# Patient Record
Sex: MALE | Race: WHITE | NOT HISPANIC OR LATINO | Employment: OTHER | URBAN - METROPOLITAN AREA
[De-identification: names, ages, dates, MRNs, and addresses within clinical notes are randomized per-mention and may not be internally consistent; named-entity substitution may affect disease eponyms.]

---

## 2020-12-21 ENCOUNTER — OFFICE VISIT (OUTPATIENT)
Dept: INTERNAL MEDICINE CLINIC | Facility: CLINIC | Age: 80
End: 2020-12-21
Payer: COMMERCIAL

## 2020-12-21 VITALS
SYSTOLIC BLOOD PRESSURE: 148 MMHG | TEMPERATURE: 97.8 F | OXYGEN SATURATION: 92 % | DIASTOLIC BLOOD PRESSURE: 80 MMHG | BODY MASS INDEX: 25.76 KG/M2 | WEIGHT: 170 LBS | HEIGHT: 68 IN | HEART RATE: 60 BPM

## 2020-12-21 DIAGNOSIS — H61.21 IMPACTED CERUMEN OF RIGHT EAR: ICD-10-CM

## 2020-12-21 DIAGNOSIS — E78.2 MIXED HYPERLIPIDEMIA: ICD-10-CM

## 2020-12-21 DIAGNOSIS — I10 ESSENTIAL HYPERTENSION: ICD-10-CM

## 2020-12-21 DIAGNOSIS — E11.9 TYPE 2 DIABETES MELLITUS WITHOUT COMPLICATION, WITHOUT LONG-TERM CURRENT USE OF INSULIN (HCC): Primary | ICD-10-CM

## 2020-12-21 PROBLEM — Z90.49 STATUS POST LAPAROSCOPIC CHOLECYSTECTOMY: Status: ACTIVE | Noted: 2019-05-10

## 2020-12-21 PROBLEM — Z09 STATUS POST UMBILICAL HERNIA REPAIR, FOLLOW-UP EXAM: Status: ACTIVE | Noted: 2019-05-10

## 2020-12-21 PROBLEM — K21.9 HIATAL HERNIA WITH GERD: Status: ACTIVE | Noted: 2019-05-29

## 2020-12-21 PROBLEM — K44.9 HIATAL HERNIA WITH GERD: Status: ACTIVE | Noted: 2019-05-29

## 2020-12-21 PROCEDURE — 3288F FALL RISK ASSESSMENT DOCD: CPT | Performed by: INTERNAL MEDICINE

## 2020-12-21 PROCEDURE — 3079F DIAST BP 80-89 MM HG: CPT | Performed by: INTERNAL MEDICINE

## 2020-12-21 PROCEDURE — 1101F PT FALLS ASSESS-DOCD LE1/YR: CPT | Performed by: INTERNAL MEDICINE

## 2020-12-21 PROCEDURE — 1160F RVW MEDS BY RX/DR IN RCRD: CPT | Performed by: INTERNAL MEDICINE

## 2020-12-21 PROCEDURE — 3077F SYST BP >= 140 MM HG: CPT | Performed by: INTERNAL MEDICINE

## 2020-12-21 PROCEDURE — 99203 OFFICE O/P NEW LOW 30 MIN: CPT | Performed by: INTERNAL MEDICINE

## 2020-12-21 PROCEDURE — 3725F SCREEN DEPRESSION PERFORMED: CPT | Performed by: INTERNAL MEDICINE

## 2020-12-21 PROCEDURE — 69210 REMOVE IMPACTED EAR WAX UNI: CPT | Performed by: INTERNAL MEDICINE

## 2020-12-21 RX ORDER — LOVASTATIN 40 MG/1
40 TABLET ORAL
COMMUNITY
End: 2021-05-03 | Stop reason: SDUPTHER

## 2020-12-21 RX ORDER — GLIMEPIRIDE 4 MG/1
4 TABLET ORAL
COMMUNITY
End: 2021-03-15 | Stop reason: SDUPTHER

## 2020-12-21 RX ORDER — ATENOLOL 100 MG/1
100 TABLET ORAL
COMMUNITY
End: 2021-06-03 | Stop reason: SDUPTHER

## 2020-12-21 RX ORDER — LISINOPRIL AND HYDROCHLOROTHIAZIDE 25; 20 MG/1; MG/1
TABLET ORAL
COMMUNITY
Start: 2020-09-28 | End: 2021-01-11 | Stop reason: SDUPTHER

## 2021-01-11 DIAGNOSIS — I10 BENIGN ESSENTIAL HTN: Primary | ICD-10-CM

## 2021-01-11 NOTE — TELEPHONE ENCOUNTER
LOV; 12/21/20  NOV: 5/26/21    Looks as if this patient is new to the practice, has seen Dr Veronica Jama once but needs his med refilled   Not sure of the dx code

## 2021-01-12 RX ORDER — LISINOPRIL AND HYDROCHLOROTHIAZIDE 25; 20 MG/1; MG/1
1 TABLET ORAL DAILY
Qty: 90 TABLET | Refills: 1 | Status: SHIPPED | OUTPATIENT
Start: 2021-01-12 | End: 2021-06-03 | Stop reason: SDUPTHER

## 2021-01-19 ENCOUNTER — IMMUNIZATIONS (OUTPATIENT)
Dept: FAMILY MEDICINE CLINIC | Facility: HOSPITAL | Age: 81
End: 2021-01-19

## 2021-01-19 DIAGNOSIS — Z23 ENCOUNTER FOR IMMUNIZATION: Primary | ICD-10-CM

## 2021-01-19 PROCEDURE — 0011A SARS-COV-2 / COVID-19 MRNA VACCINE (MODERNA) 100 MCG: CPT

## 2021-01-19 PROCEDURE — 91301 SARS-COV-2 / COVID-19 MRNA VACCINE (MODERNA) 100 MCG: CPT

## 2021-02-19 ENCOUNTER — IMMUNIZATIONS (OUTPATIENT)
Dept: FAMILY MEDICINE CLINIC | Facility: HOSPITAL | Age: 81
End: 2021-02-19

## 2021-02-19 DIAGNOSIS — Z23 ENCOUNTER FOR IMMUNIZATION: Primary | ICD-10-CM

## 2021-02-19 PROCEDURE — 0012A SARS-COV-2 / COVID-19 MRNA VACCINE (MODERNA) 100 MCG: CPT

## 2021-02-19 PROCEDURE — 91301 SARS-COV-2 / COVID-19 MRNA VACCINE (MODERNA) 100 MCG: CPT

## 2021-03-15 ENCOUNTER — TELEPHONE (OUTPATIENT)
Dept: INTERNAL MEDICINE CLINIC | Age: 81
End: 2021-03-15

## 2021-03-15 DIAGNOSIS — E11.9 TYPE 2 DIABETES MELLITUS WITHOUT COMPLICATION, WITHOUT LONG-TERM CURRENT USE OF INSULIN (HCC): ICD-10-CM

## 2021-03-15 DIAGNOSIS — E11.9 TYPE 2 DIABETES MELLITUS WITHOUT COMPLICATION, WITHOUT LONG-TERM CURRENT USE OF INSULIN (HCC): Primary | ICD-10-CM

## 2021-03-15 RX ORDER — GLIMEPIRIDE 4 MG/1
4 TABLET ORAL DAILY
Qty: 90 TABLET | Refills: 0 | Status: CANCELLED | OUTPATIENT
Start: 2021-03-15

## 2021-03-15 RX ORDER — GLIMEPIRIDE 4 MG/1
4 TABLET ORAL DAILY
Qty: 90 TABLET | Refills: 0 | Status: SHIPPED | OUTPATIENT
Start: 2021-03-15 | End: 2021-06-03 | Stop reason: SDUPTHER

## 2021-05-03 DIAGNOSIS — E78.2 MIXED HYPERLIPIDEMIA: Primary | ICD-10-CM

## 2021-05-03 RX ORDER — LOVASTATIN 40 MG/1
40 TABLET ORAL DAILY
Qty: 90 TABLET | Refills: 1 | Status: SHIPPED | OUTPATIENT
Start: 2021-05-03 | End: 2021-06-03 | Stop reason: SDUPTHER

## 2021-05-03 NOTE — TELEPHONE ENCOUNTER
LOV: 12/21/20  NOV: 5/26/21      His med needs to be refilled, he just est with Dr Sharon Siegel at last visit 12/21/20

## 2021-05-14 ENCOUNTER — APPOINTMENT (OUTPATIENT)
Dept: LAB | Facility: IMAGING CENTER | Age: 81
End: 2021-05-14
Payer: COMMERCIAL

## 2021-05-14 DIAGNOSIS — I10 ESSENTIAL HYPERTENSION: ICD-10-CM

## 2021-05-14 DIAGNOSIS — E78.2 MIXED HYPERLIPIDEMIA: ICD-10-CM

## 2021-05-14 DIAGNOSIS — E11.9 TYPE 2 DIABETES MELLITUS WITHOUT COMPLICATION, WITHOUT LONG-TERM CURRENT USE OF INSULIN (HCC): ICD-10-CM

## 2021-05-14 LAB
ALBUMIN SERPL BCP-MCNC: 4.3 G/DL (ref 3.5–5)
ALP SERPL-CCNC: 79 U/L (ref 46–116)
ALT SERPL W P-5'-P-CCNC: 27 U/L (ref 12–78)
ANION GAP SERPL CALCULATED.3IONS-SCNC: 6 MMOL/L (ref 4–13)
AST SERPL W P-5'-P-CCNC: 20 U/L (ref 5–45)
BASOPHILS # BLD AUTO: 0.03 THOUSANDS/ΜL (ref 0–0.1)
BASOPHILS NFR BLD AUTO: 0 % (ref 0–1)
BILIRUB SERPL-MCNC: 1.61 MG/DL (ref 0.2–1)
BUN SERPL-MCNC: 24 MG/DL (ref 5–25)
CALCIUM SERPL-MCNC: 9.1 MG/DL (ref 8.3–10.1)
CHLORIDE SERPL-SCNC: 105 MMOL/L (ref 100–108)
CHOLEST SERPL-MCNC: 132 MG/DL (ref 50–200)
CO2 SERPL-SCNC: 28 MMOL/L (ref 21–32)
CREAT SERPL-MCNC: 1.32 MG/DL (ref 0.6–1.3)
CREAT UR-MCNC: 95.3 MG/DL
EOSINOPHIL # BLD AUTO: 0.23 THOUSAND/ΜL (ref 0–0.61)
EOSINOPHIL NFR BLD AUTO: 2 % (ref 0–6)
ERYTHROCYTE [DISTWIDTH] IN BLOOD BY AUTOMATED COUNT: 13.1 % (ref 11.6–15.1)
EST. AVERAGE GLUCOSE BLD GHB EST-MCNC: 126 MG/DL
GFR SERPL CREATININE-BSD FRML MDRD: 50 ML/MIN/1.73SQ M
GLUCOSE P FAST SERPL-MCNC: 91 MG/DL (ref 65–99)
HBA1C MFR BLD: 6 %
HCT VFR BLD AUTO: 43.8 % (ref 36.5–49.3)
HDLC SERPL-MCNC: 56 MG/DL
HGB BLD-MCNC: 14.3 G/DL (ref 12–17)
IMM GRANULOCYTES # BLD AUTO: 0.04 THOUSAND/UL (ref 0–0.2)
IMM GRANULOCYTES NFR BLD AUTO: 0 % (ref 0–2)
LDLC SERPL CALC-MCNC: 53 MG/DL (ref 0–100)
LYMPHOCYTES # BLD AUTO: 6.36 THOUSANDS/ΜL (ref 0.6–4.47)
LYMPHOCYTES NFR BLD AUTO: 54 % (ref 14–44)
MCH RBC QN AUTO: 29.7 PG (ref 26.8–34.3)
MCHC RBC AUTO-ENTMCNC: 32.6 G/DL (ref 31.4–37.4)
MCV RBC AUTO: 91 FL (ref 82–98)
MICROALBUMIN UR-MCNC: 241 MG/L (ref 0–20)
MICROALBUMIN/CREAT 24H UR: 253 MG/G CREATININE (ref 0–30)
MONOCYTES # BLD AUTO: 0.64 THOUSAND/ΜL (ref 0.17–1.22)
MONOCYTES NFR BLD AUTO: 5 % (ref 4–12)
NEUTROPHILS # BLD AUTO: 4.59 THOUSANDS/ΜL (ref 1.85–7.62)
NEUTS SEG NFR BLD AUTO: 39 % (ref 43–75)
NONHDLC SERPL-MCNC: 76 MG/DL
NRBC BLD AUTO-RTO: 0 /100 WBCS
PLATELET # BLD AUTO: 163 THOUSANDS/UL (ref 149–390)
PMV BLD AUTO: 10.7 FL (ref 8.9–12.7)
POTASSIUM SERPL-SCNC: 4 MMOL/L (ref 3.5–5.3)
PROT SERPL-MCNC: 7.2 G/DL (ref 6.4–8.2)
RBC # BLD AUTO: 4.81 MILLION/UL (ref 3.88–5.62)
SODIUM SERPL-SCNC: 139 MMOL/L (ref 136–145)
TRIGL SERPL-MCNC: 114 MG/DL
WBC # BLD AUTO: 11.89 THOUSAND/UL (ref 4.31–10.16)

## 2021-05-14 PROCEDURE — 80061 LIPID PANEL: CPT

## 2021-05-14 PROCEDURE — 85025 COMPLETE CBC W/AUTO DIFF WBC: CPT

## 2021-05-14 PROCEDURE — 82043 UR ALBUMIN QUANTITATIVE: CPT

## 2021-05-14 PROCEDURE — 36415 COLL VENOUS BLD VENIPUNCTURE: CPT

## 2021-05-14 PROCEDURE — 83036 HEMOGLOBIN GLYCOSYLATED A1C: CPT

## 2021-05-14 PROCEDURE — 80053 COMPREHEN METABOLIC PANEL: CPT

## 2021-05-14 PROCEDURE — 82570 ASSAY OF URINE CREATININE: CPT

## 2021-06-03 ENCOUNTER — OFFICE VISIT (OUTPATIENT)
Dept: INTERNAL MEDICINE CLINIC | Facility: CLINIC | Age: 81
End: 2021-06-03
Payer: COMMERCIAL

## 2021-06-03 VITALS
OXYGEN SATURATION: 98 % | SYSTOLIC BLOOD PRESSURE: 114 MMHG | DIASTOLIC BLOOD PRESSURE: 72 MMHG | WEIGHT: 170.6 LBS | TEMPERATURE: 98.4 F | HEIGHT: 68 IN | BODY MASS INDEX: 25.85 KG/M2 | HEART RATE: 55 BPM | RESPIRATION RATE: 18 BRPM

## 2021-06-03 DIAGNOSIS — E80.6 HYPERBILIRUBINEMIA: ICD-10-CM

## 2021-06-03 DIAGNOSIS — R80.9 MICROALBUMINURIA: ICD-10-CM

## 2021-06-03 DIAGNOSIS — D72.829 LEUKOCYTOSIS, UNSPECIFIED TYPE: ICD-10-CM

## 2021-06-03 DIAGNOSIS — E11.9 TYPE 2 DIABETES MELLITUS WITHOUT COMPLICATION, WITHOUT LONG-TERM CURRENT USE OF INSULIN (HCC): Primary | ICD-10-CM

## 2021-06-03 DIAGNOSIS — E78.2 MIXED HYPERLIPIDEMIA: ICD-10-CM

## 2021-06-03 DIAGNOSIS — G20 PARKINSON'S DISEASE (HCC): ICD-10-CM

## 2021-06-03 DIAGNOSIS — I10 BENIGN ESSENTIAL HTN: ICD-10-CM

## 2021-06-03 DIAGNOSIS — I10 ESSENTIAL HYPERTENSION: ICD-10-CM

## 2021-06-03 PROBLEM — G20.A1 PARKINSON'S DISEASE: Status: ACTIVE | Noted: 2021-05-19

## 2021-06-03 PROBLEM — H61.21 IMPACTED CERUMEN OF RIGHT EAR: Status: RESOLVED | Noted: 2020-12-21 | Resolved: 2021-06-03

## 2021-06-03 PROCEDURE — 1160F RVW MEDS BY RX/DR IN RCRD: CPT | Performed by: INTERNAL MEDICINE

## 2021-06-03 PROCEDURE — 3074F SYST BP LT 130 MM HG: CPT | Performed by: INTERNAL MEDICINE

## 2021-06-03 PROCEDURE — 1036F TOBACCO NON-USER: CPT | Performed by: INTERNAL MEDICINE

## 2021-06-03 PROCEDURE — 3288F FALL RISK ASSESSMENT DOCD: CPT | Performed by: INTERNAL MEDICINE

## 2021-06-03 PROCEDURE — 3725F SCREEN DEPRESSION PERFORMED: CPT | Performed by: INTERNAL MEDICINE

## 2021-06-03 PROCEDURE — 3078F DIAST BP <80 MM HG: CPT | Performed by: INTERNAL MEDICINE

## 2021-06-03 PROCEDURE — 99214 OFFICE O/P EST MOD 30 MIN: CPT | Performed by: INTERNAL MEDICINE

## 2021-06-03 PROCEDURE — 1101F PT FALLS ASSESS-DOCD LE1/YR: CPT | Performed by: INTERNAL MEDICINE

## 2021-06-03 RX ORDER — LISINOPRIL AND HYDROCHLOROTHIAZIDE 25; 20 MG/1; MG/1
1 TABLET ORAL DAILY
Qty: 90 TABLET | Refills: 1 | Status: SHIPPED | OUTPATIENT
Start: 2021-06-03 | End: 2021-10-04 | Stop reason: SDUPTHER

## 2021-06-03 RX ORDER — LOVASTATIN 40 MG/1
40 TABLET ORAL DAILY
Qty: 90 TABLET | Refills: 1 | Status: SHIPPED | OUTPATIENT
Start: 2021-06-03 | End: 2021-10-25 | Stop reason: SDUPTHER

## 2021-06-03 RX ORDER — ATENOLOL 100 MG/1
100 TABLET ORAL DAILY
Qty: 90 TABLET | Refills: 1 | Status: SHIPPED | OUTPATIENT
Start: 2021-06-03 | End: 2022-01-10 | Stop reason: SDUPTHER

## 2021-06-03 RX ORDER — GLIMEPIRIDE 4 MG/1
4 TABLET ORAL DAILY
Qty: 90 TABLET | Refills: 1 | Status: SHIPPED | OUTPATIENT
Start: 2021-06-03 | End: 2021-09-20 | Stop reason: SDUPTHER

## 2021-06-03 NOTE — PROGRESS NOTES
Assessment/Plan:    Type 2 diabetes mellitus without complication, without long-term current use of insulin ([facility])    Lab Results   Component Value Date    HGBA1C 6 0 (H) 05/14/2021   Controlled  Continue glimepiride 4 mg daily and metformin 1000 mg twice a day  Essential hypertension  Controlled  Continue atenolol 100 mg daily, lisinopril-hydrochlorothiazide 20-25 mg daily  Parkinson's disease [facility])  Continue follow-up with Neurology  Mixed hyperlipidemia  Controlled  Continue lovastatin 40 mg daily  Microalbuminuria  Will refer to nephrology for further evaluation  Hyperbilirubinemia  Recheck CMP in 3 months  Diagnoses and all orders for this visit:    Type 2 diabetes mellitus without complication, without long-term current use of insulin ([facility] )  -     Ambulatory referral to Ophthalmology; Future  -     glimepiride (AMARYL) 4 mg tablet; Take 1 tablet (4 mg total) by mouth daily  -     metFORMIN (GLUCOPHAGE) 1000 MG tablet; Take 1 tablet (1,000 mg total) by mouth 2 (two) times a day with meals  -     Hemoglobin A1C; Future  -     Comprehensive metabolic panel; Future  -     CBC and differential; Future    Benign essential HTN  -     lisinopril-hydrochlorothiazide (PRINZIDE,ZESTORETIC) 20-25 MG per tablet; Take 1 tablet by mouth daily  -     atenolol (TENORMIN) 100 mg tablet; Take 1 tablet (100 mg total) by mouth daily    Mixed hyperlipidemia  -     lovastatin (MEVACOR) 40 MG tablet; Take 1 tablet (40 mg total) by mouth daily  -     Hemoglobin A1C; Future  -     Comprehensive metabolic panel; Future  -     CBC and differential; Future    Microalbuminuria  -     Ambulatory referral to Nephrology; Future    Leukocytosis, unspecified type  -     CBC and differential; Future    Hyperbilirubinemia  -     Comprehensive metabolic panel;  Future  -     CBC and differential; Future    Essential hypertension    Parkinson's disease ([facility] )                  Subjective:      Patient ID: Kaileyycirina Poe is a 79 y o  male  Chief Complaint   Patient presents with    Follow-up     6 month, BW done 5/14/21  no other issues or concerns    health maintenance     annual due , eye exam due appt is in Nov with Dr Stan Dang, A1C after Nov 14, PHQ, Fall risk, bmi f/u plan, bmi adult       69-year-old male is seen today for follow-up of chronic conditions  Laboratory studies reviewed today, hemoglobin A1c is controlled at 6%  CBC shows mild leukocytosis  CMP shows mild elevation in total bilirubin  Lipid panel is within acceptable range  He has been compliant with medication regimen  He has no active complaints or concerns at this time  He has establish care with a neurologist and is currently being evaluated for Parkinson's disease  He was not started any Parkinson's medications at this time  Tremors are stable  Microalbumin:  Creatinine ratio is elevated  He has never been evaluated by Nephrology in the past       Hypertension  This is a chronic problem  The current episode started more than 1 year ago  The problem is unchanged  The problem is controlled  Pertinent negatives include no chest pain, headaches, palpitations or shortness of breath  Past treatments include beta blockers, ACE inhibitors and diuretics  The current treatment provides moderate improvement  There are no compliance problems  Hyperlipidemia  This is a chronic problem  The current episode started more than 1 year ago  The problem is controlled  Recent lipid tests were reviewed and are normal  Pertinent negatives include no chest pain or shortness of breath  Current antihyperlipidemic treatment includes statins  The current treatment provides moderate improvement of lipids  There are no compliance problems  Diabetes  He presents for his follow-up diabetic visit  He has type 2 diabetes mellitus  His disease course has been stable  There are no hypoglycemic associated symptoms   Pertinent negatives for hypoglycemia include no dizziness or headaches  There are no diabetic associated symptoms  Pertinent negatives for diabetes include no chest pain, no fatigue and no weakness  There are no hypoglycemic complications  Symptoms are stable  There are no diabetic complications  Current diabetic treatment includes oral agent (dual therapy)  He is compliant with treatment all of the time  His weight is stable  He is following a generally healthy diet  There is no change in his home blood glucose trend  An ACE inhibitor/angiotensin II receptor blocker is being taken  The following portions of the patient's history were reviewed and updated as appropriate: allergies, current medications, past family history, past medical history, past social history, past surgical history and problem list     Review of Systems   Constitutional: Negative for activity change, appetite change, chills, diaphoresis, fatigue and fever  HENT: Negative for congestion, postnasal drip, rhinorrhea, sinus pressure, sinus pain, sneezing and sore throat  Eyes: Negative for visual disturbance  Respiratory: Negative for apnea, cough, choking, chest tightness, shortness of breath and wheezing  Cardiovascular: Negative for chest pain, palpitations and leg swelling  Gastrointestinal: Negative for abdominal distention, abdominal pain, anal bleeding, blood in stool, constipation, diarrhea, nausea and vomiting  Endocrine: Negative for cold intolerance and heat intolerance  Genitourinary: Negative for difficulty urinating, dysuria and hematuria  Musculoskeletal: Negative  Skin: Negative  Neurological: Negative for dizziness, weakness, light-headedness, numbness and headaches  Hematological: Negative for adenopathy  Psychiatric/Behavioral: Negative for agitation, sleep disturbance and suicidal ideas  All other systems reviewed and are negative  History reviewed  No pertinent past medical history        Current Outpatient Medications:     atenolol (TENORMIN) 100 mg tablet, Take 1 tablet (100 mg total) by mouth daily, Disp: 90 tablet, Rfl: 1    glimepiride (AMARYL) 4 mg tablet, Take 1 tablet (4 mg total) by mouth daily, Disp: 90 tablet, Rfl: 1    lisinopril-hydrochlorothiazide (PRINZIDE,ZESTORETIC) 20-25 MG per tablet, Take 1 tablet by mouth daily, Disp: 90 tablet, Rfl: 1    lovastatin (MEVACOR) 40 MG tablet, Take 1 tablet (40 mg total) by mouth daily, Disp: 90 tablet, Rfl: 1    metFORMIN (GLUCOPHAGE) 1000 MG tablet, Take 1 tablet (1,000 mg total) by mouth 2 (two) times a day with meals, Disp: 180 tablet, Rfl: 1    No Known Allergies    Social History   Past Surgical History:   Procedure Laterality Date    GALLBLADDER SURGERY      NOSE SURGERY       Family History   Problem Relation Age of Onset    No Known Problems Mother     No Known Problems Father        Objective:  /72 (BP Location: Left arm, Patient Position: Sitting, Cuff Size: Standard)   Pulse 55   Temp 98 4 °F (36 9 °C) (Temporal)   Resp 18   Ht 5' 8" (1 727 m)   Wt 77 4 kg (170 lb 9 6 oz)   SpO2 98%   BMI 25 94 kg/m²     Recent Results (from the past 1344 hour(s))   CBC and differential    Collection Time: 05/14/21  9:05 AM   Result Value Ref Range    WBC 11 89 (H) 4 31 - 10 16 Thousand/uL    RBC 4 81 3 88 - 5 62 Million/uL    Hemoglobin 14 3 12 0 - 17 0 g/dL    Hematocrit 43 8 36 5 - 49 3 %    MCV 91 82 - 98 fL    MCH 29 7 26 8 - 34 3 pg    MCHC 32 6 31 4 - 37 4 g/dL    RDW 13 1 11 6 - 15 1 %    MPV 10 7 8 9 - 12 7 fL    Platelets 110 307 - 422 Thousands/uL    nRBC 0 /100 WBCs    Neutrophils Relative 39 (L) 43 - 75 %    Immat GRANS % 0 0 - 2 %    Lymphocytes Relative 54 (H) 14 - 44 %    Monocytes Relative 5 4 - 12 %    Eosinophils Relative 2 0 - 6 %    Basophils Relative 0 0 - 1 %    Neutrophils Absolute 4 59 1 85 - 7 62 Thousands/µL    Immature Grans Absolute 0 04 0 00 - 0 20 Thousand/uL    Lymphocytes Absolute 6 36 (H) 0 60 - 4 47 Thousands/µL    Monocytes Absolute 0 64 0 17 - 1 22 Thousand/µL    Eosinophils Absolute 0 23 0 00 - 0 61 Thousand/µL    Basophils Absolute 0 03 0 00 - 0 10 Thousands/µL   Comprehensive metabolic panel    Collection Time: 05/14/21  9:05 AM   Result Value Ref Range    Sodium 139 136 - 145 mmol/L    Potassium 4 0 3 5 - 5 3 mmol/L    Chloride 105 100 - 108 mmol/L    CO2 28 21 - 32 mmol/L    ANION GAP 6 4 - 13 mmol/L    BUN 24 5 - 25 mg/dL    Creatinine 1 32 (H) 0 60 - 1 30 mg/dL    Glucose, Fasting 91 65 - 99 mg/dL    Calcium 9 1 8 3 - 10 1 mg/dL    AST 20 5 - 45 U/L    ALT 27 12 - 78 U/L    Alkaline Phosphatase 79 46 - 116 U/L    Total Protein 7 2 6 4 - 8 2 g/dL    Albumin 4 3 3 5 - 5 0 g/dL    Total Bilirubin 1 61 (H) 0 20 - 1 00 mg/dL    eGFR 50 ml/min/1 73sq m   Hemoglobin A1C    Collection Time: 05/14/21  9:05 AM   Result Value Ref Range    Hemoglobin A1C 6 0 (H) Normal 3 8-5 6%; PreDiabetic 5 7-6 4%; Diabetic >=6 5%; Glycemic control for adults with diabetes <7 0% %     mg/dl   Lipid panel    Collection Time: 05/14/21  9:05 AM   Result Value Ref Range    Cholesterol 132 50 - 200 mg/dL    Triglycerides 114 <=150 mg/dL    HDL, Direct 56 >=40 mg/dL    LDL Calculated 53 0 - 100 mg/dL    Non-HDL-Chol (CHOL-HDL) 76 mg/dl   Microalbumin / creatinine urine ratio    Collection Time: 05/14/21  9:05 AM   Result Value Ref Range    Creatinine, Ur 95 3 mg/dL    Microalbum  ,U,Random 241 0 (H) 0 0 - 20 0 mg/L    Microalb Creat Ratio 253 (H) 0 - 30 mg/g creatinine            Physical Exam  Vitals signs and nursing note reviewed  Constitutional:       General: He is not in acute distress  Appearance: He is well-developed  He is not diaphoretic  HENT:      Head: Normocephalic and atraumatic  Eyes:      General: No scleral icterus  Right eye: No discharge  Left eye: No discharge  Conjunctiva/sclera: Conjunctivae normal       Pupils: Pupils are equal, round, and reactive to light  Neck:      Musculoskeletal: Normal range of motion and neck supple  Thyroid: No thyromegaly  Vascular: No JVD  Cardiovascular:      Rate and Rhythm: Normal rate and regular rhythm  Heart sounds: Normal heart sounds  No murmur  No friction rub  No gallop  Pulmonary:      Effort: Pulmonary effort is normal  No respiratory distress  Breath sounds: Normal breath sounds  No wheezing or rales  Chest:      Chest wall: No tenderness  Abdominal:      General: Bowel sounds are normal  There is no distension  Palpations: Abdomen is soft  There is no mass  Tenderness: There is no abdominal tenderness  There is no guarding or rebound  Musculoskeletal: Normal range of motion  General: No tenderness or deformity  Lymphadenopathy:      Cervical: No cervical adenopathy  Skin:     General: Skin is warm and dry  Coloration: Skin is not pale  Findings: No erythema or rash  Neurological:      Mental Status: He is alert and oriented to person, place, and time  Cranial Nerves: No cranial nerve deficit  Coordination: Coordination normal       Deep Tendon Reflexes: Reflexes are normal and symmetric  Psychiatric:         Behavior: Behavior normal          Thought Content:  Thought content normal          Judgment: Judgment normal

## 2021-06-03 NOTE — ASSESSMENT & PLAN NOTE
Lab Results   Component Value Date    HGBA1C 6 0 (H) 05/14/2021   Controlled  Continue glimepiride 4 mg daily and metformin 1000 mg twice a day

## 2021-08-09 ENCOUNTER — CONSULT (OUTPATIENT)
Dept: NEPHROLOGY | Facility: CLINIC | Age: 81
End: 2021-08-09
Payer: COMMERCIAL

## 2021-08-09 VITALS
WEIGHT: 168 LBS | BODY MASS INDEX: 25.46 KG/M2 | SYSTOLIC BLOOD PRESSURE: 138 MMHG | RESPIRATION RATE: 16 BRPM | DIASTOLIC BLOOD PRESSURE: 78 MMHG | HEART RATE: 80 BPM | HEIGHT: 68 IN

## 2021-08-09 DIAGNOSIS — R80.9 MICROALBUMINURIA: Primary | ICD-10-CM

## 2021-08-09 LAB
SL AMB  POCT GLUCOSE, UA: NEGATIVE
SL AMB LEUKOCYTE ESTERASE,UA: NEGATIVE
SL AMB POCT BILIRUBIN,UA: NEGATIVE
SL AMB POCT BLOOD,UA: NEGATIVE
SL AMB POCT CLARITY,UA: CLEAR
SL AMB POCT COLOR,UA: YELLOW
SL AMB POCT KETONES,UA: NEGATIVE
SL AMB POCT NITRITE,UA: NEGATIVE
SL AMB POCT PH,UA: 5
SL AMB POCT SPECIFIC GRAVITY,UA: 1.03
SL AMB POCT URINE PROTEIN: ABNORMAL
SL AMB POCT UROBILINOGEN: NEGATIVE

## 2021-08-09 PROCEDURE — 99204 OFFICE O/P NEW MOD 45 MIN: CPT | Performed by: INTERNAL MEDICINE

## 2021-08-09 PROCEDURE — 3075F SYST BP GE 130 - 139MM HG: CPT | Performed by: INTERNAL MEDICINE

## 2021-08-09 PROCEDURE — 1036F TOBACCO NON-USER: CPT | Performed by: INTERNAL MEDICINE

## 2021-08-09 PROCEDURE — 3078F DIAST BP <80 MM HG: CPT | Performed by: INTERNAL MEDICINE

## 2021-08-09 PROCEDURE — 81002 URINALYSIS NONAUTO W/O SCOPE: CPT | Performed by: INTERNAL MEDICINE

## 2021-08-09 NOTE — LETTER
August 9, 2021     January Hightower, [address] [facility]    Patient: Toby East   YOB: 1940   Date of Visit: 8/9/2021       Dear Dr Elda Cohn:    Thank you for referring Toby East to me for evaluation  Below are my notes for this consultation  If you have questions, please do not hesitate to call me  I look forward to following your patient along with you  Sincerely,        Sivan Christian MD        CC: No Recipients  Sivan Christian MD  8/9/2021  2:41 PM  Sign when Signing Visit  Consultation - Nephrology   Toby East 79 y o  male MRN: [id number]  Unit/Bed#:  Encounter: [id number]      Assessment/Plan     Assessment / Plan:  1  Renal    The patient reports that he has been diabetic for couple years and is currently on oral medications and is excellent glycemic control  Creatinine was 1 25 year ago 1 3 this year  Urine protein estimation is still below 300 mg which is in the microalbumin phase  Urinalysis today dip for protein but was likely a concentrated specimen as there are couple in the labs that showed microalbumin levels less than 300  So at this point I cannot say the T is overt diabetic nephropathy given the low levels of protein and that would mean that his mild renal insufficiency is not due to diabetic kidney disease and likely is due to nephrosclerosis  Another possibility there could be reduction glomerular pressure from an ACE-inhibitor  This point is on all the appropriate treatment with excellent sugar control as is A1cs less than 7%  He is on treatment for hypercholesterolemia blood pressure is excellent and he is on an ACE-inhibitor  In talking to the patient he told me that he had a new family physician this year  He would like to just follow with his primary doctor less there was a real problem or issue with his kidneys    At this point you been doing a great job with monitor his urine protein estimation is in renal function I would just continue with that per protocol  Continue with the current treatment if there is ever problem or issue in the future you would like him to be seen by me please have him call the office for follow-up and he is aware of this and that was his desire  History of Present Illness   Physician Requesting Consult: No att  providers found  Reason for Consult / Principal Problem:  Renal insufficiency with micro albuminuria  Hx and PE limited by:   HPI: Sugey Celaya is a 79y o  year old male who presents for his 1st visit  The patient states his health has been good he has no recent illnesses and he was referred here after recently seeing his family physician to evaluate his kidney function  History obtained from chart review and the patient  Consults    Review of Systems   Constitutional: Negative for chills, diaphoresis, fatigue and fever  HENT: Negative  Eyes: Negative  Respiratory: Negative  Negative for cough, chest tightness, shortness of breath and wheezing  Cardiovascular: Negative  Negative for chest pain, palpitations and leg swelling  Gastrointestinal: Negative  Negative for abdominal distention, abdominal pain, diarrhea, nausea and vomiting  Genitourinary: Negative for difficulty urinating, dysuria, flank pain and hematuria  Neurological: Positive for tremors  Negative for dizziness, syncope, light-headedness and headaches  Psychiatric/Behavioral: Negative  Negative for agitation, behavioral problems, confusion and decreased concentration  Historical Information   Patient Active Problem List   Diagnosis    Essential hypertension    Hiatal hernia with GERD    Status post laparoscopic cholecystectomy    Status post umbilical hernia repair, follow-up exam    Type 2 diabetes mellitus without complication, without long-term current use of insulin ([facility]    Mixed hyperlipidemia    Parkinson's disease ([facility]    Microalbuminuria    Hyperbilirubinemia     History reviewed   No pertinent past medical history  Past Surgical History:   Procedure Laterality Date    GALLBLADDER SURGERY      NOSE SURGERY       Social History   Social History     Substance and Sexual Activity   Alcohol Use Yes     Social History     Substance and Sexual Activity   Drug Use Never     Social History     Tobacco Use   Smoking Status Never Smoker   Smokeless Tobacco Never Used     Family History   Problem Relation Age of Onset    No Known Problems Mother     No Known Problems Father        Meds/Allergies   current meds:   No current facility-administered medications for this visit  No Known Allergies    Objective   [unfilled]  Body mass index is 25 54 kg/m²  Invasive Devices:        PHYSICAL EXAM:  /78 (BP Location: Left arm, Patient Position: Sitting, Cuff Size: Standard)   Pulse 80   Resp 16   Ht 5' 8" (1 727 m)   Wt 76 2 kg (168 lb)   BMI 25 54 kg/m²     Physical Exam  Constitutional:       General: He is not in acute distress  Appearance: Normal appearance  He is not ill-appearing or diaphoretic  HENT:      Head: Normocephalic and atraumatic  Nose:      Comments: Wearing mask     Mouth/Throat:      Comments: Wearing mask  Eyes:      General: No scleral icterus  Extraocular Movements: Extraocular movements intact  Cardiovascular:      Rate and Rhythm: Normal rate and regular rhythm  Heart sounds: No friction rub  No gallop  Comments: No edema  Pulmonary:      Effort: Pulmonary effort is normal  No respiratory distress  Breath sounds: Normal breath sounds  No wheezing, rhonchi or rales  Abdominal:      General: Bowel sounds are normal  There is no distension  Palpations: Abdomen is soft  Tenderness: There is no abdominal tenderness  There is no rebound  Musculoskeletal:      Cervical back: Normal range of motion and neck supple  Neurological:      General: No focal deficit present        Mental Status: He is alert and oriented to person, place, and time  Mental status is at baseline  Psychiatric:         Mood and Affect: Mood normal          Behavior: Behavior normal          Thought Content:  Thought content normal          Judgment: Judgment normal            Current Weight: Weight - Scale: 76 2 kg (168 lb)  First Weight: Weight - Scale: 76 2 kg (168 lb)    Lab Results:              Invalid input(s): LABGLOM        Invalid input(s): LABALBU

## 2021-08-09 NOTE — PROGRESS NOTES
Consultation - Nephrology   Terri Milligan 79 y o  male MRN: [id number]  Unit/Bed#:  Encounter: [id number]      Assessment/Plan     Assessment / Plan:  1  Renal    The patient reports that he has been diabetic for couple years and is currently on oral medications and is excellent glycemic control  Creatinine was 1 25 year ago 1 3 this year  Urine protein estimation is still below 300 mg which is in the microalbumin phase  Urinalysis today dip for protein but was likely a concentrated specimen as there are couple in the labs that showed microalbumin levels less than 300  So at this point I cannot say the T is overt diabetic nephropathy given the low levels of protein and that would mean that his mild renal insufficiency is not due to diabetic kidney disease and likely is due to nephrosclerosis  Another possibility there could be reduction glomerular pressure from an ACE-inhibitor  This point is on all the appropriate treatment with excellent sugar control as is A1cs less than 7%  He is on treatment for hypercholesterolemia blood pressure is excellent and he is on an ACE-inhibitor  In talking to the patient he told me that he had a new family physician this year  He would like to just follow with his primary doctor less there was a real problem or issue with his kidneys  At this point you been doing a great job with monitor his urine protein estimation is in renal function I would just continue with that per protocol  Continue with the current treatment if there is ever problem or issue in the future you would like him to be seen by me please have him call the office for follow-up and he is aware of this and that was his desire  History of Present Illness   Physician Requesting Consult: No att  providers found  Reason for Consult / Principal Problem:  Renal insufficiency with micro albuminuria  Hx and PE limited by:   HPI: Terri Milligan is a 79y o  year old male who presents for his 1st visit    The patient states his health has been good he has no recent illnesses and he was referred here after recently seeing his family physician to evaluate his kidney function  History obtained from chart review and the patient  Consults    Review of Systems   Constitutional: Negative for chills, diaphoresis, fatigue and fever  HENT: Negative  Eyes: Negative  Respiratory: Negative  Negative for cough, chest tightness, shortness of breath and wheezing  Cardiovascular: Negative  Negative for chest pain, palpitations and leg swelling  Gastrointestinal: Negative  Negative for abdominal distention, abdominal pain, diarrhea, nausea and vomiting  Genitourinary: Negative for difficulty urinating, dysuria, flank pain and hematuria  Neurological: Positive for tremors  Negative for dizziness, syncope, light-headedness and headaches  Psychiatric/Behavioral: Negative  Negative for agitation, behavioral problems, confusion and decreased concentration  Historical Information   Patient Active Problem List   Diagnosis    Essential hypertension    Hiatal hernia with GERD    Status post laparoscopic cholecystectomy    Status post umbilical hernia repair, follow-up exam    Type 2 diabetes mellitus without complication, without long-term current use of insulin ([facility]    Mixed hyperlipidemia    Parkinson's disease ([facility]    Microalbuminuria    Hyperbilirubinemia     History reviewed  No pertinent past medical history    Past Surgical History:   Procedure Laterality Date    GALLBLADDER SURGERY      NOSE SURGERY       Social History   Social History     Substance and Sexual Activity   Alcohol Use Yes     Social History     Substance and Sexual Activity   Drug Use Never     Social History     Tobacco Use   Smoking Status Never Smoker   Smokeless Tobacco Never Used     Family History   Problem Relation Age of Onset    No Known Problems Mother     No Known Problems Father        Meds/Allergies   current meds: No current facility-administered medications for this visit  No Known Allergies    Objective   [unfilled]  Body mass index is 25 54 kg/m²  Invasive Devices:        PHYSICAL EXAM:  /78 (BP Location: Left arm, Patient Position: Sitting, Cuff Size: Standard)   Pulse 80   Resp 16   Ht 5' 8" (1 727 m)   Wt 76 2 kg (168 lb)   BMI 25 54 kg/m²     Physical Exam  Constitutional:       General: He is not in acute distress  Appearance: Normal appearance  He is not ill-appearing or diaphoretic  HENT:      Head: Normocephalic and atraumatic  Nose:      Comments: Wearing mask     Mouth/Throat:      Comments: Wearing mask  Eyes:      General: No scleral icterus  Extraocular Movements: Extraocular movements intact  Cardiovascular:      Rate and Rhythm: Normal rate and regular rhythm  Heart sounds: No friction rub  No gallop  Comments: No edema  Pulmonary:      Effort: Pulmonary effort is normal  No respiratory distress  Breath sounds: Normal breath sounds  No wheezing, rhonchi or rales  Abdominal:      General: Bowel sounds are normal  There is no distension  Palpations: Abdomen is soft  Tenderness: There is no abdominal tenderness  There is no rebound  Musculoskeletal:      Cervical back: Normal range of motion and neck supple  Neurological:      General: No focal deficit present  Mental Status: He is alert and oriented to person, place, and time  Mental status is at baseline  Psychiatric:         Mood and Affect: Mood normal          Behavior: Behavior normal          Thought Content:  Thought content normal          Judgment: Judgment normal            Current Weight: Weight - Scale: 76 2 kg (168 lb)  First Weight: Weight - Scale: 76 2 kg (168 lb)    Lab Results:              Invalid input(s): LABGLOM        Invalid input(s): LABALBU

## 2021-08-09 NOTE — PATIENT INSTRUCTIONS
You are here for your 1st visit as you were sent by your family physician  You are diabetic for couple years  Your creatinine test which is the blood test for the kidney function is 1 3 going back a year was around 1 2 so it is unchanged  There is a little bit of protein in your urine that could be due to diabetes very early but at this point you do not have diabetic kidney disease because those patients tend to have a lot of protein in the urine and would not be responsible for the blood test being a little abnormal   Your doctor's been monitoring this  Your on all the appropriate treatment 2 delay damage your kidneys with blood pressure control sugar control is great medicines for cholesterol and your on a medicine called an ACE-inhibitor that helps minimize protein in the urine  This tells me your on all the appropriate treatment and her blood sugars are doing great  I will communicate this with your doctor and if there is a problem in the future he can refer you back

## 2021-09-14 ENCOUNTER — TELEPHONE (OUTPATIENT)
Dept: HEMATOLOGY ONCOLOGY | Facility: CLINIC | Age: 81
End: 2021-09-14

## 2021-09-14 ENCOUNTER — TELEPHONE (OUTPATIENT)
Dept: SURGICAL ONCOLOGY | Facility: CLINIC | Age: 81
End: 2021-09-14

## 2021-09-14 NOTE — TELEPHONE ENCOUNTER
New Patient Intake Skin Malignancy   Patient Details:     Guido Maldonado     1940     [id number]     Background Information:   [address] starts by opening a telephone encounter and gathering the following information   Who is calling to schedule? Patient   Referring Provider Dr Selin Montgomery   To Which Speciality? Surgical Oncology   Reason for Visit? New Diagnosis   Tumor Type/Diagnosis? melanoma   Is this Cancer or Non-Cancer? Cancer   Is there a confirmed diagnosis from Biopsy/tissue reviewed by Pathology? Yes   Date of Tissue Diagnosis  If outside of [facility] obtain report and slides 9/2021   Is the patient aware of diagnosis? Yes   Has Imaging been done? No   If so, when and where? If outside of [facility], obtain records and disk    Has Bloodwork been done? No   If so, when and where? If outside of [facility], obtain records  Are records needed from an outside facility? Yes   If yes, Name, [place] city and state where facility is located  The path is from [address] [place] [address] and was sent to right fax 9/14/21 and slide requested is ready to be sent   Is there a personal history of cancer? If yes, what kind? No   Is there a family history of cancer? If so, what kind? No   Scheduling Information:   Preferred Amber Coates  Or [facility]   Are there any days the patient cannot be seen? No   Will you see a different provider if we can schedule your appointment sooner?  Yes   Miscellaneous:  sent to nurse navigator for scheduling instructions

## 2021-09-14 NOTE — TELEPHONE ENCOUNTER
New Patient Request   Patient Details:     Alvin Garcia      1940      [id number]      Reason for Appointment   Who is calling to schedule? Nicki   If not Patient, what is their name? What is the diagnosis? Melanoma    Who is the referring doctor? Dr Johnny Morales are you scheduling with ? Surg onc    Preferred [id number] [address] or [facility] Number to call back on? If calling from the [facility], use the Nurse number   [phone]   Miscellaneous Information:  records will be sent    Please advise the patient, a new patient  will be calling them back within 1 business day

## 2021-09-20 ENCOUNTER — DOCUMENTATION (OUTPATIENT)
Dept: HEMATOLOGY ONCOLOGY | Facility: CLINIC | Age: 81
End: 2021-09-20

## 2021-09-20 DIAGNOSIS — E11.9 TYPE 2 DIABETES MELLITUS WITHOUT COMPLICATION, WITHOUT LONG-TERM CURRENT USE OF INSULIN (HCC): ICD-10-CM

## 2021-09-20 RX ORDER — GLIMEPIRIDE 4 MG/1
4 TABLET ORAL DAILY
Qty: 90 TABLET | Refills: 1 | Status: SHIPPED | OUTPATIENT
Start: 2021-09-20 | End: 2021-12-28 | Stop reason: SDUPTHER

## 2021-09-20 NOTE — PROGRESS NOTES
Called ins & spoke to fernanda call ref# [id number] pt has an active [facility] med repl plan that runs on a cal year  Effective 01/01/21   Deduct $300 met in sept 21  Out of pocket $1700 met $419 28  For labs there is the deduct then a co-ins of 90/10 that goes towards the out of pocket   Once the out of pocket is met all services are covered 100%

## 2021-09-20 NOTE — TELEPHONE ENCOUNTER
Requesting Amaryl 4 mg to be sent to [facility] in [place] since he does not think he will get in on time      LOV: 6/3/21  NOV: 10/11/21

## 2021-09-20 NOTE — TELEPHONE ENCOUNTER
Intake received  Benefits review in process  Pt outreach to follow    Called pt to go over his benefts(see notes) & pt thanked me for the info

## 2021-09-22 PROBLEM — C43.39 MELANOMA OF FOREHEAD (HCC): Status: ACTIVE | Noted: 2021-09-22

## 2021-09-23 ENCOUNTER — CONSULT (OUTPATIENT)
Dept: SURGICAL ONCOLOGY | Facility: CLINIC | Age: 81
End: 2021-09-23
Payer: COMMERCIAL

## 2021-09-23 VITALS
OXYGEN SATURATION: 98 % | WEIGHT: 165 LBS | SYSTOLIC BLOOD PRESSURE: 148 MMHG | BODY MASS INDEX: 25.01 KG/M2 | DIASTOLIC BLOOD PRESSURE: 100 MMHG | TEMPERATURE: 97.1 F | HEART RATE: 68 BPM | RESPIRATION RATE: 16 BRPM | HEIGHT: 68 IN

## 2021-09-23 DIAGNOSIS — C43.30 MALIGNANT MELANOMA OF SKIN OF FACE (HCC): Primary | ICD-10-CM

## 2021-09-23 PROCEDURE — 99204 OFFICE O/P NEW MOD 45 MIN: CPT | Performed by: STUDENT IN AN ORGANIZED HEALTH CARE EDUCATION/TRAINING PROGRAM

## 2021-09-23 PROCEDURE — 1036F TOBACCO NON-USER: CPT | Performed by: STUDENT IN AN ORGANIZED HEALTH CARE EDUCATION/TRAINING PROGRAM

## 2021-09-23 RX ORDER — TRAMADOL HYDROCHLORIDE 50 MG/1
50 TABLET ORAL EVERY 6 HOURS PRN
Qty: 20 TABLET | Refills: 0 | Status: SHIPPED | OUTPATIENT
Start: 2021-09-23 | End: 2021-10-12 | Stop reason: HOSPADM

## 2021-09-23 RX ORDER — CEFAZOLIN SODIUM 1 G/50ML
1000 SOLUTION INTRAVENOUS ONCE
Status: CANCELLED | OUTPATIENT
Start: 2021-09-23 | End: 2021-09-23

## 2021-09-23 NOTE — PROGRESS NOTES
Surgical Oncology Consultation    [address] [facility] [facility] Aranza Fillers  [address] [facility] [id number] [phone]    Patient:  Ghanshyam Donovan  1940  [id number]    Primary Care provider: MD Chinyere Proctor [address]    Referring provider:  No referring provider defined for this encounter  Diagnoses and all orders for this visit:    Malignant melanoma of skin of face ([facility] [address] )  -     Case request operating room: [address] [facility], BIOPSY LYMPH NODE SENTINEL; Standing    Other orders  -     Incentive spirometry; Standing  -     Insert and maintain IV line; Standing  -     Void On-Call to O R ; Standing  -     Place sequential compression device; Standing  -     Comprehensive metabolic panel; Future  -     CBC and differential; Future  -     EKG 12 lead; Future  -     XR chest pa & lateral; Future  -     ceFAZolin (ANCEF) IVPB (premix in dextrose) 1,000 mg 50 mL  -     NM lymphatic melanoma; Future  -     Ambulatory referral to Plastic Surgery; Future  -     traMADol (Ultram) 50 mg tablet; Take 1 tablet (50 mg total) by mouth every 6 (six) hours as needed for moderate pain        Chief Complaint   Patient presents with    Melanoma     Pt here for consult for melanoma on his left face  No follow-ups on file  Oncology History    No history exists  History of Present Illness  :   78 yo male presents with new melanoma of left face  Patient states the melena has been present for an unknown amount of time  He states he sees Dermatology regularly, almost 4 months, for a history of basal cell and squamous cell carcinomas  The dermatologist noted this lesion, performed biopsy, revealing a 1 7 mm in depth melanoma with a positive deep margin  The patient denies any other lumps or bumps the face or neck    He states he has is a hat regularly when he goes out the son and does not recall any blistering sunburns  He has had brown hair and blue eyes  Review of Systems  Complete ROS Surg Onc:   Constitutional: The patient denies new or recent history of general fatigue, no recent weight loss, no change in appetite  Eyes: No complaints of visual problems, no scleral icterus  ENT: No complaints of ear pain, no hoarseness, no difficulty swallowing,  no tinnitus and no new masses in head, oral cavity, or neck  Cardiovascular: No complaints of chest pain, no palpitations, no ankle edema  Respiratory: No complaints of shortness of breath, no cough  Gastrointestinal: No complaints of jaundice, no bloody stools, no pale stools  Genitourinary: No complaints of dysuria, no hematuria, no nocturia, no frequent urination, no urethral discharge  Musculoskeletal: No complaints of weakness, paralysis, joint stiffness or arthralgias  Integumentary: No complaints of rash, no new lesions  Neurological: No complaints of convulsions, no seizures, no dizziness  Hematologic/Lymphatic: No complaints of easy bruising  Endocrine:  No hot or cold intolerance  No polydipsia, polyphagia, or polyuria  Allergy/immunology:  No environmental allergies  No food allergies  Not immunocompromised  Patient Active Problem List   Diagnosis    Essential hypertension    Hiatal hernia with GERD    Status post laparoscopic cholecystectomy    Status post umbilical hernia repair, follow-up exam    Type 2 diabetes mellitus without complication, without long-term current use of insulin ([address] )    Mixed hyperlipidemia    Parkinson's disease ([address] )    Microalbuminuria    Hyperbilirubinemia    Malignant melanoma of skin of face ([address] )     History reviewed  No pertinent past medical history    Past Surgical History:   Procedure Laterality Date    GALLBLADDER SURGERY      HAND SURGERY Left     NOSE SURGERY       Family History   Problem Relation Age of Onset    No Known Problems Mother     No Known Problems Father Social History     Socioeconomic History    Marital status: /Civil Union     Spouse name: Not on file    Number of children: Not on file    Years of education: Not on file    Highest education level: Not on file   Occupational History    Not on file   Tobacco Use    Smoking status: Never Smoker    Smokeless tobacco: Never Used   Vaping Use    Vaping Use: Never used   Substance and Sexual Activity    Alcohol use: Not Currently    Drug use: Never    Sexual activity: Not Currently   Other Topics Concern    Not on file   Social History Narrative    Not on file     Social Determinants of Health     Financial Resource Strain:     Difficulty of Paying Living Expenses:    Food Insecurity:     Worried About Running Out of Food in the Last Year:     [address] in the Last Year:    Transportation Needs:     Lack of Transportation (Medical):      Lack of Transportation (Non-Medical):    Physical Activity:     Days of Exercise per Week:     Minutes of Exercise per Session:    Stress:     Feeling of Stress :    Social Connections:     Frequency of Communication with Friends and Family:     Frequency of Social Gatherings with Friends and Family:     Attends [employer] Services:     Active Member of Clubs or Organizations:     Attends Club or Organization Meetings:     Marital Status:    Intimate Partner Violence:     Fear of Current or Ex-Partner:     Emotionally Abused:     Physically Abused:     Sexually Abused:        Current Outpatient Medications:     atenolol (TENORMIN) 100 mg tablet, Take 1 tablet (100 mg total) by mouth daily, Disp: 90 tablet, Rfl: 1    glimepiride (AMARYL) 4 mg tablet, Take 1 tablet (4 mg total) by mouth daily, Disp: 90 tablet, Rfl: 1    lisinopril-hydrochlorothiazide (PRINZIDE,ZESTORETIC) 20-25 MG per tablet, Take 1 tablet by mouth daily, Disp: 90 tablet, Rfl: 1    lovastatin (MEVACOR) 40 MG tablet, Take 1 tablet (40 mg total) by mouth daily, Disp: 90 tablet, Rfl: 1    metFORMIN (GLUCOPHAGE) 1000 MG tablet, Take 1 tablet (1,000 mg total) by mouth 2 (two) times a day with meals, Disp: 180 tablet, Rfl: 1  No Known Allergies    Vitals:    09/23/21 0954   BP: 148/100   Pulse: 68   Resp: 16   Temp: (!) 97 1 °F (36 2 °C)   SpO2: 98%       Physical Exam   General: Appears well, appears stated age  Skin: Warm, anicteric  HEENT: Normocephalic, atraumatic; sclera aniceteric, mucous membranes moist; cervical nodes without adenopathy  1 cm superior and lateral to the eye is a 0 8 cm pigmented lesion  Cardiopulmonary: RRR, Easy WOB, no BLE edema  Abd: Flat and soft, nontender, no masses appreciated, no hepatosplenomegaly  MSK: Symmetric, no cyanosis, no overt weakness  Lymphatic: No cervical, axillary or inguinal lymphadenopathy  Neuro: Affect appropriate, no gross motor abnormalities            Pathology:  Reviewed in media    Labs: Reviewed in EPIC    Imaging  No results found  I independently reviewed and interpreted the above laboratory and imaging data  Discussion/Summary:   80 yo male with new diagnosis of 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  Discussed diagnosis of melanoma and explained that the patient has an intermediate thickness melanoma for which a wide local excision and sentinel lymph node biopsy is recommended  I also discussed that the true depth of the lesion is unknown because of the positive deep margin  Discussed that any additional therapy will be guided by the final pathology and results of the LN bx  Discussed that sun protection is best prevention for melanoma and discussed ongoing sun protection  Reinforced need for continued skin surveillance with dermatology, which the patient has been very diligent about  Risks, benefits, alternatives and prognosis discussed in full to include bleeding, infection, wound healing complications, need for re-excision, seroma, and pt expresses understanding and endorsement   Will proceed with WLE + SLN bx

## 2021-09-27 DIAGNOSIS — E11.9 TYPE 2 DIABETES MELLITUS WITHOUT COMPLICATION, WITHOUT LONG-TERM CURRENT USE OF INSULIN (HCC): ICD-10-CM

## 2021-09-30 ENCOUNTER — CONSULT (OUTPATIENT)
Dept: PLASTIC SURGERY | Facility: CLINIC | Age: 81
End: 2021-09-30
Payer: COMMERCIAL

## 2021-09-30 VITALS
HEIGHT: 66 IN | SYSTOLIC BLOOD PRESSURE: 130 MMHG | BODY MASS INDEX: 27.32 KG/M2 | WEIGHT: 170 LBS | DIASTOLIC BLOOD PRESSURE: 78 MMHG | HEART RATE: 68 BPM | TEMPERATURE: 96.5 F

## 2021-09-30 DIAGNOSIS — C43.30 MALIGNANT MELANOMA OF SKIN OF FACE (HCC): ICD-10-CM

## 2021-09-30 PROCEDURE — 99204 OFFICE O/P NEW MOD 45 MIN: CPT | Performed by: STUDENT IN AN ORGANIZED HEALTH CARE EDUCATION/TRAINING PROGRAM

## 2021-09-30 PROCEDURE — 1160F RVW MEDS BY RX/DR IN RCRD: CPT | Performed by: STUDENT IN AN ORGANIZED HEALTH CARE EDUCATION/TRAINING PROGRAM

## 2021-09-30 NOTE — PROGRESS NOTES
Plastic Surgery Consult    Reason for visit: left temple melanoma      HPI:  Patient is a pleasant 80 y/o male who presents as referral from surg onc with biopsy proven melanoma of at least 1 7 mm thick with positive margins on shave biopsy  Patient does not recall how long the lesion has been there  He has no history of skin cancer and did not spend a significant portion of his life out in the sun  He has no family history of skin cancer  The lesion does not ulcerate or cause him any discomfort  ROS: 12 pt ROS reviewed, negative except as otherwise noted in HPI  PMH: HTN, cataracts, DM (last HgbA1C 6 0)  FamHx: non-contrib  SurgHx: cholecystectomy  SocHx: no tobaco, etoh  Meds: metformin, lisinopril, glimepiride, atenolol, no blood thinners or steroids  Allergies: NKDA    PE:  Vitals:    09/30/21 1018   BP: 130/78   Pulse: 68   Temp: (!) 96 5 °F (35 8 °C)       General: NC/AT, breathing comfortably on RA  Neuro: CN II-XII grossly intact, symmetric reflexes  HEENT: PERRLA, EOMI, external ears normal, no lesions or deformities, neck supple, trachea midline  Respiratory: CTAB, normal respiratory effort  Cardio: RRR, normal S1, S2, no murmur, rubs, gallops  GI: soft, non-tender, non-distended  MSK: normal alignment, mobility, gait  Skin: no rashes, lesions, subcutaneous nodules      Left temple:  1x0 8 cm scarred biopsy site 1 cm above the lateral aspect of brow  The biopsy site is erythematous but no darkened lesion or color variegation  No palpable lymphadenopathy    Facial nerve is intact bilaterally  V1-V3 intact bilaterally    Gross visual acuity and visual fields intact    Labs: pathology reviewed    A/P: 80 y/o male who presents with intermediate thickness melanoma of the superior left brow    -I discussed with Dr Gregoria Rodriguez who will perform the resection  Given the depth of the lesion, likely 2 cm margins which would leave a substantial defect of the brow and possibly the eyelid   I discussed this with the patient that ultimately the size of the defect would dictate the reconstruction   -After my discussion with the patient, I informed him that my goal was to close the wound in the least complex way possible but still to achieve adequate wound closure  This would best be performed with skin graft, ideally full-thickness to mitigate contracture  This would be obtained from preauricular, post-auricular, supraclavicular, groin, or thigh for STSG if needed, depending on the size of the defect  I explained that the eyelid could also be affected  If this were to occur, we would need to perform local tissue rearrangement, local flaps to achieve wound closure  Again, ultimate reconstructive method would depend on defect  Patient acknowledged  Photos taken, consents obtained   -I also informed patient that the resection could damage the frontal branch of the facial nerve which could impact frontalis and forehead movement  Patient acknowledged   -Patient was scheduled for cataract surgery which will be put on hold   Would like baseline ophthalmologic exam records  -Will coordinate with Dr Luis Jain office for surgery date    Johana Lazcano MD   [address] [place] [address] and Reconstructive Surgery   [address] [facility], [address]   Office: [phone]

## 2021-10-04 ENCOUNTER — TELEPHONE (OUTPATIENT)
Dept: SURGICAL ONCOLOGY | Facility: CLINIC | Age: 81
End: 2021-10-04

## 2021-10-04 DIAGNOSIS — I10 BENIGN ESSENTIAL HTN: ICD-10-CM

## 2021-10-04 RX ORDER — LISINOPRIL AND HYDROCHLOROTHIAZIDE 25; 20 MG/1; MG/1
1 TABLET ORAL DAILY
Qty: 90 TABLET | Refills: 0 | Status: SHIPPED | OUTPATIENT
Start: 2021-10-04 | End: 2022-01-10 | Stop reason: SDUPTHER

## 2021-10-05 ENCOUNTER — OFFICE VISIT (OUTPATIENT)
Dept: LAB | Age: 81
End: 2021-10-05
Payer: COMMERCIAL

## 2021-10-05 ENCOUNTER — APPOINTMENT (OUTPATIENT)
Dept: LAB | Age: 81
End: 2021-10-05
Payer: COMMERCIAL

## 2021-10-05 DIAGNOSIS — C43.30 MALIGNANT MELANOMA OF SKIN OF FACE (HCC): ICD-10-CM

## 2021-10-05 DIAGNOSIS — E11.9 TYPE 2 DIABETES MELLITUS WITHOUT COMPLICATION, WITHOUT LONG-TERM CURRENT USE OF INSULIN (HCC): ICD-10-CM

## 2021-10-05 LAB
ALBUMIN SERPL BCP-MCNC: 3.8 G/DL (ref 3.5–5)
ALP SERPL-CCNC: 74 U/L (ref 46–116)
ALT SERPL W P-5'-P-CCNC: 30 U/L (ref 12–78)
ANION GAP SERPL CALCULATED.3IONS-SCNC: 5 MMOL/L (ref 4–13)
AST SERPL W P-5'-P-CCNC: 20 U/L (ref 5–45)
BASOPHILS # BLD AUTO: 0.02 THOUSANDS/ΜL (ref 0–0.1)
BASOPHILS NFR BLD AUTO: 0 % (ref 0–1)
BILIRUB SERPL-MCNC: 1.36 MG/DL (ref 0.2–1)
BUN SERPL-MCNC: 21 MG/DL (ref 5–25)
CALCIUM SERPL-MCNC: 9.5 MG/DL (ref 8.3–10.1)
CHLORIDE SERPL-SCNC: 107 MMOL/L (ref 100–108)
CO2 SERPL-SCNC: 28 MMOL/L (ref 21–32)
CREAT SERPL-MCNC: 1.28 MG/DL (ref 0.6–1.3)
EOSINOPHIL # BLD AUTO: 0.14 THOUSAND/ΜL (ref 0–0.61)
EOSINOPHIL NFR BLD AUTO: 1 % (ref 0–6)
ERYTHROCYTE [DISTWIDTH] IN BLOOD BY AUTOMATED COUNT: 13.2 % (ref 11.6–15.1)
GFR SERPL CREATININE-BSD FRML MDRD: 52 ML/MIN/1.73SQ M
GLUCOSE SERPL-MCNC: 193 MG/DL (ref 65–140)
HCT VFR BLD AUTO: 40.6 % (ref 36.5–49.3)
HGB BLD-MCNC: 13.3 G/DL (ref 12–17)
IMM GRANULOCYTES # BLD AUTO: 0.03 THOUSAND/UL (ref 0–0.2)
IMM GRANULOCYTES NFR BLD AUTO: 0 % (ref 0–2)
LYMPHOCYTES # BLD AUTO: 4.73 THOUSANDS/ΜL (ref 0.6–4.47)
LYMPHOCYTES NFR BLD AUTO: 44 % (ref 14–44)
MCH RBC QN AUTO: 30.3 PG (ref 26.8–34.3)
MCHC RBC AUTO-ENTMCNC: 32.8 G/DL (ref 31.4–37.4)
MCV RBC AUTO: 93 FL (ref 82–98)
MONOCYTES # BLD AUTO: 0.44 THOUSAND/ΜL (ref 0.17–1.22)
MONOCYTES NFR BLD AUTO: 4 % (ref 4–12)
NEUTROPHILS # BLD AUTO: 5.35 THOUSANDS/ΜL (ref 1.85–7.62)
NEUTS SEG NFR BLD AUTO: 51 % (ref 43–75)
NRBC BLD AUTO-RTO: 0 /100 WBCS
PLATELET # BLD AUTO: 127 THOUSANDS/UL (ref 149–390)
PMV BLD AUTO: 10.5 FL (ref 8.9–12.7)
POTASSIUM SERPL-SCNC: 4.6 MMOL/L (ref 3.5–5.3)
PROT SERPL-MCNC: 6.7 G/DL (ref 6.4–8.2)
RBC # BLD AUTO: 4.39 MILLION/UL (ref 3.88–5.62)
SODIUM SERPL-SCNC: 140 MMOL/L (ref 136–145)
WBC # BLD AUTO: 10.71 THOUSAND/UL (ref 4.31–10.16)

## 2021-10-05 PROCEDURE — 83036 HEMOGLOBIN GLYCOSYLATED A1C: CPT

## 2021-10-05 PROCEDURE — 36415 COLL VENOUS BLD VENIPUNCTURE: CPT

## 2021-10-05 PROCEDURE — 93005 ELECTROCARDIOGRAM TRACING: CPT

## 2021-10-05 PROCEDURE — 80053 COMPREHEN METABOLIC PANEL: CPT

## 2021-10-05 PROCEDURE — 85025 COMPLETE CBC W/AUTO DIFF WBC: CPT

## 2021-10-07 ENCOUNTER — APPOINTMENT (OUTPATIENT)
Dept: RADIOLOGY | Age: 81
End: 2021-10-07
Payer: COMMERCIAL

## 2021-10-07 DIAGNOSIS — C43.30 MALIGNANT MELANOMA OF SKIN OF FACE (HCC): ICD-10-CM

## 2021-10-07 DIAGNOSIS — E11.9 TYPE 2 DIABETES MELLITUS WITHOUT COMPLICATION, WITHOUT LONG-TERM CURRENT USE OF INSULIN (HCC): Primary | ICD-10-CM

## 2021-10-07 LAB
EST. AVERAGE GLUCOSE BLD GHB EST-MCNC: 126 MG/DL
HBA1C MFR BLD: 6 %

## 2021-10-07 PROCEDURE — 71046 X-RAY EXAM CHEST 2 VIEWS: CPT

## 2021-10-08 LAB
ATRIAL RATE: 54 BPM
PR INTERVAL: 160 MS
QRS AXIS: 60 DEGREES
QRSD INTERVAL: 78 MS
QT INTERVAL: 444 MS
QTC INTERVAL: 421 MS
T WAVE AXIS: 65 DEGREES
VENTRICULAR RATE: 54 BPM

## 2021-10-08 PROCEDURE — 93010 ELECTROCARDIOGRAM REPORT: CPT | Performed by: INTERNAL MEDICINE

## 2021-10-11 ENCOUNTER — ANESTHESIA EVENT (OUTPATIENT)
Dept: PERIOP | Facility: HOSPITAL | Age: 81
End: 2021-10-11
Payer: COMMERCIAL

## 2021-10-12 ENCOUNTER — ANESTHESIA (OUTPATIENT)
Dept: PERIOP | Facility: HOSPITAL | Age: 81
End: 2021-10-12
Payer: COMMERCIAL

## 2021-10-12 ENCOUNTER — HOSPITAL ENCOUNTER (OUTPATIENT)
Facility: HOSPITAL | Age: 81
Setting detail: OUTPATIENT SURGERY
Discharge: HOME | End: 2021-10-12
Attending: STUDENT IN AN ORGANIZED HEALTH CARE EDUCATION/TRAINING PROGRAM | Admitting: STUDENT IN AN ORGANIZED HEALTH CARE EDUCATION/TRAINING PROGRAM
Payer: COMMERCIAL

## 2021-10-12 ENCOUNTER — HOSPITAL ENCOUNTER (OUTPATIENT)
Dept: NUCLEAR MEDICINE | Facility: HOSPITAL | Age: 81
Discharge: HOME | End: 2021-10-12
Attending: STUDENT IN AN ORGANIZED HEALTH CARE EDUCATION/TRAINING PROGRAM
Payer: COMMERCIAL

## 2021-10-12 VITALS
SYSTOLIC BLOOD PRESSURE: 163 MMHG | RESPIRATION RATE: 17 BRPM | OXYGEN SATURATION: 96 % | BODY MASS INDEX: 25.9 KG/M2 | DIASTOLIC BLOOD PRESSURE: 77 MMHG | TEMPERATURE: 97.7 F | WEIGHT: 165 LBS | HEIGHT: 67 IN | HEART RATE: 63 BPM

## 2021-10-12 DIAGNOSIS — C43.30 MALIGNANT MELANOMA OF SKIN OF FACE (HCC): ICD-10-CM

## 2021-10-12 LAB — GLUCOSE SERPL-MCNC: 103 MG/DL (ref 65–140)

## 2021-10-12 PROCEDURE — 88305 TISSUE EXAM BY PATHOLOGIST: CPT | Performed by: PATHOLOGY

## 2021-10-12 PROCEDURE — 82948 REAGENT STRIP/BLOOD GLUCOSE: CPT

## 2021-10-12 PROCEDURE — 88342 IMHCHEM/IMCYTCHM 1ST ANTB: CPT | Performed by: PATHOLOGY

## 2021-10-12 PROCEDURE — 88341 IMHCHEM/IMCYTCHM EA ADD ANTB: CPT | Performed by: PATHOLOGY

## 2021-10-12 PROCEDURE — 15240 FTH/GFT F/C/C/M/N/AX/G/H/F20: CPT | Performed by: STUDENT IN AN ORGANIZED HEALTH CARE EDUCATION/TRAINING PROGRAM

## 2021-10-12 PROCEDURE — 78195 LYMPH SYSTEM IMAGING: CPT

## 2021-10-12 PROCEDURE — 88307 TISSUE EXAM BY PATHOLOGIST: CPT | Performed by: PATHOLOGY

## 2021-10-12 PROCEDURE — A9541 TC99M SULFUR COLLOID: HCPCS

## 2021-10-12 PROCEDURE — 15241 FTH/GFT F/C/C/M/N/A/G/H/F EA: CPT | Performed by: STUDENT IN AN ORGANIZED HEALTH CARE EDUCATION/TRAINING PROGRAM

## 2021-10-12 PROCEDURE — G1004 CDSM NDSC: HCPCS

## 2021-10-12 PROCEDURE — 38525 BIOPSY/REMOVAL LYMPH NODES: CPT | Performed by: STUDENT IN AN ORGANIZED HEALTH CARE EDUCATION/TRAINING PROGRAM

## 2021-10-12 PROCEDURE — 99024 POSTOP FOLLOW-UP VISIT: CPT | Performed by: STUDENT IN AN ORGANIZED HEALTH CARE EDUCATION/TRAINING PROGRAM

## 2021-10-12 PROCEDURE — 11646 EXC F/E/E/N/L MAL+MRG >4 CM: CPT | Performed by: STUDENT IN AN ORGANIZED HEALTH CARE EDUCATION/TRAINING PROGRAM

## 2021-10-12 PROCEDURE — 15004 WOUND PREP F/N/HF/G: CPT | Performed by: STUDENT IN AN ORGANIZED HEALTH CARE EDUCATION/TRAINING PROGRAM

## 2021-10-12 PROCEDURE — 38900 IO MAP OF SENT LYMPH NODE: CPT | Performed by: STUDENT IN AN ORGANIZED HEALTH CARE EDUCATION/TRAINING PROGRAM

## 2021-10-12 RX ORDER — LIDOCAINE HYDROCHLORIDE 10 MG/ML
INJECTION, SOLUTION EPIDURAL; INFILTRATION; INTRACAUDAL; PERINEURAL AS NEEDED
Status: DISCONTINUED | OUTPATIENT
Start: 2021-10-12 | End: 2021-10-12

## 2021-10-12 RX ORDER — MAGNESIUM HYDROXIDE 1200 MG/15ML
LIQUID ORAL AS NEEDED
Status: DISCONTINUED | OUTPATIENT
Start: 2021-10-12 | End: 2021-10-12 | Stop reason: HOSPADM

## 2021-10-12 RX ORDER — ACETAMINOPHEN 325 MG/1
975 TABLET ORAL ONCE
Status: COMPLETED | OUTPATIENT
Start: 2021-10-12 | End: 2021-10-12

## 2021-10-12 RX ORDER — ISOSULFAN BLUE 50 MG/5ML
INJECTION, SOLUTION SUBCUTANEOUS AS NEEDED
Status: DISCONTINUED | OUTPATIENT
Start: 2021-10-12 | End: 2021-10-12 | Stop reason: HOSPADM

## 2021-10-12 RX ORDER — GABAPENTIN 300 MG/1
300 CAPSULE ORAL ONCE
Status: COMPLETED | OUTPATIENT
Start: 2021-10-12 | End: 2021-10-12

## 2021-10-12 RX ORDER — MINERAL OIL
OIL (ML) MISCELLANEOUS AS NEEDED
Status: DISCONTINUED | OUTPATIENT
Start: 2021-10-12 | End: 2021-10-12 | Stop reason: HOSPADM

## 2021-10-12 RX ORDER — ROCURONIUM BROMIDE 10 MG/ML
INJECTION, SOLUTION INTRAVENOUS AS NEEDED
Status: DISCONTINUED | OUTPATIENT
Start: 2021-10-12 | End: 2021-10-12

## 2021-10-12 RX ORDER — ALBUMIN, HUMAN INJ 5% 5 %
SOLUTION INTRAVENOUS CONTINUOUS PRN
Status: DISCONTINUED | OUTPATIENT
Start: 2021-10-12 | End: 2021-10-12

## 2021-10-12 RX ORDER — ACETAMINOPHEN 500 MG
500 TABLET ORAL EVERY 6 HOURS PRN
Qty: 30 TABLET | Refills: 2 | Status: SHIPPED | OUTPATIENT
Start: 2021-10-12 | End: 2021-11-11

## 2021-10-12 RX ORDER — FENTANYL CITRATE/PF 50 MCG/ML
25 SYRINGE (ML) INJECTION
Status: DISCONTINUED | OUTPATIENT
Start: 2021-10-12 | End: 2021-10-12 | Stop reason: HOSPADM

## 2021-10-12 RX ORDER — SODIUM CHLORIDE, SODIUM LACTATE, POTASSIUM CHLORIDE, CALCIUM CHLORIDE 600; 310; 30; 20 MG/100ML; MG/100ML; MG/100ML; MG/100ML
125 INJECTION, SOLUTION INTRAVENOUS CONTINUOUS
Status: DISCONTINUED | OUTPATIENT
Start: 2021-10-12 | End: 2021-10-12 | Stop reason: HOSPADM

## 2021-10-12 RX ORDER — ONDANSETRON 2 MG/ML
4 INJECTION INTRAMUSCULAR; INTRAVENOUS ONCE AS NEEDED
Status: DISCONTINUED | OUTPATIENT
Start: 2021-10-12 | End: 2021-10-12 | Stop reason: HOSPADM

## 2021-10-12 RX ORDER — DEXAMETHASONE SODIUM PHOSPHATE 10 MG/ML
INJECTION, SOLUTION INTRAMUSCULAR; INTRAVENOUS AS NEEDED
Status: DISCONTINUED | OUTPATIENT
Start: 2021-10-12 | End: 2021-10-12

## 2021-10-12 RX ORDER — PROPOFOL 10 MG/ML
INJECTION, EMULSION INTRAVENOUS AS NEEDED
Status: DISCONTINUED | OUTPATIENT
Start: 2021-10-12 | End: 2021-10-12

## 2021-10-12 RX ORDER — OXYCODONE HYDROCHLORIDE 5 MG/1
5 TABLET ORAL EVERY 4 HOURS PRN
Qty: 18 TABLET | Refills: 0 | Status: SHIPPED | OUTPATIENT
Start: 2021-10-12 | End: 2021-10-22

## 2021-10-12 RX ORDER — SODIUM CHLORIDE, SODIUM LACTATE, POTASSIUM CHLORIDE, CALCIUM CHLORIDE 600; 310; 30; 20 MG/100ML; MG/100ML; MG/100ML; MG/100ML
50 INJECTION, SOLUTION INTRAVENOUS CONTINUOUS
Status: DISCONTINUED | OUTPATIENT
Start: 2021-10-12 | End: 2021-10-12 | Stop reason: HOSPADM

## 2021-10-12 RX ORDER — FENTANYL CITRATE 50 UG/ML
INJECTION, SOLUTION INTRAMUSCULAR; INTRAVENOUS AS NEEDED
Status: DISCONTINUED | OUTPATIENT
Start: 2021-10-12 | End: 2021-10-12

## 2021-10-12 RX ORDER — ONDANSETRON 2 MG/ML
INJECTION INTRAMUSCULAR; INTRAVENOUS AS NEEDED
Status: DISCONTINUED | OUTPATIENT
Start: 2021-10-12 | End: 2021-10-12

## 2021-10-12 RX ORDER — EPHEDRINE SULFATE 50 MG/ML
INJECTION INTRAVENOUS AS NEEDED
Status: DISCONTINUED | OUTPATIENT
Start: 2021-10-12 | End: 2021-10-12

## 2021-10-12 RX ORDER — SODIUM CHLORIDE, SODIUM LACTATE, POTASSIUM CHLORIDE, CALCIUM CHLORIDE 600; 310; 30; 20 MG/100ML; MG/100ML; MG/100ML; MG/100ML
20 INJECTION, SOLUTION INTRAVENOUS CONTINUOUS
Status: CANCELLED | OUTPATIENT
Start: 2021-10-12

## 2021-10-12 RX ORDER — LIDOCAINE HYDROCHLORIDE AND EPINEPHRINE 10; 10 MG/ML; UG/ML
INJECTION, SOLUTION INFILTRATION; PERINEURAL AS NEEDED
Status: DISCONTINUED | OUTPATIENT
Start: 2021-10-12 | End: 2021-10-12 | Stop reason: HOSPADM

## 2021-10-12 RX ORDER — GLYCOPYRROLATE 0.2 MG/ML
INJECTION INTRAMUSCULAR; INTRAVENOUS AS NEEDED
Status: DISCONTINUED | OUTPATIENT
Start: 2021-10-12 | End: 2021-10-12

## 2021-10-12 RX ORDER — CEFAZOLIN SODIUM 1 G/50ML
1000 SOLUTION INTRAVENOUS ONCE
Status: COMPLETED | OUTPATIENT
Start: 2021-10-12 | End: 2021-10-12

## 2021-10-12 RX ORDER — ONDANSETRON 4 MG/1
4 TABLET, FILM COATED ORAL EVERY 8 HOURS PRN
Qty: 20 TABLET | Refills: 0 | Status: SHIPPED | OUTPATIENT
Start: 2021-10-12 | End: 2021-11-11

## 2021-10-12 RX ORDER — NEOSTIGMINE METHYLSULFATE 1 MG/ML
INJECTION INTRAVENOUS AS NEEDED
Status: DISCONTINUED | OUTPATIENT
Start: 2021-10-12 | End: 2021-10-12

## 2021-10-12 RX ADMIN — ALBUMIN HUMAN: 0.05 INJECTION, SOLUTION INTRAVENOUS at 13:31

## 2021-10-12 RX ADMIN — ONDANSETRON 4 MG: 2 INJECTION INTRAMUSCULAR; INTRAVENOUS at 15:10

## 2021-10-12 RX ADMIN — FENTANYL CITRATE 25 MCG: 50 INJECTION, SOLUTION INTRAMUSCULAR; INTRAVENOUS at 16:00

## 2021-10-12 RX ADMIN — ACETAMINOPHEN 975 MG: 325 TABLET, FILM COATED ORAL at 11:19

## 2021-10-12 RX ADMIN — GABAPENTIN 300 MG: 300 CAPSULE ORAL at 11:19

## 2021-10-12 RX ADMIN — EPHEDRINE SULFATE 10 MG: 50 INJECTION, SOLUTION INTRAVENOUS at 13:29

## 2021-10-12 RX ADMIN — LIDOCAINE HYDROCHLORIDE 50 MG: 10 INJECTION, SOLUTION EPIDURAL; INFILTRATION; INTRACAUDAL at 13:01

## 2021-10-12 RX ADMIN — SODIUM CHLORIDE, SODIUM LACTATE, POTASSIUM CHLORIDE, AND CALCIUM CHLORIDE: .6; .31; .03; .02 INJECTION, SOLUTION INTRAVENOUS at 12:54

## 2021-10-12 RX ADMIN — PROPOFOL 150 MG: 10 INJECTION, EMULSION INTRAVENOUS at 13:01

## 2021-10-12 RX ADMIN — CEFAZOLIN SODIUM 1000 MG: 1 SOLUTION INTRAVENOUS at 12:54

## 2021-10-12 RX ADMIN — EPHEDRINE SULFATE 5 MG: 50 INJECTION, SOLUTION INTRAVENOUS at 15:22

## 2021-10-12 RX ADMIN — ROCURONIUM BROMIDE 10 MG: 10 SOLUTION INTRAVENOUS at 13:36

## 2021-10-12 RX ADMIN — EPHEDRINE SULFATE 5 MG: 50 INJECTION, SOLUTION INTRAVENOUS at 14:29

## 2021-10-12 RX ADMIN — ROCURONIUM BROMIDE 30 MG: 10 SOLUTION INTRAVENOUS at 13:01

## 2021-10-12 RX ADMIN — ROCURONIUM BROMIDE 10 MG: 10 SOLUTION INTRAVENOUS at 13:21

## 2021-10-12 RX ADMIN — PHENYLEPHRINE HYDROCHLORIDE 40 MCG/MIN: 10 INJECTION INTRAVENOUS at 13:11

## 2021-10-12 RX ADMIN — NEOSTIGMINE METHYLSULFATE 2 MG: 1 INJECTION INTRAVENOUS at 15:41

## 2021-10-12 RX ADMIN — DEXAMETHASONE SODIUM PHOSPHATE 4 MG: 10 INJECTION, SOLUTION INTRAMUSCULAR; INTRAVENOUS at 13:01

## 2021-10-12 RX ADMIN — GLYCOPYRROLATE 0.4 MG: 0.2 INJECTION, SOLUTION INTRAMUSCULAR; INTRAVENOUS at 15:41

## 2021-10-12 RX ADMIN — FENTANYL CITRATE 50 MCG: 50 INJECTION, SOLUTION INTRAMUSCULAR; INTRAVENOUS at 13:01

## 2021-10-12 RX ADMIN — FENTANYL CITRATE 25 MCG: 50 INJECTION, SOLUTION INTRAMUSCULAR; INTRAVENOUS at 14:58

## 2021-10-13 ENCOUNTER — TELEPHONE (OUTPATIENT)
Dept: PLASTIC SURGERY | Facility: CLINIC | Age: 81
End: 2021-10-13

## 2021-10-20 ENCOUNTER — OFFICE VISIT (OUTPATIENT)
Dept: PLASTIC SURGERY | Facility: CLINIC | Age: 81
End: 2021-10-20

## 2021-10-20 DIAGNOSIS — C43.30 MALIGNANT MELANOMA OF SKIN OF FACE (HCC): Primary | ICD-10-CM

## 2021-10-20 PROCEDURE — 99024 POSTOP FOLLOW-UP VISIT: CPT | Performed by: PHYSICIAN ASSISTANT

## 2021-10-25 DIAGNOSIS — E78.2 MIXED HYPERLIPIDEMIA: ICD-10-CM

## 2021-10-25 RX ORDER — LOVASTATIN 40 MG/1
40 TABLET ORAL DAILY
Qty: 90 TABLET | Refills: 1 | Status: SHIPPED | OUTPATIENT
Start: 2021-10-25 | End: 2022-01-10 | Stop reason: SDUPTHER

## 2021-10-25 RX ORDER — LOVASTATIN 40 MG/1
40 TABLET ORAL DAILY
Qty: 90 TABLET | Refills: 1 | Status: SHIPPED | OUTPATIENT
Start: 2021-10-25 | End: 2021-10-25 | Stop reason: SDUPTHER

## 2021-10-28 ENCOUNTER — OFFICE VISIT (OUTPATIENT)
Dept: SURGICAL ONCOLOGY | Facility: CLINIC | Age: 81
End: 2021-10-28
Payer: COMMERCIAL

## 2021-10-28 ENCOUNTER — TELEPHONE (OUTPATIENT)
Dept: HEMATOLOGY ONCOLOGY | Facility: CLINIC | Age: 81
End: 2021-10-28

## 2021-10-28 VITALS
BODY MASS INDEX: 25.74 KG/M2 | SYSTOLIC BLOOD PRESSURE: 132 MMHG | RESPIRATION RATE: 17 BRPM | WEIGHT: 164 LBS | OXYGEN SATURATION: 96 % | HEIGHT: 67 IN | DIASTOLIC BLOOD PRESSURE: 70 MMHG | HEART RATE: 60 BPM | TEMPERATURE: 96.8 F

## 2021-10-28 DIAGNOSIS — C43.30 MALIGNANT MELANOMA OF SKIN OF FACE (HCC): Primary | ICD-10-CM

## 2021-10-28 PROCEDURE — 99214 OFFICE O/P EST MOD 30 MIN: CPT | Performed by: STUDENT IN AN ORGANIZED HEALTH CARE EDUCATION/TRAINING PROGRAM

## 2021-11-01 ENCOUNTER — PATIENT OUTREACH (OUTPATIENT)
Dept: HEMATOLOGY ONCOLOGY | Facility: CLINIC | Age: 81
End: 2021-11-01

## 2021-11-01 DIAGNOSIS — C43.30 MALIGNANT MELANOMA OF SKIN OF FACE (HCC): Primary | ICD-10-CM

## 2021-11-02 ENCOUNTER — OFFICE VISIT (OUTPATIENT)
Dept: PLASTIC SURGERY | Facility: CLINIC | Age: 81
End: 2021-11-02

## 2021-11-02 DIAGNOSIS — C43.30 MALIGNANT MELANOMA OF SKIN OF FACE (HCC): Primary | ICD-10-CM

## 2021-11-02 PROCEDURE — 99024 POSTOP FOLLOW-UP VISIT: CPT | Performed by: PHYSICIAN ASSISTANT

## 2021-11-03 ENCOUNTER — CONSULT (OUTPATIENT)
Dept: HEMATOLOGY ONCOLOGY | Facility: CLINIC | Age: 81
End: 2021-11-03
Payer: COMMERCIAL

## 2021-11-03 VITALS
BODY MASS INDEX: 26.06 KG/M2 | SYSTOLIC BLOOD PRESSURE: 124 MMHG | HEART RATE: 65 BPM | TEMPERATURE: 97.8 F | OXYGEN SATURATION: 95 % | DIASTOLIC BLOOD PRESSURE: 70 MMHG | HEIGHT: 67 IN | WEIGHT: 166 LBS | RESPIRATION RATE: 17 BRPM

## 2021-11-03 DIAGNOSIS — C43.30 MALIGNANT MELANOMA OF SKIN OF FACE (HCC): ICD-10-CM

## 2021-11-03 DIAGNOSIS — E11.9 TYPE 2 DIABETES MELLITUS WITHOUT COMPLICATION, WITHOUT LONG-TERM CURRENT USE OF INSULIN (HCC): Primary | ICD-10-CM

## 2021-11-03 PROCEDURE — 99205 OFFICE O/P NEW HI 60 MIN: CPT | Performed by: INTERNAL MEDICINE

## 2021-11-03 PROCEDURE — 3078F DIAST BP <80 MM HG: CPT | Performed by: INTERNAL MEDICINE

## 2021-11-03 PROCEDURE — 3074F SYST BP LT 130 MM HG: CPT | Performed by: INTERNAL MEDICINE

## 2021-11-11 ENCOUNTER — OFFICE VISIT (OUTPATIENT)
Dept: INTERNAL MEDICINE CLINIC | Facility: CLINIC | Age: 81
End: 2021-11-11
Payer: COMMERCIAL

## 2021-11-11 VITALS
DIASTOLIC BLOOD PRESSURE: 78 MMHG | BODY MASS INDEX: 25.9 KG/M2 | RESPIRATION RATE: 20 BRPM | HEIGHT: 67 IN | OXYGEN SATURATION: 98 % | HEART RATE: 55 BPM | SYSTOLIC BLOOD PRESSURE: 118 MMHG | TEMPERATURE: 98.5 F | WEIGHT: 165 LBS

## 2021-11-11 DIAGNOSIS — D69.6 THROMBOCYTOPENIA (HCC): ICD-10-CM

## 2021-11-11 DIAGNOSIS — Z00.00 MEDICARE ANNUAL WELLNESS VISIT, SUBSEQUENT: ICD-10-CM

## 2021-11-11 DIAGNOSIS — Z12.5 SCREENING FOR PROSTATE CANCER: ICD-10-CM

## 2021-11-11 DIAGNOSIS — C43.30 MALIGNANT MELANOMA OF SKIN OF FACE (HCC): ICD-10-CM

## 2021-11-11 DIAGNOSIS — Z13.6 SCREENING FOR CARDIOVASCULAR CONDITION: ICD-10-CM

## 2021-11-11 DIAGNOSIS — Z12.12 SCREENING FOR COLORECTAL CANCER: ICD-10-CM

## 2021-11-11 DIAGNOSIS — I10 ESSENTIAL HYPERTENSION: ICD-10-CM

## 2021-11-11 DIAGNOSIS — R80.9 MICROALBUMINURIA: ICD-10-CM

## 2021-11-11 DIAGNOSIS — E11.9 TYPE 2 DIABETES MELLITUS WITHOUT COMPLICATION, WITHOUT LONG-TERM CURRENT USE OF INSULIN (HCC): Primary | ICD-10-CM

## 2021-11-11 DIAGNOSIS — E78.2 MIXED HYPERLIPIDEMIA: ICD-10-CM

## 2021-11-11 DIAGNOSIS — E80.6 HYPERBILIRUBINEMIA: ICD-10-CM

## 2021-11-11 DIAGNOSIS — G20 PARKINSON'S DISEASE (HCC): ICD-10-CM

## 2021-11-11 DIAGNOSIS — Z12.11 SCREENING FOR COLORECTAL CANCER: ICD-10-CM

## 2021-11-11 PROCEDURE — 1170F FXNL STATUS ASSESSED: CPT | Performed by: INTERNAL MEDICINE

## 2021-11-11 PROCEDURE — 1036F TOBACCO NON-USER: CPT | Performed by: INTERNAL MEDICINE

## 2021-11-11 PROCEDURE — 1160F RVW MEDS BY RX/DR IN RCRD: CPT | Performed by: INTERNAL MEDICINE

## 2021-11-11 PROCEDURE — 3288F FALL RISK ASSESSMENT DOCD: CPT | Performed by: INTERNAL MEDICINE

## 2021-11-11 PROCEDURE — 3725F SCREEN DEPRESSION PERFORMED: CPT | Performed by: INTERNAL MEDICINE

## 2021-11-11 PROCEDURE — G0439 PPPS, SUBSEQ VISIT: HCPCS | Performed by: INTERNAL MEDICINE

## 2021-11-11 PROCEDURE — 1125F AMNT PAIN NOTED PAIN PRSNT: CPT | Performed by: INTERNAL MEDICINE

## 2021-11-11 PROCEDURE — 99214 OFFICE O/P EST MOD 30 MIN: CPT | Performed by: INTERNAL MEDICINE

## 2021-11-12 ENCOUNTER — HOSPITAL ENCOUNTER (OUTPATIENT)
Dept: RADIOLOGY | Age: 81
Discharge: HOME | End: 2021-11-12
Payer: COMMERCIAL

## 2021-11-12 DIAGNOSIS — C43.30 MALIGNANT MELANOMA OF SKIN OF FACE (HCC): ICD-10-CM

## 2021-11-12 LAB — GLUCOSE SERPL-MCNC: 99 MG/DL (ref 65–140)

## 2021-11-12 PROCEDURE — 78816 PET IMAGE W/CT FULL BODY: CPT

## 2021-11-12 PROCEDURE — A9552 F18 FDG: HCPCS

## 2021-11-22 ENCOUNTER — TELEPHONE (OUTPATIENT)
Dept: HEMATOLOGY ONCOLOGY | Facility: CLINIC | Age: 81
End: 2021-11-22

## 2021-11-28 ENCOUNTER — HOSPITAL ENCOUNTER (OUTPATIENT)
Dept: RADIOLOGY | Facility: HOSPITAL | Age: 81
Discharge: HOME | End: 2021-11-28
Attending: INTERNAL MEDICINE
Payer: COMMERCIAL

## 2021-11-28 DIAGNOSIS — C43.30 MALIGNANT MELANOMA OF SKIN OF FACE (HCC): ICD-10-CM

## 2021-11-28 PROCEDURE — 70553 MRI BRAIN STEM W/O & W/DYE: CPT

## 2021-11-28 PROCEDURE — A9585 GADOBUTROL INJECTION: HCPCS | Performed by: INTERNAL MEDICINE

## 2021-11-28 RX ADMIN — GADOBUTROL 7 ML: 604.72 INJECTION INTRAVENOUS at 13:13

## 2021-11-29 PROBLEM — C43.39 MALIGNANT MELANOMA OF FOREHEAD (HCC): Status: ACTIVE | Noted: 2021-11-29

## 2021-12-01 ENCOUNTER — OFFICE VISIT (OUTPATIENT)
Dept: PLASTIC SURGERY | Facility: CLINIC | Age: 81
End: 2021-12-01

## 2021-12-01 DIAGNOSIS — C43.39 MALIGNANT MELANOMA OF FOREHEAD (HCC): Primary | ICD-10-CM

## 2021-12-01 PROCEDURE — 99024 POSTOP FOLLOW-UP VISIT: CPT | Performed by: PHYSICIAN ASSISTANT

## 2021-12-02 ENCOUNTER — TELEPHONE (OUTPATIENT)
Dept: HEMATOLOGY ONCOLOGY | Facility: CLINIC | Age: 81
End: 2021-12-02

## 2021-12-03 ENCOUNTER — OFFICE VISIT (OUTPATIENT)
Dept: HEMATOLOGY ONCOLOGY | Facility: CLINIC | Age: 81
End: 2021-12-03
Payer: COMMERCIAL

## 2021-12-03 VITALS
HEIGHT: 67 IN | TEMPERATURE: 97.4 F | OXYGEN SATURATION: 95 % | WEIGHT: 167 LBS | DIASTOLIC BLOOD PRESSURE: 70 MMHG | HEART RATE: 78 BPM | BODY MASS INDEX: 26.21 KG/M2 | RESPIRATION RATE: 16 BRPM | SYSTOLIC BLOOD PRESSURE: 118 MMHG

## 2021-12-03 DIAGNOSIS — C43.39 MALIGNANT MELANOMA OF FOREHEAD (HCC): ICD-10-CM

## 2021-12-03 DIAGNOSIS — C43.30 MALIGNANT MELANOMA OF SKIN OF FACE (HCC): Primary | ICD-10-CM

## 2021-12-03 PROCEDURE — 3078F DIAST BP <80 MM HG: CPT | Performed by: INTERNAL MEDICINE

## 2021-12-03 PROCEDURE — 3074F SYST BP LT 130 MM HG: CPT | Performed by: INTERNAL MEDICINE

## 2021-12-03 PROCEDURE — 99214 OFFICE O/P EST MOD 30 MIN: CPT | Performed by: INTERNAL MEDICINE

## 2021-12-03 PROCEDURE — 1160F RVW MEDS BY RX/DR IN RCRD: CPT | Performed by: INTERNAL MEDICINE

## 2021-12-03 PROCEDURE — 1036F TOBACCO NON-USER: CPT | Performed by: INTERNAL MEDICINE

## 2021-12-28 DIAGNOSIS — E11.9 TYPE 2 DIABETES MELLITUS WITHOUT COMPLICATION, WITHOUT LONG-TERM CURRENT USE OF INSULIN (HCC): ICD-10-CM

## 2021-12-28 RX ORDER — GLIMEPIRIDE 4 MG/1
4 TABLET ORAL DAILY
Qty: 90 TABLET | Refills: 1 | Status: SHIPPED | OUTPATIENT
Start: 2021-12-28 | End: 2022-03-10 | Stop reason: SDUPTHER

## 2021-12-30 ENCOUNTER — TELEPHONE (OUTPATIENT)
Dept: INTERNAL MEDICINE CLINIC | Facility: OTHER | Age: 81
End: 2021-12-30

## 2022-01-10 DIAGNOSIS — E78.2 MIXED HYPERLIPIDEMIA: ICD-10-CM

## 2022-01-10 DIAGNOSIS — E11.9 TYPE 2 DIABETES MELLITUS WITHOUT COMPLICATION, WITHOUT LONG-TERM CURRENT USE OF INSULIN (HCC): ICD-10-CM

## 2022-01-10 DIAGNOSIS — I10 BENIGN ESSENTIAL HTN: ICD-10-CM

## 2022-01-10 RX ORDER — ATENOLOL 100 MG/1
100 TABLET ORAL DAILY
Qty: 90 TABLET | Refills: 1 | Status: SHIPPED | OUTPATIENT
Start: 2022-01-10 | End: 2022-05-25 | Stop reason: SDUPTHER

## 2022-01-10 RX ORDER — LISINOPRIL AND HYDROCHLOROTHIAZIDE 25; 20 MG/1; MG/1
1 TABLET ORAL DAILY
Qty: 90 TABLET | Refills: 0 | Status: SHIPPED | OUTPATIENT
Start: 2022-01-10 | End: 2022-02-17 | Stop reason: SDUPTHER

## 2022-01-10 RX ORDER — LOVASTATIN 40 MG/1
40 TABLET ORAL DAILY
Qty: 90 TABLET | Refills: 1 | Status: SHIPPED | OUTPATIENT
Start: 2022-01-10 | End: 2022-05-25 | Stop reason: SDUPTHER

## 2022-02-01 ENCOUNTER — OFFICE VISIT (OUTPATIENT)
Dept: SURGICAL ONCOLOGY | Facility: CLINIC | Age: 82
End: 2022-02-01
Payer: COMMERCIAL

## 2022-02-01 VITALS
HEIGHT: 67 IN | DIASTOLIC BLOOD PRESSURE: 78 MMHG | RESPIRATION RATE: 16 BRPM | SYSTOLIC BLOOD PRESSURE: 118 MMHG | BODY MASS INDEX: 26.49 KG/M2 | OXYGEN SATURATION: 100 % | TEMPERATURE: 96 F | HEART RATE: 58 BPM | WEIGHT: 168.8 LBS

## 2022-02-01 DIAGNOSIS — C43.30 MALIGNANT MELANOMA OF SKIN OF FACE (HCC): Primary | ICD-10-CM

## 2022-02-01 PROCEDURE — 3074F SYST BP LT 130 MM HG: CPT | Performed by: STUDENT IN AN ORGANIZED HEALTH CARE EDUCATION/TRAINING PROGRAM

## 2022-02-01 PROCEDURE — 1160F RVW MEDS BY RX/DR IN RCRD: CPT | Performed by: STUDENT IN AN ORGANIZED HEALTH CARE EDUCATION/TRAINING PROGRAM

## 2022-02-01 PROCEDURE — 3078F DIAST BP <80 MM HG: CPT | Performed by: STUDENT IN AN ORGANIZED HEALTH CARE EDUCATION/TRAINING PROGRAM

## 2022-02-01 PROCEDURE — 99214 OFFICE O/P EST MOD 30 MIN: CPT | Performed by: STUDENT IN AN ORGANIZED HEALTH CARE EDUCATION/TRAINING PROGRAM

## 2022-02-01 NOTE — PROGRESS NOTES
Surgical Oncology Consultation    [address] [facility] CANCER CARE SURGICAL ONCOLOGY [address]  [facility] [id number] [phone]    Patient:  Fer Rosas  1940  [id number]    Primary Care provider: MD Chinyere Donovan [address]    Referring provider:  No referring provider defined for this encounter  Diagnoses and all orders for this visit:    Malignant melanoma of skin of face [facility])        Chief Complaint   Patient presents with    Follow-up       Return in about 3 months (around 5/1/2022)  Oncology History   Malignant melanoma of skin of face ([facility] )   9/8/2021 -  Cancer Staged    Staging form: Melanoma of the Skin, AJCC 8th Edition  - Clinical stage from 9/8/2021: Stage III (cT2a, cN1a, cM0) - Signed by Dixie Holter, MD on 11/7/2021 9/8/2021 -  Cancer Staged    Staging form: Melanoma of the Skin, AJCC 8th Edition  - Pathologic stage from 9/8/2021: Stage IIIA (pT2a, pN1a, cM0) - Signed by Dixie Holter, MD on 11/7/2021 9/22/2021 Initial Diagnosis    Malignant melanoma of skin of face ([facility] [address] )       STAGE IIIA: [id number]    History of Present Illness  :   78 yo male presents with new melanoma of left face  Patient states the melena has been present for an unknown amount of time  He states he sees Dermatology regularly, almost 4 months, for a history of basal cell and squamous cell carcinomas  The dermatologist noted this lesion, performed biopsy, revealing a 1 7 mm in depth melanoma with a positive deep margin  The patient denies any other lumps or bumps the face or neck  He states he has is a hat regularly when he goes out the son and does not recall any blistering sunburns  He has had brown hair and blue eyes  Interval History 10/28/21  The patient is doing very well today  He is status post wide local excision of his face melanoma as well as as a sentinel lymph node biopsy    His face is healing very well  He has no complaints or concerns at this time  He has no concerns about his pre- auricular incision  Will see Dr Brenden Gonzalez for consideration of sustemic therapy  Interval History 2/1/22  Patient is doing very well today  He has no concerns about the appearance of his incisions and no new lumps or bumps to report  He elected to pursue close surveillance instead of adjuvant therapy  He had restaging imaging with PET and brain MRI in November, demonstrating no evidence of disease  No new concerns or questions today  Review of Systems  Complete ROS Surg Onc:   Constitutional: The patient denies new or recent history of general fatigue, no recent weight loss, no change in appetite  Eyes: No complaints of visual problems, no scleral icterus  ENT: No complaints of ear pain, no hoarseness, no difficulty swallowing,  no tinnitus and no new masses in head, oral cavity, or neck  Cardiovascular: No complaints of chest pain, no palpitations, no ankle edema  Respiratory: No complaints of shortness of breath, no cough  Gastrointestinal: No complaints of jaundice, no bloody stools, no pale stools  Genitourinary: No complaints of dysuria, no hematuria, no nocturia, no frequent urination, no urethral discharge  Musculoskeletal: No complaints of weakness, paralysis, joint stiffness or arthralgias  Integumentary: No complaints of rash, no new lesions  Neurological: No complaints of convulsions, no seizures, no dizziness  Hematologic/Lymphatic: No complaints of easy bruising  Endocrine:  No hot or cold intolerance  No polydipsia, polyphagia, or polyuria  Allergy/immunology:  No environmental allergies  No food allergies  Not immunocompromised        Patient Active Problem List   Diagnosis    Essential hypertension    Hiatal hernia with GERD    Status post laparoscopic cholecystectomy    Status post umbilical hernia repair, follow-up exam    Type 2 diabetes mellitus without complication, without long-term current use of insulin ([facility] )    Mixed hyperlipidemia    Parkinson's disease ([facility] )    Microalbuminuria    Hyperbilirubinemia    Malignant melanoma of skin of face ([facility] )    Thrombocytopenia ([facility] )    Malignant melanoma of forehead ([facility] )     Past Medical History:   Diagnosis Date    [facility]     Hyperlipidemia     Hypertension      Past Surgical History:   Procedure Laterality Date    FLAP LOCAL HEAD / NECK Left 10/12/2021    Procedure: RECONSTRUCTION OF LEFT TEMPLE DEFECT AFTER RESCECTION MELANOMA;  Surgeon: Michelle Yoo MD;  [facility];  Service: Plastics    FULL THICKNESS SKIN GRAFT Left 10/12/2021    Procedure: SKIN GRAFT FULL THICKNESS LEFT TEMPLE;  Surgeon: Michelle Yoo MD;  [facility];  Service: Plastics    GALLBLADDER SURGERY      HAND SURGERY Left     LYMPH NODE BIOPSY Left 10/12/2021    Procedure: BIOPSY LYMPH NODE SENTINEL, LYMPHOSCINTIGRAPHY, INTRAOPERATIVE LYMPHATIC MAPPING (INJECT AT 1200);   Surgeon: John Hodge MD;  [facility];  Service: Surgical Oncology    NOSE SURGERY      SKIN CANCER EXCISION  10/2021    SKIN LESION EXCISION Left 10/12/2021    Procedure: [address];  Surgeon: John Hodge MD;  [facility];  Service: Surgical Oncology    SPLIT THICKNESS SKIN GRAFT Left 10/12/2021    Procedure: SKIN GRAFT SPLIT THICKNESS LEFT TEMPLE;  Surgeon: Michelle Yoo MD;  [facility];  Service: Plastics     Family History   Problem Relation Age of Onset    No Known Problems Mother     No Known Problems Father     Colon cancer Other 60     Social History     Socioeconomic History    Marital status: /Civil Union     Spouse name: Not on file    Number of children: Not on file    Years of education: Not on file    Highest education level: Not on file   Occupational History    Not on file   Tobacco Use    Smoking status: Never Smoker    Smokeless tobacco: Never Used   Vaping Use    Vaping Use: Never used   Substance and Sexual Activity    Alcohol use: Not Currently    Drug use: Never    Sexual activity: Not Currently   Other Topics Concern    Not on file   Social History Narrative    Not on file     Social Determinants of Health     Financial Resource Strain: Not on file   Food Insecurity: Not on file   Transportation Needs: Not on file   Physical Activity: Not on file   Stress: Not on file   Social Connections: Not on file   Intimate Partner Violence: Not on file   Housing Stability: Not on file       Current Outpatient Medications:     atenolol (TENORMIN) 100 mg tablet, Take 1 tablet (100 mg total) by mouth daily, Disp: 90 tablet, Rfl: 1    glimepiride (AMARYL) 4 mg tablet, Take 1 tablet (4 mg total) by mouth daily, Disp: 90 tablet, Rfl: 1    lisinopril-hydrochlorothiazide (PRINZIDE,ZESTORETIC) 20-25 MG per tablet, Take 1 tablet by mouth daily, Disp: 90 tablet, Rfl: 0    lovastatin (MEVACOR) 40 MG tablet, Take 1 tablet (40 mg total) by mouth daily, Disp: 90 tablet, Rfl: 1    metFORMIN (GLUCOPHAGE) 1000 MG tablet, Take 1 tablet (1,000 mg total) by mouth 2 (two) times a day with meals, Disp: 180 tablet, Rfl: 1  No Known Allergies    Vitals:    02/01/22 0954   BP: 118/78   Pulse: 58   Resp: 16   Temp: (!) 96 °F (35 6 °C)   SpO2: 100%       Physical Exam   General: Appears well, appears stated age  Skin: Warm, anicteric  HEENT: Normocephalic, atraumatic; sclera aniceteric, mucous membranes moist; cervical nodes without adenopathy  Well-healed graft site and pre-auricular incision with AMBER  Cardiopulmonary: RRR, Easy WOB, no BLE edema  Abd: Flat and soft, nontender, no masses appreciated, no hepatosplenomegaly  MSK: Symmetric, no cyanosis, no overt weakness  Lymphatic: No cervical, axillary or inguinal lymphadenopathy  Neuro: Affect appropriate, no gross motor abnormalities            Pathology:  Final Diagnosis   A   Lymph node, sentinel, #1, pre-auricular, biopsy:  One lymph node with subcapsular rare melanoma cells consistent with sub-micrometastasis (<0 1 mm)  See synoptic report and note       B  Skin, left forehead, excision:  Residual invasive melanoma and scar (cicatrix), margins free  See synoptic report  Labs: Reviewed in EPIC    Imaging  No results found  I independently reviewed and interpreted the above laboratory and imaging data, including Dr Monica Humphrey consultation, PET scans, MRI  Discussion/Summary:   78-year-old gentleman status post wide local excision with sentinel lymph node biopsy of what ultimately was determined to be a [id number] melanoma  Amena distinction of sub micro metastases  He has elected to pursue surveillance in stead adjuvant therapy  He is actively seeing Dr Mey Cox, and imaging in Nov was AMBER  Also seeing Dr Juliocesar Bell with derm - last seen 12 2021 with no cocnerns  I will continue seeing him every 3 months  All questions answered

## 2022-02-01 NOTE — LETTER
February 1, 2022     [address] [facility]  [address] [facility]    Patient: Ellis Marinelli   YOB: 1940   Date of Visit: 2/1/2022       Dear Dr Julian Lewis:    Thank you for your care of Mr Zack Castillo  I will continue seeing him as his surgical oncologist for the next several years  Here are my notes from today's visit -     Sincerely,        Nayeli Couch MD        CC: No Recipients  Nayeli Couch MD  2/1/2022  9:57 AM  Incomplete  Surgical Oncology Consultation    [address] [facility] [address] [address] [phone]    Patient:  Ellis Marinelli  1940  [id number]    Primary Care provider: Danna Henry MD  [facility] [address]  [address]    Referring provider:  No referring provider defined for this encounter  Diagnoses and all orders for this visit:    Malignant melanoma of skin of face ([facility] )        No chief complaint on file  No follow-ups on file  Oncology History   Malignant melanoma of skin of face ([facility] )   9/8/2021 -  Cancer Staged    Staging form: Melanoma of the Skin, AJCC 8th Edition  - Clinical stage from 9/8/2021: Stage III (cT2a, cN1a, cM0) - Signed by Gillian Bernard MD on 11/7/2021 9/8/2021 -  Cancer Staged    Staging form: Melanoma of the Skin, AJCC 8th Edition  - Pathologic stage from 9/8/2021: Stage IIIA (pT2a, pN1a, cM0) - Signed by Gillian Bernard MD on 11/7/2021 9/22/2021 Initial Diagnosis    Malignant melanoma of skin of face ([facility] [address] )       STAGE IIIA: [id number]    History of Present Illness  :   78 yo male presents with new melanoma of left face  Patient states the melena has been present for an unknown amount of time  He states he sees Dermatology regularly, almost 4 months, for a history of basal cell and squamous cell carcinomas    The dermatologist noted this lesion, performed biopsy, revealing a 1 7 mm in depth melanoma with a positive deep margin  The patient denies any other lumps or bumps the face or neck  He states he has is a hat regularly when he goes out the son and does not recall any blistering sunburns  He has had brown hair and blue eyes  Interval History 10/28/21  The patient is doing very well today  He is status post wide local excision of his face melanoma as well as as a sentinel lymph node biopsy  His face is healing very well  He has no complaints or concerns at this time  He has no concerns about his pre- auricular incision  Will see Dr Brenden Gonzalez for consideration of sustemic therapy  Interval History 2/1/22  Patient is doing very well today  He has no concerns about the appearance of his incisions and no new lumps or bumps to report  He elected to pursue close surveillance instead of adjuvant therapy  He had restaging imaging with PET and brain MRI in November, demonstrating no evidence of disease  No new concerns or questions today  Review of Systems  Complete ROS Surg Onc:   Constitutional: The patient denies new or recent history of general fatigue, no recent weight loss, no change in appetite  Eyes: No complaints of visual problems, no scleral icterus  ENT: No complaints of ear pain, no hoarseness, no difficulty swallowing,  no tinnitus and no new masses in head, oral cavity, or neck  Cardiovascular: No complaints of chest pain, no palpitations, no ankle edema  Respiratory: No complaints of shortness of breath, no cough  Gastrointestinal: No complaints of jaundice, no bloody stools, no pale stools  Genitourinary: No complaints of dysuria, no hematuria, no nocturia, no frequent urination, no urethral discharge  Musculoskeletal: No complaints of weakness, paralysis, joint stiffness or arthralgias  Integumentary: No complaints of rash, no new lesions  Neurological: No complaints of convulsions, no seizures, no dizziness     Hematologic/Lymphatic: No complaints of easy bruising  Endocrine:  No hot or cold intolerance  No polydipsia, polyphagia, or polyuria  Allergy/immunology:  No environmental allergies  No food allergies  Not immunocompromised  Patient Active Problem List   Diagnosis    Essential hypertension    Hiatal hernia with GERD    Status post laparoscopic cholecystectomy    Status post umbilical hernia repair, follow-up exam    Type 2 diabetes mellitus without complication, without long-term current use of insulin ([facility] )    Mixed hyperlipidemia    Parkinson's disease ([facility] )    Microalbuminuria    Hyperbilirubinemia    Malignant melanoma of skin of face ([facility]    Thrombocytopenia ([facility]    Malignant melanoma of forehead ([facility] )     Past Medical History:   Diagnosis Date    Diabetes mellitus ([facility] )     Hyperlipidemia     Hypertension      Past Surgical History:   Procedure Laterality Date    FLAP LOCAL HEAD / NECK Left 10/12/2021    Procedure: RECONSTRUCTION OF LEFT TEMPLE DEFECT AFTER RESCECTION MELANOMA;  Surgeon: Merline Soman, MD;  [facility];  Service: Plastics    FULL THICKNESS SKIN GRAFT Left 10/12/2021    Procedure: SKIN GRAFT FULL THICKNESS LEFT TEMPLE;  Surgeon: Merline Soman, MD;  [facility];  Service: Plastics    GALLBLADDER SURGERY      HAND SURGERY Left     LYMPH NODE BIOPSY Left 10/12/2021    Procedure: BIOPSY LYMPH NODE SENTINEL, LYMPHOSCINTIGRAPHY, INTRAOPERATIVE LYMPHATIC MAPPING (INJECT AT 1200);   Surgeon: Reid Jones MD;  [facility];  Service: Surgical Oncology    NOSE SURGERY      SKIN CANCER EXCISION  10/2021    SKIN LESION EXCISION Left 10/12/2021    Procedure: FACE MELANOMA SCAR WIDE EXCISION  FACE;  Surgeon: Reid Jones MD;  [facility];  Service: Surgical Oncology    SPLIT THICKNESS SKIN GRAFT Left 10/12/2021    Procedure: SKIN GRAFT SPLIT THICKNESS LEFT TEMPLE;  Surgeon: Merline Soman, MD;  [facility];  Service: Plastics     Family History Problem Relation Age of Onset    No Known Problems Mother     No Known Problems Father     Colon cancer Other 61     Social History     Socioeconomic History    Marital status: /Civil Union     Spouse name: Not on file    Number of children: Not on file    Years of education: Not on file    Highest education level: Not on file   Occupational History    Not on file   Tobacco Use    Smoking status: Never Smoker    Smokeless tobacco: Never Used   Vaping Use    Vaping Use: Never used   Substance and Sexual Activity    Alcohol use: Not Currently    Drug use: Never    Sexual activity: Not Currently   Other Topics Concern    Not on file   Social History Narrative    Not on file     Social Determinants of Health     Financial Resource Strain: Not on file   Food Insecurity: Not on file   Transportation Needs: Not on file   Physical Activity: Not on file   Stress: Not on file   Social Connections: Not on file   Intimate Partner Violence: Not on file   Housing Stability: Not on file       Current Outpatient Medications:     atenolol (TENORMIN) 100 mg tablet, Take 1 tablet (100 mg total) by mouth daily, Disp: 90 tablet, Rfl: 1    glimepiride (AMARYL) 4 mg tablet, Take 1 tablet (4 mg total) by mouth daily, Disp: 90 tablet, Rfl: 1    lisinopril-hydrochlorothiazide (PRINZIDE,ZESTORETIC) 20-25 MG per tablet, Take 1 tablet by mouth daily, Disp: 90 tablet, Rfl: 0    lovastatin (MEVACOR) 40 MG tablet, Take 1 tablet (40 mg total) by mouth daily, Disp: 90 tablet, Rfl: 1    metFORMIN (GLUCOPHAGE) 1000 MG tablet, Take 1 tablet (1,000 mg total) by mouth 2 (two) times a day with meals, Disp: 180 tablet, Rfl: 1  No Known Allergies    There were no vitals filed for this visit  Physical Exam   General: Appears well, appears stated age  Skin: Warm, anicteric  HEENT: Normocephalic, atraumatic; sclera aniceteric, mucous membranes moist; cervical nodes without adenopathy   1 cm superior and lateral to the eye is a 0 8 cm pigmented lesion  Cardiopulmonary: RRR, Easy WOB, no BLE edema  Abd: Flat and soft, nontender, no masses appreciated, no hepatosplenomegaly  MSK: Symmetric, no cyanosis, no overt weakness  Lymphatic: No cervical, axillary or inguinal lymphadenopathy  Neuro: Affect appropriate, no gross motor abnormalities            Pathology:  Final Diagnosis   A  Lymph node, sentinel, #1,  pre-auricular, biopsy:  One lymph node with subcapsular rare melanoma cells consistent with sub-micrometastasis (<0 1 mm)  See synoptic report and note       B  Skin, left forehead, excision:  Residual invasive melanoma and scar (cicatrix), margins free  See synoptic report  Labs: Reviewed in EPIC    Imaging  No results found  I independently reviewed and interpreted the above laboratory and imaging data, including Dr Kisha Cuellar consultation, PET scans, MRI  Discussion/Summary:   79-year-old gentleman status post wide local excision with sentinel lymph node biopsy of what ultimately was determined to be a [id number] melanoma  Amena distinction of sub micro metastases  He has elected to pursue surveillance in stead adjuvant therapy  He is actively seeing Dr Franky Tavarez

## 2022-02-16 ENCOUNTER — APPOINTMENT (OUTPATIENT)
Dept: LAB | Facility: IMAGING CENTER | Age: 82
End: 2022-02-16
Payer: COMMERCIAL

## 2022-02-16 ENCOUNTER — HOSPITAL ENCOUNTER (OUTPATIENT)
Dept: RADIOLOGY | Facility: IMAGING CENTER | Age: 82
Discharge: HOME | End: 2022-02-16
Payer: COMMERCIAL

## 2022-02-16 DIAGNOSIS — C43.30 MALIGNANT MELANOMA OF SKIN OF FACE (HCC): ICD-10-CM

## 2022-02-16 LAB
ALBUMIN SERPL BCP-MCNC: 4 G/DL (ref 3.5–5)
ALP SERPL-CCNC: 83 U/L (ref 46–116)
ALT SERPL W P-5'-P-CCNC: 20 U/L (ref 12–78)
ANION GAP SERPL CALCULATED.3IONS-SCNC: 4 MMOL/L (ref 4–13)
AST SERPL W P-5'-P-CCNC: 14 U/L (ref 5–45)
BASOPHILS # BLD AUTO: 0.01 THOUSANDS/ΜL (ref 0–0.1)
BASOPHILS NFR BLD AUTO: 0 % (ref 0–1)
BILIRUB SERPL-MCNC: 1.38 MG/DL (ref 0.2–1)
BUN SERPL-MCNC: 21 MG/DL (ref 5–25)
CALCIUM SERPL-MCNC: 9.4 MG/DL (ref 8.3–10.1)
CHLORIDE SERPL-SCNC: 106 MMOL/L (ref 100–108)
CO2 SERPL-SCNC: 29 MMOL/L (ref 21–32)
CREAT SERPL-MCNC: 1.16 MG/DL (ref 0.6–1.3)
EOSINOPHIL # BLD AUTO: 0.07 THOUSAND/ΜL (ref 0–0.61)
EOSINOPHIL NFR BLD AUTO: 1 % (ref 0–6)
ERYTHROCYTE [DISTWIDTH] IN BLOOD BY AUTOMATED COUNT: 13.1 % (ref 11.6–15.1)
GFR SERPL CREATININE-BSD FRML MDRD: 58 ML/MIN/1.73SQ M
GLUCOSE P FAST SERPL-MCNC: 100 MG/DL (ref 65–99)
HCT VFR BLD AUTO: 44 % (ref 36.5–49.3)
HGB BLD-MCNC: 14.1 G/DL (ref 12–17)
IMM GRANULOCYTES # BLD AUTO: 0.01 THOUSAND/UL (ref 0–0.2)
IMM GRANULOCYTES NFR BLD AUTO: 0 % (ref 0–2)
LDH SERPL-CCNC: 202 U/L (ref 81–234)
LYMPHOCYTES # BLD AUTO: 3.2 THOUSANDS/ΜL (ref 0.6–4.47)
LYMPHOCYTES NFR BLD AUTO: 44 % (ref 14–44)
MCH RBC QN AUTO: 29.4 PG (ref 26.8–34.3)
MCHC RBC AUTO-ENTMCNC: 32 G/DL (ref 31.4–37.4)
MCV RBC AUTO: 92 FL (ref 82–98)
MONOCYTES # BLD AUTO: 0.43 THOUSAND/ΜL (ref 0.17–1.22)
MONOCYTES NFR BLD AUTO: 6 % (ref 4–12)
NEUTROPHILS # BLD AUTO: 3.58 THOUSANDS/ΜL (ref 1.85–7.62)
NEUTS SEG NFR BLD AUTO: 49 % (ref 43–75)
NRBC BLD AUTO-RTO: 0 /100 WBCS
PLATELET # BLD AUTO: 125 THOUSANDS/UL (ref 149–390)
PMV BLD AUTO: 10.7 FL (ref 8.9–12.7)
POTASSIUM SERPL-SCNC: 4.6 MMOL/L (ref 3.5–5.3)
PROT SERPL-MCNC: 7.4 G/DL (ref 6.4–8.2)
RBC # BLD AUTO: 4.8 MILLION/UL (ref 3.88–5.62)
SODIUM SERPL-SCNC: 139 MMOL/L (ref 136–145)
WBC # BLD AUTO: 7.3 THOUSAND/UL (ref 4.31–10.16)

## 2022-02-16 PROCEDURE — 80053 COMPREHEN METABOLIC PANEL: CPT

## 2022-02-16 PROCEDURE — 76536 US EXAM OF HEAD AND NECK: CPT

## 2022-02-16 PROCEDURE — 83615 LACTATE (LD) (LDH) ENZYME: CPT

## 2022-02-16 PROCEDURE — 36415 COLL VENOUS BLD VENIPUNCTURE: CPT

## 2022-02-16 PROCEDURE — 85025 COMPLETE CBC W/AUTO DIFF WBC: CPT

## 2022-03-02 ENCOUNTER — OFFICE VISIT (OUTPATIENT)
Dept: HEMATOLOGY ONCOLOGY | Facility: CLINIC | Age: 82
End: 2022-03-02
Payer: COMMERCIAL

## 2022-03-02 VITALS
HEIGHT: 67 IN | DIASTOLIC BLOOD PRESSURE: 72 MMHG | WEIGHT: 168 LBS | SYSTOLIC BLOOD PRESSURE: 110 MMHG | BODY MASS INDEX: 26.37 KG/M2 | TEMPERATURE: 97.2 F

## 2022-03-02 DIAGNOSIS — R93.89 ABNORMAL FINDING OF DIAGNOSTIC IMAGING: ICD-10-CM

## 2022-03-02 DIAGNOSIS — C43.39 MALIGNANT MELANOMA OF FOREHEAD (HCC): ICD-10-CM

## 2022-03-02 DIAGNOSIS — C43.30 MALIGNANT MELANOMA OF SKIN OF FACE (HCC): Primary | ICD-10-CM

## 2022-03-02 PROCEDURE — 3074F SYST BP LT 130 MM HG: CPT | Performed by: INTERNAL MEDICINE

## 2022-03-02 PROCEDURE — 3078F DIAST BP <80 MM HG: CPT | Performed by: INTERNAL MEDICINE

## 2022-03-02 PROCEDURE — 99214 OFFICE O/P EST MOD 30 MIN: CPT | Performed by: INTERNAL MEDICINE

## 2022-03-02 PROCEDURE — 1160F RVW MEDS BY RX/DR IN RCRD: CPT | Performed by: INTERNAL MEDICINE

## 2022-03-02 PROCEDURE — 1036F TOBACCO NON-USER: CPT | Performed by: INTERNAL MEDICINE

## 2022-03-02 NOTE — LETTER
March 4, 2022     Branden Hunter MD  [address] [place] [address]    Patient: Dima Poe   YOB: 1940   Date of Visit: 3/2/2022       Dear Dr Martines Carrier:    Thank you for referring Dima Stager to me for evaluation  Below are my notes for this consultation  If you have questions, please do not hesitate to call me  I look forward to following your patient along with you  Sincerely,        Lorelei Murray MD        CC: MD Gamal Perez MD Weldon Einstein, MD Elissa Lint, MD  3/4/2022  2:11 PM  Sign when Signing Visit  [address] [place] [address] Saskia Chaudhari 1159  [phone]     Date of Visit: 3/2/2022  Name: Dima Poe   YOB: 1940       Subjective    VISIT DIAGNOSIS:  Diagnoses and all orders for this visit:    Malignant melanoma of skin of face [facility])  -     Ambulatory Referral to Interventional Radiology; Future    Malignant melanoma of forehead (HCC)    Abnormal finding of diagnostic imaging  -     Ambulatory Referral to Interventional Radiology;  Future        Oncology History   Malignant melanoma of skin of face ([facility] [address] )   9/8/2021 -  Cancer Staged    Staging form: Melanoma of the Skin, AJCC 8th Edition  - Clinical stage from 9/8/2021: Stage III (cT2a, cN1a, cM0) - Signed by Lorelei Murray MD on 11/7/2021 9/8/2021 -  Cancer Staged    Staging form: Melanoma of the Skin, AJCC 8th Edition  - Pathologic stage from 9/8/2021: Stage IIIA (pT2a, pN1a, cM0) - Signed by Lorelei Murray MD on 11/7/2021 9/22/2021 Initial Diagnosis    Malignant melanoma of skin of face [facility])        Cancer Staging  Malignant melanoma of skin of face [facility])  Staging form: Melanoma of the Skin, AJCC 8th Edition  - Clinical stage from 9/8/2021: Stage III (cT2a, cN1a, cM0) - Signed by Lorelei Murray MD on 11/7/2021  - Pathologic stage from 9/8/2021: Stage IIIA (pT2a, pN1a, cM0) - Signed by Emanuel Lin MD on 11/7/2021       HISTORY OF PRESENT ILLNESS: Ti Garcia is a 79 y o  male  who has stage IIIA melanoma here for follow-up and surveillance  He is doing okay and feels well  He has no complaints  He denies any new, changing, concerning skin lesions  Denies new lymphadenopathy  He underwent ultrasound of his neck lymph node basin for monitoring  I am unable to access the specific imaging, but I have reviewed the radiology report which demonstrates an irregular elongated structure in the left pre-auricular tissues extending from the skin to the superficial aspect of left parotid measuring 1 5 x 0 4 x 1 9 cm and this appears to correlate with the previous seen mild FDG avid lesion on PET scan 11/12/2021  He also has an ovoid lymph node inferior to left parotid gland without a clear fatty hilum measuring 9 1 x 0 5 x 1 2 cm and attention to this lesion should be followed  He also has an incidental 0 4 cm left thyroid nodule which does not meet criteria for biopsy or follow-up ultrasound  REVIEW OF SYSTEMS:  Review of Systems   Constitutional: Negative for appetite change, fatigue, fever and unexpected weight change  HENT:   Negative for lump/mass  Eyes: Negative for icterus  Respiratory: Negative for cough, shortness of breath and wheezing  Cardiovascular: Negative for leg swelling  Gastrointestinal: Negative for abdominal pain, constipation, diarrhea, nausea and vomiting  Genitourinary: Negative for difficulty urinating and hematuria  Musculoskeletal: Negative for arthralgias, gait problem and myalgias  Skin: Negative for itching and rash  No new, changing, or concerning lesions  Neurological: Negative for extremity weakness, gait problem, headaches, light-headedness and numbness  Hematological: Negative for adenopathy          MEDICATIONS:    Current Outpatient Medications:     atenolol (TENORMIN) 100 mg tablet, Take 1 tablet (100 mg total) by mouth daily, Disp: 90 tablet, Rfl: 1    glimepiride (AMARYL) 4 mg tablet, Take 1 tablet (4 mg total) by mouth daily, Disp: 90 tablet, Rfl: 1    lisinopril-hydrochlorothiazide (PRINZIDE,ZESTORETIC) 20-25 MG per tablet, Take 1 tablet by mouth daily, Disp: 90 tablet, Rfl: 1    lovastatin (MEVACOR) 40 MG tablet, Take 1 tablet (40 mg total) by mouth daily, Disp: 90 tablet, Rfl: 1    metFORMIN (GLUCOPHAGE) 1000 MG tablet, Take 1 tablet (1,000 mg total) by mouth 2 (two) times a day with meals, Disp: 180 tablet, Rfl: 1     ALLERGIES:  No Known Allergies     ACTIVE PROBLEMS:  Patient Active Problem List   Diagnosis    Essential hypertension    Hiatal hernia with GERD    Status post laparoscopic cholecystectomy    Status post umbilical hernia repair, follow-up exam    Type 2 diabetes mellitus without complication, without long-term current use of insulin (HCC)    Mixed hyperlipidemia    Parkinson's disease ([address] )    Microalbuminuria    Hyperbilirubinemia    Malignant melanoma of skin of face ([facility] [address] )    Thrombocytopenia (HCC)    Malignant melanoma of forehead ([facility] [address] )          PAST MEDICAL HISTORY:   Past Medical History:   Diagnosis Date    Diabetes mellitus ([facility] [address] )     Hyperlipidemia     Hypertension         PAST SURGICAL HISTORY:  Past Surgical History:   Procedure Laterality Date    FLAP LOCAL HEAD / NECK Left 10/12/2021    Procedure: RECONSTRUCTION OF LEFT TEMPLE DEFECT AFTER RESCECTION MELANOMA;  Surgeon: Kathrine Borden MD;  [facility];  Service: Plastics    FULL THICKNESS SKIN GRAFT Left 10/12/2021    Procedure: SKIN GRAFT FULL THICKNESS LEFT TEMPLE;  Surgeon: Kathrine Borden MD;  [facility];  Service: Plastics    GALLBLADDER SURGERY      HAND SURGERY Left     LYMPH NODE BIOPSY Left 10/12/2021    Procedure: BIOPSY LYMPH NODE SENTINEL, LYMPHOSCINTIGRAPHY, INTRAOPERATIVE LYMPHATIC MAPPING (INJECT AT 1200);   Surgeon: Sunitha Dockery MD;  [facility];  Service: Surgical Oncology    NOSE SURGERY      SKIN CANCER EXCISION  10/2021    SKIN LESION EXCISION Left 10/12/2021    Procedure: FACE MELANOMA SCAR WIDE EXCISION  FACE;  Surgeon: Rebekah Andrews MD;  [facility];  Service: Surgical Oncology    SPLIT THICKNESS SKIN GRAFT Left 10/12/2021    Procedure: SKIN GRAFT SPLIT THICKNESS LEFT TEMPLE;  Surgeon: Karley Rogers MD;  [facility];  Service: Plastics        SOCIAL HISTORY:  Social History     Socioeconomic History    Marital status: /Civil Union     Spouse name: None    Number of children: None    Years of education: None    Highest education level: None   Occupational History    None   Tobacco Use    Smoking status: Never Smoker    Smokeless tobacco: Never Used   Vaping Use    Vaping Use: Never used   Substance and Sexual Activity    Alcohol use: Not Currently    Drug use: Never    Sexual activity: Not Currently   Other Topics Concern    None   Social History Narrative    None     Social Determinants of Health     Financial Resource Strain: Not on file   Food Insecurity: Not on file   Transportation Needs: Not on file   Physical Activity: Not on file   Stress: Not on file   Social Connections: Not on file   Intimate Partner Violence: Not on file   Housing Stability: Not on file        FAMILY HISTORY:  Family History   Problem Relation Age of Onset    No Known Problems Mother     No Known Problems Father     Colon cancer Other 60          Objective    PHYSICAL EXAMINATION:   Blood pressure 110/72, temperature (!) 97 2 °F (36 2 °C), temperature source Tympanic, height 5' 7" (1 702 m), weight 76 2 kg (168 lb)       ECOG Performance Status      Most Recent Value   ECOG Performance Status 1 - Restricted in physically strenuous activity but ambulatory and able to carry out work of a light or sedentary nature, e g , light house work, office work             Physical Exam  Constitutional:       General: He is not in acute distress  Appearance: Normal appearance  He is not toxic-appearing  HENT:      Mouth/Throat:      Mouth: Mucous membranes are moist       Pharynx: Oropharynx is clear  Eyes:      General: No scleral icterus  Cardiovascular:      Rate and Rhythm: Normal rate and regular rhythm  Pulses: Normal pulses  Heart sounds: No murmur heard  No friction rub  No gallop  Pulmonary:      Effort: Pulmonary effort is normal  No respiratory distress  Breath sounds: Normal breath sounds  No wheezing or rales  Chest:   Breasts:      Right: No axillary adenopathy or supraclavicular adenopathy  Left: No axillary adenopathy or supraclavicular adenopathy  Abdominal:      General: There is no distension  Palpations: There is no mass  Tenderness: There is no abdominal tenderness  There is no rebound  Musculoskeletal:         General: No swelling or tenderness  Right lower leg: No edema  Left lower leg: No edema  Lymphadenopathy:      Head:      Right side of head: No submandibular, preauricular or posterior auricular adenopathy  Left side of head: No submandibular, preauricular or posterior auricular adenopathy  Cervical: Cervical adenopathy present  Right cervical: No superficial or posterior cervical adenopathy  Left cervical: Superficial cervical adenopathy (+ fullness below the jawline, oblong in nature) present  No posterior cervical adenopathy  Upper Body:      Right upper body: No supraclavicular or axillary adenopathy  Left upper body: No supraclavicular or axillary adenopathy  Lower Body: No right inguinal adenopathy  No left inguinal adenopathy  Skin:     Findings: No rash  Comments: Well healed surgical scar  No evidence of recurrence at primary site  Neurological:      General: No focal deficit present  Mental Status: He is alert and oriented to person, place, and time     Psychiatric:         Mood and Affect: Mood normal          Behavior: Behavior normal          Thought Content: Thought content normal          Judgment: Judgment normal          I reviewed lab data in the chart      WBC   Date Value Ref Range Status   02/16/2022 7 30 4 31 - 10 16 Thousand/uL Final   10/05/2021 10 71 (H) 4 31 - 10 16 Thousand/uL Final   05/14/2021 11 89 (H) 4 31 - 10 16 Thousand/uL Final     Hemoglobin   Date Value Ref Range Status   02/16/2022 14 1 12 0 - 17 0 g/dL Final   10/05/2021 13 3 12 0 - 17 0 g/dL Final   05/14/2021 14 3 12 0 - 17 0 g/dL Final     Platelets   Date Value Ref Range Status   02/16/2022 125 (L) 149 - 390 Thousands/uL Final   10/05/2021 127 (L) 149 - 390 Thousands/uL Final   05/14/2021 163 149 - 390 Thousands/uL Final     MCV   Date Value Ref Range Status   02/16/2022 92 82 - 98 fL Final   10/05/2021 93 82 - 98 fL Final   05/14/2021 91 82 - 98 fL Final      Potassium   Date Value Ref Range Status   02/16/2022 4 6 3 5 - 5 3 mmol/L Final   10/05/2021 4 6 3 5 - 5 3 mmol/L Final   05/14/2021 4 0 3 5 - 5 3 mmol/L Final     Chloride   Date Value Ref Range Status   02/16/2022 106 100 - 108 mmol/L Final   10/05/2021 107 100 - 108 mmol/L Final   05/14/2021 105 100 - 108 mmol/L Final     CO2   Date Value Ref Range Status   02/16/2022 29 21 - 32 mmol/L Final   10/05/2021 28 21 - 32 mmol/L Final   05/14/2021 28 21 - 32 mmol/L Final     BUN   Date Value Ref Range Status   02/16/2022 21 5 - 25 mg/dL Final   10/05/2021 21 5 - 25 mg/dL Final   05/14/2021 24 5 - 25 mg/dL Final     Creatinine   Date Value Ref Range Status   02/16/2022 1 16 0 60 - 1 30 mg/dL Final     Comment:     Standardized to IDMS reference method   10/05/2021 1 28 0 60 - 1 30 mg/dL Final     Comment:     Standardized to IDMS reference method   05/14/2021 1 32 (H) 0 60 - 1 30 mg/dL Final     Comment:     Standardized to IDMS reference method     Glucose   Date Value Ref Range Status   10/05/2021 193 (H) 65 - 140 mg/dL Final     Comment:     If the patient is fasting, the ADA then defines impaired fasting glucose as > 100 mg/dL and diabetes as > or equal to 123 mg/dL  Specimen collection should occur prior to Sulfasalazine administration due to the potential for falsely depressed results  Specimen collection should occur prior to Sulfapyridine administration due to the potential for falsely elevated results  Calcium   Date Value Ref Range Status   02/16/2022 9 4 8 3 - 10 1 mg/dL Final   10/05/2021 9 5 8 3 - 10 1 mg/dL Final   05/14/2021 9 1 8 3 - 10 1 mg/dL Final     Albumin   Date Value Ref Range Status   02/16/2022 4 0 3 5 - 5 0 g/dL Final   10/05/2021 3 8 3 5 - 5 0 g/dL Final   05/14/2021 4 3 3 5 - 5 0 g/dL Final     Total Bilirubin   Date Value Ref Range Status   02/16/2022 1 38 (H) 0 20 - 1 00 mg/dL Final     Comment:     Use of this assay is not recommended for patients undergoing treatment with eltrombopag due to the potential for falsely elevated results  10/05/2021 1 36 (H) 0 20 - 1 00 mg/dL Final     Comment:     Use of this assay is not recommended for patients undergoing treatment with eltrombopag due to the potential for falsely elevated results  05/14/2021 1 61 (H) 0 20 - 1 00 mg/dL Final     Comment:     Use of this assay is not recommended for patients undergoing treatment with eltrombopag due to the potential for falsely elevated results  Alkaline Phosphatase   Date Value Ref Range Status   02/16/2022 83 46 - 116 U/L Final   10/05/2021 74 46 - 116 U/L Final   05/14/2021 79 46 - 116 U/L Final     AST   Date Value Ref Range Status   02/16/2022 14 5 - 45 U/L Final     Comment:     Specimen collection should occur prior to Sulfasalazine administration due to the potential for falsely depressed results  10/05/2021 20 5 - 45 U/L Final     Comment:     Specimen collection should occur prior to Sulfasalazine administration due to the potential for falsely depressed results      05/14/2021 20 5 - 45 U/L Final     Comment:     Specimen collection should occur prior to Sulfasalazine administration due to the potential for falsely depressed results  ALT   Date Value Ref Range Status   02/16/2022 20 12 - 78 U/L Final     Comment:     Specimen collection should occur prior to Sulfasalazine and/or Sulfapyridine administration due to the potential for falsely depressed results  10/05/2021 30 12 - 78 U/L Final     Comment:     Specimen collection should occur prior to Sulfasalazine and/or Sulfapyridine administration due to the potential for falsely depressed results  05/14/2021 27 12 - 78 U/L Final     Comment:     Specimen collection should occur prior to Sulfasalazine and/or Sulfapyridine administration due to the potential for falsely depressed results  LD   Date Value Ref Range Status   02/16/2022 202 81 - 234 U/L Final     No results found for: TSH  No results found for: C0GMEKN   No results found for: FREET4      RECENT IMAGING:  Procedure: US head neck soft tissue    Result Date: 2/17/2022  Narrative: HEAD AND NECK SOFT TISSUE ULTRASOUND INDICATION:   C43 30: Malignant melanoma of unspecified part of face  COMPARISON:  PET/CT 11/12/2021 TECHNIQUE:   Real-time ultrasound of the preauricular region was performed with a linear transducer with both volumetric sweeps and still imaging techniques  FINDINGS:  There is an irregular elongated hypoechoic structure in the left preauricular soft tissues extending from the skin to the superficial aspect of the left parotid gland measuring approximately 1 5 x 0 4 x 1 9 cm (images 7 and 19)  No detectable internal vascularity on color Doppler  This appears to correlate with previously seen area of mild FDG uptake in the left preauricular region on PET/CT 11/12/2021  There is an ovoid hypoechoic lymph node inferior to the left parotid gland without clear fatty hilum measuring 1 1 x 0 5 x 1 2 cm (image 35)   Typical-appearing lymph nodes measuring less than 1 cm in short axis diameter with fatty rasheed in the parotid regions and right submandibular region  Left thyroid nodule incidentally noted on image 116: LEFT lower pole nodule measuring 0 4 x 0 4 x 0 4 cm  No priors for comparison  COMPOSITION:  2 points, solid or almost completely solid  ECHOGENICITY:  2 points, hypoechoic  SHAPE:  0 points, wider-than-tall  MARGIN:  0 points, smooth  ECHOGENIC FOCI:  0 points, none or large comet-tail artifacts  TI-RADS Classification:  TR 4 (4-6 points), FNA if  >=  1 5 cm  Follow if  >=  1 cm  A 2 mm focus of calcification is noted in the right thyroid mid gland  Impression: 1  Irregular elongated hypoechoic structure in the left preauricular soft tissues extending from the skin to the superficial aspect of the left parotid gland measuring approximately 1 5 x 0 4 x 1 9 cm  This appears to correlate with previously seen area of mild FDG uptake in the left preauricular region on PET/CT 11/12/2021  This finding is indeterminate  Biopsy may be considered  2   Ovoid hypoechoic lymph node inferior to the left parotid gland without clear fatty hilum measuring 1 1 x 0 5 x 1 2 cm  Attention on close interval follow-up  Alternatively, biopsy could be considered if clinically indicated  3   Incidentally noted 0 4 cm left thyroid nodule as above which does not meet ACR TI-RADS criteria for requiring biopsy or follow-up ultrasound  Reference: ACR Thyroid Imaging, Reporting and Data System (TI-RADS): White Paper of the [employer]  J AM Nevin Radiol 8412;65:911-594  Additional recommendations based on American Thyroid Association 2015 guidelines  The study was marked in EPIC for significant notification  Workstation performed: [id number]            Assessment/Plan  Mr Stephan Morales is a 79 yr male with stage IIIA melanoma here for follow-up with US concerning for disease recurrence    1  Malignant melanoma of skin of face ([facility] [address] )  2  Malignant melanoma of forehead [facility])  Mr Stephan Morales is doing well and he denies any issues  Imaging of his left neck US LN basin is concerning for disease recurrence in and around the parotid gland and possibly the neck  Referral placed to Interventional Radiology for biopsy of 1 of the lesions  We will have him follow-up after the biopsy to discuss final pathology and further recommendations  - Ambulatory Referral to Interventional Radiology; Future      3  Abnormal finding of diagnostic imaging  Concern for disease recurrence with US of LN basin demonstrating enlarging nodularity  Referral to IR for biopsy  Further recommendations pending final pathology  - Ambulatory Referral to Interventional Radiology;  Future      Mitra Morelos MD, PhD

## 2022-03-04 NOTE — PROGRESS NOTES
[facility]ATOLOGY [facility]  [address] [facility] [address]  [phone]  [phone]     Date of Visit: 3/2/2022  Name: Luis Chaudhari   YOB: 1940       Subjective    VISIT DIAGNOSIS:  Diagnoses and all orders for this visit:    Malignant melanoma of skin of face [facility])  -     Ambulatory Referral to Interventional Radiology; Future    Malignant melanoma of forehead (HCC)    Abnormal finding of diagnostic imaging  -     Ambulatory Referral to Interventional Radiology; Future        Oncology History   Malignant melanoma of skin of face ([facility] [address] )   9/8/2021 -  Cancer Staged    Staging form: Melanoma of the Skin, AJCC 8th Edition  - Clinical stage from 9/8/2021: Stage III (cT2a, cN1a, cM0) - Signed by Vida Gonzalez MD on 11/7/2021 9/8/2021 -  Cancer Staged    Staging form: Melanoma of the Skin, AJCC 8th Edition  - Pathologic stage from 9/8/2021: Stage IIIA (pT2a, pN1a, cM0) - Signed by Vida Gonzalez MD on 11/7/2021 9/22/2021 Initial Diagnosis    Malignant melanoma of skin of face [facility])        Cancer Staging  Malignant melanoma of skin of face [facility])  Staging form: Melanoma of the Skin, AJCC 8th Edition  - Clinical stage from 9/8/2021: Stage III (cT2a, cN1a, cM0) - Signed by Vida Gonzalez MD on 11/7/2021  - Pathologic stage from 9/8/2021: Stage IIIA (pT2a, pN1a, cM0) - Signed by Vida Gonzalez MD on 11/7/2021       HISTORY OF PRESENT ILLNESS: Luis Chaudhari is a 79 y o  male  who has stage IIIA melanoma here for follow-up and surveillance  He is doing okay and feels well  He has no complaints  He denies any new, changing, concerning skin lesions  Denies new lymphadenopathy  He underwent ultrasound of his neck lymph node basin for monitoring    I am unable to access the specific imaging, but I have reviewed the radiology report which demonstrates an irregular elongated structure in the left pre-auricular tissues extending from the skin to the superficial aspect of left parotid measuring 1 5 x 0 4 x 1 9 cm and this appears to correlate with the previous seen mild FDG avid lesion on PET scan 11/12/2021  He also has an ovoid lymph node inferior to left parotid gland without a clear fatty hilum measuring 9 1 x 0 5 x 1 2 cm and attention to this lesion should be followed  He also has an incidental 0 4 cm left thyroid nodule which does not meet criteria for biopsy or follow-up ultrasound  REVIEW OF SYSTEMS:  Review of Systems   Constitutional: Negative for appetite change, fatigue, fever and unexpected weight change  HENT:   Negative for lump/mass  Eyes: Negative for icterus  Respiratory: Negative for cough, shortness of breath and wheezing  Cardiovascular: Negative for leg swelling  Gastrointestinal: Negative for abdominal pain, constipation, diarrhea, nausea and vomiting  Genitourinary: Negative for difficulty urinating and hematuria  Musculoskeletal: Negative for arthralgias, gait problem and myalgias  Skin: Negative for itching and rash  No new, changing, or concerning lesions  Neurological: Negative for extremity weakness, gait problem, headaches, light-headedness and numbness  Hematological: Negative for adenopathy          MEDICATIONS:    Current Outpatient Medications:     atenolol (TENORMIN) 100 mg tablet, Take 1 tablet (100 mg total) by mouth daily, Disp: 90 tablet, Rfl: 1    glimepiride (AMARYL) 4 mg tablet, Take 1 tablet (4 mg total) by mouth daily, Disp: 90 tablet, Rfl: 1    lisinopril-hydrochlorothiazide (PRINZIDE,ZESTORETIC) 20-25 MG per tablet, Take 1 tablet by mouth daily, Disp: 90 tablet, Rfl: 1    lovastatin (MEVACOR) 40 MG tablet, Take 1 tablet (40 mg total) by mouth daily, Disp: 90 tablet, Rfl: 1    metFORMIN (GLUCOPHAGE) 1000 MG tablet, Take 1 tablet (1,000 mg total) by mouth 2 (two) times a day with meals, Disp: 180 tablet, Rfl: 1     ALLERGIES:  No Known Allergies     ACTIVE PROBLEMS:  Patient Active Problem List   Diagnosis    Essential hypertension    Hiatal hernia with GERD    Status post laparoscopic cholecystectomy    Status post umbilical hernia repair, follow-up exam    Type 2 diabetes mellitus without complication, without long-term current use of insulin (HCC)    Mixed hyperlipidemia    Parkinson's disease ([facility] [address] )    Microalbuminuria    Hyperbilirubinemia    Malignant melanoma of skin of face ([facility] [address] )    Thrombocytopenia ([facility] [address] )    Malignant melanoma of forehead ([facility] )          PAST MEDICAL HISTORY:   Past Medical History:   Diagnosis Date    [facility]     Hyperlipidemia     Hypertension         PAST SURGICAL HISTORY:  Past Surgical History:   Procedure Laterality Date    FLAP LOCAL HEAD / NECK Left 10/12/2021    Procedure: RECONSTRUCTION OF LEFT TEMPLE DEFECT AFTER RESCECTION MELANOMA;  Surgeon: Michelle Yoo MD;  [facility];  Service: Plastics    FULL THICKNESS SKIN GRAFT Left 10/12/2021    Procedure: SKIN GRAFT FULL THICKNESS LEFT TEMPLE;  Surgeon: Michelle Yoo MD;  [facility];  Service: Plastics    GALLBLADDER SURGERY      HAND SURGERY Left     LYMPH NODE BIOPSY Left 10/12/2021    Procedure: BIOPSY LYMPH NODE SENTINEL, LYMPHOSCINTIGRAPHY, INTRAOPERATIVE LYMPHATIC MAPPING (INJECT AT 1200);   Surgeon: John Hodge MD;  [facility];  Service: Surgical Oncology    NOSE SURGERY      SKIN CANCER EXCISION  10/2021    SKIN LESION EXCISION Left 10/12/2021    Procedure: FACE MELANOMA SCAR WIDE EXCISION  FACE;  Surgeon: John Hodge MD;  [facility];  Service: Surgical Oncology    SPLIT THICKNESS SKIN GRAFT Left 10/12/2021    Procedure: SKIN GRAFT SPLIT THICKNESS LEFT TEMPLE;  Surgeon: Michelle Yoo MD;  [facility];  Service: Plastics        SOCIAL HISTORY:  Social History     Socioeconomic History    Marital status: /Civil Union     Spouse name: None    Number of children: None    Years of education: None    Highest education level: None   Occupational History    None   Tobacco Use    Smoking status: Never Smoker    Smokeless tobacco: Never Used   Vaping Use    Vaping Use: Never used   Substance and Sexual Activity    Alcohol use: Not Currently    Drug use: Never    Sexual activity: Not Currently   Other Topics Concern    None   Social History Narrative    None     Social Determinants of Health     Financial Resource Strain: Not on file   Food Insecurity: Not on file   Transportation Needs: Not on file   Physical Activity: Not on file   Stress: Not on file   Social Connections: Not on file   Intimate Partner Violence: Not on file   Housing Stability: Not on file        FAMILY HISTORY:  Family History   Problem Relation Age of Onset    No Known Problems Mother     No Known Problems Father     Colon cancer Other 60          Objective    PHYSICAL EXAMINATION:   Blood pressure 110/72, temperature (!) 97 2 °F (36 2 °C), temperature source Tympanic, height 5' 7" (1 702 m), weight 76 2 kg (168 lb)  ECOG Performance Status      Most Recent Value   ECOG Performance Status 1 - Restricted in physically strenuous activity but ambulatory and able to carry out work of a light or sedentary nature, e g , light house work, office work             Physical Exam  Constitutional:       General: He is not in acute distress  Appearance: Normal appearance  He is not toxic-appearing  HENT:      Mouth/Throat:      Mouth: Mucous membranes are moist       Pharynx: Oropharynx is clear  Eyes:      General: No scleral icterus  Cardiovascular:      Rate and Rhythm: Normal rate and regular rhythm  Pulses: Normal pulses  Heart sounds: No murmur heard  No friction rub  No gallop  Pulmonary:      Effort: Pulmonary effort is normal  No respiratory distress  Breath sounds: Normal breath sounds  No wheezing or rales  Chest:   Breasts:      Right: No axillary adenopathy or supraclavicular adenopathy  Left: No axillary adenopathy or supraclavicular adenopathy  Abdominal:      General: There is no distension  Palpations: There is no mass  Tenderness: There is no abdominal tenderness  There is no rebound  Musculoskeletal:         General: No swelling or tenderness  Right lower leg: No edema  Left lower leg: No edema  Lymphadenopathy:      Head:      Right side of head: No submandibular, preauricular or posterior auricular adenopathy  Left side of head: No submandibular, preauricular or posterior auricular adenopathy  Cervical: Cervical adenopathy present  Right cervical: No superficial or posterior cervical adenopathy  Left cervical: Superficial cervical adenopathy (+ fullness below the jawline, oblong in nature) present  No posterior cervical adenopathy  Upper Body:      Right upper body: No supraclavicular or axillary adenopathy  Left upper body: No supraclavicular or axillary adenopathy  Lower Body: No right inguinal adenopathy  No left inguinal adenopathy  Skin:     Findings: No rash  Comments: Well healed surgical scar  No evidence of recurrence at primary site  Neurological:      General: No focal deficit present  Mental Status: He is alert and oriented to person, place, and time  Psychiatric:         Mood and Affect: Mood normal          Behavior: Behavior normal          Thought Content: Thought content normal          Judgment: Judgment normal          I reviewed lab data in the chart      WBC   Date Value Ref Range Status   02/16/2022 7 30 4 31 - 10 16 Thousand/uL Final   10/05/2021 10 71 (H) 4 31 - 10 16 Thousand/uL Final   05/14/2021 11 89 (H) 4 31 - 10 16 Thousand/uL Final     Hemoglobin   Date Value Ref Range Status   02/16/2022 14 1 12 0 - 17 0 g/dL Final   10/05/2021 13 3 12 0 - 17 0 g/dL Final   05/14/2021 14 3 12 0 - 17 0 g/dL Final     Platelets   Date Value Ref Range Status   02/16/2022 125 (L) 149 - 390 Thousands/uL Final   10/05/2021 127 (L) 149 - 390 Thousands/uL Final   05/14/2021 163 149 - 390 Thousands/uL Final     MCV   Date Value Ref Range Status   02/16/2022 92 82 - 98 fL Final   10/05/2021 93 82 - 98 fL Final   05/14/2021 91 82 - 98 fL Final      Potassium   Date Value Ref Range Status   02/16/2022 4 6 3 5 - 5 3 mmol/L Final   10/05/2021 4 6 3 5 - 5 3 mmol/L Final   05/14/2021 4 0 3 5 - 5 3 mmol/L Final     Chloride   Date Value Ref Range Status   02/16/2022 106 100 - 108 mmol/L Final   10/05/2021 107 100 - 108 mmol/L Final   05/14/2021 105 100 - 108 mmol/L Final     CO2   Date Value Ref Range Status   02/16/2022 29 21 - 32 mmol/L Final   10/05/2021 28 21 - 32 mmol/L Final   05/14/2021 28 21 - 32 mmol/L Final     BUN   Date Value Ref Range Status   02/16/2022 21 5 - 25 mg/dL Final   10/05/2021 21 5 - 25 mg/dL Final   05/14/2021 24 5 - 25 mg/dL Final     Creatinine   Date Value Ref Range Status   02/16/2022 1 16 0 60 - 1 30 mg/dL Final     Comment:     Standardized to IDMS reference method   10/05/2021 1 28 0 60 - 1 30 mg/dL Final     Comment:     Standardized to IDMS reference method   05/14/2021 1 32 (H) 0 60 - 1 30 mg/dL Final     Comment:     Standardized to IDMS reference method     Glucose   Date Value Ref Range Status   10/05/2021 193 (H) 65 - 140 mg/dL Final     Comment:     If the patient is fasting, the ADA then defines impaired fasting glucose as > 100 mg/dL and diabetes as > or equal to 123 mg/dL  Specimen collection should occur prior to Sulfasalazine administration due to the potential for falsely depressed results  Specimen collection should occur prior to Sulfapyridine administration due to the potential for falsely elevated results       Calcium   Date Value Ref Range Status   02/16/2022 9 4 8 3 - 10 1 mg/dL Final   10/05/2021 9 5 8 3 - 10 1 mg/dL Final   05/14/2021 9 1 8 3 - 10 1 mg/dL Final     Albumin   Date Value Ref Range Status   02/16/2022 4 0 3 5 - 5 0 g/dL Final   10/05/2021 3 8 3 5 - 5 0 g/dL Final   05/14/2021 4 3 3 5 - 5 0 g/dL Final     Total Bilirubin   Date Value Ref Range Status   02/16/2022 1 38 (H) 0 20 - 1 00 mg/dL Final     Comment:     Use of this assay is not recommended for patients undergoing treatment with eltrombopag due to the potential for falsely elevated results  10/05/2021 1 36 (H) 0 20 - 1 00 mg/dL Final     Comment:     Use of this assay is not recommended for patients undergoing treatment with eltrombopag due to the potential for falsely elevated results  05/14/2021 1 61 (H) 0 20 - 1 00 mg/dL Final     Comment:     Use of this assay is not recommended for patients undergoing treatment with eltrombopag due to the potential for falsely elevated results  Alkaline Phosphatase   Date Value Ref Range Status   02/16/2022 83 46 - 116 U/L Final   10/05/2021 74 46 - 116 U/L Final   05/14/2021 79 46 - 116 U/L Final     AST   Date Value Ref Range Status   02/16/2022 14 5 - 45 U/L Final     Comment:     Specimen collection should occur prior to Sulfasalazine administration due to the potential for falsely depressed results  10/05/2021 20 5 - 45 U/L Final     Comment:     Specimen collection should occur prior to Sulfasalazine administration due to the potential for falsely depressed results  05/14/2021 20 5 - 45 U/L Final     Comment:     Specimen collection should occur prior to Sulfasalazine administration due to the potential for falsely depressed results  ALT   Date Value Ref Range Status   02/16/2022 20 12 - 78 U/L Final     Comment:     Specimen collection should occur prior to Sulfasalazine and/or Sulfapyridine administration due to the potential for falsely depressed results  10/05/2021 30 12 - 78 U/L Final     Comment:     Specimen collection should occur prior to Sulfasalazine and/or Sulfapyridine administration due to the potential for falsely depressed results      05/14/2021 27 12 - 78 U/L Final     Comment:     Specimen collection should occur prior to Sulfasalazine and/or Sulfapyridine administration due to the potential for falsely depressed results  LD   Date Value Ref Range Status   02/16/2022 202 81 - 234 U/L Final     No results found for: TSH  No results found for: [id number]   No results found for: FREET4      RECENT IMAGING:  Procedure: US head neck soft tissue    Result Date: 2/17/2022  Narrative: HEAD AND NECK SOFT TISSUE ULTRASOUND INDICATION:   C43 30: Malignant melanoma of unspecified part of face  COMPARISON:  PET/CT 11/12/2021 TECHNIQUE:   Real-time ultrasound of the preauricular region was performed with a linear transducer with both volumetric sweeps and still imaging techniques  FINDINGS:  There is an irregular elongated hypoechoic structure in the left preauricular soft tissues extending from the skin to the superficial aspect of the left parotid gland measuring approximately 1 5 x 0 4 x 1 9 cm (images 7 and 19)  No detectable internal vascularity on color Doppler  This appears to correlate with previously seen area of mild FDG uptake in the left preauricular region on PET/CT 11/12/2021  There is an ovoid hypoechoic lymph node inferior to the left parotid gland without clear fatty hilum measuring 1 1 x 0 5 x 1 2 cm (image 35)  Typical-appearing lymph nodes measuring less than 1 cm in short axis diameter with fatty rasheed in the parotid regions and right submandibular region  Left thyroid nodule incidentally noted on image 116: LEFT lower pole nodule measuring 0 4 x 0 4 x 0 4 cm  No priors for comparison  COMPOSITION:  2 points, solid or almost completely solid  ECHOGENICITY:  2 points, hypoechoic  SHAPE:  0 points, wider-than-tall  MARGIN:  0 points, smooth  ECHOGENIC FOCI:  0 points, none or large comet-tail artifacts  TI-RADS Classification:  TR 4 (4-6 points), FNA if  >=  1 5 cm  Follow if  >=  1 cm  A 2 mm focus of calcification is noted in the right thyroid mid gland  Impression: 1    Irregular elongated hypoechoic structure in the left preauricular soft tissues extending from the skin to the superficial aspect of the left parotid gland measuring approximately 1 5 x 0 4 x 1 9 cm  This appears to correlate with previously seen area of mild FDG uptake in the left preauricular region on PET/CT 11/12/2021  This finding is indeterminate  Biopsy may be considered  2   Ovoid hypoechoic lymph node inferior to the left parotid gland without clear fatty hilum measuring 1 1 x 0 5 x 1 2 cm  Attention on close interval follow-up  Alternatively, biopsy could be considered if clinically indicated  3   Incidentally noted 0 4 cm left thyroid nodule as above which does not meet ACR TI-RADS criteria for requiring biopsy or follow-up ultrasound  Reference: ACR Thyroid Imaging, Reporting and Data System (TI-RADS): White Paper of the [employer]  J AM Nevin Radiol 5786;86:[id number]  Additional recommendations based on American Thyroid Association 2015 guidelines  The study was marked in EPIC for significant notification  Workstation performed: [id number]            Assessment/Plan  Mr Gilberto Enamorado is a 79 yr male with stage IIIA melanoma here for follow-up with US concerning for disease recurrence    1  Malignant melanoma of skin of face ([facility] [address] )  2  Malignant melanoma of forehead [facility])  Mr Gilberto Enamorado is doing well and he denies any issues  Imaging of his left neck US LN basin is concerning for disease recurrence in and around the parotid gland and possibly the neck  Referral placed to Interventional Radiology for biopsy of 1 of the lesions  We will have him follow-up after the biopsy to discuss final pathology and further recommendations  - Ambulatory Referral to Interventional Radiology; Future      3  Abnormal finding of diagnostic imaging  Concern for disease recurrence with US of LN basin demonstrating enlarging nodularity  Referral to IR for biopsy  Further recommendations pending final pathology      - Ambulatory Referral to Interventional Radiology;  Future      Eugene Richardson MD, PhD

## 2022-03-07 NOTE — NURSING NOTE
Call placed to patient to discuss upcoming appointment at [facility] radiology department and complete consultation with patient  Patient is having lymph node biopsy utilizing US  guidance  Reviewed patient's allergies, no current anticoagulant medication present, also discussed the pre and post procedure expectations  Reminded patient of location and time expected for procedure, Patient expressed understanding by verbalizing and repeating instructions

## 2022-03-10 DIAGNOSIS — E11.9 TYPE 2 DIABETES MELLITUS WITHOUT COMPLICATION, WITHOUT LONG-TERM CURRENT USE OF INSULIN (HCC): ICD-10-CM

## 2022-03-10 RX ORDER — GLIMEPIRIDE 4 MG/1
4 TABLET ORAL DAILY
Qty: 90 TABLET | Refills: 1 | Status: SHIPPED | OUTPATIENT
Start: 2022-03-10 | End: 2022-05-25 | Stop reason: SDUPTHER

## 2022-03-21 ENCOUNTER — HOSPITAL ENCOUNTER (OUTPATIENT)
Dept: RADIOLOGY | Facility: HOSPITAL | Age: 82
Discharge: HOME | End: 2022-03-21
Payer: COMMERCIAL

## 2022-03-21 DIAGNOSIS — C43.30 MALIGNANT MELANOMA OF SKIN OF FACE (HCC): ICD-10-CM

## 2022-03-21 DIAGNOSIS — R93.89 ABNORMAL FINDING OF DIAGNOSTIC IMAGING: ICD-10-CM

## 2022-03-21 PROCEDURE — 88172 CYTP DX EVAL FNA 1ST EA SITE: CPT | Performed by: SPECIALIST

## 2022-03-21 PROCEDURE — 88173 CYTOPATH EVAL FNA REPORT: CPT | Performed by: SPECIALIST

## 2022-03-21 PROCEDURE — 10005 FNA BX W/US GDN 1ST LES: CPT

## 2022-03-21 RX ORDER — LIDOCAINE HYDROCHLORIDE 10 MG/ML
1.5 INJECTION, SOLUTION EPIDURAL; INFILTRATION; INTRACAUDAL; PERINEURAL ONCE
Status: COMPLETED | OUTPATIENT
Start: 2022-03-21 | End: 2022-03-21

## 2022-03-21 RX ADMIN — LIDOCAINE HYDROCHLORIDE 1.5 ML: 10 INJECTION, SOLUTION EPIDURAL; INFILTRATION; INTRACAUDAL; PERINEURAL at 09:50

## 2022-03-24 ENCOUNTER — TELEPHONE (OUTPATIENT)
Dept: HEMATOLOGY ONCOLOGY | Facility: CLINIC | Age: 82
End: 2022-03-24

## 2022-03-24 NOTE — TELEPHONE ENCOUNTER
Called and spoke with patient  First infusion scheduled for 4/4 at 2:30 at the [facility]  Follow up appointment with Allen Summers on 4/4 at 1:30 to obtain consent  Patient aware to get blood work one week before his first infusion prior to 9am  All questions answered and patient verbalized understanding

## 2022-03-24 NOTE — TELEPHONE ENCOUNTER
Called patient to review biopsy results and that it was inconclusive for a definitive diagnosis  I gave him options to repeat the biopsy, undergo excisional biopsy with Dr Ramin Eastman, or proceeded with treatment and watch for a response with close monitoring  He has decided to proceede with treatment with pembrolizumab every 6 weeks  I explained that my nurse, Paradise Westbrook, will be calling him once it is all scheduled and we will see him in the office prior to his first infusion to sign consent and review any questions he may have at that time  He was appreciative of the phone call

## 2022-03-28 ENCOUNTER — APPOINTMENT (OUTPATIENT)
Dept: LAB | Facility: IMAGING CENTER | Age: 82
End: 2022-03-28
Payer: COMMERCIAL

## 2022-03-28 DIAGNOSIS — Z79.899 HIGH RISK MEDICATION USE: ICD-10-CM

## 2022-03-28 DIAGNOSIS — C43.30 MALIGNANT MELANOMA OF SKIN OF FACE (HCC): ICD-10-CM

## 2022-03-28 DIAGNOSIS — C43.39 MALIGNANT MELANOMA OF FOREHEAD (HCC): ICD-10-CM

## 2022-03-28 LAB — CORTIS AM PEAK SERPL-MCNC: 16.5 UG/DL (ref 4.2–22.4)

## 2022-03-28 PROCEDURE — 82533 TOTAL CORTISOL: CPT

## 2022-03-28 PROCEDURE — 82024 ASSAY OF ACTH: CPT

## 2022-03-29 LAB — ACTH PLAS-MCNC: 33 PG/ML (ref 7.2–63.3)

## 2022-04-01 ENCOUNTER — TELEPHONE (OUTPATIENT)
Dept: INFUSION CENTER | Facility: CLINIC | Age: 82
End: 2022-04-01

## 2022-04-01 NOTE — TELEPHONE ENCOUNTER
Called patient to completed new patient phone call  Provided location of infusion center  Reviewed visitor and masking policy with patient  Informed patient he can eat, drink, take home meds etc  Patient aware he will go to the office first to see Georgene Cushing then come to infusion center after that appointment  Patient had labs drawn for infusion, reviewed with patient he will need to have labs work done prior to each infusion  Patient asking about infusion schedule, reviewed current scheduled appointments every 6 weeks, per patient he thought he was to come every 3 weeks then transition to every 6 weeks, informed patient he is currently ordered for every 6 weeks but can clarify that with Ghanshyam Hernandez on Monday  Reviewed what to expect at infusion appointment with patient  Patient asking about side effects of medication  Reviewed general possible side effects of immunotherapy that we monitor and recommended he reach out to Dr Michelle Gaviria office for any new symptoms on treatment  Patient asking how long he will be on these infusions, recommended he ask this at his appointment with Ghanshyam Hernandez as his oncology doctors would be the ones to decide this and may have to see how he responds to this medication  Patient verbalized understanding and appreciated call  Confirmed appointments for Monday 4/4 1:30 in oncology office and then 2:30 in infusion center

## 2022-04-04 ENCOUNTER — HOSPITAL ENCOUNTER (OUTPATIENT)
Dept: INFUSION CENTER | Facility: CLINIC | Age: 82
Discharge: HOME | End: 2022-04-04
Payer: COMMERCIAL

## 2022-04-04 ENCOUNTER — OFFICE VISIT (OUTPATIENT)
Dept: HEMATOLOGY ONCOLOGY | Facility: CLINIC | Age: 82
End: 2022-04-04

## 2022-04-04 VITALS
RESPIRATION RATE: 17 BRPM | TEMPERATURE: 97.4 F | BODY MASS INDEX: 26.47 KG/M2 | SYSTOLIC BLOOD PRESSURE: 109 MMHG | HEIGHT: 67 IN | DIASTOLIC BLOOD PRESSURE: 80 MMHG | HEART RATE: 54 BPM | OXYGEN SATURATION: 97 %

## 2022-04-04 VITALS
TEMPERATURE: 98.6 F | RESPIRATION RATE: 16 BRPM | SYSTOLIC BLOOD PRESSURE: 110 MMHG | OXYGEN SATURATION: 98 % | HEART RATE: 64 BPM | DIASTOLIC BLOOD PRESSURE: 70 MMHG | BODY MASS INDEX: 26.53 KG/M2 | HEIGHT: 67 IN | WEIGHT: 169 LBS

## 2022-04-04 DIAGNOSIS — C43.39 MALIGNANT MELANOMA OF FOREHEAD (HCC): ICD-10-CM

## 2022-04-04 DIAGNOSIS — C43.39 MALIGNANT MELANOMA OF FOREHEAD (HCC): Primary | ICD-10-CM

## 2022-04-04 DIAGNOSIS — C43.30 MALIGNANT MELANOMA OF SKIN OF FACE (HCC): Primary | ICD-10-CM

## 2022-04-04 DIAGNOSIS — Z79.899 HIGH RISK MEDICATION USE: ICD-10-CM

## 2022-04-04 PROCEDURE — 1160F RVW MEDS BY RX/DR IN RCRD: CPT

## 2022-04-04 PROCEDURE — 3078F DIAST BP <80 MM HG: CPT

## 2022-04-04 PROCEDURE — 3074F SYST BP LT 130 MM HG: CPT

## 2022-04-04 PROCEDURE — 99215 OFFICE O/P EST HI 40 MIN: CPT

## 2022-04-04 PROCEDURE — 1036F TOBACCO NON-USER: CPT

## 2022-04-04 PROCEDURE — 96413 CHEMO IV INFUSION 1 HR: CPT

## 2022-04-04 RX ORDER — SODIUM CHLORIDE 9 MG/ML
20 INJECTION, SOLUTION INTRAVENOUS ONCE
Status: COMPLETED | OUTPATIENT
Start: 2022-04-04 | End: 2022-04-04

## 2022-04-04 RX ADMIN — SODIUM CHLORIDE 400 MG: 9 INJECTION, SOLUTION INTRAVENOUS at 15:15

## 2022-04-04 RX ADMIN — SODIUM CHLORIDE 20 ML/HR: 9 INJECTION, SOLUTION INTRAVENOUS at 15:00

## 2022-04-04 NOTE — PROGRESS NOTES
Pt here for first dose Pembro  Labs reviewed prior to treatment  Vitals stable  Callbell within reach; will continue to monitor

## 2022-04-04 NOTE — PROGRESS NOTES
[place] [id number]  Progress Note  Alice Fournier, 1940, [id number]  4/5/2022    Assessment/Plan:  1  Malignant melanoma of forehead [facility])  Mr Chiqui Hamm is an 79-year-old male with local recurrent melanoma of the forehead had for follow up prior to starting treatment with pembrolizumab  I had an extensive discussion with the patient and daughter both previously on the phone and today in the office regarding his diagnosis, progonosis, and recommendations for further management  We reviewed his melanoma history, current findings, pathology, and imaging tests to date  We discussed the benefits, side effects, and risks of PD1 inhibitors which include but are not limited to rash and autoimmune related adverse events including colitis; dermatitis; endocrinopathy including diabetes, hypophysitis, and thyroid function abnormalities; hepatitis; myocarditis; neuropathy; nephritis; pneumonitis; uveitis; and fatigue  Provided him with information of the medications the schedule of his infusions with blood work  Consent was signed in the office today for treatment  Baseline labs reviewed and okay to start treatment today  Reviewed side effects and treatment schedule with the patient and his daughter today in the office  He will have blood work in 3 weeks and then again the week prior to his second infusion  He has standing lab orders in the system  We will call him to remind him of this  It was stressed in the office that he must call us with any new symptoms or side-effect no matter how minor so that we can help him manage it  He will schedule PET scan today for end of May beginning of June after his second cycle to assess for response to treatment     - NM pet ct tumor imaging whole body; Future    2   High risk medication use  He is on treatment with immunotherapy and we will continue to monitor for side effects at lab abnormalities  We will monitor labs with each treatment to ensure safety of continuing on treatment  He knows to watch for signs and symptoms for immune mediated side effects  Denies any immune mediated side effects at this time     - NM pet ct tumor imaging whole body; Future      The patient is scheduled for follow-up in approximately 6 weeks prior to his next infusion  Patient voiced agreement and understanding to the above  Patient knows to call the Hematology/Oncology office with any questions and concerns regarding the above     -------------------------------------------------------------------------------------------------------    Chief Complaint   Patient presents with    Follow-up       History of present illness:   Kathrine Guzman is a 54-year-old with stage 3A melanoma of the forehead now with presumed local recurrence  On 2/16/22 surveillance ultrasound of the neck revealed elongated hypoechoic structure in the left preauricular soft tissue extending from the skin to the superficial aspect of the left parotid gland measuring 1 5x0 4x1 9 cm  This was then biopsied on 3/21/22 for which cellularity was too scant to make definitive diagnosis  This is presumed to be melanoma and after discussion with Dr Vinod Jacques 3 options were presented to the patient for plan moving forward  Re-biopsy with hopes for sufficient tissue, excisional biopsy with surgical oncology, or proceeded with single agent PD-1 inhibitor in hopes for response  I had discussed all of these options on the phone with the patient he decided to proceed with treatment with pembrolizumab  He is feeling well and offers no concerns or complaints at this time  He is seen in the office today with his daughter prior to cycle #1       Cancer History:   Oncology History   Malignant melanoma of skin of face ([facility] [address] )   9/8/2021 -  Cancer Staged    Staging form: Melanoma of the Skin, AJCC 8th Edition  - Clinical stage from 9/8/2021: Stage III (cT2a, cN1a, cM0) - Signed by Leann Gonzales MD on 2021 -  Cancer Staged    Staging form: Melanoma of the Skin, AJCC 8th Edition  - Pathologic stage from 2021: Stage IIIA (pT2a, pN1a, cM0) - Signed by Leann Gonzales MD on 2021 Initial Diagnosis    Malignant melanoma of skin of face ([facility] [address] )     2022 -  Chemotherapy    pembrolizumab (KEYTRUDA) IVPB, 400 mg, Intravenous, Once, 1 of 6 cycles  Administration: 400 mg (2022)     Malignant melanoma of forehead ([facility] [address] )   2021 Initial Diagnosis    Malignant melanoma of forehead ([facility] [address] )     2022 -  Chemotherapy    pembrolizumab (KEYTRUDA) IVPB, 400 mg, Intravenous, Once, 1 of 6 cycles  Administration: 400 mg (2022)          Cancer Staging  Malignant melanoma of skin of face [facility])  Staging form: Melanoma of the Skin, AJCC 8th Edition  - Clinical stage from 2021: Stage III (cT2a, cN1a, cM0) - Signed by Leann Gonzales MD on 2021  - Pathologic stage from 2021: Stage IIIA (pT2a, pN1a, cM0) - Signed by Leann Gonzales MD on 2021       ECO - Symptomatic but completely ambulatory     Review of Systems   Constitutional: Negative for activity change, appetite change, chills, fatigue, fever and unexpected weight change  HENT: Negative for ear pain, sore throat, trouble swallowing and voice change  Eyes: Negative for photophobia, pain and visual disturbance  Respiratory: Negative for cough, chest tightness and shortness of breath  Cardiovascular: Negative for chest pain, palpitations and leg swelling  Gastrointestinal: Negative for abdominal pain, blood in stool, constipation, diarrhea, nausea and vomiting  Endocrine: Negative for cold intolerance and heat intolerance  Genitourinary: Negative for difficulty urinating, dysuria, frequency, hematuria and urgency  Musculoskeletal: Negative for arthralgias, back pain, joint swelling and myalgias     Skin: Negative for color change and rash         No new, changing, or concerning lesions  Neurological: Negative for dizziness, seizures, syncope, weakness, light-headedness, numbness and headaches  Hematological: Negative for adenopathy  Does not bruise/bleed easily  Psychiatric/Behavioral: Negative for confusion and sleep disturbance  All other systems reviewed and are negative  Current Outpatient Medications:     atenolol (TENORMIN) 100 mg tablet, Take 1 tablet (100 mg total) by mouth daily, Disp: 90 tablet, Rfl: 1    glimepiride (AMARYL) 4 mg tablet, Take 1 tablet (4 mg total) by mouth daily, Disp: 90 tablet, Rfl: 1    lisinopril-hydrochlorothiazide (PRINZIDE,ZESTORETIC) 20-25 MG per tablet, Take 1 tablet by mouth daily, Disp: 90 tablet, Rfl: 1    lovastatin (MEVACOR) 40 MG tablet, Take 1 tablet (40 mg total) by mouth daily, Disp: 90 tablet, Rfl: 1    metFORMIN (GLUCOPHAGE) 1000 MG tablet, Take 1 tablet (1,000 mg total) by mouth 2 (two) times a day with meals, Disp: 180 tablet, Rfl: 1  No current facility-administered medications for this visit  No Known Allergies    Objective:   /70 (BP Location: Right arm, Patient Position: Sitting, Cuff Size: Standard)   Pulse 64   Temp 98 6 °F (37 °C) (Temporal)   Resp 16   Ht 5' 7" (1 702 m)   Wt 76 7 kg (169 lb)   SpO2 98%   BMI 26 47 kg/m²   Wt Readings from Last 6 Encounters:   04/04/22 76 7 kg (169 lb)   03/02/22 76 2 kg (168 lb)   02/01/22 76 6 kg (168 lb 12 8 oz)   12/03/21 75 8 kg (167 lb)   11/11/21 74 8 kg (165 lb)   11/03/21 75 3 kg (166 lb)       Physical Exam  Chest:   Breasts:      Right: No axillary adenopathy or supraclavicular adenopathy  Left: No axillary adenopathy or supraclavicular adenopathy  Lymphadenopathy:      Head:      Right side of head: No submental, submandibular, preauricular or posterior auricular adenopathy  Left side of head: Preauricular adenopathy present  No submental, submandibular or posterior auricular adenopathy  Cervical: No cervical adenopathy  Right cervical: No superficial cervical adenopathy  Left cervical: No superficial cervical adenopathy  Upper Body:      Right upper body: No supraclavicular or axillary adenopathy  Left upper body: No supraclavicular or axillary adenopathy  Goals and Barriers:    Current Goal:  Prolong Survival from Cancer  Barriers: None  Patient's Capacity to Self Care:  Patient able to self care      Pertinent Laboratory Results and Imaging Review:  Appointment on 03/28/2022   Component Date Value Ref Range Status    ACTH 03/28/2022 33 0  7 2 - 63 3 pg/mL Final    ACTH reference interval for samples collected between 7 and 10 AM     Cortisol - AM 03/28/2022 16 5  4 2 - 22 4 ug/dL Final   Orders Only on 03/24/2022   Component Date Value Ref Range Status    WBC 03/28/2022 9 45  4 31 - 10 16 Thousand/uL Final    RBC 03/28/2022 5 01  3 88 - 5 62 Million/uL Final    Hemoglobin 03/28/2022 14 8  12 0 - 17 0 g/dL Final    Hematocrit 03/28/2022 45 0  36 5 - 49 3 % Final    MCV 03/28/2022 90  82 - 98 fL Final    MCH 03/28/2022 29 5  26 8 - 34 3 pg Final    MCHC 03/28/2022 32 9  31 4 - 37 4 g/dL Final    RDW 03/28/2022 13 2  11 6 - 15 1 % Final    MPV 03/28/2022 10 7  8 9 - 12 7 fL Final    Platelets 78/58/2209 143* 149 - 390 Thousands/uL Final    nRBC 03/28/2022 0  /100 WBCs Final    Neutrophils Relative 03/28/2022 43  43 - 75 % Final    Immat GRANS % 03/28/2022 0  0 - 2 % Final    Lymphocytes Relative 03/28/2022 49* 14 - 44 % Final    Monocytes Relative 03/28/2022 6  4 - 12 % Final    Eosinophils Relative 03/28/2022 2  0 - 6 % Final    Basophils Relative 03/28/2022 0  0 - 1 % Final    Neutrophils Absolute 03/28/2022 4 01  1 85 - 7 62 Thousands/µL Final    Immature Grans Absolute 03/28/2022 0 03  0 00 - 0 20 Thousand/uL Final    Lymphocytes Absolute 03/28/2022 4 65* 0 60 - 4 47 Thousands/µL Final    Monocytes Absolute 03/28/2022 0 56  0 17 - 1 22 Thousand/µL Final    Eosinophils Absolute 03/28/2022 0 18  0 00 - 0 61 Thousand/µL Final    Basophils Absolute 03/28/2022 0 02  0 00 - 0 10 Thousands/µL Final    Sodium 03/28/2022 138  136 - 145 mmol/L Final    Potassium 03/28/2022 4 1  3 5 - 5 3 mmol/L Final    Chloride 03/28/2022 104  100 - 108 mmol/L Final    CO2 03/28/2022 29  21 - 32 mmol/L Final    ANION GAP 03/28/2022 5  4 - 13 mmol/L Final    BUN 03/28/2022 23  5 - 25 mg/dL Final    Creatinine 03/28/2022 1 40* 0 60 - 1 30 mg/dL Final    Standardized to IDMS reference method    Glucose 03/28/2022 205* 65 - 140 mg/dL Final    If the patient is fasting, the ADA then defines impaired fasting glucose as > 100 mg/dL and diabetes as > or equal to 123 mg/dL  Specimen collection should occur prior to Sulfasalazine administration due to the potential for falsely depressed results  Specimen collection should occur prior to Sulfapyridine administration due to the potential for falsely elevated results   Calcium 03/28/2022 8 9  8 3 - 10 1 mg/dL Final    AST 03/28/2022 18  5 - 45 U/L Final    Specimen collection should occur prior to Sulfasalazine administration due to the potential for falsely depressed results   ALT 03/28/2022 23  12 - 78 U/L Final    Specimen collection should occur prior to Sulfasalazine and/or Sulfapyridine administration due to the potential for falsely depressed results   Alkaline Phosphatase 03/28/2022 82  46 - 116 U/L Final    Total Protein 03/28/2022 7 3  6 4 - 8 2 g/dL Final    Albumin 03/28/2022 4 1  3 5 - 5 0 g/dL Final    Total Bilirubin 03/28/2022 1 15* 0 20 - 1 00 mg/dL Final    Use of this assay is not recommended for patients undergoing treatment with eltrombopag due to the potential for falsely elevated results      eGFR 03/28/2022 46  ml/min/1 73sq m Final    LD 03/28/2022 213  81 - 234 U/L Final    T3, Free 03/28/2022 2 51  2 30 - 4 20 pg/mL Final    Free T4 03/28/2022 0 96  0 76 - 1 46 ng/dL Final Specimen collection should occur prior to Sulfasalazine administration due to the potential for falsely elevated results   TSH 3RD GENERATON 03/28/2022 2 920  0 358 - 3 740 uIU/mL Final   Hospital Outpatient Visit on 03/21/2022   Component Date Value Ref Range Status    Case Report 03/21/2022    Final                    Value:Non-gynecologic Cytology                          Case: [id number]                                  Authorizing Provider:  NE Martinez      Collected:           03/21/2022                   Ordering Location:     [address] [place] [address]      Received:            03/21/2022 [address] Ultrasound                                                          Pathologist:           Rosa Portillo MD                                                     Specimens:   A) - Mass, left head/neck prearicular structure                                                     B) - Mass                                                                                  Final Diagnosis 03/21/2022    Final                    Value: This result contains rich text formatting which cannot be displayed here   Intraoperative Consultation 03/21/2022    Final                    Value: This result contains rich text formatting which cannot be displayed here  Donte Gamma Description 03/21/2022    Final                    Value: This result contains rich text formatting which cannot be displayed here   Clinical Information 03/21/2022    Final                    Value:[id number]    Additional Information 03/21/2022    Final                    Value: This result contains rich text formatting which cannot be displayed here  2/16/22: US head neck soft tissue  1  Irregular elongated hypoechoic structure in the left preauricular soft tissues extending from the skin to the superficial aspect of the left parotid gland measuring approximately 1 5 x 0 4 x 1 9 cm    This appears to correlate with previously seen   area of mild FDG uptake in the left preauricular region on PET/CT 11/12/2021  This finding is indeterminate  Biopsy may be considered      2  Ovoid hypoechoic lymph node inferior to the left parotid gland without clear fatty hilum measuring 1 1 x 0 5 x 1 2 cm  Attention on close interval follow-up  Alternatively, biopsy could be considered if clinically indicated      3  Incidentally noted 0 4 cm left thyroid nodule as above which does not meet ACR TI-RADS criteria for requiring biopsy or follow-up ultrasound  The following historical data was reviewed  Past Medical History:   Diagnosis Date    Diabetes mellitus ([address] )     Hyperlipidemia     Hypertension        Past Surgical History:   Procedure Laterality Date    FLAP LOCAL HEAD / NECK Left 10/12/2021    Procedure: RECONSTRUCTION OF LEFT TEMPLE DEFECT AFTER RESCECTION MELANOMA;  Surgeon: Alvarez Phillips MD;  [facility];  Service: Plastics    FULL THICKNESS SKIN GRAFT Left 10/12/2021    Procedure: SKIN GRAFT FULL THICKNESS LEFT TEMPLE;  Surgeon: Alvarez Phillips MD;  [facility];  Service: Plastics    GALLBLADDER SURGERY      HAND SURGERY Left     LYMPH NODE BIOPSY Left 10/12/2021    Procedure: BIOPSY LYMPH NODE SENTINEL, LYMPHOSCINTIGRAPHY, INTRAOPERATIVE LYMPHATIC MAPPING (INJECT AT 1200);   Surgeon: Pb Morales MD;  [facility];  Service: Surgical Oncology    NOSE SURGERY      SKIN CANCER EXCISION  10/2021    SKIN LESION EXCISION Left 10/12/2021    Procedure: FACE MELANOMA SCAR WIDE EXCISION  FACE;  Surgeon: Pb Morales MD;  [facility];  Service: Surgical Oncology    SPLIT THICKNESS SKIN GRAFT Left 10/12/2021    Procedure: SKIN GRAFT SPLIT THICKNESS LEFT TEMPLE;  Surgeon: Alvarez Phillips MD;  [facility];  Service: Plastics    US GUIDED LYMPH NODE BIOPSY LEFT  3/21/2022       Social History     Socioeconomic History    Marital status: /Civil Union     Spouse name: None    Number of children: None    Years of education: None    Highest education level: None   Occupational History    None   Tobacco Use    Smoking status: Never Smoker    Smokeless tobacco: Never Used   Vaping Use    Vaping Use: Never used   Substance and Sexual Activity    Alcohol use: Not Currently    Drug use: Never    Sexual activity: Not Currently   Other Topics Concern    None   Social History Narrative    None     Social Determinants of Health     Financial Resource Strain: Not on file   Food Insecurity: Not on file   Transportation Needs: Not on file   Physical Activity: Not on file   Stress: Not on file   Social Connections: Not on file   Intimate Partner Violence: Not on file   Housing Stability: Not on file       Family History   Problem Relation Age of Onset    No Known Problems Mother     No Known Problems Father     Colon cancer Other 60       Please note: This report has been generated by a voice recognition software system  Therefore there may be syntax, spelling, and/or grammatical errors  Please call if you have any questions

## 2022-04-18 ENCOUNTER — TELEPHONE (OUTPATIENT)
Dept: HEMATOLOGY ONCOLOGY | Facility: CLINIC | Age: 82
End: 2022-04-18

## 2022-04-18 NOTE — TELEPHONE ENCOUNTER
CALL RETURN FORM   Reason for patient call? Patient is calling in indicating that he is experiencing lower back pain and is unsure of what to do    Patient's primary oncologist?  Sami Simpson    Name of person the patient was calling for?   Sami Simpson    Any additional information to add, if applicable? n/a   Informed patient that the message will be forwarded to the team and someone will get back to them as soon as possible    Did you relay this information to the patient?  yes, patient can be reached back at [phone]

## 2022-04-18 NOTE — TELEPHONE ENCOUNTER
Called and spoke with patient  He states he is having intermittent pain in his lower back  He states this is not new, states this has been going on for months  Pain goes away when he sits down  He states he was told last office visit that if he has any symptoms he should call  He has been using aleve prn which helps  I advised he can also use a heating pad or ice pack on the area as well  Patient verbalized understanding and will call back with any new or worsening symptoms

## 2022-04-25 ENCOUNTER — TELEPHONE (OUTPATIENT)
Dept: HEMATOLOGY ONCOLOGY | Facility: CLINIC | Age: 82
End: 2022-04-25

## 2022-04-25 NOTE — TELEPHONE ENCOUNTER
Called and spoke with patient  Reminded him he is due for 3 week blood work this week  Also confirmed he needs blood work one week prior to next infusion on 5/16, he will get labs on 5/10  All questions answered and patient verbalized understanding

## 2022-04-26 ENCOUNTER — APPOINTMENT (OUTPATIENT)
Dept: LAB | Facility: IMAGING CENTER | Age: 82
End: 2022-04-26
Payer: COMMERCIAL

## 2022-05-03 ENCOUNTER — OFFICE VISIT (OUTPATIENT)
Dept: SURGICAL ONCOLOGY | Facility: CLINIC | Age: 82
End: 2022-05-03
Payer: COMMERCIAL

## 2022-05-03 VITALS
WEIGHT: 168.4 LBS | OXYGEN SATURATION: 98 % | TEMPERATURE: 97.1 F | BODY MASS INDEX: 26.43 KG/M2 | HEIGHT: 67 IN | DIASTOLIC BLOOD PRESSURE: 70 MMHG | SYSTOLIC BLOOD PRESSURE: 132 MMHG | HEART RATE: 58 BPM | RESPIRATION RATE: 16 BRPM

## 2022-05-03 DIAGNOSIS — C43.30 MALIGNANT MELANOMA OF SKIN OF FACE (HCC): Primary | ICD-10-CM

## 2022-05-03 PROCEDURE — 99214 OFFICE O/P EST MOD 30 MIN: CPT | Performed by: STUDENT IN AN ORGANIZED HEALTH CARE EDUCATION/TRAINING PROGRAM

## 2022-05-03 NOTE — LETTER
May 3, 2022     Marcin Alvarez, [address] [place] [address]  [facility]    Patient: Pao Cervantes   YOB: 1940   Date of Visit: 5/3/2022       Dear Dr Ai Gauthier: Thank you for referring Pao Cervantes to me for evaluation  Below are my notes for this consultation  If you have questions, please do not hesitate to call me  I look forward to following your patient along with you  Sincerely,        Ruben Wylie MD        CC: MD Ruben Hernandez MD  5/3/2022  9:37 AM  [facility]  Surgical Oncology F/u    [address]  [facility] [address] [address] [phone]    Patient:  Pao Cervantes  1940  [id number]    Primary Care provider: Ramya Ramirez MD  [facility]  [address]    Referring provider:  No referring provider defined for this encounter  Diagnoses and all orders for this visit:    Malignant melanoma of skin of face [facility])        Chief Complaint   Patient presents with    Follow-up       No follow-ups on file      Oncology History   Malignant melanoma of skin of face ([facility] [address] )   9/8/2021 -  Cancer Staged    Staging form: Melanoma of the Skin, AJCC 8th Edition  - Clinical stage from 9/8/2021: Stage III (cT2a, cN1a, cM0) - Signed by Leann Gonzales MD on 11/7/2021 9/8/2021 -  Cancer Staged    Staging form: Melanoma of the Skin, AJCC 8th Edition  - Pathologic stage from 9/8/2021: Stage IIIA (pT2a, pN1a, cM0) - Signed by Leann Gonzales MD on 11/7/2021 9/22/2021 Initial Diagnosis    Malignant melanoma of skin of face ([facility] [address] )     4/4/2022 -  Chemotherapy    pembrolizumab (KEYTRUDA) IVPB, 400 mg, Intravenous, Once, 1 of 6 cycles  Administration: 400 mg (4/4/2022)     Malignant melanoma of forehead ([address] )   11/29/2021 Initial Diagnosis    Malignant melanoma of forehead ([facility] [address] )     4/4/2022 -  Chemotherapy    pembrolizumab (KEYTRUDA) IVPB, 400 mg, Intravenous, Once, 1 of 6 cycles  Administration: 400 mg (4/4/2022)       STAGE IIIA: [id number]    History of Present Illness  :   80 yo male presents with new melanoma of left face  Patient states the melena has been present for an unknown amount of time  He states he sees Dermatology regularly, almost 4 months, for a history of basal cell and squamous cell carcinomas  The dermatologist noted this lesion, performed biopsy, revealing a 1 7 mm in depth melanoma with a positive deep margin  The patient denies any other lumps or bumps the face or neck  He states he has is a hat regularly when he goes out the son and does not recall any blistering sunburns  He has had brown hair and blue eyes  Interval History 10/28/21  The patient is doing very well today  He is status post wide local excision of his face melanoma as well as as a sentinel lymph node biopsy  Path [id number] - Amena distinction of sub micro metastases  He has elected to pursue surveillance without adjuvant therapy  He is actively seeing Dr Julio Jacinto, and imaging in Nov was AMBER  Also seeing Dr Almazan Done with derm - last seen 12 2021 with no cocnerns  I will continue seeing him every 3 months  Interval History 2/1/22  Patient is doing very well today  He has no concerns about the appearance of his incisions and no new lumps or bumps to report  He elected to pursue close surveillance instead of adjuvant therapy  He had restaging imaging with PET and brain MRI in November, demonstrating no evidence of disease  No new concerns or questions today  Interval History 5/3/22  The patient is feeling well today  He does complain of some numbness at the inferior margin of his skin graft  He has been actively following with Dr Julio Jacinto  On ultrasound of his nodes for surveillance, there was some concern that there was a preauricular nodularity as well as within the left neck    An IR biopsy was performed, which was inconclusive  In discussion with Dr Hailey Gaytan, the decision was made to pursue adjuvant therapy followed by reimaging to determine whether there was a change in the characteristics  He has received his 1st dose of Keytruda  He feels he is doing relatively well with that, though he does complain of fatigue  He also complains of a new 2 mm lesion behind his ear  No bone pain, headache, other systemic symptoms  Review of Systems  Complete ROS Surg Onc:   Constitutional: The patient denies new or recent history of general fatigue, no recent weight loss, no change in appetite  Eyes: No complaints of visual problems, no scleral icterus  ENT: No complaints of ear pain, no hoarseness, no difficulty swallowing,  no tinnitus and no new masses in head, oral cavity, or neck  Cardiovascular: No complaints of chest pain, no palpitations, no ankle edema  Respiratory: No complaints of shortness of breath, no cough  Gastrointestinal: No complaints of jaundice, no bloody stools, no pale stools  Genitourinary: No complaints of dysuria, no hematuria, no nocturia, no frequent urination, no urethral discharge  Musculoskeletal: No complaints of weakness, paralysis, joint stiffness or arthralgias  Integumentary: No complaints of rash, no new lesions  Neurological: No complaints of convulsions, no seizures, no dizziness  Hematologic/Lymphatic: No complaints of easy bruising  Endocrine:  No hot or cold intolerance  No polydipsia, polyphagia, or polyuria  Allergy/immunology:  No environmental allergies  No food allergies  Not immunocompromised        Patient Active Problem List   Diagnosis    Essential hypertension    Hiatal hernia with GERD    Status post laparoscopic cholecystectomy    Status post umbilical hernia repair, follow-up exam    Type 2 diabetes mellitus without complication, without long-term current use of insulin ([address] )    Mixed hyperlipidemia    Parkinson's disease ([address] )    Microalbuminuria  Hyperbilirubinemia    Malignant melanoma of skin of face ([facility] [address] )    Thrombocytopenia ([facility] [facility] )    Malignant melanoma of forehead ([facility] [address] )     Past Medical History:   Diagnosis Date    Diabetes mellitus ([facility] [address] )     Hyperlipidemia     Hypertension      Past Surgical History:   Procedure Laterality Date    FLAP LOCAL HEAD / NECK Left 10/12/2021    Procedure: RECONSTRUCTION OF LEFT TEMPLE DEFECT AFTER RESCECTION MELANOMA;  Surgeon: Chauncey Fernandez MD;  [facility];  Service: Plastics    FULL THICKNESS SKIN GRAFT Left 10/12/2021    Procedure: SKIN GRAFT FULL THICKNESS LEFT TEMPLE;  Surgeon: Chauncey Fernandez MD;  [facility];  Service: Plastics    GALLBLADDER SURGERY      HAND SURGERY Left     LYMPH NODE BIOPSY Left 10/12/2021    Procedure: BIOPSY LYMPH NODE SENTINEL, LYMPHOSCINTIGRAPHY, INTRAOPERATIVE LYMPHATIC MAPPING (INJECT AT 1200);   Surgeon: Grazyna Louis MD;  [facility];  Service: Surgical Oncology    NOSE SURGERY      SKIN CANCER EXCISION  10/2021    SKIN LESION EXCISION Left 10/12/2021    Procedure: FACE MELANOMA SCAR WIDE EXCISION  FACE;  Surgeon: Grazyna Louis MD;  [facility];  Service: Surgical Oncology    SPLIT THICKNESS SKIN GRAFT Left 10/12/2021    Procedure: SKIN GRAFT SPLIT THICKNESS LEFT TEMPLE;  Surgeon: Chauncey Fernandez MD;  [facility];  Service: Plastics    US GUIDED LYMPH NODE BIOPSY LEFT  3/21/2022     Family History   Problem Relation Age of Onset    No Known Problems Mother     No Known Problems Father     Colon cancer Other 60     Social History     Socioeconomic History    Marital status: /Civil Union     Spouse name: Not on file    Number of children: Not on file    Years of education: Not on file    Highest education level: Not on file   Occupational History    Not on file   Tobacco Use    Smoking status: Never Smoker    Smokeless tobacco: Never Used   Vaping Use    Vaping Use: Never used   Substance and Sexual Activity    Alcohol use: Not Currently    Drug use: Never    Sexual activity: Not Currently   Other Topics Concern    Not on file   Social History Narrative    Not on file     Social Determinants of Health     Financial Resource Strain: Not on file   Food Insecurity: Not on file   Transportation Needs: Not on file   Physical Activity: Not on file   Stress: Not on file   Social Connections: Not on file   Intimate Partner Violence: Not on file   Housing Stability: Not on file       Current Outpatient Medications:     atenolol (TENORMIN) 100 mg tablet, Take 1 tablet (100 mg total) by mouth daily, Disp: 90 tablet, Rfl: 1    glimepiride (AMARYL) 4 mg tablet, Take 1 tablet (4 mg total) by mouth daily, Disp: 90 tablet, Rfl: 1    lisinopril-hydrochlorothiazide (PRINZIDE,ZESTORETIC) 20-25 MG per tablet, Take 1 tablet by mouth daily, Disp: 90 tablet, Rfl: 1    lovastatin (MEVACOR) 40 MG tablet, Take 1 tablet (40 mg total) by mouth daily, Disp: 90 tablet, Rfl: 1    metFORMIN (GLUCOPHAGE) 1000 MG tablet, Take 1 tablet (1,000 mg total) by mouth 2 (two) times a day with meals, Disp: 180 tablet, Rfl: 1  No Known Allergies    Vitals:    05/03/22 0858   BP: 132/70   Pulse: 58   Resp: 16   Temp: (!) 97 1 °F (36 2 °C)   SpO2: 98%       Physical Exam   General: Appears well, appears stated age  Skin: Warm, anicteric  HEENT: Normocephalic, atraumatic; sclera aniceteric, mucous membranes moist  Well-healed graft site and pre-auricular incision with AMBER  Mild assymmetric nodularity of left neck appreciated  Post aur with verrucous-appearing lesion  Cardiopulmonary: RRR, Easy WOB, no BLE edema  Abd: Flat and soft, nontender, no masses appreciated, no hepatosplenomegaly  MSK: Symmetric, no cyanosis, no overt weakness  Lymphatic: No cervical, axillary or inguinal lymphadenopathy  Neuro: Affect appropriate, no gross motor abnormalities        Pathology:  Final Diagnosis   A  Lymph node, sentinel, #1,  pre-auricular, biopsy:   One lymph node with subcapsular rare melanoma cells consistent with sub-micrometastasis (<0 1 mm)  See synoptic report and note       B  Skin, left forehead, excision:  Residual invasive melanoma and scar (cicatrix), margins free  See synoptic report  Labs: Reviewed in EPIC    Imaging  No results found  I independently reviewed and interpreted the above laboratory and imaging data, including Dr Jayna Dunne consultation, PET scans, US  I discussed this pt with Dr Hailee Alberts  Discussion/Summary:   79year-old gentleman status post wide local excision with sentinel lymph node biopsy of what ultimately was determined to be a [id number] melanoma 10/2021  Ultrasound of the neck completed a couple of months ago revealed concern for recurrence in the preauricular region as well as the left cervical chain  He underwent biopsy of this which was inconclusive, and elected per to pursue adjuvant therapy and follow clinically  He is doing well with this  He complains today of seeing multiple doctors including myself, derm, medical oncology  As he is currently under treatment with Dr Hailee Alberts, I will see him in 6 months time  I will communicate with her and her team regarding his therapeutic plans  Mary Ramon MD  5/3/2022  9:29 AM  Incomplete  Surgical Oncology Consultation    [facility] [address] [address] [phone]    Patient:  Martita Olvera  1940  [id number]    Primary Care provider: Phoenix Vasquez MD  [facility] [address]  [address]    Referring provider:  No referring provider defined for this encounter  Diagnoses and all orders for this visit:    Malignant melanoma of skin of face [facility])        Chief Complaint   Patient presents with    Follow-up       No follow-ups on file      Oncology History   Malignant melanoma of skin of face ([facility] [address] )   9/8/2021 -  Cancer Staged    Staging form: Melanoma of the Skin, AJCC 8th Edition  - Clinical stage from 9/8/2021: Stage III (cT2a, cN1a, cM0) - Signed by Álvaro Farah MD on 11/7/2021 9/8/2021 -  Cancer Staged    Staging form: Melanoma of the Skin, AJCC 8th Edition  - Pathologic stage from 9/8/2021: Stage IIIA (pT2a, pN1a, cM0) - Signed by Álvaro Farah MD on 11/7/2021 9/22/2021 Initial Diagnosis    Malignant melanoma of skin of face ([facility] [address] )     4/4/2022 -  Chemotherapy    pembrolizumab (KEYTRUDA) IVPB, 400 mg, Intravenous, Once, 1 of 6 cycles  Administration: 400 mg (4/4/2022)     Malignant melanoma of forehead ([facility] [address] )   11/29/2021 Initial Diagnosis    Malignant melanoma of forehead ([facility] [address] )     4/4/2022 -  Chemotherapy    pembrolizumab (KEYTRUDA) IVPB, 400 mg, Intravenous, Once, 1 of 6 cycles  Administration: 400 mg (4/4/2022)       STAGE IIIA: [id number]    History of Present Illness  :   78 yo male presents with new melanoma of left face  Patient states the melena has been present for an unknown amount of time  He states he sees Dermatology regularly, almost 4 months, for a history of basal cell and squamous cell carcinomas  The dermatologist noted this lesion, performed biopsy, revealing a 1 7 mm in depth melanoma with a positive deep margin  The patient denies any other lumps or bumps the face or neck  He states he has is a hat regularly when he goes out the son and does not recall any blistering sunburns  He has had brown hair and blue eyes  Interval History 10/28/21  The patient is doing very well today  He is status post wide local excision of his face melanoma as well as as a sentinel lymph node biopsy  His face is healing very well  He has no complaints or concerns at this time  He has no concerns about his pre- auricular incision  Will see Dr Fredrick Sahu for consideration of sustemic therapy  Interval History 2/1/22  Patient is doing very well today    He has no concerns about the appearance of his incisions and no new lumps or bumps to report  He elected to pursue close surveillance instead of adjuvant therapy  He had restaging imaging with PET and brain MRI in November, demonstrating no evidence of disease  No new concerns or questions today  Review of Systems  Complete ROS Surg Onc:   Constitutional: The patient denies new or recent history of general fatigue, no recent weight loss, no change in appetite  Eyes: No complaints of visual problems, no scleral icterus  ENT: No complaints of ear pain, no hoarseness, no difficulty swallowing,  no tinnitus and no new masses in head, oral cavity, or neck  Cardiovascular: No complaints of chest pain, no palpitations, no ankle edema  Respiratory: No complaints of shortness of breath, no cough  Gastrointestinal: No complaints of jaundice, no bloody stools, no pale stools  Genitourinary: No complaints of dysuria, no hematuria, no nocturia, no frequent urination, no urethral discharge  Musculoskeletal: No complaints of weakness, paralysis, joint stiffness or arthralgias  Integumentary: No complaints of rash, no new lesions  Neurological: No complaints of convulsions, no seizures, no dizziness  Hematologic/Lymphatic: No complaints of easy bruising  Endocrine:  No hot or cold intolerance  No polydipsia, polyphagia, or polyuria  Allergy/immunology:  No environmental allergies  No food allergies  Not immunocompromised        Patient Active Problem List   Diagnosis    Essential hypertension    Hiatal hernia with GERD    Status post laparoscopic cholecystectomy    Status post umbilical hernia repair, follow-up exam    Type 2 diabetes mellitus without complication, without long-term current use of insulin (HCC)    Mixed hyperlipidemia    Parkinson's disease ([address] )    Microalbuminuria    Hyperbilirubinemia    Malignant melanoma of skin of face ([facility] [address] )    Thrombocytopenia (HCC)    Malignant melanoma of forehead ([facility] [address] )     Past Medical History:   Diagnosis Date  Diabetes mellitus ([facility] [address] )     Hyperlipidemia     Hypertension      Past Surgical History:   Procedure Laterality Date    FLAP LOCAL HEAD / NECK Left 10/12/2021    Procedure: RECONSTRUCTION OF LEFT TEMPLE DEFECT AFTER RESCECTION MELANOMA;  Surgeon: Leticia Ambrosio MD;  [facility];  Service: Plastics    FULL THICKNESS SKIN GRAFT Left 10/12/2021    Procedure: SKIN GRAFT FULL THICKNESS LEFT TEMPLE;  Surgeon: Leticia Ambrosio MD;  [facility];  Service: Plastics    GALLBLADDER SURGERY      HAND SURGERY Left     LYMPH NODE BIOPSY Left 10/12/2021    Procedure: BIOPSY LYMPH NODE SENTINEL, LYMPHOSCINTIGRAPHY, INTRAOPERATIVE LYMPHATIC MAPPING (INJECT AT 1200);   Surgeon: Reza Jesus MD;  [facility];  Service: Surgical Oncology    NOSE SURGERY      SKIN CANCER EXCISION  10/2021    SKIN LESION EXCISION Left 10/12/2021    Procedure: [address];  Surgeon: Reza Jesus MD;  [facility];  Service: Surgical Oncology    SPLIT THICKNESS SKIN GRAFT Left 10/12/2021    Procedure: SKIN GRAFT SPLIT THICKNESS LEFT TEMPLE;  Surgeon: Leticia Ambrosio MD;  [facility];  Service: Plastics    US GUIDED LYMPH NODE BIOPSY LEFT  3/21/2022     Family History   Problem Relation Age of Onset    No Known Problems Mother     No Known Problems Father     Colon cancer Other 61     Social History     Socioeconomic History    Marital status: /Civil Union     Spouse name: Not on file    Number of children: Not on file    Years of education: Not on file    Highest education level: Not on file   Occupational History    Not on file   Tobacco Use    Smoking status: Never Smoker    Smokeless tobacco: Never Used   Vaping Use    Vaping Use: Never used   Substance and Sexual Activity    Alcohol use: Not Currently    Drug use: Never    Sexual activity: Not Currently   Other Topics Concern    Not on file   Social History Narrative    Not on file Social Determinants of Health     Financial Resource Strain: Not on file   Food Insecurity: Not on file   Transportation Needs: Not on file   Physical Activity: Not on file   Stress: Not on file   Social Connections: Not on file   Intimate Partner Violence: Not on file   Housing Stability: Not on file       Current Outpatient Medications:     atenolol (TENORMIN) 100 mg tablet, Take 1 tablet (100 mg total) by mouth daily, Disp: 90 tablet, Rfl: 1    glimepiride (AMARYL) 4 mg tablet, Take 1 tablet (4 mg total) by mouth daily, Disp: 90 tablet, Rfl: 1    lisinopril-hydrochlorothiazide (PRINZIDE,ZESTORETIC) 20-25 MG per tablet, Take 1 tablet by mouth daily, Disp: 90 tablet, Rfl: 1    lovastatin (MEVACOR) 40 MG tablet, Take 1 tablet (40 mg total) by mouth daily, Disp: 90 tablet, Rfl: 1    metFORMIN (GLUCOPHAGE) 1000 MG tablet, Take 1 tablet (1,000 mg total) by mouth 2 (two) times a day with meals, Disp: 180 tablet, Rfl: 1  No Known Allergies    Vitals:    05/03/22 0858   BP: 132/70   Pulse: 58   Resp: 16   Temp: (!) 97 1 °F (36 2 °C)   SpO2: 98%       Physical Exam   General: Appears well, appears stated age  Skin: Warm, anicteric  HEENT: Normocephalic, atraumatic; sclera aniceteric, mucous membranes moist; cervical nodes without adenopathy  Well-healed graft site and pre-auricular incision with AMBER  Cardiopulmonary: RRR, Easy WOB, no BLE edema  Abd: Flat and soft, nontender, no masses appreciated, no hepatosplenomegaly  MSK: Symmetric, no cyanosis, no overt weakness  Lymphatic: No cervical, axillary or inguinal lymphadenopathy  Neuro: Affect appropriate, no gross motor abnormalities            Pathology:  Final Diagnosis   A  Lymph node, sentinel, #1,  pre-auricular, biopsy:  One lymph node with subcapsular rare melanoma cells consistent with sub-micrometastasis (<0 1 mm)  See synoptic report and note       B  Skin, left forehead, excision:  Residual invasive melanoma and scar (cicatrix), margins free   See synoptic report  Labs: Reviewed in EPIC    Imaging  No results found  I independently reviewed and interpreted the above laboratory and imaging data, including Dr Katie Hooper consultation, PET scans, MRI  Discussion/Summary:   79-year-old gentleman status post wide local excision with sentinel lymph node biopsy of what ultimately was determined to be a [id number] melanoma  Amena distinction of sub micro metastases  He has elected to pursue surveillance in stead adjuvant therapy  He is actively seeing Dr Dariel Klinefelter, and imaging in Nov was AMBER  Also seeing Dr Gayla Madsen with derm - last seen 12 2021 with no cocnerns  I will continue seeing him every 3 months  All questions answered

## 2022-05-03 NOTE — PROGRESS NOTES
Surgical Oncology F/u    [address] [facility] SURGICAL ONCOLOGY Roberta Jairon [place] [address] [place] [id number] [phone]    Patient:  Cornelio Wadsworth  1940  [id number]    Primary Care provider: MD Chinyere Bush [address]    Referring provider:  No referring provider defined for this encounter  Diagnoses and all orders for this visit:    Malignant melanoma of skin of face [facility])        Chief Complaint   Patient presents with    Follow-up       No follow-ups on file  Oncology History   Malignant melanoma of skin of face ([facility] )   9/8/2021 -  Cancer Staged    Staging form: Melanoma of the Skin, AJCC 8th Edition  - Clinical stage from 9/8/2021: Stage III (cT2a, cN1a, cM0) - Signed by Desiree Hensley MD on 11/7/2021 9/8/2021 -  Cancer Staged    Staging form: Melanoma of the Skin, AJCC 8th Edition  - Pathologic stage from 9/8/2021: Stage IIIA (pT2a, pN1a, cM0) - Signed by Desiree Hensley MD on 11/7/2021 9/22/2021 Initial Diagnosis    Malignant melanoma of skin of face ([facility] )     4/4/2022 -  Chemotherapy    pembrolizumab (KEYTRUDA) IVPB, 400 mg, Intravenous, Once, 1 of 6 cycles  Administration: 400 mg (4/4/2022)     Malignant melanoma of forehead ([facility] )   11/29/2021 Initial Diagnosis    Malignant melanoma of forehead ([facility] )     4/4/2022 -  Chemotherapy    pembrolizumab (KEYTRUDA) IVPB, 400 mg, Intravenous, Once, 1 of 6 cycles  Administration: 400 mg (4/4/2022)       STAGE IIIA: [id number]    History of Present Illness  :   78 yo male presents with new melanoma of left face  Patient states the melena has been present for an unknown amount of time  He states he sees Dermatology regularly, almost 4 months, for a history of basal cell and squamous cell carcinomas  The dermatologist noted this lesion, performed biopsy, revealing a 1 7 mm in depth melanoma with a positive deep margin    The patient denies any other lumps or bumps the face or neck  He states he has is a hat regularly when he goes out the son and does not recall any blistering sunburns  He has had brown hair and blue eyes  Interval History 10/28/21  The patient is doing very well today  He is status post wide local excision of his face melanoma as well as as a sentinel lymph node biopsy  Path T2aN1a - Amena distinction of sub micro metastases  He has elected to pursue surveillance without adjuvant therapy  He is actively seeing Dr Vinod Jacques, and imaging in Nov was AMBER  Also seeing Dr Junito White with derm - last seen 12 2021 with no cocnerns  I will continue seeing him every 3 months  Interval History 2/1/22  Patient is doing very well today  He has no concerns about the appearance of his incisions and no new lumps or bumps to report  He elected to pursue close surveillance instead of adjuvant therapy  He had restaging imaging with PET and brain MRI in November, demonstrating no evidence of disease  No new concerns or questions today  Interval History 5/3/22  The patient is feeling well today  He does complain of some numbness at the inferior margin of his skin graft  He has been actively following with Dr Vinod Jacques  On ultrasound of his nodes for surveillance, there was some concern that there was a preauricular nodularity as well as within the left neck  An IR biopsy was performed, which was inconclusive  In discussion with Dr Vinod Jacques, the decision was made to pursue adjuvant therapy followed by reimaging to determine whether there was a change in the characteristics  He has received his 1st dose of Keytruda  He feels he is doing relatively well with that, though he does complain of fatigue  He also complains of a new 2 mm lesion behind his ear  No bone pain, headache, other systemic symptoms      Review of Systems  Complete ROS Surg Onc:   Constitutional: The patient denies new or recent history of general fatigue, no recent weight loss, no change in appetite  Eyes: No complaints of visual problems, no scleral icterus  ENT: No complaints of ear pain, no hoarseness, no difficulty swallowing,  no tinnitus and no new masses in head, oral cavity, or neck  Cardiovascular: No complaints of chest pain, no palpitations, no ankle edema  Respiratory: No complaints of shortness of breath, no cough  Gastrointestinal: No complaints of jaundice, no bloody stools, no pale stools  Genitourinary: No complaints of dysuria, no hematuria, no nocturia, no frequent urination, no urethral discharge  Musculoskeletal: No complaints of weakness, paralysis, joint stiffness or arthralgias  Integumentary: No complaints of rash, no new lesions  Neurological: No complaints of convulsions, no seizures, no dizziness  Hematologic/Lymphatic: No complaints of easy bruising  Endocrine:  No hot or cold intolerance  No polydipsia, polyphagia, or polyuria  Allergy/immunology:  No environmental allergies  No food allergies  Not immunocompromised        Patient Active Problem List   Diagnosis    Essential hypertension    Hiatal hernia with GERD    Status post laparoscopic cholecystectomy    Status post umbilical hernia repair, follow-up exam    Type 2 diabetes mellitus without complication, without long-term current use of insulin (HCC)    Mixed hyperlipidemia    Parkinson's disease ([address] )    Microalbuminuria    Hyperbilirubinemia    Malignant melanoma of skin of face ([address] )    Thrombocytopenia (HCC)    Malignant melanoma of forehead ([address] )     Past Medical History:   Diagnosis Date    Diabetes mellitus ([address] )     Hyperlipidemia     Hypertension      Past Surgical History:   Procedure Laterality Date    FLAP LOCAL HEAD / NECK Left 10/12/2021    Procedure: RECONSTRUCTION OF LEFT TEMPLE DEFECT AFTER RESCECTION MELANOMA;  Surgeon: Vicenta Sierra MD;  [facility];  Service: Plastics    FULL THICKNESS SKIN GRAFT Left 10/12/2021    Procedure: SKIN GRAFT FULL THICKNESS LEFT TEMPLE;  Surgeon: Laura Orellana MD;  [facility];  Service: Plastics    GALLBLADDER SURGERY      HAND SURGERY Left     LYMPH NODE BIOPSY Left 10/12/2021    Procedure: BIOPSY LYMPH NODE SENTINEL, LYMPHOSCINTIGRAPHY, INTRAOPERATIVE LYMPHATIC MAPPING (INJECT AT 1200);   Surgeon: Tila Lopes MD;  [facility];  Service: Surgical Oncology    NOSE SURGERY      SKIN CANCER EXCISION  10/2021    SKIN LESION EXCISION Left 10/12/2021    Procedure: FACE MELANOMA SCAR WIDE EXCISION  FACE;  Surgeon: Tila Lopes MD;  [facility];  Service: Surgical Oncology    SPLIT THICKNESS SKIN GRAFT Left 10/12/2021    Procedure: SKIN GRAFT SPLIT THICKNESS LEFT TEMPLE;  Surgeon: Laura Orellana MD;  [facility];  Service: Plastics    US GUIDED LYMPH NODE BIOPSY LEFT  3/21/2022     Family History   Problem Relation Age of Onset    No Known Problems Mother     No Known Problems Father     Colon cancer Other 60     Social History     Socioeconomic History    Marital status: /Civil Union     Spouse name: Not on file    Number of children: Not on file    Years of education: Not on file    Highest education level: Not on file   Occupational History    Not on file   Tobacco Use    Smoking status: Never Smoker    Smokeless tobacco: Never Used   Vaping Use    Vaping Use: Never used   Substance and Sexual Activity    Alcohol use: Not Currently    Drug use: Never    Sexual activity: Not Currently   Other Topics Concern    Not on file   Social History Narrative    Not on file     Social Determinants of Health     Financial Resource Strain: Not on file   Food Insecurity: Not on file   Transportation Needs: Not on file   Physical Activity: Not on file   Stress: Not on file   Social Connections: Not on file   Intimate Partner Violence: Not on file   Housing Stability: Not on file       Current Outpatient Medications:     atenolol (TENORMIN) 100 mg tablet, Take 1 tablet (100 mg total) by mouth daily, Disp: 90 tablet, Rfl: 1    glimepiride (AMARYL) 4 mg tablet, Take 1 tablet (4 mg total) by mouth daily, Disp: 90 tablet, Rfl: 1    lisinopril-hydrochlorothiazide (PRINZIDE,ZESTORETIC) 20-25 MG per tablet, Take 1 tablet by mouth daily, Disp: 90 tablet, Rfl: 1    lovastatin (MEVACOR) 40 MG tablet, Take 1 tablet (40 mg total) by mouth daily, Disp: 90 tablet, Rfl: 1    metFORMIN (GLUCOPHAGE) 1000 MG tablet, Take 1 tablet (1,000 mg total) by mouth 2 (two) times a day with meals, Disp: 180 tablet, Rfl: 1  No Known Allergies    Vitals:    05/03/22 0858   BP: 132/70   Pulse: 58   Resp: 16   Temp: (!) 97 1 °F (36 2 °C)   SpO2: 98%       Physical Exam   General: Appears well, appears stated age  Skin: Warm, anicteric  HEENT: Normocephalic, atraumatic; sclera aniceteric, mucous membranes moist  Well-healed graft site and pre-auricular incision with AMBER  Mild assymmetric nodularity of left neck appreciated  Post aur with verrucous-appearing lesion  Cardiopulmonary: RRR, Easy WOB, no BLE edema  Abd: Flat and soft, nontender, no masses appreciated, no hepatosplenomegaly  MSK: Symmetric, no cyanosis, no overt weakness  Lymphatic: No cervical, axillary or inguinal lymphadenopathy  Neuro: Affect appropriate, no gross motor abnormalities        Pathology:  Final Diagnosis   A  Lymph node, sentinel, #1,  pre-auricular, biopsy:  One lymph node with subcapsular rare melanoma cells consistent with sub-micrometastasis (<0 1 mm)  See synoptic report and note       B  Skin, left forehead, excision:  Residual invasive melanoma and scar (cicatrix), margins free  See synoptic report  Labs: Reviewed in EPIC    Imaging  No results found  I independently reviewed and interpreted the above laboratory and imaging data, including Dr Michelle Gaviria consultation, PET scans, US  I discussed this pt with Dr Manzo Conception         Discussion/Summary:   79year-old gentleman status post wide local excision with sentinel lymph node biopsy of what ultimately was determined to be a [id number] melanoma 10/2021  Ultrasound of the neck completed a couple of months ago revealed concern for recurrence in the preauricular region as well as the left cervical chain  He underwent biopsy of this which was inconclusive, and elected per to pursue adjuvant therapy and follow clinically  He is doing well with this  He complains today of seeing multiple doctors including myself, derm, medical oncology  As he is currently under treatment with Dr Hailee Alberts, I will see him in 6 months time  I will communicate with her and her team regarding his therapeutic plans

## 2022-05-10 ENCOUNTER — APPOINTMENT (OUTPATIENT)
Dept: LAB | Facility: IMAGING CENTER | Age: 82
End: 2022-05-10
Payer: COMMERCIAL

## 2022-05-10 DIAGNOSIS — E78.2 MIXED HYPERLIPIDEMIA: ICD-10-CM

## 2022-05-10 DIAGNOSIS — I10 ESSENTIAL HYPERTENSION: ICD-10-CM

## 2022-05-10 DIAGNOSIS — E11.9 TYPE 2 DIABETES MELLITUS WITHOUT COMPLICATION, WITHOUT LONG-TERM CURRENT USE OF INSULIN (HCC): ICD-10-CM

## 2022-05-10 LAB
CHOLEST SERPL-MCNC: 128 MG/DL
CREAT UR-MCNC: 197 MG/DL
EST. AVERAGE GLUCOSE BLD GHB EST-MCNC: 134 MG/DL
HBA1C MFR BLD: 6.3 %
HDLC SERPL-MCNC: 50 MG/DL
LDLC SERPL CALC-MCNC: 56 MG/DL (ref 0–100)
MICROALBUMIN UR-MCNC: 400 MG/L (ref 0–20)
MICROALBUMIN/CREAT 24H UR: 203 MG/G CREATININE (ref 0–30)
NONHDLC SERPL-MCNC: 78 MG/DL
TRIGL SERPL-MCNC: 108 MG/DL

## 2022-05-10 PROCEDURE — 83036 HEMOGLOBIN GLYCOSYLATED A1C: CPT

## 2022-05-10 PROCEDURE — 80061 LIPID PANEL: CPT

## 2022-05-10 PROCEDURE — 82043 UR ALBUMIN QUANTITATIVE: CPT

## 2022-05-10 PROCEDURE — 82570 ASSAY OF URINE CREATININE: CPT

## 2022-05-16 ENCOUNTER — OFFICE VISIT (OUTPATIENT)
Dept: HEMATOLOGY ONCOLOGY | Facility: CLINIC | Age: 82
End: 2022-05-16
Payer: COMMERCIAL

## 2022-05-16 ENCOUNTER — HOSPITAL ENCOUNTER (OUTPATIENT)
Dept: INFUSION CENTER | Facility: CLINIC | Age: 82
Discharge: HOME | End: 2022-05-16
Payer: COMMERCIAL

## 2022-05-16 VITALS
HEART RATE: 55 BPM | SYSTOLIC BLOOD PRESSURE: 132 MMHG | OXYGEN SATURATION: 98 % | DIASTOLIC BLOOD PRESSURE: 82 MMHG | TEMPERATURE: 98 F | RESPIRATION RATE: 18 BRPM

## 2022-05-16 VITALS
WEIGHT: 167 LBS | HEIGHT: 67 IN | OXYGEN SATURATION: 96 % | SYSTOLIC BLOOD PRESSURE: 126 MMHG | HEART RATE: 69 BPM | BODY MASS INDEX: 26.21 KG/M2 | DIASTOLIC BLOOD PRESSURE: 84 MMHG | TEMPERATURE: 97.8 F | RESPIRATION RATE: 14 BRPM

## 2022-05-16 DIAGNOSIS — C43.39 MALIGNANT MELANOMA OF FOREHEAD (HCC): Primary | ICD-10-CM

## 2022-05-16 DIAGNOSIS — C43.39 MALIGNANT MELANOMA OF FOREHEAD (HCC): ICD-10-CM

## 2022-05-16 DIAGNOSIS — Z79.899 HIGH RISK MEDICATION USE: ICD-10-CM

## 2022-05-16 DIAGNOSIS — C43.30 MALIGNANT MELANOMA OF SKIN OF FACE (HCC): Primary | ICD-10-CM

## 2022-05-16 PROCEDURE — 96413 CHEMO IV INFUSION 1 HR: CPT

## 2022-05-16 PROCEDURE — 3074F SYST BP LT 130 MM HG: CPT

## 2022-05-16 PROCEDURE — 99213 OFFICE O/P EST LOW 20 MIN: CPT

## 2022-05-16 PROCEDURE — 3079F DIAST BP 80-89 MM HG: CPT

## 2022-05-16 RX ORDER — SODIUM CHLORIDE 9 MG/ML
20 INJECTION, SOLUTION INTRAVENOUS ONCE
Status: COMPLETED | OUTPATIENT
Start: 2022-05-16 | End: 2022-05-16

## 2022-05-16 RX ADMIN — SODIUM CHLORIDE 400 MG: 9 INJECTION, SOLUTION INTRAVENOUS at 14:36

## 2022-05-16 RX ADMIN — SODIUM CHLORIDE 20 ML/HR: 9 INJECTION, SOLUTION INTRAVENOUS at 14:20

## 2022-05-16 NOTE — PATIENT INSTRUCTIONS
May 2022      Luis Monday Tuesday Wednesday Thursday Friday Saturday   1     2     3    FOLLOW UP PG   8:45 AM   (15 min )   Kendell Bolaños MD   [facility] [place] 4     5     6     7                8     9     10    LAB WALK IN   8:10 AM   (5 min )   [facility] LAB CHAIR 1   [facility] 11     12     13     14                15     [facility]  PG   1:15 PM   (30 min )   [facility]    INF ONCOLOGY TX-TREATMENT PLAN   2:30 PM   (120 min )   [facility] INF CHAIR [facility] 17     18     19     20     21        Cycle 2, Day 1        22     23     24     25    OFFICE VISIT SHORT PG  10:00 AM   (15 min )   Kan Bob MD   [facility] 94     29     02                06     58     63                                           Treatment Details         5/16/2022 - Cycle 2, Day 1      Chemotherapy: [facility] PROVIDER COMMUNICATION, PEMBROLIZUMAB IVPB        June 2022 Sunday Monday Tuesday Wednesday Thursday Friday Saturday                  1    NM PET CT TUMOR IMAGING WB  10:00 AM   (120 min )   BE NM SLN PET  3   [facility] 2     3     4                5     6     7     8     9     10     11                12     13     14     15     16     17     18                19     20     21     22     23     24     25                26     27    FOLLOW UP PG   1:15 PM   (30 min )   [facility]    INF ONCOLOGY TX-TREATMENT PLAN   2:00 PM   (120 min )   AN INF CHAIR [facility] [address] [place] [address] 28     29     30                  Cycle 3, Day 1               Treatment Details         6/27/2022 - Cycle 3, Day 1      Chemotherapy: ON[facility] PROVIDER COMMUNICATION, PEMBROLIZUMAB IVPB

## 2022-05-16 NOTE — PROGRESS NOTES
Patient here for [facility] [place] and is well, no changes or c/o  Tolerating treatment well

## 2022-05-16 NOTE — PROGRESS NOTES
[facility] [id number]  Progress Note  Shayy Harrell, 1940, [id number]  5/16/2022    Assessment/Plan:  1  Malignant melanoma of forehead [facility])  Mr Maya Paul is an 79-year-old male with local recurrent melanoma before head on treatment with pembrolizumab here for follow-up prior to cycle 2  He is doing well and tolerating treatment fine  Labs reviewed he is okay for treatment today  He is standing labs in the system and will have these drawn in 3 weeks and then again the week prior to his next infusion  He is scheduled for PET scan on 06/01  He sees Dermatology in June  He is planning on having cataract surgery next month  2  High risk medication use  He is on treatment with immunotherapy and we will continue to monitor for side effects at lab abnormalities  We will monitor labs with each treatment to ensure safety of continuing on treatment  He knows to watch for signs and symptoms for immune mediated side effects  Denies any immune mediated side effects at this time  The patient is scheduled for follow-up in approximately 6 weeks with Dr Isabel Lazcano  Patient voiced agreement and understanding to the above  Patient knows to call the Hematology/Oncology office with any questions and concerns regarding the above     -------------------------------------------------------------------------------------------------------    Chief Complaint   Patient presents with    Follow-up       History of present illness:   Shayy Harrell is an 79-year-old male with locally recurrent melanoma of the forehead on treatment with pembrolizumab here for follow-up prior to cycle 2  He is doing well and tolerating treatment fine he has no complaints or concerns at this time  He does experience some mild fatigue after the 1st cycle but still able to go about his daily living        Cancer History:   Oncology History   Malignant melanoma of skin of face ([facility] [address] )   2021 -  Cancer Staged    Staging form: Melanoma of the Skin, AJCC 8th Edition  - Clinical stage from 2021: Stage III (cT2a, cN1a, cM0) - Signed by Errol Arevalo MD on 2021 -  Cancer Staged    Staging form: Melanoma of the Skin, AJCC 8th Edition  - Pathologic stage from 2021: Stage IIIA (pT2a, pN1a, cM0) - Signed by Errol Arevalo MD on 2021 Initial Diagnosis    Malignant melanoma of skin of face ([facility] [address] )     2022 -  Chemotherapy    pembrolizumab (KEYTRUDA) IVPB, 400 mg, Intravenous, Once, 1 of 6 cycles  Administration: 400 mg (2022)     Malignant melanoma of forehead ([facility] [address] )   2021 Initial Diagnosis    [facility]     2022 -  Chemotherapy    pembrolizumab (KEYTRUDA) IVPB, 400 mg, Intravenous, Once, 1 of 6 cycles  Administration: 400 mg (2022)          Cancer Staging  Malignant melanoma of skin of face [facility])  Staging form: Melanoma of the Skin, AJCC 8th Edition  - Clinical stage from 2021: Stage III (cT2a, cN1a, cM0) - Signed by Errol Arevalo MD on 2021  - Pathologic stage from 2021: Stage IIIA (pT2a, pN1a, cM0) - Signed by Errol Arevalo MD on 2021       ECO - Asymptomatic     Review of Systems   Constitutional: Positive for fatigue (mild)  Negative for activity change, appetite change, chills, fever and unexpected weight change  HENT: Negative for ear pain, sore throat, trouble swallowing and voice change  Eyes: Negative for photophobia, pain and visual disturbance  Respiratory: Negative for cough, chest tightness and shortness of breath  Cardiovascular: Negative for chest pain, palpitations and leg swelling  Gastrointestinal: Negative for abdominal pain, blood in stool, constipation, diarrhea, nausea and vomiting  Endocrine: Negative for cold intolerance and heat intolerance     Genitourinary: Negative for difficulty urinating, dysuria, frequency, hematuria and urgency  Musculoskeletal: Negative for arthralgias, back pain, joint swelling and myalgias  Skin: Negative for color change and rash  No new, changing, or concerning lesions  Neurological: Negative for dizziness, seizures, syncope, weakness, light-headedness, numbness and headaches  Hematological: Negative for adenopathy  Does not bruise/bleed easily  Psychiatric/Behavioral: Negative for confusion and sleep disturbance  All other systems reviewed and are negative  Current Outpatient Medications:     atenolol (TENORMIN) 100 mg tablet, Take 1 tablet (100 mg total) by mouth daily, Disp: 90 tablet, Rfl: 1    glimepiride (AMARYL) 4 mg tablet, Take 1 tablet (4 mg total) by mouth daily, Disp: 90 tablet, Rfl: 1    lisinopril-hydrochlorothiazide (PRINZIDE,ZESTORETIC) 20-25 MG per tablet, Take 1 tablet by mouth daily, Disp: 90 tablet, Rfl: 1    lovastatin (MEVACOR) 40 MG tablet, Take 1 tablet (40 mg total) by mouth daily, Disp: 90 tablet, Rfl: 1    metFORMIN (GLUCOPHAGE) 1000 MG tablet, Take 1 tablet (1,000 mg total) by mouth 2 (two) times a day with meals, Disp: 180 tablet, Rfl: 1    No Known Allergies    Objective:   /84 (BP Location: Left arm, Patient Position: Sitting, Cuff Size: Adult)   Pulse 69   Temp 97 8 °F (36 6 °C) (Temporal)   Resp 14   Ht 5' 7" (1 702 m)   Wt 75 8 kg (167 lb)   SpO2 96%   BMI 26 16 kg/m²   Wt Readings from Last 6 Encounters:   05/16/22 75 8 kg (167 lb)   05/03/22 76 4 kg (168 lb 6 4 oz)   04/04/22 76 7 kg (169 lb)   03/02/22 76 2 kg (168 lb)   02/01/22 76 6 kg (168 lb 12 8 oz)   12/03/21 75 8 kg (167 lb)       Physical Exam  Constitutional:       General: He is not in acute distress  Appearance: Normal appearance  He is normal weight  He is not ill-appearing  HENT:      Head: Atraumatic  Eyes:      Extraocular Movements: Extraocular movements intact        Conjunctiva/sclera: Conjunctivae normal    Cardiovascular: Rate and Rhythm: Normal rate and regular rhythm  Pulses: Normal pulses  Heart sounds: Normal heart sounds  No murmur heard  Pulmonary:      Effort: Pulmonary effort is normal  No respiratory distress  Breath sounds: Normal breath sounds  No wheezing  Chest:   Breasts:      Right: No axillary adenopathy or supraclavicular adenopathy  Left: No axillary adenopathy or supraclavicular adenopathy  Abdominal:      General: Abdomen is flat  Bowel sounds are normal  There is no distension  Palpations: Abdomen is soft  Tenderness: There is no abdominal tenderness  Musculoskeletal:      Right lower leg: No edema  Left lower leg: No edema  Lymphadenopathy:      Head:      Right side of head: No submental, submandibular, preauricular or posterior auricular adenopathy  Left side of head: Preauricular adenopathy present  No submental, submandibular or posterior auricular adenopathy  Cervical: No cervical adenopathy  Right cervical: No superficial cervical adenopathy  Left cervical: No superficial cervical adenopathy  Upper Body:      Right upper body: No supraclavicular or axillary adenopathy  Left upper body: No supraclavicular or axillary adenopathy  Skin:     General: Skin is warm and dry  Capillary Refill: Capillary refill takes less than 2 seconds  Coloration: Skin is not pale  Findings: No bruising, lesion or rash  Neurological:      General: No focal deficit present  Mental Status: He is alert and oriented to person, place, and time  Mental status is at baseline  Motor: No weakness  Gait: Gait normal    Psychiatric:         Mood and Affect: Mood normal          Behavior: Behavior normal          Thought Content: Thought content normal          Judgment: Judgment normal        Goals and Barriers:    Current Goal:   Prolong Survival from Cancer  Barriers: None        Patient's Capacity to Self Care:  Patient able to self care  Pertinent Laboratory Results and Imaging Review:  Appointment on 05/10/2022   Component Date Value Ref Range Status    Creatinine, Ur 05/10/2022 197 0  mg/dL Final    Microalbum  ,U,Random 05/10/2022 400 0 (A) 0 0 - 20 0 mg/L Final    Microalb Creat Ratio 05/10/2022 203 (A) 0 - 30 mg/g creatinine Final    Hemoglobin A1C 05/10/2022 6 3 (A) Normal 3 8-5 6%; PreDiabetic 5 7-6 4%; Diabetic >=6 5%; Glycemic control for adults with diabetes <7 0% % Final    EAG 05/10/2022 134  mg/dl Final    Cholesterol 05/10/2022 128  See Comment mg/dL Final    Cholesterol:         Pediatric <18 Years        Desirable          <170 mg/dL      Borderline High    170-199 mg/dL      High               >=200 mg/dL        Adult >=18 Years            Desirable         <200 mg/dL      Borderline High   200-239 mg/dL      High              >239 mg/dL      Triglycerides 05/10/2022 108  See Comment mg/dL Final    Triglyceride:     0-9Y            <75mg/dL     10Y-17Y         <90 mg/dL       >=18Y     Normal          <150 mg/dL     Borderline High 150-199 mg/dL     High            200-499 mg/dL        Very High       >499 mg/dL    Specimen collection should occur prior to N-Acetylcysteine or Metamizole administration due to the potential for falsely depressed results   HDL, Direct 05/10/2022 50  >=40 mg/dL Final    Specimen collection should occur prior to Metamizole administration due to the potential for falsley depressed results   LDL Calculated 05/10/2022 56  0 - 100 mg/dL Final    LDL Cholesterol:     Optimal           <100 mg/dl     Near Optimal      100-129 mg/dl     Above Optimal       Borderline High 130-159 mg/dl       High            160-189 mg/dl       Very High       >189 mg/dl         This screening LDL is a calculated result  It does not have the accuracy of the Direct Measured LDL in the monitoring of patients with hyperlipidemia and/or statin therapy     Direct Measure LDL (YRU946) must be ordered separately in these patients      Non-HDL-Chol (CHOL-HDL) 05/10/2022 78  mg/dl Final   Ancillary Orders on 04/29/2022   Component Date Value Ref Range Status    WBC 05/10/2022 7 79  4 31 - 10 16 Thousand/uL Final    RBC 05/10/2022 4 76  3 88 - 5 62 Million/uL Final    Hemoglobin 05/10/2022 14 0  12 0 - 17 0 g/dL Final    Hematocrit 05/10/2022 41 9  36 5 - 49 3 % Final    MCV 05/10/2022 88  82 - 98 fL Final    MCH 05/10/2022 29 4  26 8 - 34 3 pg Final    MCHC 05/10/2022 33 4  31 4 - 37 4 g/dL Final    RDW 05/10/2022 13 2  11 6 - 15 1 % Final    MPV 05/10/2022 10 4  8 9 - 12 7 fL Final    Platelets 77/07/7056 130 (A) 149 - 390 Thousands/uL Final    nRBC 05/10/2022 0  /100 WBCs Final    Neutrophils Relative 05/10/2022 42 (A) 43 - 75 % Final    Immat GRANS % 05/10/2022 0  0 - 2 % Final    Lymphocytes Relative 05/10/2022 48 (A) 14 - 44 % Final    Monocytes Relative 05/10/2022 7  4 - 12 % Final    Eosinophils Relative 05/10/2022 3  0 - 6 % Final    Basophils Relative 05/10/2022 0  0 - 1 % Final    Neutrophils Absolute 05/10/2022 3 30  1 85 - 7 62 Thousands/µL Final    Immature Grans Absolute 05/10/2022 0 03  0 00 - 0 20 Thousand/uL Final    Lymphocytes Absolute 05/10/2022 3 71  0 60 - 4 47 Thousands/µL Final    Monocytes Absolute 05/10/2022 0 52  0 17 - 1 22 Thousand/µL Final    Eosinophils Absolute 05/10/2022 0 21  0 00 - 0 61 Thousand/µL Final    Basophils Absolute 05/10/2022 0 02  0 00 - 0 10 Thousands/µL Final    Sodium 05/10/2022 140  136 - 145 mmol/L Final    Potassium 05/10/2022 4 2  3 5 - 5 3 mmol/L Final    Chloride 05/10/2022 107  100 - 108 mmol/L Final    CO2 05/10/2022 28  21 - 32 mmol/L Final    ANION GAP 05/10/2022 5  4 - 13 mmol/L Final    BUN 05/10/2022 28 (A) 5 - 25 mg/dL Final    Creatinine 05/10/2022 1 34 (A) 0 60 - 1 30 mg/dL Final    Standardized to IDMS reference method    Glucose, Fasting 05/10/2022 105 (A) 65 - 99 mg/dL Final    Specimen collection should occur prior to Sulfasalazine administration due to the potential for falsely depressed results  Specimen collection should occur prior to Sulfapyridine administration due to the potential for falsely elevated results   Calcium 05/10/2022 9 2  8 3 - 10 1 mg/dL Final    AST 05/10/2022 16  5 - 45 U/L Final    Specimen collection should occur prior to Sulfasalazine administration due to the potential for falsely depressed results   ALT 05/10/2022 19  12 - 78 U/L Final    Specimen collection should occur prior to Sulfasalazine and/or Sulfapyridine administration due to the potential for falsely depressed results   Alkaline Phosphatase 05/10/2022 82  46 - 116 U/L Final    Total Protein 05/10/2022 6 9  6 4 - 8 2 g/dL Final    Albumin 05/10/2022 4 0  3 5 - 5 0 g/dL Final    Total Bilirubin 05/10/2022 1 35 (A) 0 20 - 1 00 mg/dL Final    Use of this assay is not recommended for patients undergoing treatment with eltrombopag due to the potential for falsely elevated results   eGFR 05/10/2022 48  ml/min/1 73sq m Final    LD 05/10/2022 158  81 - 234 U/L Final    T3, Free 05/10/2022 2 30  2 30 - 4 20 pg/mL Final    Free T4 05/10/2022 1 00  0 76 - 1 46 ng/dL Final    Specimen collection should occur prior to Sulfasalazine administration due to the potential for falsely elevated results   TSH 3RD GENERATON 05/10/2022 2 060  0 450 - 4 500 uIU/mL Final    Adult TSH (3rd generation) reference range follows the recommended guidelines of the American Thyroid Association, January, 2020  The following historical data was reviewed      Past Medical History:   Diagnosis Date    Diabetes mellitus ([facility] [address] )     Hyperlipidemia     Hypertension        Past Surgical History:   Procedure Laterality Date    FLAP LOCAL HEAD / NECK Left 10/12/2021    Procedure: RECONSTRUCTION OF LEFT TEMPLE DEFECT AFTER RESCECTION MELANOMA;  Surgeon: Mer Troncoso MD;  [facility];  Service: Plastics    FULL THICKNESS SKIN GRAFT Left 10/12/2021    Procedure: SKIN GRAFT FULL THICKNESS LEFT TEMPLE;  Surgeon: Pepito Glover MD;  [facility];  Service: Plastics    GALLBLADDER SURGERY      HAND SURGERY Left     LYMPH NODE BIOPSY Left 10/12/2021    Procedure: BIOPSY LYMPH NODE SENTINEL, LYMPHOSCINTIGRAPHY, INTRAOPERATIVE LYMPHATIC MAPPING (INJECT AT 1200); Surgeon: Mary Ramon MD;  [facility];  Service: Surgical Oncology    NOSE SURGERY      SKIN CANCER EXCISION  10/2021    SKIN LESION EXCISION Left 10/12/2021    Procedure: FACE MELANOMA SCAR WIDE EXCISION  FACE;  Surgeon: Mary Ramon MD;  [facility];  Service: Surgical Oncology    SPLIT THICKNESS SKIN GRAFT Left 10/12/2021    Procedure: SKIN GRAFT SPLIT THICKNESS LEFT TEMPLE;  Surgeon: Pepito Glover MD;  [facility];  Service: Plastics    US GUIDED LYMPH NODE BIOPSY LEFT  3/21/2022       Social History     Socioeconomic History    Marital status: /Civil Union     Spouse name: None    Number of children: None    Years of education: None    Highest education level: None   Occupational History    None   Tobacco Use    Smoking status: Never Smoker    Smokeless tobacco: Never Used   Vaping Use    Vaping Use: Never used   Substance and Sexual Activity    Alcohol use: Not Currently    Drug use: Never    Sexual activity: Not Currently   Other Topics Concern    None   Social History Narrative    None     Social Determinants of Health     Financial Resource Strain: Not on file   Food Insecurity: Not on file   Transportation Needs: Not on file   Physical Activity: Not on file   Stress: Not on file   Social Connections: Not on file   Intimate Partner Violence: Not on file   Housing Stability: Not on file       Family History   Problem Relation Age of Onset    No Known Problems Mother     No Known Problems Father     Colon cancer Other 59       Please note: This report has been generated by a voice recognition software system  Therefore there may be syntax, spelling, and/or grammatical errors  Please call if you have any questions

## 2022-05-16 NOTE — PROGRESS NOTES
Patient tolerated treatment today without incident and was discharged post, no c/o offered, AVS previously given, next cycle 6/27/22

## 2022-05-25 ENCOUNTER — OFFICE VISIT (OUTPATIENT)
Dept: INTERNAL MEDICINE CLINIC | Facility: OTHER | Age: 82
End: 2022-05-25
Payer: COMMERCIAL

## 2022-05-25 ENCOUNTER — TELEPHONE (OUTPATIENT)
Dept: NEPHROLOGY | Facility: CLINIC | Age: 82
End: 2022-05-25

## 2022-05-25 VITALS
SYSTOLIC BLOOD PRESSURE: 118 MMHG | OXYGEN SATURATION: 99 % | TEMPERATURE: 98.1 F | RESPIRATION RATE: 18 BRPM | BODY MASS INDEX: 26.59 KG/M2 | DIASTOLIC BLOOD PRESSURE: 72 MMHG | HEART RATE: 53 BPM | HEIGHT: 67 IN | WEIGHT: 169.4 LBS

## 2022-05-25 DIAGNOSIS — G20 PARKINSON'S DISEASE (HCC): ICD-10-CM

## 2022-05-25 DIAGNOSIS — C77.3 SECONDARY AND UNSPECIFIED MALIGNANT NEOPLASM OF AXILLA AND UPPER LIMB LYMPH NODES (HCC): ICD-10-CM

## 2022-05-25 DIAGNOSIS — E11.9 TYPE 2 DIABETES MELLITUS WITHOUT COMPLICATION, WITHOUT LONG-TERM CURRENT USE OF INSULIN (HCC): Primary | ICD-10-CM

## 2022-05-25 DIAGNOSIS — I10 ESSENTIAL HYPERTENSION: ICD-10-CM

## 2022-05-25 DIAGNOSIS — I10 BENIGN ESSENTIAL HTN: ICD-10-CM

## 2022-05-25 DIAGNOSIS — D69.6 THROMBOCYTOPENIA (HCC): ICD-10-CM

## 2022-05-25 DIAGNOSIS — E78.2 MIXED HYPERLIPIDEMIA: ICD-10-CM

## 2022-05-25 DIAGNOSIS — R80.9 MICROALBUMINURIA: ICD-10-CM

## 2022-05-25 DIAGNOSIS — N18.30 STAGE 3 CHRONIC KIDNEY DISEASE, UNSPECIFIED WHETHER STAGE 3A OR 3B CKD (HCC): ICD-10-CM

## 2022-05-25 DIAGNOSIS — E80.6 HYPERBILIRUBINEMIA: ICD-10-CM

## 2022-05-25 PROCEDURE — 1160F RVW MEDS BY RX/DR IN RCRD: CPT | Performed by: INTERNAL MEDICINE

## 2022-05-25 PROCEDURE — 3725F SCREEN DEPRESSION PERFORMED: CPT | Performed by: INTERNAL MEDICINE

## 2022-05-25 PROCEDURE — 99214 OFFICE O/P EST MOD 30 MIN: CPT | Performed by: INTERNAL MEDICINE

## 2022-05-25 PROCEDURE — 1036F TOBACCO NON-USER: CPT | Performed by: INTERNAL MEDICINE

## 2022-05-25 RX ORDER — GLIMEPIRIDE 4 MG/1
4 TABLET ORAL DAILY
Qty: 90 TABLET | Refills: 3 | Status: SHIPPED | OUTPATIENT
Start: 2022-05-25

## 2022-05-25 RX ORDER — LISINOPRIL AND HYDROCHLOROTHIAZIDE 25; 20 MG/1; MG/1
1 TABLET ORAL DAILY
Qty: 90 TABLET | Refills: 3 | Status: SHIPPED | OUTPATIENT
Start: 2022-05-25

## 2022-05-25 RX ORDER — LOVASTATIN 40 MG/1
40 TABLET ORAL DAILY
Qty: 90 TABLET | Refills: 3 | Status: SHIPPED | OUTPATIENT
Start: 2022-05-25

## 2022-05-25 RX ORDER — ATENOLOL 100 MG/1
100 TABLET ORAL DAILY
Qty: 90 TABLET | Refills: 3 | Status: SHIPPED | OUTPATIENT
Start: 2022-05-25

## 2022-05-25 NOTE — PROGRESS NOTES
Assessment/Plan:    Type 2 diabetes mellitus without complication, without long-term current use of insulin (HCC)  Stable, controlled  Continue glimepiride 4 mg daily and metformin 1000 mg twice a day  He denies any hypoglycemic symptoms  Lab Results   Component Value Date    HGBA1C 6 3 (H) 05/10/2022       Essential hypertension  Controlled  Continue lisinopril-hydrochlorothiazide 20-25 mg daily and atenolol 100 mg daily  Parkinson's disease ([facility] )  Discussed continuing range of motion and stretching exercises  Stage 3 chronic kidney disease, unspecified whether stage 3a or 3b CKD ([facility] )  Lab Results   Component Value Date    EGFR 48 05/10/2022    EGFR 44 04/26/2022    EGFR 46 03/28/2022    CREATININE 1 34 (H) 05/10/2022    CREATININE 1 44 (H) 04/26/2022    CREATININE 1 40 (H) 03/28/2022   Will refer to Nephrology for further evaluation given microalbuminuria  Thrombocytopenia (HCC)  Stable without active bleeding  Mixed hyperlipidemia  Continue lovastatin 40 mg daily  Hyperbilirubinemia  Continue to trend CMP  Diagnoses and all orders for this visit:    Type 2 diabetes mellitus without complication, without long-term current use of insulin (HCC)  -     metFORMIN (GLUCOPHAGE) 1000 MG tablet; Take 1 tablet (1,000 mg total) by mouth 2 (two) times a day with meals  -     glimepiride (AMARYL) 4 mg tablet; Take 1 tablet (4 mg total) by mouth daily  -     Comprehensive metabolic panel; Future  -     Hemoglobin A1C; Future  -     CBC; Future    Mixed hyperlipidemia  -     lovastatin (MEVACOR) 40 MG tablet; Take 1 tablet (40 mg total) by mouth daily  -     Comprehensive metabolic panel; Future  -     Hemoglobin A1C; Future  -     CBC; Future    Benign essential HTN  -     lisinopril-hydrochlorothiazide (PRINZIDE,ZESTORETIC) 20-25 MG per tablet; Take 1 tablet by mouth daily  -     atenolol (TENORMIN) 100 mg tablet;  Take 1 tablet (100 mg total) by mouth daily  -     Comprehensive metabolic panel; Future  -     Hemoglobin A1C; Future  -     CBC; Future    Microalbuminuria  -     Ambulatory Referral to Nephrology; Future  -     Comprehensive metabolic panel; Future  -     CBC; Future    Essential hypertension    Thrombocytopenia (HCC)    Hyperbilirubinemia  -     Comprehensive metabolic panel; Future    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)    Stage 3 chronic kidney disease, unspecified whether stage 3a or 3b CKD (HCC)    Parkinson's disease ([facility] )        BMI Counseling: Body mass index is 26 53 kg/m²  The BMI is above normal  Nutrition recommendations include encouraging healthy choices of fruits and vegetables, decreasing fast food intake, consuming healthier snacks, limiting drinks that contain sugar, moderation in carbohydrate intake, increasing intake of lean protein, reducing intake of saturated and trans fat and reducing intake of cholesterol  Exercise recommendations include moderate physical activity 150 minutes/week and exercising 3-5 times per week  No pharmacotherapy was ordered  Patient referred to PCP  Rationale for BMI follow-up plan is due to patient being overweight or obese  Depression Screening and Follow-up Plan: Patient was screened for depression during today's encounter  They screened negative with a PHQ-2 score of 0  Falls Plan of Care: balance, strength, and gait training instructions were provided  Medications that increase falls were reviewed  Vitamin D supplementation was recommended  Subjective:      Patient ID: Morey Mortimer is a 79 y o  male  Chief Complaint   Patient presents with    Follow-up     6 month, labs done 5/10/22, no issues    hm     Phq, bmi f/u plan       79-year-old male seen today for follow-up of chronic conditions  Laboratory studies reviewed today, CBC remains stable with thrombocytopenia  Hemoglobin A1c controlled at 6 3%  Microalbumin: Creatinine ratio remain elevated      He has been compliant with medication regimen  He has been undergoing treatment infusions for melanoma  He is undergoing PET scan on 06/01/2022  Otherwise, he has no active complaints or concerns at this time  Diabetes  He presents for his follow-up diabetic visit  He has type 2 diabetes mellitus  His disease course has been stable  There are no hypoglycemic associated symptoms  Pertinent negatives for hypoglycemia include no dizziness or headaches  There are no diabetic associated symptoms  Pertinent negatives for diabetes include no chest pain, no fatigue and no weakness  There are no hypoglycemic complications  Symptoms are stable  Diabetic complications include nephropathy  Risk factors for coronary artery disease include diabetes mellitus, dyslipidemia, hypertension and male sex  Current diabetic treatment includes diet and oral agent (dual therapy)  He is compliant with treatment all of the time  He is following a generally healthy diet  There is no change in his home blood glucose trend  An ACE inhibitor/angiotensin II receptor blocker is being taken  Eye exam is current  Hypertension  This is a chronic problem  The current episode started more than 1 year ago  The problem is unchanged  The problem is controlled  Pertinent negatives include no chest pain, headaches, palpitations or shortness of breath  Past treatments include beta blockers, ACE inhibitors and diuretics  The current treatment provides moderate improvement  There are no compliance problems  Hyperlipidemia  This is a chronic problem  The current episode started more than 1 year ago  The problem is controlled  Recent lipid tests were reviewed and are normal  Pertinent negatives include no chest pain or shortness of breath  Current antihyperlipidemic treatment includes statins  The current treatment provides moderate improvement of lipids  There are no compliance problems          The following portions of the patient's history were reviewed and updated as appropriate: allergies, current medications, past family history, past medical history, past social history, past surgical history and problem list     Review of Systems   Constitutional: Negative for activity change, appetite change, chills, diaphoresis, fatigue and fever  HENT: Negative for congestion, postnasal drip, rhinorrhea, sinus pressure, sinus pain, sneezing and sore throat  Eyes: Negative for visual disturbance  Respiratory: Negative for apnea, cough, choking, chest tightness, shortness of breath and wheezing  Cardiovascular: Negative for chest pain, palpitations and leg swelling  Gastrointestinal: Negative for abdominal distention, abdominal pain, anal bleeding, blood in stool, constipation, diarrhea, nausea and vomiting  Endocrine: Negative for cold intolerance and heat intolerance  Genitourinary: Negative for difficulty urinating, dysuria and hematuria  Musculoskeletal: Negative  Skin: Negative  Neurological: Negative for dizziness, weakness, light-headedness, numbness and headaches  Hematological: Negative for adenopathy  Psychiatric/Behavioral: Negative for agitation, sleep disturbance and suicidal ideas  All other systems reviewed and are negative          Past Medical History:   Diagnosis Date    Diabetes mellitus ([facility] )     Hyperlipidemia     Hypertension          Current Outpatient Medications:     atenolol (TENORMIN) 100 mg tablet, Take 1 tablet (100 mg total) by mouth daily, Disp: 90 tablet, Rfl: 3    glimepiride (AMARYL) 4 mg tablet, Take 1 tablet (4 mg total) by mouth daily, Disp: 90 tablet, Rfl: 3    lisinopril-hydrochlorothiazide (PRINZIDE,ZESTORETIC) 20-25 MG per tablet, Take 1 tablet by mouth daily, Disp: 90 tablet, Rfl: 3    lovastatin (MEVACOR) 40 MG tablet, Take 1 tablet (40 mg total) by mouth daily, Disp: 90 tablet, Rfl: 3    metFORMIN (GLUCOPHAGE) 1000 MG tablet, Take 1 tablet (1,000 mg total) by mouth 2 (two) times a day with meals, Disp: 180 tablet, Rfl: 3    No Known Allergies    Social History   Past Surgical History:   Procedure Laterality Date    FLAP LOCAL HEAD / NECK Left 10/12/2021    Procedure: RECONSTRUCTION OF LEFT TEMPLE DEFECT AFTER RESCECTION MELANOMA;  Surgeon: Janice White MD;  [facility];  Service: Plastics    FULL THICKNESS SKIN GRAFT Left 10/12/2021    Procedure: SKIN GRAFT FULL THICKNESS LEFT TEMPLE;  Surgeon: Janice White MD;  [facility];  Service: Plastics    GALLBLADDER SURGERY      HAND SURGERY Left     LYMPH NODE BIOPSY Left 10/12/2021    Procedure: BIOPSY LYMPH NODE SENTINEL, LYMPHOSCINTIGRAPHY, INTRAOPERATIVE LYMPHATIC MAPPING (INJECT AT 1200);   Surgeon: Chad Quick MD;  [facility];  Service: Surgical Oncology    NOSE SURGERY      SKIN CANCER EXCISION  10/2021    SKIN LESION EXCISION Left 10/12/2021    Procedure: FACE MELANOMA SCAR WIDE EXCISION  FACE;  Surgeon: Chad Quick MD;  [facility];  Service: Surgical Oncology    SPLIT THICKNESS SKIN GRAFT Left 10/12/2021    Procedure: SKIN GRAFT SPLIT THICKNESS LEFT TEMPLE;  Surgeon: Janice White MD;  [facility];  Service: Plastics    US GUIDED LYMPH NODE BIOPSY LEFT  3/21/2022     Family History   Problem Relation Age of Onset    No Known Problems Mother     No Known Problems Father     Colon cancer Other 59       Objective:  /72 (BP Location: Left arm, Patient Position: Sitting, Cuff Size: Standard)   Pulse (!) 53   Temp 98 1 °F (36 7 °C) (Temporal)   Resp 18   Ht 5' 7" (1 702 m)   Wt 76 8 kg (169 lb 6 4 oz)   SpO2 99%   BMI 26 53 kg/m²     Recent Results (from the past 1344 hour(s))   CBC and differential    Collection Time: 04/26/22  8:54 AM   Result Value Ref Range    WBC 7 91 4 31 - 10 16 Thousand/uL    RBC 4 50 3 88 - 5 62 Million/uL    Hemoglobin 13 2 12 0 - 17 0 g/dL    Hematocrit 40 6 36 5 - 49 3 %    MCV 90 82 - 98 fL    MCH 29 3 26 8 - 34 3 pg    MCHC 32 5 31 4 - 37 4 g/dL    RDW 13 1 11 6 - 15 1 %    MPV 10 6 8 9 - 12 7 fL    Platelets 051 (L) 997 - 390 Thousands/uL    nRBC 0 /100 WBCs    Neutrophils Relative 47 43 - 75 %    Immat GRANS % 0 0 - 2 %    Lymphocytes Relative 44 14 - 44 %    Monocytes Relative 7 4 - 12 %    Eosinophils Relative 2 0 - 6 %    Basophils Relative 0 0 - 1 %    Neutrophils Absolute 3 70 1 85 - 7 62 Thousands/µL    Immature Grans Absolute 0 02 0 00 - 0 20 Thousand/uL    Lymphocytes Absolute 3 46 0 60 - 4 47 Thousands/µL    Monocytes Absolute 0 52 0 17 - 1 22 Thousand/µL    Eosinophils Absolute 0 19 0 00 - 0 61 Thousand/µL    Basophils Absolute 0 02 0 00 - 0 10 Thousands/µL   Comprehensive metabolic panel    Collection Time: 04/26/22  8:54 AM   Result Value Ref Range    Sodium 140 136 - 145 mmol/L    Potassium 4 0 3 5 - 5 3 mmol/L    Chloride 107 100 - 108 mmol/L    CO2 28 21 - 32 mmol/L    ANION GAP 5 4 - 13 mmol/L    BUN 29 (H) 5 - 25 mg/dL    Creatinine 1 44 (H) 0 60 - 1 30 mg/dL    Glucose 192 (H) 65 - 140 mg/dL    Calcium 8 3 8 3 - 10 1 mg/dL    AST 16 5 - 45 U/L    ALT 19 12 - 78 U/L    Alkaline Phosphatase 83 46 - 116 U/L    Total Protein 6 7 6 4 - 8 2 g/dL    Albumin 3 7 3 5 - 5 0 g/dL    Total Bilirubin 1 02 (H) 0 20 - 1 00 mg/dL    eGFR 44 ml/min/1 73sq m   LD,Blood    Collection Time: 04/26/22  8:54 AM   Result Value Ref Range     81 - 234 U/L   T3, free    Collection Time: 04/26/22  8:54 AM   Result Value Ref Range    T3, Free 2 24 (L) 2 30 - 4 20 pg/mL   T4, free    Collection Time: 04/26/22  8:54 AM   Result Value Ref Range    Free T4 1 03 0 76 - 1 46 ng/dL   TSH, 3rd generation    Collection Time: 04/26/22  8:54 AM   Result Value Ref Range    TSH 3RD GENERATON 1 860 0 450 - 4 500 uIU/mL   CBC and differential    Collection Time: 05/10/22  8:22 AM   Result Value Ref Range    WBC 7 79 4 31 - 10 16 Thousand/uL    RBC 4 76 3 88 - 5 62 Million/uL    Hemoglobin 14 0 12 0 - 17 0 g/dL    Hematocrit 41 9 36 5 - 49 3 %    MCV 88 82 - 98 fL    MCH 29 4 26 8 - 34 3 pg    MCHC 33 4 31 4 - 37 4 g/dL    RDW 13 2 11 6 - 15 1 %    MPV 10 4 8 9 - 12 7 fL    Platelets 107 (L) 075 - 390 Thousands/uL    nRBC 0 /100 WBCs    Neutrophils Relative 42 (L) 43 - 75 %    Immat GRANS % 0 0 - 2 %    Lymphocytes Relative 48 (H) 14 - 44 %    Monocytes Relative 7 4 - 12 %    Eosinophils Relative 3 0 - 6 %    Basophils Relative 0 0 - 1 %    Neutrophils Absolute 3 30 1 85 - 7 62 Thousands/µL    Immature Grans Absolute 0 03 0 00 - 0 20 Thousand/uL    Lymphocytes Absolute 3 71 0 60 - 4 47 Thousands/µL    Monocytes Absolute 0 52 0 17 - 1 22 Thousand/µL    Eosinophils Absolute 0 21 0 00 - 0 61 Thousand/µL    Basophils Absolute 0 02 0 00 - 0 10 Thousands/µL   Comprehensive metabolic panel    Collection Time: 05/10/22  8:22 AM   Result Value Ref Range    Sodium 140 136 - 145 mmol/L    Potassium 4 2 3 5 - 5 3 mmol/L    Chloride 107 100 - 108 mmol/L    CO2 28 21 - 32 mmol/L    ANION GAP 5 4 - 13 mmol/L    BUN 28 (H) 5 - 25 mg/dL    Creatinine 1 34 (H) 0 60 - 1 30 mg/dL    Glucose, Fasting 105 (H) 65 - 99 mg/dL    Calcium 9 2 8 3 - 10 1 mg/dL    AST 16 5 - 45 U/L    ALT 19 12 - 78 U/L    Alkaline Phosphatase 82 46 - 116 U/L    Total Protein 6 9 6 4 - 8 2 g/dL    Albumin 4 0 3 5 - 5 0 g/dL    Total Bilirubin 1 35 (H) 0 20 - 1 00 mg/dL    eGFR 48 ml/min/1 73sq m   LD,Blood    Collection Time: 05/10/22  8:22 AM   Result Value Ref Range     81 - 234 U/L   T3, free    Collection Time: 05/10/22  8:22 AM   Result Value Ref Range    T3, Free 2 30 2 30 - 4 20 pg/mL   T4, free    Collection Time: 05/10/22  8:22 AM   Result Value Ref Range    Free T4 1 00 0 76 - 1 46 ng/dL   TSH, 3rd generation    Collection Time: 05/10/22  8:22 AM   Result Value Ref Range    TSH 3RD GENERATON 2 060 0 450 - 4 500 uIU/mL   Microalbumin / creatinine urine ratio    Collection Time: 05/10/22  8:22 AM   Result Value Ref Range    Creatinine, Ur 197 0 mg/dL    Microalbum  ,U,Random 400 0 (H) 0 0 - 20 0 mg/L Microalb Creat Ratio 203 (H) 0 - 30 mg/g creatinine   Hemoglobin A1C    Collection Time: 05/10/22  8:22 AM   Result Value Ref Range    Hemoglobin A1C 6 3 (H) Normal 3 8-5 6%; PreDiabetic 5 7-6 4%; Diabetic >=6 5%; Glycemic control for adults with diabetes <7 0% %     mg/dl   Lipid panel    Collection Time: 05/10/22  8:22 AM   Result Value Ref Range    Cholesterol 128 See Comment mg/dL    Triglycerides 108 See Comment mg/dL    HDL, Direct 50 >=40 mg/dL    LDL Calculated 56 0 - 100 mg/dL    Non-HDL-Chol (CHOL-HDL) 78 mg/dl            Physical Exam  Vitals and nursing note reviewed  Constitutional:       General: He is not in acute distress  Appearance: He is well-developed  He is not diaphoretic  HENT:      Head: Normocephalic and atraumatic  Eyes:      General: No scleral icterus  Right eye: No discharge  Left eye: No discharge  Conjunctiva/sclera: Conjunctivae normal       Pupils: Pupils are equal, round, and reactive to light  Neck:      Thyroid: No thyromegaly  Vascular: No JVD  Cardiovascular:      Rate and Rhythm: Normal rate and regular rhythm  Heart sounds: Normal heart sounds  No murmur heard  No friction rub  No gallop  Pulmonary:      Effort: Pulmonary effort is normal  No respiratory distress  Breath sounds: Normal breath sounds  No wheezing or rales  Chest:      Chest wall: No tenderness  Abdominal:      General: Bowel sounds are normal  There is no distension  Palpations: Abdomen is soft  There is no mass  Tenderness: There is no abdominal tenderness  There is no guarding or rebound  Musculoskeletal:         General: No tenderness or deformity  Normal range of motion  Cervical back: Normal range of motion and neck supple  Lymphadenopathy:      Cervical: No cervical adenopathy  Skin:     General: Skin is warm and dry  Coloration: Skin is not pale  Findings: No erythema or rash     Neurological:      Mental Status: He is alert and oriented to person, place, and time  Cranial Nerves: No cranial nerve deficit  Coordination: Coordination normal       Deep Tendon Reflexes: Reflexes are normal and symmetric  Psychiatric:         Behavior: Behavior normal          Thought Content:  Thought content normal          Judgment: Judgment normal

## 2022-05-25 NOTE — TELEPHONE ENCOUNTER
Patient called to schedule a consult from his referral  Patient was scheduled  Upon investigation it was found that the patient had previously seen Dr Isaiah East  Consult with Gini Clark for 8/29 is being cancelled  Follow up with Dr Isaiah East is scheduled for 9/01

## 2022-05-25 NOTE — ASSESSMENT & PLAN NOTE
Lab Results   Component Value Date    EGFR 48 05/10/2022    EGFR 44 04/26/2022    EGFR 46 03/28/2022    CREATININE 1 34 (H) 05/10/2022    CREATININE 1 44 (H) 04/26/2022    CREATININE 1 40 (H) 03/28/2022   Will refer to Nephrology for further evaluation given microalbuminuria

## 2022-05-25 NOTE — ASSESSMENT & PLAN NOTE
Stable, controlled  Continue glimepiride 4 mg daily and metformin 1000 mg twice a day  He denies any hypoglycemic symptoms    Lab Results   Component Value Date    HGBA1C 6 3 (H) 05/10/2022

## 2022-06-15 ENCOUNTER — HOSPITAL ENCOUNTER (OUTPATIENT)
Dept: RADIOLOGY | Age: 82
Discharge: HOME | End: 2022-06-15
Payer: COMMERCIAL

## 2022-06-15 DIAGNOSIS — C43.39 MALIGNANT MELANOMA OF FOREHEAD (HCC): ICD-10-CM

## 2022-06-15 LAB — GLUCOSE SERPL-MCNC: 68 MG/DL (ref 65–140)

## 2022-06-15 PROCEDURE — 82948 REAGENT STRIP/BLOOD GLUCOSE: CPT

## 2022-06-15 PROCEDURE — 78816 PET IMAGE W/CT FULL BODY: CPT

## 2022-06-15 PROCEDURE — A9552 F18 FDG: HCPCS

## 2022-06-20 ENCOUNTER — APPOINTMENT (OUTPATIENT)
Dept: LAB | Facility: IMAGING CENTER | Age: 82
End: 2022-06-20
Payer: COMMERCIAL

## 2022-06-20 RX ORDER — SODIUM CHLORIDE 9 MG/ML
20 INJECTION, SOLUTION INTRAVENOUS ONCE
Status: CANCELLED | OUTPATIENT
Start: 2022-06-27

## 2022-06-21 ENCOUNTER — TELEPHONE (OUTPATIENT)
Dept: HEMATOLOGY ONCOLOGY | Facility: CLINIC | Age: 82
End: 2022-06-21

## 2022-06-21 NOTE — TELEPHONE ENCOUNTER
PET results from 6/15 reviewed by Dr  [address]  Called and spoke with patient  Discussed normal PET results  All questions answered and patient verbalized understanding

## 2022-06-23 DIAGNOSIS — C43.39 MALIGNANT MELANOMA OF FOREHEAD (HCC): Primary | ICD-10-CM

## 2022-06-23 DIAGNOSIS — C43.30 MALIGNANT MELANOMA OF SKIN OF FACE (HCC): ICD-10-CM

## 2022-06-27 ENCOUNTER — OFFICE VISIT (OUTPATIENT)
Dept: HEMATOLOGY ONCOLOGY | Facility: CLINIC | Age: 82
End: 2022-06-27
Payer: COMMERCIAL

## 2022-06-27 ENCOUNTER — HOSPITAL ENCOUNTER (OUTPATIENT)
Dept: INFUSION CENTER | Facility: CLINIC | Age: 82
Discharge: HOME | End: 2022-06-27
Payer: COMMERCIAL

## 2022-06-27 VITALS
RESPIRATION RATE: 14 BRPM | BODY MASS INDEX: 26.68 KG/M2 | HEIGHT: 67 IN | TEMPERATURE: 98 F | HEART RATE: 62 BPM | DIASTOLIC BLOOD PRESSURE: 86 MMHG | SYSTOLIC BLOOD PRESSURE: 136 MMHG | WEIGHT: 170 LBS | OXYGEN SATURATION: 98 %

## 2022-06-27 VITALS
SYSTOLIC BLOOD PRESSURE: 149 MMHG | RESPIRATION RATE: 18 BRPM | DIASTOLIC BLOOD PRESSURE: 76 MMHG | OXYGEN SATURATION: 97 % | TEMPERATURE: 97.8 F | HEART RATE: 54 BPM

## 2022-06-27 DIAGNOSIS — Z79.899 HIGH RISK MEDICATION USE: ICD-10-CM

## 2022-06-27 DIAGNOSIS — C43.30 MALIGNANT MELANOMA OF SKIN OF FACE (HCC): ICD-10-CM

## 2022-06-27 DIAGNOSIS — C43.30 MALIGNANT MELANOMA OF SKIN OF FACE (HCC): Primary | ICD-10-CM

## 2022-06-27 DIAGNOSIS — C43.39 MALIGNANT MELANOMA OF FOREHEAD (HCC): ICD-10-CM

## 2022-06-27 DIAGNOSIS — C43.39 MALIGNANT MELANOMA OF FOREHEAD (HCC): Primary | ICD-10-CM

## 2022-06-27 PROCEDURE — 99213 OFFICE O/P EST LOW 20 MIN: CPT

## 2022-06-27 PROCEDURE — 1036F TOBACCO NON-USER: CPT

## 2022-06-27 PROCEDURE — 1160F RVW MEDS BY RX/DR IN RCRD: CPT

## 2022-06-27 PROCEDURE — 3079F DIAST BP 80-89 MM HG: CPT

## 2022-06-27 PROCEDURE — 3075F SYST BP GE 130 - 139MM HG: CPT

## 2022-06-27 PROCEDURE — 96413 CHEMO IV INFUSION 1 HR: CPT

## 2022-06-27 RX ORDER — SODIUM CHLORIDE 9 MG/ML
20 INJECTION, SOLUTION INTRAVENOUS ONCE
Status: COMPLETED | OUTPATIENT
Start: 2022-06-27 | End: 2022-06-27

## 2022-06-27 RX ADMIN — SODIUM CHLORIDE 400 MG: 9 INJECTION, SOLUTION INTRAVENOUS at 14:25

## 2022-06-27 RX ADMIN — SODIUM CHLORIDE 20 ML/HR: 0.9 INJECTION, SOLUTION INTRAVENOUS at 14:25

## 2022-06-27 NOTE — PROGRESS NOTES
Patient here for [facility] ([facility])  Patient resting with no complaints  Vitals stable  Labs within parameters for treatment  [place] within reach of patient  Will continue to monitor

## 2022-06-27 NOTE — PROGRESS NOTES
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 with pembrolizumab here for follow-up prior to cycle 3  He is doing well and tolerating treatment fine  His labs are reviewed and he is okay to continue with treatment today  He is standing labs in the system and knows to have these completed the week prior to his next infusion  We reviewed in detail the results of his PET scan demonstrating a response to treatment  He is due for an ultrasound in September  This will be ordered at his next visit  He is seeing Dermatology later this month  3  High risk medication use  He is on treatment with immunotherapy and we will continue to monitor for side effects at lab abnormalities  We will monitor labs with each treatment to ensure safety of continuing on treatment  He knows to watch for signs and symptoms for immune mediated side effects  Denies any immune mediated side effects at this time  The patient is scheduled for follow-up in approximately 6 weeks with Dr Segundo Sarabia  Patient voiced agreement and understanding to the above  Patient knows to call the Hematology/Oncology office with any questions and concerns regarding the above     -------------------------------------------------------------------------------------------------------    Chief Complaint   Patient presents with    Follow-up       History of present illness:   Gina Perkins is an 23-year-old male with locally recurrent melanoma of the forehead on treatment with pembrolizumab here for follow-up prior to cycle 3  He is doing well and tolerating treatment fine  He has no complaints or concerns today    He has some mild fatigue but able to do his activities of daily living including taking out the garbage and walking up a flight of steps  He had a PET scan earlier this month that demonstrated a good response to treatment with resolution of activity in the left pre or are cool and supraclavicular region  There was previous no of activity on the left 1st metatarsal head that has decreased in activity  On exam there is no lesion or swelling  Cancer History:   Oncology History   Malignant melanoma of skin of face ([facility] [address] )   2021 -  Cancer Staged    Staging form: Melanoma of the Skin, AJCC 8th Edition  - Clinical stage from 2021: Stage III (cT2a, cN1a, cM0) - Signed by Puja Stahl MD on 2021 -  Cancer Staged    Staging form: Melanoma of the Skin, AJCC 8th Edition  - Pathologic stage from 2021: Stage IIIA (pT2a, pN1a, cM0) - Signed by Puja Stahl MD on 2021 Initial Diagnosis    Malignant melanoma of skin of face ([facility] )     2022 -  Chemotherapy    pembrolizumab (KEYTRUDA) IVPB, 400 mg, Intravenous, Once, 2 of 6 cycles  Administration: 400 mg (2022), 400 mg (2022)     Malignant melanoma of forehead ([facility] [address] )   2021 Initial Diagnosis    Malignant melanoma of forehead ([facility] )     2022 -  Chemotherapy    pembrolizumab (KEYTRUDA) IVPB, 400 mg, Intravenous, Once, 2 of 6 cycles  Administration: 400 mg (2022), 400 mg (2022)          Cancer Staging  Malignant melanoma of skin of face [facility])  Staging form: Melanoma of the Skin, AJCC 8th Edition  - Clinical stage from 2021: Stage III (cT2a, cN1a, cM0) - Signed by Puja Stahl MD on 2021  - Pathologic stage from 2021: Stage IIIA (pT2a, pN1a, cM0) - Signed by Puja Stahl MD on 2021       ECO - Asymptomatic     Review of Systems   Constitutional: Positive for fatigue (mild)  Negative for activity change, appetite change, chills, fever and unexpected weight change     HENT: Negative for ear pain, sore throat, trouble swallowing and voice change  Eyes: Negative for photophobia, pain and visual disturbance  Respiratory: Negative for cough, chest tightness and shortness of breath  Cardiovascular: Negative for chest pain, palpitations and leg swelling  Gastrointestinal: Negative for abdominal pain, blood in stool, constipation, diarrhea, nausea and vomiting  Endocrine: Negative for cold intolerance and heat intolerance  Genitourinary: Negative for difficulty urinating, dysuria, frequency, hematuria and urgency  Musculoskeletal: Negative for arthralgias, back pain, joint swelling and myalgias  Skin: Negative for color change and rash  No new, changing, or concerning lesions  Neurological: Negative for dizziness, seizures, syncope, weakness, light-headedness, numbness and headaches  Hematological: Negative for adenopathy  Does not bruise/bleed easily  Psychiatric/Behavioral: Negative for confusion and sleep disturbance  All other systems reviewed and are negative          Current Outpatient Medications:     atenolol (TENORMIN) 100 mg tablet, Take 1 tablet (100 mg total) by mouth daily, Disp: 90 tablet, Rfl: 3    glimepiride (AMARYL) 4 mg tablet, Take 1 tablet (4 mg total) by mouth daily, Disp: 90 tablet, Rfl: 3    lisinopril-hydrochlorothiazide (PRINZIDE,ZESTORETIC) 20-25 MG per tablet, Take 1 tablet by mouth daily, Disp: 90 tablet, Rfl: 3    lovastatin (MEVACOR) 40 MG tablet, Take 1 tablet (40 mg total) by mouth daily, Disp: 90 tablet, Rfl: 3    metFORMIN (GLUCOPHAGE) 1000 MG tablet, Take 1 tablet (1,000 mg total) by mouth 2 (two) times a day with meals, Disp: 180 tablet, Rfl: 3    No Known Allergies    Objective:   /86 (BP Location: Left arm, Patient Position: Sitting, Cuff Size: Adult)   Pulse 62   Temp 98 °F (36 7 °C) (Temporal)   Resp 14   Ht 5' 7" (1 702 m)   Wt 77 1 kg (170 lb)   SpO2 98%   BMI 26 63 kg/m²   Wt Readings from Last 6 Encounters:   06/27/22 77 1 kg (170 lb)   05/25/22 76 8 kg (169 lb 6 4 oz)   05/16/22 75 8 kg (167 lb)   05/03/22 76 4 kg (168 lb 6 4 oz)   04/04/22 76 7 kg (169 lb)   03/02/22 76 2 kg (168 lb)       Physical Exam  Constitutional:       General: He is not in acute distress  Appearance: Normal appearance  He is normal weight  He is not ill-appearing  HENT:      Head: Atraumatic  Eyes:      Extraocular Movements: Extraocular movements intact  Conjunctiva/sclera: Conjunctivae normal    Cardiovascular:      Rate and Rhythm: Normal rate and regular rhythm  Pulses: Normal pulses  Heart sounds: Normal heart sounds  No murmur heard  Pulmonary:      Effort: Pulmonary effort is normal  No respiratory distress  Breath sounds: Normal breath sounds  No wheezing  Chest:   Breasts:      Right: No axillary adenopathy or supraclavicular adenopathy  Left: No axillary adenopathy or supraclavicular adenopathy  Abdominal:      General: Abdomen is flat  Bowel sounds are normal  There is no distension  Palpations: Abdomen is soft  Tenderness: There is no abdominal tenderness  Musculoskeletal:      Right lower leg: No edema  Left lower leg: No edema  Lymphadenopathy:      Head:      Right side of head: No submental, submandibular, preauricular or posterior auricular adenopathy  Left side of head: No submental, submandibular, preauricular or posterior auricular adenopathy  Cervical: No cervical adenopathy  Right cervical: No superficial cervical adenopathy  Left cervical: No superficial cervical adenopathy  Upper Body:      Right upper body: No supraclavicular or axillary adenopathy  Left upper body: No supraclavicular or axillary adenopathy  Lower Body: No right inguinal adenopathy  No left inguinal adenopathy  Skin:     General: Skin is warm and dry  Capillary Refill: Capillary refill takes less than 2 seconds        Coloration: Skin is not pale  Findings: No bruising, lesion or rash  Neurological:      General: No focal deficit present  Mental Status: He is alert and oriented to person, place, and time  Mental status is at baseline  Motor: No weakness  Gait: Gait normal    Psychiatric:         Mood and Affect: Mood normal          Behavior: Behavior normal          Thought Content: Thought content normal          Judgment: Judgment normal          Goals and Barriers:    Current Goal:   Prolong Survival from Cancer  Barriers: None  Patient's Capacity to Self Care:  Patient able to self care  Pertinent Laboratory Results and Imaging Review:  6/15/22: NM pet ct tumor imaging whole body  1  Interval resolution of previous abnormal activity in the left preauricular and supraclavicular regions  Currently, increased activity in the right external ear canal is nonspecific  Correlate with direct visualization  2  No evidence of FDG avid cervical adenopathy  3  No evidence of glucose avid distant metastatic disease in the chest, abdomen, or pelvis  4  Decreasing activity associated with the left 1st metatarsal head to represent prior trauma or arthritis    Otherwise no focal areas of abnormal osseous activity    Hospital Outpatient Visit on 06/15/2022   Component Date Value Ref Range Status    POC Glucose 06/15/2022 68  65 - 140 mg/dl Final   Ancillary Orders on 05/20/2022   Component Date Value Ref Range Status    WBC 06/20/2022 10 89 (A) 4 31 - 10 16 Thousand/uL Final    RBC 06/20/2022 4 65  3 88 - 5 62 Million/uL Final    Hemoglobin 06/20/2022 13 5  12 0 - 17 0 g/dL Final    Hematocrit 06/20/2022 42 2  36 5 - 49 3 % Final    MCV 06/20/2022 91  82 - 98 fL Final    MCH 06/20/2022 29 0  26 8 - 34 3 pg Final    MCHC 06/20/2022 32 0  31 4 - 37 4 g/dL Final    RDW 06/20/2022 13 6  11 6 - 15 1 % Final    MPV 06/20/2022 10 8  8 9 - 12 7 fL Final    Platelets 46/90/0462 137 (A) 149 - 390 Thousands/uL Final    nRBC 06/20/2022 0  /100 WBCs Final    Neutrophils Relative 06/20/2022 47  43 - 75 % Final    Immat GRANS % 06/20/2022 0  0 - 2 % Final    Lymphocytes Relative 06/20/2022 46 (A) 14 - 44 % Final    Monocytes Relative 06/20/2022 5  4 - 12 % Final    Eosinophils Relative 06/20/2022 2  0 - 6 % Final    Basophils Relative 06/20/2022 0  0 - 1 % Final    Neutrophils Absolute 06/20/2022 5 02  1 85 - 7 62 Thousands/µL Final    Immature Grans Absolute 06/20/2022 0 02  0 00 - 0 20 Thousand/uL Final    Lymphocytes Absolute 06/20/2022 5 03 (A) 0 60 - 4 47 Thousands/µL Final    Monocytes Absolute 06/20/2022 0 56  0 17 - 1 22 Thousand/µL Final    Eosinophils Absolute 06/20/2022 0 24  0 00 - 0 61 Thousand/µL Final    Basophils Absolute 06/20/2022 0 02  0 00 - 0 10 Thousands/µL Final    Sodium 06/20/2022 140  136 - 145 mmol/L Final    Potassium 06/20/2022 3 9  3 5 - 5 3 mmol/L Final    Chloride 06/20/2022 106  100 - 108 mmol/L Final    CO2 06/20/2022 26  21 - 32 mmol/L Final    ANION GAP 06/20/2022 8  4 - 13 mmol/L Final    BUN 06/20/2022 24  5 - 25 mg/dL Final    Creatinine 06/20/2022 1 37 (A) 0 60 - 1 30 mg/dL Final    Standardized to IDMS reference method    Glucose, Fasting 06/20/2022 143 (A) 65 - 99 mg/dL Final    Specimen collection should occur prior to Sulfasalazine administration due to the potential for falsely depressed results  Specimen collection should occur prior to Sulfapyridine administration due to the potential for falsely elevated results   Calcium 06/20/2022 8 7  8 3 - 10 1 mg/dL Final    AST 06/20/2022 14  5 - 45 U/L Final    Specimen collection should occur prior to Sulfasalazine administration due to the potential for falsely depressed results   ALT 06/20/2022 20  12 - 78 U/L Final    Specimen collection should occur prior to Sulfasalazine and/or Sulfapyridine administration due to the potential for falsely depressed results       Alkaline Phosphatase 06/20/2022 89  46 - 116 U/L Final  Total Protein 06/20/2022 6 8  6 4 - 8 2 g/dL Final    Albumin 06/20/2022 3 6  3 5 - 5 0 g/dL Final    Total Bilirubin 06/20/2022 0 98  0 20 - 1 00 mg/dL Final    Use of this assay is not recommended for patients undergoing treatment with eltrombopag due to the potential for falsely elevated results   eGFR 06/20/2022 47  ml/min/1 73sq m Final    LD 06/20/2022 165  81 - 234 U/L Final    T3, Free 06/20/2022 1 97 (A) 2 30 - 4 20 pg/mL Final    Free T4 06/20/2022 0 82  0 76 - 1 46 ng/dL Final    Specimen collection should occur prior to Sulfasalazine administration due to the potential for falsely elevated results   TSH 3RD GENERATON 06/20/2022 1 420  0 450 - 4 500 uIU/mL Final    Adult TSH (3rd generation) reference range follows the recommended guidelines of the American Thyroid Association, January, 2020  Appointment on 05/10/2022   Component Date Value Ref Range Status    Creatinine, Ur 05/10/2022 197 0  mg/dL Final    Microalbum  ,U,Random 05/10/2022 400 0 (A) 0 0 - 20 0 mg/L Final    Microalb Creat Ratio 05/10/2022 203 (A) 0 - 30 mg/g creatinine Final    Hemoglobin A1C 05/10/2022 6 3 (A) Normal 3 8-5 6%; PreDiabetic 5 7-6 4%;  Diabetic >=6 5%; Glycemic control for adults with diabetes <7 0% % Final    EAG 05/10/2022 134  mg/dl Final    Cholesterol 05/10/2022 128  See Comment mg/dL Final    Cholesterol:         Pediatric <18 Years        Desirable          <170 mg/dL      Borderline High    170-199 mg/dL      High               >=200 mg/dL        Adult >=18 Years            Desirable         <200 mg/dL      Borderline High   200-239 mg/dL      High              >239 mg/dL      Triglycerides 05/10/2022 108  See Comment mg/dL Final    Triglyceride:     0-9Y            <75mg/dL     10Y-17Y         <90 mg/dL       >=18Y     Normal          <150 mg/dL     Borderline High 150-199 mg/dL     High            200-499 mg/dL        Very High       >499 mg/dL    Specimen collection should occur prior to N-Acetylcysteine or Metamizole administration due to the potential for falsely depressed results   HDL, Direct 05/10/2022 50  >=40 mg/dL Final    Specimen collection should occur prior to Metamizole administration due to the potential for falsley depressed results   LDL Calculated 05/10/2022 56  0 - 100 mg/dL Final    LDL Cholesterol:     Optimal           <100 mg/dl     Near Optimal      100-129 mg/dl     Above Optimal       Borderline High 130-159 mg/dl       High            160-189 mg/dl       Very High       >189 mg/dl         This screening LDL is a calculated result  It does not have the accuracy of the Direct Measured LDL in the monitoring of patients with hyperlipidemia and/or statin therapy  Direct Measure LDL (FQJ123) must be ordered separately in these patients      Non-HDL-Chol (CHOL-HDL) 05/10/2022 78  mg/dl Final   Ancillary Orders on 04/29/2022   Component Date Value Ref Range Status    WBC 05/10/2022 7 79  4 31 - 10 16 Thousand/uL Final    RBC 05/10/2022 4 76  3 88 - 5 62 Million/uL Final    Hemoglobin 05/10/2022 14 0  12 0 - 17 0 g/dL Final    Hematocrit 05/10/2022 41 9  36 5 - 49 3 % Final    MCV 05/10/2022 88  82 - 98 fL Final    MCH 05/10/2022 29 4  26 8 - 34 3 pg Final    MCHC 05/10/2022 33 4  31 4 - 37 4 g/dL Final    RDW 05/10/2022 13 2  11 6 - 15 1 % Final    MPV 05/10/2022 10 4  8 9 - 12 7 fL Final    Platelets 29/81/7153 130 (A) 149 - 390 Thousands/uL Final    nRBC 05/10/2022 0  /100 WBCs Final    Neutrophils Relative 05/10/2022 42 (A) 43 - 75 % Final    Immat GRANS % 05/10/2022 0  0 - 2 % Final    Lymphocytes Relative 05/10/2022 48 (A) 14 - 44 % Final    Monocytes Relative 05/10/2022 7  4 - 12 % Final    Eosinophils Relative 05/10/2022 3  0 - 6 % Final    Basophils Relative 05/10/2022 0  0 - 1 % Final    Neutrophils Absolute 05/10/2022 3 30  1 85 - 7 62 Thousands/µL Final    Immature Grans Absolute 05/10/2022 0 03  0 00 - 0 20 Thousand/uL Final    Lymphocytes Absolute 05/10/2022 3 71  0 60 - 4 47 Thousands/µL Final    Monocytes Absolute 05/10/2022 0 52  0 17 - 1 22 Thousand/µL Final    Eosinophils Absolute 05/10/2022 0 21  0 00 - 0 61 Thousand/µL Final    Basophils Absolute 05/10/2022 0 02  0 00 - 0 10 Thousands/µL Final    Sodium 05/10/2022 140  136 - 145 mmol/L Final    Potassium 05/10/2022 4 2  3 5 - 5 3 mmol/L Final    Chloride 05/10/2022 107  100 - 108 mmol/L Final    CO2 05/10/2022 28  21 - 32 mmol/L Final    ANION GAP 05/10/2022 5  4 - 13 mmol/L Final    BUN 05/10/2022 28 (A) 5 - 25 mg/dL Final    Creatinine 05/10/2022 1 34 (A) 0 60 - 1 30 mg/dL Final    Standardized to IDMS reference method    Glucose, Fasting 05/10/2022 105 (A) 65 - 99 mg/dL Final    Specimen collection should occur prior to Sulfasalazine administration due to the potential for falsely depressed results  Specimen collection should occur prior to Sulfapyridine administration due to the potential for falsely elevated results   Calcium 05/10/2022 9 2  8 3 - 10 1 mg/dL Final    AST 05/10/2022 16  5 - 45 U/L Final    Specimen collection should occur prior to Sulfasalazine administration due to the potential for falsely depressed results   ALT 05/10/2022 19  12 - 78 U/L Final    Specimen collection should occur prior to Sulfasalazine and/or Sulfapyridine administration due to the potential for falsely depressed results   Alkaline Phosphatase 05/10/2022 82  46 - 116 U/L Final    Total Protein 05/10/2022 6 9  6 4 - 8 2 g/dL Final    Albumin 05/10/2022 4 0  3 5 - 5 0 g/dL Final    Total Bilirubin 05/10/2022 1 35 (A) 0 20 - 1 00 mg/dL Final    Use of this assay is not recommended for patients undergoing treatment with eltrombopag due to the potential for falsely elevated results      eGFR 05/10/2022 48  ml/min/1 73sq m Final    LD 05/10/2022 158  81 - 234 U/L Final    T3, Free 05/10/2022 2 30  2 30 - 4 20 pg/mL Final    Free T4 05/10/2022 1 00  0 76 - 1 46 ng/dL Final Specimen collection should occur prior to Sulfasalazine administration due to the potential for falsely elevated results   TSH 3RD GENERATON 05/10/2022 2 060  0 450 - 4 500 uIU/mL Final    Adult TSH (3rd generation) reference range follows the recommended guidelines of the American Thyroid Association, January, 2020  The following historical data was reviewed  Past Medical History:   Diagnosis Date    Diabetes mellitus ([address] )     Hyperlipidemia     Hypertension        Past Surgical History:   Procedure Laterality Date    FLAP LOCAL HEAD / NECK Left 10/12/2021    Procedure: RECONSTRUCTION OF LEFT TEMPLE DEFECT AFTER RESCECTION MELANOMA;  Surgeon: Sam Pugh MD;  [facility];  Service: Plastics    FULL THICKNESS SKIN GRAFT Left 10/12/2021    Procedure: SKIN GRAFT FULL THICKNESS LEFT TEMPLE;  Surgeon: Sam Pugh MD;  [facility];  Service: Plastics    GALLBLADDER SURGERY      HAND SURGERY Left     LYMPH NODE BIOPSY Left 10/12/2021    Procedure: BIOPSY LYMPH NODE SENTINEL, LYMPHOSCINTIGRAPHY, INTRAOPERATIVE LYMPHATIC MAPPING (INJECT AT 1200);   Surgeon: Oneil Dahl MD;  [facility];  Service: Surgical Oncology    NOSE SURGERY      SKIN CANCER EXCISION  10/2021    SKIN LESION EXCISION Left 10/12/2021    Procedure: FACE MELANOMA SCAR WIDE EXCISION  FACE;  Surgeon: Oneil Dahl MD;  [facility];  Service: Surgical Oncology    SPLIT THICKNESS SKIN GRAFT Left 10/12/2021    Procedure: SKIN GRAFT SPLIT THICKNESS LEFT TEMPLE;  Surgeon: Sam Pugh MD;  [facility];  Service: [address] NODE BIOPSY LEFT  3/21/2022       Social History     Socioeconomic History    Marital status: /Civil Union     Spouse name: None    Number of children: None    Years of education: None    Highest education level: None   Occupational History    None   Tobacco Use    Smoking status: Never Smoker    Smokeless tobacco: Never Used   Vaping Use    Vaping Use: Never used   Substance and Sexual Activity    Alcohol use: Not Currently    Drug use: Never    Sexual activity: Not Currently   Other Topics Concern    None   Social History Narrative    None     Social Determinants of Health     Financial Resource Strain: Not on file   Food Insecurity: Not on file   Transportation Needs: Not on file   Physical Activity: Not on file   Stress: Not on file   Social Connections: Not on file   Intimate Partner Violence: Not on file   Housing Stability: Not on file       Family History   Problem Relation Age of Onset    No Known Problems Mother     No Known Problems Father     Colon cancer Other 60       Please note: This report has been generated by a voice recognition software system  Therefore there may be syntax, spelling, and/or grammatical errors  Please call if you have any questions

## 2022-07-25 ENCOUNTER — ESTABLISHED COMPREHENSIVE EXAM (OUTPATIENT)
Dept: URBAN - METROPOLITAN AREA CLINIC 6 | Facility: CLINIC | Age: 82
End: 2022-07-25

## 2022-07-25 DIAGNOSIS — E11.9: ICD-10-CM

## 2022-07-25 DIAGNOSIS — H25.813: ICD-10-CM

## 2022-07-25 DIAGNOSIS — H35.413: ICD-10-CM

## 2022-07-25 DIAGNOSIS — H02.885: ICD-10-CM

## 2022-07-25 DIAGNOSIS — H02.882: ICD-10-CM

## 2022-07-25 PROCEDURE — 92014 COMPRE OPH EXAM EST PT 1/>: CPT

## 2022-07-25 ASSESSMENT — VISUAL ACUITY
OD_GLARE: 20/80
OS_CC: 20/40
OD_CC: J2
OS_CC: J2
OS_GLARE: 20/60
OD_CC: 20/50-1

## 2022-07-25 ASSESSMENT — TONOMETRY
OD_IOP_MMHG: 14
OS_IOP_MMHG: 14

## 2022-08-01 ENCOUNTER — APPOINTMENT (OUTPATIENT)
Dept: LAB | Facility: IMAGING CENTER | Age: 82
End: 2022-08-01
Payer: COMMERCIAL

## 2022-08-01 RX ORDER — SODIUM CHLORIDE 9 MG/ML
20 INJECTION, SOLUTION INTRAVENOUS ONCE
Status: CANCELLED | OUTPATIENT
Start: 2022-08-08

## 2022-08-08 ENCOUNTER — HOSPITAL ENCOUNTER (OUTPATIENT)
Dept: INFUSION CENTER | Facility: CLINIC | Age: 82
Discharge: HOME | End: 2022-08-08
Payer: COMMERCIAL

## 2022-08-08 ENCOUNTER — OFFICE VISIT (OUTPATIENT)
Dept: HEMATOLOGY ONCOLOGY | Facility: CLINIC | Age: 82
End: 2022-08-08
Payer: COMMERCIAL

## 2022-08-08 VITALS
TEMPERATURE: 97.7 F | HEART RATE: 55 BPM | DIASTOLIC BLOOD PRESSURE: 82 MMHG | OXYGEN SATURATION: 95 % | SYSTOLIC BLOOD PRESSURE: 154 MMHG | RESPIRATION RATE: 18 BRPM

## 2022-08-08 VITALS
RESPIRATION RATE: 16 BRPM | HEART RATE: 53 BPM | SYSTOLIC BLOOD PRESSURE: 110 MMHG | WEIGHT: 169 LBS | HEIGHT: 67 IN | TEMPERATURE: 97.1 F | BODY MASS INDEX: 26.53 KG/M2 | DIASTOLIC BLOOD PRESSURE: 80 MMHG | OXYGEN SATURATION: 96 %

## 2022-08-08 DIAGNOSIS — C43.39 MALIGNANT MELANOMA OF FOREHEAD (HCC): ICD-10-CM

## 2022-08-08 DIAGNOSIS — Z79.899 HIGH RISK MEDICATION USE: ICD-10-CM

## 2022-08-08 DIAGNOSIS — C43.30 MALIGNANT MELANOMA OF SKIN OF FACE (HCC): ICD-10-CM

## 2022-08-08 DIAGNOSIS — C43.39 MALIGNANT MELANOMA OF FOREHEAD (HCC): Primary | ICD-10-CM

## 2022-08-08 DIAGNOSIS — C43.30 MALIGNANT MELANOMA OF SKIN OF FACE (HCC): Primary | ICD-10-CM

## 2022-08-08 PROCEDURE — 3074F SYST BP LT 130 MM HG: CPT | Performed by: INTERNAL MEDICINE

## 2022-08-08 PROCEDURE — 3079F DIAST BP 80-89 MM HG: CPT | Performed by: INTERNAL MEDICINE

## 2022-08-08 PROCEDURE — 1160F RVW MEDS BY RX/DR IN RCRD: CPT | Performed by: INTERNAL MEDICINE

## 2022-08-08 PROCEDURE — 99214 OFFICE O/P EST MOD 30 MIN: CPT | Performed by: INTERNAL MEDICINE

## 2022-08-08 PROCEDURE — 96413 CHEMO IV INFUSION 1 HR: CPT

## 2022-08-08 RX ORDER — SODIUM CHLORIDE 9 MG/ML
20 INJECTION, SOLUTION INTRAVENOUS ONCE
Status: COMPLETED | OUTPATIENT
Start: 2022-08-08 | End: 2022-08-08

## 2022-08-08 RX ADMIN — SODIUM CHLORIDE 20 ML/HR: 0.9 INJECTION, SOLUTION INTRAVENOUS at 15:31

## 2022-08-08 RX ADMIN — SODIUM CHLORIDE 400 MG: 9 INJECTION, SOLUTION INTRAVENOUS at 15:40

## 2022-08-08 NOTE — PROGRESS NOTES
[facility]ATOLOGY [facility]  [address] [facility] [address] [id number] [phone]     Date of Visit: 8/8/2022  Name: Traci Dodge   YOB: 1940     Subjective:     Mr Caridad Bateman is an 79 Y M with history of malignant melanoma of the forehead, initially diagnosed with stage IIIa in September 2021, currently on immunotherapy with Keytruda, with 4th cycle scheduled for today  Patient has been tolerating immunotherapy with no major side effects  ROS was unremarkable, patient did report occasional R  Elbow pain, moreso prominent at rest  Denied any other joint pain  He has occasional balancing difficulty when he starts walking  Has a cane but he hasn't been using it  He is independent of all ADLs; he helps take care of his wife at home who is wheelchair bound due to ataxia  VISIT DIAGNOSIS: Malignant melanoma of the head      Oncology History   Malignant melanoma of skin of face ([facility] [address] )   9/8/2021 -  Cancer Staged    Staging form: Melanoma of the Skin, AJCC 8th Edition  - Clinical stage from 9/8/2021: Stage III (cT2a, cN1a, cM0) - Signed by Jacqui Lewis MD on 11/7/2021 9/8/2021 -  Cancer Staged    Staging form: Melanoma of the Skin, AJCC 8th Edition  - Pathologic stage from 9/8/2021: Stage IIIA (pT2a, pN1a, cM0) - Signed by Jacqui Lewis MD on 11/7/2021 9/22/2021 Initial Diagnosis    Malignant melanoma of skin of face ([facility] [address] )     4/4/2022 -  Chemotherapy    pembrolizumab (KEYTRUDA) IVPB, 400 mg, Intravenous, Once, 3 of 9 cycles  Administration: 400 mg (4/4/2022), 400 mg (5/16/2022), 400 mg (6/27/2022)     Malignant melanoma of forehead ([facility] [address] )   11/29/2021 Initial Diagnosis    Malignant melanoma of forehead ([facility] [address] )     4/4/2022 -  Chemotherapy    pembrolizumab (KEYTRUDA) IVPB, 400 mg, Intravenous, Once, 3 of 9 cycles  Administration: 400 mg (4/4/2022), 400 mg (5/16/2022), 400 mg (6/27/2022)        Cancer Staging  Malignant melanoma of skin of face [facility]  Staging form: Melanoma of the Skin, AJCC 8th Edition  - Clinical stage from 9/8/2021: Stage III (cT2a, cN1a, cM0) - Signed by Mary Herzog MD on 11/7/2021  - Pathologic stage from 9/8/2021: Stage IIIA (pT2a, pN1a, cM0) - Signed by Mary Herzog MD on 11/7/2021     Treatment Details   Treatment goal Curative   Plan Name OP Pembrolizumab Q 42 Days   Status Active   Start Date 4/4/2022   End Date 3/6/2023 (Planned)   Provider Mary Herzog MD   Chemotherapy pembrolizumab [facility]) IVPB, 400 mg, Intravenous, Once, 3 of 9 cycles  Administration: 400 mg (4/4/2022), 400 mg (5/16/2022), 400 mg (6/27/2022)        HISTORY OF PRESENT ILLNESS:    Elda Collins is a 79 y o  male  who has stage IIIA melanoma, diagnosed in September 2021, currently on immunotherapy with Keytruda q6 weeks, here for follow-up and surveillance  Skin biopsy from September 2021 came back positive for melanoma with at least 1 7mm invasive melanoma extending into deep specimen margin with 3 mitoses per mm2  There was no ulceration or lymphovascular invasion seen but margins were present for invasive melanoma  Subsequently, patient had a WLE on 10/12/21 which showed residual invasive melanoma (lentigo maligna, 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, 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) with free margins this time  1 sentinel LN biopsied of the pre-auricular area, which showed rare melanoma cells consistent with sub-micromets (<0 1mm)  Because of LN involvement, patient was staged as stage IIIA melanoma (pT2a, pN1a, M0)  He is due for his 4th cycle of Keytruda today       He is doing well overall  Reported occasional episodes of R  Elbow arthritic pain   Also reported noticing a lump on the back of the head behind the ear but physical exam did not reveal any palpable bumps or LAD       INTERIM HISTORY:   Currently on immunotherapy with Keytruda (4th cycle scheduled for today)   Most recent PET scan from 6/15/22 reviewed, showing interval resolution of interval resolution of previous abnormal activity in the left preauricular and supraclavicular regions  There was increased activity in the right external ear canal, nonspecific  REVIEW OF SYSTEMS:  Review of Systems   Constitutional: Negative for appetite change, chills, fatigue, fever and unexpected weight change  HENT:   Negative for hearing loss, mouth sores, nosebleeds and sore throat  Eyes: Negative for eye problems and icterus  Respiratory: Negative for chest tightness, cough, shortness of breath and wheezing  Cardiovascular: Negative for chest pain and palpitations  Gastrointestinal: Negative for abdominal distention, abdominal pain, blood in stool, constipation, diarrhea, nausea and vomiting  Endocrine: Negative for hot flashes  Genitourinary: Negative for difficulty urinating, dyspareunia, dysuria, frequency and hematuria  Musculoskeletal: Positive for gait problem  Negative for back pain, flank pain, myalgias and neck pain  Skin: Negative for itching and rash  Neurological: Positive for gait problem  Negative for dizziness, headaches (balancing difficulty, doesn't use his cane), light-headedness and speech difficulty  Hematological: Does not bruise/bleed easily  Psychiatric/Behavioral: Negative for confusion  The patient is not nervous/anxious           MEDICATIONS:    Current Outpatient Medications:     atenolol (TENORMIN) 100 mg tablet, Take 1 tablet (100 mg total) by mouth daily, Disp: 90 tablet, Rfl: 3    glimepiride (AMARYL) 4 mg tablet, Take 1 tablet (4 mg total) by mouth daily, Disp: 90 tablet, Rfl: 3    lisinopril-hydrochlorothiazide (PRINZIDE,ZESTORETIC) 20-25 MG per tablet, Take 1 tablet by mouth daily, Disp: 90 tablet, Rfl: 3    lovastatin (MEVACOR) 40 MG tablet, Take 1 tablet (40 mg total) by mouth daily, Disp: 90 tablet, Rfl: 3    metFORMIN (GLUCOPHAGE) 1000 MG tablet, Take 1 tablet (1,000 mg total) by mouth 2 (two) times a day with meals, Disp: 180 tablet, Rfl: 3 ALLERGIES:  No Known Allergies     ACTIVE PROBLEMS:  Patient Active Problem List   Diagnosis    Essential hypertension    Hiatal hernia with GERD    Status post laparoscopic cholecystectomy    Status post umbilical hernia repair, follow-up exam    Type 2 diabetes mellitus without complication, without long-term current use of insulin (HCC)    Mixed hyperlipidemia    Parkinson's disease ([facility] [address] )    Microalbuminuria    Hyperbilirubinemia    Malignant melanoma of skin of face ([facility] [address] )    Thrombocytopenia (HCC)    Malignant melanoma of forehead ([facility] [address] )    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)    Stage 3 chronic kidney disease, unspecified whether stage 3a or 3b CKD ([facility] )          PAST MEDICAL HISTORY:   Past Medical History:   Diagnosis Date    Diabetes mellitus ([facility]     Hyperlipidemia     Hypertension         PAST SURGICAL HISTORY:  Past Surgical History:   Procedure Laterality Date    FLAP LOCAL HEAD / NECK Left 10/12/2021    Procedure: RECONSTRUCTION OF LEFT TEMPLE DEFECT AFTER RESCECTION MELANOMA;  Surgeon: Merline Soman, MD;  [facility];  Service: Plastics    FULL THICKNESS SKIN GRAFT Left 10/12/2021    Procedure: SKIN GRAFT FULL THICKNESS LEFT TEMPLE;  Surgeon: Merline Soman, MD;  [facility];  Service: Plastics    GALLBLADDER SURGERY      HAND SURGERY Left     LYMPH NODE BIOPSY Left 10/12/2021    Procedure: BIOPSY LYMPH NODE SENTINEL, LYMPHOSCINTIGRAPHY, INTRAOPERATIVE LYMPHATIC MAPPING (INJECT AT 1200);   Surgeon: Reid Jones MD;  [facility];  Service: Surgical Oncology    NOSE SURGERY      SKIN CANCER EXCISION  10/2021    SKIN LESION EXCISION Left 10/12/2021    Procedure: FACE MELANOMA SCAR WIDE EXCISION  FACE;  Surgeon: Reid Jones MD;  [facility];  Service: Surgical Oncology    SPLIT THICKNESS SKIN GRAFT Left 10/12/2021    Procedure: SKIN GRAFT SPLIT THICKNESS LEFT TEMPLE;  Surgeon: Lc Hicks MD;  [facility];  Service: Plastics    US GUIDED LYMPH NODE BIOPSY LEFT  3/21/2022        SOCIAL HISTORY:  Social History     Socioeconomic History    Marital status: /Civil Union     Spouse name: None    Number of children: None    Years of education: None    Highest education level: None   Occupational History    None   Tobacco Use    Smoking status: Never Smoker    Smokeless tobacco: Never Used   Vaping Use    Vaping Use: Never used   Substance and Sexual Activity    Alcohol use: Not Currently    Drug use: Never    Sexual activity: Not Currently   Other Topics Concern    None   Social History Narrative    None     Social Determinants of Health     Financial Resource Strain: Not on file   Food Insecurity: Not on file   Transportation Needs: Not on file   Physical Activity: Not on file   Stress: Not on file   Social Connections: Not on file   Intimate Partner Violence: Not on file   Housing Stability: Not on file        FAMILY HISTORY:  Family History   Problem Relation Age of Onset    No Known Problems Mother     No Known Problems Father     Colon cancer Other 60          Objective    PHYSICAL EXAMINATION:   Blood pressure 110/80, pulse (!) 53, temperature (!) 97 1 °F (36 2 °C), temperature source Tympanic, resp  rate 16, height 5' 7" (1 702 m), weight 76 7 kg (169 lb), SpO2 96 %  Pain Score:   5      Physical Exam  Vitals reviewed  Constitutional:       General: He is not in acute distress  Appearance: Normal appearance  He is well-developed and normal weight  He is not ill-appearing, toxic-appearing or diaphoretic  HENT:      Head: Normocephalic and atraumatic  Mouth/Throat:      Mouth: Mucous membranes are moist    Eyes:      Conjunctiva/sclera: Conjunctivae normal    Cardiovascular:      Rate and Rhythm: Normal rate and regular rhythm  Heart sounds: No murmur heard    Pulmonary:      Effort: Pulmonary effort is normal  No respiratory distress  Breath sounds: Normal breath sounds  Abdominal:      Palpations: Abdomen is soft  Tenderness: There is no abdominal tenderness  Musculoskeletal:         General: No swelling or tenderness  Cervical back: Neck supple  No rigidity  Right lower leg: No edema  Left lower leg: No edema  Lymphadenopathy:      Cervical: No cervical adenopathy  Skin:     General: Skin is warm  Capillary Refill: Capillary refill takes less than 2 seconds  Comments: Surgical scars from prior skin surgeries present  Multiple seborrheic keratotic appearing lesions appreciated on the back, however few flat brown irregular lesions also noted on the back  Neurological:      General: No focal deficit present  Mental Status: He is alert and oriented to person, place, and time  Sensory: No sensory deficit  Gait: Gait abnormal (demonstrating difficulty with balance on initiation of gait)  Psychiatric:         Mood and Affect: Mood normal          Behavior: Behavior normal          Thought Content: Thought content normal          I reviewed lab data in the chart      WBC   Date Value Ref Range Status   08/01/2022 10 16 4 31 - 10 16 Thousand/uL Final   06/20/2022 10 89 (H) 4 31 - 10 16 Thousand/uL Final   05/10/2022 7 79 4 31 - 10 16 Thousand/uL Final     Hemoglobin   Date Value Ref Range Status   08/01/2022 13 9 12 0 - 17 0 g/dL Final   06/20/2022 13 5 12 0 - 17 0 g/dL Final   05/10/2022 14 0 12 0 - 17 0 g/dL Final     Platelets   Date Value Ref Range Status   08/01/2022 133 (L) 149 - 390 Thousands/uL Final   06/20/2022 137 (L) 149 - 390 Thousands/uL Final   05/10/2022 130 (L) 149 - 390 Thousands/uL Final     MCV   Date Value Ref Range Status   08/01/2022 90 82 - 98 fL Final   06/20/2022 91 82 - 98 fL Final   05/10/2022 88 82 - 98 fL Final      Potassium   Date Value Ref Range Status   08/01/2022 4 0 3 5 - 5 3 mmol/L Final   06/20/2022 3 9 3 5 - 5 3 mmol/L Final   05/10/2022 4 2 3 5 - 5 3 mmol/L Final     Chloride   Date Value Ref Range Status   08/01/2022 106 96 - 108 mmol/L Final   06/20/2022 106 100 - 108 mmol/L Final   05/10/2022 107 100 - 108 mmol/L Final     CO2   Date Value Ref Range Status   08/01/2022 29 21 - 32 mmol/L Final   06/20/2022 26 21 - 32 mmol/L Final   05/10/2022 28 21 - 32 mmol/L Final     BUN   Date Value Ref Range Status   08/01/2022 23 5 - 25 mg/dL Final   06/20/2022 24 5 - 25 mg/dL Final   05/10/2022 28 (H) 5 - 25 mg/dL Final     Creatinine   Date Value Ref Range Status   08/01/2022 1 36 (H) 0 60 - 1 30 mg/dL Final     Comment:     Standardized to IDMS reference method   06/20/2022 1 37 (H) 0 60 - 1 30 mg/dL Final     Comment:     Standardized to IDMS reference method   05/10/2022 1 34 (H) 0 60 - 1 30 mg/dL Final     Comment:     Standardized to IDMS reference method     Glucose   Date Value Ref Range Status   04/26/2022 192 (H) 65 - 140 mg/dL Final     Comment:     If the patient is fasting, the ADA then defines impaired fasting glucose as > 100 mg/dL and diabetes as > or equal to 123 mg/dL  Specimen collection should occur prior to Sulfasalazine administration due to the potential for falsely depressed results  Specimen collection should occur prior to Sulfapyridine administration due to the potential for falsely elevated results  03/28/2022 205 (H) 65 - 140 mg/dL Final     Comment:     If the patient is fasting, the ADA then defines impaired fasting glucose as > 100 mg/dL and diabetes as > or equal to 123 mg/dL  Specimen collection should occur prior to Sulfasalazine administration due to the potential for falsely depressed results  Specimen collection should occur prior to Sulfapyridine administration due to the potential for falsely elevated results  10/05/2021 193 (H) 65 - 140 mg/dL Final     Comment:     If the patient is fasting, the ADA then defines impaired fasting glucose as > 100 mg/dL and diabetes as > or equal to 123 mg/dL    Specimen collection should occur prior to Sulfasalazine administration due to the potential for falsely depressed results  Specimen collection should occur prior to Sulfapyridine administration due to the potential for falsely elevated results  Calcium   Date Value Ref Range Status   08/01/2022 9 0 8 3 - 10 1 mg/dL Final   06/20/2022 8 7 8 3 - 10 1 mg/dL Final   05/10/2022 9 2 8 3 - 10 1 mg/dL Final     Albumin   Date Value Ref Range Status   08/01/2022 3 7 3 5 - 5 0 g/dL Final   06/20/2022 3 6 3 5 - 5 0 g/dL Final   05/10/2022 4 0 3 5 - 5 0 g/dL Final     Total Bilirubin   Date Value Ref Range Status   08/01/2022 1 48 (H) 0 20 - 1 00 mg/dL Final     Comment:     Use of this assay is not recommended for patients undergoing treatment with eltrombopag due to the potential for falsely elevated results  06/20/2022 0 98 0 20 - 1 00 mg/dL Final     Comment:     Use of this assay is not recommended for patients undergoing treatment with eltrombopag due to the potential for falsely elevated results  05/10/2022 1 35 (H) 0 20 - 1 00 mg/dL Final     Comment:     Use of this assay is not recommended for patients undergoing treatment with eltrombopag due to the potential for falsely elevated results  Alkaline Phosphatase   Date Value Ref Range Status   08/01/2022 79 46 - 116 U/L Final   06/20/2022 89 46 - 116 U/L Final   05/10/2022 82 46 - 116 U/L Final     AST   Date Value Ref Range Status   08/01/2022 19 5 - 45 U/L Final     Comment:     Specimen collection should occur prior to Sulfasalazine administration due to the potential for falsely depressed results  06/20/2022 14 5 - 45 U/L Final     Comment:     Specimen collection should occur prior to Sulfasalazine administration due to the potential for falsely depressed results  05/10/2022 16 5 - 45 U/L Final     Comment:     Specimen collection should occur prior to Sulfasalazine administration due to the potential for falsely depressed results        ALT   Date Value Ref Range Status   08/01/2022 22 12 - 78 U/L Final     Comment:     Specimen collection should occur prior to Sulfasalazine and/or Sulfapyridine administration due to the potential for falsely depressed results  06/20/2022 20 12 - 78 U/L Final     Comment:     Specimen collection should occur prior to Sulfasalazine and/or Sulfapyridine administration due to the potential for falsely depressed results  05/10/2022 19 12 - 78 U/L Final     Comment:     Specimen collection should occur prior to Sulfasalazine and/or Sulfapyridine administration due to the potential for falsely depressed results  LD   Date Value Ref Range Status   08/01/2022 186 81 - 234 U/L Final   06/20/2022 165 81 - 234 U/L Final   05/10/2022 158 81 - 234 U/L Final     No results found for: TSH  No results found for: [id number]   Free T4   Date Value Ref Range Status   08/01/2022 0 81 0 76 - 1 46 ng/dL Final     Comment:     Specimen collection should occur prior to Sulfasalazine administration due to the potential for falsely elevated results  06/20/2022 0 82 0 76 - 1 46 ng/dL Final     Comment:     Specimen collection should occur prior to Sulfasalazine administration due to the potential for falsely elevated results  05/10/2022 1 00 0 76 - 1 46 ng/dL Final     Comment:     Specimen collection should occur prior to Sulfasalazine administration due to the potential for falsely elevated results  RECENT IMAGING:  NM pet ct tumor imaging whole body    Result Date: 6/15/2022  Narrative WHOLE-BODY PET/CT SCAN INDICATION: C43 39: Malignant melanoma of other parts of face   Malignant melanoma of the face  Status post wide excision and sentinel lymph node biopsy, restaging examination following chemotherapy MODIFIER: PS + COMPARISON: 11/12/2021 CELL TYPE:  Malignant melanoma, at least 1 7 mm thickness TECHNIQUE:   8 6 mCi F-18-FD administered IV   Multiplanar attenuation corrected and non attenuation corrected PET images were acquired 60 minutes post injection  Contiguous, low dose, axial CT sections were obtained from the skull vertex through the feet  Intravenous contrast material was not utilized  This examination, like all CT scans performed in the [facility], was performed utilizing techniques to minimize radiation dose exposure, including the use of iterative reconstruction  and automated exposure control  Fasting serum glucose: 68 mg/dl FINDINGS: VISUALIZED BRAIN:   No acute abnormalities are seen  HEAD/NECK:   There is no evidence of FDG avid cervical adenopathy  Mildly increased acoustical sensitivity is seen in the right external ear canal image 1200/41 SUV max 4 2  On prior study there was increased activity in the left preauricular area which is no longer seen  There was also activity in the left supraclavicular superficial soft tissue, no longer seen  CT images: Otherwise unremarkable CHEST:   No FDG avid soft tissue lesions are seen  CT images: On the prior study there was a left axillary subcutaneous nodule, no longer present  No pleural or pericardial effusion  Coronary artery calcification and hiatal hernia noted  ABDOMEN:   No FDG avid soft tissue lesions are seen  CT images: As on the prior study, there is intense bowel activity  There is no abnormal finding on CT suggesting it is physiologic  There is no hydronephrosis or ascites  Status post cholecystectomy  Stable nonglucose avid right renal exophytic nodules  PELVIS: No FDG avid soft tissue lesions are seen  CT images: No pelvic ascites  OSSEOUS STRUCTURES/EXTREMITIES: As on prior study, increased activity, left 1st metatarsal head, prior SUV max 10 0 currently 5 4  Otherwise no suspicious hypermetabolic osseous lesions or foci of abnormal activity in the extremities     Impression 1  Interval resolution of previous abnormal activity in the left preauricular and supraclavicular regions    Currently, increased activity in the right external ear canal is nonspecific  Correlate with direct visualization 2  No evidence of FDG avid cervical adenopathy 3  No evidence of glucose avid distant metastatic disease in the chest, abdomen, or pelvis  4  Decreasing activity associated with the left 1st metatarsal head to represent prior trauma or arthritis  Otherwise no focal areas of abnormal osseous activity  Workstation performed: [id number]     Assessment/Plan:        1  Malignant melanoma of forehead [facility])    Mr Felipe Dexter is an 79year old male with stage IIIA melanoma (pT2a, pN1a, M0), initially diagnosed in September 2021, currently on immunotherapy with Keytruda  Current physical exam, most recent imaging (PET scan from June) and bloodwork does not show any evidence of metastasis or recurrence  Patient to continue  [place] for a total of 9 cycles to complete total 1 year of immunotherapy  US of head/neck ordered for surveillance as he had a positive LN (preauricular) identified on sentinel LN biopsy  Continue routine follow-up every 6 weeks in office for physical exams prior to immunotherapy infusions  PET scan in 6 months from prior one to evaluate response to immunotherapy  Patient to continue routine dermatology appointments (Dr Kanwal Richardson is his dermatologist)  Patient was counseled on the importance of practicing sun-protective measures when going outside       Return in about 6 weeks (around 9/19/2022)

## 2022-08-08 NOTE — PROGRESS NOTES
Patient to infusion for Keytruda  He offers no complaints  VSS, IV placed without issue  Labs reviewed from 8/1/22 and meet parameters for treatment today  Call bell within reach

## 2022-08-08 NOTE — LETTER
August 9, 2022     Lynwood Severin, MD  [address] [place] [address]    Patient: Tirso Rivas   YOB: 1940   Date of Visit: 8/8/2022       Dear Dr Bennett Quick:    Thank you for referring Tirso Rivas to me for evaluation  Below are my notes for this consultation  If you have questions, please do not hesitate to call me  I look forward to following your patient along with you  Sincerely,        Judith Hamilton MD        CC: MD Toni Petit MD Lupie Davis, MD Texie Payment, DO  8/9/2022  6:42 PM  Attested  [facility] [address]  [phone]  [phone]     Date of Visit: 8/8/2022  Name: Tirso Rivas   YOB: 1940     Subjective:     Mr Carlos Christianson is an 79 Y M with history of malignant melanoma of the forehead, initially diagnosed with stage IIIa in September 2021, currently on immunotherapy with Keytruda, with 4th cycle scheduled for today  Patient has been tolerating immunotherapy with no major side effects  ROS was unremarkable, patient did report occasional R  Elbow pain, moreso prominent at rest  Denied any other joint pain  He has occasional balancing difficulty when he starts walking  Has a cane but he hasn't been using it  He is independent of all ADLs; he helps take care of his wife at home who is wheelchair bound due to ataxia  VISIT DIAGNOSIS: Malignant melanoma of the head      Oncology History   Malignant melanoma of skin of face ([facility] [address] )   9/8/2021 -  Cancer Staged    Staging form: Melanoma of the Skin, AJCC 8th Edition  - Clinical stage from 9/8/2021: Stage III (cT2a, cN1a, cM0) - Signed by Judith Hamilton MD on 11/7/2021 9/8/2021 -  Cancer Staged    Staging form: Melanoma of the Skin, AJCC 8th Edition  - Pathologic stage from 9/8/2021: Stage IIIA (pT2a, pN1a, cM0) - Signed by Judith Hamilton MD on 11/7/2021 9/22/2021 Initial Diagnosis    Malignant melanoma of skin of face ([facility] [address] )     4/4/2022 -  Chemotherapy    pembrolizumab (KEYTRUDA) IVPB, 400 mg, Intravenous, Once, 3 of 9 cycles  Administration: 400 mg (4/4/2022), 400 mg (5/16/2022), 400 mg (6/27/2022)     Malignant melanoma of forehead ([facility] [address] )   11/29/2021 Initial Diagnosis    Malignant melanoma of forehead ([facility] [address] )     4/4/2022 -  Chemotherapy    pembrolizumab (KEYTRUDA) IVPB, 400 mg, Intravenous, Once, 3 of 9 cycles  Administration: 400 mg (4/4/2022), 400 mg (5/16/2022), 400 mg (6/27/2022)        Cancer Staging  Malignant melanoma of skin of face [facility])  Staging form: Melanoma of the Skin, AJCC 8th Edition  - Clinical stage from 9/8/2021: Stage III (cT2a, cN1a, cM0) - Signed by Dinora Quinones MD on 11/7/2021  - Pathologic stage from 9/8/2021: Stage IIIA (pT2a, pN1a, cM0) - Signed by Dinora Quinones MD on 11/7/2021     Treatment Details   Treatment goal Curative   Plan Name OP Pembrolizumab Q 42 Days   Status Active   Start Date 4/4/2022   End Date 3/6/2023 (Planned)   Provider Dinora Quinones MD   Chemotherapy pembrolizumab [facility]) IVPB, 400 mg, Intravenous, Once, 3 of 9 cycles  Administration: 400 mg (4/4/2022), 400 mg (5/16/2022), 400 mg (6/27/2022)        HISTORY OF PRESENT ILLNESS:    Toma Frazier is a 79 y o  male  who has stage IIIA melanoma, diagnosed in September 2021, currently on immunotherapy with Keytruda q6 weeks, here for follow-up and surveillance  Skin biopsy from September 2021 came back positive for melanoma with at least 1 7mm invasive melanoma extending into deep specimen margin with 3 mitoses per mm2  There was no ulceration or lymphovascular invasion seen but margins were present for invasive melanoma  Subsequently, patient had a WLE on 10/12/21 which showed residual invasive melanoma (lentigo maligna, 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, 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) with free margins this time   1 sentinel LN biopsied of the pre-auricular area, which showed rare melanoma cells consistent with sub-micromets (<0 1mm)  Because of LN involvement, patient was staged as stage IIIA melanoma (pT2a, pN1a, M0)  He is due for his 4th cycle of Keytruda today       He is doing well overall  Reported occasional episodes of R  Elbow arthritic pain  Also reported noticing a lump on the back of the head behind the ear but physical exam did not reveal any palpable bumps or LAD       INTERIM HISTORY:   Currently on immunotherapy with Keytruda (4th cycle scheduled for today)   Most recent PET scan from 6/15/22 reviewed, showing interval resolution of interval resolution of previous abnormal activity in the left preauricular and supraclavicular regions  There was increased activity in the right external ear canal, nonspecific  REVIEW OF SYSTEMS:  Review of Systems   Constitutional: Negative for appetite change, chills, fatigue, fever and unexpected weight change  HENT:   Negative for hearing loss, mouth sores, nosebleeds and sore throat  Eyes: Negative for eye problems and icterus  Respiratory: Negative for chest tightness, cough, shortness of breath and wheezing  Cardiovascular: Negative for chest pain and palpitations  Gastrointestinal: Negative for abdominal distention, abdominal pain, blood in stool, constipation, diarrhea, nausea and vomiting  Endocrine: Negative for hot flashes  Genitourinary: Negative for difficulty urinating, dyspareunia, dysuria, frequency and hematuria  Musculoskeletal: Positive for gait problem  Negative for back pain, flank pain, myalgias and neck pain  Skin: Negative for itching and rash  Neurological: Positive for gait problem  Negative for dizziness, headaches (balancing difficulty, doesn't use his cane), light-headedness and speech difficulty  Hematological: Does not bruise/bleed easily  Psychiatric/Behavioral: Negative for confusion  The patient is not nervous/anxious           MEDICATIONS:    Current Outpatient Medications:     atenolol (TENORMIN) 100 mg tablet, Take 1 tablet (100 mg total) by mouth daily, Disp: 90 tablet, Rfl: 3    glimepiride (AMARYL) 4 mg tablet, Take 1 tablet (4 mg total) by mouth daily, Disp: 90 tablet, Rfl: 3    lisinopril-hydrochlorothiazide (PRINZIDE,ZESTORETIC) 20-25 MG per tablet, Take 1 tablet by mouth daily, Disp: 90 tablet, Rfl: 3    lovastatin (MEVACOR) 40 MG tablet, Take 1 tablet (40 mg total) by mouth daily, Disp: 90 tablet, Rfl: 3    metFORMIN (GLUCOPHAGE) 1000 MG tablet, Take 1 tablet (1,000 mg total) by mouth 2 (two) times a day with meals, Disp: 180 tablet, Rfl: 3     ALLERGIES:  No Known Allergies     ACTIVE PROBLEMS:  Patient Active Problem List   Diagnosis    Essential hypertension    Hiatal hernia with GERD    Status post laparoscopic cholecystectomy    Status post umbilical hernia repair, follow-up exam    Type 2 diabetes mellitus without complication, without long-term current use of insulin (HCC)    Mixed hyperlipidemia    Parkinson's disease (HCC)    Microalbuminuria    Hyperbilirubinemia    Malignant melanoma of skin of face (HCC)    Thrombocytopenia (HCC)    Malignant melanoma of forehead ([address] )    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)    Stage 3 chronic kidney disease, unspecified whether stage 3a or 3b CKD ([address] )          PAST MEDICAL HISTORY:   Past Medical History:   Diagnosis Date    Diabetes mellitus ([address] )     Hyperlipidemia     Hypertension         PAST SURGICAL HISTORY:  Past Surgical History:   Procedure Laterality Date    FLAP LOCAL HEAD / NECK Left 10/12/2021    Procedure: RECONSTRUCTION OF LEFT TEMPLE DEFECT AFTER RESCECTION MELANOMA;  Surgeon: Karley Rogers MD;  [facility];  Service: Plastics    FULL THICKNESS SKIN GRAFT Left 10/12/2021    Procedure: SKIN GRAFT FULL THICKNESS LEFT TEMPLE;  Surgeon: Karley Rogers MD;  [facility];  Service: Plastics    GALLBLADDER SURGERY      HAND SURGERY Left  LYMPH NODE BIOPSY Left 10/12/2021    Procedure: BIOPSY LYMPH NODE SENTINEL, LYMPHOSCINTIGRAPHY, INTRAOPERATIVE LYMPHATIC MAPPING (INJECT AT 1200); Surgeon: Fidel Pena MD;  [facility];  Service: Surgical Oncology    NOSE SURGERY      SKIN CANCER EXCISION  10/2021    SKIN LESION EXCISION Left 10/12/2021    Procedure: FACE MELANOMA SCAR WIDE EXCISION  FACE;  Surgeon: Fidel Pena MD;  [facility];  Service: Surgical Oncology    SPLIT THICKNESS SKIN GRAFT Left 10/12/2021    Procedure: SKIN GRAFT SPLIT THICKNESS LEFT TEMPLE;  Surgeon: Kalee Sequeira MD;  [facility];  Service: Plastics    US GUIDED LYMPH NODE BIOPSY LEFT  3/21/2022        SOCIAL HISTORY:  Social History     Socioeconomic History    Marital status: /Civil Union     Spouse name: None    Number of children: None    Years of education: None    Highest education level: None   Occupational History    None   Tobacco Use    Smoking status: Never Smoker    Smokeless tobacco: Never Used   Vaping Use    Vaping Use: Never used   Substance and Sexual Activity    Alcohol use: Not Currently    Drug use: Never    Sexual activity: Not Currently   Other Topics Concern    None   Social History Narrative    None     Social Determinants of Health     Financial Resource Strain: Not on file   Food Insecurity: Not on file   Transportation Needs: Not on file   Physical Activity: Not on file   Stress: Not on file   Social Connections: Not on file   Intimate Partner Violence: Not on file   Housing Stability: Not on file        FAMILY HISTORY:  Family History   Problem Relation Age of Onset    No Known Problems Mother     No Known Problems Father     Colon cancer Other 60          Objective    PHYSICAL EXAMINATION:   Blood pressure 110/80, pulse (!) 53, temperature (!) 97 1 °F (36 2 °C), temperature source Tympanic, resp  rate 16, height 5' 7" (1 702 m), weight 76 7 kg (169 lb), SpO2 96 %       Pain Score:   5 Physical Exam  Vitals reviewed  Constitutional:       General: He is not in acute distress  Appearance: Normal appearance  He is well-developed and normal weight  He is not ill-appearing, toxic-appearing or diaphoretic  HENT:      Head: Normocephalic and atraumatic  Mouth/Throat:      Mouth: Mucous membranes are moist    Eyes:      Conjunctiva/sclera: Conjunctivae normal    Cardiovascular:      Rate and Rhythm: Normal rate and regular rhythm  Heart sounds: No murmur heard  Pulmonary:      Effort: Pulmonary effort is normal  No respiratory distress  Breath sounds: Normal breath sounds  Abdominal:      Palpations: Abdomen is soft  Tenderness: There is no abdominal tenderness  Musculoskeletal:         General: No swelling or tenderness  Cervical back: Neck supple  No rigidity  Right lower leg: No edema  Left lower leg: No edema  Lymphadenopathy:      Cervical: No cervical adenopathy  Skin:     General: Skin is warm  Capillary Refill: Capillary refill takes less than 2 seconds  Comments: Surgical scars from prior skin surgeries present  Multiple seborrheic keratotic appearing lesions appreciated on the back, however few flat brown irregular lesions also noted on the back  Neurological:      General: No focal deficit present  Mental Status: He is alert and oriented to person, place, and time  Sensory: No sensory deficit  Gait: Gait abnormal (demonstrating difficulty with balance on initiation of gait)  Psychiatric:         Mood and Affect: Mood normal          Behavior: Behavior normal          Thought Content: Thought content normal          I reviewed lab data in the chart      WBC   Date Value Ref Range Status   08/01/2022 10 16 4 31 - 10 16 Thousand/uL Final   06/20/2022 10 89 (H) 4 31 - 10 16 Thousand/uL Final   05/10/2022 7 79 4 31 - 10 16 Thousand/uL Final     Hemoglobin   Date Value Ref Range Status   08/01/2022 13 9 12 0 - 17 0 g/dL Final   06/20/2022 13 5 12 0 - 17 0 g/dL Final   05/10/2022 14 0 12 0 - 17 0 g/dL Final     Platelets   Date Value Ref Range Status   08/01/2022 133 (L) 149 - 390 Thousands/uL Final   06/20/2022 137 (L) 149 - 390 Thousands/uL Final   05/10/2022 130 (L) 149 - 390 Thousands/uL Final     MCV   Date Value Ref Range Status   08/01/2022 90 82 - 98 fL Final   06/20/2022 91 82 - 98 fL Final   05/10/2022 88 82 - 98 fL Final      Potassium   Date Value Ref Range Status   08/01/2022 4 0 3 5 - 5 3 mmol/L Final   06/20/2022 3 9 3 5 - 5 3 mmol/L Final   05/10/2022 4 2 3 5 - 5 3 mmol/L Final     Chloride   Date Value Ref Range Status   08/01/2022 106 96 - 108 mmol/L Final   06/20/2022 106 100 - 108 mmol/L Final   05/10/2022 107 100 - 108 mmol/L Final     CO2   Date Value Ref Range Status   08/01/2022 29 21 - 32 mmol/L Final   06/20/2022 26 21 - 32 mmol/L Final   05/10/2022 28 21 - 32 mmol/L Final     BUN   Date Value Ref Range Status   08/01/2022 23 5 - 25 mg/dL Final   06/20/2022 24 5 - 25 mg/dL Final   05/10/2022 28 (H) 5 - 25 mg/dL Final     Creatinine   Date Value Ref Range Status   08/01/2022 1 36 (H) 0 60 - 1 30 mg/dL Final     Comment:     Standardized to IDMS reference method   06/20/2022 1 37 (H) 0 60 - 1 30 mg/dL Final     Comment:     Standardized to IDMS reference method   05/10/2022 1 34 (H) 0 60 - 1 30 mg/dL Final     Comment:     Standardized to IDMS reference method     Glucose   Date Value Ref Range Status   04/26/2022 192 (H) 65 - 140 mg/dL Final     Comment:     If the patient is fasting, the ADA then defines impaired fasting glucose as > 100 mg/dL and diabetes as > or equal to 123 mg/dL  Specimen collection should occur prior to Sulfasalazine administration due to the potential for falsely depressed results  Specimen collection should occur prior to Sulfapyridine administration due to the potential for falsely elevated results     03/28/2022 205 (H) 65 - 140 mg/dL Final     Comment:     If the patient is fasting, the ADA then defines impaired fasting glucose as > 100 mg/dL and diabetes as > or equal to 123 mg/dL  Specimen collection should occur prior to Sulfasalazine administration due to the potential for falsely depressed results  Specimen collection should occur prior to Sulfapyridine administration due to the potential for falsely elevated results  10/05/2021 193 (H) 65 - 140 mg/dL Final     Comment:     If the patient is fasting, the ADA then defines impaired fasting glucose as > 100 mg/dL and diabetes as > or equal to 123 mg/dL  Specimen collection should occur prior to Sulfasalazine administration due to the potential for falsely depressed results  Specimen collection should occur prior to Sulfapyridine administration due to the potential for falsely elevated results  Calcium   Date Value Ref Range Status   08/01/2022 9 0 8 3 - 10 1 mg/dL Final   06/20/2022 8 7 8 3 - 10 1 mg/dL Final   05/10/2022 9 2 8 3 - 10 1 mg/dL Final     Albumin   Date Value Ref Range Status   08/01/2022 3 7 3 5 - 5 0 g/dL Final   06/20/2022 3 6 3 5 - 5 0 g/dL Final   05/10/2022 4 0 3 5 - 5 0 g/dL Final     Total Bilirubin   Date Value Ref Range Status   08/01/2022 1 48 (H) 0 20 - 1 00 mg/dL Final     Comment:     Use of this assay is not recommended for patients undergoing treatment with eltrombopag due to the potential for falsely elevated results  06/20/2022 0 98 0 20 - 1 00 mg/dL Final     Comment:     Use of this assay is not recommended for patients undergoing treatment with eltrombopag due to the potential for falsely elevated results  05/10/2022 1 35 (H) 0 20 - 1 00 mg/dL Final     Comment:     Use of this assay is not recommended for patients undergoing treatment with eltrombopag due to the potential for falsely elevated results       Alkaline Phosphatase   Date Value Ref Range Status   08/01/2022 79 46 - 116 U/L Final   06/20/2022 89 46 - 116 U/L Final   05/10/2022 82 46 - 116 U/L Final     AST   Date Value Ref Range Status   08/01/2022 19 5 - 45 U/L Final     Comment:     Specimen collection should occur prior to Sulfasalazine administration due to the potential for falsely depressed results  06/20/2022 14 5 - 45 U/L Final     Comment:     Specimen collection should occur prior to Sulfasalazine administration due to the potential for falsely depressed results  05/10/2022 16 5 - 45 U/L Final     Comment:     Specimen collection should occur prior to Sulfasalazine administration due to the potential for falsely depressed results  ALT   Date Value Ref Range Status   08/01/2022 22 12 - 78 U/L Final     Comment:     Specimen collection should occur prior to Sulfasalazine and/or Sulfapyridine administration due to the potential for falsely depressed results  06/20/2022 20 12 - 78 U/L Final     Comment:     Specimen collection should occur prior to Sulfasalazine and/or Sulfapyridine administration due to the potential for falsely depressed results  05/10/2022 19 12 - 78 U/L Final     Comment:     Specimen collection should occur prior to Sulfasalazine and/or Sulfapyridine administration due to the potential for falsely depressed results  LD   Date Value Ref Range Status   08/01/2022 186 81 - 234 U/L Final   06/20/2022 165 81 - 234 U/L Final   05/10/2022 158 81 - 234 U/L Final     No results found for: TSH  No results found for: U0NLFOS   Free T4   Date Value Ref Range Status   08/01/2022 0 81 0 76 - 1 46 ng/dL Final     Comment:     Specimen collection should occur prior to Sulfasalazine administration due to the potential for falsely elevated results  06/20/2022 0 82 0 76 - 1 46 ng/dL Final     Comment:     Specimen collection should occur prior to Sulfasalazine administration due to the potential for falsely elevated results     05/10/2022 1 00 0 76 - 1 46 ng/dL Final     Comment:     Specimen collection should occur prior to Sulfasalazine administration due to the potential for falsely elevated results  RECENT IMAGING:  NM pet ct tumor imaging whole body    Result Date: 6/15/2022  [facility] WHOLE-BODY PET/CT SCAN INDICATION: C43 39: Malignant melanoma of other parts of face   Malignant melanoma of the face  Status post wide excision and sentinel lymph node biopsy, restaging examination following chemotherapy MODIFIER: PS + COMPARISON: 11/12/2021 CELL TYPE:  Malignant melanoma, at least 1 7 mm thickness TECHNIQUE:   8 6 mCi F-18-FD administered IV  Multiplanar attenuation corrected and non attenuation corrected PET images were acquired 60 minutes post injection  Contiguous, low dose, axial CT sections were obtained from the skull vertex through the feet  Intravenous contrast material was not utilized  This examination, like all CT scans performed in the [facility], was performed utilizing techniques to minimize radiation dose exposure, including the use of iterative reconstruction  and automated exposure control  Fasting serum glucose: 68 mg/dl FINDINGS: VISUALIZED BRAIN:   No acute abnormalities are seen  HEAD/NECK:   There is no evidence of FDG avid cervical adenopathy  Mildly increased acoustical sensitivity is seen in the right external ear canal image 1200/41 SUV max 4 2  On prior study there was increased activity in the left preauricular area which is no longer seen  There was also activity in the left supraclavicular superficial soft tissue, no longer seen  CT images: Otherwise unremarkable CHEST:   No FDG avid soft tissue lesions are seen  CT images: On the prior study there was a left axillary subcutaneous nodule, no longer present  No pleural or pericardial effusion  Coronary artery calcification and hiatal hernia noted  ABDOMEN:   No FDG avid soft tissue lesions are seen  CT images: As on the prior study, there is intense bowel activity  There is no abnormal finding on CT suggesting it is physiologic  There is no hydronephrosis or ascites    Status post cholecystectomy  Stable nonglucose avid right renal exophytic nodules  PELVIS: No FDG avid soft tissue lesions are seen  CT images: No pelvic ascites  OSSEOUS STRUCTURES/EXTREMITIES: As on prior study, increased activity, left 1st metatarsal head, prior SUV max 10 0 currently 5 4  Otherwise no suspicious hypermetabolic osseous lesions or foci of abnormal activity in the extremities     Impression 1  Interval resolution of previous abnormal activity in the left preauricular and supraclavicular regions  Currently, increased activity in the right external ear canal is nonspecific  Correlate with direct visualization 2  No evidence of FDG avid cervical adenopathy 3  No evidence of glucose avid distant metastatic disease in the chest, abdomen, or pelvis  4  Decreasing activity associated with the left 1st metatarsal head to represent prior trauma or arthritis  Otherwise no focal areas of abnormal osseous activity  Workstation performed: [id number]     Assessment/Plan:        1  Malignant melanoma of forehead [facility])    Mr Ene Wolff is an 79year old male with stage IIIA melanoma (pT2a, pN1a, M0), initially diagnosed in September 2021, currently on immunotherapy with Keytruda  Current physical exam, most recent imaging (PET scan from June) and bloodwork does not show any evidence of metastasis or recurrence  Patient to continue Kayleigh Clay for a total of 9 cycles to complete total 1 year of immunotherapy  US of head/neck ordered for surveillance as he had a positive LN (preauricular) identified on sentinel LN biopsy  Continue routine follow-up every 6 weeks in office for physical exams prior to immunotherapy infusions  PET scan in 6 months from prior one to evaluate response to immunotherapy  Patient to continue routine dermatology appointments (Dr Sammie Fontaine is his dermatologist)   Patient was counseled on the importance of practicing sun-protective measures when going outside       Return in about 6 weeks (around 9/19/2022)  Attestation signed by Itzel Mtz MD at 8/9/2022  6:42 PM:  I interviewed, took the history and examined the patient 0n 8/8/2022  I discussed the case with the Fellow and reviewed the Fellow's note , prescribed medications, and orders placed  I supervised the Fellow and I agree with the [facility] management plan as it was presented to me  I was present in the clinic and examined the patient  Mr Susana Maher is a 79 yr male with stage IIIA melanoma on treatment with adjuvant pembrolizumab here for follow-up and treatment  He is doing well with no major complaints  Every once in a while has pain in his right elbow no other arthralgias  Denies headaches, double vision, rash, itching, and diarrhea  Physical exam is unremarkable with no concerning cutaneous lesions  No lymphadenopathy  He has no clinical evidence of recurrence or metastasis  Labs reviewed and okay to treat  He will receive pembrolizumab today and continue for total of 9 doses/1 year of treatment  He will return in 6 weeks for his next treatment  He has standing orders for blood work  He knows to call with any issues or concerns prior to his next visit  He is due for ultrasounds of his lymph node basin at this time  Orders placed in scripts provided  1  Malignant melanoma of forehead ([address] )  2  Malignant melanoma of skin of face ([address] )  - Infusion Calculated Appointment Request; Future  - Infusion Calculated Appointment Request; Future  - Infusion Calculated Appointment Request; Future  - US head neck soft tissue; Future        3  High risk medication use  He is on treatment with immunotherapy and we will continue to monitor for side effects and lab abnormalities  We will monitor labs with each treatment to ensure safety of continuing on treatment  He knows to watch for signs and symptoms for immune mediated side effects  Denies any immune mediated side effects at this time         Fracisco Moreno MD, PhD

## 2022-09-12 RX ORDER — SODIUM CHLORIDE 9 MG/ML
20 INJECTION, SOLUTION INTRAVENOUS ONCE
Status: CANCELLED | OUTPATIENT
Start: 2022-09-19

## 2022-09-13 ENCOUNTER — HOSPITAL ENCOUNTER (OUTPATIENT)
Dept: RADIOLOGY | Facility: IMAGING CENTER | Age: 82
Discharge: HOME | End: 2022-09-13
Payer: COMMERCIAL

## 2022-09-13 ENCOUNTER — APPOINTMENT (OUTPATIENT)
Dept: LAB | Facility: IMAGING CENTER | Age: 82
End: 2022-09-13
Payer: COMMERCIAL

## 2022-09-13 DIAGNOSIS — C43.39 MALIGNANT MELANOMA OF FOREHEAD (HCC): ICD-10-CM

## 2022-09-13 PROCEDURE — 76536 US EXAM OF HEAD AND NECK: CPT

## 2022-09-19 ENCOUNTER — HOSPITAL ENCOUNTER (OUTPATIENT)
Dept: INFUSION CENTER | Facility: CLINIC | Age: 82
Discharge: HOME | End: 2022-09-19
Payer: COMMERCIAL

## 2022-09-19 ENCOUNTER — OFFICE VISIT (OUTPATIENT)
Dept: HEMATOLOGY ONCOLOGY | Facility: CLINIC | Age: 82
End: 2022-09-19
Payer: COMMERCIAL

## 2022-09-19 ENCOUNTER — PRE-OP CATARACT MEASUREMENTS (OUTPATIENT)
Dept: URBAN - METROPOLITAN AREA CLINIC 6 | Facility: CLINIC | Age: 82
End: 2022-09-19

## 2022-09-19 VITALS
BODY MASS INDEX: 26.68 KG/M2 | HEART RATE: 65 BPM | DIASTOLIC BLOOD PRESSURE: 80 MMHG | WEIGHT: 170 LBS | RESPIRATION RATE: 17 BRPM | TEMPERATURE: 97 F | OXYGEN SATURATION: 96 % | HEIGHT: 67 IN | SYSTOLIC BLOOD PRESSURE: 152 MMHG

## 2022-09-19 VITALS
RESPIRATION RATE: 18 BRPM | TEMPERATURE: 97.6 F | HEART RATE: 54 BPM | SYSTOLIC BLOOD PRESSURE: 178 MMHG | DIASTOLIC BLOOD PRESSURE: 81 MMHG | OXYGEN SATURATION: 98 %

## 2022-09-19 DIAGNOSIS — Z79.899 HIGH RISK MEDICATION USE: ICD-10-CM

## 2022-09-19 DIAGNOSIS — H02.885: ICD-10-CM

## 2022-09-19 DIAGNOSIS — E11.9: ICD-10-CM

## 2022-09-19 DIAGNOSIS — H02.882: ICD-10-CM

## 2022-09-19 DIAGNOSIS — C43.30 MALIGNANT MELANOMA OF SKIN OF FACE (HCC): Primary | ICD-10-CM

## 2022-09-19 DIAGNOSIS — H25.813: ICD-10-CM

## 2022-09-19 DIAGNOSIS — H35.413: ICD-10-CM

## 2022-09-19 DIAGNOSIS — C43.39 MALIGNANT MELANOMA OF FOREHEAD (HCC): ICD-10-CM

## 2022-09-19 DIAGNOSIS — C77.3 SECONDARY AND UNSPECIFIED MALIGNANT NEOPLASM OF AXILLA AND UPPER LIMB LYMPH NODES (HCC): ICD-10-CM

## 2022-09-19 PROCEDURE — 96413 CHEMO IV INFUSION 1 HR: CPT

## 2022-09-19 PROCEDURE — 92136 OPHTHALMIC BIOMETRY: CPT

## 2022-09-19 PROCEDURE — 99214 OFFICE O/P EST MOD 30 MIN: CPT | Performed by: INTERNAL MEDICINE

## 2022-09-19 PROCEDURE — 1160F RVW MEDS BY RX/DR IN RCRD: CPT | Performed by: INTERNAL MEDICINE

## 2022-09-19 PROCEDURE — 92012 INTRM OPH EXAM EST PATIENT: CPT

## 2022-09-19 RX ORDER — SODIUM CHLORIDE 9 MG/ML
20 INJECTION, SOLUTION INTRAVENOUS ONCE
Status: COMPLETED | OUTPATIENT
Start: 2022-09-19 | End: 2022-09-19

## 2022-09-19 RX ADMIN — SODIUM CHLORIDE 400 MG: 9 INJECTION, SOLUTION INTRAVENOUS at 14:26

## 2022-09-19 RX ADMIN — SODIUM CHLORIDE 20 ML/HR: 9 INJECTION, SOLUTION INTRAVENOUS at 14:07

## 2022-09-19 ASSESSMENT — KERATOMETRY
OS_AXISANGLE_DEGREES: 160
OS_AXISANGLE2_DEGREES: 70
OD_AXISANGLE2_DEGREES: 76
OD_AXISANGLE_DEGREES: 166
OS_K1POWER_DIOPTERS: 46.00
OS_K2POWER_DIOPTERS: 46.25
OD_K1POWER_DIOPTERS: 45.75
OD_K2POWER_DIOPTERS: 46.50

## 2022-09-19 ASSESSMENT — VISUAL ACUITY
OS_GLARE: 20/60
OD_GLARE: 20/80
OS_CC: 20/40-2
OD_CC: 20/50-1

## 2022-09-19 ASSESSMENT — TONOMETRY
OS_IOP_MMHG: 14
OD_IOP_MMHG: 13

## 2022-09-19 NOTE — LETTER
September 19, 2022     Alex Sorenson MD  [address] [place] [address]    Patient: Traci Dodge   YOB: 1940   Date of Visit: 9/19/2022       Dear Dr John Leonard:    Thank you for referring Traci Dodge to me for evaluation  Below are my notes for this consultation  If you have questions, please do not hesitate to call me  I look forward to following your patient along with you           Sincerely,        Jacqui Lewis MD        CC: MD Jeanie Hernandez MD Lorn Lux, MD Lenore Shivers, MD  9/19/2022  2:21 PM  Sign when Signing Visit  [address] [place] [address] 58  [phone]     Date of Visit: 9/19/2022  Name: Traci Dodge   YOB: 1940        Subjective    VISIT DIAGNOSIS:  Diagnoses and all orders for this visit:    Malignant melanoma of skin of face ([facility] [address] )    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[address] )    High risk medication use        Oncology History   Malignant melanoma of skin of face ([facility] [address] )   9/8/2021 -  Cancer Staged    Staging form: Melanoma of the Skin, AJCC 8th Edition  - Clinical stage from 9/8/2021: Stage III (cT2a, cN1a, cM0) - Signed by Jacqui Lewis MD on 11/7/2021 9/8/2021 -  Cancer Staged    Staging form: Melanoma of the Skin, AJCC 8th Edition  - Pathologic stage from 9/8/2021: Stage IIIA (pT2a, pN1a, cM0) - Signed by Jacqui Lewis MD on 11/7/2021 9/22/2021 Initial Diagnosis    Malignant melanoma of skin of face ([facility] [address] )     4/4/2022 -  Chemotherapy    pembrolizumab (KEYTRUDA) IVPB, 400 mg, Intravenous, Once, 5 of 9 cycles  Administration: 400 mg (4/4/2022), 400 mg (5/16/2022), 400 mg (6/27/2022), 400 mg (8/8/2022)     Malignant melanoma of forehead ([facility] [address] )   11/29/2021 Initial Diagnosis    Malignant melanoma of forehead ([facility] )     4/4/2022 -  Chemotherapy    pembrolizumab (KEYTRUDA) IVPB, 400 mg, Intravenous, Once, 5 of 9 cycles  Administration: 400 mg (4/4/2022), 400 mg (5/16/2022), 400 mg (6/27/2022), 400 mg (8/8/2022)        Cancer Staging  Malignant melanoma of skin of face [facility])  Staging form: Melanoma of the Skin, AJCC 8th Edition  - Clinical stage from 9/8/2021: Stage III (cT2a, cN1a, cM0) - Signed by Alexis Martinez MD on 11/7/2021  - Pathologic stage from 9/8/2021: Stage IIIA (pT2a, pN1a, cM0) - Signed by Alexis Martinez MD on 11/7/2021     Treatment Details   Treatment goal Curative   Plan Name OP Pembrolizumab Q 42 Days   Status Active   Start Date 4/4/2022   End Date 3/6/2023 (Planned)   Provider Alexis Martinez MD   Chemotherapy pembrolizumab [facility]) IVPB, 400 mg, Intravenous, Once, 5 of 9 cycles  Administration: 400 mg (4/4/2022), 400 mg (5/16/2022), 400 mg (6/27/2022), 400 mg (8/8/2022)          HISTORY OF PRESENT ILLNESS: Eric Bucio is a 79 y o  male  who has Stage IIIA melanoma with questionable recurrence with indeterminate biopsy on adjuvant treatment with pembrolizumab here for follow up and treatment  He is doing okay and feels well  Denies any new, changing, concerning skin lesions  Denies lymphadenopathy  He does state that he is having some increased dyspnea on exertion, mainly walking longer distances like today walking from the parking lot to the front lobby  Otherwise has not really noticed any change  Denies any chest pain  Denies headaches, double vision, rash, itching, and diarrhea  We discussed results from his recent head and neck ultrasound which demonstrate stable irregular elongated structures in the left periauricular soft tissue which was the side that was previously biopsied with cellular atypia  He did have previous PET scan 3 months ago that demonstrated decreased FDG activity in this region  - consistent with resolution of underlying etiology disease sources information          REVIEW OF SYSTEMS:  Review of Systems Constitutional: Negative for appetite change, fatigue, fever and unexpected weight change  HENT:   Negative for lump/mass  Eyes: Negative for icterus  Respiratory: Positive for shortness of breath ([place])  Negative for cough and wheezing  Cardiovascular: Negative for leg swelling  Gastrointestinal: Negative for abdominal pain, constipation, diarrhea, nausea and vomiting  Genitourinary: Negative for difficulty urinating and hematuria  Musculoskeletal: Negative for arthralgias, gait problem and myalgias  Skin: Negative for itching and rash  No new, changing, or concerning lesions  Neurological: Negative for extremity weakness, gait problem, headaches, light-headedness and numbness  Hematological: Negative for adenopathy  MEDICATIONS:    Current Outpatient Medications:     atenolol (TENORMIN) 100 mg tablet, Take 1 tablet (100 mg total) by mouth daily, Disp: 90 tablet, Rfl: 3    glimepiride (AMARYL) 4 mg tablet, Take 1 tablet (4 mg total) by mouth daily, Disp: 90 tablet, Rfl: 3    lisinopril-hydrochlorothiazide (PRINZIDE,ZESTORETIC) 20-25 MG per tablet, Take 1 tablet by mouth daily, Disp: 90 tablet, Rfl: 3    lovastatin (MEVACOR) 40 MG tablet, Take 1 tablet (40 mg total) by mouth daily, Disp: 90 tablet, Rfl: 3    metFORMIN (GLUCOPHAGE) 1000 MG tablet, Take 1 tablet (1,000 mg total) by mouth 2 (two) times a day with meals, Disp: 180 tablet, Rfl: 3  No current facility-administered medications for this visit      Facility-Administered Medications Ordered in Other Visits:     pembrolizumab (KEYTRUDA) 400 mg in sodium chloride 0 9 % 50 mL IVPB, 400 mg, Intravenous, Once, Judith Hamilton MD     ALLERGIES:  No Known Allergies     ACTIVE PROBLEMS:  Patient Active Problem List   Diagnosis    Essential hypertension    Hiatal hernia with GERD    Status post laparoscopic cholecystectomy    Status post umbilical hernia repair, follow-up exam    Type 2 diabetes mellitus without complication, without long-term current use of insulin (HCC)    Mixed hyperlipidemia    Parkinson's disease ([facility] [address] )    Microalbuminuria    Hyperbilirubinemia    Malignant melanoma of skin of face ([facility] [address] )    Thrombocytopenia (HCC)    [facility]    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)    Stage 3 chronic kidney disease, unspecified whether stage 3a or 3b CKD ([facility]  )    High risk medication use          PAST MEDICAL HISTORY:   Past Medical History:   Diagnosis Date    Diabetes mellitus [facility]     Hyperlipidemia     Hypertension         PAST SURGICAL HISTORY:  Past Surgical History:   Procedure Laterality Date    FLAP LOCAL HEAD / NECK Left 10/12/2021    Procedure: RECONSTRUCTION OF LEFT TEMPLE DEFECT AFTER RESCECTION MELANOMA;  Surgeon: Jeanie Huizar MD;  [facility];  Service: Plastics    FULL THICKNESS SKIN GRAFT Left 10/12/2021    Procedure: SKIN GRAFT FULL THICKNESS LEFT TEMPLE;  Surgeon: Jeanie Huizar MD;  [facility];  Service: Plastics    GALLBLADDER SURGERY      HAND SURGERY Left     LYMPH NODE BIOPSY Left 10/12/2021    Procedure: BIOPSY LYMPH NODE SENTINEL, LYMPHOSCINTIGRAPHY, INTRAOPERATIVE LYMPHATIC MAPPING (INJECT AT 1200);   Surgeon: Slava Velarde MD;  [facility];  Service: Surgical Oncology    NOSE SURGERY      SKIN CANCER EXCISION  10/2021    SKIN LESION EXCISION Left 10/12/2021    Procedure: FACE MELANOMA SCAR WIDE EXCISION  FACE;  Surgeon: Slava Velarde MD;  [facility];  Service: Surgical Oncology    SPLIT THICKNESS SKIN GRAFT Left 10/12/2021    Procedure: SKIN GRAFT SPLIT THICKNESS LEFT TEMPLE;  Surgeon: Jeanie Huizar MD;  [facility];  Service: Plastics    US GUIDED LYMPH NODE BIOPSY LEFT  3/21/2022        SOCIAL HISTORY:  Social History     Socioeconomic History    Marital status: /Civil Union     Spouse name: None    Number of children: None    Years of education: None    Highest education level: None   Occupational History    None   Tobacco Use    Smoking status: Never Smoker    Smokeless tobacco: Never Used   Vaping Use    Vaping Use: Never used   Substance and Sexual Activity    Alcohol use: Not Currently    Drug use: Never    Sexual activity: Not Currently   Other Topics Concern    None   Social History Narrative    None     Social Determinants of Health     Financial Resource Strain: Not on file   Food Insecurity: Not on file   Transportation Needs: Not on file   Physical Activity: Not on file   Stress: Not on file   Social Connections: Not on file   Intimate Partner Violence: Not on file   Housing Stability: Not on file        FAMILY HISTORY:  Family History   Problem Relation Age of Onset    No Known Problems Mother     No Known Problems Father     Colon cancer Other 60           Objective    PHYSICAL EXAMINATION:   Blood pressure 152/80, pulse 65, temperature (!) 97 °F (36 1 °C), resp  rate 17, height 5' 7" (1 702 m), weight 77 1 kg (170 lb), SpO2 96 %  Pain Score: 0-No pain      ECOG Performance Status    Flowsheet Row Most Recent Value   ECOG Performance Status 1 - Restricted in physically strenuous activity but ambulatory and able to carry out work of a light or sedentary nature, e g , light house work, office work            Physical Exam  Constitutional:       General: He is not in acute distress  Appearance: Normal appearance  He is not toxic-appearing  HENT:      Mouth/Throat:      Mouth: Mucous membranes are moist       Pharynx: Oropharynx is clear  Eyes:      General: No scleral icterus  Cardiovascular:      Rate and Rhythm: Normal rate and regular rhythm  Pulses: Normal pulses  Heart sounds: No murmur heard  No friction rub  No gallop  Pulmonary:      Effort: Pulmonary effort is normal  No respiratory distress  Breath sounds: Normal breath sounds  No wheezing or rales     Chest: Breasts:      Right: No axillary adenopathy or supraclavicular adenopathy  Left: No axillary adenopathy or supraclavicular adenopathy  Abdominal:      General: There is no distension  Palpations: There is no mass  Tenderness: There is no abdominal tenderness  There is no rebound  Musculoskeletal:         General: No swelling or tenderness  Right lower leg: No edema  Left lower leg: No edema  Lymphadenopathy:      Head:      Right side of head: No submandibular, preauricular or posterior auricular adenopathy  Left side of head: No submandibular, preauricular or posterior auricular adenopathy  Cervical: No cervical adenopathy  Right cervical: No superficial or posterior cervical adenopathy  Left cervical: No superficial or posterior cervical adenopathy  Upper Body:      Right upper body: No supraclavicular or axillary adenopathy  Left upper body: No supraclavicular or axillary adenopathy  Skin:     Findings: No rash  Comments: No concerning skin lesions   Neurological:      General: No focal deficit present  Mental Status: He is alert and oriented to person, place, and time  Psychiatric:         Mood and Affect: Mood normal          Behavior: Behavior normal          Thought Content: Thought content normal          Judgment: Judgment normal          I reviewed lab data in the chart      WBC   Date Value Ref Range Status   09/13/2022 9 66 4 31 - 10 16 Thousand/uL Final   08/01/2022 10 16 4 31 - 10 16 Thousand/uL Final   06/20/2022 10 89 (H) 4 31 - 10 16 Thousand/uL Final     Hemoglobin   Date Value Ref Range Status   09/13/2022 13 6 12 0 - 17 0 g/dL Final   08/01/2022 13 9 12 0 - 17 0 g/dL Final   06/20/2022 13 5 12 0 - 17 0 g/dL Final     Platelets   Date Value Ref Range Status   09/13/2022 128 (L) 149 - 390 Thousands/uL Final   08/01/2022 133 (L) 149 - 390 Thousands/uL Final   06/20/2022 137 (L) 149 - 390 Thousands/uL Final     MCV   Date Value Ref Range Status   09/13/2022 89 82 - 98 fL Final   08/01/2022 90 82 - 98 fL Final   06/20/2022 91 82 - 98 fL Final      Potassium   Date Value Ref Range Status   09/13/2022 4 1 3 5 - 5 3 mmol/L Final   08/01/2022 4 0 3 5 - 5 3 mmol/L Final   06/20/2022 3 9 3 5 - 5 3 mmol/L Final     Chloride   Date Value Ref Range Status   09/13/2022 105 96 - 108 mmol/L Final   08/01/2022 106 96 - 108 mmol/L Final   06/20/2022 106 100 - 108 mmol/L Final     CO2   Date Value Ref Range Status   09/13/2022 26 21 - 32 mmol/L Final   08/01/2022 29 21 - 32 mmol/L Final   06/20/2022 26 21 - 32 mmol/L Final     BUN   Date Value Ref Range Status   09/13/2022 19 5 - 25 mg/dL Final   08/01/2022 23 5 - 25 mg/dL Final   06/20/2022 24 5 - 25 mg/dL Final     Creatinine   Date Value Ref Range Status   09/13/2022 1 37 (H) 0 60 - 1 30 mg/dL Final     Comment:     Standardized to IDMS reference method   08/01/2022 1 36 (H) 0 60 - 1 30 mg/dL Final     Comment:     Standardized to IDMS reference method   06/20/2022 1 37 (H) 0 60 - 1 30 mg/dL Final     Comment:     Standardized to IDMS reference method     Glucose   Date Value Ref Range Status   09/13/2022 174 (H) 65 - 140 mg/dL Final     Comment:     If the patient is fasting, the ADA then defines impaired fasting glucose as > 100 mg/dL and diabetes as > or equal to 123 mg/dL  Specimen collection should occur prior to Sulfasalazine administration due to the potential for falsely depressed results  Specimen collection should occur prior to Sulfapyridine administration due to the potential for falsely elevated results  04/26/2022 192 (H) 65 - 140 mg/dL Final     Comment:     If the patient is fasting, the ADA then defines impaired fasting glucose as > 100 mg/dL and diabetes as > or equal to 123 mg/dL  Specimen collection should occur prior to Sulfasalazine administration due to the potential for falsely depressed results   Specimen collection should occur prior to Sulfapyridine administration due to the potential for falsely elevated results  03/28/2022 205 (H) 65 - 140 mg/dL Final     Comment:     If the patient is fasting, the ADA then defines impaired fasting glucose as > 100 mg/dL and diabetes as > or equal to 123 mg/dL  Specimen collection should occur prior to Sulfasalazine administration due to the potential for falsely depressed results  Specimen collection should occur prior to Sulfapyridine administration due to the potential for falsely elevated results  Calcium   Date Value Ref Range Status   09/13/2022 8 8 8 3 - 10 1 mg/dL Final   08/01/2022 9 0 8 3 - 10 1 mg/dL Final   06/20/2022 8 7 8 3 - 10 1 mg/dL Final     Albumin   Date Value Ref Range Status   09/13/2022 3 8 3 5 - 5 0 g/dL Final   08/01/2022 3 7 3 5 - 5 0 g/dL Final   06/20/2022 3 6 3 5 - 5 0 g/dL Final     Total Bilirubin   Date Value Ref Range Status   09/13/2022 1 36 (H) 0 20 - 1 00 mg/dL Final     Comment:     Use of this assay is not recommended for patients undergoing treatment with eltrombopag due to the potential for falsely elevated results  08/01/2022 1 48 (H) 0 20 - 1 00 mg/dL Final     Comment:     Use of this assay is not recommended for patients undergoing treatment with eltrombopag due to the potential for falsely elevated results  06/20/2022 0 98 0 20 - 1 00 mg/dL Final     Comment:     Use of this assay is not recommended for patients undergoing treatment with eltrombopag due to the potential for falsely elevated results  Alkaline Phosphatase   Date Value Ref Range Status   09/13/2022 77 46 - 116 U/L Final   08/01/2022 79 46 - 116 U/L Final   06/20/2022 89 46 - 116 U/L Final     AST   Date Value Ref Range Status   09/13/2022 14 5 - 45 U/L Final     Comment:     Specimen collection should occur prior to Sulfasalazine administration due to the potential for falsely depressed results      08/01/2022 19 5 - 45 U/L Final     Comment:     Specimen collection should occur prior to Sulfasalazine administration due to the potential for falsely depressed results  2022 14 5 - 45 U/L Final     Comment:     Specimen collection should occur prior to Sulfasalazine administration due to the potential for falsely depressed results  ALT   Date Value Ref Range Status   2022 24 12 - 78 U/L Final     Comment:     Specimen collection should occur prior to Sulfasalazine and/or Sulfapyridine administration due to the potential for falsely depressed results  2022 22 12 - 78 U/L Final     Comment:     Specimen collection should occur prior to Sulfasalazine and/or Sulfapyridine administration due to the potential for falsely depressed results  2022 20 12 - 78 U/L Final     Comment:     Specimen collection should occur prior to Sulfasalazine and/or Sulfapyridine administration due to the potential for falsely depressed results  LD   Date Value Ref Range Status   2022 165 81 - 234 U/L Final   2022 186 81 - 234 U/L Final   2022 165 81 - 234 U/L Final     No results found for: TSH  No results found for: [id number]   Free T4   Date Value Ref Range Status   2022 0 91 0 76 - 1 46 ng/dL Final     Comment:     Specimen collection should occur prior to Sulfasalazine administration due to the potential for falsely elevated results  2022 0 81 0 76 - 1 46 ng/dL Final     Comment:     Specimen collection should occur prior to Sulfasalazine administration due to the potential for falsely elevated results  2022 0 82 0 76 - 1 46 ng/dL Final     Comment:     Specimen collection should occur prior to Sulfasalazine administration due to the potential for falsely elevated results  RECENT IMAGIN2022 US Head and Neck    1  Stable irregular, elongated hypoechoic structure left preauricular soft tissues extending from the skin to the superficial aspect of the left parotid gland  This was previously biopsied 3/21/2022 which revealed cellular atypia    However, interval stability and resolution of FDG activity in this area on the most recent PET/CT favor benignity  Continued ultrasound surveillance in 6 months may be considered      2  Otherwise, no suspicious lymphadenopathy detected  Assessment/Plan  Mr Meg Henry is a 79 yr male with stage IIIA melanoma and possible recurrence on treatment with pembrolizumab doing well with response treatment here for follow-up and treatment  1  Malignant melanoma of skin of face ([facility] [address] )  2  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[facility] [address] )  He is doing well and tolerating treatment  Recent ultrasound demonstrates stable ovoid potential lymphadenopathy which correlates to areas of decreased FDG activity on previous PET  This is all suggestive of response to treatment or resolution of underlying etiology which could be inflammation versus neoplasm  We will continue treatment  Labs reviewed and okay to continue  Pembrolizumab cycle 5 today  He will return in 6 weeks for his next treatment  He has standing orders for blood work  He knows to call with any issues or concerns prior to his next visit  3  High risk medication use  He is on treatment with immunotherapy and we will continue to monitor for side effects and lab abnormalities  We will monitor labs with each treatment to ensure safety of continuing on treatment  He knows to watch for signs and symptoms for immune mediated side effects  Will continue to monitor his shortness of breath  Return in about 6 weeks (around 10/31/2022) for Office Visit, Infusion - See Treatment Plan, labs       Abilio Washington MD, PhD

## 2022-09-19 NOTE — PROGRESS NOTES
Pt resting with no complaints, vitals stable, labs within parameters for treatment, call bell within reach  All questions answered and emotional support given  All needs met at this time

## 2022-09-19 NOTE — PROGRESS NOTES
[address] [facility] [address] [address]  [phone]     Date of Visit: 9/19/2022  Name: Santiago Jacobs   YOB: 1940        Subjective    VISIT DIAGNOSIS:  Diagnoses and all orders for this visit:    Malignant melanoma of skin of face ([facility] [address] )    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[facility] [address] )    High risk medication use        Oncology History   Malignant melanoma of skin of face ([facility] [address] )   9/8/2021 -  Cancer Staged    Staging form: Melanoma of the Skin, AJCC 8th Edition  - Clinical stage from 9/8/2021: Stage III (cT2a, cN1a, cM0) - Signed by Tanya Murray MD on 11/7/2021 9/8/2021 -  Cancer Staged    Staging form: Melanoma of the Skin, AJCC 8th Edition  - Pathologic stage from 9/8/2021: Stage IIIA (pT2a, pN1a, cM0) - Signed by Tanya Murray MD on 11/7/2021 9/22/2021 Initial Diagnosis    Malignant melanoma of skin of face ([facility] [address] )     4/4/2022 -  Chemotherapy    pembrolizumab (KEYTRUDA) IVPB, 400 mg, Intravenous, Once, 5 of 9 cycles  Administration: 400 mg (4/4/2022), 400 mg (5/16/2022), 400 mg (6/27/2022), 400 mg (8/8/2022)     Malignant melanoma of forehead ([facility] [address] )   11/29/2021 Initial Diagnosis    Malignant melanoma of forehead ([facility] [address] )     4/4/2022 -  Chemotherapy    pembrolizumab (KEYTRUDA) IVPB, 400 mg, Intravenous, Once, 5 of 9 cycles  Administration: 400 mg (4/4/2022), 400 mg (5/16/2022), 400 mg (6/27/2022), 400 mg (8/8/2022)        Cancer Staging  Malignant melanoma of skin of face [facility])  Staging form: Melanoma of the Skin, AJCC 8th Edition  - Clinical stage from 9/8/2021: Stage III (cT2a, cN1a, cM0) - Signed by Tanya Murray MD on 11/7/2021  - Pathologic stage from 9/8/2021: Stage IIIA (pT2a, pN1a, cM0) - Signed by Tanya Murray MD on 11/7/2021     Treatment Details   Treatment goal Curative   Plan Name OP Pembrolizumab Q 42 Days   Status Active   Start Date 4/4/2022   End Date 3/6/2023 (Planned)   Provider Gillian Bernard MD   Chemotherapy pembrolizumab [facility]) IVPB, 400 mg, Intravenous, Once, 5 of 9 cycles  Administration: 400 mg (4/4/2022), 400 mg (5/16/2022), 400 mg (6/27/2022), 400 mg (8/8/2022)          HISTORY OF PRESENT ILLNESS: Ellis Marinelli is a 79 y o  male  who has Stage IIIA melanoma with questionable recurrence with indeterminate biopsy on adjuvant treatment with pembrolizumab here for follow up and treatment  He is doing okay and feels well  Denies any new, changing, concerning skin lesions  Denies lymphadenopathy  He does state that he is having some increased dyspnea on exertion, mainly walking longer distances like today walking from the parking lot to the front lobby  Otherwise has not really noticed any change  Denies any chest pain  Denies headaches, double vision, rash, itching, and diarrhea  We discussed results from his recent head and neck ultrasound which demonstrate stable irregular elongated structures in the left periauricular soft tissue which was the side that was previously biopsied with cellular atypia  He did have previous PET scan 3 months ago that demonstrated decreased FDG activity in this region  - consistent with resolution of underlying etiology disease sources information  REVIEW OF SYSTEMS:  Review of Systems   Constitutional: Negative for appetite change, fatigue, fever and unexpected weight change  HENT:   Negative for lump/mass  Eyes: Negative for icterus  Respiratory: Positive for shortness of breath ([place])  Negative for cough and wheezing  Cardiovascular: Negative for leg swelling  Gastrointestinal: Negative for abdominal pain, constipation, diarrhea, nausea and vomiting  Genitourinary: Negative for difficulty urinating and hematuria  Musculoskeletal: Negative for arthralgias, gait problem and myalgias  Skin: Negative for itching and rash  No new, changing, or concerning lesions  Neurological: Negative for extremity weakness, gait problem, headaches, light-headedness and numbness  Hematological: Negative for adenopathy  MEDICATIONS:    Current Outpatient Medications:     atenolol (TENORMIN) 100 mg tablet, Take 1 tablet (100 mg total) by mouth daily, Disp: 90 tablet, Rfl: 3    glimepiride (AMARYL) 4 mg tablet, Take 1 tablet (4 mg total) by mouth daily, Disp: 90 tablet, Rfl: 3    lisinopril-hydrochlorothiazide (PRINZIDE,ZESTORETIC) 20-25 MG per tablet, Take 1 tablet by mouth daily, Disp: 90 tablet, Rfl: 3    lovastatin (MEVACOR) 40 MG tablet, Take 1 tablet (40 mg total) by mouth daily, Disp: 90 tablet, Rfl: 3    metFORMIN (GLUCOPHAGE) 1000 MG tablet, Take 1 tablet (1,000 mg total) by mouth 2 (two) times a day with meals, Disp: 180 tablet, Rfl: 3  No current facility-administered medications for this visit      Facility-Administered Medications Ordered in Other Visits:     pembrolizumab (KEYTRUDA) 400 mg in sodium chloride 0 9 % 50 mL IVPB, 400 mg, Intravenous, Once, Chon Joe MD     ALLERGIES:  No Known Allergies     ACTIVE PROBLEMS:  Patient Active Problem List   Diagnosis    Essential hypertension    Hiatal hernia with GERD    Status post laparoscopic cholecystectomy    Status post umbilical hernia repair, follow-up exam    Type 2 diabetes mellitus without complication, without long-term current use of insulin (HCC)    Mixed hyperlipidemia    Parkinson's disease ([facility] [address] )    Microalbuminuria    Hyperbilirubinemia    Malignant melanoma of skin of face ([facility] [address] )    Thrombocytopenia ([facility] [address] )    Malignant melanoma of forehead ([facility] [address] )    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[facility] [address] )    Stage 3 chronic kidney disease, unspecified whether stage 3a or 3b CKD ([facility] [address] )    High risk medication use          PAST MEDICAL HISTORY:   Past Medical History:   Diagnosis Date    Diabetes mellitus ([facility] [address] )     Hyperlipidemia     Hypertension PAST SURGICAL HISTORY:  Past Surgical History:   Procedure Laterality Date    FLAP LOCAL HEAD / NECK Left 10/12/2021    Procedure: RECONSTRUCTION OF LEFT TEMPLE DEFECT AFTER RESCECTION MELANOMA;  Surgeon: Gamal Washington MD;  [facility];  Service: Plastics    FULL THICKNESS SKIN GRAFT Left 10/12/2021    Procedure: SKIN GRAFT FULL THICKNESS LEFT TEMPLE;  Surgeon: Gamal Washington MD;  [facility];  Service: Plastics    GALLBLADDER SURGERY      HAND SURGERY Left     LYMPH NODE BIOPSY Left 10/12/2021    Procedure: BIOPSY LYMPH NODE SENTINEL, LYMPHOSCINTIGRAPHY, INTRAOPERATIVE LYMPHATIC MAPPING (INJECT AT 1200);   Surgeon: Abilio Bess MD;  [facility];  Service: Surgical Oncology    NOSE SURGERY      SKIN CANCER EXCISION  10/2021    SKIN LESION EXCISION Left 10/12/2021    Procedure: FACE MELANOMA SCAR WIDE EXCISION  FACE;  Surgeon: Abilio Bess MD;  [facility];  Service: Surgical Oncology    SPLIT THICKNESS SKIN GRAFT Left 10/12/2021    Procedure: SKIN GRAFT SPLIT THICKNESS LEFT TEMPLE;  Surgeon: Gamal Washington MD;  [facility];  Service: Plastics    US GUIDED LYMPH NODE BIOPSY LEFT  3/21/2022        SOCIAL HISTORY:  Social History     Socioeconomic History    Marital status: /Civil Union     Spouse name: None    Number of children: None    Years of education: None    Highest education level: None   Occupational History    None   Tobacco Use    Smoking status: Never Smoker    Smokeless tobacco: Never Used   Vaping Use    Vaping Use: Never used   Substance and Sexual Activity    Alcohol use: Not Currently    Drug use: Never    Sexual activity: Not Currently   Other Topics Concern    None   Social History Narrative    None     Social Determinants of Health     Financial Resource Strain: Not on file   Food Insecurity: Not on file   Transportation Needs: Not on file   Physical Activity: Not on file   Stress: Not on file   Social Connections: Not on file   Intimate Partner Violence: Not on file   Housing Stability: Not on file        FAMILY HISTORY:  Family History   Problem Relation Age of Onset    No Known Problems Mother     No Known Problems Father     Colon cancer Other 61           Objective    PHYSICAL EXAMINATION:   Blood pressure 152/80, pulse 65, temperature (!) 97 °F (36 1 °C), resp  rate 17, height 5' 7" (1 702 m), weight 77 1 kg (170 lb), SpO2 96 %  Pain Score: 0-No pain      ECOG Performance Status    Flowsheet Row Most Recent Value   ECOG Performance Status 1 - Restricted in physically strenuous activity but ambulatory and able to carry out work of a light or sedentary nature, e g , light house work, office work            Physical Exam  Constitutional:       General: He is not in acute distress  Appearance: Normal appearance  He is not toxic-appearing  HENT:      Mouth/Throat:      Mouth: Mucous membranes are moist       Pharynx: Oropharynx is clear  Eyes:      General: No scleral icterus  Cardiovascular:      Rate and Rhythm: Normal rate and regular rhythm  Pulses: Normal pulses  Heart sounds: No murmur heard  No friction rub  No gallop  Pulmonary:      Effort: Pulmonary effort is normal  No respiratory distress  Breath sounds: Normal breath sounds  No wheezing or rales  Chest:   Breasts:      Right: No axillary adenopathy or supraclavicular adenopathy  Left: No axillary adenopathy or supraclavicular adenopathy  Abdominal:      General: There is no distension  Palpations: There is no mass  Tenderness: There is no abdominal tenderness  There is no rebound  Musculoskeletal:         General: No swelling or tenderness  Right lower leg: No edema  Left lower leg: No edema  Lymphadenopathy:      Head:      Right side of head: No submandibular, preauricular or posterior auricular adenopathy        Left side of head: No submandibular, preauricular or posterior auricular adenopathy  Cervical: No cervical adenopathy  Right cervical: No superficial or posterior cervical adenopathy  Left cervical: No superficial or posterior cervical adenopathy  Upper Body:      Right upper body: No supraclavicular or axillary adenopathy  Left upper body: No supraclavicular or axillary adenopathy  Skin:     Findings: No rash  Comments: No concerning skin lesions   Neurological:      General: No focal deficit present  Mental Status: He is alert and oriented to person, place, and time  Psychiatric:         Mood and Affect: Mood normal          Behavior: Behavior normal          Thought Content: Thought content normal          Judgment: Judgment normal          I reviewed lab data in the chart      WBC   Date Value Ref Range Status   09/13/2022 9 66 4 31 - 10 16 Thousand/uL Final   08/01/2022 10 16 4 31 - 10 16 Thousand/uL Final   06/20/2022 10 89 (H) 4 31 - 10 16 Thousand/uL Final     Hemoglobin   Date Value Ref Range Status   09/13/2022 13 6 12 0 - 17 0 g/dL Final   08/01/2022 13 9 12 0 - 17 0 g/dL Final   06/20/2022 13 5 12 0 - 17 0 g/dL Final     Platelets   Date Value Ref Range Status   09/13/2022 128 (L) 149 - 390 Thousands/uL Final   08/01/2022 133 (L) 149 - 390 Thousands/uL Final   06/20/2022 137 (L) 149 - 390 Thousands/uL Final     MCV   Date Value Ref Range Status   09/13/2022 89 82 - 98 fL Final   08/01/2022 90 82 - 98 fL Final   06/20/2022 91 82 - 98 fL Final      Potassium   Date Value Ref Range Status   09/13/2022 4 1 3 5 - 5 3 mmol/L Final   08/01/2022 4 0 3 5 - 5 3 mmol/L Final   06/20/2022 3 9 3 5 - 5 3 mmol/L Final     Chloride   Date Value Ref Range Status   09/13/2022 105 96 - 108 mmol/L Final   08/01/2022 106 96 - 108 mmol/L Final   06/20/2022 106 100 - 108 mmol/L Final     CO2   Date Value Ref Range Status   09/13/2022 26 21 - 32 mmol/L Final   08/01/2022 29 21 - 32 mmol/L Final   06/20/2022 26 21 - 32 mmol/L Final     BUN   Date Value Ref Range Status   09/13/2022 19 5 - 25 mg/dL Final   08/01/2022 23 5 - 25 mg/dL Final   06/20/2022 24 5 - 25 mg/dL Final     Creatinine   Date Value Ref Range Status   09/13/2022 1 37 (H) 0 60 - 1 30 mg/dL Final     Comment:     Standardized to IDMS reference method   08/01/2022 1 36 (H) 0 60 - 1 30 mg/dL Final     Comment:     Standardized to IDMS reference method   06/20/2022 1 37 (H) 0 60 - 1 30 mg/dL Final     Comment:     Standardized to IDMS reference method     Glucose   Date Value Ref Range Status   09/13/2022 174 (H) 65 - 140 mg/dL Final     Comment:     If the patient is fasting, the ADA then defines impaired fasting glucose as > 100 mg/dL and diabetes as > or equal to 123 mg/dL  Specimen collection should occur prior to Sulfasalazine administration due to the potential for falsely depressed results  Specimen collection should occur prior to Sulfapyridine administration due to the potential for falsely elevated results  04/26/2022 192 (H) 65 - 140 mg/dL Final     Comment:     If the patient is fasting, the ADA then defines impaired fasting glucose as > 100 mg/dL and diabetes as > or equal to 123 mg/dL  Specimen collection should occur prior to Sulfasalazine administration due to the potential for falsely depressed results  Specimen collection should occur prior to Sulfapyridine administration due to the potential for falsely elevated results  03/28/2022 205 (H) 65 - 140 mg/dL Final     Comment:     If the patient is fasting, the ADA then defines impaired fasting glucose as > 100 mg/dL and diabetes as > or equal to 123 mg/dL  Specimen collection should occur prior to Sulfasalazine administration due to the potential for falsely depressed results  Specimen collection should occur prior to Sulfapyridine administration due to the potential for falsely elevated results       Calcium   Date Value Ref Range Status   09/13/2022 8 8 8 3 - 10 1 mg/dL Final   08/01/2022 9 0 8 3 - 10 1 mg/dL Final   06/20/2022 8 7 8 3 - 10 1 mg/dL Final     Albumin   Date Value Ref Range Status   09/13/2022 3 8 3 5 - 5 0 g/dL Final   08/01/2022 3 7 3 5 - 5 0 g/dL Final   06/20/2022 3 6 3 5 - 5 0 g/dL Final     Total Bilirubin   Date Value Ref Range Status   09/13/2022 1 36 (H) 0 20 - 1 00 mg/dL Final     Comment:     Use of this assay is not recommended for patients undergoing treatment with eltrombopag due to the potential for falsely elevated results  08/01/2022 1 48 (H) 0 20 - 1 00 mg/dL Final     Comment:     Use of this assay is not recommended for patients undergoing treatment with eltrombopag due to the potential for falsely elevated results  06/20/2022 0 98 0 20 - 1 00 mg/dL Final     Comment:     Use of this assay is not recommended for patients undergoing treatment with eltrombopag due to the potential for falsely elevated results  Alkaline Phosphatase   Date Value Ref Range Status   09/13/2022 77 46 - 116 U/L Final   08/01/2022 79 46 - 116 U/L Final   06/20/2022 89 46 - 116 U/L Final     AST   Date Value Ref Range Status   09/13/2022 14 5 - 45 U/L Final     Comment:     Specimen collection should occur prior to Sulfasalazine administration due to the potential for falsely depressed results  08/01/2022 19 5 - 45 U/L Final     Comment:     Specimen collection should occur prior to Sulfasalazine administration due to the potential for falsely depressed results  06/20/2022 14 5 - 45 U/L Final     Comment:     Specimen collection should occur prior to Sulfasalazine administration due to the potential for falsely depressed results  ALT   Date Value Ref Range Status   09/13/2022 24 12 - 78 U/L Final     Comment:     Specimen collection should occur prior to Sulfasalazine and/or Sulfapyridine administration due to the potential for falsely depressed results      08/01/2022 22 12 - 78 U/L Final     Comment:     Specimen collection should occur prior to Sulfasalazine and/or Sulfapyridine administration due to the potential for falsely depressed results  2022 20 12 - 78 U/L Final     Comment:     Specimen collection should occur prior to Sulfasalazine and/or Sulfapyridine administration due to the potential for falsely depressed results  LD   Date Value Ref Range Status   2022 165 81 - 234 U/L Final   2022 186 81 - 234 U/L Final   2022 165 81 - 234 U/L Final     No results found for: TSH  No results found for: [id number]   Free T4   Date Value Ref Range Status   2022 0 91 0 76 - 1 46 ng/dL Final     Comment:     Specimen collection should occur prior to Sulfasalazine administration due to the potential for falsely elevated results  2022 0 81 0 76 - 1 46 ng/dL Final     Comment:     Specimen collection should occur prior to Sulfasalazine administration due to the potential for falsely elevated results  2022 0 82 0 76 - 1 46 ng/dL Final     Comment:     Specimen collection should occur prior to Sulfasalazine administration due to the potential for falsely elevated results  RECENT IMAGIN2022 US Head and Neck    1  Stable irregular, elongated hypoechoic structure left preauricular soft tissues extending from the skin to the superficial aspect of the left parotid gland  This was previously biopsied 3/21/2022 which revealed cellular atypia  However, interval   stability and resolution of FDG activity in this area on the most recent PET/CT favor benignity  Continued ultrasound surveillance in 6 months may be considered      2  Otherwise, no suspicious lymphadenopathy detected  Assessment/Plan  Mr Peter Hedrick is a 79 yr male with stage IIIA melanoma and possible recurrence on treatment with pembrolizumab doing well with response treatment here for follow-up and treatment  1  Malignant melanoma of skin of face ([address] )  2  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[address] )  He is doing well and tolerating treatment  Recent ultrasound demonstrates stable ovoid potential lymphadenopathy which correlates to areas of decreased FDG activity on previous PET  This is all suggestive of response to treatment or resolution of underlying etiology which could be inflammation versus neoplasm  We will continue treatment  Labs reviewed and okay to continue  Pembrolizumab cycle 5 today  He will return in 6 weeks for his next treatment  He has standing orders for blood work  He knows to call with any issues or concerns prior to his next visit  3  High risk medication use  He is on treatment with immunotherapy and we will continue to monitor for side effects and lab abnormalities  We will monitor labs with each treatment to ensure safety of continuing on treatment  He knows to watch for signs and symptoms for immune mediated side effects  Will continue to monitor his shortness of breath  Return in about 6 weeks (around 10/31/2022) for Office Visit, Infusion - See Treatment Plan, labs       Kely Pena MD, PhD

## 2022-09-27 ENCOUNTER — CONSULT (OUTPATIENT)
Dept: INTERNAL MEDICINE CLINIC | Facility: OTHER | Age: 82
End: 2022-09-27
Payer: COMMERCIAL

## 2022-09-27 VITALS
SYSTOLIC BLOOD PRESSURE: 150 MMHG | OXYGEN SATURATION: 95 % | TEMPERATURE: 96.5 F | DIASTOLIC BLOOD PRESSURE: 80 MMHG | BODY MASS INDEX: 26.84 KG/M2 | WEIGHT: 171 LBS | HEIGHT: 67 IN | HEART RATE: 62 BPM

## 2022-09-27 DIAGNOSIS — E78.2 MIXED HYPERLIPIDEMIA: ICD-10-CM

## 2022-09-27 DIAGNOSIS — Z01.818 PRE-OP EVALUATION: ICD-10-CM

## 2022-09-27 DIAGNOSIS — D69.6 THROMBOCYTOPENIA (HCC): ICD-10-CM

## 2022-09-27 DIAGNOSIS — R42 DIZZINESS: Primary | ICD-10-CM

## 2022-09-27 DIAGNOSIS — G20 PARKINSON'S DISEASE (HCC): ICD-10-CM

## 2022-09-27 DIAGNOSIS — N18.30 STAGE 3 CHRONIC KIDNEY DISEASE, UNSPECIFIED WHETHER STAGE 3A OR 3B CKD (HCC): ICD-10-CM

## 2022-09-27 DIAGNOSIS — H25.9 AGE-RELATED CATARACT OF BOTH EYES, UNSPECIFIED AGE-RELATED CATARACT TYPE: ICD-10-CM

## 2022-09-27 DIAGNOSIS — I10 ESSENTIAL HYPERTENSION: ICD-10-CM

## 2022-09-27 DIAGNOSIS — E11.9 TYPE 2 DIABETES MELLITUS WITHOUT COMPLICATION, WITHOUT LONG-TERM CURRENT USE OF INSULIN (HCC): ICD-10-CM

## 2022-09-27 PROCEDURE — 93000 ELECTROCARDIOGRAM COMPLETE: CPT | Performed by: NURSE PRACTITIONER

## 2022-09-27 PROCEDURE — 99213 OFFICE O/P EST LOW 20 MIN: CPT | Performed by: NURSE PRACTITIONER

## 2022-09-27 RX ORDER — MECLIZINE HCL 12.5 MG/1
12.5 TABLET ORAL 3 TIMES DAILY PRN
Qty: 15 TABLET | Refills: 0 | Status: SHIPPED | OUTPATIENT
Start: 2022-09-27 | End: 2022-10-02

## 2022-09-27 NOTE — ASSESSMENT & PLAN NOTE
Likely vertigo, given script for vestibular therapy, will start meclizine  Orthostatic blood pressure is 150/80, 150/70, 160/70  EKG reviewed

## 2022-09-27 NOTE — PROGRESS NOTES
Assessment/Plan:    Type 2 diabetes mellitus without complication, without long-term current use of insulin ([facility])  Stable, controlled  Continue glimepiride 4 mg daily and metformin 1000 mg twice a day  He denies any hypoglycemic symptoms  Lab Results   Component Value Date    HGBA1C 6 3 (H) 05/10/2022       Essential hypertension  Controlled  Continue lisinopril-hydrochlorothiazide 20-25 mg daily and atenolol 100 mg daily  Parkinson's disease ([facility] )  Discussed continuing range of motion and stretching exercises  Thrombocytopenia (HCC)  Stable without active bleeding  Stage 3 chronic kidney disease, unspecified whether stage 3a or 3b CKD ([facility] )  Lab Results   Component Value Date    EGFR 47 09/13/2022    EGFR 47 08/01/2022    EGFR 47 06/20/2022    CREATININE 1 37 (H) 09/13/2022    CREATININE 1 36 (H) 08/01/2022    CREATININE 1 37 (H) 06/20/2022   Referral previously given to Nephrology due to microalbuminuria  Encourage good oral water intake  Avoid nephrotoxic medication    Mixed hyperlipidemia  Continue lovastatin 40 mg daily  Dizziness  Likely vertigo, given script for vestibular therapy, will start meclizine  Orthostatic blood pressure is 150/80, 150/70, 160/70  EKG reviewed  Diagnoses and all orders for this visit:    Dizziness  -     POCT ECG  -     meclizine (ANTIVERT) 12 5 MG tablet; Take 1 tablet (12 5 mg total) by mouth 3 (three) times a day as needed for dizziness for up to 5 days  -     Ambulatory Referral to Physical Therapy;  Future    Type 2 diabetes mellitus without complication, without long-term current use of insulin ([facility])    Essential hypertension    Parkinson's disease (HCC)    Thrombocytopenia ([facility] )    Stage 3 chronic kidney disease, unspecified whether stage 3a or 3b CKD ([facility] )    Mixed hyperlipidemia    Pre-op evaluation          Subjective:    Toma Frazier is a 79y o  year old male who presents to the office today for a preoperative consultation at the request of surgeon Dr Wendy Neal who plans on performing bilateral cataract surgery, right eye will be performed on 10/11/2022 and left eye will be performed on 10/25/2022  This consultation is requested for the specific conditions prompting preoperative evaluation (i e  because of potential affect on operative risk)  Planned anesthesia: MAC  The patient has the following known anesthesia issues: denies  Patients bleeding risk: no recent abnormal bleeding  Patient does not have objections to receiving blood products if needed  Patient is able to walk 4 blocks without symptoms  Patient is able to walk up 2 flights of stairs without symptoms  Significant past medical history includes:  Patient Active Problem List   Diagnosis    Essential hypertension    Hiatal hernia with GERD    Status post laparoscopic cholecystectomy    Status post umbilical hernia repair, follow-up exam    Type 2 diabetes mellitus without complication, without long-term current use of insulin ([facility] )    Mixed hyperlipidemia    Parkinson's disease ([facility] )    Microalbuminuria    Hyperbilirubinemia    Malignant melanoma of skin of face ([facility] )    Thrombocytopenia (HCC)    Malignant melanoma of forehead ([facility] )    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)    Stage 3 chronic kidney disease, unspecified whether stage 3a or 3b CKD ([facility] )    High risk medication use    Dizziness       Tobacco use: denies  Alcohol use: denies  Illicit drug use: denies    Symptoms:   Easy bleeding: no  Easy bruising: no  Frequent nose bleeds: no  Chest pain: no  Cough: no  Dyspnea on exertion:no  Edema: no  Palpitations: no  Wheezing: no    Living situation: Patient lives with his wife in a [place] home  He will be caring for himself after surgery  hedoes not have post-op concerns       The following portions of the patient's history were reviewed and updated as appropriate: allergies, current medications, past family history, past medical history, past social history, past surgical history and problem list     Review of Systems  Review of Systems   Constitutional: Negative for activity change, appetite change, chills, diaphoresis and fever  HENT: Negative for congestion, ear discharge, ear pain, postnasal drip, rhinorrhea, sinus pressure, sinus pain and sore throat  Eyes: Negative for pain, discharge, itching and visual disturbance  Respiratory: Negative for cough, chest tightness, shortness of breath and wheezing  Cardiovascular: Negative for chest pain, palpitations and leg swelling  Gastrointestinal: Negative for abdominal pain, constipation, diarrhea, nausea and vomiting  Endocrine: Negative for polydipsia, polyphagia and polyuria  Genitourinary: Negative for difficulty urinating, dysuria and urgency  Musculoskeletal: Negative for arthralgias, back pain and neck pain  Skin: Negative for rash and wound  Neurological: Positive for dizziness  Negative for weakness, numbness and headaches          Reports that he has been having dizziness for quite some time, denies falls at home, reports that he feels dizzy when lying down and turning his head to the right, denies dizziness with change of position         Past Medical History:   Diagnosis Date    Diabetes mellitus ([address] )     Hyperlipidemia     Hypertension      Past Surgical History:   Procedure Laterality Date    FLAP LOCAL HEAD / NECK Left 10/12/2021    Procedure: RECONSTRUCTION OF LEFT TEMPLE DEFECT AFTER RESCECTION MELANOMA;  Surgeon: Vinod Thao MD;  [facility];  Service: Plastics    FULL THICKNESS SKIN GRAFT Left 10/12/2021    Procedure: SKIN GRAFT FULL THICKNESS LEFT TEMPLE;  Surgeon: Vinod Thao MD;  [facility];  Service: Plastics    GALLBLADDER SURGERY      HAND SURGERY Left     LYMPH NODE BIOPSY Left 10/12/2021    Procedure: BIOPSY LYMPH NODE SENTINEL, LYMPHOSCINTIGRAPHY, INTRAOPERATIVE LYMPHATIC MAPPING (INJECT AT 1200); Surgeon: Cristi Walters MD;  [facility];  Service: Surgical Oncology    NOSE SURGERY      SKIN CANCER EXCISION  10/2021    SKIN LESION EXCISION Left 10/12/2021    Procedure: [address];  Surgeon: Cristi Walters MD;  [facility];  Service: Surgical Oncology    SPLIT THICKNESS SKIN GRAFT Left 10/12/2021    Procedure: SKIN GRAFT SPLIT THICKNESS LEFT TEMPLE;  Surgeon: Jaqueline Anne MD;  [facility];  Service: Plastics    US GUIDED LYMPH NODE BIOPSY LEFT  3/21/2022     Social History     Tobacco Use    Smoking status: Never Smoker    Smokeless tobacco: Never Used   Vaping Use    Vaping Use: Never used   Substance Use Topics    Alcohol use: Not Currently    Drug use: Never     Family History   Problem Relation Age of Onset    No Known Problems Mother     No Known Problems Father     Colon cancer Other 60     Patient has no known allergies  Current Outpatient Medications   Medication Sig Dispense Refill    atenolol (TENORMIN) 100 mg tablet Take 1 tablet (100 mg total) by mouth daily 90 tablet 3    glimepiride (AMARYL) 4 mg tablet Take 1 tablet (4 mg total) by mouth daily 90 tablet 3    lisinopril-hydrochlorothiazide (PRINZIDE,ZESTORETIC) 20-25 MG per tablet Take 1 tablet by mouth daily 90 tablet 3    lovastatin (MEVACOR) 40 MG tablet Take 1 tablet (40 mg total) by mouth daily 90 tablet 3    meclizine (ANTIVERT) 12 5 MG tablet Take 1 tablet (12 5 mg total) by mouth 3 (three) times a day as needed for dizziness for up to 5 days 15 tablet 0    metFORMIN (GLUCOPHAGE) 1000 MG tablet Take 1 tablet (1,000 mg total) by mouth 2 (two) times a day with meals 180 tablet 3     No current facility-administered medications for this visit         Objective:       /80 Comment: sitting to standing  Pulse 62   Temp (!) 96 5 °F (35 8 °C) (Tympanic)   Ht 5' 7" (1 702 m)   Wt 77 6 kg (171 lb)   SpO2 95% Comment: ra  BMI 26 78 kg/m² Physical Exam  Constitutional:       General: He is not in acute distress  Appearance: He is well-developed  He is not diaphoretic  HENT:      Head: Normocephalic and atraumatic  Right Ear: External ear normal       Left Ear: External ear normal       Nose: Nose normal       Mouth/Throat:      Pharynx: No oropharyngeal exudate  Eyes:      General:         Right eye: No discharge  Left eye: No discharge  Conjunctiva/sclera: Conjunctivae normal       Pupils: Pupils are equal, round, and reactive to light  Neck:      Thyroid: No thyromegaly  Cardiovascular:      Rate and Rhythm: Normal rate and regular rhythm  Heart sounds: Normal heart sounds  No murmur heard  No friction rub  No gallop  Pulmonary:      Effort: Pulmonary effort is normal  No respiratory distress  Breath sounds: Normal breath sounds  No stridor  No wheezing or rales  Abdominal:      General: Bowel sounds are normal  There is no distension  Palpations: Abdomen is soft  Tenderness: There is no abdominal tenderness  Musculoskeletal:      Cervical back: Normal range of motion and neck supple  Lymphadenopathy:      Cervical: No cervical adenopathy  Skin:     General: Skin is warm and dry  Findings: No erythema or rash  Neurological:      Mental Status: He is alert and oriented to person, place, and time  Psychiatric:         Behavior: Behavior normal          Thought Content:  Thought content normal          Judgment: Judgment normal               Cardiographics  ECG: see attached         Lab Review   Ancillary Orders on 08/12/2022   Component Date Value    WBC 09/13/2022 9 66     RBC 09/13/2022 4 53     Hemoglobin 09/13/2022 13 6     Hematocrit 09/13/2022 40 5     MCV 09/13/2022 89     MCH 09/13/2022 30 0     MCHC 09/13/2022 33 6     RDW 09/13/2022 13 8     MPV 09/13/2022 10 4     Platelets 39/11/8587 128 (A)    nRBC 09/13/2022 0     Neutrophils Relative 09/13/2022 44     Immat GRANS % 2022 0     Lymphocytes Relative 2022 48 (A)    Monocytes Relative 2022 6     Eosinophils Relative 2022 2     Basophils Relative 2022 0     Neutrophils Absolute 2022 4 29     Immature Grans Absolute 2022 0 03     Lymphocytes Absolute 2022 4 50 (A)    Monocytes Absolute 2022 0 61     Eosinophils Absolute 2022 0 21     Basophils Absolute 2022 0 02     Sodium 2022 138     Potassium 2022 4 1     Chloride 2022 105     CO2 2022 26     ANION GAP 2022 7     BUN 2022 19     Creatinine 2022 1 37 (A)    Glucose 2022 174 (A)    Calcium 2022 8 8     AST 2022 14     ALT 2022 24     Alkaline Phosphatase 2022 77     Total Protein 2022 6 8     Albumin 2022 3 8     Total Bilirubin 2022 1 36 (A)    eGFR 2022 47     LD 2022 165     T3, Free 2022 2 50     Free T4 2022 0 91     TSH 3RD GENERATON 2022 2 030         Assessment:     79 y o  male with planned surgery as above  Known risk factors for perioperative complications: None        RCRI  High Risk surgery? 1 Point  CAD History:         1 Point   MI; Positive Stress Test; CP due to Mi;  Nitrate Usage to control Angina; Pathologic Q wave on EKG  CHF Active:         1 Point   Pulm Edema; Paroxysmal Nocturnal Dyspnea;  Bibasilar Rales (crackles);S3; CHF on CXR  Cerebrovascular Disease (TIA or CVA):     1 Point  DM on Insulin:        1 Point  Serum Creat >2 0 mg/dl:       1 Point          Total Points: 0     Scorin: Class I, Very Low Risk (0 4%)     1: Class II, Low risk (0 9%)     2: Class III Moderate (6 6%)     3: Class IV High (>11%)    Cardiac Risk Estimation: mild    Shaune Meals is cleared from a cardiovascular standpoint to proceed with surgery    he is at a mild risk from a cardiovascular standpoint at this time without any additional cardiac testing  Reevaluation needed if he should present with symptoms prior to surgery  Plan:  Preoperative workup as follows none  Change in medication regimen before surgery: Discuss with surgeon prior to surgery

## 2022-09-27 NOTE — ASSESSMENT & PLAN NOTE
Lab Results   Component Value Date    EGFR 47 09/13/2022    EGFR 47 08/01/2022    EGFR 47 06/20/2022    CREATININE 1 37 (H) 09/13/2022    CREATININE 1 36 (H) 08/01/2022    CREATININE 1 37 (H) 06/20/2022   Referral previously given to Nephrology due to microalbuminuria  Encourage good oral water intake  Avoid nephrotoxic medication

## 2022-09-29 ENCOUNTER — EVALUATION (OUTPATIENT)
Dept: PHYSICAL THERAPY | Age: 82
End: 2022-09-29
Payer: COMMERCIAL

## 2022-09-29 VITALS — HEART RATE: 60 BPM | SYSTOLIC BLOOD PRESSURE: 130 MMHG | DIASTOLIC BLOOD PRESSURE: 84 MMHG

## 2022-09-29 DIAGNOSIS — R26.9 ABNORMALITY OF GAIT: Primary | ICD-10-CM

## 2022-09-29 DIAGNOSIS — G20 PARKINSON'S DISEASE (HCC): ICD-10-CM

## 2022-09-29 DIAGNOSIS — R42 DIZZINESS: ICD-10-CM

## 2022-09-29 PROCEDURE — 97110 THERAPEUTIC EXERCISES: CPT

## 2022-09-29 PROCEDURE — 97163 PT EVAL HIGH COMPLEX 45 MIN: CPT

## 2022-09-30 NOTE — PROGRESS NOTES
PT Evaluation     Today's date: 2022  Patient name: Toma Frazier  : 1940  MRN: [id number]  Referring provider: Fredick Schwab, CR*  Dx:   Encounter Diagnosis     ICD-10-CM    1  Abnormality of gait  R26 9    2  Dizziness  R42 Ambulatory Referral to Physical Therapy   3   Parkinson's disease ([facility] )  G20                   Assessment/Plan    Subjective Evaluation    History of Present Illness  Onset date: worsening gait and c/o dizziness for the past 9 months           Recurrent probem    Quality of life: fair          Objective    Flowsheet Rows    Flowsheet Row Most Recent Value   PT/OT G-Codes    Current Score 39   Projected Score 61   Assessment Type Evaluation   G code set Mobility: Walking & Moving Around   Mobility: Walking and Moving Around Current Status () CK   Mobility: Walking and Moving Around Goal Status () CJ             Precautions: no known allergies      Manuals 22             I eval                                                    Neuro Re-Ed             Trial gait training with walker Cueing with turning 60 ft x 4            Squats (sit to stand ) 10 reps             Hamstring stretch             heelcord stretch             sidelying quad stretch             Eye head and vor exer              Forward /backward walking in ll bars, sidestepping supervision              Ther Ex             Ankle sway referencing flat and foam              Cervical upper trap and self rotation stretch             Chin tucks              Hand to opposite knee gait                                                                  Ther Activity                                       Gait Training                                       Modalities

## 2022-10-03 ENCOUNTER — TELEPHONE (OUTPATIENT)
Dept: NEPHROLOGY | Facility: CLINIC | Age: 82
End: 2022-10-03

## 2022-10-03 NOTE — TELEPHONE ENCOUNTER
Patient call to cancel his 10/12/22  Appointment did not want to r/s will call to make new appointment

## 2022-10-04 ENCOUNTER — OFFICE VISIT (OUTPATIENT)
Dept: PHYSICAL THERAPY | Age: 82
End: 2022-10-04
Payer: COMMERCIAL

## 2022-10-04 DIAGNOSIS — R26.9 ABNORMALITY OF GAIT: Primary | ICD-10-CM

## 2022-10-04 DIAGNOSIS — R42 DIZZINESS: ICD-10-CM

## 2022-10-04 PROCEDURE — 97110 THERAPEUTIC EXERCISES: CPT

## 2022-10-04 PROCEDURE — 97140 MANUAL THERAPY 1/> REGIONS: CPT

## 2022-10-05 NOTE — PROGRESS NOTES
Daily Note     Today's date: 10/4/2022  Patient name: Elda Collins  : 1940  MRN: [id number]  Referring provider: REGINA Hyde  Dx:   Encounter Diagnosis     ICD-10-CM    1  Abnormality of gait  R26 9    2  Dizziness  R42                   Subjective: Hill Burris PT supervising units       Pt stating dizziness with supine lying while wife was trying to put in eye drops per his report yesterday  Objective: See treatment diary below      Assessment: Tolerated treatment well  Patient would benefit from continued PT      Plan: Continue per plan of care  Recheck danita hallpike next session        Precautions: no known allergies      Manuals 9/29/22 10/4            I eval             Right epley manuever  perf and recheck danita hallpike           dixhallpike recheck                          Neuro Re-Ed             Trial gait training with walker Cueing with turning 60 ft x 4 reciprocal arm swing no assistive device increase step length           Squats (sit to stand ) 10 reps  10 reps            Hamstring stretch             heelcord stretch             sidelying quad stretch             Eye head and vor exer              Forward /backward walking in ll bars, sidestepping supervision   10 ft x 5 each           Ther Ex             Ankle sway referencing flat and foam   10 reps  EO, EC            Cervical upper trap and self rotation stretch             Chin tucks   5 reps 5 sec hold            Hand to opposite knee gait   Cg of 1 difficulty to perform 5 ft hold           Laq, hip flexion  2# 3 x 10                                                  Ther Activity                                       Gait Training                                       Modalities

## 2022-10-07 ENCOUNTER — OFFICE VISIT (OUTPATIENT)
Dept: PHYSICAL THERAPY | Age: 82
End: 2022-10-07
Payer: COMMERCIAL

## 2022-10-07 DIAGNOSIS — R42 VERTIGO: ICD-10-CM

## 2022-10-07 DIAGNOSIS — R26.9 ABNORMALITY OF GAIT: Primary | ICD-10-CM

## 2022-10-07 PROCEDURE — 97110 THERAPEUTIC EXERCISES: CPT

## 2022-10-07 PROCEDURE — 97140 MANUAL THERAPY 1/> REGIONS: CPT

## 2022-10-07 NOTE — PROGRESS NOTES
Daily Note     Today's date: 10/7/2022  Patient name: Sherin Page  : 1940  MRN: [id number]  Referring provider: REGINA Mendez  Dx:   Encounter Diagnosis     ICD-10-CM    1  Abnormality of gait  R26 9    2  Vertigo  R42                   Subjective: I haven't been dizzy since last session "      Objective: See treatment diary below      Assessment: Tolerated treatment well  Patient would benefit from continued PT      Plan: Continue per plan of care        Precautions: no known allergies      Manuals 9/29/22 10/4 10/7           I eval             Right epley manuever  perf and recheck danita hallpike perf again          dixhallpike recheck   + on right j                       Neuro Re-Ed             Trial gait training with walker Cueing with turning 60 ft x 4 reciprocal arm swing no assistive device increase step length improved gait quality noted today no loss of balance with quick turn with gait          Squats (sit to stand ) 10 reps  10 reps  3 x 10          Hamstring stretch             heelcord stretch             sidelying quad stretch             Eye head and vor exer              Forward /backward walking in ll bars, sidestepping supervision   10 ft x 5 each 10 ft x 5          Ther Ex             Ankle sway referencing flat and foam   10 reps  EO, EC  10 reps           Cervical upper trap and self rotation stretch             Chin tucks   5 reps 5 sec hold  5 reps           Hand to opposite knee gait   Cg of 1 difficulty to perform 5 ft hold           Laq, hip flexion  2# 3 x 10                                                  Ther Activity                                       Gait Training                                       Modalities

## 2022-10-11 ENCOUNTER — SURGERY/PROCEDURE (OUTPATIENT)
Dept: URBAN - METROPOLITAN AREA SURGICAL CENTER 6 | Facility: SURGICAL CENTER | Age: 82
End: 2022-10-11

## 2022-10-11 DIAGNOSIS — H25.811: ICD-10-CM

## 2022-10-11 PROCEDURE — 66984 XCAPSL CTRC RMVL W/O ECP: CPT

## 2022-10-12 ENCOUNTER — 1 DAY POST-OP (OUTPATIENT)
Dept: URBAN - METROPOLITAN AREA CLINIC 6 | Facility: CLINIC | Age: 82
End: 2022-10-12

## 2022-10-12 DIAGNOSIS — Z96.1: ICD-10-CM

## 2022-10-12 PROCEDURE — 99024 POSTOP FOLLOW-UP VISIT: CPT

## 2022-10-12 ASSESSMENT — KERATOMETRY
OD_K2POWER_DIOPTERS: 46.50
OS_K1POWER_DIOPTERS: 46.00
OS_K2POWER_DIOPTERS: 46.25
OS_AXISANGLE_DEGREES: 160
OD_K1POWER_DIOPTERS: 45.75
OD_AXISANGLE_DEGREES: 166
OS_AXISANGLE2_DEGREES: 70
OD_AXISANGLE2_DEGREES: 76

## 2022-10-12 ASSESSMENT — VISUAL ACUITY: OD_SC: 20/25-2

## 2022-10-12 ASSESSMENT — TONOMETRY
OS_IOP_MMHG: 17
OD_IOP_MMHG: 17

## 2022-10-13 ASSESSMENT — KERATOMETRY
OD_K2POWER_DIOPTERS: 46.50
OS_AXISANGLE2_DEGREES: 70
OD_AXISANGLE2_DEGREES: 76
OD_AXISANGLE_DEGREES: 166
OS_K2POWER_DIOPTERS: 46.25
OS_AXISANGLE_DEGREES: 160
OD_K1POWER_DIOPTERS: 45.75
OS_K1POWER_DIOPTERS: 46.00

## 2022-10-19 ENCOUNTER — 1 WEEK POST-OP (OUTPATIENT)
Dept: URBAN - METROPOLITAN AREA CLINIC 6 | Facility: CLINIC | Age: 82
End: 2022-10-19

## 2022-10-19 DIAGNOSIS — H25.812: ICD-10-CM

## 2022-10-19 DIAGNOSIS — Z96.1: ICD-10-CM

## 2022-10-19 PROCEDURE — 92136 OPHTHALMIC BIOMETRY: CPT | Mod: 26,LT

## 2022-10-19 PROCEDURE — 99024 POSTOP FOLLOW-UP VISIT: CPT

## 2022-10-19 ASSESSMENT — KERATOMETRY
OD_K1POWER_DIOPTERS: 45.75
OS_K2POWER_DIOPTERS: 46.25
OS_AXISANGLE_DEGREES: 160
OD_AXISANGLE2_DEGREES: 76
OS_AXISANGLE2_DEGREES: 70
OS_K1POWER_DIOPTERS: 46.00
OD_K2POWER_DIOPTERS: 46.50
OD_AXISANGLE_DEGREES: 166

## 2022-10-19 ASSESSMENT — VISUAL ACUITY: OD_SC: 20/40-2

## 2022-10-19 ASSESSMENT — TONOMETRY
OS_IOP_MMHG: 15
OD_IOP_MMHG: 13

## 2022-10-24 ENCOUNTER — APPOINTMENT (OUTPATIENT)
Dept: LAB | Facility: IMAGING CENTER | Age: 82
End: 2022-10-24
Payer: COMMERCIAL

## 2022-10-24 DIAGNOSIS — E80.6 HYPERBILIRUBINEMIA: ICD-10-CM

## 2022-10-24 DIAGNOSIS — I10 BENIGN ESSENTIAL HTN: ICD-10-CM

## 2022-10-24 DIAGNOSIS — R80.9 MICROALBUMINURIA: ICD-10-CM

## 2022-10-24 DIAGNOSIS — E78.2 MIXED HYPERLIPIDEMIA: ICD-10-CM

## 2022-10-24 DIAGNOSIS — E11.9 TYPE 2 DIABETES MELLITUS WITHOUT COMPLICATION, WITHOUT LONG-TERM CURRENT USE OF INSULIN (HCC): ICD-10-CM

## 2022-10-24 LAB
ALBUMIN SERPL BCP-MCNC: 3.9 G/DL (ref 3.5–5)
ALP SERPL-CCNC: 85 U/L (ref 46–116)
ALT SERPL W P-5'-P-CCNC: 32 U/L (ref 12–78)
ANION GAP SERPL CALCULATED.3IONS-SCNC: 5 MMOL/L (ref 4–13)
AST SERPL W P-5'-P-CCNC: 19 U/L (ref 5–45)
BILIRUB SERPL-MCNC: 1.5 MG/DL (ref 0.2–1)
BUN SERPL-MCNC: 33 MG/DL (ref 5–25)
CALCIUM SERPL-MCNC: 8.8 MG/DL (ref 8.3–10.1)
CHLORIDE SERPL-SCNC: 107 MMOL/L (ref 96–108)
CO2 SERPL-SCNC: 28 MMOL/L (ref 21–32)
CREAT SERPL-MCNC: 1.47 MG/DL (ref 0.6–1.3)
EST. AVERAGE GLUCOSE BLD GHB EST-MCNC: 134 MG/DL
GFR SERPL CREATININE-BSD FRML MDRD: 43 ML/MIN/1.73SQ M
GLUCOSE P FAST SERPL-MCNC: 194 MG/DL (ref 65–99)
HBA1C MFR BLD: 6.3 %
POTASSIUM SERPL-SCNC: 3.9 MMOL/L (ref 3.5–5.3)
PROT SERPL-MCNC: 6.7 G/DL (ref 6.4–8.4)
SODIUM SERPL-SCNC: 140 MMOL/L (ref 135–147)

## 2022-10-24 PROCEDURE — 83036 HEMOGLOBIN GLYCOSYLATED A1C: CPT

## 2022-10-24 PROCEDURE — 80053 COMPREHEN METABOLIC PANEL: CPT

## 2022-10-24 RX ORDER — SODIUM CHLORIDE 9 MG/ML
20 INJECTION, SOLUTION INTRAVENOUS ONCE
Status: CANCELLED | OUTPATIENT
Start: 2022-10-31

## 2022-10-25 ENCOUNTER — SURGERY/PROCEDURE (OUTPATIENT)
Dept: URBAN - METROPOLITAN AREA SURGICAL CENTER 6 | Facility: SURGICAL CENTER | Age: 82
End: 2022-10-25

## 2022-10-25 DIAGNOSIS — H25.812: ICD-10-CM

## 2022-10-25 PROCEDURE — 66984 XCAPSL CTRC RMVL W/O ECP: CPT | Mod: 79,LT

## 2022-10-26 ENCOUNTER — 1 DAY POST-OP (OUTPATIENT)
Dept: URBAN - METROPOLITAN AREA CLINIC 6 | Facility: CLINIC | Age: 82
End: 2022-10-26

## 2022-10-26 DIAGNOSIS — Z96.1: ICD-10-CM

## 2022-10-26 PROCEDURE — 99024 POSTOP FOLLOW-UP VISIT: CPT

## 2022-10-26 ASSESSMENT — KERATOMETRY
OS_AXISANGLE_DEGREES: 160
OD_K1POWER_DIOPTERS: 45.75
OD_K2POWER_DIOPTERS: 46.50
OS_K1POWER_DIOPTERS: 46.00
OS_K2POWER_DIOPTERS: 46.25
OS_AXISANGLE2_DEGREES: 70
OD_AXISANGLE2_DEGREES: 76
OD_AXISANGLE_DEGREES: 166

## 2022-10-26 ASSESSMENT — VISUAL ACUITY
OS_SC: 20/30+2
OD_SC: 20/25

## 2022-10-26 ASSESSMENT — TONOMETRY
OS_IOP_MMHG: 13
OD_IOP_MMHG: 10

## 2022-10-27 ASSESSMENT — KERATOMETRY
OD_AXISANGLE_DEGREES: 166
OD_AXISANGLE2_DEGREES: 76
OD_K2POWER_DIOPTERS: 46.50
OS_K1POWER_DIOPTERS: 46.00
OD_K1POWER_DIOPTERS: 45.75
OS_K2POWER_DIOPTERS: 46.25
OS_AXISANGLE2_DEGREES: 70
OS_AXISANGLE_DEGREES: 160

## 2022-10-31 ENCOUNTER — HOSPITAL ENCOUNTER (OUTPATIENT)
Dept: INFUSION CENTER | Facility: CLINIC | Age: 82
Discharge: HOME | End: 2022-10-31

## 2022-10-31 ENCOUNTER — OFFICE VISIT (OUTPATIENT)
Dept: HEMATOLOGY ONCOLOGY | Facility: CLINIC | Age: 82
End: 2022-10-31

## 2022-10-31 VITALS
HEART RATE: 59 BPM | HEIGHT: 67 IN | OXYGEN SATURATION: 96 % | DIASTOLIC BLOOD PRESSURE: 84 MMHG | SYSTOLIC BLOOD PRESSURE: 138 MMHG | TEMPERATURE: 97.2 F | RESPIRATION RATE: 16 BRPM | BODY MASS INDEX: 25.98 KG/M2 | WEIGHT: 165.5 LBS

## 2022-10-31 VITALS
SYSTOLIC BLOOD PRESSURE: 134 MMHG | TEMPERATURE: 97.6 F | RESPIRATION RATE: 19 BRPM | DIASTOLIC BLOOD PRESSURE: 80 MMHG | HEART RATE: 59 BPM | OXYGEN SATURATION: 99 %

## 2022-10-31 DIAGNOSIS — N18.30 STAGE 3 CHRONIC KIDNEY DISEASE, UNSPECIFIED WHETHER STAGE 3A OR 3B CKD (HCC): ICD-10-CM

## 2022-10-31 DIAGNOSIS — C77.3 SECONDARY AND UNSPECIFIED MALIGNANT NEOPLASM OF AXILLA AND UPPER LIMB LYMPH NODES (HCC): ICD-10-CM

## 2022-10-31 DIAGNOSIS — C43.30 MALIGNANT MELANOMA OF SKIN OF FACE (HCC): Primary | ICD-10-CM

## 2022-10-31 DIAGNOSIS — C43.39 MALIGNANT MELANOMA OF FOREHEAD (HCC): ICD-10-CM

## 2022-10-31 DIAGNOSIS — Z79.899 HIGH RISK MEDICATION USE: ICD-10-CM

## 2022-10-31 RX ORDER — SODIUM CHLORIDE 9 MG/ML
20 INJECTION, SOLUTION INTRAVENOUS ONCE
Status: COMPLETED | OUTPATIENT
Start: 2022-10-31 | End: 2022-10-31

## 2022-10-31 RX ADMIN — SODIUM CHLORIDE 20 ML/HR: 0.9 INJECTION, SOLUTION INTRAVENOUS at 12:00

## 2022-10-31 RX ADMIN — SODIUM CHLORIDE 400 MG: 9 INJECTION, SOLUTION INTRAVENOUS at 12:36

## 2022-10-31 NOTE — PROGRESS NOTES
Patient is here for [facility]  Labs reviewed from 10/24, within treatment parameters  Patient resting with no complains  Will continue to monitor

## 2022-10-31 NOTE — ASSESSMENT & PLAN NOTE
After reviewing of labs patient is ok to undergo Cycle 6 of Keytruda infusion   We will continue monitoring for side effects and laboratory abnormalities  Will repeat labs prior to next visit  We will continue monitoring his SoB, dizziness and fatigue

## 2022-10-31 NOTE — PROGRESS NOTES
Patient tolerated infusion without complications  Patient aware of next appointment   Patient  provided AVS

## 2022-10-31 NOTE — PROGRESS NOTES
[facility] HEMATOLOGY ONCOLOGY SPECIALISTS [address] [facility] [address]  [phone]     Date of Visit: 10/31/2022  Name: Holly Riggs   YOB: 1940        Subjective    VISIT DIAGNOSIS:  Diagnoses and all orders for this visit:    Malignant melanoma of skin of face ([facility] [address] )  -     NM pet ct tumor imaging whole body; Future    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[facility] [address] )  -     NM pet ct tumor imaging whole body; Future    High risk medication use  -     NM pet ct tumor imaging whole body;  Future    Stage 3 chronic kidney disease, unspecified whether stage 3a or 3b CKD ([facility] [address] )        Oncology History   [facility]   9/8/2021 -  Cancer Staged    Staging form: Melanoma of the Skin, AJCC 8th Edition  - Clinical stage from 9/8/2021: Stage III (cT2a, cN1a, cM0) - Signed by Luly Huitron MD on 11/7/2021 9/8/2021 -  Cancer Staged    Staging form: Melanoma of the Skin, AJCC 8th Edition  - Pathologic stage from 9/8/2021: Stage IIIA (pT2a, pN1a, cM0) - Signed by Luly Huitron MD on 11/7/2021 9/22/2021 Initial Diagnosis    Malignant melanoma of skin of face ([facility] [address] )     4/4/2022 -  Chemotherapy    pembrolizumab (KEYTRUDA) IVPB, 400 mg, Intravenous, Once, 5 of 9 cycles  Administration: 400 mg (4/4/2022), 400 mg (5/16/2022), 400 mg (6/27/2022), 400 mg (8/8/2022), 400 mg (9/19/2022)     Malignant melanoma of forehead ([facility] [address] )   11/29/2021 Initial Diagnosis    Malignant melanoma of forehead ([facility] [address] )     4/4/2022 -  Chemotherapy    pembrolizumab (KEYTRUDA) IVPB, 400 mg, Intravenous, Once, 5 of 9 cycles  Administration: 400 mg (4/4/2022), 400 mg (5/16/2022), 400 mg (6/27/2022), 400 mg (8/8/2022), 400 mg (9/19/2022)        Cancer Staging  Malignant melanoma of skin of face [facility])  Staging form: Melanoma of the Skin, AJCC 8th Edition  - Clinical stage from 9/8/2021: Stage III (cT2a, cN1a, cM0) - Signed by Tsering Maloney Rodrigue Cottrell MD on 11/7/2021  - Pathologic stage from 9/8/2021: Stage IIIA (pT2a, pN1a, cM0) - Signed by Elizabeth Sawyer MD on 11/7/2021     Treatment Details   Treatment goal Curative   Plan Name OP Pembrolizumab Q 42 Days   Status Active   Start Date 4/4/2022   End Date 3/6/2023 (Planned)   Provider Elizabeth Sawyer MD   Chemotherapy pembrolizumab [facility]) IVPB, 400 mg, Intravenous, Once, 5 of 9 cycles  Administration: 400 mg (4/4/2022), 400 mg (5/16/2022), 400 mg (6/27/2022), 400 mg (8/8/2022), 400 mg (9/19/2022)          HISTORY OF PRESENT ILLNESS: Samanta Naqvi is a 79 y o  male  Who has a history of Stage IIIa malignant melanoma  Today he presents for follow up prior to his 6th Pembrolizumab infusion later today  He endorses dizziness which is not new, aggravated by changing positions and getting up from a sitting position for which patient has been following up with PT  Patient also endorses fatigue and decreased oral intake as his wife has ataxia and is in a wheelchair and cannot cook  He has been eating mostly box meals  Drinks approximately 1 cup of water, also drinks milk  Takes his medications with 1 cup of coffee in the morning  He has noted increased sputum production and dyspnea on exertion which has caused him to have a cough productive of whitish sputum, but he states that the Dominguez has been present for some time  He had cataract surgery 10/25 for the left eye and 2 weeks prior to that for the right eye with significant improvement in vision, however now he has to obtain reading glasses  Labs and imaging were reviewed with patient during this visit, most notably elevated BUN and Creatining which as we discussed was likely secondary to his diabetes (which is currently well controlled) and his poor oral intake  Augie Enciso states that he was supposed to follow up with nephrology, but has had to cancel his most recent appointment   He was advised to increase his water intake and follow up with nephrology as well as his PCP  REVIEW OF SYSTEMS:  Review of Systems   Constitutional: Positive for fatigue  Negative for appetite change, fever and unexpected weight change  HENT:   Positive for hearing loss  Negative for lump/mass and trouble swallowing  Eyes: Negative for eye problems and icterus  Respiratory: Negative for chest tightness, cough and shortness of breath  Cardiovascular: Negative for chest pain, leg swelling and palpitations  Gastrointestinal: Negative for abdominal distention, abdominal pain, diarrhea, nausea and vomiting  Endocrine: Negative for hot flashes  Genitourinary: Negative for difficulty urinating and hematuria  Musculoskeletal: Negative for arthralgias and myalgias  Skin: Negative for itching, rash and wound  Neurological: Positive for dizziness  Negative for extremity weakness, headaches, light-headedness and numbness  Hematological: Negative for adenopathy  Psychiatric/Behavioral: Negative for confusion and sleep disturbance          MEDICATIONS:    Current Outpatient Medications:   •  atenolol (TENORMIN) 100 mg tablet, Take 1 tablet (100 mg total) by mouth daily, Disp: 90 tablet, Rfl: 3  •  glimepiride (AMARYL) 4 mg tablet, Take 1 tablet (4 mg total) by mouth daily, Disp: 90 tablet, Rfl: 3  •  lisinopril-hydrochlorothiazide (PRINZIDE,ZESTORETIC) 20-25 MG per tablet, Take 1 tablet by mouth daily, Disp: 90 tablet, Rfl: 3  •  lovastatin (MEVACOR) 40 MG tablet, Take 1 tablet (40 mg total) by mouth daily, Disp: 90 tablet, Rfl: 3  •  metFORMIN (GLUCOPHAGE) 1000 MG tablet, Take 1 tablet (1,000 mg total) by mouth 2 (two) times a day with meals, Disp: 180 tablet, Rfl: 3  •  meclizine (ANTIVERT) 12 5 MG tablet, Take 1 tablet (12 5 mg total) by mouth 3 (three) times a day as needed for dizziness for up to 5 days, Disp: 15 tablet, Rfl: 0     ALLERGIES:  No Known Allergies     ACTIVE PROBLEMS:  Patient Active Problem List   Diagnosis   • Essential hypertension • Hiatal hernia with GERD   • Status post laparoscopic cholecystectomy   • Status post umbilical hernia repair, follow-up exam   • Type 2 diabetes mellitus without complication, without long-term current use of insulin (HCC)   • Mixed hyperlipidemia   • Parkinson's disease ([facility] [address] )   • Microalbuminuria   • Hyperbilirubinemia   • Mal[facility]   • Thrombocytopenia (HCC)   • Malignant melanoma of forehead (HCC)   • 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)   • Stage 3 chronic kidney disease, unspecified whether stage 3a or 3b CKD ([facility] )   • High risk medication use   • Dizziness          PAST MEDICAL HISTORY:   Past Medical History:   Diagnosis Date   • Diabetes mellitus ([place] [address] )    • Hyperlipidemia    • Hypertension         PAST SURGICAL HISTORY:  Past Surgical History:   Procedure Laterality Date   • FLAP LOCAL HEAD / NECK Left 10/12/2021    Procedure: RECONSTRUCTION OF LEFT TEMPLE DEFECT AFTER RESCECTION MELANOMA;  Surgeon: True Wellington MD;  [facility];  Service: Plastics   • FULL THICKNESS SKIN GRAFT Left 10/12/2021    Procedure: SKIN GRAFT FULL THICKNESS LEFT TEMPLE;  Surgeon: True Wellington MD;  [facility];  Service: Plastics   • GALLBLADDER SURGERY     • HAND SURGERY Left    • LYMPH NODE BIOPSY Left 10/12/2021    Procedure: BIOPSY LYMPH NODE SENTINEL, LYMPHOSCINTIGRAPHY, INTRAOPERATIVE LYMPHATIC MAPPING (INJECT AT 1200);   Surgeon: Angela Dang MD;  [facility];  Service: Surgical Oncology   • NOSE SURGERY     • SKIN CANCER EXCISION  10/2021   • SKIN LESION EXCISION Left 10/12/2021    Procedure: FACE MELANOMA SCAR WIDE EXCISION  FACE;  Surgeon: Anglea Dang MD;  [facility];  Service: Surgical Oncology   • SPLIT THICKNESS SKIN GRAFT Left 10/12/2021    Procedure: SKIN GRAFT SPLIT THICKNESS LEFT TEMPLE;  Surgeon: True Wellington MD;  [facility]: [facility];  Service: Plastics   • US GUIDED LYMPH NODE BIOPSY LEFT  3/21/2022        SOCIAL HISTORY:  Social History     Socioeconomic History   • Marital status: /Civil Union     Spouse name: None   • Number of children: None   • Years of education: None   • Highest education level: None   Occupational History   • None   Tobacco Use   • Smoking status: Never Smoker   • Smokeless tobacco: Never Used   Vaping Use   • Vaping Use: Never used   Substance and Sexual Activity   • Alcohol use: Not Currently   • Drug use: Never   • Sexual activity: Not Currently   Other Topics Concern   • None   Social History Narrative   • None     Social Determinants of Health     Financial Resource Strain: Not on file   Food Insecurity: Not on file   Transportation Needs: Not on file   Physical Activity: Not on file   Stress: Not on file   Social Connections: Not on file   Intimate Partner Violence: Not on file   Housing Stability: Not on file        FAMILY HISTORY:  Family History   Problem Relation Age of Onset   • No Known Problems Mother    • No Known Problems Father    • Colon cancer Other 60           Objective    PHYSICAL EXAMINATION:   Blood pressure 138/84, pulse 59, temperature (!) 97 2 °F (36 2 °C), resp  rate 16, height 5' 7" (1 702 m), weight 75 1 kg (165 lb 8 oz), SpO2 96 %  Pain Score: 0-No pain     ECOG Performance Status    Flowsheet Row Most Recent Value   ECOG Performance Status 1 - Restricted in physically strenuous activity but ambulatory and able to carry out work of a light or sedentary nature, e g , light house work, office work               Physical Exam  Constitutional:       General: He is not in acute distress  Appearance: Normal appearance  He is normal weight  He is not ill-appearing, toxic-appearing or diaphoretic  HENT:      Head: Normocephalic and atraumatic  Nose: Nose normal  No congestion or rhinorrhea  Mouth/Throat:      Mouth: Mucous membranes are moist       Pharynx: Oropharynx is clear     Eyes:      General: No scleral icterus  Right eye: No discharge  Left eye: No discharge  Extraocular Movements: Extraocular movements intact  Conjunctiva/sclera: Conjunctivae normal    Neck:      Comments: Nondiscrete fullness appreciated in the anterior cervical region bilaterally, no masses appreciated or discrete lymphadenopathy  Cardiovascular:      Rate and Rhythm: Normal rate and regular rhythm  Pulses: Normal pulses  Heart sounds: Normal heart sounds  No murmur heard  Pulmonary:      Effort: Pulmonary effort is normal  No respiratory distress  Breath sounds: Normal breath sounds  No wheezing, rhonchi or rales  Abdominal:      General: Abdomen is flat  Bowel sounds are normal  There is no distension  Palpations: Abdomen is soft  Tenderness: There is no abdominal tenderness  There is no guarding  Musculoskeletal:         General: Normal range of motion  Cervical back: Normal range of motion and neck supple  Right lower leg: No edema  Left lower leg: No edema  Lymphadenopathy:      Cervical: No cervical adenopathy  Skin:     General: Skin is warm and dry  Coloration: Skin is not jaundiced or pale  Comments: SK left lateral forehead   Neurological:      Mental Status: He is alert  Mental status is at baseline  Psychiatric:         Mood and Affect: Mood normal          Behavior: Behavior normal          Thought Content: Thought content normal          Judgment: Judgment normal          I reviewed lab data in the chart      WBC   Date Value Ref Range Status   10/24/2022 9 59 4 31 - 10 16 Thousand/uL Final   09/13/2022 9 66 4 31 - 10 16 Thousand/uL Final   08/01/2022 10 16 4 31 - 10 16 Thousand/uL Final     Hemoglobin   Date Value Ref Range Status   10/24/2022 13 7 12 0 - 17 0 g/dL Final   09/13/2022 13 6 12 0 - 17 0 g/dL Final   08/01/2022 13 9 12 0 - 17 0 g/dL Final     Platelets   Date Value Ref Range Status   10/24/2022 169 149 - 390 Thousands/uL Final 09/13/2022 128 (L) 149 - 390 Thousands/uL Final   08/01/2022 133 (L) 149 - 390 Thousands/uL Final     MCV   Date Value Ref Range Status   10/24/2022 91 82 - 98 fL Final   09/13/2022 89 82 - 98 fL Final   08/01/2022 90 82 - 98 fL Final      Potassium   Date Value Ref Range Status   10/24/2022 3 9 3 5 - 5 3 mmol/L Final   09/13/2022 4 1 3 5 - 5 3 mmol/L Final   08/01/2022 4 0 3 5 - 5 3 mmol/L Final     Chloride   Date Value Ref Range Status   10/24/2022 107 96 - 108 mmol/L Final   09/13/2022 105 96 - 108 mmol/L Final   08/01/2022 106 96 - 108 mmol/L Final     CO2   Date Value Ref Range Status   10/24/2022 28 21 - 32 mmol/L Final   09/13/2022 26 21 - 32 mmol/L Final   08/01/2022 29 21 - 32 mmol/L Final     BUN   Date Value Ref Range Status   10/24/2022 33 (H) 5 - 25 mg/dL Final   09/13/2022 19 5 - 25 mg/dL Final   08/01/2022 23 5 - 25 mg/dL Final     Creatinine   Date Value Ref Range Status   10/24/2022 1 47 (H) 0 60 - 1 30 mg/dL Final     Comment:     Standardized to IDMS reference method   09/13/2022 1 37 (H) 0 60 - 1 30 mg/dL Final     Comment:     Standardized to IDMS reference method   08/01/2022 1 36 (H) 0 60 - 1 30 mg/dL Final     Comment:     Standardized to IDMS reference method     Glucose   Date Value Ref Range Status   09/13/2022 174 (H) 65 - 140 mg/dL Final     Comment:     If the patient is fasting, the ADA then defines impaired fasting glucose as > 100 mg/dL and diabetes as > or equal to 123 mg/dL  Specimen collection should occur prior to Sulfasalazine administration due to the potential for falsely depressed results  Specimen collection should occur prior to Sulfapyridine administration due to the potential for falsely elevated results  04/26/2022 192 (H) 65 - 140 mg/dL Final     Comment:     If the patient is fasting, the ADA then defines impaired fasting glucose as > 100 mg/dL and diabetes as > or equal to 123 mg/dL    Specimen collection should occur prior to Sulfasalazine administration due to the potential for falsely depressed results  Specimen collection should occur prior to Sulfapyridine administration due to the potential for falsely elevated results  03/28/2022 205 (H) 65 - 140 mg/dL Final     Comment:     If the patient is fasting, the ADA then defines impaired fasting glucose as > 100 mg/dL and diabetes as > or equal to 123 mg/dL  Specimen collection should occur prior to Sulfasalazine administration due to the potential for falsely depressed results  Specimen collection should occur prior to Sulfapyridine administration due to the potential for falsely elevated results  Calcium   Date Value Ref Range Status   10/24/2022 8 8 8 3 - 10 1 mg/dL Final   09/13/2022 8 8 8 3 - 10 1 mg/dL Final   08/01/2022 9 0 8 3 - 10 1 mg/dL Final     Albumin   Date Value Ref Range Status   10/24/2022 3 9 3 5 - 5 0 g/dL Final   09/13/2022 3 8 3 5 - 5 0 g/dL Final   08/01/2022 3 7 3 5 - 5 0 g/dL Final     Total Bilirubin   Date Value Ref Range Status   10/24/2022 1 50 (H) 0 20 - 1 00 mg/dL Final     Comment:     Use of this assay is not recommended for patients undergoing treatment with eltrombopag due to the potential for falsely elevated results  09/13/2022 1 36 (H) 0 20 - 1 00 mg/dL Final     Comment:     Use of this assay is not recommended for patients undergoing treatment with eltrombopag due to the potential for falsely elevated results  08/01/2022 1 48 (H) 0 20 - 1 00 mg/dL Final     Comment:     Use of this assay is not recommended for patients undergoing treatment with eltrombopag due to the potential for falsely elevated results  Alkaline Phosphatase   Date Value Ref Range Status   10/24/2022 85 46 - 116 U/L Final   09/13/2022 77 46 - 116 U/L Final   08/01/2022 79 46 - 116 U/L Final     AST   Date Value Ref Range Status   10/24/2022 19 5 - 45 U/L Final     Comment:     Specimen collection should occur prior to Sulfasalazine administration due to the potential for falsely depressed results  09/13/2022 14 5 - 45 U/L Final     Comment:     Specimen collection should occur prior to Sulfasalazine administration due to the potential for falsely depressed results  08/01/2022 19 5 - 45 U/L Final     Comment:     Specimen collection should occur prior to Sulfasalazine administration due to the potential for falsely depressed results  ALT   Date Value Ref Range Status   10/24/2022 32 12 - 78 U/L Final     Comment:     Specimen collection should occur prior to Sulfasalazine and/or Sulfapyridine administration due to the potential for falsely depressed results  09/13/2022 24 12 - 78 U/L Final     Comment:     Specimen collection should occur prior to Sulfasalazine and/or Sulfapyridine administration due to the potential for falsely depressed results  08/01/2022 22 12 - 78 U/L Final     Comment:     Specimen collection should occur prior to Sulfasalazine and/or Sulfapyridine administration due to the potential for falsely depressed results  LD   Date Value Ref Range Status   10/24/2022 154 81 - 234 U/L Final   09/13/2022 165 81 - 234 U/L Final   08/01/2022 186 81 - 234 U/L Final     No results found for: TSH  No results found for: [id number]   Free T4   Date Value Ref Range Status   10/24/2022 0 97 0 76 - 1 46 ng/dL Final     Comment:     Specimen collection should occur prior to Sulfasalazine administration due to the potential for falsely elevated results  09/13/2022 0 91 0 76 - 1 46 ng/dL Final     Comment:     Specimen collection should occur prior to Sulfasalazine administration due to the potential for falsely elevated results  08/01/2022 0 81 0 76 - 1 46 ng/dL Final     Comment:     Specimen collection should occur prior to Sulfasalazine administration due to the potential for falsely elevated results  RECENT IMAGING:  No results found         Assessment    Assessment/Plan  Malignant melanoma of skin of face [facility])  Patient has been doing well, however endorses fatigue, dizziness and decreased PO intake  He will be undergoing cycle 6 of Pembrolizumab today  Labs and imaging were reviewed during this visit, notably elevate BUN and Cr, likely in the setting of poor PO intake as well as chronically elevated in the setting of T2DM  At this time he is ok to continue with Keytruda infusion later today  He will return in 6 weeks for his next treatment  Will repeat blood work and PET scan prior to next visit  Patient advised to increase intake of water  Ancil Rater verbalized understanding and is in agreement with plan  Stage 3 chronic kidney disease, unspecified whether stage 3a or 3b CKD ([facility] [address] )  Lab Results   Component Value Date    EGFR 43 10/24/2022    EGFR 47 09/13/2022    EGFR 47 08/01/2022    CREATININE 1 47 (H) 10/24/2022    CREATININE 1 37 (H) 09/13/2022    CREATININE 1 36 (H) 08/01/2022     Follow up with nephrology  Patient advised to increase intake of water    High risk medication use  After reviewing of labs patient is ok to undergo Cycle 6 of Keytruda infusion   We will continue monitoring for side effects and laboratory abnormalities  Will repeat labs prior to next visit  We will continue monitoring his SoB, dizziness and fatigue  Return in about 6 weeks (around 12/12/2022) for Office Visit, Infusion - See Treatment Plan, labs, scans

## 2022-10-31 NOTE — ASSESSMENT & PLAN NOTE
Patient has been doing well, however endorses fatigue, dizziness and decreased PO intake  He will be undergoing cycle 6 of Pembrolizumab today  Labs and imaging were reviewed during this visit, notably elevate BUN and Cr, likely in the setting of poor PO intake as well as chronically elevated in the setting of T2DM  At this time he is ok to continue with Keytruda infusion later today  He will return in 6 weeks for his next treatment  Will repeat blood work and PET scan prior to next visit  Patient advised to increase intake of water  Tim Lao verbalized understanding and is in agreement with plan

## 2022-10-31 NOTE — LETTER
November 2, 2022     David Villa MD  [address] [place] [address]    Patient: Padmini Diaz   YOB: 1940   Date of Visit: 10/31/2022       Dear Dr Kamla Huitron:    Thank you for referring Padmini Diaz to me for evaluation  Below are my notes for this consultation  If you have questions, please do not hesitate to call me  I look forward to following your patient along with you  Sincerely,        Wisam Sosa MD        CC: MD Víctor Schwartz MD Ada Docker, MD Iva Skeeter, MD  10/31/2022 11:41 AM  Attested  [address] HEMATOLOGY ONCOLOGY [facility] 15  88 [facility] 58  [phone]  [phone]     Date of Visit: 10/31/2022  Name: Padmini Diaz   YOB: 1940        Subjective    VISIT DIAGNOSIS:  Diagnoses and all orders for this visit:    Malignant melanoma of skin of face ([facility] [address] )  -     NM pet ct tumor imaging whole body; Future    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[address] )  -     NM pet ct tumor imaging whole body; Future    High risk medication use  -     NM pet ct tumor imaging whole body;  Future    Stage 3 chronic kidney disease, unspecified whether stage 3a or 3b CKD ([address] )        Oncology History   Malignant melanoma of skin of face ([facility] [address] )   9/8/2021 -  Cancer Staged    Staging form: Melanoma of the Skin, AJCC 8th Edition  - Clinical stage from 9/8/2021: Stage III (cT2a, cN1a, cM0) - Signed by Wisam Sosa MD on 11/7/2021 9/8/2021 -  Cancer Staged    Staging form: Melanoma of the Skin, AJCC 8th Edition  - Pathologic stage from 9/8/2021: Stage IIIA (pT2a, pN1a, cM0) - Signed by Wisam Sosa MD on 11/7/2021 9/22/2021 Initial Diagnosis    Malignant melanoma of skin of face ([facility] [address] )     4/4/2022 -  Chemotherapy    pembrolizumab (KEYTRUDA) IVPB, 400 mg, Intravenous, Once, 5 of 9 cycles  Administration: 400 mg (4/4/2022), 400 mg (5/16/2022), 400 mg (6/27/2022), 400 mg (8/8/2022), 400 mg (9/19/2022)     Malignant melanoma of forehead ([facility] [address] )   11/29/2021 Initial Diagnosis    Malignant melanoma of forehead ([facility] [address] )     4/4/2022 -  Chemotherapy    pembrolizumab (KEYTRUDA) IVPB, 400 mg, Intravenous, Once, 5 of 9 cycles  Administration: 400 mg (4/4/2022), 400 mg (5/16/2022), 400 mg (6/27/2022), 400 mg (8/8/2022), 400 mg (9/19/2022)        Cancer Staging  Malignant melanoma of skin of face [facility])  Staging form: Melanoma of the Skin, AJCC 8th Edition  - Clinical stage from 9/8/2021: Stage III (cT2a, cN1a, cM0) - Signed by Yocasta Tejeda MD on 11/7/2021  - Pathologic stage from 9/8/2021: Stage IIIA (pT2a, pN1a, cM0) - Signed by Yocasta Tejeda MD on 11/7/2021     Treatment Details   Treatment goal Curative   Plan Name OP Pembrolizumab Q 42 Days   Status Active   Start Date 4/4/2022   End Date 3/6/2023 (Planned)   Provider Yocasta Tejeda MD   Chemotherapy pembrolizumab [facility]) IVPB, 400 mg, Intravenous, Once, 5 of 9 cycles  Administration: 400 mg (4/4/2022), 400 mg (5/16/2022), 400 mg (6/27/2022), 400 mg (8/8/2022), 400 mg (9/19/2022)          HISTORY OF PRESENT ILLNESS: Chauncey Vela is a 79 y o  male  Who has a history of Stage IIIa malignant melanoma  Today he presents for follow up prior to his 6th Pembrolizumab infusion later today  He endorses dizziness which is not new, aggravated by changing positions and getting up from a sitting position for which patient has been following up with PT  Patient also endorses fatigue and decreased oral intake as his wife has ataxia and is in a wheelchair and cannot cook  He has been eating mostly box meals  Drinks approximately 1 cup of water, also drinks milk  Takes his medications with 1 cup of coffee in the morning     He has noted increased sputum production and dyspnea on exertion which has caused him to have a cough productive of whitish sputum, but he states that the Dominguez has been present for some time  He had cataract surgery 10/25 for the left eye and 2 weeks prior to that for the right eye with significant improvement in vision, however now he has to obtain reading glasses  Labs and imaging were reviewed with patient during this visit, most notably elevated BUN and Creatining which as we discussed was likely secondary to his diabetes (which is currently well controlled) and his poor oral intake  Lorraineannabel Angulo states that he was supposed to follow up with nephrology, but has had to cancel his most recent appointment  He was advised to increase his water intake and follow up with nephrology as well as his PCP  REVIEW OF SYSTEMS:  Review of Systems   Constitutional: Positive for fatigue  Negative for appetite change, fever and unexpected weight change  HENT:   Positive for hearing loss  Negative for lump/mass and trouble swallowing  Eyes: Negative for eye problems and icterus  Respiratory: Negative for chest tightness, cough and shortness of breath  Cardiovascular: Negative for chest pain, leg swelling and palpitations  Gastrointestinal: Negative for abdominal distention, abdominal pain, diarrhea, nausea and vomiting  Endocrine: Negative for hot flashes  Genitourinary: Negative for difficulty urinating and hematuria  Musculoskeletal: Negative for arthralgias and myalgias  Skin: Negative for itching, rash and wound  Neurological: Positive for dizziness  Negative for extremity weakness, headaches, light-headedness and numbness  Hematological: Negative for adenopathy  Psychiatric/Behavioral: Negative for confusion and sleep disturbance          MEDICATIONS:    Current Outpatient Medications:   •  atenolol (TENORMIN) 100 mg tablet, Take 1 tablet (100 mg total) by mouth daily, Disp: 90 tablet, Rfl: 3  •  glimepiride (AMARYL) 4 mg tablet, Take 1 tablet (4 mg total) by mouth daily, Disp: 90 tablet, Rfl: 3  •  lisinopril-hydrochlorothiazide (PRINZIDE,ZESTORETIC) 20-25 MG per tablet, Take 1 tablet by mouth daily, Disp: 90 tablet, Rfl: 3  •  lovastatin (MEVACOR) 40 MG tablet, Take 1 tablet (40 mg total) by mouth daily, Disp: 90 tablet, Rfl: 3  •  metFORMIN (GLUCOPHAGE) 1000 MG tablet, Take 1 tablet (1,000 mg total) by mouth 2 (two) times a day with meals, Disp: 180 tablet, Rfl: 3  •  meclizine (ANTIVERT) 12 5 MG tablet, Take 1 tablet (12 5 mg total) by mouth 3 (three) times a day as needed for dizziness for up to 5 days, Disp: 15 tablet, Rfl: 0     ALLERGIES:  No Known Allergies     ACTIVE PROBLEMS:  Patient Active Problem List   Diagnosis   • Essential hypertension   • Hiatal hernia with GERD   • Status post laparoscopic cholecystectomy   • Status post umbilical hernia repair, follow-up exam   • Type 2 diabetes mellitus without complication, without long-term current use of insulin (HCC)   • Mixed hyperlipidemia   • Parkinson's disease ([facility] [address] )   • Microalbuminuria   • Hyperbilirubinemia   • Malignant melanoma of skin of face (HCC)   • Thrombocytopenia (HCC)   • Malignant melanoma of forehead (HCC)   • 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)   • Stage 3 chronic kidney disease, unspecified whether stage 3a or 3b CKD ([facility] [address] )   • High risk medication use   • Dizziness          PAST MEDICAL HISTORY:   Past Medical History:   Diagnosis Date   • Diabetes mellitus ([facility] [address] )    • Hyperlipidemia    • Hypertension         PAST SURGICAL HISTORY:  Past Surgical History:   Procedure Laterality Date   • FLAP LOCAL HEAD / NECK Left 10/12/2021    Procedure: RECONSTRUCTION OF LEFT TEMPLE DEFECT AFTER RESCECTION MELANOMA;  Surgeon: Garcia Garcia MD;  [facility];  Service: Plastics   • FULL THICKNESS SKIN GRAFT Left 10/12/2021    Procedure: SKIN GRAFT FULL THICKNESS LEFT TEMPLE;  Surgeon: Garcia Garcia MD;  [facility];  Service: Plastics   • GALLBLADDER SURGERY     • HAND SURGERY Left    • LYMPH NODE BIOPSY Left 10/12/2021    Procedure: BIOPSY LYMPH NODE SENTINEL, LYMPHOSCINTIGRAPHY, INTRAOPERATIVE LYMPHATIC MAPPING (INJECT AT 1200); Surgeon: Lupe Bryant MD;  [facility];  Service: Surgical Oncology   • NOSE SURGERY     • SKIN CANCER EXCISION  10/2021   • SKIN LESION EXCISION Left 10/12/2021    Procedure: [address];  Surgeon: Lupe Bryant MD;  [facility];  Service: Surgical Oncology   • SPLIT THICKNESS SKIN GRAFT Left 10/12/2021    Procedure: SKIN GRAFT SPLIT THICKNESS LEFT TEMPLE;  Surgeon: Rubén Sullivan MD;  [facility];  Service: Plastics   • US GUIDED LYMPH NODE BIOPSY LEFT  3/21/2022        SOCIAL HISTORY:  Social History     Socioeconomic History   • Marital status: /Civil Union     Spouse name: None   • Number of children: None   • Years of education: None   • Highest education level: None   Occupational History   • None   Tobacco Use   • Smoking status: Never Smoker   • Smokeless tobacco: Never Used   Vaping Use   • Vaping Use: Never used   Substance and Sexual Activity   • Alcohol use: Not Currently   • Drug use: Never   • Sexual activity: Not Currently   Other Topics Concern   • None   Social History Narrative   • None     Social Determinants of Health     Financial Resource Strain: Not on file   Food Insecurity: Not on file   Transportation Needs: Not on file   Physical Activity: Not on file   Stress: Not on file   Social Connections: Not on file   Intimate Partner Violence: Not on file   Housing Stability: Not on file        FAMILY HISTORY:  Family History   Problem Relation Age of Onset   • No Known Problems Mother    • No Known Problems Father    • Colon cancer Other 60           Objective    PHYSICAL EXAMINATION:   Blood pressure 138/84, pulse 59, temperature (!) 97 2 °F (36 2 °C), resp  rate 16, height 5' 7" (1 702 m), weight 75 1 kg (165 lb 8 oz), SpO2 96 %       Pain Score: 0-No pain     ECOG Performance Status    Flowsheet Row Most Recent Value   ECOG Performance Status 1 - Restricted in physically strenuous activity but ambulatory and able to carry out work of a light or sedentary nature, e g , light house work, office work               Physical Exam  Constitutional:       General: He is not in acute distress  Appearance: Normal appearance  He is normal weight  He is not ill-appearing, toxic-appearing or diaphoretic  HENT:      Head: Normocephalic and atraumatic  Nose: Nose normal  No congestion or rhinorrhea  Mouth/Throat:      Mouth: Mucous membranes are moist       Pharynx: Oropharynx is clear  Eyes:      General: No scleral icterus  Right eye: No discharge  Left eye: No discharge  Extraocular Movements: Extraocular movements intact  Conjunctiva/sclera: Conjunctivae normal    Neck:      Comments: Nondiscrete fullness appreciated in the anterior cervical region bilaterally, no masses appreciated or discrete lymphadenopathy  Cardiovascular:      Rate and Rhythm: Normal rate and regular rhythm  Pulses: Normal pulses  Heart sounds: Normal heart sounds  No murmur heard  Pulmonary:      Effort: Pulmonary effort is normal  No respiratory distress  Breath sounds: Normal breath sounds  No wheezing, rhonchi or rales  Abdominal:      General: Abdomen is flat  Bowel sounds are normal  There is no distension  Palpations: Abdomen is soft  Tenderness: There is no abdominal tenderness  There is no guarding  Musculoskeletal:         General: Normal range of motion  Cervical back: Normal range of motion and neck supple  Right lower leg: No edema  Left lower leg: No edema  Lymphadenopathy:      Cervical: No cervical adenopathy  Skin:     General: Skin is warm and dry  Coloration: Skin is not jaundiced or pale  Comments: SK left lateral forehead   Neurological:      Mental Status: He is alert  Mental status is at baseline     Psychiatric:         Mood and Affect: Mood normal  Behavior: Behavior normal          Thought Content: Thought content normal          Judgment: Judgment normal          I reviewed lab data in the chart      WBC   Date Value Ref Range Status   10/24/2022 9 59 4 31 - 10 16 Thousand/uL Final   09/13/2022 9 66 4 31 - 10 16 Thousand/uL Final   08/01/2022 10 16 4 31 - 10 16 Thousand/uL Final     Hemoglobin   Date Value Ref Range Status   10/24/2022 13 7 12 0 - 17 0 g/dL Final   09/13/2022 13 6 12 0 - 17 0 g/dL Final   08/01/2022 13 9 12 0 - 17 0 g/dL Final     Platelets   Date Value Ref Range Status   10/24/2022 169 149 - 390 Thousands/uL Final   09/13/2022 128 (L) 149 - 390 Thousands/uL Final   08/01/2022 133 (L) 149 - 390 Thousands/uL Final     MCV   Date Value Ref Range Status   10/24/2022 91 82 - 98 fL Final   09/13/2022 89 82 - 98 fL Final   08/01/2022 90 82 - 98 fL Final      Potassium   Date Value Ref Range Status   10/24/2022 3 9 3 5 - 5 3 mmol/L Final   09/13/2022 4 1 3 5 - 5 3 mmol/L Final   08/01/2022 4 0 3 5 - 5 3 mmol/L Final     Chloride   Date Value Ref Range Status   10/24/2022 107 96 - 108 mmol/L Final   09/13/2022 105 96 - 108 mmol/L Final   08/01/2022 106 96 - 108 mmol/L Final     CO2   Date Value Ref Range Status   10/24/2022 28 21 - 32 mmol/L Final   09/13/2022 26 21 - 32 mmol/L Final   08/01/2022 29 21 - 32 mmol/L Final     BUN   Date Value Ref Range Status   10/24/2022 33 (H) 5 - 25 mg/dL Final   09/13/2022 19 5 - 25 mg/dL Final   08/01/2022 23 5 - 25 mg/dL Final     Creatinine   Date Value Ref Range Status   10/24/2022 1 47 (H) 0 60 - 1 30 mg/dL Final     Comment:     Standardized to IDMS reference method   09/13/2022 1 37 (H) 0 60 - 1 30 mg/dL Final     Comment:     Standardized to IDMS reference method   08/01/2022 1 36 (H) 0 60 - 1 30 mg/dL Final     Comment:     Standardized to IDMS reference method     Glucose   Date Value Ref Range Status   09/13/2022 174 (H) 65 - 140 mg/dL Final     Comment:     If the patient is fasting, the ADA then defines impaired fasting glucose as > 100 mg/dL and diabetes as > or equal to 123 mg/dL  Specimen collection should occur prior to Sulfasalazine administration due to the potential for falsely depressed results  Specimen collection should occur prior to Sulfapyridine administration due to the potential for falsely elevated results  04/26/2022 192 (H) 65 - 140 mg/dL Final     Comment:     If the patient is fasting, the ADA then defines impaired fasting glucose as > 100 mg/dL and diabetes as > or equal to 123 mg/dL  Specimen collection should occur prior to Sulfasalazine administration due to the potential for falsely depressed results  Specimen collection should occur prior to Sulfapyridine administration due to the potential for falsely elevated results  03/28/2022 205 (H) 65 - 140 mg/dL Final     Comment:     If the patient is fasting, the ADA then defines impaired fasting glucose as > 100 mg/dL and diabetes as > or equal to 123 mg/dL  Specimen collection should occur prior to Sulfasalazine administration due to the potential for falsely depressed results  Specimen collection should occur prior to Sulfapyridine administration due to the potential for falsely elevated results  Calcium   Date Value Ref Range Status   10/24/2022 8 8 8 3 - 10 1 mg/dL Final   09/13/2022 8 8 8 3 - 10 1 mg/dL Final   08/01/2022 9 0 8 3 - 10 1 mg/dL Final     Albumin   Date Value Ref Range Status   10/24/2022 3 9 3 5 - 5 0 g/dL Final   09/13/2022 3 8 3 5 - 5 0 g/dL Final   08/01/2022 3 7 3 5 - 5 0 g/dL Final     Total Bilirubin   Date Value Ref Range Status   10/24/2022 1 50 (H) 0 20 - 1 00 mg/dL Final     Comment:     Use of this assay is not recommended for patients undergoing treatment with eltrombopag due to the potential for falsely elevated results     09/13/2022 1 36 (H) 0 20 - 1 00 mg/dL Final     Comment:     Use of this assay is not recommended for patients undergoing treatment with eltrombopag due to the potential for falsely elevated results  08/01/2022 1 48 (H) 0 20 - 1 00 mg/dL Final     Comment:     Use of this assay is not recommended for patients undergoing treatment with eltrombopag due to the potential for falsely elevated results  Alkaline Phosphatase   Date Value Ref Range Status   10/24/2022 85 46 - 116 U/L Final   09/13/2022 77 46 - 116 U/L Final   08/01/2022 79 46 - 116 U/L Final     AST   Date Value Ref Range Status   10/24/2022 19 5 - 45 U/L Final     Comment:     Specimen collection should occur prior to Sulfasalazine administration due to the potential for falsely depressed results  09/13/2022 14 5 - 45 U/L Final     Comment:     Specimen collection should occur prior to Sulfasalazine administration due to the potential for falsely depressed results  08/01/2022 19 5 - 45 U/L Final     Comment:     Specimen collection should occur prior to Sulfasalazine administration due to the potential for falsely depressed results  ALT   Date Value Ref Range Status   10/24/2022 32 12 - 78 U/L Final     Comment:     Specimen collection should occur prior to Sulfasalazine and/or Sulfapyridine administration due to the potential for falsely depressed results  09/13/2022 24 12 - 78 U/L Final     Comment:     Specimen collection should occur prior to Sulfasalazine and/or Sulfapyridine administration due to the potential for falsely depressed results  08/01/2022 22 12 - 78 U/L Final     Comment:     Specimen collection should occur prior to Sulfasalazine and/or Sulfapyridine administration due to the potential for falsely depressed results         LD   Date Value Ref Range Status   10/24/2022 154 81 - 234 U/L Final   09/13/2022 165 81 - 234 U/L Final   08/01/2022 186 81 - 234 U/L Final     No results found for: TSH  No results found for: [id number]   Free T4   Date Value Ref Range Status   10/24/2022 0 97 0 76 - 1 46 ng/dL Final     Comment:     Specimen collection should occur prior to Sulfasalazine administration due to the potential for falsely elevated results  09/13/2022 0 91 0 76 - 1 46 ng/dL Final     Comment:     Specimen collection should occur prior to Sulfasalazine administration due to the potential for falsely elevated results  08/01/2022 0 81 0 76 - 1 46 ng/dL Final     Comment:     Specimen collection should occur prior to Sulfasalazine administration due to the potential for falsely elevated results  RECENT IMAGING:  No results found  Assessment    Assessment/Plan  Malignant melanoma of skin of face [facility])  Patient has been doing well, however endorses fatigue, dizziness and decreased PO intake  He will be undergoing cycle 6 of Pembrolizumab today  Labs and imaging were reviewed during this visit, notably elevate BUN and Cr, likely in the setting of poor PO intake as well as chronically elevated in the setting of T2DM  At this time he is ok to continue with Keytruda infusion later today  He will return in 6 weeks for his next treatment  Will repeat blood work and PET scan prior to next visit  Patient advised to increase intake of water  Cresencio Doyle verbalized understanding and is in agreement with plan  Stage 3 chronic kidney disease, unspecified whether stage 3a or 3b CKD ([facility] [address] )  Lab Results   Component Value Date    EGFR 43 10/24/2022    EGFR 47 09/13/2022    EGFR 47 08/01/2022    CREATININE 1 47 (H) 10/24/2022    CREATININE 1 37 (H) 09/13/2022    CREATININE 1 36 (H) 08/01/2022     Follow up with nephrology  Patient advised to increase intake of water    High risk medication use  After reviewing of labs patient is ok to undergo Cycle 6 of Keytruda infusion   We will continue monitoring for side effects and laboratory abnormalities  Will repeat labs prior to next visit  We will continue monitoring his SoB, dizziness and fatigue  Return in about 6 weeks (around 12/12/2022) for Office Visit, Infusion - See Treatment Plan, labs, scans       Attestation with edits by Robbin Jamison MD at 11/2/2022  8:18 AM:  I interviewed, took the history and examined the patient  I discussed the case with the Resident and reviewed the Resident’s note , prescribed medications, and orders placed  I supervised the Resident and I agree with the Resident management plan as it was presented to me  I was present in the clinic and examined the patient  Mr Bartley Sever is a 79 yr male with Stage IIIA melanoma on treatment with adjuvant pembrolizumab here for follow up and treatment  Doing ok and tolerating treatment  Denies immune mediated side effects  Stable to mildly worsened MCMILLAN   + sputum  Decreased eating due to caring for his wife and eating mainly readily made meals - boxed meals, frozen foods, etc       On exam, SK on right forehead/temple area  Fullness bilateral cervical LNs L>R but no discrete nodules  Labs reviewed and ok to continue with treatment  Due for imaging at this time and will assess possible cervical LAD as well as overall status of disease  Ok for cycle 6 pembrolizumab today  Will return in six weeks for next treatment  Has standing orders for blood work  Orders placed for scans  He knows to call with any issues or concerns prior to his next visit       Robbin Jamison MD 11/02/22

## 2022-10-31 NOTE — ASSESSMENT & PLAN NOTE
Lab Results   Component Value Date    EGFR 43 10/24/2022    EGFR 47 09/13/2022    EGFR 47 08/01/2022    CREATININE 1 47 (H) 10/24/2022    CREATININE 1 37 (H) 09/13/2022    CREATININE 1 36 (H) 08/01/2022     Follow up with nephrology  Patient advised to increase intake of water

## 2022-11-02 ENCOUNTER — 1 WEEK POST-OP (OUTPATIENT)
Dept: URBAN - METROPOLITAN AREA CLINIC 6 | Facility: CLINIC | Age: 82
End: 2022-11-02

## 2022-11-02 DIAGNOSIS — Z96.1: ICD-10-CM

## 2022-11-02 PROCEDURE — 99024 POSTOP FOLLOW-UP VISIT: CPT

## 2022-11-02 ASSESSMENT — KERATOMETRY
OD_K1POWER_DIOPTERS: 45.75
OD_K2POWER_DIOPTERS: 46.50
OS_K2POWER_DIOPTERS: 46.25
OS_AXISANGLE2_DEGREES: 70
OD_AXISANGLE2_DEGREES: 76
OS_K1POWER_DIOPTERS: 46.00
OS_AXISANGLE_DEGREES: 160
OD_AXISANGLE_DEGREES: 166

## 2022-11-02 ASSESSMENT — TONOMETRY
OS_IOP_MMHG: 12
OD_IOP_MMHG: 11

## 2022-11-02 ASSESSMENT — VISUAL ACUITY
OS_SC: 20/20-1
OD_SC: 20/30
OU_CC: J1+

## 2022-11-03 ENCOUNTER — OFFICE VISIT (OUTPATIENT)
Dept: SURGICAL ONCOLOGY | Facility: CLINIC | Age: 82
End: 2022-11-03

## 2022-11-03 VITALS
TEMPERATURE: 98 F | RESPIRATION RATE: 18 BRPM | HEART RATE: 60 BPM | HEIGHT: 67 IN | WEIGHT: 169.4 LBS | SYSTOLIC BLOOD PRESSURE: 126 MMHG | DIASTOLIC BLOOD PRESSURE: 84 MMHG | BODY MASS INDEX: 26.59 KG/M2 | OXYGEN SATURATION: 98 %

## 2022-11-03 DIAGNOSIS — C43.30 MALIGNANT MELANOMA OF SKIN OF FACE (HCC): Primary | ICD-10-CM

## 2022-11-03 DIAGNOSIS — L98.9 SKIN LESION: ICD-10-CM

## 2022-11-03 NOTE — LETTER
November 3, 2022     Boby Bence, MD  [address] [facility]    Patient: Red Castillo   YOB: 1940   Date of Visit: 11/3/2022       Dear Dr Ryan Pierre: Thank you for referring Red Castillo to me for evaluation  Below are my notes for this consultation  If you have questions, please do not hesitate to call me  I look forward to following your patient along with you  Sincerely,        Caty Winslow MD        CC: No Recipients  Caty Winslow MD  11/3/2022  9:25 AM  Incomplete  Surgical Oncology F/u    [address] [facility] [address] [address] [phone]    Patient:  Red Castillo  1940  [id number]    [facility] provider: [facility]  [address]    Referring provider:  No referring provider defined for this encounter  Diagnoses and all orders for this visit:    Malignant melanoma of skin of face [facility])        Chief Complaint   Patient presents with   • Follow-up       No follow-ups on file      Oncology History   Malignant melanoma of skin of face ([facility] )   9/8/2021 -  Cancer Staged    Staging form: Melanoma of the Skin, AJCC 8th Edition  - Clinical stage from 9/8/2021: Stage III (cT2a, cN1a, cM0) - Signed by Boby Bence, MD on 11/7/2021 9/8/2021 -  Cancer Staged    Staging form: Melanoma of the Skin, AJCC 8th Edition  - Pathologic stage from 9/8/2021: Stage IIIA (pT2a, pN1a, cM0) - Signed by Boby Bence, MD on 11/7/2021 9/22/2021 Initial Diagnosis    Malignant melanoma of skin of face ([facility] )     4/4/2022 -  Chemotherapy    pembrolizumab (KEYTRUDA) IVPB, 400 mg, Intravenous, Once, 6 of 9 cycles  Administration: 400 mg (4/4/2022), 400 mg (5/16/2022), 400 mg (6/27/2022), 400 mg (8/8/2022), 400 mg (9/19/2022), 400 mg (10/31/2022)     Malignant melanoma of forehead ([facility] )   11/29/2021 Initial Diagnosis    Malignant melanoma of forehead ([facility] [address] )     4/4/2022 -  Chemotherapy    pembrolizumab (KEYTRUDA) IVPB, 400 mg, Intravenous, Once, 6 of 9 cycles  Administration: 400 mg (4/4/2022), 400 mg (5/16/2022), 400 mg (6/27/2022), 400 mg (8/8/2022), 400 mg (9/19/2022), 400 mg (10/31/2022)       STAGE IIIA: [id number]    Interval History 11/2022  Doing well  Is on pembro with Dr Bradly Pedraza  Tolerating treatment well  Had cycle 6 recently  Sched PET for near future  Continues to see derm    Interval History 5/3/22  The patient is feeling well today  He does complain of some numbness at the inferior margin of his skin graft  He has been actively following with Dr Bradly Pedraza  On ultrasound of his nodes for surveillance, there was some concern that there was a preauricular nodularity as well as within the left neck  An IR biopsy was performed, which was inconclusive  In discussion with Dr Bradly Pedraza, the decision was made to pursue adjuvant therapy followed by reimaging to determine whether there was a change in the characteristics  He has received his 1st dose of Keytruda  He feels he is doing relatively well with that, though he does complain of fatigue  No bone pain, headache, other systemic symptoms  Review of Systems  Complete ROS Surg Onc:   Constitutional: The patient denies new or recent history of general fatigue, no recent weight loss, no change in appetite  Eyes: No complaints of visual problems, no scleral icterus  ENT: No complaints of ear pain, no hoarseness, no difficulty swallowing,  no tinnitus and no new masses in head, oral cavity, or neck  Cardiovascular: No complaints of chest pain, no palpitations, no ankle edema  Respiratory: No complaints of shortness of breath, no cough  Gastrointestinal: No complaints of jaundice, no bloody stools, no pale stools  Genitourinary: No complaints of dysuria, no hematuria, no nocturia, no frequent urination, no urethral discharge     Musculoskeletal: No complaints of weakness, paralysis, joint stiffness or arthralgias  Integumentary: No complaints of rash, no new lesions  Neurological: No complaints of convulsions, no seizures, no dizziness  Hematologic/Lymphatic: No complaints of easy bruising  Endocrine:  No hot or cold intolerance  No polydipsia, polyphagia, or polyuria  Allergy/immunology:  No environmental allergies  No food allergies  Not immunocompromised  Patient Active Problem List   Diagnosis   • Essential hypertension   • Hiatal hernia with GERD   • Status post laparoscopic cholecystectomy   • Status post umbilical hernia repair, follow-up exam   • Type 2 diabetes mellitus without complication, without long-term current use of insulin ([facility] )   • Mixed hyperlipidemia   • Parkinson's disease ([facility] )   • Microalbuminuria   • Hyperbilirubinemia   • Malignant melanoma of skin of face ([facility] )   • Thrombocytopenia ([facility] )   • Malignant melanoma of forehead ([facility] )   • 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)   • Stage 3 chronic kidney disease, unspecified whether stage 3a or 3b CKD ([facility] )   • High risk medication use   • Dizziness     Past Medical History:   Diagnosis Date   • Diabetes mellitus ([facility] )    • Hyperlipidemia    • Hypertension      Past Surgical History:   Procedure Laterality Date   • FLAP LOCAL HEAD / NECK Left 10/12/2021    Procedure: RECONSTRUCTION OF LEFT TEMPLE DEFECT AFTER RESCECTION MELANOMA;  Surgeon: Brice Acevedo MD;  [facility];  Service: Plastics   • FULL THICKNESS SKIN GRAFT Left 10/12/2021    Procedure: SKIN GRAFT FULL THICKNESS LEFT TEMPLE;  Surgeon: Brice Acevedo MD;  [facility];  Service: Plastics   • GALLBLADDER SURGERY     • HAND SURGERY Left    • LYMPH NODE BIOPSY Left 10/12/2021    Procedure: BIOPSY LYMPH NODE SENTINEL, LYMPHOSCINTIGRAPHY, INTRAOPERATIVE LYMPHATIC MAPPING (INJECT AT 1200);   Surgeon: Naty Oconnor MD;  [facility];  Service: Surgical Oncology   • NOSE SURGERY     • SKIN CANCER EXCISION  10/2021   • SKIN LESION EXCISION Left 10/12/2021    Procedure: FACE MELANOMA SCAR WIDE EXCISION  FACE;  Surgeon: Lio Nash MD;  [facility];  Service: Surgical Oncology   • SPLIT THICKNESS SKIN GRAFT Left 10/12/2021    Procedure: SKIN GRAFT SPLIT THICKNESS LEFT TEMPLE;  Surgeon: Himanshu Reyes MD;  [facility];  Service: Plastics   • US GUIDED LYMPH NODE BIOPSY LEFT  3/21/2022     Family History   Problem Relation Age of Onset   • No Known Problems Mother    • No Known Problems Father    • Colon cancer Other 61     Social History     Socioeconomic History   • Marital status: /Civil Union     Spouse name: Not on file   • Number of children: Not on file   • Years of education: Not on file   • Highest education level: Not on file   Occupational History   • Not on file   Tobacco Use   • Smoking status: Never Smoker   • Smokeless tobacco: Never Used   Vaping Use   • Vaping Use: Never used   Substance and Sexual Activity   • Alcohol use: Not Currently   • Drug use: Never   • Sexual activity: Not Currently   Other Topics Concern   • Not on file   Social History Narrative   • Not on file     Social Determinants of Health     Financial Resource Strain: Not on file   Food Insecurity: Not on file   Transportation Needs: Not on file   Physical Activity: Not on file   Stress: Not on file   Social Connections: Not on file   Intimate Partner Violence: Not on file   Housing Stability: Not on file       Current Outpatient Medications:   •  atenolol (TENORMIN) 100 mg tablet, Take 1 tablet (100 mg total) by mouth daily, Disp: 90 tablet, Rfl: 3  •  glimepiride (AMARYL) 4 mg tablet, Take 1 tablet (4 mg total) by mouth daily, Disp: 90 tablet, Rfl: 3  •  lisinopril-hydrochlorothiazide (PRINZIDE,ZESTORETIC) 20-25 MG per tablet, Take 1 tablet by mouth daily, Disp: 90 tablet, Rfl: 3  •  lovastatin (MEVACOR) 40 MG tablet, Take 1 tablet (40 mg total) by mouth daily, Disp: 90 tablet, Rfl: 3  •  metFORMIN (GLUCOPHAGE) 1000 MG tablet, Take 1 tablet (1,000 mg total) by mouth 2 (two) times a day with meals, Disp: 180 tablet, Rfl: 3  •  meclizine (ANTIVERT) 12 5 MG tablet, Take 1 tablet (12 5 mg total) by mouth 3 (three) times a day as needed for dizziness for up to 5 days, Disp: 15 tablet, Rfl: 0  No Known Allergies    Vitals:    11/03/22 0847   BP: 126/84   Pulse: 60   Resp: 18   Temp: 98 °F (36 7 °C)   SpO2: 98%       Physical Exam     General: Appears well, appears stated age  Skin: Warm, anicteric  HEENT: Normocephalic, atraumatic; sclera aniceteric, mucous membranes moist  Well-healed graft site and pre-auricular incision  Several new pigments lesions between primary and node incision  Cardiopulmonary: RRR, Easy WOB, no BLE edema  Abd: Flat and soft, nontender, no masses appreciated, no hepatosplenomegaly  MSK: Symmetric, no cyanosis, no overt weakness  Lymphatic: No cervical, axillary or inguinal lymphadenopathy  Neuro: Affect appropriate, no gross motor abnormalities            Pathology:  Final Diagnosis   A  Lymph node, sentinel, #1,  pre-auricular, biopsy:  One lymph node with subcapsular rare melanoma cells consistent with sub-micrometastasis (<0 1 mm)  See synoptic report and note       B  Skin, left forehead, excision:  Residual invasive melanoma and scar (cicatrix), margins free  See synoptic report  Labs: Reviewed in EPIC    Imaging  No results found  I independently reviewed and interpreted the above laboratory and imaging data, including Dr Rachel Oswald consultation, PET scans, US  I discussed this pt with Dr Romelia Guadarrama  Discussion/Summary:   79year-old gentleman status post wide local excision with sentinel lymph node biopsy of what ultimately was determined to be a [id number] melanoma 10/2021  Ultrasound of the neck completed earlier this year revealed concern for recurrence in the preauricular region as well as the left cervical chain    He underwent biopsy of this which was inconclusive, and elected per to pursue adjuvant therapy and follow clinically  He is doing well with this and has received 6 treatments  PET sched for Dec  Today I performed punch bx of one of these new pigmented lesions - most likely SK but new and thus biopsied  Will call with results  Will cont pembro and plan for PET  Biopsy    Date/Time: 11/3/2022 9:16 AM  Performed by: Deb Overton MD  Authorized by: Deb Overton MD     Procedure Details - Skin Biopsy:     Biopsy method: punch biopsy      Malignancy: malignancy unknown       Hx melanoma with lesions concerning for in transit disease  Punch biopsy performed today  Lesion infiltrated with local and punch performed  2 mm punch  Covered with baci and dry dressing

## 2022-11-03 NOTE — PROGRESS NOTES
Surgical Oncology F/u    [address] [facility] SURGICAL ONCOLOGY [address]  [facility] [id number] [phone]    Patient:  Kelvin Franklin  1940  [id number]    Primary Care provider: MD [address]    Referring provider:  No referring provider defined for this encounter  Diagnoses and all orders for this visit:    Malignant melanoma of skin of face [facility])        Chief Complaint   Patient presents with   • Follow-up       No follow-ups on file  Oncology History   Malignant melanoma of skin of face ([facility] [address] )   9/8/2021 -  Cancer Staged    Staging form: Melanoma of the Skin, AJCC 8th Edition  - Clinical stage from 9/8/2021: Stage III (cT2a, cN1a, cM0) - Signed by Ester Penny MD on 11/7/2021 9/8/2021 -  Cancer Staged    Staging form: Melanoma of the Skin, AJCC 8th Edition  - Pathologic stage from 9/8/2021: Stage IIIA (pT2a, pN1a, cM0) - Signed by Ester Penny MD on 11/7/2021 9/22/2021 Initial Diagnosis    Malignant melanoma of skin of face ([facility] [address] )     4/4/2022 -  Chemotherapy    pembrolizumab (KEYTRUDA) IVPB, 400 mg, Intravenous, Once, 6 of 9 cycles  Administration: 400 mg (4/4/2022), 400 mg (5/16/2022), 400 mg (6/27/2022), 400 mg (8/8/2022), 400 mg (9/19/2022), 400 mg (10/31/2022)     Malignant melanoma of forehead ([address] )   11/29/2021 Initial Diagnosis    Malignant melanoma of forehead ([facility] [address] )     4/4/2022 -  Chemotherapy    pembrolizumab (KEYTRUDA) IVPB, 400 mg, Intravenous, Once, 6 of 9 cycles  Administration: 400 mg (4/4/2022), 400 mg (5/16/2022), 400 mg (6/27/2022), 400 mg (8/8/2022), 400 mg (9/19/2022), 400 mg (10/31/2022)       STAGE IIIA: [id number]    Interval History 11/2022  Doing well  Is on pembro with Dr Heidi Ray  Tolerating treatment well  Had cycle 6 recently  Sched PET for near future  Continues to see derm    Interval History 5/3/22  The patient is feeling well today    He does complain of some numbness at the inferior margin of his skin graft  He has been actively following with Dr Esperanza Erwin  On ultrasound of his nodes for surveillance, there was some concern that there was a preauricular nodularity as well as within the left neck  An IR biopsy was performed, which was inconclusive  In discussion with Dr Esperanza Erwin, the decision was made to pursue adjuvant therapy followed by reimaging to determine whether there was a change in the characteristics  He has received his 1st dose of Keytruda  He feels he is doing relatively well with that, though he does complain of fatigue  No bone pain, headache, other systemic symptoms  Review of Systems  Complete ROS Surg Onc:   Constitutional: The patient denies new or recent history of general fatigue, no recent weight loss, no change in appetite  Eyes: No complaints of visual problems, no scleral icterus  ENT: No complaints of ear pain, no hoarseness, no difficulty swallowing,  no tinnitus and no new masses in head, oral cavity, or neck  Cardiovascular: No complaints of chest pain, no palpitations, no ankle edema  Respiratory: No complaints of shortness of breath, no cough  Gastrointestinal: No complaints of jaundice, no bloody stools, no pale stools  Genitourinary: No complaints of dysuria, no hematuria, no nocturia, no frequent urination, no urethral discharge  Musculoskeletal: No complaints of weakness, paralysis, joint stiffness or arthralgias  Integumentary: No complaints of rash, no new lesions  Neurological: No complaints of convulsions, no seizures, no dizziness  Hematologic/Lymphatic: No complaints of easy bruising  Endocrine:  No hot or cold intolerance  No polydipsia, polyphagia, or polyuria  Allergy/immunology:  No environmental allergies  No food allergies  Not immunocompromised        Patient Active Problem List   Diagnosis   • Essential hypertension   • Hiatal hernia with GERD   • Status post laparoscopic cholecystectomy   • Status post umbilical hernia repair, follow-up exam   • Type 2 diabetes mellitus without complication, without long-term current use of insulin (HCC)   • Mixed hyperlipidemia   • Parkinson's disease ([facility] [address] )   • Microalbuminuria   • Hyperbilirubinemia   • Malignant [facility] )   • Thrombocytopenia (HCC)   • Malignant melanoma of forehead (HCC)   • 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)   • Stage 3 chronic kidney disease, unspecified whether stage 3a or 3b CKD ([facility] )   • High risk medication use   • Dizziness     Past Medical History:   Diagnosis Date   • Diabetes mellitus ([facility] )    • Hyperlipidemia    • Hypertension      Past Surgical History:   Procedure Laterality Date   • FLAP LOCAL HEAD / NECK Left 10/12/2021    Procedure: RECONSTRUCTION OF LEFT TEMPLE DEFECT AFTER RESCECTION MELANOMA;  Surgeon: Fernanda Chavez MD;  [facility];  Service: Plastics   • FULL THICKNESS SKIN GRAFT Left 10/12/2021    Procedure: SKIN GRAFT FULL THICKNESS LEFT TEMPLE;  Surgeon: Fernanda Chavez MD;  [facility];  Service: Plastics   • GALLBLADDER SURGERY     • HAND SURGERY Left    • LYMPH NODE BIOPSY Left 10/12/2021    Procedure: BIOPSY LYMPH NODE SENTINEL, LYMPHOSCINTIGRAPHY, INTRAOPERATIVE LYMPHATIC MAPPING (INJECT AT 1200);   Surgeon: Vladimir Dodson MD;  [facility];  Service: Surgical Oncology   • NOSE SURGERY     • SKIN CANCER EXCISION  10/2021   • SKIN LESION EXCISION Left 10/12/2021    Procedure: [address];  Surgeon: Vladimir Dodson MD;  [facility];  Service: Surgical Oncology   • SPLIT THICKNESS SKIN GRAFT Left 10/12/2021    Procedure: SKIN GRAFT SPLIT THICKNESS LEFT TEMPLE;  Surgeon: Fernanda Chavez MD;  [facility];  Service: Plastics   • US GUIDED LYMPH NODE BIOPSY LEFT  3/21/2022     Family History   Problem Relation Age of Onset   • No Known Problems Mother    • No Known Problems Father    • Colon cancer Other 61     Social History     Socioeconomic History   • Marital status: /Civil Union     Spouse name: Not on file   • Number of children: Not on file   • Years of education: Not on file   • Highest education level: Not on file   Occupational History   • Not on file   Tobacco Use   • Smoking status: Never Smoker   • Smokeless tobacco: Never Used   Vaping Use   • Vaping Use: Never used   Substance and Sexual Activity   • Alcohol use: Not Currently   • Drug use: Never   • Sexual activity: Not Currently   Other Topics Concern   • Not on file   Social History Narrative   • Not on file     Social Determinants of Health     Financial Resource Strain: Not on file   Food Insecurity: Not on file   Transportation Needs: Not on file   Physical Activity: Not on file   Stress: Not on file   Social Connections: Not on file   Intimate Partner Violence: Not on file   Housing Stability: Not on file       Current Outpatient Medications:   •  atenolol (TENORMIN) 100 mg tablet, Take 1 tablet (100 mg total) by mouth daily, Disp: 90 tablet, Rfl: 3  •  glimepiride (AMARYL) 4 mg tablet, Take 1 tablet (4 mg total) by mouth daily, Disp: 90 tablet, Rfl: 3  •  lisinopril-hydrochlorothiazide (PRINZIDE,ZESTORETIC) 20-25 MG per tablet, Take 1 tablet by mouth daily, Disp: 90 tablet, Rfl: 3  •  lovastatin (MEVACOR) 40 MG tablet, Take 1 tablet (40 mg total) by mouth daily, Disp: 90 tablet, Rfl: 3  •  metFORMIN (GLUCOPHAGE) 1000 MG tablet, Take 1 tablet (1,000 mg total) by mouth 2 (two) times a day with meals, Disp: 180 tablet, Rfl: 3  •  meclizine (ANTIVERT) 12 5 MG tablet, Take 1 tablet (12 5 mg total) by mouth 3 (three) times a day as needed for dizziness for up to 5 days, Disp: 15 tablet, Rfl: 0  No Known Allergies    Vitals:    11/03/22 0847   BP: 126/84   Pulse: 60   Resp: 18   Temp: 98 °F (36 7 °C)   SpO2: 98%       Physical Exam     General: Appears well, appears stated age  Skin: Warm, anicteric  HEENT: Normocephalic, atraumatic; sclera aniceteric, mucous membranes moist  Well-healed graft site and pre-auricular incision  Several new pigments lesions between primary and node incision  Cardiopulmonary: RRR, Easy WOB, no BLE edema  Abd: Flat and soft, nontender, no masses appreciated, no hepatosplenomegaly  MSK: Symmetric, no cyanosis, no overt weakness  Lymphatic: No cervical, axillary or inguinal lymphadenopathy  Neuro: Affect appropriate, no gross motor abnormalities            Pathology:  Final Diagnosis   A  Lymph node, sentinel, #1,  pre-auricular, biopsy:  One lymph node with subcapsular rare melanoma cells consistent with sub-micrometastasis (<0 1 mm)  See synoptic report and note       B  Skin, left forehead, excision:  Residual invasive melanoma and scar (cicatrix), margins free  See synoptic report  Labs: Reviewed in EPIC    Imaging  No results found  I independently reviewed and interpreted the above laboratory and imaging data, including Dr Rachel Oswald consultation, PET scans, US  I discussed this pt with Dr Romelia Guadarrama  Discussion/Summary:   79year-old gentleman status post wide local excision with sentinel lymph node biopsy of what ultimately was determined to be a [id number] melanoma 10/2021  Ultrasound of the neck completed earlier this year revealed concern for recurrence in the preauricular region as well as the left cervical chain  He underwent biopsy of this which was inconclusive, and elected per to pursue adjuvant therapy and follow clinically  He is doing well with this and has received 6 treatments  PET sched for Dec  Today I performed punch bx of one of these new pigmented lesions - most likely SK but new and thus biopsied  Will call with results  Will cont pembro and plan for PET       Biopsy    Date/Time: 11/3/2022 9:16 AM  Performed by: Litzy Phoenix MD  Authorized by: Litzy Phoenix MD     Procedure Details - Skin Biopsy:     Biopsy method: punch biopsy Malignancy: malignancy unknown       Hx melanoma with lesions concerning for in transit disease  Punch biopsy performed today  Lesion infiltrated with local and punch performed  2 mm punch  Covered with baci and dry dressing

## 2022-11-11 ENCOUNTER — OFFICE VISIT (OUTPATIENT)
Dept: PHYSICAL THERAPY | Age: 82
End: 2022-11-11

## 2022-11-11 DIAGNOSIS — R42 DIZZINESS: ICD-10-CM

## 2022-11-11 DIAGNOSIS — R26.9 ABNORMALITY OF GAIT: Primary | ICD-10-CM

## 2022-11-12 NOTE — PROGRESS NOTES
Daily Note     Today's date: 2022  Patient name: Mamie Delgado  : 1940  MRN: [id number]  Referring provider: RGEINA Palacios  Dx:   Encounter Diagnosis     ICD-10-CM    1  Abnormality of gait  R26 9    2  Dizziness  R42                   Subjective: "I am having much dizziness "      Objective: See treatment diary below      Assessment: Tolerated treatment well  Patient would benefit from continued PT  Left Bar b que roll f/b right epley post canal performed today by PT   Pt trialing rolling walking as unstable gait observed with pt to utilize if feeling unbalance or difficulty starting to walk ( possible hx of parkinson's disease noted )      Plan: Continue per plan of care        Precautions: no known allergies      Manuals 9/29/22 10/4 10/7 11/11          I eval             Right epley manuever  perf and recheck danita hallpike perf again Man after performing bar b que roll for left horiz canal         dixhallpike recheck   + on right j + right and left rolling                       Neuro Re-Ed             Trial gait training with walker Cueing with turning 60 ft x 4 reciprocal arm swing no assistive device increase step length improved gait quality noted today no loss of balance with quick turn with gait Rolling walker training 100ft x 2         Squats (sit to stand ) 10 reps  10 reps  3 x 10 3 x 10          Hamstring stretch    1 min x 1         heelcord stretch             sidelying quad stretch             Eye head and vor exer              Forward /backward walking in ll bars, sidestepping supervision   10 ft x 5 each 10 ft x 5          Ther Ex             Ankle sway referencing flat and foam   10 reps  EO, EC  10 reps  10         Cervical upper trap and self rotation stretch             Chin tucks   5 reps 5 sec hold  5 reps  5 reps          Hand to opposite knee gait   Cg of 1 difficulty to perform 5 ft hold           Laq, hip flexion  2# 3 x 10 Ther Activity                                       Gait Training                                       Modalities

## 2022-11-15 ENCOUNTER — OFFICE VISIT (OUTPATIENT)
Dept: PHYSICAL THERAPY | Age: 82
End: 2022-11-15

## 2022-11-15 DIAGNOSIS — R26.9 ABNORMALITY OF GAIT: Primary | ICD-10-CM

## 2022-11-15 DIAGNOSIS — R42 DIZZINESS: ICD-10-CM

## 2022-11-15 NOTE — PROGRESS NOTES
Daily Note     Today's date: 11/15/2022  Patient name: Sherin Page  : 1940  MRN: [id number]  Referring provider: REGINA Mendez  Dx:   Encounter Diagnosis     ICD-10-CM    1  Abnormality of gait  R26 9    2  Dizziness  R42                   Subjective: "I was good for about 5 days then it showed up again "      Objective: See treatment diary below      Assessment: Tolerated treatment well  Patient would benefit from continued PT      Plan: Continue per plan of care        Precautions: no known allergies      Manuals 9/29/22 10/4 10/7 11/11 11/15         I eval             Right epley manuever  perf and recheck danita hallpike perf again Man after performing bar b que roll for left horiz canal Right epley,  bilateral ant canal treatment   Then retest         dixhallpike recheck   + on right j + right and left rolling  30 min man                     Neuro Re-Ed             Trial gait training with walker Cueing with turning 60 ft x 4 reciprocal arm swing no assistive device increase step length improved gait quality noted today no loss of balance with quick turn with gait Rolling walker training 100ft x 2 No walker needed improved ambulation noted today        Squats (sit to stand ) 10 reps  10 reps  3 x 10 3 x 10  3 x 10        Hamstring stretch    1 min x 1         heelcord stretch             sidelying quad stretch             Eye head and vor exer              Forward /backward walking in ll bars, sidestepping supervision   10 ft x 5 each 10 ft x 5          Ther Ex             Ankle sway referencing flat and foam   10 reps  EO, EC  10 reps  10         Cervical upper trap and self rotation stretch             Chin tucks   5 reps 5 sec hold  5 reps  5 reps  10 reps         Hand to opposite knee gait   Cg of 1 difficulty to perform 5 ft hold           Laq, hip flexion  2# 3 x 10           Upper trap stretch, and levator     3 hold 15 sec        Self rotation stretch     3 hold 15 sec        scm stretch      3 hold 15 sec         Ther Activity             Corner pect stretch     5 reps 20 sec hold        Cervical isometrics     5 reps 5 sec hold         Gait Training                                       Modalities

## 2022-11-16 ENCOUNTER — RX CHECK (OUTPATIENT)
Dept: URBAN - METROPOLITAN AREA CLINIC 6 | Facility: CLINIC | Age: 82
End: 2022-11-16

## 2022-11-16 DIAGNOSIS — H52.13: ICD-10-CM

## 2022-11-16 PROCEDURE — 92015 DETERMINE REFRACTIVE STATE: CPT

## 2022-11-16 ASSESSMENT — KERATOMETRY
OS_K1POWER_DIOPTERS: 46.00
OS_K2POWER_DIOPTERS: 46.25
OS_AXISANGLE_DEGREES: 160
OS_AXISANGLE2_DEGREES: 70
OD_K2POWER_DIOPTERS: 46.50
OD_AXISANGLE2_DEGREES: 76
OD_K1POWER_DIOPTERS: 45.75
OD_AXISANGLE_DEGREES: 166

## 2022-11-16 ASSESSMENT — VISUAL ACUITY
OD_SC: 20/40-2
OS_SC: 20/25-1

## 2022-11-18 ENCOUNTER — OFFICE VISIT (OUTPATIENT)
Dept: PHYSICAL THERAPY | Age: 82
End: 2022-11-18

## 2022-11-18 DIAGNOSIS — R26.9 ABNORMALITY OF GAIT: ICD-10-CM

## 2022-11-18 DIAGNOSIS — R42 DIZZINESS: Primary | ICD-10-CM

## 2022-11-18 NOTE — PROGRESS NOTES
Daily Note     Today's date: 2022  Patient name: Solo Klein  : 1940  MRN: [id number]  Referring provider: REGINA Dela Cruz  Dx:   Encounter Diagnosis     ICD-10-CM    1  Dizziness  R42       2  Abnormality of gait  R26 9                      Subjective: "i'm 80-90 percent better "      Objective: See treatment diary below      Assessment: Tolerated treatment well  Patient demonstrated fatigue post treatment      Plan: Continue per plan of care  Instruct in right self epley maneuver        Precautions: no known allergies      Manuals 9/29/22 10/4 10/7 11/11 11/15 11/18        I eval             Right epley manuever  perf and recheck danita hallpike perf again Man after performing bar b que roll for left horiz canal Right epley,  bilateral ant canal treatment   Then retest  Right and bilateral ant canals'man       dixhallpike recheck   + on right j + right and left rolling  30 min man Man 15 min                    Neuro Re-Ed             Trial gait training with walker Cueing with turning 60 ft x 4 reciprocal arm swing no assistive device increase step length improved gait quality noted today no loss of balance with quick turn with gait Rolling walker training 100ft x 2 No walker needed improved ambulation noted today        Squats (sit to stand ) 10 reps  10 reps  3 x 10 3 x 10  3 x 10 3 x 10       Hamstring stretch    1 min x 1         heelcord stretch             sidelying quad stretch             Eye head and vor exer              Forward /backward walking in ll bars, sidestepping supervision   10 ft x 5 each 10 ft x 5   10 ft x 4       Ther Ex             Ankle sway referencing flat and foam   10 reps  EO, EC  10 reps  10         Cervical upper trap and self rotation stretch             Chin tucks   5 reps 5 sec hold  5 reps  5 reps  10 reps  10 reps        Hand to opposite knee gait   Cg of 1 difficulty to perform 5 ft hold    Cg of 1close s 40 ft x 2       Laq, hip flexion  2# 3 x 10 Upper trap stretch, and levator     3 hold 15 sec        Self rotation stretch     3 hold 15 sec        scm stretch      3 hold 15 sec         Ther Activity             Corner pect stretch     5 reps 20 sec hold        Cervical isometrics     5 reps 5 sec hold  5 reps 5 sec hold        Gait Training             Hurdles fwd, sidestepping in ll bars       Close super and bars present 10 ft x 8       Cane 1 inch height sidestepping and forward       10-ft  X 5       Modalities

## 2022-11-23 ENCOUNTER — OFFICE VISIT (OUTPATIENT)
Dept: PHYSICAL THERAPY | Age: 82
End: 2022-11-23

## 2022-11-23 ENCOUNTER — TELEPHONE (OUTPATIENT)
Dept: SURGICAL ONCOLOGY | Facility: CLINIC | Age: 82
End: 2022-11-23

## 2022-11-23 DIAGNOSIS — R42 DIZZINESS: Primary | ICD-10-CM

## 2022-11-23 DIAGNOSIS — R26.9 ABNORMALITY OF GAIT: ICD-10-CM

## 2022-11-23 NOTE — TELEPHONE ENCOUNTER
Tc to pt to review pathology results  Pt reports site healed well  Instructed to call with any further questions or concerns  Pt verbalized understanding and was appreciative of the call

## 2022-11-23 NOTE — PROGRESS NOTES
Daily Note     Today's date: 2022  Patient name: Morey Mortimer  : 1940  MRN: [id number]  Referring provider: REGINA Chopra  Dx:   Encounter Diagnosis     ICD-10-CM    1  Dizziness  R42       2  Abnormality of gait  R26 9           Start Time: 0800  Stop Time: 0900  Total time in clinic (min): 60 minutes    Subjective: "I felt very good for the past 5 days "      Objective: See treatment diary below      Assessment: Tolerated treatment well  Patient would benefit from continued PT      Plan: Continue per plan of care        Precautions: no known allergies      Manuals 9/29/22 10/4 10/7 11/11 11/15 11/18 11/23       I eval             Right epley manuever  perf and recheck danita hallpike perf again Man after performing bar b que roll for left horiz canal Right epley,  bilateral ant canal treatment   Then retest  Right and bilateral ant canals'man Right post and bilateral ant canals      dixhallpike recheck   + on right j + right and left rolling  30 min man Man 15 min Man 15 min +right post canal                   Neuro Re-Ed             Trial gait training with walker Cueing with turning 60 ft x 4 reciprocal arm swing no assistive device increase step length improved gait quality noted today no loss of balance with quick turn with gait Rolling walker training 100ft x 2 No walker needed improved ambulation noted today        Squats (sit to stand ) 10 reps  10 reps  3 x 10 3 x 10  3 x 10 3 x 10 3 x 10      Hamstring stretch    1 min x 1         heelcord stretch             sidelying quad stretch             Eye head and vor exer              Forward /backward walking in ll bars, sidestepping supervision   10 ft x 5 each 10 ft x 5   10 ft x 4 10 ft x 4      Ther Ex             Ankle sway referencing flat and foam   10 reps  EO, EC  10 reps  10   10      Cervical upper trap and self rotation stretch             Chin tucks   5 reps 5 sec hold  5 reps  5 reps  10 reps  10 reps  10 reps x 2      Hand to opposite knee gait   Cg of 1 difficulty to perform 5 ft hold    Cg of 1close s 40 ft x 2  Cg 40 ft x 2     Laq, hip flexion  2# 3 x 10           Upper trap stretch, and levator     3 hold 15 sec   3 hold 15 sec     Self rotation stretch     3 hold 15 sec   3 hold 15 sec     scm stretch      3 hold 15 sec         Ther Activity             Corner pect stretch     5 reps 20 sec hold   5 reps 20 sec      Cervical isometrics     5 reps 5 sec hold  5 reps 5 sec hold   5 reps 5 sec     Gait Training             Hurdles fwd, sidestepping in ll bars       Close super and bars present 10 ft x 8  Close s/ cg of 1 10 ft x 6     Cane 1 inch height sidestepping and forward       10-ft  X 5       Modalities

## 2022-11-28 ENCOUNTER — OFFICE VISIT (OUTPATIENT)
Dept: PHYSICAL THERAPY | Age: 82
End: 2022-11-28

## 2022-11-28 DIAGNOSIS — R42 DIZZINESS: ICD-10-CM

## 2022-11-28 DIAGNOSIS — R26.9 ABNORMALITY OF GAIT: Primary | ICD-10-CM

## 2022-11-28 NOTE — PROGRESS NOTES
PT Re-Evaluation     Today's date: 2022  Patient name: Cammie Baez  : 1940  MRN: [id number]  Referring provider: REGINA Wellington  Dx:   Encounter Diagnosis     ICD-10-CM    1  Abnormality of gait  R26 9       2  Dizziness  R42                      Assessment  Assessment details: Cammie Baez is an 79year old male presenting with improved balance and improving vertigo at this time  Tug has improved to 10 seconds  Today's session revealed a 2 beat nystagmus with right post canal testing  He does present with severe cervical rom limitation which has challenged the canolith repositioning technique  PT is recommended to continue at this time to ensure vertigo consistently stays reduced  Functional gait assessment   Impairments: abnormal or restricted ROM and impaired balance  Other impairment: signficant forward head posturing      Functional limitations: significant cervical rom restrictions improving to moderate and improved cervical stabilization notedUnderstanding of Dx/Px/POC: good   Prognosis: good    Goals  stg improve vertigo by 50 percent met  Improve sitting posture with use of lumbar roll and chin tuck exer in 2-3 weeks partially met  ltg :   Improve vertigo by 80 percent in 4 weeks   No loss of balance with quick turns with gait in 4 weeks  Partially met  Functional gait assessment to    Partially met   Pt able to perform self canolith repositioning prior to discharge not yet met       Plan  Patient would benefit from: skilled physical therapy  Referral necessary: Yes  Planned therapy interventions: manual therapy, canalith repositioning, balance, neuromuscular re-education, patient education, postural training, home exercise program, therapeutic activities, therapeutic exercise, strengthening and stretching  Frequency: 2x week  Duration in weeks: 8  Plan of Care beginning date: 2022  Plan of Care expiration date: 2023  Treatment plan discussed with: patient        Subjective Evaluation    History of Present Illness  Mechanism of injury: Norah Dick has made steady progress with improved balance and improved gait quality, in addition to today reporting this is the best he has felt in quite some time with noted decrease in right post canal BPPV symptoms with canolith repositioning  Recommend to continue PT 2-3 more weeks with possible d/c pending BPPV symptoms 80 percent reduced consistently  (today 90 percent improved in status  )  He is tolerating canolith repositioning with PT encouraging extra pillow usage with sleeping to elevate head  PT also focusing in on reducing his forward head posturing and cervical stabilization  Recurrent probem    Quality of life: good    Pain  Current pain ratin  At best pain ratin  At worst pain ratin  Location: c spine ( significant rom restrictions present at I eval now at moderate restriction  Quality: dull ache  Relieving factors: change in position, rest and heat  Progression: improved          Objective     Active Range of Motion   Cervical/Thoracic Spine       Cervical  Subcranial protraction:  WFL   Subcranial retraction: Active cervical subcranial retraction: 3/4 range improved  Flexion:  WFL  Extension: Neck active extension: 1/2 range       Left lateral flexion: Neck active lateral bend left: 1/4 range  Right lateral flexion: Neck active lateral bend right: 1/4 range       Left rotation: Neck active rotation left: 1/2 range   Right rotation: Neck active rotation right: 1/2 range            Additional Active Range of Motion Details  significant cervical restriction improved to moderate level   Pt given cervical rom exer, isometric exer at this time  rom restrictions limiting canolith repositioning techniques         Ambulation     Ambulation: Level Surfaces   Ambulation without assistive device: independent    Additional Level Surfaces Ambulation Details  250 ft,      TUG 10 sec, Functional gait assessment 26/30  Tandem gait close supervision 10 ft,  Tandem stance 30 sec, sharpened romberg pt falls in 10 sec,   Right post canal 2 beat nystagmus today  Right rolling slight vertigo , left roll neg,   Pt has benefitted from ant canal and right post canal canolith repositioning to date  Underlying neurological condition benefiting from PT hand to opposite  Knee gait high level balance activities at this time                  Precautions: no known allergies      Manuals 11/28/22            danita hallpike retest Right 2 beat nystagmus then gone            Rolling r/l  Right very slight no nystagmus            c prom stretch by PT             graston to c spine              Neuro Re-Ed/ there exer              Chin tucks  10 reps 5 sec hold             Cervical isometrics 5 reps 5 sec hold             Upper trap stretch 3 hold 15 sec            Levator stretch 3 hold 15 sec            Self rotation stretch 3 hold 15 sec            heelcord stretch with strap  1 min x 1            Hamstring stretch in longsit 20 sec x 5            Ther Ex             Ankle sway referencing flat and foam eo, ec close sup in ll bars 10 reps each            Tandem gait offset feet 1 min x 1            Tandem stance offset feet 30 sec x 3            Forward and backward walking in ll bars  10 ft x 4            Sidestepping in ll bars 10 ft x 4 no resistance then w/ theraband  10 ft x 4 with theraband red            Hurdles sidestepping and forward stepping cg of 1             Hand to opposite knee gait  40 ft x 2 close supervsion                          Ther Activity                                       Gait Training                                       Modalities

## 2022-11-29 ENCOUNTER — TELEMEDICINE (OUTPATIENT)
Dept: INTERNAL MEDICINE CLINIC | Facility: OTHER | Age: 82
End: 2022-11-29

## 2022-11-29 DIAGNOSIS — E11.9 TYPE 2 DIABETES MELLITUS WITHOUT COMPLICATION, WITHOUT LONG-TERM CURRENT USE OF INSULIN (HCC): ICD-10-CM

## 2022-11-29 DIAGNOSIS — Z13.6 SCREENING FOR CARDIOVASCULAR CONDITION: ICD-10-CM

## 2022-11-29 DIAGNOSIS — N18.30 STAGE 3 CHRONIC KIDNEY DISEASE, UNSPECIFIED WHETHER STAGE 3A OR 3B CKD (HCC): ICD-10-CM

## 2022-11-29 DIAGNOSIS — Z71.89 ADVANCED CARE PLANNING/COUNSELING DISCUSSION: ICD-10-CM

## 2022-11-29 DIAGNOSIS — D69.6 THROMBOCYTOPENIA (HCC): ICD-10-CM

## 2022-11-29 DIAGNOSIS — Z00.00 MEDICARE ANNUAL WELLNESS VISIT, SUBSEQUENT: ICD-10-CM

## 2022-11-29 DIAGNOSIS — I10 ESSENTIAL HYPERTENSION: Primary | ICD-10-CM

## 2022-11-29 PROBLEM — R42 DIZZINESS: Status: RESOLVED | Noted: 2022-09-27 | Resolved: 2022-11-29

## 2022-11-29 PROBLEM — Z79.899 HIGH RISK MEDICATION USE: Status: RESOLVED | Noted: 2022-09-19 | Resolved: 2022-11-29

## 2022-11-29 PROBLEM — E80.6 HYPERBILIRUBINEMIA: Status: RESOLVED | Noted: 2021-06-03 | Resolved: 2022-11-29

## 2022-11-29 PROBLEM — K13.70 ORAL LESION: Status: ACTIVE | Noted: 2022-11-29

## 2022-11-29 NOTE — PATIENT INSTRUCTIONS
Medicare Preventive Visit Patient Instructions  Thank you for completing your Welcome to Medicare Visit or Medicare Annual Wellness Visit today  Your next wellness visit will be due in one year (11/30/2023)  The screening/preventive services that you may require over the next 5-10 years are detailed below  Some tests may not apply to you based off risk factors and/or age  Screening tests ordered at today's visit but not completed yet may show as past due  Also, please note that scanned in results may not display below  Preventive Screenings:  Service Recommendations Previous Testing/Comments   Colorectal Cancer Screening  · Colonoscopy    · Fecal Occult Blood Test (FOBT)/Fecal Immunochemical Test (FIT)  · Fecal DNA/Cologuard Test  · Flexible Sigmoidoscopy Age: 39-70 years old   Colonoscopy: every 10 years (May be performed more frequently if at higher risk)  OR  FOBT/FIT: every 1 year  OR  Cologuard: every 3 years  OR  Sigmoidoscopy: every 5 years  Screening may be recommended earlier than age 39 if at higher risk for colorectal cancer  Also, an individualized decision between you and your healthcare provider will decide whether screening between the ages of 74-80 would be appropriate   Colonoscopy: Not on file  FOBT/FIT: Not on file  Cologuard: Not on file  Sigmoidoscopy: Not on file          Prostate Cancer Screening Individualized decision between patient and health care provider in men between ages of 53-78   Medicare will cover every 12 months beginning on the day after your 50th birthday PSA: No results in last 5 years     Screening Not Indicated     Hepatitis C Screening Once for adults born between 1945 and 1965  More frequently in patients at high risk for Hepatitis C Hep C Antibody: Not on file        Diabetes Screening 1-2 times per year if you're at risk for diabetes or have pre-diabetes Fasting glucose: 194 mg/dL (10/24/2022)  A1C: 6 3 % (10/24/2022)  Screening Not Indicated  History Diabetes Cholesterol Screening Once every 5 years if you don't have a lipid disorder  May order more often based on risk factors  Lipid panel: 05/10/2022  Screening Not Indicated  History Lipid Disorder      Other Preventive Screenings Covered by Medicare:  1  Abdominal Aortic Aneurysm (AAA) Screening: covered once if your at risk  You're considered to be at risk if you have a family history of AAA or a male between the age of 73-68 who smoking at least 100 cigarettes in your lifetime  2  Lung Cancer Screening: covers low dose CT scan once per year if you meet all of the following conditions: (1) Age 50-69; (2) No signs or symptoms of lung cancer; (3) Current smoker or have quit smoking within the last 15 years; (4) You have a tobacco smoking history of at least 20 pack years (packs per day x number of years you smoked); (5) You get a written order from a healthcare provider  3  Glaucoma Screening: covered annually if you're considered high risk: (1) You have diabetes OR (2) Family history of glaucoma OR (3)  aged 48 and older OR (3)  American aged 72 and older  3  Osteoporosis Screening: covered every 2 years if you meet one of the following conditions: (1) Have a vertebral abnormality; (2) On glucocorticoid therapy for more than 3 months; (3) Have primary hyperparathyroidism; (4) On osteoporosis medications and need to assess response to drug therapy  5  HIV Screening: covered annually if you're between the age of 12-76  Also covered annually if you are younger than 13 and older than 72 with risk factors for HIV infection  For pregnant patients, it is covered up to 3 times per pregnancy      Immunizations:  Immunization Recommendations   Influenza Vaccine Annual influenza vaccination during flu season is recommended for all persons aged >= 6 months who do not have contraindications   Pneumococcal Vaccine   * Pneumococcal conjugate vaccine = PCV13 (Prevnar 13), PCV15 (Vaxneuvance), PCV20 (Prevnar 20)  * Pneumococcal polysaccharide vaccine = PPSV23 (Pneumovax) Adults 2364 years old: 1-3 doses may be recommended based on certain risk factors  Adults 72 years old: 1-2 doses may be recommended based off what pneumonia vaccine you previously received   Hepatitis B Vaccine 3 dose series if at intermediate or high risk (ex: diabetes, end stage renal disease, liver disease)   Tetanus (Td) Vaccine - COST NOT COVERED BY MEDICARE PART B Following completion of primary series, a booster dose should be given every 10 years to maintain immunity against tetanus  Td may also be given as tetanus wound prophylaxis  Tdap Vaccine - COST NOT COVERED BY MEDICARE PART B Recommended at least once for all adults  For pregnant patients, recommended with each pregnancy  Shingles Vaccine (Shingrix) - COST NOT COVERED BY MEDICARE PART B  2 shot series recommended in those aged 48 and above     Health Maintenance Due:  There are no preventive care reminders to display for this patient  Immunizations Due:      Topic Date Due   • Hepatitis B Vaccine (1 of 3 - 3-dose series) Never done   • Pneumococcal Vaccine: 65+ Years (1 - PCV) Never done   • COVID-19 Vaccine (5 - Booster for Moderna series) 08/05/2022   • Influenza Vaccine (1) Never done     Advance Directives   What are advance directives? Advance directives are legal documents that state your wishes and plans for medical care  These plans are made ahead of time in case you lose your ability to make decisions for yourself  Advance directives can apply to any medical decision, such as the treatments you want, and if you want to donate organs  What are the types of advance directives? There are many types of advance directives, and each state has rules about how to use them  You may choose a combination of any of the following:  · Living will: This is a written record of the treatment you want   You can also choose which treatments you do not want, which to limit, and which to stop at a certain time  This includes surgery, medicine, IV fluid, and tube feedings  · Durable power of  for healthcare [facility]): This is a written record that states who you want to make healthcare choices for you when you are unable to make them for yourself  This person, called a proxy, is usually a family member or a friend  You may choose more than 1 proxy  · Do not resuscitate (DNR) order:  A DNR order is used in case your heart stops beating or you stop breathing  It is a request not to have certain forms of treatment, such as CPR  A DNR order may be included in other types of advance directives  · Medical directive: This covers the care that you want if you are in a coma, near death, or unable to make decisions for yourself  You can list the treatments you want for each condition  Treatment may include pain medicine, surgery, blood transfusions, dialysis, IV or tube feedings, and a ventilator (breathing machine)  · Values history: This document has questions about your views, beliefs, and how you feel and think about life  This information can help others choose the care that you would choose  Why are advance directives important? An advance directive helps you control your care  Although spoken wishes may be used, it is better to have your wishes written down  Spoken wishes can be misunderstood, or not followed  Treatments may be given even if you do not want them  An advance directive may make it easier for your family to make difficult choices about your care  Weight Management   Why it is important to manage your weight:  Being overweight increases your risk of health conditions such as heart disease, high blood pressure, type 2 diabetes, and certain types of cancer  It can also increase your risk for osteoarthritis, sleep apnea, and other respiratory problems  Aim for a slow, steady weight loss  Even a small amount of weight loss can lower your risk of health problems    How to lose weight safely:  A safe and healthy way to lose weight is to eat fewer calories and get regular exercise  You can lose up about 1 pound a week by decreasing the number of calories you eat by 500 calories each day  Healthy meal plan for weight management:  A healthy meal plan includes a variety of foods, contains fewer calories, and helps you stay healthy  A healthy meal plan includes the following:  · Eat whole-grain foods more often  A healthy meal plan should contain fiber  Fiber is the part of grains, fruits, and vegetables that is not broken down by your body  Whole-grain foods are healthy and provide extra fiber in your diet  Some examples of whole-grain foods are whole-wheat breads and pastas, oatmeal, brown rice, and bulgur  · Eat a variety of vegetables every day  Include dark, leafy greens such as spinach, kale, jean greens, and mustard greens  Eat yellow and orange vegetables such as carrots, sweet potatoes, and winter squash  · Eat a variety of fruits every day  Choose fresh or canned fruit (canned in its own juice or light syrup) instead of juice  Fruit juice has very little or no fiber  · Eat low-fat dairy foods  Drink fat-free (skim) milk or 1% milk  Eat fat-free yogurt and low-fat cottage cheese  Try low-fat cheeses such as mozzarella and other reduced-fat cheeses  · Choose meat and other protein foods that are low in fat  Choose beans or other legumes such as split peas or lentils  Choose fish, skinless poultry (chicken or turkey), or lean cuts of red meat (beef or pork)  Before you cook meat or poultry, cut off any visible fat  · Use less fat and oil  Try baking foods instead of frying them  Add less fat, such as margarine, sour cream, regular salad dressing and mayonnaise to foods  Eat fewer high-fat foods  Some examples of high-fat foods include french fries, doughnuts, ice cream, and cakes  · Eat fewer sweets  Limit foods and drinks that are high in sugar   This includes candy, cookies, regular soda, and sweetened drinks  Exercise:  Exercise at least 30 minutes per day on most days of the week  Some examples of exercise include walking, biking, dancing, and swimming  You can also fit in more physical activity by taking the stairs instead of the elevator or parking farther away from stores  Ask your healthcare provider about the best exercise plan for you  © Copyright [employer] 2018 Information is for End User's use only and may not be sold, redistributed or otherwise used for commercial purposes  All illustrations and images included in CareNotes® are the copyrighted property of [employer]  or [facility] Preventive Visit Patient Instructions  Thank you for completing your Welcome to Medicare Visit or Medicare Annual Wellness Visit today  Your next wellness visit will be due in one year (11/30/2023)  The screening/preventive services that you may require over the next 5-10 years are detailed below  Some tests may not apply to you based off risk factors and/or age  Screening tests ordered at today's visit but not completed yet may show as past due  Also, please note that scanned in results may not display below  Preventive Screenings:  Service Recommendations Previous Testing/Comments   Colorectal Cancer Screening  · Colonoscopy    · Fecal Occult Blood Test (FOBT)/Fecal Immunochemical Test (FIT)  · Fecal DNA/Cologuard Test  · Flexible Sigmoidoscopy Age: 39-70 years old   Colonoscopy: every 10 years (May be performed more frequently if at higher risk)  OR  FOBT/FIT: every 1 year  OR  Cologuard: every 3 years  OR  Sigmoidoscopy: every 5 years  Screening may be recommended earlier than age 39 if at higher risk for colorectal cancer  Also, an individualized decision between you and your healthcare provider will decide whether screening between the ages of 74-80 would be appropriate   Colonoscopy: Not on file  FOBT/FIT: Not on file  Cologuard: Not on file  Sigmoidoscopy: Not on file          Prostate Cancer Screening Individualized decision between patient and health care provider in men between ages of 53-78   Medicare will cover every 12 months beginning on the day after your 50th birthday PSA: No results in last 5 years     Screening Not Indicated     Hepatitis C Screening Once for adults born between 1945 and 1965  More frequently in patients at high risk for Hepatitis C Hep C Antibody: Not on file        Diabetes Screening 1-2 times per year if you're at risk for diabetes or have pre-diabetes Fasting glucose: 194 mg/dL (10/24/2022)  A1C: 6 3 % (10/24/2022)  Screening Not Indicated  History Diabetes   Cholesterol Screening Once every 5 years if you don't have a lipid disorder  May order more often based on risk factors  Lipid panel: 05/10/2022  Screening Not Indicated  History Lipid Disorder      Other Preventive Screenings Covered by Medicare:  6  Abdominal Aortic Aneurysm (AAA) Screening: covered once if your at risk  You're considered to be at risk if you have a family history of AAA or a male between the age of 73-68 who smoking at least 100 cigarettes in your lifetime  7  Lung Cancer Screening: covers low dose CT scan once per year if you meet all of the following conditions: (1) Age 50-69; (2) No signs or symptoms of lung cancer; (3) Current smoker or have quit smoking within the last 15 years; (4) You have a tobacco smoking history of at least 20 pack years (packs per day x number of years you smoked); (5) You get a written order from a healthcare provider  8  Glaucoma Screening: covered annually if you're considered high risk: (1) You have diabetes OR (2) Family history of glaucoma OR (3)  aged 48 and older OR (3)  American aged 72 and older  5   Osteoporosis Screening: covered every 2 years if you meet one of the following conditions: (1) Have a vertebral abnormality; (2) On glucocorticoid therapy for more than 3 months; (3) Have primary hyperparathyroidism; (4) On osteoporosis medications and need to assess response to drug therapy  10  HIV Screening: covered annually if you're between the age of 12-76  Also covered annually if you are younger than 13 and older than 72 with risk factors for HIV infection  For pregnant patients, it is covered up to 3 times per pregnancy  Immunizations:  Immunization Recommendations   Influenza Vaccine Annual influenza vaccination during flu season is recommended for all persons aged >= 6 months who do not have contraindications   Pneumococcal Vaccine   * Pneumococcal conjugate vaccine = PCV13 (Prevnar 13), PCV15 (Vaxneuvance), PCV20 (Prevnar 20)  * Pneumococcal polysaccharide vaccine = PPSV23 (Pneumovax) Adults 25-60 years old: 1-3 doses may be recommended based on certain risk factors  Adults 72 years old: 1-2 doses may be recommended based off what pneumonia vaccine you previously received   Hepatitis B Vaccine 3 dose series if at intermediate or high risk (ex: diabetes, end stage renal disease, liver disease)   Tetanus (Td) Vaccine - COST NOT COVERED BY MEDICARE PART B Following completion of primary series, a booster dose should be given every 10 years to maintain immunity against tetanus  Td may also be given as tetanus wound prophylaxis  Tdap Vaccine - COST NOT COVERED BY MEDICARE PART B Recommended at least once for all adults  For pregnant patients, recommended with each pregnancy  Shingles Vaccine (Shingrix) - COST NOT COVERED BY MEDICARE PART B  2 shot series recommended in those aged 48 and above     Health Maintenance Due:  There are no preventive care reminders to display for this patient    Immunizations Due:      Topic Date Due   • Hepatitis B Vaccine (1 of 3 - 3-dose series) Never done   • Pneumococcal Vaccine: 65+ Years (1 - PCV) Never done   • COVID-19 Vaccine (5 - Booster for Moderna series) 08/05/2022   • Influenza Vaccine (1) Never done     Advance Directives   What are advance directives? Advance directives are legal documents that state your wishes and plans for medical care  These plans are made ahead of time in case you lose your ability to make decisions for yourself  Advance directives can apply to any medical decision, such as the treatments you want, and if you want to donate organs  What are the types of advance directives? There are many types of advance directives, and each state has rules about how to use them  You may choose a combination of any of the following:  · Living will: This is a written record of the treatment you want  You can also choose which treatments you do not want, which to limit, and which to stop at a certain time  This includes surgery, medicine, IV fluid, and tube feedings  · Durable power of  for healthcare [facility]): This is a written record that states who you want to make healthcare choices for you when you are unable to make them for yourself  This person, called a proxy, is usually a family member or a friend  You may choose more than 1 proxy  · Do not resuscitate (DNR) order:  A DNR order is used in case your heart stops beating or you stop breathing  It is a request not to have certain forms of treatment, such as CPR  A DNR order may be included in other types of advance directives  · Medical directive: This covers the care that you want if you are in a coma, near death, or unable to make decisions for yourself  You can list the treatments you want for each condition  Treatment may include pain medicine, surgery, blood transfusions, dialysis, IV or tube feedings, and a ventilator (breathing machine)  · Values history: This document has questions about your views, beliefs, and how you feel and think about life  This information can help others choose the care that you would choose  Why are advance directives important? An advance directive helps you control your care   Although spoken wishes may be used, it is better to have your wishes written down  Spoken wishes can be misunderstood, or not followed  Treatments may be given even if you do not want them  An advance directive may make it easier for your family to make difficult choices about your care  Weight Management   Why it is important to manage your weight:  Being overweight increases your risk of health conditions such as heart disease, high blood pressure, type 2 diabetes, and certain types of cancer  It can also increase your risk for osteoarthritis, sleep apnea, and other respiratory problems  Aim for a slow, steady weight loss  Even a small amount of weight loss can lower your risk of health problems  How to lose weight safely:  A safe and healthy way to lose weight is to eat fewer calories and get regular exercise  You can lose up about 1 pound a week by decreasing the number of calories you eat by 500 calories each day  Healthy meal plan for weight management:  A healthy meal plan includes a variety of foods, contains fewer calories, and helps you stay healthy  A healthy meal plan includes the following:  · Eat whole-grain foods more often  A healthy meal plan should contain fiber  Fiber is the part of grains, fruits, and vegetables that is not broken down by your body  Whole-grain foods are healthy and provide extra fiber in your diet  Some examples of whole-grain foods are whole-wheat breads and pastas, oatmeal, brown rice, and bulgur  · Eat a variety of vegetables every day  Include dark, leafy greens such as spinach, kale, jean greens, and mustard greens  Eat yellow and orange vegetables such as carrots, sweet potatoes, and winter squash  · Eat a variety of fruits every day  Choose fresh or canned fruit (canned in its own juice or light syrup) instead of juice  Fruit juice has very little or no fiber  · Eat low-fat dairy foods  Drink fat-free (skim) milk or 1% milk  Eat fat-free yogurt and low-fat cottage cheese   Try low-fat cheeses such as mozzarella and other reduced-fat cheeses  · Choose meat and other protein foods that are low in fat  Choose beans or other legumes such as split peas or lentils  Choose fish, skinless poultry (chicken or turkey), or lean cuts of red meat (beef or pork)  Before you cook meat or poultry, cut off any visible fat  · Use less fat and oil  Try baking foods instead of frying them  Add less fat, such as margarine, sour cream, regular salad dressing and mayonnaise to foods  Eat fewer high-fat foods  Some examples of high-fat foods include french fries, doughnuts, ice cream, and cakes  · Eat fewer sweets  Limit foods and drinks that are high in sugar  This includes candy, cookies, regular soda, and sweetened drinks  Exercise:  Exercise at least 30 minutes per day on most days of the week  Some examples of exercise include walking, biking, dancing, and swimming  You can also fit in more physical activity by taking the stairs instead of the elevator or parking farther away from stores  Ask your healthcare provider about the best exercise plan for you  © Copyright [employer] 2018 Information is for End User's use only and may not be sold, redistributed or otherwise used for commercial purposes   All illustrations and images included in CareNotes® are the copyrighted property of [employer]  or [address] [place] [address] [employer]

## 2022-11-29 NOTE — ASSESSMENT & PLAN NOTE
Lab Results   Component Value Date    EGFR 43 10/24/2022    EGFR 47 09/13/2022    EGFR 47 08/01/2022    CREATININE 1 47 (H) 10/24/2022    CREATININE 1 37 (H) 09/13/2022    CREATININE 1 36 (H) 08/01/2022   Stable  Continue to trend kidney function

## 2022-11-29 NOTE — ASSESSMENT & PLAN NOTE
Lab Results   Component Value Date    HGBA1C 6 3 (H) 10/24/2022   Controlled  Continue current diabetic regimen  Consider discontinuing Amaryl if he develops any hypoglycemic symptoms

## 2022-11-29 NOTE — PROGRESS NOTES
Assessment and Plan:     Problem List Items Addressed This Visit        Endocrine    Type 2 diabetes mellitus without complication, without long-term current use of insulin ([facility] [address] )       Lab Results   Component Value Date    HGBA1C 6 3 (H) 10/24/2022   Controlled  Continue current diabetic regimen  Consider discontinuing Amaryl if he develops any hypoglycemic symptoms  Relevant Orders    Hemoglobin A1C    Lipid panel    Microalbumin / creatinine urine ratio       Cardiovascular and Mediastinum    Essential hypertension - Primary     Well controlled  Continue current antihypertensive regimen  Relevant Orders    Hemoglobin A1C    Lipid panel    Microalbumin / creatinine urine ratio       Hematopoietic and Hemostatic    Thrombocytopenia (HCC)     Resolved  Continue to trend CBC  Genitourinary    Stage 3 chronic kidney disease, unspecified whether stage 3a or 3b CKD (HCC)     Lab Results   Component Value Date    EGFR 43 10/24/2022    EGFR 47 09/13/2022    EGFR 47 08/01/2022    CREATININE 1 47 (H) 10/24/2022    CREATININE 1 37 (H) 09/13/2022    CREATININE 1 36 (H) 08/01/2022   Stable  Continue to trend kidney function  Other    Advanced care planning/counseling discussion     Refer to ACP note  Other Visit Diagnoses     Medicare annual wellness visit, subsequent        Screening for cardiovascular condition            Serious Illness Conversation    1  What is your understanding now of where you are with your illness? Prognostic Understanding: appropriate understanding of prognosis     2  How much information about what is likely to be ahead with your illness would you like to have? Information: patient wants to be fully informed     3  What did you (clinician) communicate to the patient? Prognostic Communication: Uncertain - It can be difficult to predict what will happen with your illness   I hope you will continue to live well for a long time but I’m worried that you could get sick quickly, and I think it is important to prepare for that possibility  4  If your health situation worsens, what are your most important goals? Goals: be mentally aware, have my medical decisions respected, be physically comfortable, be spiritually and emotionally at peace     5  What are the biggest fears and worries about the future and your health? Fears/Worries: loss of control     6  What abilities are so critical to your life that you cannot imagine living without them? Unacceptable Function: being chronically confused or not being myself, being in pain or very uncomfortable, being unable to interact with others, being unconscious, not being myself, being in chronic severe pain, being unable to communicate effectively, being unable to talk, not being able to care for myself, including toileting and feeding     7  What gives you strength as you think about the future with your illness? 8  If you become sicker, how much are you willing to go through for the possibility of gaining more time? Be in the hospital: Yes Have a feeding tube: Yes   Be in the ICU: Yes Live in a nursing home: Yes   Be on a ventilator: Yes Be uncomfortable: Yes   Be on dialysis: Yes Undergo aggressive test and/or procedures: Yes   9  How much does your proxy and family know about your priorities and wishes? Discussion Discussion: extensive discussion with family about goals and wishes        How does this plan sound to you? I will do everything I can to help you through this  Patient verbalized understanding of the plan     I have spent 15 minutes speaking with my patient on advanced care planning today or during this visit     Advanced directives         BMI Counseling: There is no height or weight on file to calculate BMI   The BMI is above normal  Nutrition recommendations include decreasing portion sizes, encouraging healthy choices of fruits and vegetables, decreasing fast food intake, consuming healthier snacks and limiting drinks that contain sugar  Exercise recommendations include moderate physical activity 150 minutes/week and exercising 3-5 times per week  No pharmacotherapy was ordered  Patient referred to PCP  Rationale for BMI follow-up plan is due to patient being overweight or obese  Depression Screening and Follow-up Plan: Patient was screened for depression during today's encounter  They screened negative with a PHQ-2 score of 0  Falls Plan of Care: balance, strength, and gait training instructions were provided  Medications that increase falls were reviewed  Vitamin D supplementation was recommended  Preventive health issues were discussed with patient, and age appropriate screening tests were ordered as noted in patient's After Visit Summary  Personalized health advice and appropriate referrals for health education or preventive services given if needed, as noted in patient's After Visit Summary  History of Present Illness:     Patient presents for a Medicare Wellness Visit    11-year-old male seen today for follow-up of chronic conditions  Laboratory studies reviewed today, hemoglobin A1c is 6 3%   TSH, CBC, CMP are all within acceptable range  He has been compliant with medication regimen  He has been experiencing allergic rhinitis symptoms  He recently saw his periodontist who noticed an oral lesion which recommendations are to follow up closely  Otherwise, he has no active complaints or concerns at this time  Hypertension  This is a chronic problem  The current episode started more than 1 year ago  The problem is unchanged  The problem is controlled  Pertinent negatives include no chest pain, headaches, palpitations or shortness of breath  Past treatments include beta blockers, ACE inhibitors and diuretics  The current treatment provides moderate improvement  There are no compliance problems  Hyperlipidemia  This is a chronic problem   The current episode started more than 1 year ago  The problem is controlled  Recent lipid tests were reviewed and are normal  Pertinent negatives include no chest pain or shortness of breath  Current antihyperlipidemic treatment includes statins  The current treatment provides moderate improvement of lipids  There are no compliance problems  Diabetes  He presents for his follow-up diabetic visit  He has type 2 diabetes mellitus  His disease course has been stable  There are no hypoglycemic associated symptoms  Pertinent negatives for hypoglycemia include no dizziness or headaches  There are no diabetic associated symptoms  Pertinent negatives for diabetes include no chest pain, no fatigue and no weakness  There are no hypoglycemic complications  Symptoms are stable  There are no diabetic complications  Current diabetic treatment includes diet and oral agent (dual therapy)  He is compliant with treatment all of the time  There is no change in his home blood glucose trend  An ACE inhibitor/angiotensin II receptor blocker is being taken  Patient Care Team:  Osito Ruby MD as PCP - General (Internal Medicine)  Osito Ruby MD as PCP - [address] [place] [address] (RTE)  Chelo Helms MD (Dermatology)  Skyler Diaz MD (Surgical Oncology)  Cherie Guillen MD (Plastic Surgery)  Flower Calhoun, RN as Nurse Navigator (Oncology)     Review of Systems:     Review of Systems   Constitutional: Negative for activity change, appetite change, chills, diaphoresis, fatigue and fever  HENT: Negative for congestion, postnasal drip, rhinorrhea, sinus pressure, sinus pain, sneezing and sore throat  Eyes: Negative for visual disturbance  Respiratory: Negative for apnea, cough, choking, chest tightness, shortness of breath and wheezing  Cardiovascular: Negative for chest pain, palpitations and leg swelling     Gastrointestinal: Negative for abdominal distention, abdominal pain, anal bleeding, blood in stool, constipation, diarrhea, nausea and vomiting  Endocrine: Negative for cold intolerance and heat intolerance  Genitourinary: Negative for difficulty urinating, dysuria and hematuria  Musculoskeletal: Negative  Skin: Negative  Neurological: Negative for dizziness, weakness, light-headedness, numbness and headaches  Hematological: Negative for adenopathy  Psychiatric/Behavioral: Negative for agitation, sleep disturbance and suicidal ideas  All other systems reviewed and are negative         Problem List:     Patient Active Problem List   Diagnosis   • Essential hypertension   • Hiatal hernia with GERD   • Status post laparoscopic cholecystectomy   • Status post umbilical hernia repair, follow-up exam   • Type 2 diabetes mellitus without complication, without long-term current use of insulin ([facility] [address] )   • Mixed hyperlipidemia   • Parkinson's disease ([facility] [address] )   • Microalbuminuria   • Malignant melanoma of skin of face ([facility] [address] )   • Thrombocytopenia ([facility] [address] )   • Malignant melanoma of forehead ([facility] [address] )   • 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)   • Stage 3 chronic kidney disease, unspecified whether stage 3a or 3b CKD ([facility] [address] )   • Oral lesion   • Advanced care planning/counseling discussion      Past Medical and Surgical History:     Past Medical History:   Diagnosis Date   • Diabetes mellitus ([facility] [address] )    • Hyperlipidemia    • Hypertension      Past Surgical History:   Procedure Laterality Date   • CATARACT EXTRACTION, BILATERAL     • FLAP LOCAL HEAD / NECK Left 10/12/2021    Procedure: RECONSTRUCTION OF LEFT TEMPLE DEFECT AFTER RESCECTION MELANOMA;  Surgeon: Roberto Delgado MD;  [facility];  Service: Plastics   • FULL THICKNESS SKIN GRAFT Left 10/12/2021    Procedure: SKIN GRAFT FULL THICKNESS LEFT TEMPLE;  Surgeon: Roberto Delgado MD;  [facility];  Service: Plastics   • GALLBLADDER SURGERY     • HAND SURGERY Left    • LYMPH NODE BIOPSY Left 10/12/2021    Procedure: BIOPSY LYMPH NODE SENTINEL, LYMPHOSCINTIGRAPHY, INTRAOPERATIVE LYMPHATIC MAPPING (INJECT AT 1200); Surgeon: Tiana Minor MD;  [facility];  Service: Surgical Oncology   • NOSE SURGERY     • SKIN CANCER EXCISION  10/2021   • SKIN LESION EXCISION Left 10/12/2021    Procedure: [address];  Surgeon: Tiana Minor MD;  [facility];  Service: Surgical Oncology   • SPLIT THICKNESS SKIN GRAFT Left 10/12/2021    Procedure: SKIN GRAFT SPLIT THICKNESS LEFT TEMPLE;  Surgeon: Lily Ibrahim MD;  [facility];  Service: Plastics   • US GUIDED LYMPH NODE BIOPSY LEFT  03/21/2022      Family History:     Family History   Problem Relation Age of Onset   • No Known Problems Mother    • No Known Problems Father    • Colon cancer Other 61      Social History:     Social History     Socioeconomic History   • Marital status: /Civil Union     Spouse name: None   • Number of children: None   • Years of education: None   • Highest education level: None   Occupational History   • None   Tobacco Use   • Smoking status: Never   • Smokeless tobacco: Never   Vaping Use   • Vaping Use: Never used   Substance and Sexual Activity   • Alcohol use: Not Currently   • Drug use: Never   • Sexual activity: Not Currently   Other Topics Concern   • None   Social History Narrative   • None     Social Determinants of Health     Financial Resource Strain: Low Risk    • Difficulty of Paying Living Expenses: Not hard at all   Food Insecurity: Not on file   Transportation Needs: No Transportation Needs   • Lack of Transportation (Medical): No   • Lack of Transportation (Non-Medical):  No   Physical Activity: Not on file   Stress: Not on file   Social Connections: Not on file   Intimate Partner Violence: Not on file   Housing Stability: Not on file      Medications and Allergies:     Current Outpatient Medications   Medication Sig Dispense Refill   • atenolol (TENORMIN) 100 mg tablet Take 1 tablet (100 mg total) by mouth daily 90 tablet 3   • glimepiride (AMARYL) 4 mg tablet Take 1 tablet (4 mg total) by mouth daily 90 tablet 3   • lisinopril-hydrochlorothiazide (PRINZIDE,ZESTORETIC) 20-25 MG per tablet Take 1 tablet by mouth daily 90 tablet 3   • lovastatin (MEVACOR) 40 MG tablet Take 1 tablet (40 mg total) by mouth daily 90 tablet 3   • meclizine (ANTIVERT) 12 5 MG tablet Take 1 tablet (12 5 mg total) by mouth 3 (three) times a day as needed for dizziness for up to 5 days 15 tablet 0   • metFORMIN (GLUCOPHAGE) 1000 MG tablet Take 1 tablet (1,000 mg total) by mouth 2 (two) times a day with meals 180 tablet 3     No current facility-administered medications for this visit  No Known Allergies   Immunizations:     Immunization History   Administered Date(s) Administered   • COVID-19 MODERNA VACC 0 5 ML IM 01/19/2021, 02/19/2021, 11/12/2021, 04/05/2022      Health Maintenance: There are no preventive care reminders to display for this patient  Topic Date Due   • Hepatitis B Vaccine (1 of 3 - 3-dose series) Never done   • Pneumococcal Vaccine: 65+ Years (1 - PCV) Never done   • COVID-19 Vaccine (5 - Booster for Moderna series) 08/05/2022   • Influenza Vaccine (1) Never done      Medicare Screening Tests and Risk Assessments:     Leopoldo Gee is here for his Subsequent Wellness visit  Last Medicare Wellness visit information reviewed, patient interviewed and updates made to the record today  Health Risk Assessment:   Patient rates overall health as fair  Patient feels that their physical health rating is same  Patient is satisfied with their life  Eyesight was rated as same  Hearing was rated as same  Patient feels that their emotional and mental health rating is same  Patients states they are never, rarely angry  Patient states they are often unusually tired/fatigued  Pain experienced in the last 7 days has been none  Patient states that he has experienced no weight loss or gain in last 6 months       Depression Screening: PHQ-2 Score: 0      Fall Risk Screening: In the past year, patient has experienced: no history of falling in past year      Home Safety:  Patient does not have trouble with stairs inside or outside of their home  Patient has working smoke alarms and has no working carbon monoxide detector  Home safety hazards include: none  Nutrition:   Current diet is Regular  Medications:   Patient is not currently taking any over-the-counter supplements  Patient is able to manage medications  Activities of Daily Living (ADLs)/Instrumental Activities of Daily Living (IADLs):   Walk and transfer into and out of bed and chair?: Yes  Dress and groom yourself?: Yes    Bathe or shower yourself?: Yes    Feed yourself?  Yes  Do your laundry/housekeeping?: Yes  Manage your money, pay your bills and track your expenses?: Yes  Make your own meals?: Yes    Do your own shopping?: Yes    Previous Hospitalizations:   Any hospitalizations or ED visits within the last 12 months?: No      Advance Care Planning:   Living will: Yes    Advanced directive: Yes    Advanced directive counseling given: Yes    End of Life Decisions reviewed with patient: Yes    Provider agrees with end of life decisions: Yes      Cognitive Screening:   Provider or family/friend/caregiver concerned regarding cognition?: No    PREVENTIVE SCREENINGS      Cardiovascular Screening:    General: Screening Not Indicated, History Lipid Disorder, Risks and Benefits Discussed and Screening Current      Diabetes Screening:     General: Screening Not Indicated, History Diabetes and Risks and Benefits Discussed      Colorectal Cancer Screening:     General: Screening Not Indicated      Prostate Cancer Screening:    General: Screening Not Indicated      Osteoporosis Screening:    General: Screening Not Indicated      Abdominal Aortic Aneurysm (AAA) Screening:        General: Screening Not Indicated      Lung Cancer Screening:     General: Screening Not Indicated Hepatitis C Screening:    General: Screening Not Indicated    Screening, Brief Intervention, and Referral to Treatment (SBIRT)    Screening    Typical number of drinks in a week: 0    AUDIT-C Screenin) How often did you have a drink containing alcohol in the past year? never  2) How many drinks did you have on a typical day when you were drinking in the past year? 0  3) How often did you have 6 or more drinks on one occasion in the past year? never    AUDIT-C Score: 0  Interpretation: Score 0-3 (male): Negative screen for alcohol misuse    Single Item Drug Screening:  How often have you used an illegal drug (including marijuana) or a prescription medication for non-medical reasons in the past year? never    Single Item Drug Screen Score: 0  Interpretation: Negative screen for possible drug use disorder    Brief Intervention  Alcohol & drug use screenings were reviewed  No concerns regarding substance use disorder identified  Other Counseling Topics:   Car/seat belt/driving safety, skin self-exam, sunscreen and calcium and vitamin D intake and regular weightbearing exercise  No results found  Physical Exam:     There were no vitals taken for this visit  Physical Exam  Vitals and nursing note reviewed  Tisha Holter, MD  Virtual AWV Consent    Verification of patient location:    Patient is located in the following state in which I hold an active license [place]    Reason for visit is follow-up and Medicare wellness visit    Encounter provider Tisha Holter, MD    Provider located at [address] [place] [phone]      Recent Visits  No visits were found meeting these conditions    Showing recent visits within past 7 days and meeting all other requirements  Today's Visits  Date Type Provider Dept   22 Telemedicine Tisha Holter, MD [facility] Showing today's visits and meeting all other requirements  Future Appointments  No visits were found meeting these conditions  Showing future appointments within next 150 days and meeting all other requirements       After connecting through televideo, the patient was identified by name and date of birth  Akila Leeanne was informed that this is a telemedicine visit and that the visit is being conducted through the [employer]  He agrees to proceed  My office door was closed  No one else was in the room  He acknowledged consent and understanding of privacy and security of the video platform  Akila Fallon verbally agrees to participate in [employer]  Pt is aware that [employer] could be limited without vital signs or the ability to perform a full hands-on physical Ogjason Coppola understands he or the provider may request at any time to terminate the video visit and request the patient to seek care or treatment in person  Patient is aware this is a billable service

## 2022-11-30 ENCOUNTER — OFFICE VISIT (OUTPATIENT)
Dept: PHYSICAL THERAPY | Age: 82
End: 2022-11-30

## 2022-11-30 DIAGNOSIS — R42 DIZZINESS: ICD-10-CM

## 2022-11-30 DIAGNOSIS — R26.9 ABNORMALITY OF GAIT: Primary | ICD-10-CM

## 2022-12-01 NOTE — PROGRESS NOTES
Daily Note     Today's date: 2022  Patient name: Becca Reddy  : 1940  MRN: [id number]  Referring provider: REGINA Kinsey  Dx:   Encounter Diagnosis     ICD-10-CM    1  Abnormality of gait  R26 9       2  Dizziness  R42                      Subjective: "this is the best I've felt  No dizziness this morning  "      Objective: See treatment diary below      Assessment: Tolerated treatment well  Patient would benefit from continued PT      Plan: Continue per plan of care        Precautions: no known allergies      Manuals 22           danita hallpike retest Right 2 beat nystagmus then gone neg           Rolling r/l  Right very slight no nystagmus neg           c prom stretch by PT             graston to c spine              Neuro Re-Ed/ there exer              Chin tucks  10 reps 5 sec hold  10 reps            Cervical isometrics 5 reps 5 sec hold  5 x 5 sec           Upper trap stretch 3 hold 15 sec 3 hold 15 sec           Levator stretch 3 hold 15 sec 3 hold 15 sec           Self rotation stretch 3 hold 15 sec 3 hold 15 sec           heelcord stretch with strap  1 min x 1 1 min x 1           Hamstring stretch in longsit 20 sec x 5 20 sec x 5           Ther Ex             Ankle sway referencing flat and foam eo, ec close sup in ll bars 10 reps each 10 reps            Tandem gait offset feet 1 min x 1 1 min x 1           Tandem stance offset feet 30 sec x 3 30 sec x 3           Forward and backward walking in ll bars  10 ft x 4 10 ft x 4           Sidestepping in ll bars 10 ft x 4 no resistance then w/ theraband  10 ft x 4 with theraband red 10 ft x 4           Hurdles sidestepping and forward stepping cg of 1  10 ft x 4           Hand to opposite knee gait  40 ft x 2 close supervsion  20 ft x 2                        Ther Activity                                       Gait Training                                       Modalities

## 2022-12-05 ENCOUNTER — OFFICE VISIT (OUTPATIENT)
Dept: PHYSICAL THERAPY | Age: 82
End: 2022-12-05

## 2022-12-05 ENCOUNTER — APPOINTMENT (OUTPATIENT)
Dept: LAB | Facility: IMAGING CENTER | Age: 82
End: 2022-12-05

## 2022-12-05 DIAGNOSIS — R26.9 ABNORMALITY OF GAIT: Primary | ICD-10-CM

## 2022-12-05 NOTE — PROGRESS NOTES
Daily Note     Today's date: 2022  Patient name: Solo Klein  : 1940  MRN: [id number]  Referring provider: REGINA Dela Cruz  Dx:   Encounter Diagnosis     ICD-10-CM    1  Abnormality of gait  R26 9                      Subjective: Pt  Denies dizziness at this time  Objective: See treatment diary below      Assessment: Tolerated treatment well  Patient would benefit from continued PT      Plan: Continue per plan of care        Precautions: no known allergies      Manuals 22 12/5          danita hallpike retest Right 2 beat nystagmus then gone neg           Rolling r/l  Right very slight no nystagmus neg           c prom stretch by PT             graston to c spine              Neuro Re-Ed/ there exer              Chin tucks  10 reps 5 sec hold  10 reps            Cervical isometrics 5 reps 5 sec hold  5 x 5 sec 5 x 5          Upper trap stretch 3 hold 15 sec 3 hold 15 sec 20sec x 5          Levator stretch 3 hold 15 sec 3 hold 15 sec 20sec x 5          Self rotation stretch 3 hold 15 sec 3 hold 15 sec 5 x 5          heelcord stretch with strap  1 min x 1 1 min x 1 20sec x 5          Hamstring stretch in longsit 20 sec x 5 20 sec x 5 20sec x 5          Ther Ex             Ankle sway referencing flat and foam eo, ec close sup in ll bars 10 reps each 10 reps  10x          Tandem gait offset feet 1 min x 1 1 min x 1 1 min          Tandem stance offset feet 30 sec x 3 30 sec x 3 20sec x 5          Forward and backward walking in ll bars  10 ft x 4 10 ft x 4 60ft          Sidestepping in ll bars 10 ft x 4 no resistance then w/ theraband  10 ft x 4 with theraband red 10 ft x 4 60ft          Hurdles sidestepping and forward stepping cg of 1  10 ft x 4 60ft          Hand to opposite knee gait  40 ft x 2 close supervsion  20 ft x 2           Ball pass   40ft x 2          Ther Activity                                       Gait Training                                       Modalities

## 2022-12-06 ENCOUNTER — HOSPITAL ENCOUNTER (OUTPATIENT)
Dept: RADIOLOGY | Age: 82
Discharge: HOME | End: 2022-12-06

## 2022-12-06 DIAGNOSIS — C43.30 MALIGNANT MELANOMA OF SKIN OF FACE (HCC): ICD-10-CM

## 2022-12-06 DIAGNOSIS — Z79.899 HIGH RISK MEDICATION USE: ICD-10-CM

## 2022-12-06 DIAGNOSIS — C77.3 SECONDARY AND UNSPECIFIED MALIGNANT NEOPLASM OF AXILLA AND UPPER LIMB LYMPH NODES (HCC): ICD-10-CM

## 2022-12-06 LAB — GLUCOSE SERPL-MCNC: 77 MG/DL (ref 65–140)

## 2022-12-06 RX ORDER — SODIUM CHLORIDE 9 MG/ML
20 INJECTION, SOLUTION INTRAVENOUS ONCE
Status: CANCELLED | OUTPATIENT
Start: 2022-12-12

## 2022-12-06 NOTE — LETTER
[address] [place], [place] [address] [address]      December 12, 2022    MRN: [id number]     Phone: [phone]     Dear Mr Angela Currie recently had a(n) Nuclear Medicine performed on 12/6/2022 at  [address] [place] [address] that was requested by Nicole Diez MD  The study was reviewed by a radiologist, which is a physician who specializes in medical imaging  The radiologist issued a report describing his or her findings  In that report there was a finding that the radiologist felt warranted further discussion with your health care provider and that discussion would be beneficial to you  The results were sent to Nicole Diez MD on 12/07/2022  2:44 PM  We recommend that you contact Nicole Diez MD at [phone] or set up an appointment to discuss the results of the imaging test  If you have already heard from Nicole Diez MD regarding the results of your study, you can disregard this letter  This letter is not meant to alarm you, but intended to encourage you to follow-up on your results with the provider that sent you for the imaging study  In addition, we have enclosed answers to frequently asked questions by other patients who have also received a letter to review results with their health care provider (see page two)  Thank you for choosing [facility] for your medical imaging needs  FREQUENTLY ASKED QUESTIONS    1  Why am I receiving this letter? [facility] requires us to notify patients who have findings on imaging exams that may require more testing or follow-up with a health professional within the next 3 months          2  How serious is the finding on the imaging test?  This letter is sent to all patients who may need follow-up or more testing within the next 3 months  Receiving this letter does not necessarily mean you have a life-threatening imaging finding or disease  Recommendations in the radiologist’s imaging report are general in nature and it is up to your healthcare provider to say whether those recommendations make sense for your situation  You are strongly encouraged to talk to your health care provider about the results and ask whether additional steps need to be taken  3  Where can I get a copy of the final report for my recent radiology exam?  To get a full copy of the report you can access your records online at [email or web address] or please contact [facility] Medical Records Department at [phone] Monday through Friday between 8 am and 6 pm          4  What do I need to do now? Please contact your health care provider who requested the imaging study to discuss what further actions (if any) are needed  You may have already heard from (your ordering provider) in regard to this test in which case you can disregard this letter  NOTICE IN ACCORDANCE WITH THE [place] STATE “PATIENT TEST RESULT INFORMATION ACT OF 2018”    You are receiving this notice as a result of a determination by your diagnostic imaging service that further discussions of your test results are warranted and would be beneficial to you  The complete results of your test or tests have been or will be sent to the health care practitioner that ordered the test or tests  It is recommended that you contact your health care practitioner to discuss your results as soon as possible

## 2022-12-07 ENCOUNTER — OFFICE VISIT (OUTPATIENT)
Dept: PHYSICAL THERAPY | Age: 82
End: 2022-12-07

## 2022-12-07 ENCOUNTER — TELEPHONE (OUTPATIENT)
Dept: HEMATOLOGY ONCOLOGY | Facility: CLINIC | Age: 82
End: 2022-12-07

## 2022-12-07 DIAGNOSIS — R26.9 ABNORMALITY OF GAIT: Primary | ICD-10-CM

## 2022-12-07 NOTE — PROGRESS NOTES
Daily Note     Today's date: 2022  Patient name: Clyde Aceves  : 1940  MRN: [id number]  Referring provider: REGINA Perez  Dx:   Encounter Diagnosis     ICD-10-CM    1  Abnormality of gait  R26 9                      Subjective: Pt  Denies dizziness with all activities  Objective: See treatment diary below      Assessment: Tolerated treatment well  Patient would benefit from continued PT      Plan: Continue per plan of care        Precautions: no known allergies      Manuals 22 12         danita hallpike retest Right 2 beat nystagmus then gone neg           Rolling r/l  Right very slight no nystagmus neg           c prom stretch by PT             graston to c spine              Neuro Re-Ed/ there exer              Chin tucks  10 reps 5 sec hold  10 reps   10x         Cervical isometrics 5 reps 5 sec hold  5 x 5 sec 5 x 5 5 x 5         Upper trap stretch 3 hold 15 sec 3 hold 15 sec 20sec x 5          Levator stretch 3 hold 15 sec 3 hold 15 sec 20sec x 5          Self rotation stretch 3 hold 15 sec 3 hold 15 sec 5 x 5 5 x 5         heelcord stretch with strap  1 min x 1 1 min x 1 20sec x 5          Hamstring stretch in longsit 20 sec x 5 20 sec x 5 20sec x 5 20sec x 5         Ther Ex             Ankle sway referencing flat and foam eo, ec close sup in ll bars 10 reps each 10 reps  10x 10x         Tandem gait offset feet 1 min x 1 1 min x 1 1 min 1 min         Tandem stance offset feet 30 sec x 3 30 sec x 3 20sec x 5 20sec x 5         Forward and backward walking in ll bars  10 ft x 4 10 ft x 4 60ft 60ft         Sidestepping in ll bars 10 ft x 4 no resistance then w/ theraband  10 ft x 4 with theraband red 10 ft x 4 60ft 60ft         Hurdles sidestepping and forward stepping cg of 1  10 ft x 4 60ft 60ft         Hand to opposite knee gait  40 ft x 2 close supervsion  20 ft x 2           Ball pass   40ft x 2 40x 2         Ther Activity Gait Training                                       Modalities

## 2022-12-07 NOTE — TELEPHONE ENCOUNTER
Received a voicemail from [facility] with radiology regarding significant findings on PET from 12/6  Will forward to Dr Yuri Plunkett for review:    IMPRESSION:  1  New small pulmonary nodules, the largest measuring 5 mm  The largest exhibits minimal FDG activity  This is suspicious for developing pulmonary metastases  2  No evidence of recurrent FDG avid disease in the head or neck  3   No other suspicious FDG avid abnormalities

## 2022-12-12 ENCOUNTER — OFFICE VISIT (OUTPATIENT)
Dept: HEMATOLOGY ONCOLOGY | Facility: CLINIC | Age: 82
End: 2022-12-12

## 2022-12-12 ENCOUNTER — TELEPHONE (OUTPATIENT)
Dept: HEMATOLOGY ONCOLOGY | Facility: CLINIC | Age: 82
End: 2022-12-12

## 2022-12-12 ENCOUNTER — HOSPITAL ENCOUNTER (OUTPATIENT)
Dept: INFUSION CENTER | Facility: CLINIC | Age: 82
Discharge: HOME | End: 2022-12-12

## 2022-12-12 VITALS
TEMPERATURE: 97.8 F | RESPIRATION RATE: 16 BRPM | OXYGEN SATURATION: 98 % | BODY MASS INDEX: 26.37 KG/M2 | HEART RATE: 58 BPM | SYSTOLIC BLOOD PRESSURE: 142 MMHG | DIASTOLIC BLOOD PRESSURE: 74 MMHG | WEIGHT: 168 LBS | HEIGHT: 67 IN

## 2022-12-12 VITALS
RESPIRATION RATE: 16 BRPM | DIASTOLIC BLOOD PRESSURE: 80 MMHG | HEART RATE: 53 BPM | SYSTOLIC BLOOD PRESSURE: 138 MMHG | OXYGEN SATURATION: 94 % | TEMPERATURE: 96.4 F

## 2022-12-12 DIAGNOSIS — C43.39 MALIGNANT MELANOMA OF FOREHEAD (HCC): ICD-10-CM

## 2022-12-12 DIAGNOSIS — C43.30 MALIGNANT MELANOMA OF SKIN OF FACE (HCC): Primary | ICD-10-CM

## 2022-12-12 DIAGNOSIS — Z79.899 HIGH RISK MEDICATION USE: ICD-10-CM

## 2022-12-12 RX ORDER — METHYLPREDNISOLONE 4 MG/1
TABLET ORAL
Qty: 1 EACH | Refills: 0 | Status: SHIPPED | OUTPATIENT
Start: 2022-12-12

## 2022-12-12 RX ORDER — SODIUM CHLORIDE 9 MG/ML
20 INJECTION, SOLUTION INTRAVENOUS ONCE
Status: COMPLETED | OUTPATIENT
Start: 2022-12-12 | End: 2022-12-12

## 2022-12-12 RX ADMIN — SODIUM CHLORIDE 400 MG: 9 INJECTION, SOLUTION INTRAVENOUS at 15:31

## 2022-12-12 RX ADMIN — SODIUM CHLORIDE 20 ML/HR: 0.9 INJECTION, SOLUTION INTRAVENOUS at 14:48

## 2022-12-12 NOTE — TELEPHONE ENCOUNTER
Needs follow up office visit with Dr Varghese in 6 weeks - prior to his scheduled infusion   Also needs to have one additional infusion appointments and office visit scheduled

## 2022-12-12 NOTE — PROGRESS NOTES
Patient tolerated infusion without complications  Patient aware of next appointment    Patient provided appointment list

## 2022-12-12 NOTE — LETTER
December 12, 2022     Radha Fishman MD  [address] [place] [address]    Patient: Janet Brandon   YOB: 1940   Date of Visit: 12/12/2022       Dear Dr Meredith Morgan:    Thank you for referring Janet Brandon to me for evaluation  Below are my notes for this consultation  If you have questions, please do not hesitate to call me  I look forward to following your patient along with you  Sincerely,        Alin Sanford MD        CC: MD Misti Rojas MD Rada Emery, MD [facility], MD  12/12/2022 11:41 PM  Attested  [address] [facility] 15  88 [facility] 58  [phone]  [phone]     Date of Visit: 12/12/2022  Name: Janet Brandon   YOB: 1940     Subjective     Subjective    VISIT DIAGNOSIS:  There are no diagnoses linked to this encounter      Oncology History   Malignant melanoma of skin of face ([facility] [address] )   9/8/2021 -  Cancer Staged    Staging form: Melanoma of the Skin, AJCC 8th Edition  - Clinical stage from 9/8/2021: Stage III (cT2a, cN1a, cM0) - Signed by Alin Sanford MD on 11/7/2021 9/8/2021 -  Cancer Staged    Staging form: Melanoma of the Skin, AJCC 8th Edition  - Pathologic stage from 9/8/2021: Stage IIIA (pT2a, pN1a, cM0) - Signed by Alin Sanford MD on 11/7/2021 9/22/2021 Initial Diagnosis    Malignant melanoma of skin of face ([facility] [address] )     4/4/2022 -  Chemotherapy    pembrolizumab (KEYTRUDA) IVPB, 400 mg, Intravenous, Once, 6 of 9 cycles  Administration: 400 mg (4/4/2022), 400 mg (5/16/2022), 400 mg (6/27/2022), 400 mg (8/8/2022), 400 mg (9/19/2022), 400 mg (10/31/2022)     Malignant melanoma of forehead ([facility] [address] )   11/29/2021 Initial Diagnosis    Malignant melanoma of forehead ([facility] [address] )     4/4/2022 -  Chemotherapy    pembrolizumab (KEYTRUDA) IVPB, 400 mg, Intravenous, Once, 6 of 9 cycles  Administration: 400 mg (4/4/2022), 400 mg (5/16/2022), 400 mg (6/27/2022), 400 mg (8/8/2022), 400 mg (9/19/2022), 400 mg (10/31/2022)        Cancer Staging   Malignant melanoma of skin of face [facility])  Staging form: Melanoma of the Skin, AJCC 8th Edition  - Clinical stage from 9/8/2021: Stage III (cT2a, cN1a, cM0) - Signed by Kaylan Slade MD on 11/7/2021  - Pathologic stage from 9/8/2021: Stage IIIA (pT2a, pN1a, cM0) - Signed by Kaylan Slade MD on 11/7/2021     Treatment Details   Treatment goal Curative   Plan Name OP Pembrolizumab Q 42 Days   Status Active   Start Date 4/4/2022   End Date 3/6/2023 (Planned)   Provider Kaylan Slade MD   Chemotherapy pembrolizumab [facility]) IVPB, 400 mg, Intravenous, Once, 6 of 9 cycles  Administration: 400 mg (4/4/2022), 400 mg (5/16/2022), 400 mg (6/27/2022), 400 mg (8/8/2022), 400 mg (9/19/2022), 400 mg (10/31/2022)          HISTORY OF PRESENT ILLNESS: Solo Klein is a 79 y o  male  who has Stage IIIA melanoma with questionable recurrence with indeterminate biopsy on adjuvant treatment with pembrolizumab here for follow up and treatment      He is doing okay and feels well  States that he is still experiencing a productive cough  He states that the cough(whitish)  has been ongoing for the past 2 -3 months  Denies any fevers or chills       We discussed results from his recent PET CT scan which demonstrate stable new small pulmonary nouldes with the largest exhibiting minimal FDG activity   We discussed that the nodules are too small to be biopsied at this time        INTERIM HISTORY:     REVIEW OF SYSTEMS:  Review of Systems - Oncology     MEDICATIONS:    Current Outpatient Medications:   •  atenolol (TENORMIN) 100 mg tablet, Take 1 tablet (100 mg total) by mouth daily, Disp: 90 tablet, Rfl: 3  •  glimepiride (AMARYL) 4 mg tablet, Take 1 tablet (4 mg total) by mouth daily, Disp: 90 tablet, Rfl: 3  •  lisinopril-hydrochlorothiazide (PRINZIDE,ZESTORETIC) 20-25 MG per tablet, Take 1 tablet by mouth daily, Disp: 90 tablet, Rfl: 3  •  lovastatin (MEVACOR) 40 MG tablet, Take 1 tablet (40 mg total) by mouth daily, Disp: 90 tablet, Rfl: 3  •  metFORMIN (GLUCOPHAGE) 1000 MG tablet, Take 1 tablet (1,000 mg total) by mouth 2 (two) times a day with meals, Disp: 180 tablet, Rfl: 3  •  meclizine (ANTIVERT) 12 5 MG tablet, Take 1 tablet (12 5 mg total) by mouth 3 (three) times a day as needed for dizziness for up to 5 days, Disp: 15 tablet, Rfl: 0     ALLERGIES:  No Known Allergies     ACTIVE PROBLEMS:  Patient Active Problem List   Diagnosis   • Essential hypertension   • Hiatal hernia with GERD   • Status post laparoscopic cholecystectomy   • Status post umbilical hernia repair, follow-up exam   • Type 2 diabetes mellitus without complication, without long-term current use of insulin (HCC)   • Mixed hyperlipidemia   • Parkinson's disease ([address] )   • Microalbuminuria   • Malignant melanoma of skin of face ([address] )   • Thrombocytopenia (HCC)   • Malignant melanoma of forehead ([address] )   • 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)   • Stage 3 chronic kidney disease, unspecified whether stage 3a or 3b CKD ([facility] [address] )   • Oral lesion   • Advanced care planning/counseling discussion          PAST MEDICAL HISTORY:   Past Medical History:   Diagnosis Date   • Diabetes mellitus ([address] )    • Hyperlipidemia    • Hypertension         PAST SURGICAL HISTORY:  Past Surgical History:   Procedure Laterality Date   • CATARACT EXTRACTION, BILATERAL     • FLAP LOCAL HEAD / NECK Left 10/12/2021    Procedure: RECONSTRUCTION OF LEFT TEMPLE DEFECT AFTER RESCECTION MELANOMA;  Surgeon: Viry Best MD;  [facility];  Service: Plastics   • FULL THICKNESS SKIN GRAFT Left 10/12/2021    Procedure: SKIN GRAFT FULL THICKNESS LEFT TEMPLE;  Surgeon: Viry Best MD;  [facility];  Service: Plastics   • GALLBLADDER SURGERY     • HAND SURGERY Left    • LYMPH NODE BIOPSY Left 10/12/2021    Procedure: BIOPSY LYMPH NODE SENTINEL, LYMPHOSCINTIGRAPHY, INTRAOPERATIVE LYMPHATIC MAPPING (INJECT AT 1200); Surgeon: Micky Dorado MD;  [facility];  Service: Surgical Oncology   • NOSE SURGERY     • SKIN CANCER EXCISION  10/2021   • SKIN LESION EXCISION Left 10/12/2021    Procedure: [address];  Surgeon: Micky Dorado MD;  [facility];  Service: Surgical Oncology   • SPLIT THICKNESS SKIN GRAFT Left 10/12/2021    Procedure: SKIN GRAFT SPLIT THICKNESS LEFT TEMPLE;  Surgeon: Tona Lopez MD;  [facility];  Service: Plastics   • US GUIDED LYMPH NODE BIOPSY LEFT  03/21/2022        SOCIAL HISTORY:  Social History     Socioeconomic History   • Marital status: /Civil Union     Spouse name: None   • Number of children: None   • Years of education: None   • Highest education level: None   Occupational History   • None   Tobacco Use   • Smoking status: Never   • Smokeless tobacco: Never   Vaping Use   • Vaping Use: Never used   Substance and Sexual Activity   • Alcohol use: Not Currently   • Drug use: Never   • Sexual activity: Not Currently   Other Topics Concern   • None   Social History Narrative   • None     Social Determinants of Health     Financial Resource Strain: Low Risk    • Difficulty of Paying Living Expenses: Not hard at all   Food Insecurity: Not on file   Transportation Needs: No Transportation Needs   • Lack of Transportation (Medical): No   • Lack of Transportation (Non-Medical): No   Physical Activity: Not on file   Stress: Not on file   Social Connections: Not on file   Intimate Partner Violence: Not on file   Housing Stability: Not on file        FAMILY HISTORY:  Family History   Problem Relation Age of Onset   • No Known Problems Mother    • No Known Problems Father    • Colon cancer Other 60        Objective     Objective    PHYSICAL EXAMINATION:   Blood pressure 142/74, pulse 58, temperature 97 8 °F (36 6 °C), resp   rate 16, height 5' 7" (1 702 m), weight 76 2 kg (168 lb), SpO2 98 %  Physical Exam  Constitutional:       Appearance: Normal appearance  HENT:      Head: Normocephalic and atraumatic  Right Ear: Tympanic membrane normal       Left Ear: Tympanic membrane normal       Mouth/Throat:      Pharynx: Oropharynx is clear  Eyes:      Conjunctiva/sclera: Conjunctivae normal    Cardiovascular:      Rate and Rhythm: Normal rate and regular rhythm  Pulses: Normal pulses  Heart sounds: Normal heart sounds  Pulmonary:      Effort: Pulmonary effort is normal       Breath sounds: Normal breath sounds  Abdominal:      General: Bowel sounds are normal    Musculoskeletal:         General: Normal range of motion  Cervical back: Normal range of motion  Skin:     General: Skin is warm  Neurological:      Mental Status: He is alert  Psychiatric:         Mood and Affect: Mood normal          Behavior: Behavior normal          I reviewed lab data in the chart      WBC   Date Value Ref Range Status   12/05/2022 9 28 4 31 - 10 16 Thousand/uL Final   10/24/2022 9 59 4 31 - 10 16 Thousand/uL Final   09/13/2022 9 66 4 31 - 10 16 Thousand/uL Final     Hemoglobin   Date Value Ref Range Status   12/05/2022 13 8 12 0 - 17 0 g/dL Final   10/24/2022 13 7 12 0 - 17 0 g/dL Final   09/13/2022 13 6 12 0 - 17 0 g/dL Final     Platelets   Date Value Ref Range Status   12/05/2022 152 149 - 390 Thousands/uL Final   10/24/2022 169 149 - 390 Thousands/uL Final   09/13/2022 128 (L) 149 - 390 Thousands/uL Final     MCV   Date Value Ref Range Status   12/05/2022 93 82 - 98 fL Final   10/24/2022 91 82 - 98 fL Final   09/13/2022 89 82 - 98 fL Final      Potassium   Date Value Ref Range Status   12/05/2022 4 3 3 5 - 5 3 mmol/L Final   10/24/2022 3 9 3 5 - 5 3 mmol/L Final   09/13/2022 4 1 3 5 - 5 3 mmol/L Final     Chloride   Date Value Ref Range Status   12/05/2022 110 (H) 96 - 108 mmol/L Final   10/24/2022 107 96 - 108 mmol/L Final   09/13/2022 105 96 - 108 mmol/L Final     CO2   Date Value Ref Range Status   12/05/2022 24 21 - 32 mmol/L Final   10/24/2022 28 21 - 32 mmol/L Final   09/13/2022 26 21 - 32 mmol/L Final     BUN   Date Value Ref Range Status   12/05/2022 26 (H) 5 - 25 mg/dL Final   10/24/2022 33 (H) 5 - 25 mg/dL Final   09/13/2022 19 5 - 25 mg/dL Final     Creatinine   Date Value Ref Range Status   12/05/2022 1 43 (H) 0 60 - 1 30 mg/dL Final     Comment:     Standardized to IDMS reference method   10/24/2022 1 47 (H) 0 60 - 1 30 mg/dL Final     Comment:     Standardized to IDMS reference method   09/13/2022 1 37 (H) 0 60 - 1 30 mg/dL Final     Comment:     Standardized to IDMS reference method     Glucose   Date Value Ref Range Status   12/05/2022 142 (H) 65 - 140 mg/dL Final     Comment:     If the patient is fasting, the ADA then defines impaired fasting glucose as > 100 mg/dL and diabetes as > or equal to 123 mg/dL  Specimen collection should occur prior to Sulfasalazine administration due to the potential for falsely depressed results  Specimen collection should occur prior to Sulfapyridine administration due to the potential for falsely elevated results  09/13/2022 174 (H) 65 - 140 mg/dL Final     Comment:     If the patient is fasting, the ADA then defines impaired fasting glucose as > 100 mg/dL and diabetes as > or equal to 123 mg/dL  Specimen collection should occur prior to Sulfasalazine administration due to the potential for falsely depressed results  Specimen collection should occur prior to Sulfapyridine administration due to the potential for falsely elevated results  04/26/2022 192 (H) 65 - 140 mg/dL Final     Comment:     If the patient is fasting, the ADA then defines impaired fasting glucose as > 100 mg/dL and diabetes as > or equal to 123 mg/dL  Specimen collection should occur prior to Sulfasalazine administration due to the potential for falsely depressed results   Specimen collection should occur prior to Sulfapyridine administration due to the potential for falsely elevated results  Calcium   Date Value Ref Range Status   12/05/2022 8 8 8 3 - 10 1 mg/dL Final   10/24/2022 8 8 8 3 - 10 1 mg/dL Final   09/13/2022 8 8 8 3 - 10 1 mg/dL Final     Albumin   Date Value Ref Range Status   12/05/2022 3 9 3 5 - 5 0 g/dL Final   10/24/2022 3 9 3 5 - 5 0 g/dL Final   09/13/2022 3 8 3 5 - 5 0 g/dL Final     Total Bilirubin   Date Value Ref Range Status   12/05/2022 1 27 (H) 0 20 - 1 00 mg/dL Final     Comment:     Use of this assay is not recommended for patients undergoing treatment with eltrombopag due to the potential for falsely elevated results  10/24/2022 1 50 (H) 0 20 - 1 00 mg/dL Final     Comment:     Use of this assay is not recommended for patients undergoing treatment with eltrombopag due to the potential for falsely elevated results  09/13/2022 1 36 (H) 0 20 - 1 00 mg/dL Final     Comment:     Use of this assay is not recommended for patients undergoing treatment with eltrombopag due to the potential for falsely elevated results  Alkaline Phosphatase   Date Value Ref Range Status   12/05/2022 79 46 - 116 U/L Final   10/24/2022 85 46 - 116 U/L Final   09/13/2022 77 46 - 116 U/L Final     AST   Date Value Ref Range Status   12/05/2022 13 5 - 45 U/L Final     Comment:     Specimen collection should occur prior to Sulfasalazine administration due to the potential for falsely depressed results  10/24/2022 19 5 - 45 U/L Final     Comment:     Specimen collection should occur prior to Sulfasalazine administration due to the potential for falsely depressed results  09/13/2022 14 5 - 45 U/L Final     Comment:     Specimen collection should occur prior to Sulfasalazine administration due to the potential for falsely depressed results        ALT   Date Value Ref Range Status   12/05/2022 22 12 - 78 U/L Final     Comment:     Specimen collection should occur prior to Sulfasalazine and/or Sulfapyridine administration due to the potential for falsely depressed results  10/24/2022 32 12 - 78 U/L Final     Comment:     Specimen collection should occur prior to Sulfasalazine and/or Sulfapyridine administration due to the potential for falsely depressed results  09/13/2022 24 12 - 78 U/L Final     Comment:     Specimen collection should occur prior to Sulfasalazine and/or Sulfapyridine administration due to the potential for falsely depressed results  LD   Date Value Ref Range Status   12/05/2022 187 81 - 234 U/L Final   10/24/2022 154 81 - 234 U/L Final   09/13/2022 165 81 - 234 U/L Final     No results found for: TSH  No results found for: [id number]   Free T4   Date Value Ref Range Status   12/05/2022 0 87 0 76 - 1 46 ng/dL Final     Comment:     Specimen collection should occur prior to Sulfasalazine administration due to the potential for falsely elevated results  10/24/2022 0 97 0 76 - 1 46 ng/dL Final     Comment:     Specimen collection should occur prior to Sulfasalazine administration due to the potential for falsely elevated results  09/13/2022 0 91 0 76 - 1 46 ng/dL Final     Comment:     Specimen collection should occur prior to Sulfasalazine administration due to the potential for falsely elevated results  RECENT IMAGING:  No results found  Assessment     Assessment/Plan  Mr Angelito Ornelas is a 79 yr male with stage IIIA melanoma and possible recurrence on treatment with pembrolizumab       1  Malignant melanoma of skin of face ([facility] [address] )  2  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[address] )  He is doing well and tolerating treatment  Recent PET CT scan  Demonstrates New small pulmonary nodules, the largest measuring 5 mm  The largest exhibits minimal FDG activity  This is suspicious for developing pulmonary metastases  2  No evidence of recurrent FDG avid disease in the head or neck  We will continue treatment    Labs reviewed and okay to continue      Pembrolizumab cycle 7/9 completed      He will return in 6 weeks for his next treatment  He has standing orders for blood work  He knows to call with any issues or concerns prior to his next visit         3  High risk medication use  He is on treatment with immunotherapy and we will continue to monitor for side effects and lab abnormalities   We will monitor labs with each treatment to ensure safety of continuing on treatment  Lashanda Mtz knows to watch for signs and symptoms for immune mediated side effects        Will continue to monitor his shortness of breath                            Return in about 6 weeks (around 1/23/2023) for Office Visit, Infusion - See Treatment Plan, labs  Attestation signed by Johnnie Pretty MD at 12/12/2022 11:41 PM:  I interviewed, took the history and examined the patient  I discussed the case with the Resident and reviewed the Resident’s note , prescribed medications, and orders placed  I supervised the Resident and I agree with the Resident management plan as it was presented to me  I was present in the clinic and examined the patient  Mr Ryder Aviles is a 79 yr male with stage IIIa melanoma and questionable progression with an indeterminate biopsy started on treatment with pembrolizumab here for follow-up and treatment today  She is doing okay  Continues to have a cough with whitish sputum happens all day long  Has been happening for 2 to 3 months  No associated fevers or chills or night sweats  Has not had any relief from this  He was initially told he might have allergies but has not really taken anything for because he does not think these are allergies  Independently reviewed his imaging and we discussed there is concerning small pulmonary nodules that are new from previous that have some minimal FDG activity  I discussed that at this time they are less than 1 cm, the largest is 5 mm, and amenable to biopsy at this time    We will continue with treatment with pembrolizumab and repeat short-term imaging for his lung in 8 to 12 weeks  On exam his lungs are clear  No concerning skin lesions  No lymphadenopathy  ECOG PS 1  We discussed results of his imaging  Labs reviewed  We will continue with treatment  Cycle 7 pembrolizumab today  Will return in 6 weeks for his next treatment  He has standing orders for blood work prior to each treatment  We will try treatment with a Medrol Dosepak to see if he gets any relief from his cough  Possible reactive airway and we can see if a short course of steroids will help calm his airway down  He knows to call with any issues or concerns prior to his next visit        Aminata Fraga MD 12/12/22

## 2022-12-12 NOTE — PROGRESS NOTES
[facility] [address]  [place] [id number] [phone]     Date of Visit: 12/12/2022  Name: Chasity Miller   YOB: 1940     Subjective    Subjective    VISIT DIAGNOSIS:  There are no diagnoses linked to this encounter      Oncology History   Malignant melanoma of skin of face ([facility] )   9/8/2021 -  Cancer Staged    Staging form: Melanoma of the Skin, AJCC 8th Edition  - Clinical stage from 9/8/2021: Stage III (cT2a, cN1a, cM0) - Signed by Aminata Fraga MD on 11/7/2021 9/8/2021 -  Cancer Staged    Staging form: Melanoma of the Skin, AJCC 8th Edition  - Pathologic stage from 9/8/2021: Stage IIIA (pT2a, pN1a, cM0) - Signed by Aminata Fraga MD on 11/7/2021 9/22/2021 Initial Diagnosis    Malignant melanoma of skin of face ([facility] )     4/4/2022 -  Chemotherapy    pembrolizumab (KEYTRUDA) IVPB, 400 mg, Intravenous, Once, 6 of 9 cycles  Administration: 400 mg (4/4/2022), 400 mg (5/16/2022), 400 mg (6/27/2022), 400 mg (8/8/2022), 400 mg (9/19/2022), 400 mg (10/31/2022)     Malignant melanoma of forehead ([facility] )   11/29/2021 Initial Diagnosis    Malignant melanoma of forehead ([facility] )     4/4/2022 -  Chemotherapy    pembrolizumab (KEYTRUDA) IVPB, 400 mg, Intravenous, Once, 6 of 9 cycles  Administration: 400 mg (4/4/2022), 400 mg (5/16/2022), 400 mg (6/27/2022), 400 mg (8/8/2022), 400 mg (9/19/2022), 400 mg (10/31/2022)        Cancer Staging   Malignant melanoma of skin of face [facility])  Staging form: Melanoma of the Skin, AJCC 8th Edition  - Clinical stage from 9/8/2021: Stage III (cT2a, cN1a, cM0) - Signed by Aminata Fraga MD on 11/7/2021  - Pathologic stage from 9/8/2021: Stage IIIA (pT2a, pN1a, cM0) - Signed by Aminata Fraga MD on 11/7/2021     Treatment Details   Treatment goal Curative   Plan Name OP Pembrolizumab Q 42 Days   Status Active   Start Date 4/4/2022   End Date 3/6/2023 (Planned)   Provider Aminata Fraga MD Chemotherapy pembrolizumab (KEYTRUDA) IVPB, 400 mg, Intravenous, Once, 6 of 9 cycles  Administration: 400 mg (4/4/2022), 400 mg (5/16/2022), 400 mg (6/27/2022), 400 mg (8/8/2022), 400 mg (9/19/2022), 400 mg (10/31/2022)          HISTORY OF PRESENT ILLNESS: Eric Bucio is a 79 y o  male  who has Stage IIIA melanoma with questionable recurrence with indeterminate biopsy on adjuvant treatment with pembrolizumab here for follow up and treatment      He is doing okay and feels well  States that he is still experiencing a productive cough  He states that the cough(whitish)  has been ongoing for the past 2 -3 months  Denies any fevers or chills       We discussed results from his recent PET CT scan which demonstrate stable new small pulmonary nouldes with the largest exhibiting minimal FDG activity   We discussed that the nodules are too small to be biopsied at this time        INTERIM HISTORY:     REVIEW OF SYSTEMS:  Review of Systems - Oncology     MEDICATIONS:    Current Outpatient Medications:   •  atenolol (TENORMIN) 100 mg tablet, Take 1 tablet (100 mg total) by mouth daily, Disp: 90 tablet, Rfl: 3  •  glimepiride (AMARYL) 4 mg tablet, Take 1 tablet (4 mg total) by mouth daily, Disp: 90 tablet, Rfl: 3  •  lisinopril-hydrochlorothiazide (PRINZIDE,ZESTORETIC) 20-25 MG per tablet, Take 1 tablet by mouth daily, Disp: 90 tablet, Rfl: 3  •  lovastatin (MEVACOR) 40 MG tablet, Take 1 tablet (40 mg total) by mouth daily, Disp: 90 tablet, Rfl: 3  •  metFORMIN (GLUCOPHAGE) 1000 MG tablet, Take 1 tablet (1,000 mg total) by mouth 2 (two) times a day with meals, Disp: 180 tablet, Rfl: 3  •  meclizine (ANTIVERT) 12 5 MG tablet, Take 1 tablet (12 5 mg total) by mouth 3 (three) times a day as needed for dizziness for up to 5 days, Disp: 15 tablet, Rfl: 0     ALLERGIES:  No Known Allergies     ACTIVE PROBLEMS:  Patient Active Problem List   Diagnosis   • Essential hypertension   • Hiatal hernia with GERD   • Status post laparoscopic cholecystectomy   • Status post umbilical hernia repair, follow-up exam   • Type 2 diabetes mellitus without complication, without long-term current use of insulin (HCC)   • Mixed hyperlipidemia   • Parkinson's disease ([facility] [address] )   • Microalbuminuria   • Malignant melanoma of skin of face ([facility] )   • Thrombocytopenia ([facility] )   • Malignant melanoma of forehead (HCC)   • 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)   • Stage 3 chronic kidney disease, unspecified whether stage 3a or 3b CKD ([facility] )   • Oral lesion   • Advanced care planning/counseling discussion          PAST MEDICAL HISTORY:   Past Medical History:   Diagnosis Date   • Diabetes mellitus ([facility] )    • Hyperlipidemia    • Hypertension         PAST SURGICAL HISTORY:  Past Surgical History:   Procedure Laterality Date   • CATARACT EXTRACTION, BILATERAL     • FLAP LOCAL HEAD / NECK Left 10/12/2021    Procedure: RECONSTRUCTION OF LEFT TEMPLE DEFECT AFTER RESCECTION MELANOMA;  Surgeon: Kalee Sequeira MD;  [facility];  Service: Plastics   • FULL THICKNESS SKIN GRAFT Left 10/12/2021    Procedure: SKIN GRAFT FULL THICKNESS LEFT TEMPLE;  Surgeon: Kalee Sequeira MD;  [facility];  Service: Plastics   • GALLBLADDER SURGERY     • HAND SURGERY Left    • LYMPH NODE BIOPSY Left 10/12/2021    Procedure: BIOPSY LYMPH NODE SENTINEL, LYMPHOSCINTIGRAPHY, INTRAOPERATIVE LYMPHATIC MAPPING (INJECT AT 1200);   Surgeon: Fidel Pena MD;  [facility];  Service: Surgical Oncology   • NOSE SURGERY     • SKIN CANCER EXCISION  10/2021   • SKIN LESION EXCISION Left 10/12/2021    Procedure: FACE MELANOMA SCAR WIDE EXCISION  FACE;  Surgeon: Fidel Pena MD;  [facility];  Service: Surgical Oncology   • SPLIT THICKNESS SKIN GRAFT Left 10/12/2021    Procedure: SKIN GRAFT SPLIT THICKNESS LEFT TEMPLE;  Surgeon: Kalee Sequeira MD;  [facility];  Service: Plastics   • [address] BIOPSY LEFT  03/21/2022        SOCIAL HISTORY:  Social History     Socioeconomic History   • Marital status: /Civil Union     Spouse name: None   • Number of children: None   • Years of education: None   • Highest education level: None   Occupational History   • None   Tobacco Use   • Smoking status: Never   • Smokeless tobacco: Never   Vaping Use   • Vaping Use: Never used   Substance and Sexual Activity   • Alcohol use: Not Currently   • Drug use: Never   • Sexual activity: Not Currently   Other Topics Concern   • None   Social History Narrative   • None     Social Determinants of Health     Financial Resource Strain: Low Risk    • Difficulty of Paying Living Expenses: Not hard at all   Food Insecurity: Not on file   Transportation Needs: No Transportation Needs   • Lack of Transportation (Medical): No   • Lack of Transportation (Non-Medical): No   Physical Activity: Not on file   Stress: Not on file   Social Connections: Not on file   Intimate Partner Violence: Not on file   Housing Stability: Not on file        FAMILY HISTORY:  Family History   Problem Relation Age of Onset   • No Known Problems Mother    • No Known Problems Father    • Colon cancer Other 60        Objective    Objective    PHYSICAL EXAMINATION:   Blood pressure 142/74, pulse 58, temperature 97 8 °F (36 6 °C), resp  rate 16, height 5' 7" (1 702 m), weight 76 2 kg (168 lb), SpO2 98 %  Physical Exam  Constitutional:       Appearance: Normal appearance  HENT:      Head: Normocephalic and atraumatic  Right Ear: Tympanic membrane normal       Left Ear: Tympanic membrane normal       Mouth/Throat:      Pharynx: Oropharynx is clear  Eyes:      Conjunctiva/sclera: Conjunctivae normal    Cardiovascular:      Rate and Rhythm: Normal rate and regular rhythm  Pulses: Normal pulses  Heart sounds: Normal heart sounds  Pulmonary:      Effort: Pulmonary effort is normal       Breath sounds: Normal breath sounds  Abdominal:      General: Bowel sounds are normal    Musculoskeletal:         General: Normal range of motion  Cervical back: Normal range of motion  Skin:     General: Skin is warm  Neurological:      Mental Status: He is alert  Psychiatric:         Mood and Affect: Mood normal          Behavior: Behavior normal          I reviewed lab data in the chart      WBC   Date Value Ref Range Status   12/05/2022 9 28 4 31 - 10 16 Thousand/uL Final   10/24/2022 9 59 4 31 - 10 16 Thousand/uL Final   09/13/2022 9 66 4 31 - 10 16 Thousand/uL Final     Hemoglobin   Date Value Ref Range Status   12/05/2022 13 8 12 0 - 17 0 g/dL Final   10/24/2022 13 7 12 0 - 17 0 g/dL Final   09/13/2022 13 6 12 0 - 17 0 g/dL Final     Platelets   Date Value Ref Range Status   12/05/2022 152 149 - 390 Thousands/uL Final   10/24/2022 169 149 - 390 Thousands/uL Final   09/13/2022 128 (L) 149 - 390 Thousands/uL Final     MCV   Date Value Ref Range Status   12/05/2022 93 82 - 98 fL Final   10/24/2022 91 82 - 98 fL Final   09/13/2022 89 82 - 98 fL Final      Potassium   Date Value Ref Range Status   12/05/2022 4 3 3 5 - 5 3 mmol/L Final   10/24/2022 3 9 3 5 - 5 3 mmol/L Final   09/13/2022 4 1 3 5 - 5 3 mmol/L Final     Chloride   Date Value Ref Range Status   12/05/2022 110 (H) 96 - 108 mmol/L Final   10/24/2022 107 96 - 108 mmol/L Final   09/13/2022 105 96 - 108 mmol/L Final     CO2   Date Value Ref Range Status   12/05/2022 24 21 - 32 mmol/L Final   10/24/2022 28 21 - 32 mmol/L Final   09/13/2022 26 21 - 32 mmol/L Final     BUN   Date Value Ref Range Status   12/05/2022 26 (H) 5 - 25 mg/dL Final   10/24/2022 33 (H) 5 - 25 mg/dL Final   09/13/2022 19 5 - 25 mg/dL Final     Creatinine   Date Value Ref Range Status   12/05/2022 1 43 (H) 0 60 - 1 30 mg/dL Final     Comment:     Standardized to IDMS reference method   10/24/2022 1 47 (H) 0 60 - 1 30 mg/dL Final     Comment:     Standardized to IDMS reference method   09/13/2022 1 37 (H) 0 60 - 1 30 mg/dL Final     Comment:     Standardized to IDMS reference method     Glucose   Date Value Ref Range Status   12/05/2022 142 (H) 65 - 140 mg/dL Final     Comment:     If the patient is fasting, the ADA then defines impaired fasting glucose as > 100 mg/dL and diabetes as > or equal to 123 mg/dL  Specimen collection should occur prior to Sulfasalazine administration due to the potential for falsely depressed results  Specimen collection should occur prior to Sulfapyridine administration due to the potential for falsely elevated results  09/13/2022 174 (H) 65 - 140 mg/dL Final     Comment:     If the patient is fasting, the ADA then defines impaired fasting glucose as > 100 mg/dL and diabetes as > or equal to 123 mg/dL  Specimen collection should occur prior to Sulfasalazine administration due to the potential for falsely depressed results  Specimen collection should occur prior to Sulfapyridine administration due to the potential for falsely elevated results  04/26/2022 192 (H) 65 - 140 mg/dL Final     Comment:     If the patient is fasting, the ADA then defines impaired fasting glucose as > 100 mg/dL and diabetes as > or equal to 123 mg/dL  Specimen collection should occur prior to Sulfasalazine administration due to the potential for falsely depressed results  Specimen collection should occur prior to Sulfapyridine administration due to the potential for falsely elevated results       Calcium   Date Value Ref Range Status   12/05/2022 8 8 8 3 - 10 1 mg/dL Final   10/24/2022 8 8 8 3 - 10 1 mg/dL Final   09/13/2022 8 8 8 3 - 10 1 mg/dL Final     Albumin   Date Value Ref Range Status   12/05/2022 3 9 3 5 - 5 0 g/dL Final   10/24/2022 3 9 3 5 - 5 0 g/dL Final   09/13/2022 3 8 3 5 - 5 0 g/dL Final     Total Bilirubin   Date Value Ref Range Status   12/05/2022 1 27 (H) 0 20 - 1 00 mg/dL Final     Comment:     Use of this assay is not recommended for patients undergoing treatment with eltrombopag due to the potential for falsely elevated results  10/24/2022 1 50 (H) 0 20 - 1 00 mg/dL Final     Comment:     Use of this assay is not recommended for patients undergoing treatment with eltrombopag due to the potential for falsely elevated results  09/13/2022 1 36 (H) 0 20 - 1 00 mg/dL Final     Comment:     Use of this assay is not recommended for patients undergoing treatment with eltrombopag due to the potential for falsely elevated results  Alkaline Phosphatase   Date Value Ref Range Status   12/05/2022 79 46 - 116 U/L Final   10/24/2022 85 46 - 116 U/L Final   09/13/2022 77 46 - 116 U/L Final     AST   Date Value Ref Range Status   12/05/2022 13 5 - 45 U/L Final     Comment:     Specimen collection should occur prior to Sulfasalazine administration due to the potential for falsely depressed results  10/24/2022 19 5 - 45 U/L Final     Comment:     Specimen collection should occur prior to Sulfasalazine administration due to the potential for falsely depressed results  09/13/2022 14 5 - 45 U/L Final     Comment:     Specimen collection should occur prior to Sulfasalazine administration due to the potential for falsely depressed results  ALT   Date Value Ref Range Status   12/05/2022 22 12 - 78 U/L Final     Comment:     Specimen collection should occur prior to Sulfasalazine and/or Sulfapyridine administration due to the potential for falsely depressed results  10/24/2022 32 12 - 78 U/L Final     Comment:     Specimen collection should occur prior to Sulfasalazine and/or Sulfapyridine administration due to the potential for falsely depressed results  09/13/2022 24 12 - 78 U/L Final     Comment:     Specimen collection should occur prior to Sulfasalazine and/or Sulfapyridine administration due to the potential for falsely depressed results         LD   Date Value Ref Range Status   12/05/2022 187 81 - 234 U/L Final   10/24/2022 154 81 - 234 U/L Final   09/13/2022 165 81 - 234 U/L Final     No results found for: TSH  No results found for: [id number]   Free T4   Date Value Ref Range Status   12/05/2022 0 87 0 76 - 1 46 ng/dL Final     Comment:     Specimen collection should occur prior to Sulfasalazine administration due to the potential for falsely elevated results  10/24/2022 0 97 0 76 - 1 46 ng/dL Final     Comment:     Specimen collection should occur prior to Sulfasalazine administration due to the potential for falsely elevated results  09/13/2022 0 91 0 76 - 1 46 ng/dL Final     Comment:     Specimen collection should occur prior to Sulfasalazine administration due to the potential for falsely elevated results  RECENT IMAGING:  No results found  Assessment    Assessment/Plan  Mr Ernestine Carey is a 79 yr male with stage IIIA melanoma and possible recurrence on treatment with pembrolizumab       1  Malignant melanoma of skin of face ([facility] [address] )  2  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[facility] )  He is doing well and tolerating treatment  Recent PET CT scan  Demonstrates New small pulmonary nodules, the largest measuring 5 mm  The largest exhibits minimal FDG activity  This is suspicious for developing pulmonary metastases  2  No evidence of recurrent FDG avid disease in the head or neck  We will continue treatment  Labs reviewed and okay to continue      Pembrolizumab cycle 7/9 completed      He will return in 6 weeks for his next treatment  He has standing orders for blood work  He knows to call with any issues or concerns prior to his next visit         3   High risk medication use  He is on treatment with immunotherapy and we will continue to monitor for side effects and lab abnormalities   We will monitor labs with each treatment to ensure safety of continuing on treatment  Shearon Begin knows to watch for signs and symptoms for immune mediated side effects        Will continue to monitor his shortness of breath                            Return in about 6 weeks (around 1/23/2023) for Office Visit, Infusion - See Treatment Plan, labs

## 2022-12-12 NOTE — TELEPHONE ENCOUNTER
Please schedule additional cycle on Monday afternoon, please print out schedule for patieent before leaving infusion  thanks!

## 2022-12-12 NOTE — PROGRESS NOTES
Patient is here for [facility]  Labs reviewed from 12/5, within treatment parameters  Patient resting with no complains  Will continue to monitor

## 2023-01-16 ENCOUNTER — APPOINTMENT (OUTPATIENT)
Dept: LAB | Facility: IMAGING CENTER | Age: 83
End: 2023-01-16

## 2023-01-16 DIAGNOSIS — I10 ESSENTIAL HYPERTENSION: ICD-10-CM

## 2023-01-16 DIAGNOSIS — E11.9 TYPE 2 DIABETES MELLITUS WITHOUT COMPLICATION, WITHOUT LONG-TERM CURRENT USE OF INSULIN (HCC): ICD-10-CM

## 2023-01-16 RX ORDER — SODIUM CHLORIDE 9 MG/ML
20 INJECTION, SOLUTION INTRAVENOUS ONCE
Status: CANCELLED | OUTPATIENT
Start: 2023-01-23

## 2023-01-20 NOTE — PROGRESS NOTES
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)    Malignant melanoma of forehead ([address] )    Lung nodules  -     CT chest wo contrast; Future    High risk medication use        Oncology History   Malignant melanoma of skin of face ([facility] )   9/8/2021 -  Cancer Staged    Staging form: Melanoma of the Skin, AJCC 8th Edition  - Clinical stage from 9/8/2021: Stage III (cT2a, cN1a, cM0) - Signed by Marcella Thacker MD on 11/7/2021 9/8/2021 -  Cancer Staged    Staging form: Melanoma of the Skin, AJCC 8th Edition  - Pathologic stage from 9/8/2021: Stage IIIA (pT2a, pN1a, cM0) - Signed by Marcella Thacker MD on 11/7/2021 9/22/2021 Initial Diagnosis    Malignant melanoma of skin of face ([facility] )     4/4/2022 -  Chemotherapy    pembrolizumab (KEYTRUDA) IVPB, 400 mg, Intravenous, Once, 8 of 9 cycles  Administration: 400 mg (4/4/2022), 400 mg (5/16/2022), 400 mg (6/27/2022), 400 mg (8/8/2022), 400 mg (9/19/2022), 400 mg (10/31/2022), 400 mg (12/12/2022), 400 mg (1/23/2023)     Malignant melanoma of forehead ([address] )   11/29/2021 Initial Diagnosis    Malignant melanoma of forehead ([address] )     4/4/2022 -  Chemotherapy    pembrolizumab (KEYTRUDA) IVPB, 400 mg, Intravenous, Once, 8 of 9 cycles  Administration: 400 mg (4/4/2022), 400 mg (5/16/2022), 400 mg (6/27/2022), 400 mg (8/8/2022), 400 mg (9/19/2022), 400 mg (10/31/2022), 400 mg (12/12/2022), 400 mg (1/23/2023)        Cancer Staging   Malignant melanoma of skin of face [facility])  Staging form: Melanoma of the Skin, AJCC 8th Edition  - Clinical stage from 9/8/2021: Stage III (cT2a, cN1a, cM0) - Signed by Faustino Carrillo MD on 11/7/2021  - Pathologic stage from 9/8/2021: Stage IIIA (pT2a, pN1a, cM0) - Signed by Faustino Carrillo MD on 11/7/2021     Treatment Details   Treatment goal Curative   Plan Name OP Pembrolizumab Q 42 Days   Status Active   Start Date 4/4/2022   End Date 3/6/2023 (Planned)   Provider Faustino Carrillo MD   Chemotherapy pembrolizumab [facility]) IVPB, 400 mg, Intravenous, Once, 8 of 9 cycles  Administration: 400 mg (4/4/2022), 400 mg (5/16/2022), 400 mg (6/27/2022), 400 mg (8/8/2022), 400 mg (9/19/2022), 400 mg (10/31/2022), 400 mg (12/12/2022), 400 mg (1/23/2023)          HISTORY OF PRESENT ILLNESS: Renu Morrissey is a 79 y o  male  who has stage IIIa melanoma with questionable disease progression started on treatment with pembrolizumab here for follow-up and treatment  He is doing okay  No issues or complaints  Denies any new, changing, or concerning skin lesions  No lymphadenopathy  Denies immune mediated side effects  Denies headaches, double vision, rash, itching, chest pain, shortness of breath, and diarrhea  REVIEW OF SYSTEMS:  Review of Systems   Constitutional: Negative for appetite change, fatigue, fever and unexpected weight change  HENT:   Negative for lump/mass  Eyes: Negative for icterus  Respiratory: Negative for cough, shortness of breath and wheezing  Cardiovascular: Negative for leg swelling  Gastrointestinal: Negative for abdominal pain, constipation, diarrhea, nausea and vomiting  Genitourinary: Negative for difficulty urinating and hematuria  Musculoskeletal: Negative for arthralgias, gait problem and myalgias  Skin: Negative for itching and rash  No new, changing, or concerning lesions  Neurological: Negative for extremity weakness, gait problem, headaches, light-headedness and numbness  Hematological: Negative for adenopathy          MEDICATIONS:    Current Outpatient Medications:   •  atenolol (TENORMIN) 100 mg tablet, Take 1 tablet (100 mg total) by mouth daily, Disp: 90 tablet, Rfl: 3  •  glimepiride (AMARYL) 4 mg tablet, Take 1 tablet (4 mg total) by mouth daily, Disp: 90 tablet, Rfl: 3  •  lisinopril-hydrochlorothiazide (PRINZIDE,ZESTORETIC) 20-25 MG per tablet, Take 1 tablet by mouth daily, Disp: 90 tablet, Rfl: 3  •  lovastatin (MEVACOR) 40 MG tablet, Take 1 tablet (40 mg total) by mouth daily, Disp: 90 tablet, Rfl: 3  •  metFORMIN (GLUCOPHAGE) 1000 MG tablet, Take 1 tablet (1,000 mg total) by mouth 2 (two) times a day with meals, Disp: 180 tablet, Rfl: 3  •  methylPREDNISolone 4 MG tablet therapy pack, Use as directed on package, Disp: 1 each, Rfl: 0  •  meclizine (ANTIVERT) 12 5 MG tablet, Take 1 tablet (12 5 mg total) by mouth 3 (three) times a day as needed for dizziness for up to 5 days, Disp: 15 tablet, Rfl: 0  No current facility-administered medications for this visit       ALLERGIES:  No Known Allergies     ACTIVE PROBLEMS:  Patient Active Problem List   Diagnosis   • Essential hypertension   • Hiatal hernia with GERD   • Status post laparoscopic cholecystectomy   • Status post umbilical hernia repair, follow-up exam   • Type 2 diabetes mellitus without complication, without long-term current use of insulin (HCC)   • Mixed hyperlipidemia   • Parkinson's disease ([address] )   • Microalbuminuria   • Malignant melanoma of skin of face ([address] )   • Thrombocytopenia (HCC)   • Malignant melanoma of forehead ([address] )   • 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)   • Stage 3 chronic kidney disease, unspecified whether stage 3a or 3b CKD ([address] )   • Oral lesion   • Advanced care planning/counseling discussion          PAST MEDICAL HISTORY:   Past Medical History:   Diagnosis Date   • Diabetes mellitus ([address] )    • Hyperlipidemia    • Hypertension         PAST SURGICAL HISTORY:  Past Surgical History:   Procedure Laterality Date   • CATARACT EXTRACTION, BILATERAL     • FLAP LOCAL HEAD / NECK Left 10/12/2021    Procedure: RECONSTRUCTION OF LEFT TEMPLE DEFECT AFTER RESCECTION MELANOMA;  Surgeon: Kailey Gates MD;  [facility];  Service: Plastics   • FULL THICKNESS SKIN GRAFT Left 10/12/2021    Procedure: SKIN GRAFT FULL THICKNESS LEFT TEMPLE;  Surgeon: Kailey Gates MD;  [facility];  Service: Plastics   • GALLBLADDER SURGERY     • HAND SURGERY Left    • LYMPH NODE BIOPSY Left 10/12/2021    Procedure: BIOPSY LYMPH NODE SENTINEL, LYMPHOSCINTIGRAPHY, INTRAOPERATIVE LYMPHATIC MAPPING (INJECT AT 1200); Surgeon: Tim Lewis MD;  [facility];  Service: Surgical Oncology   • NOSE SURGERY     • SKIN CANCER EXCISION  10/2021   • SKIN LESION EXCISION Left 10/12/2021    Procedure: [address];  Surgeon: Tim Lewis MD;  [facility];  Service: Surgical Oncology   • SPLIT THICKNESS SKIN GRAFT Left 10/12/2021    Procedure: SKIN GRAFT SPLIT THICKNESS LEFT TEMPLE;  Surgeon: Kailey Gates MD;  [facility];  Service: Plastics   • US GUIDED LYMPH NODE BIOPSY LEFT  03/21/2022        SOCIAL HISTORY:  Social History     Socioeconomic History   • Marital status: /Civil Union     Spouse name: None   • Number of children: None   • Years of education: None   • Highest education level: None   Occupational History   • None   Tobacco Use   • Smoking status: Never   • Smokeless tobacco: Never   Vaping Use   • Vaping Use: Never used   Substance and Sexual Activity   • Alcohol use: Not Currently   • Drug use: Never   • Sexual activity: Not Currently   Other Topics Concern   • None   Social History Narrative   • None     Social Determinants of Health     Financial Resource Strain: Low Risk    • Difficulty of Paying Living Expenses: Not hard at all   Food Insecurity: Not on file   Transportation Needs: No Transportation Needs   • Lack of Transportation (Medical): No   • Lack of Transportation (Non-Medical):  No   Physical Activity: Not on file   Stress: Not on file   Social Connections: Not on file   Intimate Partner Violence: Not on file   Housing Stability: Not on file        FAMILY HISTORY:  Family History   Problem Relation Age of Onset   • No Known Problems Mother    • No Known Problems Father    • Colon cancer Other 60           Objective    PHYSICAL EXAMINATION:   Blood pressure 130/80, pulse 64, resp  rate 18, height 5' 7" (1 702 m), weight 76 2 kg (168 lb), SpO2 (!) 84 %  ECOG Performance Status    Flowsheet Row Most Recent Value   ECOG Performance Status 1 - Restricted in physically strenuous activity but ambulatory and able to carry out work of a light or sedentary nature, e g , light house work, office work             Physical Exam  Constitutional:       General: He is not in acute distress  Appearance: Normal appearance  He is not toxic-appearing  HENT:      Mouth/Throat:      Mouth: Mucous membranes are moist       Pharynx: Oropharynx is clear  Eyes:      General: No scleral icterus  Cardiovascular:      Rate and Rhythm: Normal rate and regular rhythm  Pulses: Normal pulses  Heart sounds: No murmur heard  No friction rub  No gallop  Pulmonary:      Effort: Pulmonary effort is normal  No respiratory distress  Breath sounds: Normal breath sounds  No wheezing or rales  Abdominal:      General: There is no distension  Palpations: There is no mass  Tenderness: There is no abdominal tenderness  There is no rebound  Musculoskeletal:         General: No swelling or tenderness  Right lower leg: No edema  Left lower leg: No edema  Lymphadenopathy:      Head:      Right side of head: No submandibular, preauricular or posterior auricular adenopathy  Left side of head: No submandibular, preauricular or posterior auricular adenopathy  Cervical: No cervical adenopathy  Right cervical: No superficial or posterior cervical adenopathy       Left cervical: No superficial or posterior cervical adenopathy  Upper Body:      Right upper body: No supraclavicular or axillary adenopathy  Left upper body: No supraclavicular or axillary adenopathy  Skin:     Findings: No rash  Comments: No concerning skin lesions   Neurological:      General: No focal deficit present  Mental Status: He is alert and oriented to person, place, and time  Psychiatric:         Mood and Affect: Mood normal          Behavior: Behavior normal          Thought Content: Thought content normal          Judgment: Judgment normal          I reviewed lab data in the chart      WBC   Date Value Ref Range Status   01/16/2023 8 06 4 31 - 10 16 Thousand/uL Final   12/05/2022 9 28 4 31 - 10 16 Thousand/uL Final   10/24/2022 9 59 4 31 - 10 16 Thousand/uL Final     Hemoglobin   Date Value Ref Range Status   01/16/2023 13 3 12 0 - 17 0 g/dL Final   12/05/2022 13 8 12 0 - 17 0 g/dL Final   10/24/2022 13 7 12 0 - 17 0 g/dL Final     Platelets   Date Value Ref Range Status   01/16/2023 132 (L) 149 - 390 Thousands/uL Final   12/05/2022 152 149 - 390 Thousands/uL Final   10/24/2022 169 149 - 390 Thousands/uL Final     MCV   Date Value Ref Range Status   01/16/2023 92 82 - 98 fL Final   12/05/2022 93 82 - 98 fL Final   10/24/2022 91 82 - 98 fL Final      Potassium   Date Value Ref Range Status   01/16/2023 4 1 3 5 - 5 3 mmol/L Final   12/05/2022 4 3 3 5 - 5 3 mmol/L Final   10/24/2022 3 9 3 5 - 5 3 mmol/L Final     Chloride   Date Value Ref Range Status   01/16/2023 109 (H) 96 - 108 mmol/L Final   12/05/2022 110 (H) 96 - 108 mmol/L Final   10/24/2022 107 96 - 108 mmol/L Final     CO2   Date Value Ref Range Status   01/16/2023 27 21 - 32 mmol/L Final   12/05/2022 24 21 - 32 mmol/L Final   10/24/2022 28 21 - 32 mmol/L Final     BUN   Date Value Ref Range Status   01/16/2023 30 (H) 5 - 25 mg/dL Final   12/05/2022 26 (H) 5 - 25 mg/dL Final   10/24/2022 33 (H) 5 - 25 mg/dL Final     Creatinine   Date Value Ref Range Status   01/16/2023 1 60 (H) 0 60 - 1 30 mg/dL Final     Comment:     Standardized to IDMS reference method   12/05/2022 1 43 (H) 0 60 - 1 30 mg/dL Final     Comment:     Standardized to IDMS reference method   10/24/2022 1 47 (H) 0 60 - 1 30 mg/dL Final     Comment:     Standardized to IDMS reference method     Glucose   Date Value Ref Range Status   01/16/2023 181 (H) 65 - 140 mg/dL Final     Comment:     If the patient is fasting, the ADA then defines impaired fasting glucose as > 100 mg/dL and diabetes as > or equal to 123 mg/dL  Specimen collection should occur prior to Sulfasalazine administration due to the potential for falsely depressed results  Specimen collection should occur prior to Sulfapyridine administration due to the potential for falsely elevated results  12/05/2022 142 (H) 65 - 140 mg/dL Final     Comment:     If the patient is fasting, the ADA then defines impaired fasting glucose as > 100 mg/dL and diabetes as > or equal to 123 mg/dL  Specimen collection should occur prior to Sulfasalazine administration due to the potential for falsely depressed results  Specimen collection should occur prior to Sulfapyridine administration due to the potential for falsely elevated results  09/13/2022 174 (H) 65 - 140 mg/dL Final     Comment:     If the patient is fasting, the ADA then defines impaired fasting glucose as > 100 mg/dL and diabetes as > or equal to 123 mg/dL  Specimen collection should occur prior to Sulfasalazine administration due to the potential for falsely depressed results  Specimen collection should occur prior to Sulfapyridine administration due to the potential for falsely elevated results       Calcium   Date Value Ref Range Status   01/16/2023 8 9 8 3 - 10 1 mg/dL Final   12/05/2022 8 8 8 3 - 10 1 mg/dL Final   10/24/2022 8 8 8 3 - 10 1 mg/dL Final     Albumin   Date Value Ref Range Status   01/16/2023 3 6 3 5 - 5 0 g/dL Final   12/05/2022 3 9 3 5 - 5 0 g/dL Final   10/24/2022 3 9 3 5 - 5 0 g/dL Final     Total Bilirubin   Date Value Ref Range Status   01/16/2023 0 88 0 20 - 1 00 mg/dL Final     Comment:     Use of this assay is not recommended for patients undergoing treatment with eltrombopag due to the potential for falsely elevated results  12/05/2022 1 27 (H) 0 20 - 1 00 mg/dL Final     Comment:     Use of this assay is not recommended for patients undergoing treatment with eltrombopag due to the potential for falsely elevated results  10/24/2022 1 50 (H) 0 20 - 1 00 mg/dL Final     Comment:     Use of this assay is not recommended for patients undergoing treatment with eltrombopag due to the potential for falsely elevated results  Alkaline Phosphatase   Date Value Ref Range Status   01/16/2023 78 46 - 116 U/L Final   12/05/2022 79 46 - 116 U/L Final   10/24/2022 85 46 - 116 U/L Final     AST   Date Value Ref Range Status   01/16/2023 14 5 - 45 U/L Final     Comment:     Specimen collection should occur prior to Sulfasalazine administration due to the potential for falsely depressed results  12/05/2022 13 5 - 45 U/L Final     Comment:     Specimen collection should occur prior to Sulfasalazine administration due to the potential for falsely depressed results  10/24/2022 19 5 - 45 U/L Final     Comment:     Specimen collection should occur prior to Sulfasalazine administration due to the potential for falsely depressed results  ALT   Date Value Ref Range Status   01/16/2023 17 12 - 78 U/L Final     Comment:     Specimen collection should occur prior to Sulfasalazine and/or Sulfapyridine administration due to the potential for falsely depressed results  12/05/2022 22 12 - 78 U/L Final     Comment:     Specimen collection should occur prior to Sulfasalazine and/or Sulfapyridine administration due to the potential for falsely depressed results      10/24/2022 32 12 - 78 U/L Final     Comment:     Specimen collection should occur prior to Sulfasalazine and/or Sulfapyridine administration due to the potential for falsely depressed results  LD   Date Value Ref Range Status   01/16/2023 187 81 - 234 U/L Final   12/05/2022 187 81 - 234 U/L Final   10/24/2022 154 81 - 234 U/L Final     No results found for: TSH  No results found for: [id number]   Free T4   Date Value Ref Range Status   01/16/2023 0 95 0 76 - 1 46 ng/dL Final     Comment:     Specimen collection should occur prior to Sulfasalazine administration due to the potential for falsely elevated results  12/05/2022 0 87 0 76 - 1 46 ng/dL Final     Comment:     Specimen collection should occur prior to Sulfasalazine administration due to the potential for falsely elevated results  10/24/2022 0 97 0 76 - 1 46 ng/dL Final     Comment:     Specimen collection should occur prior to Sulfasalazine administration due to the potential for falsely elevated results  RECENT IMAGING:  No results found  Assessment/Plan  Mr Gal Marks is a 79 yr male with stage IIIa melanoma with progression with indeterminate biopsy started on pembrolizumab here for follow-up and treatment  1  Malignant melanoma of skin of face ([address] )  2  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[address] )  3  Malignant melanoma of forehead ([address] )  He is doing well and tolerating treatment with pembrolizumab  Denies any side effects at this time  Labs reviewed and okay to continue treatment  Cycle 8 pembrolizumab    He will return in 6 weeks prior to his next treatment  He has standing orders for blood work prior to each treatment  He knows to call with issues or concerns prior to his next visit  4  Lung nodules  On previous imaging in December 2022 there was concern for development of new nonspecific subcentimeter pulmonary nodules concerning for development of metastases  We will repeat imaging at this time to evaluate these lesions    Orders placed for CT noncontrast chest   Scripts provided  5  High risk medication use    He is on treatment with immunotherapy and we will continue to monitor for side effects and lab abnormalities  We will monitor labs with each treatment to ensure safety of continuing on treatment  He knows to watch for signs and symptoms for immune mediated side effects  Denies any immune mediated side effects at this time  He knows to call with any issues or concerns prior to his next visit  Return in about 6 weeks (around 3/6/2023) for Office Visit, labs, Infusion - See Treatment Plan       Buster Rodríguez MD, PhD

## 2023-01-23 ENCOUNTER — OFFICE VISIT (OUTPATIENT)
Dept: HEMATOLOGY ONCOLOGY | Facility: CLINIC | Age: 83
End: 2023-01-23

## 2023-01-23 ENCOUNTER — HOSPITAL ENCOUNTER (OUTPATIENT)
Dept: INFUSION CENTER | Facility: CLINIC | Age: 83
Discharge: HOME | End: 2023-01-23

## 2023-01-23 VITALS
SYSTOLIC BLOOD PRESSURE: 130 MMHG | BODY MASS INDEX: 26.37 KG/M2 | OXYGEN SATURATION: 84 % | DIASTOLIC BLOOD PRESSURE: 80 MMHG | RESPIRATION RATE: 18 BRPM | HEIGHT: 67 IN | WEIGHT: 168 LBS | HEART RATE: 64 BPM

## 2023-01-23 VITALS
HEART RATE: 68 BPM | SYSTOLIC BLOOD PRESSURE: 173 MMHG | DIASTOLIC BLOOD PRESSURE: 100 MMHG | TEMPERATURE: 96.7 F | RESPIRATION RATE: 18 BRPM | OXYGEN SATURATION: 68 %

## 2023-01-23 DIAGNOSIS — C43.30 MALIGNANT MELANOMA OF SKIN OF FACE (HCC): Primary | ICD-10-CM

## 2023-01-23 DIAGNOSIS — C77.3 SECONDARY AND UNSPECIFIED MALIGNANT NEOPLASM OF AXILLA AND UPPER LIMB LYMPH NODES (HCC): ICD-10-CM

## 2023-01-23 DIAGNOSIS — Z79.899 HIGH RISK MEDICATION USE: ICD-10-CM

## 2023-01-23 DIAGNOSIS — C43.39 MALIGNANT MELANOMA OF FOREHEAD (HCC): ICD-10-CM

## 2023-01-23 DIAGNOSIS — R91.8 LUNG NODULES: ICD-10-CM

## 2023-01-23 RX ORDER — SODIUM CHLORIDE 9 MG/ML
20 INJECTION, SOLUTION INTRAVENOUS ONCE
Status: COMPLETED | OUTPATIENT
Start: 2023-01-23 | End: 2023-01-23

## 2023-01-23 RX ADMIN — SODIUM CHLORIDE 400 MG: 9 INJECTION, SOLUTION INTRAVENOUS at 14:31

## 2023-01-23 RX ADMIN — SODIUM CHLORIDE 20 ML/HR: 9 INJECTION, SOLUTION INTRAVENOUS at 14:08

## 2023-01-23 NOTE — PROGRESS NOTES
Patient arrives to infusion center for D1 C8 Keytruda today  Patient offers no acute complaints today  Labs reviewed from 1/16/23 and are within parameters for treatment today  Noted elevated creat at 1 6 which is increased from baseline 1 37  TEAMs message sent to [facility] in Dr Monalisa Julian office - OK to treat today  PIV inserted in x2 attempts  Patient resting on recliner chair, call bell within reach

## 2023-01-23 NOTE — LETTER
January 23, 2023     Chauncey Kee MD  [address] [place] [address]    Patient: Makenzie Joyce   YOB: 1940   Date of Visit: 1/23/2023       Dear Dr Zeinab Chopra:    Thank you for referring Makenzie Joyce to me for evaluation  Below are my notes for this consultation  If you have questions, please do not hesitate to call me  I look forward to following your patient along with you           Sincerely,        Rubén Bolaños MD        CC: MD Rita Jay RN Rayma Llamas, MD Eleni Doughty, MD Ronnell Saas, MD  1/23/2023 11:04 PM  Sign when Signing Visit  [address] [place] [address] 58  [phone]  [phone]     Date of Visit: 1/23/2023  Name: Makenzie Joyce   YOB: 1940        Subjective    VISIT DIAGNOSIS:  Diagnoses and all orders for this visit:    Malignant melanoma of skin of face [facility])  -     CT chest wo contrast; Future    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 [address] )    Lung nodules  -     CT chest wo contrast; Future    High risk medication use        Oncology History   Malignant melanoma of skin of face ([address] )   9/8/2021 -  Cancer Staged    Staging form: Melanoma of the Skin, AJCC 8th Edition  - Clinical stage from 9/8/2021: Stage III (cT2a, cN1a, cM0) - Signed by Rubén Bolaños MD on 11/7/2021 9/8/2021 -  Cancer Staged    Staging form: Melanoma of the Skin, AJCC 8th Edition  - Pathologic stage from 9/8/2021: Stage IIIA (pT2a, pN1a, cM0) - Signed by Rubén Bolaños MD on 11/7/2021 9/22/2021 Initial Diagnosis    Malignant melanoma of skin of face ([facility] [address] )     4/4/2022 -  Chemotherapy    pembrolizumab (KEYTRUDA) IVPB, 400 mg, Intravenous, Once, 8 of 9 cycles  Administration: 400 mg (4/4/2022), 400 mg (5/16/2022), 400 mg (6/27/2022), 400 mg (8/8/2022), 400 mg (9/19/2022), 400 mg (10/31/2022), 400 mg (12/12/2022), 400 mg (1/23/2023)     Malignant melanoma of forehead ([facility] [address] )   11/29/2021 Initial Diagnosis    Malignant melanoma of forehead ([facility] [address] )     4/4/2022 -  Chemotherapy    pembrolizumab (KEYTRUDA) IVPB, 400 mg, Intravenous, Once, 8 of 9 cycles  Administration: 400 mg (4/4/2022), 400 mg (5/16/2022), 400 mg (6/27/2022), 400 mg (8/8/2022), 400 mg (9/19/2022), 400 mg (10/31/2022), 400 mg (12/12/2022), 400 mg (1/23/2023)        Cancer Staging   Malignant melanoma of skin of face [facility])  Staging form: Melanoma of the Skin, AJCC 8th Edition  - Clinical stage from 9/8/2021: Stage III (cT2a, cN1a, cM0) - Signed by Rubén Bolaños MD on 11/7/2021  - Pathologic stage from 9/8/2021: Stage IIIA (pT2a, pN1a, cM0) - Signed by Rubén Bolaños MD on 11/7/2021     Treatment Details   Treatment goal Curative   Plan Name OP Pembrolizumab Q 42 Days   Status Active   Start Date 4/4/2022   End Date 3/6/2023 (Planned)   Provider Rubén Bolaños MD   Chemotherapy pembrolizumab [facility]) IVPB, 400 mg, Intravenous, Once, 8 of 9 cycles  Administration: 400 mg (4/4/2022), 400 mg (5/16/2022), 400 mg (6/27/2022), 400 mg (8/8/2022), 400 mg (9/19/2022), 400 mg (10/31/2022), 400 mg (12/12/2022), 400 mg (1/23/2023)          HISTORY OF PRESENT ILLNESS: Makenzie Joyce is a 79 y o  male  who has stage IIIa melanoma with questionable disease progression started on treatment with pembrolizumab here for follow-up and treatment  He is doing okay  No issues or complaints  Denies any new, changing, or concerning skin lesions  No lymphadenopathy  Denies immune mediated side effects  Denies headaches, double vision, rash, itching, chest pain, shortness of breath, and diarrhea  REVIEW OF SYSTEMS:  Review of Systems   Constitutional: Negative for appetite change, fatigue, fever and unexpected weight change  HENT:   Negative for lump/mass  Eyes: Negative for icterus     Respiratory: Negative for cough, shortness of breath and wheezing  Cardiovascular: Negative for leg swelling  Gastrointestinal: Negative for abdominal pain, constipation, diarrhea, nausea and vomiting  Genitourinary: Negative for difficulty urinating and hematuria  Musculoskeletal: Negative for arthralgias, gait problem and myalgias  Skin: Negative for itching and rash  No new, changing, or concerning lesions  Neurological: Negative for extremity weakness, gait problem, headaches, light-headedness and numbness  Hematological: Negative for adenopathy  MEDICATIONS:    Current Outpatient Medications:   •  atenolol (TENORMIN) 100 mg tablet, Take 1 tablet (100 mg total) by mouth daily, Disp: 90 tablet, Rfl: 3  •  glimepiride (AMARYL) 4 mg tablet, Take 1 tablet (4 mg total) by mouth daily, Disp: 90 tablet, Rfl: 3  •  lisinopril-hydrochlorothiazide (PRINZIDE,ZESTORETIC) 20-25 MG per tablet, Take 1 tablet by mouth daily, Disp: 90 tablet, Rfl: 3  •  lovastatin (MEVACOR) 40 MG tablet, Take 1 tablet (40 mg total) by mouth daily, Disp: 90 tablet, Rfl: 3  •  metFORMIN (GLUCOPHAGE) 1000 MG tablet, Take 1 tablet (1,000 mg total) by mouth 2 (two) times a day with meals, Disp: 180 tablet, Rfl: 3  •  methylPREDNISolone 4 MG tablet therapy pack, Use as directed on package, Disp: 1 each, Rfl: 0  •  meclizine (ANTIVERT) 12 5 MG tablet, Take 1 tablet (12 5 mg total) by mouth 3 (three) times a day as needed for dizziness for up to 5 days, Disp: 15 tablet, Rfl: 0  No current facility-administered medications for this visit       ALLERGIES:  No Known Allergies     ACTIVE PROBLEMS:  Patient Active Problem List   Diagnosis   • Essential hypertension   • Hiatal hernia with GERD   • Status post laparoscopic cholecystectomy   • Status post umbilical hernia repair, follow-up exam   • Type 2 diabetes mellitus without complication, without long-term current use of insulin ([facility] [address] )   • Mixed hyperlipidemia   • Parkinson's disease ([facility] [address] )   • Microalbuminuria   • Malignant melanoma of skin of face (HCC)   • Thrombocytopenia (HCC)   • Malignant melanoma of forehead (HCC)   • 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)   • Stage 3 chronic kidney disease, unspecified whether stage 3a or 3b CKD ([facility] [address] )   • Oral lesion   • Advanced care planning/counseling discussion          PAST MEDICAL HISTORY:   Past Medical History:   Diagnosis Date   • Diabetes mellitus ([facility] [address] )    • Hyperlipidemia    • Hypertension         PAST SURGICAL HISTORY:  Past Surgical History:   Procedure Laterality Date   • CATARACT EXTRACTION, BILATERAL     • FLAP LOCAL HEAD / NECK Left 10/12/2021    Procedure: RECONSTRUCTION OF LEFT TEMPLE DEFECT AFTER RESCECTION MELANOMA;  Surgeon: Mick Kerr MD;  [facility];  Service: Plastics   • FULL THICKNESS SKIN GRAFT Left 10/12/2021    Procedure: SKIN GRAFT FULL THICKNESS LEFT TEMPLE;  Surgeon: Mick Kerr MD;  [facility];  Service: Plastics   • GALLBLADDER SURGERY     • HAND SURGERY Left    • LYMPH NODE BIOPSY Left 10/12/2021    Procedure: BIOPSY LYMPH NODE SENTINEL, LYMPHOSCINTIGRAPHY, INTRAOPERATIVE LYMPHATIC MAPPING (INJECT AT 1200);   Surgeon: Shay Guo MD;  [facility];  Service: Surgical Oncology   • NOSE SURGERY     • SKIN CANCER EXCISION  10/2021   • SKIN LESION EXCISION Left 10/12/2021    Procedure: FACE MELANOMA SCAR WIDE EXCISION  FACE;  Surgeon: Shay Guo MD;  [facility];  Service: Surgical Oncology   • SPLIT THICKNESS SKIN GRAFT Left 10/12/2021    Procedure: SKIN GRAFT SPLIT THICKNESS LEFT TEMPLE;  Surgeon: Mick Kerr MD;  [facility];  Service: Plastics   • US GUIDED LYMPH NODE BIOPSY LEFT  03/21/2022        SOCIAL HISTORY:  Social History     Socioeconomic History   • Marital status: /Civil Union     Spouse name: None   • Number of children: None   • Years of education: None   • Highest education level: None Occupational History   • None   Tobacco Use   • Smoking status: Never   • Smokeless tobacco: Never   Vaping Use   • Vaping Use: Never used   Substance and Sexual Activity   • Alcohol use: Not Currently   • Drug use: Never   • Sexual activity: Not Currently   Other Topics Concern   • None   Social History Narrative   • None     Social Determinants of Health     Financial Resource Strain: Low Risk    • Difficulty of Paying Living Expenses: Not hard at all   Food Insecurity: Not on file   Transportation Needs: No Transportation Needs   • Lack of Transportation (Medical): No   • Lack of Transportation (Non-Medical): No   Physical Activity: Not on file   Stress: Not on file   Social Connections: Not on file   Intimate Partner Violence: Not on file   Housing Stability: Not on file        FAMILY HISTORY:  Family History   Problem Relation Age of Onset   • No Known Problems Mother    • No Known Problems Father    • Colon cancer Other 60           Objective    PHYSICAL EXAMINATION:   Blood pressure 130/80, pulse 64, resp  rate 18, height 5' 7" (1 702 m), weight 76 2 kg (168 lb), SpO2 (!) 84 %  ECOG Performance Status    Flowsheet Row Most Recent Value   ECOG Performance Status 1 - Restricted in physically strenuous activity but ambulatory and able to carry out work of a light or sedentary nature, e g , light house work, office work             Physical Exam  Constitutional:       General: He is not in acute distress  Appearance: Normal appearance  He is not toxic-appearing  HENT:      Mouth/Throat:      Mouth: Mucous membranes are moist       Pharynx: Oropharynx is clear  Eyes:      General: No scleral icterus  Cardiovascular:      Rate and Rhythm: Normal rate and regular rhythm  Pulses: Normal pulses  Heart sounds: No murmur heard  No friction rub  No gallop  Pulmonary:      Effort: Pulmonary effort is normal  No respiratory distress  Breath sounds: Normal breath sounds   No wheezing or rales    Abdominal:      General: There is no distension  Palpations: There is no mass  Tenderness: There is no abdominal tenderness  There is no rebound  Musculoskeletal:         General: No swelling or tenderness  Right lower leg: No edema  Left lower leg: No edema  Lymphadenopathy:      Head:      Right side of head: No submandibular, preauricular or posterior auricular adenopathy  Left side of head: No submandibular, preauricular or posterior auricular adenopathy  Cervical: No cervical adenopathy  Right cervical: No superficial or posterior cervical adenopathy  Left cervical: No superficial or posterior cervical adenopathy  Upper Body:      Right upper body: No supraclavicular or axillary adenopathy  Left upper body: No supraclavicular or axillary adenopathy  Skin:     Findings: No rash  Comments: No concerning skin lesions   Neurological:      General: No focal deficit present  Mental Status: He is alert and oriented to person, place, and time  Psychiatric:         Mood and Affect: Mood normal          Behavior: Behavior normal          Thought Content: Thought content normal          Judgment: Judgment normal          I reviewed lab data in the chart      WBC   Date Value Ref Range Status   01/16/2023 8 06 4 31 - 10 16 Thousand/uL Final   12/05/2022 9 28 4 31 - 10 16 Thousand/uL Final   10/24/2022 9 59 4 31 - 10 16 Thousand/uL Final     Hemoglobin   Date Value Ref Range Status   01/16/2023 13 3 12 0 - 17 0 g/dL Final   12/05/2022 13 8 12 0 - 17 0 g/dL Final   10/24/2022 13 7 12 0 - 17 0 g/dL Final     Platelets   Date Value Ref Range Status   01/16/2023 132 (L) 149 - 390 Thousands/uL Final   12/05/2022 152 149 - 390 Thousands/uL Final   10/24/2022 169 149 - 390 Thousands/uL Final     MCV   Date Value Ref Range Status   01/16/2023 92 82 - 98 fL Final   12/05/2022 93 82 - 98 fL Final   10/24/2022 91 82 - 98 fL Final      Potassium   Date Value Ref Range Status   01/16/2023 4 1 3 5 - 5 3 mmol/L Final   12/05/2022 4 3 3 5 - 5 3 mmol/L Final   10/24/2022 3 9 3 5 - 5 3 mmol/L Final     Chloride   Date Value Ref Range Status   01/16/2023 109 (H) 96 - 108 mmol/L Final   12/05/2022 110 (H) 96 - 108 mmol/L Final   10/24/2022 107 96 - 108 mmol/L Final     CO2   Date Value Ref Range Status   01/16/2023 27 21 - 32 mmol/L Final   12/05/2022 24 21 - 32 mmol/L Final   10/24/2022 28 21 - 32 mmol/L Final     BUN   Date Value Ref Range Status   01/16/2023 30 (H) 5 - 25 mg/dL Final   12/05/2022 26 (H) 5 - 25 mg/dL Final   10/24/2022 33 (H) 5 - 25 mg/dL Final     Creatinine   Date Value Ref Range Status   01/16/2023 1 60 (H) 0 60 - 1 30 mg/dL Final     Comment:     Standardized to IDMS reference method   12/05/2022 1 43 (H) 0 60 - 1 30 mg/dL Final     Comment:     Standardized to IDMS reference method   10/24/2022 1 47 (H) 0 60 - 1 30 mg/dL Final     Comment:     Standardized to IDMS reference method     Glucose   Date Value Ref Range Status   01/16/2023 181 (H) 65 - 140 mg/dL Final     Comment:     If the patient is fasting, the ADA then defines impaired fasting glucose as > 100 mg/dL and diabetes as > or equal to 123 mg/dL  Specimen collection should occur prior to Sulfasalazine administration due to the potential for falsely depressed results  Specimen collection should occur prior to Sulfapyridine administration due to the potential for falsely elevated results  12/05/2022 142 (H) 65 - 140 mg/dL Final     Comment:     If the patient is fasting, the ADA then defines impaired fasting glucose as > 100 mg/dL and diabetes as > or equal to 123 mg/dL  Specimen collection should occur prior to Sulfasalazine administration due to the potential for falsely depressed results  Specimen collection should occur prior to Sulfapyridine administration due to the potential for falsely elevated results     09/13/2022 174 (H) 65 - 140 mg/dL Final     Comment:     If the patient is fasting, the ADA then defines impaired fasting glucose as > 100 mg/dL and diabetes as > or equal to 123 mg/dL  Specimen collection should occur prior to Sulfasalazine administration due to the potential for falsely depressed results  Specimen collection should occur prior to Sulfapyridine administration due to the potential for falsely elevated results  Calcium   Date Value Ref Range Status   01/16/2023 8 9 8 3 - 10 1 mg/dL Final   12/05/2022 8 8 8 3 - 10 1 mg/dL Final   10/24/2022 8 8 8 3 - 10 1 mg/dL Final     Albumin   Date Value Ref Range Status   01/16/2023 3 6 3 5 - 5 0 g/dL Final   12/05/2022 3 9 3 5 - 5 0 g/dL Final   10/24/2022 3 9 3 5 - 5 0 g/dL Final     Total Bilirubin   Date Value Ref Range Status   01/16/2023 0 88 0 20 - 1 00 mg/dL Final     Comment:     Use of this assay is not recommended for patients undergoing treatment with eltrombopag due to the potential for falsely elevated results  12/05/2022 1 27 (H) 0 20 - 1 00 mg/dL Final     Comment:     Use of this assay is not recommended for patients undergoing treatment with eltrombopag due to the potential for falsely elevated results  10/24/2022 1 50 (H) 0 20 - 1 00 mg/dL Final     Comment:     Use of this assay is not recommended for patients undergoing treatment with eltrombopag due to the potential for falsely elevated results  Alkaline Phosphatase   Date Value Ref Range Status   01/16/2023 78 46 - 116 U/L Final   12/05/2022 79 46 - 116 U/L Final   10/24/2022 85 46 - 116 U/L Final     AST   Date Value Ref Range Status   01/16/2023 14 5 - 45 U/L Final     Comment:     Specimen collection should occur prior to Sulfasalazine administration due to the potential for falsely depressed results  12/05/2022 13 5 - 45 U/L Final     Comment:     Specimen collection should occur prior to Sulfasalazine administration due to the potential for falsely depressed results      10/24/2022 19 5 - 45 U/L Final     Comment:     Specimen collection should occur prior to Sulfasalazine administration due to the potential for falsely depressed results  ALT   Date Value Ref Range Status   01/16/2023 17 12 - 78 U/L Final     Comment:     Specimen collection should occur prior to Sulfasalazine and/or Sulfapyridine administration due to the potential for falsely depressed results  12/05/2022 22 12 - 78 U/L Final     Comment:     Specimen collection should occur prior to Sulfasalazine and/or Sulfapyridine administration due to the potential for falsely depressed results  10/24/2022 32 12 - 78 U/L Final     Comment:     Specimen collection should occur prior to Sulfasalazine and/or Sulfapyridine administration due to the potential for falsely depressed results  LD   Date Value Ref Range Status   01/16/2023 187 81 - 234 U/L Final   12/05/2022 187 81 - 234 U/L Final   10/24/2022 154 81 - 234 U/L Final     No results found for: TSH  No results found for: [id number]   Free T4   Date Value Ref Range Status   01/16/2023 0 95 0 76 - 1 46 ng/dL Final     Comment:     Specimen collection should occur prior to Sulfasalazine administration due to the potential for falsely elevated results  12/05/2022 0 87 0 76 - 1 46 ng/dL Final     Comment:     Specimen collection should occur prior to Sulfasalazine administration due to the potential for falsely elevated results  10/24/2022 0 97 0 76 - 1 46 ng/dL Final     Comment:     Specimen collection should occur prior to Sulfasalazine administration due to the potential for falsely elevated results  RECENT IMAGING:  No results found  Assessment/Plan  Mr Adiel Marti is a 79 yr male with stage IIIa melanoma with progression with indeterminate biopsy started on pembrolizumab here for follow-up and treatment  1  Malignant melanoma of skin of face ([address] )  2  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[address] )  3   Malignant melanoma of forehead ([address] )  He is doing well and tolerating treatment with pembrolizumab  Denies any side effects at this time  Labs reviewed and okay to continue treatment  Cycle 8 pembrolizumab    He will return in 6 weeks prior to his next treatment  He has standing orders for blood work prior to each treatment  He knows to call with issues or concerns prior to his next visit  4  Lung nodules  On previous imaging in December 2022 there was concern for development of new nonspecific subcentimeter pulmonary nodules concerning for development of metastases  We will repeat imaging at this time to evaluate these lesions  Orders placed for CT noncontrast chest   Scripts provided  5  High risk medication use    He is on treatment with immunotherapy and we will continue to monitor for side effects and lab abnormalities  We will monitor labs with each treatment to ensure safety of continuing on treatment  He knows to watch for signs and symptoms for immune mediated side effects  Denies any immune mediated side effects at this time  He knows to call with any issues or concerns prior to his next visit  Return in about 6 weeks (around 3/6/2023) for Office Visit, labs, Infusion - See Treatment Plan       Mary Du MD, PhD

## 2023-01-23 NOTE — PROGRESS NOTES
Patient completed treatment today without issue  PIV removed intact for DC, coban wrap in place  Patient aware of upcoming appts, has AVS from previous appt  Patient ambulatory upon DC without gait disturbance noted

## 2023-01-31 ENCOUNTER — OFFICE VISIT (OUTPATIENT)
Dept: URGENT CARE | Age: 83
End: 2023-01-31

## 2023-01-31 VITALS
DIASTOLIC BLOOD PRESSURE: 63 MMHG | BODY MASS INDEX: 26.37 KG/M2 | HEIGHT: 67 IN | SYSTOLIC BLOOD PRESSURE: 167 MMHG | WEIGHT: 168 LBS | HEART RATE: 99 BPM | TEMPERATURE: 99.2 F | RESPIRATION RATE: 20 BRPM | OXYGEN SATURATION: 94 %

## 2023-01-31 DIAGNOSIS — U07.1 COVID: Primary | ICD-10-CM

## 2023-01-31 RX ORDER — LIDOCAINE HYDROCHLORIDE 20 MG/ML
15 SOLUTION OROPHARYNGEAL 3 TIMES DAILY PRN
Qty: 100 ML | Refills: 0 | Status: SHIPPED | OUTPATIENT
Start: 2023-01-31

## 2023-01-31 NOTE — PROGRESS NOTES
[facility]        NAME: Sebastian Cortes is a 79 y o  male  : 1940    MRN: [id number]  DATE: 2023  TIME: 7:15 PM    Assessment and Plan   COVID [U07 1]  1  COVID  Lidocaine Viscous HCl (XYLOCAINE) 2 % mucosal solution            Patient Instructions       Follow up with PCP in 3-5 days  Proceed to  ER if symptoms worsen  Chief Complaint     Chief Complaint   Patient presents with   • COVID-19     Patient felt weak last night with a sore throat  Some nausea and vomiting  Progressed into headache, weakness and fatigue this morning  Tested himself at home and was positive for covid  Painful swallowing and difficulty talking   O2 87 initially room air and went up to 94%         History of Present Illness       HPI    Review of Systems   Review of Systems      Current Medications       Current Outpatient Medications:   •  atenolol (TENORMIN) 100 mg tablet, Take 1 tablet (100 mg total) by mouth daily, Disp: 90 tablet, Rfl: 3  •  glimepiride (AMARYL) 4 mg tablet, Take 1 tablet (4 mg total) by mouth daily, Disp: 90 tablet, Rfl: 3  •  Lidocaine Viscous HCl (XYLOCAINE) 2 % mucosal solution, Swish and swallow 15 mL 3 (three) times a day as needed for mouth pain or discomfort or mucositis, Disp: 100 mL, Rfl: 0  •  lisinopril-hydrochlorothiazide (PRINZIDE,ZESTORETIC) 20-25 MG per tablet, Take 1 tablet by mouth daily, Disp: 90 tablet, Rfl: 3  •  lovastatin (MEVACOR) 40 MG tablet, Take 1 tablet (40 mg total) by mouth daily, Disp: 90 tablet, Rfl: 3  •  metFORMIN (GLUCOPHAGE) 1000 MG tablet, Take 1 tablet (1,000 mg total) by mouth 2 (two) times a day with meals, Disp: 180 tablet, Rfl: 3  •  methylPREDNISolone 4 MG tablet therapy pack, Use as directed on package, Disp: 1 each, Rfl: 0  •  meclizine (ANTIVERT) 12 5 MG tablet, Take 1 tablet (12 5 mg total) by mouth 3 (three) times a day as needed for dizziness for up to 5 days, Disp: 15 tablet, Rfl: 0    Current Allergies     Allergies as of 2023 • (No Known Allergies)            The following portions of the patient's history were reviewed and updated as appropriate: allergies, current medications, past family history, past medical history, past social history, past surgical history and problem list      Past Medical History:   Diagnosis Date   • Diabetes mellitus ([address] )    • Hyperlipidemia    • Hypertension        Past Surgical History:   Procedure Laterality Date   • CATARACT EXTRACTION, BILATERAL     • FLAP LOCAL HEAD / NECK Left 10/12/2021    Procedure: RECONSTRUCTION OF LEFT TEMPLE DEFECT AFTER RESCECTION MELANOMA;  Surgeon: Tiny Kerns MD;  [facility];  Service: Plastics   • FULL THICKNESS SKIN GRAFT Left 10/12/2021    Procedure: SKIN GRAFT FULL THICKNESS LEFT TEMPLE;  Surgeon: Tiny Kerns MD;  [facility];  Service: Plastics   • GALLBLADDER SURGERY     • HAND SURGERY Left    • LYMPH NODE BIOPSY Left 10/12/2021    Procedure: BIOPSY LYMPH NODE SENTINEL, LYMPHOSCINTIGRAPHY, INTRAOPERATIVE LYMPHATIC MAPPING (INJECT AT 1200); Surgeon: Alex Leiva MD;  [facility];  Service: Surgical Oncology   • NOSE SURGERY     • SKIN CANCER EXCISION  10/2021   • SKIN LESION EXCISION Left 10/12/2021    Procedure: [address];  Surgeon: Alex Leiva MD;  [facility];  Service: Surgical Oncology   • SPLIT THICKNESS SKIN GRAFT Left 10/12/2021    Procedure: SKIN GRAFT SPLIT THICKNESS LEFT TEMPLE;  Surgeon: Tiny Kerns MD;  [facility];  Service: Plastics   • US GUIDED LYMPH NODE BIOPSY LEFT  03/21/2022       Family History   Problem Relation Age of Onset   • No Known Problems Mother    • No Known Problems Father    • Colon cancer Other 60         Medications have been verified          Objective   /63   Pulse 99   Temp 99 2 °F (37 3 °C)   Resp 20   Ht 5' 7" (1 702 m)   Wt 76 2 kg (168 lb)   SpO2 94%   BMI 26 31 kg/m²        Physical Exam     Physical Exam COVID-19 several days before      • Follow any regular management plan you use  Your healthcare provider will tell you if you need to make any changes to your regular management plan  For example, if you have asthma, continue to follow your asthma action plan  If you have diabetes, you may need to check your blood sugar level more often  Stress and illness can make blood sugar levels go up  You may need to adjust medicine such as insulin  If you have a heart condition or high blood pressure, you may need to check your blood pressure more often  Stress and illness can also raise your blood pressure      • Talk to your healthcare providers about your medicines  You may be able to get more than 1 month of medicine at a time  This will lower the number of times you need to go to a pharmacy to get your medicines  Make sure you have enough medicine if you have a condition that can lead to an emergency  Examples include asthma medicines, insulin, or an epinephrine pen  Check the expiration dates on the medicines you currently have  Ask for refills as soon as possible, if needed  If it is not time to refill prescriptions, you may be able to get an emergency supply of some medicines  Medicine plans vary, so ask your healthcare provider or pharmacist for options      • Have supplies available in your home  If possible, get extra supplies you use regularly  Examples include absorbent pads, syringes, and wound cleaning solutions  This will limit the number of trips out of your home to get supplies      • Know the signs and symptoms of COVID-19  Signs and symptoms usually start about 5 days after infection but can take 2 to 14 days  Signs and symptoms range from mild to severe  You may feel like you have the flu or a bad cold  Your chronic health condition may cause some of the same symptoms COVID-19 causes  This can make it hard to know if a symptom is from COVID-19 or your chronic condition   Keep a record of any new or worsening symptom you have  This is especially important if you have a condition that often causes shortness of breath  Your provider can tell you if you should be tested for COVID-19  Tell your healthcare provider if you think you were infected but develop signs or symptoms not listed below:     ? A cough     ? Shortness of breath or trouble breathing that may become severe     ? A fever of at least 100 4°F, or 38°C (may be lower in adults 65 or older)     ? Chills that might include shaking     ? Muscle pain, body aches, or a headache     ? A sore throat     ? Suddenly not being able to taste or smell anything     ? Feeling very tired (fatigue)     ? Congestion (stuffy head and nose), or a runny nose     ? Diarrhea, nausea, or vomiting     What you can do to prevent having to go out of your home during this time:   • Ask your healthcare provider for other ways to have appointments  Some providers offer phone, video, or other types of appointments      • Have food, medicines, and other supplies delivered  Some pharmacies can send certain medicines to you through the mail  Grocery stores and restaurants may be able to deliver food and other items  If possible, have delivered items placed somewhere  Try not to have someone hand you an item  You will be so close to the person that the virus can spread between you      • Ask someone to get items you need  The person can get groceries, medicines, or other needed items for you  Choose a person who does not have signs or symptoms of COVID-19 or has tested negative for it  The person should not be waiting for test results  He or she should not have a condition that increases the risk for COVID-19 or serious problems it causes      What you need to know about COVID-19 vaccines: Your healthcare provider can give you more information about what to expect, depending on your chronic condition   You are considered fully vaccinated against COVID-19 two weeks after the final dose of any COVID-19 vaccine  Let your healthcare provider know when you have received the final dose of the vaccine  Make a copy of your vaccination card  Keep the original with you in case you need to show it  Keep the copy in a safe place  • COVID-19 vaccines are given as a shot in 1 or 2 doses  Vaccination is recommended for everyone 5 years or older      ? One 2-dose vaccine is fully approved  for those 16 years or older  This vaccine also has an emergency use authorization (EUA) for children 11to 13years old  No vaccine is currently available for children younger than 5 years      ? A booster (additional) dose  is given to help the immune system continue to protect against severe COVID-19      - A booster is recommended for all adults 18 or older  The booster can be a different brand of the COVID-19 vaccine than you originally received  The timing for the booster depends on the type of vaccine you received:     - 1-dose vaccine: The booster is given at least 2 months after you received the vaccine      - 2-dose vaccine: The booster is given at least 5 or 6 months after the second dose      - A booster can be given to adolescents 15to 16years old  Only 1 COVID-19 vaccine has this EUA  The booster is given at least 5 months after the second dose of the original vaccine series      - A booster is recommended for immunocompromised children 11to 6years old  Only 1 COVID-19 vaccine has this EUA  The booster is given 28 days after the second dose         Continue social distancing and other measures, even after you get the vaccine  Although it is not common, you can become infected after you get the vaccine  You may also be able to pass the virus to others without knowing you are infected      After you get the vaccine, check local, national, and international travel rules  You may need to be tested before you travel  Some countries require proof of a negative test before you travel   You may also need to quarantine after you return      Medicine may be given to prevent infection  The medicine can be given if you are at high risk for infection and cannot get the vaccine  It can also be given if your immune system does not respond well to the vaccine      Lower your risk for COVID-19:   • Wash your hands often throughout the day  Use soap and water  Rub your soapy hands together, lacing your fingers, for at least 20 seconds  Rinse with warm, running water  Dry your hands with a clean towel or paper towel  Use hand  that contains alcohol if soap and water are not available  Teach children how to wash their hands and use hand            • Cover sneezes and coughs  Turn your face away and cover your mouth and nose with a tissue  Throw the tissue away  Use the bend of your arm if a tissue is not available  Then wash your hands with soap and water or use hand   Teach children how to cover a cough or sneeze      • Follow worldwide, national, and local social distancing guidelines  Keep at least 6 feet (2 meters) between you and others      • Wear a face covering (mask) around anyone who does not live in your home  Use a cloth covering with at least 2 layers  You can also create layers by putting a cloth covering over a disposable non-medical mask  Cover your mouth and your nose           • Try not to touch your face  If you get the virus on your hands, you can transfer it to your eyes, nose, or mouth and become infected  You can also transfer it to objects, surfaces, or people      • Clean and disinfect high-touch surfaces and objects in your home often  Use disinfecting wipes, or make a solution by mixing 4 teaspoons of bleach with 1 quart (4 cups) of water  Do not  use any cleaning or disinfecting products that can trigger an asthma attack or other breathing problems  Open windows or have circulating air as you clean  Do not  mix ammonia with bleach  This will create toxic fumes     How to follow social distancing guidelines:  National and local social distancing rules vary  Rules and restrictions may change over time as restrictions are lifted  The following are general guidelines:  • Stay home if you are sick or think you may have COVID-19  It is important to stay home if you are waiting for a testing appointment or for test results      • Avoid close physical contact with anyone who does not live in your home  Do not shake hands with, hug, or kiss a person as a greeting  If you must use public transportation (such as a bus or subway), try to sit or stand away from others  Wear your face covering      • Avoid in-person gatherings and crowds  Attend virtually if possible      Help strengthen your immune system:   • Ask about other vaccines you may need  Get the influenza (flu) vaccine as soon as recommended each year, usually starting in September or October  Get the pneumonia vaccine if recommended  Your healthcare provider can tell you if you should also get other vaccines, and when to get them      • Do not smoke  Nicotine and other chemicals in cigarettes and cigars can increase your risk for infection and for serious COVID-19 effects  Ask your healthcare provider for information if you currently smoke and need help to quit  E-cigarettes or smokeless tobacco still contain nicotine  Talk to your healthcare provider before you use these products      • Eat a variety of healthy foods  Examples include vegetables, fruits, whole-grain breads and cereals, lean meats and poultry, fish, low-fat dairy products, and cooked beans  Healthy foods contain nutrients that help keep your immune system strong           • Find ways to manage stress  You may be feeling more stressed than usual because of the COVID-19 outbreak  The situation is very stressful to many people  Talk to your healthcare providers about ways to manage stress during this time   Stress can lead to breathing problems or make the problems worse  Stress can trigger an attack or exacerbation of many health conditions  It is important to do things that help you feel more relaxed, such as the following:      ? Pick 1 or 2 times a day to watch the news  Constant news watching can increase your stress levels      ? Talk to a friend on the phone or through a video chat      ? Take a warm, soothing bath      ? Listen to music      ? Exercise can also help relieve stress  This may be hard if your regular gym or outdoor exercise area is closed  If you do not have exercise equipment at home, try walking inside your home  You can walk quickly or turn on music and dance      Follow up with your doctor or healthcare provider as directed: Your providers will tell you when you can come in for tests, procedures, or check-ups  Bring your symptom record with you to all appointments  Write down your questions so you remember to ask them during your visits  For more information:   • Centers for Disease Control and Prevention  [address] [place] , [address]  Phone: 1- 179 - 2553009  Phone: 0- 774 - 7886864  Web Address: [employer]     © [address] [place] [address] 2022 Information is for End User's use only and may not be sold, redistributed or otherwise used for commercial purposes  All illustrations and images included in CareNotes® are the copyrighted property of [employer]  or [facility] Shannan Miller   The above information is an  only  It is not intended as medical advice for individual conditions or treatments  Talk to your doctor, nurse or pharmacist before following any medical regimen to see if it is safe and effective for you      COVID-19: Slow the Coronavirus Spread   WHAT YOU NEED TO KNOW:   The virus that causes COVID-19 is highly contagious (easily spread)  The virus has also changed into new forms, called variants  Some variants are spreading even more quickly and easily than the original virus   COVID-19 can cause severe or life-threatening health problems in some people  Health problems can continue for weeks, months, or possibly years  Signs and symptoms of infection from any form of the virus can take up to 14 days to begin, or may not develop at all  This means you or someone around you may have the virus and pass it to others without knowing it         DISCHARGE INSTRUCTIONS:   Call your doctor if:   • You have questions about social distancing or ways to keep your home and family safe      • You have questions or concerns about the new coronavirus or COVID-19      How the 2019 coronavirus spreads: The virus can be passed starting 2 to 3 days before symptoms begin or before a positive test if symptoms never begin  • Droplets are the main way all coronaviruses spread  The virus travels in droplets that form when a person talks, coughs, or sneezes  The droplets can also float in the air for minutes or hours  Infection happens when you breathe in the droplets or get them in your eyes or nose  Close personal contact with an infected person increases your risk for infection  This means being within 6 feet (2 meters) of the person for at least 15 minutes over 24 hours      • Person-to-person contact can spread the virus  For example, a person with the virus on his or her hands can spread it by shaking hands with someone      • The virus can stay on objects and surfaces for up to 3 days  You may become infected by touching the object or surface and then touching your eyes or mouth      What you need to know about COVID-19 vaccines:  Healthcare providers recommend a COVID-19 vaccine, even if you have already had COVID-19  You are considered fully vaccinated against COVID-19 two weeks after the final dose of any COVID-19 vaccine  Let your healthcare provider know when you have received the final dose of the vaccine  Make a copy of your vaccination card  Keep the original with you in case you need to show it   Keep the copy in a safe place  • COVID-19 vaccines are given as a shot in 1 or 2 doses  Vaccination is recommended for everyone 5 years or older      ? One 2-dose vaccine is fully approved  for those 16 years or older  This vaccine also has an emergency use authorization (EUA) for children 11to 13years old  No vaccine is currently available for children younger than 5 years      ? A booster (additional) dose  is given to help the immune system continue to protect against severe COVID-19      - A booster is recommended for all adults 18 or older  The booster can be a different brand of the COVID-19 vaccine than you originally received  The timing for the booster depends on the type of vaccine you received:     - 1-dose vaccine: The booster is given at least 2 months after you received the vaccine      - 2-dose vaccine: The booster is given at least 5 or 6 months after the second dose      - A booster can be given to adolescents 15to 16years old  Only 1 COVID-19 vaccine has this EUA  The booster is given at least 5 months after the second dose of the original vaccine series      - A booster is recommended for immunocompromised children 11to 6years old  Only 1 COVID-19 vaccine has this EUA  The booster is given 28 days after the second dose         Even after you get the vaccine, continue social distancing and other measures  Although rare, you can become infected after you get the vaccine  You may also be able to pass the virus to others without knowing you are infected      After you get the vaccine, check local, national, and international travel rules  You may need to be tested before you travel  Some countries require proof of a negative test before you travel  You may also need to quarantine after you return      Medicine may be given to prevent infection  The medicine can be given if you are at high risk for infection and cannot get the vaccine   It can also be given if your immune system does not respond well to the vaccine      Help prevent the virus from spreading:   • Wash your hands often throughout the day  Use soap and water  Rub your soapy hands together, lacing your fingers, for at least 20 seconds  Rinse with warm, running water  Dry your hands with a clean towel or paper towel  Use hand  that contains alcohol if soap and water are not available  Teach children how to wash their hands and use hand            • Cover sneezes and coughs  Turn your face away and cover your mouth and nose with a tissue  Throw the tissue away  Use the bend of your arm if a tissue is not available  Then wash your hands well with soap and water or use hand   Teach children how to cover a cough or sneeze      • Follow national and local social distancing rules  Rules may change over time as restrictions are lifted      • Stay home if you are sick or think you may have COVID-19  It is important to stay home if you are waiting for a testing appointment or for test results      • Avoid close physical contact with anyone who does not live in your home  Do not shake hands with, hug, or kiss a person as a greeting  If you must use public transportation (such as a bus or subway), try to sit or stand away from others      • Wear a face covering (mask) when needed  Use a cloth covering with at least 2 layers  You can also create layers by putting a cloth covering over a disposable non-medical mask  Cover your mouth and your nose           • Avoid in-person gatherings and crowds  Attend virtually if possible      Prevent an infection in your home:   • Clean and disinfect high-touch surfaces and objects in your home often  Use disinfecting wipes or a disinfecting solution of 4 teaspoons of bleach in 1 quart (4 cups) of water  Wash clothing and bedding with regular laundry detergent  Wash and dry on the warmest settings for the fabric      • Do not share items with anyone    This includes food, drinks, dishes, and eating utensils      • Safely handle food you have delivered or  from a restaurant  If possible, have food left at your door or placed into your car  This helps prevent close physical contact with anyone  Wash your hands before you eat      • Keep anyone who is infected away from others in the home, if possible  The person should have his or her own dishes and eating utensils      Stay safe when you go out:   • Plan your route  This will help you make the fewest stops possible and help prevent close contact       • Make a list of items to buy before you go out  This will limit the items you touch in the store  The more items you handle, the more risk you take of getting the virus on your hands      • Remember to wash your hands  Wash before you leave your home, so you do not bring the virus with you  Wash again when you get home, after you put away any items you bought      • Wear a face covering (mask) when needed  Bring extra coverings with you  You may need to wear a covering in restaurants, stores, and other public buildings  You may also need a covering on mass transit, such as a bus, subway, or airplane      Follow up with your doctor as directed:  Write down your questions so you remember to ask them during your visits  For more information:   • Centers for Disease Control and Prevention  [address] , [address]  Phone: 5- 881 - 7215587  Phone: 4- 297 - 0023437  Web Address: [employer]      © [address] [place] [address] 2022 Information is for End User's use only and may not be sold, redistributed or otherwise used for commercial purposes  All illustrations and images included in CareNotes® are the copyrighted property of [employer]  or [facility] Shannan Miller   The above information is an  only  It is not intended as medical advice for individual conditions or treatments   Talk to your doctor, nurse or pharmacist before following any medical regimen to see if it is safe and effective for you  Follow up with PCP in 3-5 days  Proceed to  ER if symptoms worsen  Chief Complaint     Chief Complaint   Patient presents with   • COVID-19     Patient felt weak last night with a sore throat  Some nausea and vomiting  Progressed into headache, weakness and fatigue this morning  Tested himself at home and was positive for covid  Painful swallowing and difficulty talking  O2 87 initially room air and went up to 94%         History of Present Illness       Patient is an 19-year-old male with past medical history significant for diabetes mellitus, hypertension, malignant melanoma currently undergoing outpatient infusions for treatment, who presents with daughter for evaluation of sore throat, fatigue, headaches, body aches, weakness since last night  He performed an at home COVID test yesterday which was negative  He denies fever, diarrhea, chest pain, palpitations, shortness of breath, wheezing, decreased fluid intake or urinary output  He reports intermittent nausea with one episode of vomiting, however his main concern is his sore throat  Review of Systems   Review of Systems   Constitutional: Positive for fatigue  Negative for fever  HENT: Positive for sore throat  Negative for congestion, ear discharge, ear pain, postnasal drip, rhinorrhea, sinus pressure, sinus pain and sneezing  Eyes: Negative  Negative for pain, discharge, redness and itching  Respiratory: Negative  Negative for apnea, cough, choking, chest tightness, shortness of breath, wheezing and stridor  Cardiovascular: Negative  Negative for chest pain and palpitations  Gastrointestinal: Positive for nausea and vomiting  Negative for diarrhea  Endocrine: Negative  Negative for polydipsia, polyphagia and polyuria  Genitourinary: Negative  Negative for decreased urine volume and flank pain  Musculoskeletal: Negative    Negative for arthralgias, back pain, myalgias, neck pain and neck stiffness  Skin: Negative  Negative for color change and rash  Allergic/Immunologic: Negative  Negative for environmental allergies  Neurological: Positive for weakness and headaches  Negative for dizziness, facial asymmetry, light-headedness and numbness  Hematological: Negative  Negative for adenopathy  Psychiatric/Behavioral: Negative            Current Medications       Current Outpatient Medications:   •  atenolol (TENORMIN) 100 mg tablet, Take 1 tablet (100 mg total) by mouth daily, Disp: 90 tablet, Rfl: 3  •  glimepiride (AMARYL) 4 mg tablet, Take 1 tablet (4 mg total) by mouth daily, Disp: 90 tablet, Rfl: 3  •  Lidocaine Viscous HCl (XYLOCAINE) 2 % mucosal solution, Swish and swallow 15 mL 3 (three) times a day as needed for mouth pain or discomfort or mucositis, Disp: 100 mL, Rfl: 0  •  lisinopril-hydrochlorothiazide (PRINZIDE,ZESTORETIC) 20-25 MG per tablet, Take 1 tablet by mouth daily, Disp: 90 tablet, Rfl: 3  •  lovastatin (MEVACOR) 40 MG tablet, Take 1 tablet (40 mg total) by mouth daily, Disp: 90 tablet, Rfl: 3  •  metFORMIN (GLUCOPHAGE) 1000 MG tablet, Take 1 tablet (1,000 mg total) by mouth 2 (two) times a day with meals, Disp: 180 tablet, Rfl: 3  •  methylPREDNISolone 4 MG tablet therapy pack, Use as directed on package, Disp: 1 each, Rfl: 0  •  meclizine (ANTIVERT) 12 5 MG tablet, Take 1 tablet (12 5 mg total) by mouth 3 (three) times a day as needed for dizziness for up to 5 days, Disp: 15 tablet, Rfl: 0    Current Allergies     Allergies as of 01/31/2023   • (No Known Allergies)            The following portions of the patient's history were reviewed and updated as appropriate: allergies, current medications, past family history, past medical history, past social history, past surgical history and problem list      Past Medical History:   Diagnosis Date   • Diabetes mellitus ([address] )    • Hyperlipidemia    • Hypertension        Past Surgical History:   Procedure Laterality Date   • CATARACT EXTRACTION, BILATERAL     • FLAP LOCAL HEAD / NECK Left 10/12/2021    Procedure: RECONSTRUCTION OF LEFT TEMPLE DEFECT AFTER RESCECTION MELANOMA;  Surgeon: Kely Randolph MD;  [facility];  Service: Plastics   • FULL THICKNESS SKIN GRAFT Left 10/12/2021    Procedure: SKIN GRAFT FULL THICKNESS LEFT TEMPLE;  Surgeon: Kely Randolph MD;  [facility];  Service: Plastics   • GALLBLADDER SURGERY     • HAND SURGERY Left    • LYMPH NODE BIOPSY Left 10/12/2021    Procedure: BIOPSY LYMPH NODE SENTINEL, LYMPHOSCINTIGRAPHY, INTRAOPERATIVE LYMPHATIC MAPPING (INJECT AT 1200); Surgeon: James Casas MD;  [facility];  Service: Surgical Oncology   • NOSE SURGERY     • SKIN CANCER EXCISION  10/2021   • SKIN LESION EXCISION Left 10/12/2021    Procedure: [address];  Surgeon: James Casas MD;  [facility];  Service: Surgical Oncology   • SPLIT THICKNESS SKIN GRAFT Left 10/12/2021    Procedure: SKIN GRAFT SPLIT THICKNESS LEFT TEMPLE;  Surgeon: Kely Randolph MD;  [facility];  Service: Plastics   • US GUIDED LYMPH NODE BIOPSY LEFT  03/21/2022       Family History   Problem Relation Age of Onset   • No Known Problems Mother    • No Known Problems Father    • Colon cancer Other 60         Medications have been verified  Objective   /63   Pulse 99   Temp 99 2 °F (37 3 °C)   Resp 20   Ht 5' 7" (1 702 m)   Wt 76 2 kg (168 lb)   SpO2 94%   BMI 26 31 kg/m²        Physical Exam     Physical Exam  Vitals reviewed  Constitutional:       General: He is not in acute distress  Appearance: Normal appearance  He is not ill-appearing, toxic-appearing or diaphoretic  Interventions: He is not intubated  HENT:      Head: Normocephalic and atraumatic  Right Ear: Hearing, tympanic membrane, ear canal and external ear normal  There is no impacted cerumen  Tympanic membrane is not erythematous or bulging        Left Ear: Hearing, tympanic membrane, ear canal and external ear normal  There is no impacted cerumen  Tympanic membrane is not erythematous or bulging  Nose: Nose normal  No congestion or rhinorrhea  Mouth/Throat:      Mouth: Mucous membranes are moist       Pharynx: Oropharynx is clear  Uvula midline  No pharyngeal swelling, oropharyngeal exudate, posterior oropharyngeal erythema or uvula swelling  Tonsils: No tonsillar exudate or tonsillar abscesses  1+ on the right  1+ on the left  Eyes:      Extraocular Movements: Extraocular movements intact  Conjunctiva/sclera: Conjunctivae normal       Pupils: Pupils are equal, round, and reactive to light  Cardiovascular:      Rate and Rhythm: Normal rate and regular rhythm  Pulses: Normal pulses  Heart sounds: Normal heart sounds, S1 normal and S2 normal  Heart sounds not distant  No murmur heard  No friction rub  No gallop  Pulmonary:      Effort: Pulmonary effort is normal  No tachypnea, bradypnea, accessory muscle usage, prolonged expiration, respiratory distress or retractions  He is not intubated  Breath sounds: Normal breath sounds  No stridor, decreased air movement or transmitted upper airway sounds  No decreased breath sounds, wheezing, rhonchi or rales  Chest:      Chest wall: No tenderness  Abdominal:      General: Abdomen is flat  Palpations: Abdomen is soft  Musculoskeletal:         General: Normal range of motion  Cervical back: Normal range of motion and neck supple  No rigidity or tenderness  Lymphadenopathy:      Cervical: No cervical adenopathy  Skin:     General: Skin is warm and dry  Capillary Refill: Capillary refill takes less than 2 seconds  Findings: No erythema  Neurological:      General: No focal deficit present  Mental Status: He is alert     Psychiatric:         Mood and Affect: Mood normal

## 2023-02-01 ENCOUNTER — TELEMEDICINE (OUTPATIENT)
Dept: INTERNAL MEDICINE CLINIC | Facility: OTHER | Age: 83
End: 2023-02-01

## 2023-02-01 VITALS — HEIGHT: 67 IN | WEIGHT: 162 LBS | BODY MASS INDEX: 25.43 KG/M2

## 2023-02-01 DIAGNOSIS — U07.1 COVID-19: Primary | ICD-10-CM

## 2023-02-01 NOTE — PATIENT INSTRUCTIONS
Ensure adequate hydration  Monitor for shortness of breath, particular at rest, fever, chest pain, palpitations, dizziness/syncope, inability to tolerate oral intake  If any of the symptoms or signs occurs, present to the emergency department for further evaluation  Take viscous lidocaine as directed and as needed for sore throat  Follow-up with primary care provider and infusion specialist regarding COVID diagnosis soon as possible  You have declined antiviral therapy for COVID at this time

## 2023-02-01 NOTE — PROGRESS NOTES
COVID-19 Outpatient Progress Note    Assessment/Plan:    Problem List Items Addressed This Visit    None     Disposition:     Patient has asymptomatic or mild COVID-19 infection  Based off CDC guidelines, they were recommended to isolate for 5 days  If they are asymptomatic or symptoms are improving with no fevers in the past 24 hours, isolation may be ended followed by 5 days of wearing a mask when around othes to minimize risk of infecting others  If still have a fever or other symptoms have not improved, continue to isolate until they improve  Regardless of when they end isolation, avoid being around people who are more likely to get very sick from COVID-19 until at least day 11  Discussed symptom directed medication options with patient  Discussed vitamin D, vitamin C, and/or zinc supplementation with patient  As per patient and daughter will defer Paxlovid  If symptoms worsen recommend reaching out to oncology to see if they would be ok if he took Paxlovid  Rest and fluids advised  Educated that the course of this illness could be 2-4 weeks  Discussed symptomatic relief, such as warm steam inhalations, tylenol/ibuprofen for fevers and body aches, rest, and drink plenty of fluids  Warm salt gargles for sore throat  Discussed red flag signs to go to the ER, such as chest pain or shortness of breath  Return to the office for reevaluation if symptoms worsen or do not improve in 1-2 weeks      I have spent 15 minutes directly with the patient  Encounter provider: NE Man     Provider located at: [address] [place] [address]     Recent Visits  No visits were found meeting these conditions    Showing recent visits within past 7 days and meeting all other requirements  Today's Visits  Date Type Provider Dept   02/01/23 [facility], [address] [place] [address] [facility]   Showing today's visits and meeting all other requirements  Future Appointments  No visits were found meeting these conditions  Showing future appointments within next 150 days and meeting all other requirements       Patient agrees to participate in a virtual check in via telephone or video visit instead of presenting to the office to address urgent/immediate medical needs  Patient is aware this is a billable service  He acknowledged consent and understanding of privacy and security of the video platform  The patient has agreed to participate and understands they can discontinue the visit at any time  After connecting through [place], the patient was identified by name and date of birth  Rupali Navarro was informed that this was a telemedicine visit and that the exam was being conducted confidentially over secure lines  My office door was closed  No one else was in the room  Rupali Navarro acknowledged consent and understanding of privacy and security of the telemedicine visit  I informed the patient that I have reviewed his record in Epic and presented the opportunity for him to ask any questions regarding the visit today  The patient agreed to participate  Verification of patient location:  Patient is located in the following state in which I hold an active license: [place]    Subjective:   Rupali Navarro is a 79 y o  male who has been screened for COVID-19  Symptom change since last report: unchanged  Patient's symptoms include fatigue, nasal congestion, sore throat, cough (productive), nausea (has resolved) and vomiting (has resolved)  Patient denies fever, chills, malaise, rhinorrhea, anosmia, loss of taste, shortness of breath, chest tightness, abdominal pain, diarrhea, myalgias and headaches  - Date of symptom onset: 1/30/2023  - Date of positive COVID-19 test: 1/31/2023  Type of test: Home antigen       COVID-19 vaccination status: Fully vaccinated with fina Simms has been staying home and has isolated themselves in his home  He is taking care to not share personal items and is cleaning all surfaces that are touched often, like counters, tabletops, and doorknobs using household cleaning sprays or wipes  He is wearing a mask when he leaves his room  His wife is currently having symptoms as well     No results found for: Chalo Trivedi, [address] [facility], [place], [place], [facility]    Review of Systems   Constitutional: Positive for fatigue  Negative for activity change, appetite change, chills, diaphoresis and fever  HENT: Positive for congestion and sore throat  Negative for ear discharge, ear pain, postnasal drip, rhinorrhea, sinus pressure and sinus pain  Eyes: Negative for pain, discharge, itching and visual disturbance  Respiratory: Positive for cough (productive)  Negative for chest tightness, shortness of breath and wheezing  Cardiovascular: Negative for chest pain, palpitations and leg swelling  Gastrointestinal: Positive for nausea (has resolved) and vomiting (has resolved)  Negative for abdominal pain, constipation and diarrhea  Endocrine: Negative for polydipsia, polyphagia and polyuria  Genitourinary: Negative for difficulty urinating, dysuria and urgency  Musculoskeletal: Negative for arthralgias, back pain, myalgias and neck pain  Skin: Negative for rash and wound  Neurological: Negative for dizziness, weakness, numbness and headaches       Current Outpatient Medications on File Prior to Visit   Medication Sig   • atenolol (TENORMIN) 100 mg tablet Take 1 tablet (100 mg total) by mouth daily   • glimepiride (AMARYL) 4 mg tablet Take 1 tablet (4 mg total) by mouth daily   • Lidocaine Viscous HCl (XYLOCAINE) 2 % mucosal solution Swish and swallow 15 mL 3 (three) times a day as needed for mouth pain or discomfort or mucositis   • lisinopril-hydrochlorothiazide (PRINZIDE,ZESTORETIC) 20-25 MG per tablet Take 1 tablet by mouth daily   • lovastatin (MEVACOR) 40 MG tablet Take 1 tablet (40 mg total) by mouth daily   • metFORMIN (GLUCOPHAGE) 1000 MG tablet Take 1 tablet (1,000 mg total) by mouth 2 (two) times a day with meals   • meclizine (ANTIVERT) 12 5 MG tablet Take 1 tablet (12 5 mg total) by mouth 3 (three) times a day as needed for dizziness for up to 5 days (Patient not taking: Reported on 2/1/2023)   • methylPREDNISolone 4 MG tablet therapy pack Use as directed on package (Patient not taking: Reported on 2/1/2023)       Objective:    Ht 5' 7" (1 702 m)   Wt 73 5 kg (162 lb)   BMI 25 37 kg/m²      Physical Exam  Neurological:      Mental Status: He is alert and oriented to person, place, and time         NE Marrero

## 2023-02-08 ENCOUNTER — ESTABLISHED COMPREHENSIVE EXAM (OUTPATIENT)
Dept: URBAN - METROPOLITAN AREA CLINIC 6 | Facility: CLINIC | Age: 83
End: 2023-02-08

## 2023-02-08 DIAGNOSIS — Z96.1: ICD-10-CM

## 2023-02-08 DIAGNOSIS — H04.123: ICD-10-CM

## 2023-02-08 PROCEDURE — 92012 INTRM OPH EXAM EST PATIENT: CPT

## 2023-02-08 ASSESSMENT — TONOMETRY
OD_IOP_MMHG: 10
OS_IOP_MMHG: 10

## 2023-02-08 ASSESSMENT — KERATOMETRY
OS_K2POWER_DIOPTERS: 46.25
OD_K1POWER_DIOPTERS: 45.75
OD_AXISANGLE2_DEGREES: 76
OD_K2POWER_DIOPTERS: 46.50
OS_AXISANGLE2_DEGREES: 70
OD_AXISANGLE_DEGREES: 166
OS_AXISANGLE_DEGREES: 160
OS_K1POWER_DIOPTERS: 46.00

## 2023-02-08 ASSESSMENT — VISUAL ACUITY
OD_SC: 20/25
OS_SC: 20/25

## 2023-02-20 ENCOUNTER — HOSPITAL ENCOUNTER (OUTPATIENT)
Dept: RADIOLOGY | Age: 83
Discharge: HOME | End: 2023-02-20

## 2023-02-20 DIAGNOSIS — R91.8 LUNG NODULES: ICD-10-CM

## 2023-02-20 DIAGNOSIS — C43.30 MALIGNANT MELANOMA OF SKIN OF FACE (HCC): ICD-10-CM

## 2023-02-21 DIAGNOSIS — E78.2 MIXED HYPERLIPIDEMIA: ICD-10-CM

## 2023-02-21 DIAGNOSIS — I10 ESSENTIAL HYPERTENSION: ICD-10-CM

## 2023-02-21 DIAGNOSIS — E11.9 TYPE 2 DIABETES MELLITUS WITHOUT COMPLICATION, WITHOUT LONG-TERM CURRENT USE OF INSULIN (HCC): Primary | ICD-10-CM

## 2023-02-21 DIAGNOSIS — N18.30 STAGE 3 CHRONIC KIDNEY DISEASE, UNSPECIFIED WHETHER STAGE 3A OR 3B CKD (HCC): ICD-10-CM

## 2023-02-25 ENCOUNTER — APPOINTMENT (EMERGENCY)
Dept: RADIOLOGY | Facility: HOSPITAL | Age: 83
End: 2023-02-25

## 2023-02-25 ENCOUNTER — HOSPITAL ENCOUNTER (EMERGENCY)
Facility: HOSPITAL | Age: 83
Discharge: HOME | End: 2023-02-25
Attending: EMERGENCY MEDICINE

## 2023-02-25 VITALS
OXYGEN SATURATION: 97 % | RESPIRATION RATE: 18 BRPM | HEART RATE: 80 BPM | DIASTOLIC BLOOD PRESSURE: 96 MMHG | SYSTOLIC BLOOD PRESSURE: 179 MMHG | TEMPERATURE: 98.2 F
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
## 2023-02-25 DIAGNOSIS — S00.83XA TRAUMATIC HEMATOMA OF FOREHEAD: Primary | ICD-10-CM

## 2023-02-25 NOTE — DISCHARGE INSTRUCTIONS
Thank you for coming to the ED for your care  We recommend keeping the current bandage on until late this evening or overnight  Afterwards, you can put a bandaid or other dressing over the area if it continues to bleed  If the bleeding increases significantly (such as saturating the dressings) we recommend returning to the ED for evaluation  This hematoma should improve with time and slowly decrease in size  Please read the attached information regarding care of this as well as the information for head injuries in general      We also recommend returning to the ED if you develop significant increase in headache, vision changes, nausea/vomiting, or other concerning symptoms
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
## 2023-02-27 ENCOUNTER — APPOINTMENT (OUTPATIENT)
Dept: LAB | Facility: IMAGING CENTER | Age: 83
End: 2023-02-27

## 2023-02-27 RX ORDER — SODIUM CHLORIDE 9 MG/ML
20 INJECTION, SOLUTION INTRAVENOUS ONCE
Status: CANCELLED | OUTPATIENT
Start: 2023-03-06

## 2023-03-06 ENCOUNTER — OFFICE VISIT (OUTPATIENT)
Dept: HEMATOLOGY ONCOLOGY | Facility: CLINIC | Age: 83
End: 2023-03-06

## 2023-03-06 ENCOUNTER — HOSPITAL ENCOUNTER (OUTPATIENT)
Dept: INFUSION CENTER | Facility: CLINIC | Age: 83
Discharge: HOME | End: 2023-03-06

## 2023-03-06 ENCOUNTER — TELEPHONE (OUTPATIENT)
Dept: HEMATOLOGY ONCOLOGY | Facility: CLINIC | Age: 83
End: 2023-03-06

## 2023-03-06 VITALS
TEMPERATURE: 97.4 F | OXYGEN SATURATION: 99 % | DIASTOLIC BLOOD PRESSURE: 80 MMHG | SYSTOLIC BLOOD PRESSURE: 132 MMHG | RESPIRATION RATE: 16 BRPM | BODY MASS INDEX: 25.9 KG/M2 | WEIGHT: 165 LBS | HEIGHT: 67 IN | HEART RATE: 68 BPM

## 2023-03-06 VITALS
TEMPERATURE: 97.1 F | DIASTOLIC BLOOD PRESSURE: 90 MMHG | HEART RATE: 55 BPM | SYSTOLIC BLOOD PRESSURE: 144 MMHG | RESPIRATION RATE: 16 BRPM | OXYGEN SATURATION: 97 %

## 2023-03-06 DIAGNOSIS — C43.30 MALIGNANT MELANOMA OF SKIN OF FACE (HCC): Primary | ICD-10-CM

## 2023-03-06 DIAGNOSIS — C77.3 SECONDARY AND UNSPECIFIED MALIGNANT NEOPLASM OF AXILLA AND UPPER LIMB LYMPH NODES (HCC): ICD-10-CM

## 2023-03-06 DIAGNOSIS — Z79.899 HIGH RISK MEDICATION USE: ICD-10-CM

## 2023-03-06 DIAGNOSIS — C43.39 MALIGNANT MELANOMA OF FOREHEAD (HCC): ICD-10-CM

## 2023-03-06 RX ORDER — SODIUM CHLORIDE 9 MG/ML
20 INJECTION, SOLUTION INTRAVENOUS ONCE
Status: COMPLETED | OUTPATIENT
Start: 2023-03-06 | End: 2023-03-06

## 2023-03-06 RX ADMIN — SODIUM CHLORIDE 400 MG: 9 INJECTION, SOLUTION INTRAVENOUS at 15:24

## 2023-03-06 RX ADMIN — SODIUM CHLORIDE 20 ML/HR: 0.9 INJECTION, SOLUTION INTRAVENOUS at 15:22

## 2023-03-06 NOTE — TELEPHONE ENCOUNTER
Follow-up disposition: Return in about 6 weeks (around 4/17/2023) for Office Visit, labs, Infusion - See Treatment Plan  Check out comments: Need office visit with me prior to his infusions and needs additional infusion appointments     Please schedule treatment and f/u appt  Thank you! While we try to accommodate patient requests, our priority is to schedule treatment according to Doctor's orders and site availability  1  Does the Provider use the intake sheet or checkout note? no  2  What would be a preferred day of the week that would work best for your infusion appointment? Monday  3  Do you prefer mornings or afternoons for your appointments? afternoon  4  Are there any days or dates that do not work for your schedule, including any upcoming vacations? none  5  We are going to try our best to schedule you at the infusion center closest to your home  In the event that we are unable to what would be your next preferred infusion site or sites? 1  [facility] (preferred)  2  [facility]    6  Do you have transportation to take you to all of your appointments? yes  7   Would you like the infusion center to draw labs from your port? (disregard if patient doesn't have a port or need labs for infusion appointment) n/a

## 2023-03-06 NOTE — LETTER
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  He knows to call with issues or concerns prior to his next visit  4  High risk medication use  He is on treatment with immunotherapy and we will continue to monitor for side effects and lab abnormalities  We will monitor labs with each treatment to ensure safety of continuing on treatment  He knows to watch for signs and symptoms for immune mediated side effects  Denies any immune mediated side effects at this time  Return in about 6 weeks (around 4/17/2023) for Office Visit, labs, Infusion - See Treatment Plan       Claudia Finch MD, PhD

## 2023-03-06 NOTE — PROGRESS NOTES
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 4/17/2023) for Office Visit, labs, Infusion - See Treatment Plan       Fannie Lombard, MD, PhD

## 2023-03-06 NOTE — PROGRESS NOTES
Patient presents today for Keytruda infusion  Patient offers no complaints  VSS  Labs from 2/27/2023 reviewed and within parameters to treat  Peripheral IV inserted without incident

## 2023-03-27 ENCOUNTER — TELEPHONE (OUTPATIENT)
Dept: HEMATOLOGY ONCOLOGY | Facility: CLINIC | Age: 83
End: 2023-03-27

## 2023-03-27 NOTE — TELEPHONE ENCOUNTER
Patient Call Form   Reason for patient call? Patient has questions about treatment plan   Patient's primary oncologist? Rodrigue Hendricks   Name of person the patient was calling for? Rodrigue Hendricks   Does the patient issue require a call back? Yes, [phone]   If call back required, inform patient that the message will be forwarded to the team and someone will get back to them as soon as possible    Did you relay this information to the patient?  yes

## 2023-03-27 NOTE — TELEPHONE ENCOUNTER
Spoke with patient's wife  Patient not home at the time  Provided my direct line for him to call me back when he returns

## 2023-03-27 NOTE — TELEPHONE ENCOUNTER
Spoke with patient  He did not have a follow up with Dr Flor Montgomery scheduled  It appears that it was missed to be scheduled  Scheduled patient to see Dr Flor Montgomery prior to his next infusion  He verbalized understanding and agreed to plan

## 2023-04-10 DIAGNOSIS — C43.39 MALIGNANT MELANOMA OF FOREHEAD (HCC): ICD-10-CM

## 2023-04-10 DIAGNOSIS — C43.30 MALIGNANT MELANOMA OF SKIN OF FACE (HCC): Primary | ICD-10-CM

## 2023-04-10 DIAGNOSIS — Z79.899 HIGH RISK MEDICATION USE: ICD-10-CM

## 2023-04-11 DIAGNOSIS — C43.39 MALIGNANT MELANOMA OF FOREHEAD (HCC): ICD-10-CM

## 2023-04-11 DIAGNOSIS — C43.30 MALIGNANT MELANOMA OF SKIN OF FACE (HCC): Primary | ICD-10-CM

## 2023-04-11 DIAGNOSIS — Z79.899 HIGH RISK MEDICATION USE: ICD-10-CM

## 2023-04-11 RX ORDER — SODIUM CHLORIDE 9 MG/ML
20 INJECTION, SOLUTION INTRAVENOUS ONCE
Status: CANCELLED | OUTPATIENT
Start: 2023-04-17

## 2023-04-26 ENCOUNTER — TELEPHONE (OUTPATIENT)
Dept: HEMATOLOGY ONCOLOGY | Facility: CLINIC | Age: 83
End: 2023-04-26

## 2023-04-26 NOTE — TELEPHONE ENCOUNTER
Appointment Change  Cancel, Reschedule, Change to Virtual      Who are you speaking with? Patient   If it is not the patient, are they listed on an active communication consent form? N/A   Which provider is the appointment scheduled with? Dr Mario Metzger   When is the appointment scheduled? Please list date and time  05/04/23 8:30AM   At which location is the appointment scheduled to take place? [facility]   Was the appointment rescheduled or changed from an in person visit to a virtual visit? If so, please list the details of the change  05/02/23 11AM   What is the reason for the appointment change? Provider out of office   Was [facility] transport scheduled for this visit? No   Does [facility] transport need to be scheduled for the new visit (if applicable) N/A   Does the patient need an infusion appointment rescheduled? No   Does the patient have an infusion appointment scheduled? If so, when? No   Is the patient undergoing chemotherapy? No   Was the no-show policy reviewed for appointments being changed with less then 24 hours of notice?  N/A

## 2023-04-28 ENCOUNTER — APPOINTMENT (OUTPATIENT)
Dept: LAB | Facility: IMAGING CENTER | Age: 83
End: 2023-04-28

## 2023-04-28 ENCOUNTER — TELEPHONE (OUTPATIENT)
Dept: HEMATOLOGY ONCOLOGY | Facility: CLINIC | Age: 83
End: 2023-04-28

## 2023-04-28 LAB
ALBUMIN SERPL BCP-MCNC: 3.3 G/DL (ref 3.5–5)
ALP SERPL-CCNC: 83 U/L (ref 46–116)
ALT SERPL W P-5'-P-CCNC: 69 U/L (ref 12–78)
ANION GAP SERPL CALCULATED.3IONS-SCNC: 1 MMOL/L (ref 4–13)
AST SERPL W P-5'-P-CCNC: 26 U/L (ref 5–45)
BASOPHILS # BLD AUTO: 0.02 THOUSANDS/ΜL (ref 0–0.1)
BASOPHILS NFR BLD AUTO: 0 % (ref 0–1)
BILIRUB SERPL-MCNC: 0.64 MG/DL (ref 0.2–1)
BUN SERPL-MCNC: 29 MG/DL (ref 5–25)
CALCIUM ALBUM COR SERPL-MCNC: 9.3 MG/DL (ref 8.3–10.1)
CALCIUM SERPL-MCNC: 8.7 MG/DL (ref 8.3–10.1)
CHLORIDE SERPL-SCNC: 110 MMOL/L (ref 96–108)
CO2 SERPL-SCNC: 28 MMOL/L (ref 21–32)
CREAT SERPL-MCNC: 1.39 MG/DL (ref 0.6–1.3)
EOSINOPHIL # BLD AUTO: 0.23 THOUSAND/ΜL (ref 0–0.61)
EOSINOPHIL NFR BLD AUTO: 3 % (ref 0–6)
ERYTHROCYTE [DISTWIDTH] IN BLOOD BY AUTOMATED COUNT: 13.2 % (ref 11.6–15.1)
GFR SERPL CREATININE-BSD FRML MDRD: 46 ML/MIN/1.73SQ M
GLUCOSE SERPL-MCNC: 187 MG/DL (ref 65–140)
HCT VFR BLD AUTO: 37.8 % (ref 36.5–49.3)
HGB BLD-MCNC: 12.6 G/DL (ref 12–17)
IMM GRANULOCYTES # BLD AUTO: 0.03 THOUSAND/UL (ref 0–0.2)
IMM GRANULOCYTES NFR BLD AUTO: 0 % (ref 0–2)
LDH SERPL-CCNC: 135 U/L (ref 81–234)
LYMPHOCYTES # BLD AUTO: 3.02 THOUSANDS/ΜL (ref 0.6–4.47)
LYMPHOCYTES NFR BLD AUTO: 43 % (ref 14–44)
MCH RBC QN AUTO: 29.7 PG (ref 26.8–34.3)
MCHC RBC AUTO-ENTMCNC: 33.3 G/DL (ref 31.4–37.4)
MCV RBC AUTO: 89 FL (ref 82–98)
MONOCYTES # BLD AUTO: 0.54 THOUSAND/ΜL (ref 0.17–1.22)
MONOCYTES NFR BLD AUTO: 8 % (ref 4–12)
NEUTROPHILS # BLD AUTO: 3.19 THOUSANDS/ΜL (ref 1.85–7.62)
NEUTS SEG NFR BLD AUTO: 46 % (ref 43–75)
NRBC BLD AUTO-RTO: 0 /100 WBCS
PLATELET # BLD AUTO: 172 THOUSANDS/UL (ref 149–390)
PMV BLD AUTO: 9.8 FL (ref 8.9–12.7)
POTASSIUM SERPL-SCNC: 4.6 MMOL/L (ref 3.5–5.3)
PROT SERPL-MCNC: 5.8 G/DL (ref 6.4–8.4)
RBC # BLD AUTO: 4.24 MILLION/UL (ref 3.88–5.62)
SODIUM SERPL-SCNC: 139 MMOL/L (ref 135–147)
T3FREE SERPL-MCNC: 2.13 PG/ML (ref 2.3–4.2)
T4 FREE SERPL-MCNC: 1.03 NG/DL (ref 0.76–1.46)
TSH SERPL DL<=0.05 MIU/L-ACNC: 1.66 UIU/ML (ref 0.45–4.5)
WBC # BLD AUTO: 7.03 THOUSAND/UL (ref 4.31–10.16)

## 2023-04-28 PROCEDURE — 85025 COMPLETE CBC W/AUTO DIFF WBC: CPT

## 2023-04-28 PROCEDURE — 36415 COLL VENOUS BLD VENIPUNCTURE: CPT

## 2023-04-28 PROCEDURE — 84481 FREE ASSAY (FT-3): CPT

## 2023-04-28 PROCEDURE — 84439 ASSAY OF FREE THYROXINE: CPT

## 2023-04-28 PROCEDURE — 80053 COMPREHEN METABOLIC PANEL: CPT

## 2023-04-28 PROCEDURE — 83615 LACTATE (LD) (LDH) ENZYME: CPT

## 2023-04-28 PROCEDURE — 84443 ASSAY THYROID STIM HORMONE: CPT

## 2023-05-02 ENCOUNTER — OFFICE VISIT (OUTPATIENT)
Dept: SURGICAL ONCOLOGY | Facility: CLINIC | Age: 83
End: 2023-05-02

## 2023-05-02 VITALS
BODY MASS INDEX: 25.58 KG/M2 | TEMPERATURE: 97.8 F | WEIGHT: 163 LBS | RESPIRATION RATE: 16 BRPM | DIASTOLIC BLOOD PRESSURE: 80 MMHG | SYSTOLIC BLOOD PRESSURE: 120 MMHG | OXYGEN SATURATION: 100 % | HEART RATE: 79 BPM | HEIGHT: 67 IN

## 2023-05-02 DIAGNOSIS — C43.30 MALIGNANT MELANOMA OF SKIN OF FACE (HCC): Primary | ICD-10-CM

## 2023-05-02 NOTE — PROGRESS NOTES
Surgical Oncology F/u    [address] [facility] SURGICAL ONCOLOGY [facility]  [place] [address] [place] [id number] [phone]    Patient:  Joselito Castillo  1940  [id number]    Primary Care provider: MD Chinyere Erickson [address]    Referring provider:  No referring provider defined for this encounter  Diagnoses and all orders for this visit:    Malignant melanoma of skin of face [facility])        Chief Complaint   Patient presents with    Follow-up       No follow-ups on file  Oncology History   Malignant melanoma of skin of face ([facility] [address] )   9/8/2021 -  Cancer Staged    Staging form: Melanoma of the Skin, AJCC 8th Edition  - Clinical stage from 9/8/2021: Stage III (cT2a, cN1a, cM0) - Signed by Julia Litten, MD on 11/7/2021 9/8/2021 -  Cancer Staged    Staging form: Melanoma of the Skin, AJCC 8th Edition  - Pathologic stage from 9/8/2021: Stage IIIA (pT2a, pN1a, cM0) - Signed by Julia Litten, MD on 11/7/2021 9/22/2021 Initial Diagnosis    Malignant melanoma of skin of face ([facility] [address] )     4/4/2022 -  Chemotherapy    pembrolizumab (KEYTRUDA) IVPB, 400 mg, Intravenous, Once, 9 of 12 cycles  Administration: 400 mg (4/4/2022), 400 mg (5/16/2022), 400 mg (6/27/2022), 400 mg (8/8/2022), 400 mg (9/19/2022), 400 mg (10/31/2022), 400 mg (12/12/2022), 400 mg (1/23/2023), 400 mg (3/6/2023)     Malignant melanoma of forehead ([facility] [address] )   11/29/2021 Initial Diagnosis    Malignant melanoma of forehead ([facility] [address] )     4/4/2022 -  Chemotherapy    pembrolizumab (KEYTRUDA) IVPB, 400 mg, Intravenous, Once, 9 of 12 cycles  Administration: 400 mg (4/4/2022), 400 mg (5/16/2022), 400 mg (6/27/2022), 400 mg (8/8/2022), 400 mg (9/19/2022), 400 mg (10/31/2022), 400 mg (12/12/2022), 400 mg (1/23/2023), 400 mg (3/6/2023)       STAGE IIIA: [facility] [place]    Doing relatively well  No immune mediated side effects  No new lumps or bumps to report   No new bx since last being seen  Due for re-imaging soon  Does feel reduction in energy, appetite  Feels he has lost weight  No HA, bone pain, abd pain, n/v      Review of Systems  Complete ROS Surg Onc:   Constitutional: The patient denies new or recent history of general fatigue, no recent weight loss, no change in appetite  Eyes: No complaints of visual problems, no scleral icterus  ENT: No complaints of ear pain, no hoarseness, no difficulty swallowing,  no tinnitus and no new masses in head, oral cavity, or neck  Cardiovascular: No complaints of chest pain, no palpitations, no ankle edema  Respiratory: No complaints of shortness of breath, no cough  Gastrointestinal: No complaints of jaundice, no bloody stools, no pale stools  Genitourinary: No complaints of dysuria, no hematuria, no nocturia, no frequent urination, no urethral discharge  Musculoskeletal: No complaints of weakness, paralysis, joint stiffness or arthralgias  Integumentary: No complaints of rash, no new lesions  Neurological: No complaints of convulsions, no seizures, no dizziness  Hematologic/Lymphatic: No complaints of easy bruising  Endocrine:  No hot or cold intolerance  No polydipsia, polyphagia, or polyuria  Allergy/immunology:  No environmental allergies  No food allergies  Not immunocompromised        Patient Active Problem List   Diagnosis    Essential hypertension    Hiatal hernia with GERD    Status post laparoscopic cholecystectomy    Status post umbilical hernia repair, follow-up exam    Type 2 diabetes mellitus without complication, without long-term current use of insulin (HCC)    Mixed hyperlipidemia    Parkinson's disease ([address] )    Microalbuminuria    Malignant melanoma of skin of face ([address] )    Thrombocytopenia (HCC)    Malignant melanoma of forehead ([address] )    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)    Stage 3 chronic kidney disease, unspecified whether stage 3a or 3b CKD ([facility] [address] )    Oral lesion    Advanced care planning/counseling discussion    COVID-19     Past Medical History:   Diagnosis Date    Diabetes mellitus ([facility] [address] )     Hyperlipidemia     Hypertension      Past Surgical History:   Procedure Laterality Date    CATARACT EXTRACTION, BILATERAL      FLAP LOCAL HEAD / NECK Left 10/12/2021    Procedure: RECONSTRUCTION OF LEFT TEMPLE DEFECT AFTER RESCECTION MELANOMA;  Surgeon: Simon Garcia MD;  [facility];  Service: Plastics    FULL THICKNESS SKIN GRAFT Left 10/12/2021    Procedure: SKIN GRAFT FULL THICKNESS LEFT TEMPLE;  Surgeon: Simon Garcia MD;  [facility];  Service: Plastics    GALLBLADDER SURGERY      HAND SURGERY Left     LYMPH NODE BIOPSY Left 10/12/2021    Procedure: BIOPSY LYMPH NODE SENTINEL, LYMPHOSCINTIGRAPHY, INTRAOPERATIVE LYMPHATIC MAPPING (INJECT AT 1200);   Surgeon: Stacy Ellsworth MD;  [facility];  Service: Surgical Oncology    NOSE SURGERY      SKIN CANCER EXCISION  10/2021    SKIN LESION EXCISION Left 10/12/2021    Procedure: FACE MELANOMA SCAR WIDE EXCISION  FACE;  Surgeon: Stacy Ellsworth MD;  [facility];  Service: Surgical Oncology    SPLIT THICKNESS SKIN GRAFT Left 10/12/2021    Procedure: SKIN GRAFT SPLIT THICKNESS LEFT TEMPLE;  Surgeon: Simon Garcia MD;  [facility];  Service: Plastics    US GUIDED LYMPH NODE BIOPSY LEFT  03/21/2022     Family History   Problem Relation Age of Onset    No Known Problems Mother     No Known Problems Father     Colon cancer Other 60     Social History     Socioeconomic History    Marital status: /Civil Union     Spouse name: Not on file    Number of children: Not on file    Years of education: Not on file    Highest education level: Not on file   Occupational History    Not on file   Tobacco Use    Smoking status: Never    Smokeless tobacco: Never   Vaping Use    Vaping Use: Never used   Substance and Sexual Activity    Alcohol use: Not Currently    Drug use: Never    Sexual activity: Not Currently   Other Topics Concern    Not on file   Social History Narrative    Not on file     Social Determinants of Health     Financial Resource Strain: Low Risk     Difficulty of Paying Living Expenses: Not hard at all   Food Insecurity: Not on file   Transportation Needs: No Transportation Needs    Lack of Transportation (Medical): No    Lack of Transportation (Non-Medical):  No   Physical Activity: Not on file   Stress: Not on file   Social Connections: Not on file   Intimate Partner Violence: Not on file   Housing Stability: Not on file       Current Outpatient Medications:     atenolol (TENORMIN) 100 mg tablet, Take 1 tablet (100 mg total) by mouth daily, Disp: 90 tablet, Rfl: 3    glimepiride (AMARYL) 4 mg tablet, Take 1 tablet (4 mg total) by mouth daily, Disp: 90 tablet, Rfl: 3    lisinopril-hydrochlorothiazide (PRINZIDE,ZESTORETIC) 20-25 MG per tablet, Take 1 tablet by mouth daily, Disp: 90 tablet, Rfl: 3    lovastatin (MEVACOR) 40 MG tablet, Take 1 tablet (40 mg total) by mouth daily, Disp: 90 tablet, Rfl: 3    metFORMIN (GLUCOPHAGE) 1000 MG tablet, Take 1 tablet (1,000 mg total) by mouth 2 (two) times a day with meals, Disp: 180 tablet, Rfl: 3    Lidocaine Viscous HCl (XYLOCAINE) 2 % mucosal solution, Swish and swallow 15 mL 3 (three) times a day as needed for mouth pain or discomfort or mucositis (Patient not taking: Reported on 5/2/2023), Disp: 100 mL, Rfl: 0    meclizine (ANTIVERT) 12 5 MG tablet, Take 1 tablet (12 5 mg total) by mouth 3 (three) times a day as needed for dizziness for up to 5 days (Patient not taking: Reported on 2/1/2023), Disp: 15 tablet, Rfl: 0    methylPREDNISolone 4 MG tablet therapy pack, Use as directed on package (Patient not taking: Reported on 2/1/2023), Disp: 1 each, Rfl: 0  No Known Allergies    Vitals:    05/02/23 1050   BP: 120/80   Pulse: 79   Resp: 16   Temp: 97 8 °F (36 6 °C)   SpO2: 100% Physical Exam     General: Appears well, appears stated age  Skin: Warm, anicteric  HEENT: Normocephalic, atraumatic; sclera aniceteric, mucous membranes moist  Well-healed graft site and pre-auricular incision  Several new pigments lesions between primary and node incision  Cardiopulmonary: RRR, Easy WOB, no BLE edema  Abd: Flat and soft, nontender, no masses appreciated, no hepatosplenomegaly  MSK: Symmetric, no cyanosis, no overt weakness  Lymphatic: No cervical, axillary or inguinal lymphadenopathy  Neuro: Affect appropriate, no gross motor abnormalities            Pathology:  Final Diagnosis   A  Lymph node, sentinel, #1,  pre-auricular, biopsy:  One lymph node with subcapsular rare melanoma cells consistent with sub-micrometastasis (<0 1 mm)  See synoptic report and note       B  Skin, left forehead, excision:  Residual invasive melanoma and scar (cicatrix), margins free  See synoptic report  Labs: Reviewed in EPIC    Imaging  No results found  I independently reviewed and interpreted the above laboratory and imaging data, including Dr uDong Oscar consultation, PET scans, US  I discussed this pt with Dr Landrum Newport  Discussion/Summary:   79year-old gentleman status post wide local excision with sentinel lymph node biopsy of what ultimately was determined to be a [id number] melanoma 10/2021  Ultrasound of the neck completed early 2022 revealed concern for recurrence in the preauricular region as well as the left cervical chain  He underwent biopsy of this which was inconclusive, and elected per to pursue adjuvant therapy and follow clinically  On last visit I performed punch bx of ?in transit lesion that was negative for cancer  PET in Dec with ?pulm mets  He is due for re-imaging soon and continues on pembro  I will see him again in 6 mo  Caitlyn Maza

## 2023-05-03 ENCOUNTER — OFFICE VISIT (OUTPATIENT)
Dept: HEMATOLOGY ONCOLOGY | Facility: CLINIC | Age: 83
End: 2023-05-03

## 2023-05-03 VITALS
HEIGHT: 67 IN | HEART RATE: 62 BPM | WEIGHT: 163 LBS | SYSTOLIC BLOOD PRESSURE: 120 MMHG | BODY MASS INDEX: 25.58 KG/M2 | DIASTOLIC BLOOD PRESSURE: 80 MMHG | RESPIRATION RATE: 16 BRPM | TEMPERATURE: 97.2 F | OXYGEN SATURATION: 96 %

## 2023-05-03 DIAGNOSIS — Z79.899 HIGH RISK MEDICATION USE: ICD-10-CM

## 2023-05-03 DIAGNOSIS — C43.30 MALIGNANT MELANOMA OF SKIN OF FACE (HCC): ICD-10-CM

## 2023-05-03 DIAGNOSIS — C43.30 MALIGNANT MELANOMA OF SKIN OF FACE (HCC): Primary | ICD-10-CM

## 2023-05-03 DIAGNOSIS — C43.39 MALIGNANT MELANOMA OF FOREHEAD (HCC): ICD-10-CM

## 2023-05-03 DIAGNOSIS — C43.39 MALIGNANT MELANOMA OF FOREHEAD (HCC): Primary | ICD-10-CM

## 2023-05-03 DIAGNOSIS — C77.3 SECONDARY AND UNSPECIFIED MALIGNANT NEOPLASM OF AXILLA AND UPPER LIMB LYMPH NODES (HCC): ICD-10-CM

## 2023-05-03 RX ORDER — SODIUM CHLORIDE 9 MG/ML
20 INJECTION, SOLUTION INTRAVENOUS ONCE
Status: CANCELLED | OUTPATIENT
Start: 2023-05-05

## 2023-05-03 NOTE — LETTER
May 3, 2023     Es Reinoso MD  [address] [place] [address]    Patient: Julisa Smith   YOB: 1940   Date of Visit: 5/3/2023       Dear Dr Tia Dennis:    Thank you for referring Julisa Smith to me for evaluation  Below are my notes for this consultation  If you have questions, please do not hesitate to call me  I look forward to following your patient along with you           Sincerely,        Trevor Acharya MD        CC: MD Billie Dalton MD Nonda Echevaria, MD Catheryn North, MD  5/3/2023  8:19 AM  Incomplete  [facility] [address] [id number] [phone]     Date of Visit: 5/3/2023  Name: Julisa Smith   YOB: 1940     Subjective     Subjective    VISIT DIAGNOSIS:  Diagnoses and all orders for this visit:    Malignant melanoma of skin of face ([address] )    Malignant melanoma of forehead ([address] )    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[address] )    High risk medication use        Oncology History   Malignant melanoma of skin of face ([address] )   9/8/2021 -  Cancer Staged    Staging form: Melanoma of the Skin, AJCC 8th Edition  - Clinical stage from 9/8/2021: Stage III (cT2a, cN1a, cM0) - Signed by Trevor Acharya MD on 11/7/2021 9/8/2021 -  Cancer Staged    Staging form: Melanoma of the Skin, AJCC 8th Edition  - Pathologic stage from 9/8/2021: Stage IIIA (pT2a, pN1a, cM0) - Signed by Trevor Acharya MD on 11/7/2021 9/22/2021 Initial Diagnosis    Malignant melanoma of skin of face ([address] )     4/4/2022 -  Chemotherapy    pembrolizumab (KEYTRUDA) IVPB, 400 mg, Intravenous, Once, 9 of 12 cycles  Administration: 400 mg (4/4/2022), 400 mg (5/16/2022), 400 mg (6/27/2022), 400 mg (8/8/2022), 400 mg (9/19/2022), 400 mg (10/31/2022), 400 mg (12/12/2022), 400 mg (1/23/2023), 400 mg (3/6/2023)     Malignant melanoma of forehead ([facility] )   11/29/2021 Initial Diagnosis    Malignant melanoma of forehead ([facility] )     4/4/2022 -  Chemotherapy    pembrolizumab (KEYTRUDA) IVPB, 400 mg, Intravenous, Once, 9 of 12 cycles  Administration: 400 mg (4/4/2022), 400 mg (5/16/2022), 400 mg (6/27/2022), 400 mg (8/8/2022), 400 mg (9/19/2022), 400 mg (10/31/2022), 400 mg (12/12/2022), 400 mg (1/23/2023), 400 mg (3/6/2023)        Cancer Staging   Malignant melanoma of skin of face [facility])  Staging form: Melanoma of the Skin, AJCC 8th Edition  - Clinical stage from 9/8/2021: Stage III (cT2a, cN1a, cM0) - Signed by Elaine Ko MD on 11/7/2021  - Pathologic stage from 9/8/2021: Stage IIIA (pT2a, pN1a, cM0) - Signed by Elaine Ko MD on 11/7/2021     Treatment Details   Treatment goal Curative   Plan Name OP Pembrolizumab Q 42 Days   Status Active   Start Date 4/4/2022   End Date 7/10/2023 (Planned)   Provider Elaine Ko MD   Chemotherapy pembrolizumab [facility]) IVPB, 400 mg, Intravenous, Once, 9 of 12 cycles  Administration: 400 mg (4/4/2022), 400 mg (5/16/2022), 400 mg (6/27/2022), 400 mg (8/8/2022), 400 mg (9/19/2022), 400 mg (10/31/2022), 400 mg (12/12/2022), 400 mg (1/23/2023), 400 mg (3/6/2023)          HISTORY OF PRESENT ILLNESS: Mami Matt is a 79 y o  male  who ***      INTERIM HISTORY: ***    REVIEW OF SYSTEMS:  Review of Systems - Oncology     MEDICATIONS:    Current Outpatient Medications:   •  atenolol (TENORMIN) 100 mg tablet, Take 1 tablet (100 mg total) by mouth daily, Disp: 90 tablet, Rfl: 3  •  glimepiride (AMARYL) 4 mg tablet, Take 1 tablet (4 mg total) by mouth daily, Disp: 90 tablet, Rfl: 3  •  lisinopril-hydrochlorothiazide (PRINZIDE,ZESTORETIC) 20-25 MG per tablet, Take 1 tablet by mouth daily, Disp: 90 tablet, Rfl: 3  •  lovastatin (MEVACOR) 40 MG tablet, Take 1 tablet (40 mg total) by mouth daily, Disp: 90 tablet, Rfl: 3  •  metFORMIN (GLUCOPHAGE) 1000 MG tablet, Take 1 tablet (1,000 mg total) by mouth 2 (two) times a day with meals, Disp: 180 tablet, Rfl: 3  •  Lidocaine Viscous HCl (XYLOCAINE) 2 % mucosal solution, Swish and swallow 15 mL 3 (three) times a day as needed for mouth pain or discomfort or mucositis (Patient not taking: Reported on 5/2/2023), Disp: 100 mL, Rfl: 0  •  meclizine (ANTIVERT) 12 5 MG tablet, Take 1 tablet (12 5 mg total) by mouth 3 (three) times a day as needed for dizziness for up to 5 days (Patient not taking: Reported on 2/1/2023), Disp: 15 tablet, Rfl: 0  •  methylPREDNISolone 4 MG tablet therapy pack, Use as directed on package (Patient not taking: Reported on 2/1/2023), Disp: 1 each, Rfl: 0     ALLERGIES:  No Known Allergies     ACTIVE PROBLEMS:  Patient Active Problem List   Diagnosis   • Essential hypertension   • Hiatal hernia with GERD   • Status post laparoscopic cholecystectomy   • Status post umbilical hernia repair, follow-up exam   • Type 2 diabetes mellitus without complication, without long-term current use of insulin (HCC)   • Mixed hyperlipidemia   • Parkinson's disease ([address] )   • Microalbuminuria   • Malignant melanoma of skin of face ([address] )   • Thrombocytopenia ([address] )   • Malignant melanoma of forehead ([address] )   • 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[address] )   • Stage 3 chronic kidney disease, unspecified whether stage 3a or 3b CKD ([address] )   • Oral lesion   • Advanced care planning/counseling discussion   • COVID-19          PAST MEDICAL HISTORY:   Past Medical History:   Diagnosis Date   • Diabetes mellitus ([address] )    • Hyperlipidemia    • Hypertension         PAST SURGICAL HISTORY:  Past Surgical History:   Procedure Laterality Date   • CATARACT EXTRACTION, BILATERAL     • FLAP LOCAL HEAD / NECK Left 10/12/2021    Procedure: RECONSTRUCTION OF LEFT TEMPLE DEFECT AFTER RESCECTION MELANOMA;  Surgeon: Amita Restrepo MD;  [facility];  Service: Plastics   • FULL THICKNESS SKIN GRAFT Left 10/12/2021    Procedure: SKIN GRAFT FULL THICKNESS LEFT TEMPLE;  Surgeon: Radha Francisco MD;  [facility];  Service: Plastics   • GALLBLADDER SURGERY     • HAND SURGERY Left    • LYMPH NODE BIOPSY Left 10/12/2021    Procedure: BIOPSY LYMPH NODE SENTINEL, LYMPHOSCINTIGRAPHY, INTRAOPERATIVE LYMPHATIC MAPPING (INJECT AT 1200); Surgeon: Felice Gilliam MD;  [facility];  Service: Surgical Oncology   • NOSE SURGERY     • SKIN CANCER EXCISION  10/2021   • SKIN LESION EXCISION Left 10/12/2021    Procedure: [address];  Surgeon: Felice Gilliam MD;  [facility];  Service: Surgical Oncology   • SPLIT THICKNESS SKIN GRAFT Left 10/12/2021    Procedure: SKIN GRAFT SPLIT THICKNESS LEFT TEMPLE;  Surgeon: Radha Francisco MD;  [facility];  Service: Plastics   • US GUIDED LYMPH NODE BIOPSY LEFT  03/21/2022        SOCIAL HISTORY:  Social History     Socioeconomic History   • Marital status: /Civil Union     Spouse name: None   • Number of children: None   • Years of education: None   • Highest education level: None   Occupational History   • None   Tobacco Use   • Smoking status: Never   • Smokeless tobacco: Never   Vaping Use   • Vaping Use: Never used   Substance and Sexual Activity   • Alcohol use: Not Currently   • Drug use: Never   • Sexual activity: Not Currently   Other Topics Concern   • None   Social History Narrative   • None     Social Determinants of Health     Financial Resource Strain: Low Risk    • Difficulty of Paying Living Expenses: Not hard at all   Food Insecurity: Not on file   Transportation Needs: No Transportation Needs   • Lack of Transportation (Medical): No   • Lack of Transportation (Non-Medical):  No   Physical Activity: Not on file   Stress: Not on file   Social Connections: Not on file   Intimate Partner Violence: Not on file   Housing Stability: Not on file        FAMILY HISTORY:  Family History   Problem Relation Age of Onset   • No Known Problems Mother    • No "Known Problems Father    • Colon cancer Other 60        Objective     Objective    PHYSICAL EXAMINATION:   Blood pressure 120/80, pulse 62, temperature (!) 97 2 °F (36 2 °C), temperature source Temporal, resp  rate 16, height 5' 7\" (1 702 m), weight 73 9 kg (163 lb), SpO2 96 %  Pain Score: 0-No pain      Physical Exam    I reviewed lab data in the chart      WBC   Date Value Ref Range Status   04/28/2023 7 03 4 31 - 10 16 Thousand/uL Final   04/10/2023 7 39 4 31 - 10 16 Thousand/uL Final   02/27/2023 8 05 4 31 - 10 16 Thousand/uL Final     Hemoglobin   Date Value Ref Range Status   04/28/2023 12 6 12 0 - 17 0 g/dL Final   04/10/2023 13 9 12 0 - 17 0 g/dL Final   02/27/2023 13 0 12 0 - 17 0 g/dL Final     Platelets   Date Value Ref Range Status   04/28/2023 172 149 - 390 Thousands/uL Final   04/10/2023 131 (L) 149 - 390 Thousands/uL Final   02/27/2023 137 (L) 149 - 390 Thousands/uL Final     MCV   Date Value Ref Range Status   04/28/2023 89 82 - 98 fL Final   04/10/2023 90 82 - 98 fL Final   02/27/2023 90 82 - 98 fL Final      Potassium   Date Value Ref Range Status   04/28/2023 4 6 3 5 - 5 3 mmol/L Final   04/10/2023 4 4 3 5 - 5 3 mmol/L Final   02/27/2023 4 0 3 5 - 5 3 mmol/L Final     Chloride   Date Value Ref Range Status   04/28/2023 110 (H) 96 - 108 mmol/L Final   04/10/2023 105 96 - 108 mmol/L Final   02/27/2023 107 96 - 108 mmol/L Final     CO2   Date Value Ref Range Status   04/28/2023 28 21 - 32 mmol/L Final   04/10/2023 30 21 - 32 mmol/L Final   02/27/2023 27 21 - 32 mmol/L Final     BUN   Date Value Ref Range Status   04/28/2023 29 (H) 5 - 25 mg/dL Final   04/10/2023 24 5 - 25 mg/dL Final   02/27/2023 29 (H) 5 - 25 mg/dL Final     Creatinine   Date Value Ref Range Status   04/28/2023 1 39 (H) 0 60 - 1 30 mg/dL Final     Comment:     Standardized to IDMS reference method   04/10/2023 1 43 (H) 0 60 - 1 30 mg/dL Final     Comment:     Standardized to IDMS reference method   02/27/2023 1 65 (H) 0 60 - 1 30 " mg/dL Final     Comment:     Standardized to IDMS reference method     Glucose   Date Value Ref Range Status   04/28/2023 187 (H) 65 - 140 mg/dL Final     Comment:     If the patient is fasting, the ADA then defines impaired fasting glucose as > 100 mg/dL and diabetes as > or equal to 123 mg/dL  Specimen collection should occur prior to Sulfasalazine administration due to the potential for falsely depressed results  Specimen collection should occur prior to Sulfapyridine administration due to the potential for falsely elevated results  01/16/2023 181 (H) 65 - 140 mg/dL Final     Comment:     If the patient is fasting, the ADA then defines impaired fasting glucose as > 100 mg/dL and diabetes as > or equal to 123 mg/dL  Specimen collection should occur prior to Sulfasalazine administration due to the potential for falsely depressed results  Specimen collection should occur prior to Sulfapyridine administration due to the potential for falsely elevated results  12/05/2022 142 (H) 65 - 140 mg/dL Final     Comment:     If the patient is fasting, the ADA then defines impaired fasting glucose as > 100 mg/dL and diabetes as > or equal to 123 mg/dL  Specimen collection should occur prior to Sulfasalazine administration due to the potential for falsely depressed results  Specimen collection should occur prior to Sulfapyridine administration due to the potential for falsely elevated results       Calcium   Date Value Ref Range Status   04/28/2023 8 7 8 3 - 10 1 mg/dL Final   04/10/2023 8 9 8 3 - 10 1 mg/dL Final   02/27/2023 8 8 8 3 - 10 1 mg/dL Final     Albumin   Date Value Ref Range Status   04/28/2023 3 3 (L) 3 5 - 5 0 g/dL Final   04/10/2023 3 8 3 5 - 5 0 g/dL Final   02/27/2023 3 6 3 5 - 5 0 g/dL Final     Total Bilirubin   Date Value Ref Range Status   04/28/2023 0 64 0 20 - 1 00 mg/dL Final     Comment:     Use of this assay is not recommended for patients undergoing treatment with eltrombopag due to the potential for falsely elevated results  04/10/2023 1 39 (H) 0 20 - 1 00 mg/dL Final     Comment:     Use of this assay is not recommended for patients undergoing treatment with eltrombopag due to the potential for falsely elevated results  02/27/2023 1 14 (H) 0 20 - 1 00 mg/dL Final     Comment:     Use of this assay is not recommended for patients undergoing treatment with eltrombopag due to the potential for falsely elevated results  Alkaline Phosphatase   Date Value Ref Range Status   04/28/2023 83 46 - 116 U/L Final   04/10/2023 91 46 - 116 U/L Final   02/27/2023 70 46 - 116 U/L Final     AST   Date Value Ref Range Status   04/28/2023 26 5 - 45 U/L Final     Comment:     Specimen collection should occur prior to Sulfasalazine administration due to the potential for falsely depressed results  04/10/2023 17 5 - 45 U/L Final     Comment:     Specimen collection should occur prior to Sulfasalazine administration due to the potential for falsely depressed results  02/27/2023 15 5 - 45 U/L Final     Comment:     Specimen collection should occur prior to Sulfasalazine administration due to the potential for falsely depressed results  ALT   Date Value Ref Range Status   04/28/2023 69 12 - 78 U/L Final     Comment:     Specimen collection should occur prior to Sulfasalazine and/or Sulfapyridine administration due to the potential for falsely depressed results  04/10/2023 21 12 - 78 U/L Final     Comment:     Specimen collection should occur prior to Sulfasalazine and/or Sulfapyridine administration due to the potential for falsely depressed results  02/27/2023 19 12 - 78 U/L Final     Comment:     Specimen collection should occur prior to Sulfasalazine and/or Sulfapyridine administration due to the potential for falsely depressed results         LD   Date Value Ref Range Status   04/28/2023 135 81 - 234 U/L Final   04/10/2023 137 81 - 234 U/L Final   02/27/2023 151 81 - 234 U/L Final     No results found for: TSH  No results found for: [id number]   Free T4   Date Value Ref Range Status   04/28/2023 1 03 0 76 - 1 46 ng/dL Final     Comment:     Specimen collection should occur prior to Sulfasalazine administration due to the potential for falsely elevated results  04/10/2023 0 95 0 76 - 1 46 ng/dL Final     Comment:     Specimen collection should occur prior to Sulfasalazine administration due to the potential for falsely elevated results  02/27/2023 0 96 0 76 - 1 46 ng/dL Final     Comment:     Specimen collection should occur prior to Sulfasalazine administration due to the potential for falsely elevated results  RECENT IMAGING:  No results found  Assessment     Assessment/Plan  No problem-specific Assessment & Plan notes found for this encounter  Return in about 6 weeks (around 6/14/2023) for Office Visit, Infusion - See Treatment Plan, labs  John Luna MD  5/3/2023  8:26 AM  Sign when Signing Visit  [address] [place] [address]  [phone]  [phone]     Date of Visit: 5/3/2023  Name: Elayne Muir   YOB: 1940        Subjective    VISIT DIAGNOSIS:  Diagnoses and all orders for this visit:    Malignant melanoma of skin of face ([address] )  -     NM pet ct tumor imaging whole body; Future    Malignant melanoma of forehead ([address] )  -     NM pet ct tumor imaging whole body; Future    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[address] )  -     NM pet ct tumor imaging whole body;  Future    High risk medication use        Oncology History   Malignant melanoma of skin of face ([address] )   9/8/2021 -  Cancer Staged    Staging form: Melanoma of the Skin, AJCC 8th Edition  - Clinical stage from 9/8/2021: Stage III (cT2a, cN1a, cM0) - Signed by John Luna MD on 11/7/2021 9/8/2021 -  Cancer Staged    Staging form: Melanoma of the Skin, AJCC 8th Edition  - Pathologic stage from 9/8/2021: Stage IIIA (pT2a, pN1a, cM0) - Signed by Mick Delgado MD on 11/7/2021 9/22/2021 Initial Diagnosis    Malignant melanoma of skin of face ([facility] )     4/4/2022 -  Chemotherapy    pembrolizumab (KEYTRUDA) IVPB, 400 mg, Intravenous, Once, 9 of 12 cycles  Administration: 400 mg (4/4/2022), 400 mg (5/16/2022), 400 mg (6/27/2022), 400 mg (8/8/2022), 400 mg (9/19/2022), 400 mg (10/31/2022), 400 mg (12/12/2022), 400 mg (1/23/2023), 400 mg (3/6/2023)     Malignant melanoma of forehead ([facility] )   11/29/2021 Initial Diagnosis    Malignant melanoma of forehead ([facility] )     4/4/2022 -  Chemotherapy    pembrolizumab (KEYTRUDA) IVPB, 400 mg, Intravenous, Once, 9 of 12 cycles  Administration: 400 mg (4/4/2022), 400 mg (5/16/2022), 400 mg (6/27/2022), 400 mg (8/8/2022), 400 mg (9/19/2022), 400 mg (10/31/2022), 400 mg (12/12/2022), 400 mg (1/23/2023), 400 mg (3/6/2023)        Cancer Staging   Malignant melanoma of skin of face [facility])  Staging form: Melanoma of the Skin, AJCC 8th Edition  - Clinical stage from 9/8/2021: Stage III (cT2a, cN1a, cM0) - Signed by Mick Delgado MD on 11/7/2021  - Pathologic stage from 9/8/2021: Stage IIIA (pT2a, pN1a, cM0) - Signed by Mick Delgado MD on 11/7/2021     Treatment Details   Treatment goal Curative   Plan Name OP Pembrolizumab Q 42 Days   Status Active   Start Date 4/4/2022   End Date 7/10/2023 (Planned)   Provider Mick Delgado MD   Chemotherapy pembrolizumab [facility]) IVPB, 400 mg, Intravenous, Once, 9 of 12 cycles  Administration: 400 mg (4/4/2022), 400 mg (5/16/2022), 400 mg (6/27/2022), 400 mg (8/8/2022), 400 mg (9/19/2022), 400 mg (10/31/2022), 400 mg (12/12/2022), 400 mg (1/23/2023), 400 mg (3/6/2023)          HISTORY OF PRESENT ILLNESS: Trish De León is a 79 y o  male  who has stage IIIa melanoma on treatment with adjuvant pembrolizumab here for continued follow-up, monitoring, and surveillance while on treatment  He is doing well and feels okay  He states that he has continued shortness of breath, which is stable for him, and proceeding starting treatment  He states it just takes a while to walk where he needs to go especially down the end of the driveway to get his mail from the [facility]  Otherwise doing okay  Eating well  Minimal fatigue  Denies any new, changing, concerning skin lesions  Denies any lymphadenopathy  Denies any headaches, double vision, rash, itching, chest pain, shortness of breath, and diarrhea  No muscle weakness  REVIEW OF SYSTEMS:  Review of Systems   Constitutional: Negative for appetite change, fatigue, fever and unexpected weight change  HENT:   Negative for lump/mass  Eyes: Negative for icterus  Respiratory: Positive for shortness of breath (stable, present prior to treatment)  Negative for cough and wheezing  Cardiovascular: Negative for leg swelling  Gastrointestinal: Negative for abdominal pain, constipation, diarrhea, nausea and vomiting  Genitourinary: Negative for difficulty urinating and hematuria  Musculoskeletal: Negative for arthralgias, gait problem and myalgias  Skin: Negative for itching and rash  No new, changing, or concerning lesions  Neurological: Negative for extremity weakness, gait problem, headaches, light-headedness and numbness  Hematological: Negative for adenopathy          MEDICATIONS:    Current Outpatient Medications:   •  atenolol (TENORMIN) 100 mg tablet, Take 1 tablet (100 mg total) by mouth daily, Disp: 90 tablet, Rfl: 3  •  glimepiride (AMARYL) 4 mg tablet, Take 1 tablet (4 mg total) by mouth daily, Disp: 90 tablet, Rfl: 3  •  lisinopril-hydrochlorothiazide (PRINZIDE,ZESTORETIC) 20-25 MG per tablet, Take 1 tablet by mouth daily, Disp: 90 tablet, Rfl: 3  •  lovastatin (MEVACOR) 40 MG tablet, Take 1 tablet (40 mg total) by mouth daily, Disp: 90 tablet, Rfl: 3  •  metFORMIN (GLUCOPHAGE) 1000 MG tablet, Take 1 tablet (1,000 mg total) by mouth 2 (two) times a day with meals, Disp: 180 tablet, Rfl: 3  •  Lidocaine Viscous HCl (XYLOCAINE) 2 % mucosal solution, Swish and swallow 15 mL 3 (three) times a day as needed for mouth pain or discomfort or mucositis (Patient not taking: Reported on 5/2/2023), Disp: 100 mL, Rfl: 0  •  meclizine (ANTIVERT) 12 5 MG tablet, Take 1 tablet (12 5 mg total) by mouth 3 (three) times a day as needed for dizziness for up to 5 days (Patient not taking: Reported on 2/1/2023), Disp: 15 tablet, Rfl: 0  •  methylPREDNISolone 4 MG tablet therapy pack, Use as directed on package (Patient not taking: Reported on 2/1/2023), Disp: 1 each, Rfl: 0     ALLERGIES:  No Known Allergies     ACTIVE PROBLEMS:  Patient Active Problem List   Diagnosis   • Essential hypertension   • Hiatal hernia with GERD   • Status post laparoscopic cholecystectomy   • Status post umbilical hernia repair, follow-up exam   • Type 2 diabetes mellitus without complication, without long-term current use of insulin (HCC)   • Mixed hyperlipidemia   • Parkinson's disease ([address] )   • Microalbuminuria   • Malignant melanoma of skin of face ([address] )   • Thrombocytopenia ([address] )   • Malignant melanoma of forehead ([address] )   • 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[address] )   • Stage 3 chronic kidney disease, unspecified whether stage 3a or 3b CKD ([address] )   • Oral lesion   • Advanced care planning/counseling discussion   • COVID-19          PAST MEDICAL HISTORY:   Past Medical History:   Diagnosis Date   • Diabetes mellitus ([address] )    • Hyperlipidemia    • Hypertension         PAST SURGICAL HISTORY:  Past Surgical History:   Procedure Laterality Date   • CATARACT EXTRACTION, BILATERAL     • FLAP LOCAL HEAD / NECK Left 10/12/2021    Procedure: RECONSTRUCTION OF LEFT TEMPLE DEFECT AFTER RESCECTION MELANOMA;  Surgeon: Ulises Bauer MD;  [facility];  Service: Plastics   • FULL THICKNESS SKIN GRAFT Left 10/12/2021    Procedure: SKIN GRAFT FULL THICKNESS LEFT TEMPLE;  Surgeon: Amita Restrepo MD;  [facility];  Service: Plastics   • GALLBLADDER SURGERY     • HAND SURGERY Left    • LYMPH NODE BIOPSY Left 10/12/2021    Procedure: BIOPSY LYMPH NODE SENTINEL, LYMPHOSCINTIGRAPHY, INTRAOPERATIVE LYMPHATIC MAPPING (INJECT AT 1200); Surgeon: Josette Case MD;  [facility];  Service: Surgical Oncology   • NOSE SURGERY     • SKIN CANCER EXCISION  10/2021   • SKIN LESION EXCISION Left 10/12/2021    Procedure: [address];  Surgeon: Josette Case MD;  [facility];  Service: Surgical Oncology   • SPLIT THICKNESS SKIN GRAFT Left 10/12/2021    Procedure: SKIN GRAFT SPLIT THICKNESS LEFT TEMPLE;  Surgeon: Amita Restrepo MD;  [facility];  Service: Plastics   • US GUIDED LYMPH NODE BIOPSY LEFT  03/21/2022        SOCIAL HISTORY:  Social History     Socioeconomic History   • Marital status: /Civil Union     Spouse name: None   • Number of children: None   • Years of education: None   • Highest education level: None   Occupational History   • None   Tobacco Use   • Smoking status: Never   • Smokeless tobacco: Never   Vaping Use   • Vaping Use: Never used   Substance and Sexual Activity   • Alcohol use: Not Currently   • Drug use: Never   • Sexual activity: Not Currently   Other Topics Concern   • None   Social History Narrative   • None     Social Determinants of Health     Financial Resource Strain: Low Risk    • Difficulty of Paying Living Expenses: Not hard at all   Food Insecurity: Not on file   Transportation Needs: No Transportation Needs   • Lack of Transportation (Medical): No   • Lack of Transportation (Non-Medical):  No   Physical Activity: Not on file   Stress: Not on file   Social Connections: Not on file   Intimate Partner Violence: Not on file   Housing Stability: Not on file        FAMILY HISTORY:  Family History   Problem Relation Age of Onset   • No Known Problems Mother    • No Known "Problems Father    • Colon cancer Other 60           Objective    PHYSICAL EXAMINATION:   Blood pressure 120/80, pulse 62, temperature (!) 97 2 °F (36 2 °C), temperature source Temporal, resp  rate 16, height 5' 7\" (1 702 m), weight 73 9 kg (163 lb), SpO2 96 %  Pain Score: 0-No pain     ECOG Performance Status    Flowsheet Row Most Recent Value   ECOG Performance Status 1 - Restricted in physically strenuous activity but ambulatory and able to carry out work of a light or sedentary nature, e g , light house work, office work             Physical Exam  Constitutional:       General: He is not in acute distress  Appearance: Normal appearance  He is not toxic-appearing  HENT:      Mouth/Throat:      Mouth: Mucous membranes are moist       Pharynx: Oropharynx is clear  Eyes:      General: No scleral icterus  Conjunctiva/sclera: Conjunctivae normal    Cardiovascular:      Rate and Rhythm: Normal rate and regular rhythm  Pulses: Normal pulses  Heart sounds: No murmur heard  No friction rub  No gallop  Pulmonary:      Effort: Pulmonary effort is normal  No respiratory distress  Breath sounds: Normal breath sounds  No wheezing or rales  Abdominal:      General: There is no distension  Palpations: There is no mass  Tenderness: There is no abdominal tenderness  There is no rebound  Musculoskeletal:         General: No swelling or tenderness  Normal range of motion  Cervical back: Normal range of motion  Right lower leg: No edema  Left lower leg: No edema  Lymphadenopathy:      Head:      Right side of head: No submandibular, preauricular or posterior auricular adenopathy  Left side of head: No submandibular, preauricular or posterior auricular adenopathy  Cervical: No cervical adenopathy  Right cervical: No superficial or posterior cervical adenopathy  Left cervical: No superficial or posterior cervical adenopathy        Upper Body:      " Right upper body: No supraclavicular or axillary adenopathy  Left upper body: No supraclavicular or axillary adenopathy  Skin:     Coloration: Skin is not jaundiced  Findings: No rash  Comments: Well healed surgical scar  No evidence of recurrence at primary site  Neurological:      General: No focal deficit present  Mental Status: He is alert and oriented to person, place, and time  Psychiatric:         Mood and Affect: Mood normal          Behavior: Behavior normal          Thought Content: Thought content normal          Judgment: Judgment normal          I reviewed lab data in the chart      WBC   Date Value Ref Range Status   04/28/2023 7 03 4 31 - 10 16 Thousand/uL Final   04/10/2023 7 39 4 31 - 10 16 Thousand/uL Final   02/27/2023 8 05 4 31 - 10 16 Thousand/uL Final     Hemoglobin   Date Value Ref Range Status   04/28/2023 12 6 12 0 - 17 0 g/dL Final   04/10/2023 13 9 12 0 - 17 0 g/dL Final   02/27/2023 13 0 12 0 - 17 0 g/dL Final     Platelets   Date Value Ref Range Status   04/28/2023 172 149 - 390 Thousands/uL Final   04/10/2023 131 (L) 149 - 390 Thousands/uL Final   02/27/2023 137 (L) 149 - 390 Thousands/uL Final     MCV   Date Value Ref Range Status   04/28/2023 89 82 - 98 fL Final   04/10/2023 90 82 - 98 fL Final   02/27/2023 90 82 - 98 fL Final      Potassium   Date Value Ref Range Status   04/28/2023 4 6 3 5 - 5 3 mmol/L Final   04/10/2023 4 4 3 5 - 5 3 mmol/L Final   02/27/2023 4 0 3 5 - 5 3 mmol/L Final     Chloride   Date Value Ref Range Status   04/28/2023 110 (H) 96 - 108 mmol/L Final   04/10/2023 105 96 - 108 mmol/L Final   02/27/2023 107 96 - 108 mmol/L Final     CO2   Date Value Ref Range Status   04/28/2023 28 21 - 32 mmol/L Final   04/10/2023 30 21 - 32 mmol/L Final   02/27/2023 27 21 - 32 mmol/L Final     BUN   Date Value Ref Range Status   04/28/2023 29 (H) 5 - 25 mg/dL Final   04/10/2023 24 5 - 25 mg/dL Final   02/27/2023 29 (H) 5 - 25 mg/dL Final     Creatinine Date Value Ref Range Status   04/28/2023 1 39 (H) 0 60 - 1 30 mg/dL Final     Comment:     Standardized to IDMS reference method   04/10/2023 1 43 (H) 0 60 - 1 30 mg/dL Final     Comment:     Standardized to IDMS reference method   02/27/2023 1 65 (H) 0 60 - 1 30 mg/dL Final     Comment:     Standardized to IDMS reference method     Glucose   Date Value Ref Range Status   04/28/2023 187 (H) 65 - 140 mg/dL Final     Comment:     If the patient is fasting, the ADA then defines impaired fasting glucose as > 100 mg/dL and diabetes as > or equal to 123 mg/dL  Specimen collection should occur prior to Sulfasalazine administration due to the potential for falsely depressed results  Specimen collection should occur prior to Sulfapyridine administration due to the potential for falsely elevated results  01/16/2023 181 (H) 65 - 140 mg/dL Final     Comment:     If the patient is fasting, the ADA then defines impaired fasting glucose as > 100 mg/dL and diabetes as > or equal to 123 mg/dL  Specimen collection should occur prior to Sulfasalazine administration due to the potential for falsely depressed results  Specimen collection should occur prior to Sulfapyridine administration due to the potential for falsely elevated results  12/05/2022 142 (H) 65 - 140 mg/dL Final     Comment:     If the patient is fasting, the ADA then defines impaired fasting glucose as > 100 mg/dL and diabetes as > or equal to 123 mg/dL  Specimen collection should occur prior to Sulfasalazine administration due to the potential for falsely depressed results  Specimen collection should occur prior to Sulfapyridine administration due to the potential for falsely elevated results       Calcium   Date Value Ref Range Status   04/28/2023 8 7 8 3 - 10 1 mg/dL Final   04/10/2023 8 9 8 3 - 10 1 mg/dL Final   02/27/2023 8 8 8 3 - 10 1 mg/dL Final     Albumin   Date Value Ref Range Status   04/28/2023 3 3 (L) 3 5 - 5 0 g/dL Final   04/10/2023 3 8 3 5 - 5 0 g/dL Final   02/27/2023 3 6 3 5 - 5 0 g/dL Final     Total Bilirubin   Date Value Ref Range Status   04/28/2023 0 64 0 20 - 1 00 mg/dL Final     Comment:     Use of this assay is not recommended for patients undergoing treatment with eltrombopag due to the potential for falsely elevated results  04/10/2023 1 39 (H) 0 20 - 1 00 mg/dL Final     Comment:     Use of this assay is not recommended for patients undergoing treatment with eltrombopag due to the potential for falsely elevated results  02/27/2023 1 14 (H) 0 20 - 1 00 mg/dL Final     Comment:     Use of this assay is not recommended for patients undergoing treatment with eltrombopag due to the potential for falsely elevated results  Alkaline Phosphatase   Date Value Ref Range Status   04/28/2023 83 46 - 116 U/L Final   04/10/2023 91 46 - 116 U/L Final   02/27/2023 70 46 - 116 U/L Final     AST   Date Value Ref Range Status   04/28/2023 26 5 - 45 U/L Final     Comment:     Specimen collection should occur prior to Sulfasalazine administration due to the potential for falsely depressed results  04/10/2023 17 5 - 45 U/L Final     Comment:     Specimen collection should occur prior to Sulfasalazine administration due to the potential for falsely depressed results  02/27/2023 15 5 - 45 U/L Final     Comment:     Specimen collection should occur prior to Sulfasalazine administration due to the potential for falsely depressed results  ALT   Date Value Ref Range Status   04/28/2023 69 12 - 78 U/L Final     Comment:     Specimen collection should occur prior to Sulfasalazine and/or Sulfapyridine administration due to the potential for falsely depressed results  04/10/2023 21 12 - 78 U/L Final     Comment:     Specimen collection should occur prior to Sulfasalazine and/or Sulfapyridine administration due to the potential for falsely depressed results      02/27/2023 19 12 - 78 U/L Final     Comment:     Specimen collection should occur prior to Sulfasalazine and/or Sulfapyridine administration due to the potential for falsely depressed results  LD   Date Value Ref Range Status   04/28/2023 135 81 - 234 U/L Final   04/10/2023 137 81 - 234 U/L Final   02/27/2023 151 81 - 234 U/L Final     No results found for: TSH  No results found for: [id number]   Free T4   Date Value Ref Range Status   04/28/2023 1 03 0 76 - 1 46 ng/dL Final     Comment:     Specimen collection should occur prior to Sulfasalazine administration due to the potential for falsely elevated results  04/10/2023 0 95 0 76 - 1 46 ng/dL Final     Comment:     Specimen collection should occur prior to Sulfasalazine administration due to the potential for falsely elevated results  02/27/2023 0 96 0 76 - 1 46 ng/dL Final     Comment:     Specimen collection should occur prior to Sulfasalazine administration due to the potential for falsely elevated results  RECENT IMAGING:  No results found  Assessment/Plan  Mr Yue Davis is a 79 yr male with stage IIIa melanoma on adjuvant treatment pembrolizumab here for continued monitoring, follow-up, and surveillance while on treatment  1  Malignant melanoma of skin of face ([address] )  2  Malignant melanoma of forehead ([address] )  3  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[address] )  He is doing well and tolerating treatment  He is stable shortness of breath predated treatment, we will continue to monitor  No abnormal heart rhythm on exam   Labs reviewed and okay  He will continue with treatment and it is okay to continue with pembrolizumab  He has standing orders for blood work prior to each treatment  He is due for full body imaging to assess disease status at this time  Orders placed for PET given his decreased kidney function in addition to the disease being on his forehead  He did recently have continued monitoring of pulmonary nodules that were identified and these seem to be stable to date  He knows to continue to monitor for immune mediated side effects and call with issues or concerns prior to his next visit       - NM pet ct tumor imaging whole body; Future    4  High risk medication use  He is on treatment with immunotherapy and we will continue to monitor for side effects and lab abnormalities  We will monitor labs with each treatment to ensure safety of continuing on treatment  He knows to watch for signs and symptoms for immune mediated side effects  Denies any immune mediated side effects at this time  Return in about 6 weeks (around 6/14/2023) for Office Visit, Infusion - See Treatment Plan, labs       Claudius Osler, MD, PhD

## 2023-05-03 NOTE — PROGRESS NOTES
[facility] HEMATOLOGY ONCOLOGY SPECIALISTS [address] [place] [address]  [phone]     Date of Visit: 5/3/2023  Name: Mami Matt   YOB: 1940        Subjective    VISIT DIAGNOSIS:  Diagnoses and all orders for this visit:    Malignant melanoma of skin of face ([address] )  -     NM pet ct tumor imaging whole body; Future    Malignant melanoma of forehead ([address] )  -     NM pet ct tumor imaging whole body; Future    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[address] )  -     NM pet ct tumor imaging whole body;  Future    High risk medication use        Oncology History   Malignant melanoma of skin of face ([address] )   9/8/2021 -  Cancer Staged    Staging form: Melanoma of the Skin, AJCC 8th Edition  - Clinical stage from 9/8/2021: Stage III (cT2a, cN1a, cM0) - Signed by Elaine Ko MD on 11/7/2021 9/8/2021 -  Cancer Staged    Staging form: Melanoma of the Skin, AJCC 8th Edition  - Pathologic stage from 9/8/2021: Stage IIIA (pT2a, pN1a, cM0) - Signed by Elaine Ko MD on 11/7/2021 9/22/2021 Initial Diagnosis    Malignant melanoma of skin of face ([address] )     4/4/2022 -  Chemotherapy    pembrolizumab (KEYTRUDA) IVPB, 400 mg, Intravenous, Once, 9 of 12 cycles  Administration: 400 mg (4/4/2022), 400 mg (5/16/2022), 400 mg (6/27/2022), 400 mg (8/8/2022), 400 mg (9/19/2022), 400 mg (10/31/2022), 400 mg (12/12/2022), 400 mg (1/23/2023), 400 mg (3/6/2023)     Malignant melanoma of forehead ([address] )   11/29/2021 Initial Diagnosis    Malignant melanoma of forehead ([address] )     4/4/2022 -  Chemotherapy    pembrolizumab (KEYTRUDA) IVPB, 400 mg, Intravenous, Once, 9 of 12 cycles  Administration: 400 mg (4/4/2022), 400 mg (5/16/2022), 400 mg (6/27/2022), 400 mg (8/8/2022), 400 mg (9/19/2022), 400 mg (10/31/2022), 400 mg (12/12/2022), 400 mg (1/23/2023), 400 mg (3/6/2023)        Cancer Staging   Malignant melanoma of skin of face ([facility] )  Staging form: Melanoma of the Skin, AJCC 8th Edition  - Clinical stage from 9/8/2021: Stage III (cT2a, cN1a, cM0) - Signed by Candido Styles MD on 11/7/2021  - Pathologic stage from 9/8/2021: Stage IIIA (pT2a, pN1a, cM0) - Signed by Candido Styles MD on 11/7/2021     Treatment Details   Treatment goal Curative   Plan Name OP Pembrolizumab Q 42 Days   Status Active   Start Date 4/4/2022   End Date 7/10/2023 (Planned)   Provider Candido Styles MD   Chemotherapy pembrolizumab [facility]) IVPB, 400 mg, Intravenous, Once, 9 of 12 cycles  Administration: 400 mg (4/4/2022), 400 mg (5/16/2022), 400 mg (6/27/2022), 400 mg (8/8/2022), 400 mg (9/19/2022), 400 mg (10/31/2022), 400 mg (12/12/2022), 400 mg (1/23/2023), 400 mg (3/6/2023)          HISTORY OF PRESENT ILLNESS: Gianna Garcai is a 79 y o  male  who has stage IIIa melanoma on treatment with adjuvant pembrolizumab here for continued follow-up, monitoring, and surveillance while on treatment  He is doing well and feels okay  He states that he has continued shortness of breath, which is stable for him, and proceeding starting treatment  He states it just takes a while to walk where he needs to go especially down the end of the driveway to get his mail from the [facility]  Otherwise doing okay  Eating well  Minimal fatigue  Denies any new, changing, concerning skin lesions  Denies any lymphadenopathy  Denies any headaches, double vision, rash, itching, chest pain, shortness of breath, and diarrhea  No muscle weakness  REVIEW OF SYSTEMS:  Review of Systems   Constitutional: Negative for appetite change, fatigue, fever and unexpected weight change  HENT:   Negative for lump/mass  Eyes: Negative for icterus  Respiratory: Positive for shortness of breath (stable, present prior to treatment)  Negative for cough and wheezing  Cardiovascular: Negative for leg swelling     Gastrointestinal: Negative for abdominal pain, constipation, diarrhea, nausea and vomiting  Genitourinary: Negative for difficulty urinating and hematuria  Musculoskeletal: Negative for arthralgias, gait problem and myalgias  Skin: Negative for itching and rash  No new, changing, or concerning lesions  Neurological: Negative for extremity weakness, gait problem, headaches, light-headedness and numbness  Hematological: Negative for adenopathy          MEDICATIONS:    Current Outpatient Medications:   •  atenolol (TENORMIN) 100 mg tablet, Take 1 tablet (100 mg total) by mouth daily, Disp: 90 tablet, Rfl: 3  •  glimepiride (AMARYL) 4 mg tablet, Take 1 tablet (4 mg total) by mouth daily, Disp: 90 tablet, Rfl: 3  •  lisinopril-hydrochlorothiazide (PRINZIDE,ZESTORETIC) 20-25 MG per tablet, Take 1 tablet by mouth daily, Disp: 90 tablet, Rfl: 3  •  lovastatin (MEVACOR) 40 MG tablet, Take 1 tablet (40 mg total) by mouth daily, Disp: 90 tablet, Rfl: 3  •  metFORMIN (GLUCOPHAGE) 1000 MG tablet, Take 1 tablet (1,000 mg total) by mouth 2 (two) times a day with meals, Disp: 180 tablet, Rfl: 3  •  Lidocaine Viscous HCl (XYLOCAINE) 2 % mucosal solution, Swish and swallow 15 mL 3 (three) times a day as needed for mouth pain or discomfort or mucositis (Patient not taking: Reported on 5/2/2023), Disp: 100 mL, Rfl: 0  •  meclizine (ANTIVERT) 12 5 MG tablet, Take 1 tablet (12 5 mg total) by mouth 3 (three) times a day as needed for dizziness for up to 5 days (Patient not taking: Reported on 2/1/2023), Disp: 15 tablet, Rfl: 0  •  methylPREDNISolone 4 MG tablet therapy pack, Use as directed on package (Patient not taking: Reported on 2/1/2023), Disp: 1 each, Rfl: 0     ALLERGIES:  No Known Allergies     ACTIVE PROBLEMS:  Patient Active Problem List   Diagnosis   • Essential hypertension   • Hiatal hernia with GERD   • Status post laparoscopic cholecystectomy   • Status post umbilical hernia repair, follow-up exam   • Type 2 diabetes mellitus without complication, without long-term current use of insulin ([facility] )   • Mixed hyperlipidemia   • Parkinson's disease ([facility] )   • Microalbuminuria   • Malignant melanoma of skin of face ([facility] )   • Thrombocytopenia (HCC)   • Malignant melanoma of forehead (HCC)   • 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)   • Stage 3 chronic kidney disease, unspecified whether stage 3a or 3b CKD ([facility] )   • Oral lesion   • Advanced care planning/counseling discussion   • COVID-19          PAST MEDICAL HISTORY:   Past Medical History:   Diagnosis Date   • Diabetes mellitus ([facility] )    • Hyperlipidemia    • Hypertension         PAST SURGICAL HISTORY:  Past Surgical History:   Procedure Laterality Date   • CATARACT EXTRACTION, BILATERAL     • FLAP LOCAL HEAD / NECK Left 10/12/2021    Procedure: RECONSTRUCTION OF LEFT TEMPLE DEFECT AFTER RESCECTION MELANOMA;  Surgeon: Govind Ortega MD;  [facility];  Service: Plastics   • FULL THICKNESS SKIN GRAFT Left 10/12/2021    Procedure: SKIN GRAFT FULL THICKNESS LEFT TEMPLE;  Surgeon: Govind Ortega MD;  [facility];  Service: Plastics   • GALLBLADDER SURGERY     • HAND SURGERY Left    • LYMPH NODE BIOPSY Left 10/12/2021    Procedure: BIOPSY LYMPH NODE SENTINEL, LYMPHOSCINTIGRAPHY, INTRAOPERATIVE LYMPHATIC MAPPING (INJECT AT 1200);   Surgeon: Kandace Burciaga MD;  [facility];  Service: Surgical Oncology   • NOSE SURGERY     • SKIN CANCER EXCISION  10/2021   • SKIN LESION EXCISION Left 10/12/2021    Procedure: FACE MELANOMA SCAR WIDE EXCISION  FACE;  Surgeon: Kandace Burciaga MD;  [facility];  Service: Surgical Oncology   • SPLIT THICKNESS SKIN GRAFT Left 10/12/2021    Procedure: SKIN GRAFT SPLIT THICKNESS LEFT TEMPLE;  Surgeon: Govind Ortega MD;  [facility];  Service: Plastics   • US GUIDED LYMPH NODE BIOPSY LEFT  03/21/2022        SOCIAL HISTORY:  Social History     Socioeconomic History   • Marital status: /Civil Union     Spouse name: None   • Number "of children: None   • Years of education: None   • Highest education level: None   Occupational History   • None   Tobacco Use   • Smoking status: Never   • Smokeless tobacco: Never   Vaping Use   • Vaping Use: Never used   Substance and Sexual Activity   • Alcohol use: Not Currently   • Drug use: Never   • Sexual activity: Not Currently   Other Topics Concern   • None   Social History Narrative   • None     Social Determinants of Health     Financial Resource Strain: Low Risk    • Difficulty of Paying Living Expenses: Not hard at all   Food Insecurity: Not on file   Transportation Needs: No Transportation Needs   • Lack of Transportation (Medical): No   • Lack of Transportation (Non-Medical): No   Physical Activity: Not on file   Stress: Not on file   Social Connections: Not on file   Intimate Partner Violence: Not on file   Housing Stability: Not on file        FAMILY HISTORY:  Family History   Problem Relation Age of Onset   • No Known Problems Mother    • No Known Problems Father    • Colon cancer Other 60           Objective    PHYSICAL EXAMINATION:   Blood pressure 120/80, pulse 62, temperature (!) 97 2 °F (36 2 °C), temperature source Temporal, resp  rate 16, height 5' 7\" (1 702 m), weight 73 9 kg (163 lb), SpO2 96 %  Pain Score: 0-No pain     ECOG Performance Status    Flowsheet Row Most Recent Value   ECOG Performance Status 1 - Restricted in physically strenuous activity but ambulatory and able to carry out work of a light or sedentary nature, e g , light house work, office work             Physical Exam  Constitutional:       General: He is not in acute distress  Appearance: Normal appearance  He is not toxic-appearing  HENT:      Mouth/Throat:      Mouth: Mucous membranes are moist       Pharynx: Oropharynx is clear  Eyes:      General: No scleral icterus  Conjunctiva/sclera: Conjunctivae normal    Cardiovascular:      Rate and Rhythm: Normal rate and regular rhythm        Pulses: Normal " pulses  Heart sounds: No murmur heard  No friction rub  No gallop  Pulmonary:      Effort: Pulmonary effort is normal  No respiratory distress  Breath sounds: Normal breath sounds  No wheezing or rales  Abdominal:      General: There is no distension  Palpations: There is no mass  Tenderness: There is no abdominal tenderness  There is no rebound  Musculoskeletal:         General: No swelling or tenderness  Normal range of motion  Cervical back: Normal range of motion  Right lower leg: No edema  Left lower leg: No edema  Lymphadenopathy:      Head:      Right side of head: No submandibular, preauricular or posterior auricular adenopathy  Left side of head: No submandibular, preauricular or posterior auricular adenopathy  Cervical: No cervical adenopathy  Right cervical: No superficial or posterior cervical adenopathy  Left cervical: No superficial or posterior cervical adenopathy  Upper Body:      Right upper body: No supraclavicular or axillary adenopathy  Left upper body: No supraclavicular or axillary adenopathy  Skin:     Coloration: Skin is not jaundiced  Findings: No rash  Comments: Well healed surgical scar  No evidence of recurrence at primary site  Neurological:      General: No focal deficit present  Mental Status: He is alert and oriented to person, place, and time  Psychiatric:         Mood and Affect: Mood normal          Behavior: Behavior normal          Thought Content: Thought content normal          Judgment: Judgment normal          I reviewed lab data in the chart      WBC   Date Value Ref Range Status   04/28/2023 7 03 4 31 - 10 16 Thousand/uL Final   04/10/2023 7 39 4 31 - 10 16 Thousand/uL Final   02/27/2023 8 05 4 31 - 10 16 Thousand/uL Final     Hemoglobin   Date Value Ref Range Status   04/28/2023 12 6 12 0 - 17 0 g/dL Final   04/10/2023 13 9 12 0 - 17 0 g/dL Final   02/27/2023 13 0 12 0 - 17 0 g/dL Final     Platelets   Date Value Ref Range Status   04/28/2023 172 149 - 390 Thousands/uL Final   04/10/2023 131 (L) 149 - 390 Thousands/uL Final   02/27/2023 137 (L) 149 - 390 Thousands/uL Final     MCV   Date Value Ref Range Status   04/28/2023 89 82 - 98 fL Final   04/10/2023 90 82 - 98 fL Final   02/27/2023 90 82 - 98 fL Final      Potassium   Date Value Ref Range Status   04/28/2023 4 6 3 5 - 5 3 mmol/L Final   04/10/2023 4 4 3 5 - 5 3 mmol/L Final   02/27/2023 4 0 3 5 - 5 3 mmol/L Final     Chloride   Date Value Ref Range Status   04/28/2023 110 (H) 96 - 108 mmol/L Final   04/10/2023 105 96 - 108 mmol/L Final   02/27/2023 107 96 - 108 mmol/L Final     CO2   Date Value Ref Range Status   04/28/2023 28 21 - 32 mmol/L Final   04/10/2023 30 21 - 32 mmol/L Final   02/27/2023 27 21 - 32 mmol/L Final     BUN   Date Value Ref Range Status   04/28/2023 29 (H) 5 - 25 mg/dL Final   04/10/2023 24 5 - 25 mg/dL Final   02/27/2023 29 (H) 5 - 25 mg/dL Final     Creatinine   Date Value Ref Range Status   04/28/2023 1 39 (H) 0 60 - 1 30 mg/dL Final     Comment:     Standardized to IDMS reference method   04/10/2023 1 43 (H) 0 60 - 1 30 mg/dL Final     Comment:     Standardized to IDMS reference method   02/27/2023 1 65 (H) 0 60 - 1 30 mg/dL Final     Comment:     Standardized to IDMS reference method     Glucose   Date Value Ref Range Status   04/28/2023 187 (H) 65 - 140 mg/dL Final     Comment:     If the patient is fasting, the ADA then defines impaired fasting glucose as > 100 mg/dL and diabetes as > or equal to 123 mg/dL  Specimen collection should occur prior to Sulfasalazine administration due to the potential for falsely depressed results  Specimen collection should occur prior to Sulfapyridine administration due to the potential for falsely elevated results     01/16/2023 181 (H) 65 - 140 mg/dL Final     Comment:     If the patient is fasting, the ADA then defines impaired fasting glucose as > 100 mg/dL and diabetes as > or equal to 123 mg/dL  Specimen collection should occur prior to Sulfasalazine administration due to the potential for falsely depressed results  Specimen collection should occur prior to Sulfapyridine administration due to the potential for falsely elevated results  12/05/2022 142 (H) 65 - 140 mg/dL Final     Comment:     If the patient is fasting, the ADA then defines impaired fasting glucose as > 100 mg/dL and diabetes as > or equal to 123 mg/dL  Specimen collection should occur prior to Sulfasalazine administration due to the potential for falsely depressed results  Specimen collection should occur prior to Sulfapyridine administration due to the potential for falsely elevated results  Calcium   Date Value Ref Range Status   04/28/2023 8 7 8 3 - 10 1 mg/dL Final   04/10/2023 8 9 8 3 - 10 1 mg/dL Final   02/27/2023 8 8 8 3 - 10 1 mg/dL Final     Albumin   Date Value Ref Range Status   04/28/2023 3 3 (L) 3 5 - 5 0 g/dL Final   04/10/2023 3 8 3 5 - 5 0 g/dL Final   02/27/2023 3 6 3 5 - 5 0 g/dL Final     Total Bilirubin   Date Value Ref Range Status   04/28/2023 0 64 0 20 - 1 00 mg/dL Final     Comment:     Use of this assay is not recommended for patients undergoing treatment with eltrombopag due to the potential for falsely elevated results  04/10/2023 1 39 (H) 0 20 - 1 00 mg/dL Final     Comment:     Use of this assay is not recommended for patients undergoing treatment with eltrombopag due to the potential for falsely elevated results  02/27/2023 1 14 (H) 0 20 - 1 00 mg/dL Final     Comment:     Use of this assay is not recommended for patients undergoing treatment with eltrombopag due to the potential for falsely elevated results       Alkaline Phosphatase   Date Value Ref Range Status   04/28/2023 83 46 - 116 U/L Final   04/10/2023 91 46 - 116 U/L Final   02/27/2023 70 46 - 116 U/L Final     AST   Date Value Ref Range Status   04/28/2023 26 5 - 45 U/L Final     Comment:     Specimen collection should occur prior to Sulfasalazine administration due to the potential for falsely depressed results  04/10/2023 17 5 - 45 U/L Final     Comment:     Specimen collection should occur prior to Sulfasalazine administration due to the potential for falsely depressed results  02/27/2023 15 5 - 45 U/L Final     Comment:     Specimen collection should occur prior to Sulfasalazine administration due to the potential for falsely depressed results  ALT   Date Value Ref Range Status   04/28/2023 69 12 - 78 U/L Final     Comment:     Specimen collection should occur prior to Sulfasalazine and/or Sulfapyridine administration due to the potential for falsely depressed results  04/10/2023 21 12 - 78 U/L Final     Comment:     Specimen collection should occur prior to Sulfasalazine and/or Sulfapyridine administration due to the potential for falsely depressed results  02/27/2023 19 12 - 78 U/L Final     Comment:     Specimen collection should occur prior to Sulfasalazine and/or Sulfapyridine administration due to the potential for falsely depressed results  LD   Date Value Ref Range Status   04/28/2023 135 81 - 234 U/L Final   04/10/2023 137 81 - 234 U/L Final   02/27/2023 151 81 - 234 U/L Final     No results found for: TSH  No results found for: [id number]   Free T4   Date Value Ref Range Status   04/28/2023 1 03 0 76 - 1 46 ng/dL Final     Comment:     Specimen collection should occur prior to Sulfasalazine administration due to the potential for falsely elevated results  04/10/2023 0 95 0 76 - 1 46 ng/dL Final     Comment:     Specimen collection should occur prior to Sulfasalazine administration due to the potential for falsely elevated results  02/27/2023 0 96 0 76 - 1 46 ng/dL Final     Comment:     Specimen collection should occur prior to Sulfasalazine administration due to the potential for falsely elevated results  RECENT IMAGING:  No results found  Assessment/Plan  Mr Mary Leavitt is a 79 yr male with stage IIIa melanoma on adjuvant treatment pembrolizumab here for continued monitoring, follow-up, and surveillance while on treatment  1  Malignant melanoma of skin of face ([address] )  2  Malignant melanoma of forehead ([address] )  3  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[address] )  He is doing well and tolerating treatment  He is stable shortness of breath predated treatment, we will continue to monitor  No abnormal heart rhythm on exam   Labs reviewed and okay  He will continue with treatment and it is okay to continue with pembrolizumab  He has standing orders for blood work prior to each treatment  He is due for full body imaging to assess disease status at this time  Orders placed for PET given his decreased kidney function in addition to the disease being on his forehead  He did recently have continued monitoring of pulmonary nodules that were identified and these seem to be stable to date  He knows to continue to monitor for immune mediated side effects and call with issues or concerns prior to his next visit       - NM pet ct tumor imaging whole body; Future    4  High risk medication use  He is on treatment with immunotherapy and we will continue to monitor for side effects and lab abnormalities  We will monitor labs with each treatment to ensure safety of continuing on treatment  He knows to watch for signs and symptoms for immune mediated side effects  Denies any immune mediated side effects at this time  Return in about 6 weeks (around 6/14/2023) for Office Visit, Infusion - See Treatment Plan, labs       Ok Dennis MD, PhD

## 2023-05-03 NOTE — LETTER
May 7, 2023     Pk Maldonado MD  [address] [place] [address]    Patient: Fabian Sexton   YOB: 1940   Date of Visit: 5/3/2023       Dear Dr Shaun Gan:    Thank you for referring Fabian Sexton to me for evaluation  Below are my notes for this consultation  If you have questions, please do not hesitate to call me  I look forward to following your patient along with you  Sincerely,        Gloria Kelly MD        CC: MD Misael Winter MD Magdaleno Buckler, MD Imelda Cowper, MD  5/3/2023  8:26 AM  Sign when Signing Visit  [address] [address]  [phone]     Date of Visit: 5/3/2023  Name: Fabian Sexton   YOB: 1940        Subjective    VISIT DIAGNOSIS:  Diagnoses and all orders for this visit:    Malignant melanoma of skin of face ([address] )  -     NM pet ct tumor imaging whole body; Future    Malignant melanoma of forehead ([address] )  -     NM pet ct tumor imaging whole body; Future    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[address] )  -     NM pet ct tumor imaging whole body;  Future    High risk medication use        Oncology History   Malignant melanoma of skin of face ([address] )   9/8/2021 -  Cancer Staged    Staging form: Melanoma of the Skin, AJCC 8th Edition  - Clinical stage from 9/8/2021: Stage III (cT2a, cN1a, cM0) - Signed by Gloria Kelly MD on 11/7/2021 9/8/2021 -  Cancer Staged    Staging form: Melanoma of the Skin, AJCC 8th Edition  - Pathologic stage from 9/8/2021: Stage IIIA (pT2a, pN1a, cM0) - Signed by Gloria Kelly MD on 11/7/2021 9/22/2021 Initial Diagnosis    Malignant melanoma of skin of face ([address] )     4/4/2022 -  Chemotherapy    pembrolizumab (KEYTRUDA) IVPB, 400 mg, Intravenous, Once, 9 of 12 cycles  Administration: 400 mg (4/4/2022), 400 mg (5/16/2022), 400 mg (6/27/2022), 400 mg (8/8/2022), 400 mg (9/19/2022), 400 mg (10/31/2022), 400 mg (12/12/2022), 400 mg (1/23/2023), 400 mg (3/6/2023)     Malignant melanoma of forehead ([facility] )   11/29/2021 Initial Diagnosis    Malignant melanoma of forehead ([facility] [facility] )     4/4/2022 -  Chemotherapy    pembrolizumab (KEYTRUDA) IVPB, 400 mg, Intravenous, Once, 9 of 12 cycles  Administration: 400 mg (4/4/2022), 400 mg (5/16/2022), 400 mg (6/27/2022), 400 mg (8/8/2022), 400 mg (9/19/2022), 400 mg (10/31/2022), 400 mg (12/12/2022), 400 mg (1/23/2023), 400 mg (3/6/2023)        Cancer Staging   Malignant melanoma of skin of face [facility])  Staging form: Melanoma of the Skin, AJCC 8th Edition  - Clinical stage from 9/8/2021: Stage III (cT2a, cN1a, cM0) - Signed by Alexa Cullen MD on 11/7/2021  - Pathologic stage from 9/8/2021: Stage IIIA (pT2a, pN1a, cM0) - Signed by Alexa Cullen MD on 11/7/2021     Treatment Details   Treatment goal Curative   Plan Name OP Pembrolizumab Q 42 Days   Status Active   Start Date 4/4/2022   End Date 7/10/2023 (Planned)   Provider Alexa Cullen MD   Chemotherapy pembrolizumab [facility]) IVPB, 400 mg, Intravenous, Once, 9 of 12 cycles  Administration: 400 mg (4/4/2022), 400 mg (5/16/2022), 400 mg (6/27/2022), 400 mg (8/8/2022), 400 mg (9/19/2022), 400 mg (10/31/2022), 400 mg (12/12/2022), 400 mg (1/23/2023), 400 mg (3/6/2023)          HISTORY OF PRESENT ILLNESS: Jovanna Carbone is a 79 y o  male  who has stage IIIa melanoma on treatment with adjuvant pembrolizumab here for continued follow-up, monitoring, and surveillance while on treatment  He is doing well and feels okay  He states that he has continued shortness of breath, which is stable for him, and proceeding starting treatment  He states it just takes a while to walk where he needs to go especially down the end of the driveway to get his mail [facility]  Otherwise doing okay  Eating well  Minimal fatigue      Denies any new, changing, concerning skin lesions  Denies any lymphadenopathy  Denies any headaches, double vision, rash, itching, chest pain, shortness of breath, and diarrhea  No muscle weakness  REVIEW OF SYSTEMS:  Review of Systems   Constitutional: Negative for appetite change, fatigue, fever and unexpected weight change  HENT:   Negative for lump/mass  Eyes: Negative for icterus  Respiratory: Positive for shortness of breath (stable, present prior to treatment)  Negative for cough and wheezing  Cardiovascular: Negative for leg swelling  Gastrointestinal: Negative for abdominal pain, constipation, diarrhea, nausea and vomiting  Genitourinary: Negative for difficulty urinating and hematuria  Musculoskeletal: Negative for arthralgias, gait problem and myalgias  Skin: Negative for itching and rash  No new, changing, or concerning lesions  Neurological: Negative for extremity weakness, gait problem, headaches, light-headedness and numbness  Hematological: Negative for adenopathy          MEDICATIONS:    Current Outpatient Medications:   •  atenolol (TENORMIN) 100 mg tablet, Take 1 tablet (100 mg total) by mouth daily, Disp: 90 tablet, Rfl: 3  •  glimepiride (AMARYL) 4 mg tablet, Take 1 tablet (4 mg total) by mouth daily, Disp: 90 tablet, Rfl: 3  •  lisinopril-hydrochlorothiazide (PRINZIDE,ZESTORETIC) 20-25 MG per tablet, Take 1 tablet by mouth daily, Disp: 90 tablet, Rfl: 3  •  lovastatin (MEVACOR) 40 MG tablet, Take 1 tablet (40 mg total) by mouth daily, Disp: 90 tablet, Rfl: 3  •  metFORMIN (GLUCOPHAGE) 1000 MG tablet, Take 1 tablet (1,000 mg total) by mouth 2 (two) times a day with meals, Disp: 180 tablet, Rfl: 3  •  Lidocaine Viscous HCl (XYLOCAINE) 2 % mucosal solution, Swish and swallow 15 mL 3 (three) times a day as needed for mouth pain or discomfort or mucositis (Patient not taking: Reported on 5/2/2023), Disp: 100 mL, Rfl: 0  •  meclizine (ANTIVERT) 12 5 MG tablet, Take 1 tablet (12 5 mg total) by mouth 3 (three) times a day as needed for dizziness for up to 5 days (Patient not taking: Reported on 2/1/2023), Disp: 15 tablet, Rfl: 0  •  methylPREDNISolone 4 MG tablet therapy pack, Use as directed on package (Patient not taking: Reported on 2/1/2023), Disp: 1 each, Rfl: 0     ALLERGIES:  No Known Allergies     ACTIVE PROBLEMS:  Patient Active Problem List   Diagnosis   • Essential hypertension   • Hiatal hernia with GERD   • Status post laparoscopic cholecystectomy   • Status post umbilical hernia repair, follow-up exam   • Type 2 diabetes mellitus without complication, without long-term current use of insulin (HCC)   • Mixed hyperlipidemia   • Parkinson's disease ([address] )   • Microalbuminuria   • Malignant melanoma of skin of face ([address] )   • Thrombocytopenia ([address] )   • Malignant melanoma of forehead ([address] )   • 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[address] )   • Stage 3 chronic kidney disease, unspecified whether stage 3a or 3b CKD ([address] )   • Oral lesion   • Advanced care planning/counseling discussion   • COVID-19          PAST MEDICAL HISTORY:   Past Medical History:   Diagnosis Date   • Diabetes mellitus ([address] )    • Hyperlipidemia    • Hypertension         PAST SURGICAL HISTORY:  Past Surgical History:   Procedure Laterality Date   • CATARACT EXTRACTION, BILATERAL     • FLAP LOCAL HEAD / NECK Left 10/12/2021    Procedure: RECONSTRUCTION OF LEFT TEMPLE DEFECT AFTER RESCECTION MELANOMA;  Surgeon: Benitez Stevens MD;  [facility];  Service: Plastics   • FULL THICKNESS SKIN GRAFT Left 10/12/2021    Procedure: SKIN GRAFT FULL THICKNESS LEFT TEMPLE;  Surgeon: Benitez Stevens MD;  [facility];  Service: Plastics   • GALLBLADDER SURGERY     • HAND SURGERY Left    • LYMPH NODE BIOPSY Left 10/12/2021    Procedure: BIOPSY LYMPH NODE SENTINEL, LYMPHOSCINTIGRAPHY, INTRAOPERATIVE LYMPHATIC MAPPING (INJECT AT 1200);   Surgeon: Eligio Monteiro MD;  [facility];  Service: "Surgical Oncology   • NOSE SURGERY     • SKIN CANCER EXCISION  10/2021   • SKIN LESION EXCISION Left 10/12/2021    Procedure: FACE MELANOMA SCAR WIDE EXCISION  FACE;  Surgeon: Perry Gilbert MD;  [facility];  Service: Surgical Oncology   • SPLIT THICKNESS SKIN GRAFT Left 10/12/2021    Procedure: SKIN GRAFT SPLIT THICKNESS LEFT TEMPLE;  Surgeon: Tom Oneill MD;  [facility];  Service: Plastics   • US GUIDED LYMPH NODE BIOPSY LEFT  03/21/2022        SOCIAL HISTORY:  Social History     Socioeconomic History   • Marital status: /Civil Union     Spouse name: None   • Number of children: None   • Years of education: None   • Highest education level: None   Occupational History   • None   Tobacco Use   • Smoking status: Never   • Smokeless tobacco: Never   Vaping Use   • Vaping Use: Never used   Substance and Sexual Activity   • Alcohol use: Not Currently   • Drug use: Never   • Sexual activity: Not Currently   Other Topics Concern   • None   Social History Narrative   • None     Social Determinants of Health     Financial Resource Strain: Low Risk    • Difficulty of Paying Living Expenses: Not hard at all   Food Insecurity: Not on file   Transportation Needs: No Transportation Needs   • Lack of Transportation (Medical): No   • Lack of Transportation (Non-Medical): No   Physical Activity: Not on file   Stress: Not on file   Social Connections: Not on file   Intimate Partner Violence: Not on file   Housing Stability: Not on file        FAMILY HISTORY:  Family History   Problem Relation Age of Onset   • No Known Problems Mother    • No Known Problems Father    • Colon cancer Other 60           Objective    PHYSICAL EXAMINATION:   Blood pressure 120/80, pulse 62, temperature (!) 97 2 °F (36 2 °C), temperature source Temporal, resp  rate 16, height 5' 7\" (1 702 m), weight 73 9 kg (163 lb), SpO2 96 %       Pain Score: 0-No pain     ECOG Performance Status    Flowsheet Row Most Recent Value   ECOG " Performance Status 1 - Restricted in physically strenuous activity but ambulatory and able to carry out work of a light or sedentary nature, e g , light house work, office work             Physical Exam  Constitutional:       General: He is not in acute distress  Appearance: Normal appearance  He is not toxic-appearing  HENT:      Mouth/Throat:      Mouth: Mucous membranes are moist       Pharynx: Oropharynx is clear  Eyes:      General: No scleral icterus  Conjunctiva/sclera: Conjunctivae normal    Cardiovascular:      Rate and Rhythm: Normal rate and regular rhythm  Pulses: Normal pulses  Heart sounds: No murmur heard  No friction rub  No gallop  Pulmonary:      Effort: Pulmonary effort is normal  No respiratory distress  Breath sounds: Normal breath sounds  No wheezing or rales  Abdominal:      General: There is no distension  Palpations: There is no mass  Tenderness: There is no abdominal tenderness  There is no rebound  Musculoskeletal:         General: No swelling or tenderness  Normal range of motion  Cervical back: Normal range of motion  Right lower leg: No edema  Left lower leg: No edema  Lymphadenopathy:      Head:      Right side of head: No submandibular, preauricular or posterior auricular adenopathy  Left side of head: No submandibular, preauricular or posterior auricular adenopathy  Cervical: No cervical adenopathy  Right cervical: No superficial or posterior cervical adenopathy  Left cervical: No superficial or posterior cervical adenopathy  Upper Body:      Right upper body: No supraclavicular or axillary adenopathy  Left upper body: No supraclavicular or axillary adenopathy  Skin:     Coloration: Skin is not jaundiced  Findings: No rash  Comments: Well healed surgical scar  No evidence of recurrence at primary site  Neurological:      General: No focal deficit present        Mental Status: He is alert and oriented to person, place, and time  Psychiatric:         Mood and Affect: Mood normal          Behavior: Behavior normal          Thought Content: Thought content normal          Judgment: Judgment normal          I reviewed lab data in the chart      WBC   Date Value Ref Range Status   04/28/2023 7 03 4 31 - 10 16 Thousand/uL Final   04/10/2023 7 39 4 31 - 10 16 Thousand/uL Final   02/27/2023 8 05 4 31 - 10 16 Thousand/uL Final     Hemoglobin   Date Value Ref Range Status   04/28/2023 12 6 12 0 - 17 0 g/dL Final   04/10/2023 13 9 12 0 - 17 0 g/dL Final   02/27/2023 13 0 12 0 - 17 0 g/dL Final     Platelets   Date Value Ref Range Status   04/28/2023 172 149 - 390 Thousands/uL Final   04/10/2023 131 (L) 149 - 390 Thousands/uL Final   02/27/2023 137 (L) 149 - 390 Thousands/uL Final     MCV   Date Value Ref Range Status   04/28/2023 89 82 - 98 fL Final   04/10/2023 90 82 - 98 fL Final   02/27/2023 90 82 - 98 fL Final      Potassium   Date Value Ref Range Status   04/28/2023 4 6 3 5 - 5 3 mmol/L Final   04/10/2023 4 4 3 5 - 5 3 mmol/L Final   02/27/2023 4 0 3 5 - 5 3 mmol/L Final     Chloride   Date Value Ref Range Status   04/28/2023 110 (H) 96 - 108 mmol/L Final   04/10/2023 105 96 - 108 mmol/L Final   02/27/2023 107 96 - 108 mmol/L Final     CO2   Date Value Ref Range Status   04/28/2023 28 21 - 32 mmol/L Final   04/10/2023 30 21 - 32 mmol/L Final   02/27/2023 27 21 - 32 mmol/L Final     BUN   Date Value Ref Range Status   04/28/2023 29 (H) 5 - 25 mg/dL Final   04/10/2023 24 5 - 25 mg/dL Final   02/27/2023 29 (H) 5 - 25 mg/dL Final     Creatinine   Date Value Ref Range Status   04/28/2023 1 39 (H) 0 60 - 1 30 mg/dL Final     Comment:     Standardized to IDMS reference method   04/10/2023 1 43 (H) 0 60 - 1 30 mg/dL Final     Comment:     Standardized to IDMS reference method   02/27/2023 1 65 (H) 0 60 - 1 30 mg/dL Final     Comment:     Standardized to IDMS reference method     Glucose   Date Value Ref Range Status   04/28/2023 187 (H) 65 - 140 mg/dL Final     Comment:     If the patient is fasting, the ADA then defines impaired fasting glucose as > 100 mg/dL and diabetes as > or equal to 123 mg/dL  Specimen collection should occur prior to Sulfasalazine administration due to the potential for falsely depressed results  Specimen collection should occur prior to Sulfapyridine administration due to the potential for falsely elevated results  01/16/2023 181 (H) 65 - 140 mg/dL Final     Comment:     If the patient is fasting, the ADA then defines impaired fasting glucose as > 100 mg/dL and diabetes as > or equal to 123 mg/dL  Specimen collection should occur prior to Sulfasalazine administration due to the potential for falsely depressed results  Specimen collection should occur prior to Sulfapyridine administration due to the potential for falsely elevated results  12/05/2022 142 (H) 65 - 140 mg/dL Final     Comment:     If the patient is fasting, the ADA then defines impaired fasting glucose as > 100 mg/dL and diabetes as > or equal to 123 mg/dL  Specimen collection should occur prior to Sulfasalazine administration due to the potential for falsely depressed results  Specimen collection should occur prior to Sulfapyridine administration due to the potential for falsely elevated results  Calcium   Date Value Ref Range Status   04/28/2023 8 7 8 3 - 10 1 mg/dL Final   04/10/2023 8 9 8 3 - 10 1 mg/dL Final   02/27/2023 8 8 8 3 - 10 1 mg/dL Final     Albumin   Date Value Ref Range Status   04/28/2023 3 3 (L) 3 5 - 5 0 g/dL Final   04/10/2023 3 8 3 5 - 5 0 g/dL Final   02/27/2023 3 6 3 5 - 5 0 g/dL Final     Total Bilirubin   Date Value Ref Range Status   04/28/2023 0 64 0 20 - 1 00 mg/dL Final     Comment:     Use of this assay is not recommended for patients undergoing treatment with eltrombopag due to the potential for falsely elevated results     04/10/2023 1 39 (H) 0 20 - 1 00 mg/dL Final     Comment: Use of this assay is not recommended for patients undergoing treatment with eltrombopag due to the potential for falsely elevated results  02/27/2023 1 14 (H) 0 20 - 1 00 mg/dL Final     Comment:     Use of this assay is not recommended for patients undergoing treatment with eltrombopag due to the potential for falsely elevated results  Alkaline Phosphatase   Date Value Ref Range Status   04/28/2023 83 46 - 116 U/L Final   04/10/2023 91 46 - 116 U/L Final   02/27/2023 70 46 - 116 U/L Final     AST   Date Value Ref Range Status   04/28/2023 26 5 - 45 U/L Final     Comment:     Specimen collection should occur prior to Sulfasalazine administration due to the potential for falsely depressed results  04/10/2023 17 5 - 45 U/L Final     Comment:     Specimen collection should occur prior to Sulfasalazine administration due to the potential for falsely depressed results  02/27/2023 15 5 - 45 U/L Final     Comment:     Specimen collection should occur prior to Sulfasalazine administration due to the potential for falsely depressed results  ALT   Date Value Ref Range Status   04/28/2023 69 12 - 78 U/L Final     Comment:     Specimen collection should occur prior to Sulfasalazine and/or Sulfapyridine administration due to the potential for falsely depressed results  04/10/2023 21 12 - 78 U/L Final     Comment:     Specimen collection should occur prior to Sulfasalazine and/or Sulfapyridine administration due to the potential for falsely depressed results  02/27/2023 19 12 - 78 U/L Final     Comment:     Specimen collection should occur prior to Sulfasalazine and/or Sulfapyridine administration due to the potential for falsely depressed results         LD   Date Value Ref Range Status   04/28/2023 135 81 - 234 U/L Final   04/10/2023 137 81 - 234 U/L Final   02/27/2023 151 81 - 234 U/L Final     No results found for: TSH  No results found for: [id number]   Free T4   Date Value Ref Range Status   04/28/2023 1  03 0 76 - 1 46 ng/dL Final     Comment:     Specimen collection should occur prior to Sulfasalazine administration due to the potential for falsely elevated results  04/10/2023 0 95 0 76 - 1 46 ng/dL Final     Comment:     Specimen collection should occur prior to Sulfasalazine administration due to the potential for falsely elevated results  02/27/2023 0 96 0 76 - 1 46 ng/dL Final     Comment:     Specimen collection should occur prior to Sulfasalazine administration due to the potential for falsely elevated results  RECENT IMAGING:  No results found  Assessment/Plan  Mr Kenny Hickey is a 79 yr male with stage IIIa melanoma on adjuvant treatment pembrolizumab here for continued monitoring, follow-up, and surveillance while on treatment  1  Malignant melanoma of skin of face ([facility] )  2  Malignant melanoma of forehead ([facility] )  3  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[facility] )  He is doing well and tolerating treatment  He is stable shortness of breath predated treatment, we will continue to monitor  No abnormal heart rhythm on exam   Labs reviewed and okay  He will continue with treatment and it is okay to continue with pembrolizumab  He has standing orders for blood work prior to each treatment  He is due for full body imaging to assess disease status at this time  Orders placed for PET given his decreased kidney function in addition to the disease being on his forehead  He did recently have continued monitoring of pulmonary nodules that were identified and these seem to be stable to date  He knows to continue to monitor for immune mediated side effects and call with issues or concerns prior to his next visit       - NM pet ct tumor imaging whole body; Future    4  High risk medication use  He is on treatment with immunotherapy and we will continue to monitor for side effects and lab abnormalities    We will monitor labs with each treatment to ensure safety of continuing on treatment  He knows to watch for signs and symptoms for immune mediated side effects  Denies any immune mediated side effects at this time  Return in about 6 weeks (around 6/14/2023) for Office Visit, Infusion - See Treatment Plan, labs       Nell Ahumada, MD, PhD

## 2023-05-03 NOTE — LETTER
May 3, 2023     Marshall Marte MD  [address] [place] [address]    Patient: Palma Foss   YOB: 1940   Date of Visit: 5/3/2023       Dear Dr Mario Jenkins:    Thank you for referring Palma Foss to me for evaluation  Below are my notes for this consultation  If you have questions, please do not hesitate to call me  I look forward to following your patient along with you           Sincerely,        Lodema Ormond, MD        CC: Vernetta Garden , MD Cloteal Bevels, MD Carrie Degree, MD Lodema Ormond, MD  5/3/2023  8:19 AM  Incomplete  [facility] [address] [id number] [phone]     Date of Visit: 5/3/2023  Name: Palma Foss   YOB: 1940     Subjective     Subjective    VISIT DIAGNOSIS:  Diagnoses and all orders for this visit:    Malignant melanoma of skin of face ([address] )    Malignant melanoma of forehead ([address] )    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[address] )    High risk medication use        Oncology History   Malignant melanoma of skin of face ([address] )   9/8/2021 -  Cancer Staged    Staging form: Melanoma of the Skin, AJCC 8th Edition  - Clinical stage from 9/8/2021: Stage III (cT2a, cN1a, cM0) - Signed by Lodema Ormond, MD on 11/7/2021 9/8/2021 -  Cancer Staged    Staging form: Melanoma of the Skin, AJCC 8th Edition  - Pathologic stage from 9/8/2021: Stage IIIA (pT2a, pN1a, cM0) - Signed by Lodema Ormond, MD on 11/7/2021 9/22/2021 Initial Diagnosis    Malignant melanoma of skin of face ([address] )     4/4/2022 -  Chemotherapy    pembrolizumab (KEYTRUDA) IVPB, 400 mg, Intravenous, Once, 9 of 12 cycles  Administration: 400 mg (4/4/2022), 400 mg (5/16/2022), 400 mg (6/27/2022), 400 mg (8/8/2022), 400 mg (9/19/2022), 400 mg (10/31/2022), 400 mg (12/12/2022), 400 mg (1/23/2023), 400 mg (3/6/2023)     Malignant melanoma of forehead ([facility] )   11/29/2021 Initial Diagnosis    Malignant melanoma of forehead ([facility] )     4/4/2022 -  Chemotherapy    pembrolizumab (KEYTRUDA) IVPB, 400 mg, Intravenous, Once, 9 of 12 cycles  Administration: 400 mg (4/4/2022), 400 mg (5/16/2022), 400 mg (6/27/2022), 400 mg (8/8/2022), 400 mg (9/19/2022), 400 mg (10/31/2022), 400 mg (12/12/2022), 400 mg (1/23/2023), 400 mg (3/6/2023)        Cancer Staging   Malignant melanoma of skin of face [facility])  Staging form: Melanoma of the Skin, AJCC 8th Edition  - Clinical stage from 9/8/2021: Stage III (cT2a, cN1a, cM0) - Signed by Patrick Deshpande MD on 11/7/2021  - Pathologic stage from 9/8/2021: Stage IIIA (pT2a, pN1a, cM0) - Signed by Patrick Deshpande MD on 11/7/2021     Treatment Details   Treatment goal Curative   Plan Name OP Pembrolizumab Q 42 Days   Status Active   Start Date 4/4/2022   End Date 7/10/2023 (Planned)   Provider Patrick Deshpande MD   Chemotherapy pembrolizumab [facility]) IVPB, 400 mg, Intravenous, Once, 9 of 12 cycles  Administration: 400 mg (4/4/2022), 400 mg (5/16/2022), 400 mg (6/27/2022), 400 mg (8/8/2022), 400 mg (9/19/2022), 400 mg (10/31/2022), 400 mg (12/12/2022), 400 mg (1/23/2023), 400 mg (3/6/2023)          HISTORY OF PRESENT ILLNESS: Dominik Hernández is a 79 y o  male  who ***      INTERIM HISTORY: ***    REVIEW OF SYSTEMS:  Review of Systems - Oncology     MEDICATIONS:    Current Outpatient Medications:   •  atenolol (TENORMIN) 100 mg tablet, Take 1 tablet (100 mg total) by mouth daily, Disp: 90 tablet, Rfl: 3  •  glimepiride (AMARYL) 4 mg tablet, Take 1 tablet (4 mg total) by mouth daily, Disp: 90 tablet, Rfl: 3  •  lisinopril-hydrochlorothiazide (PRINZIDE,ZESTORETIC) 20-25 MG per tablet, Take 1 tablet by mouth daily, Disp: 90 tablet, Rfl: 3  •  lovastatin (MEVACOR) 40 MG tablet, Take 1 tablet (40 mg total) by mouth daily, Disp: 90 tablet, Rfl: 3  •  metFORMIN (GLUCOPHAGE) 1000 MG tablet, Take 1 tablet (1,000 mg total) by mouth 2 (two) times a day with meals, Disp: 180 tablet, Rfl: 3  •  Lidocaine Viscous HCl (XYLOCAINE) 2 % mucosal solution, Swish and swallow 15 mL 3 (three) times a day as needed for mouth pain or discomfort or mucositis (Patient not taking: Reported on 5/2/2023), Disp: 100 mL, Rfl: 0  •  meclizine (ANTIVERT) 12 5 MG tablet, Take 1 tablet (12 5 mg total) by mouth 3 (three) times a day as needed for dizziness for up to 5 days (Patient not taking: Reported on 2/1/2023), Disp: 15 tablet, Rfl: 0  •  methylPREDNISolone 4 MG tablet therapy pack, Use as directed on package (Patient not taking: Reported on 2/1/2023), Disp: 1 each, Rfl: 0     ALLERGIES:  No Known Allergies     ACTIVE PROBLEMS:  Patient Active Problem List   Diagnosis   • Essential hypertension   • Hiatal hernia with GERD   • Status post laparoscopic cholecystectomy   • Status post umbilical hernia repair, follow-up exam   • Type 2 diabetes mellitus without complication, without long-term current use of insulin (HCC)   • Mixed hyperlipidemia   • Parkinson's disease ([address] )   • Microalbuminuria   • Malignant melanoma of skin of face ([address] )   • Thrombocytopenia ([address] )   • Malignant melanoma of forehead ([address] )   • 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[address] )   • Stage 3 chronic kidney disease, unspecified whether stage 3a or 3b CKD ([address] )   • Oral lesion   • Advanced care planning/counseling discussion   • COVID-19          PAST MEDICAL HISTORY:   Past Medical History:   Diagnosis Date   • Diabetes mellitus ([address] )    • Hyperlipidemia    • Hypertension         PAST SURGICAL HISTORY:  Past Surgical History:   Procedure Laterality Date   • CATARACT EXTRACTION, BILATERAL     • FLAP LOCAL HEAD / NECK Left 10/12/2021    Procedure: RECONSTRUCTION OF LEFT TEMPLE DEFECT AFTER RESCECTION MELANOMA;  Surgeon: Leroy Arcos MD;  [facility];  Service: Plastics   • FULL THICKNESS SKIN GRAFT Left 10/12/2021    Procedure: SKIN GRAFT FULL THICKNESS LEFT TEMPLE;  Surgeon: Jyoti Naranjo MD;  [facility];  Service: Plastics   • GALLBLADDER SURGERY     • HAND SURGERY Left    • LYMPH NODE BIOPSY Left 10/12/2021    Procedure: BIOPSY LYMPH NODE SENTINEL, LYMPHOSCINTIGRAPHY, INTRAOPERATIVE LYMPHATIC MAPPING (INJECT AT 1200); Surgeon: Neelima Bruno MD;  [facility];  Service: Surgical Oncology   • NOSE SURGERY     • SKIN CANCER EXCISION  10/2021   • SKIN LESION EXCISION Left 10/12/2021    Procedure: [address];  Surgeon: Neelima Bruno MD;  [facility];  Service: Surgical Oncology   • SPLIT THICKNESS SKIN GRAFT Left 10/12/2021    Procedure: SKIN GRAFT SPLIT THICKNESS LEFT TEMPLE;  Surgeon: Jyoti Naranjo MD;  [facility];  Service: Plastics   • US GUIDED LYMPH NODE BIOPSY LEFT  03/21/2022        SOCIAL HISTORY:  Social History     Socioeconomic History   • Marital status: /Civil Union     Spouse name: None   • Number of children: None   • Years of education: None   • Highest education level: None   Occupational History   • None   Tobacco Use   • Smoking status: Never   • Smokeless tobacco: Never   Vaping Use   • Vaping Use: Never used   Substance and Sexual Activity   • Alcohol use: Not Currently   • Drug use: Never   • Sexual activity: Not Currently   Other Topics Concern   • None   Social History Narrative   • None     Social Determinants of Health     Financial Resource Strain: Low Risk    • Difficulty of Paying Living Expenses: Not hard at all   Food Insecurity: Not on file   Transportation Needs: No Transportation Needs   • Lack of Transportation (Medical): No   • Lack of Transportation (Non-Medical):  No   Physical Activity: Not on file   Stress: Not on file   Social Connections: Not on file   Intimate Partner Violence: Not on file   Housing Stability: Not on file        FAMILY HISTORY:  Family History   Problem Relation Age of Onset   • No Known Problems Mother    • No "Known Problems Father    • Colon cancer Other 60        Objective     Objective    PHYSICAL EXAMINATION:   Blood pressure 120/80, pulse 62, temperature (!) 97 2 °F (36 2 °C), temperature source Temporal, resp  rate 16, height 5' 7\" (1 702 m), weight 73 9 kg (163 lb), SpO2 96 %  Pain Score: 0-No pain      Physical Exam    I reviewed lab data in the chart      WBC   Date Value Ref Range Status   04/28/2023 7 03 4 31 - 10 16 Thousand/uL Final   04/10/2023 7 39 4 31 - 10 16 Thousand/uL Final   02/27/2023 8 05 4 31 - 10 16 Thousand/uL Final     Hemoglobin   Date Value Ref Range Status   04/28/2023 12 6 12 0 - 17 0 g/dL Final   04/10/2023 13 9 12 0 - 17 0 g/dL Final   02/27/2023 13 0 12 0 - 17 0 g/dL Final     Platelets   Date Value Ref Range Status   04/28/2023 172 149 - 390 Thousands/uL Final   04/10/2023 131 (L) 149 - 390 Thousands/uL Final   02/27/2023 137 (L) 149 - 390 Thousands/uL Final     MCV   Date Value Ref Range Status   04/28/2023 89 82 - 98 fL Final   04/10/2023 90 82 - 98 fL Final   02/27/2023 90 82 - 98 fL Final      Potassium   Date Value Ref Range Status   04/28/2023 4 6 3 5 - 5 3 mmol/L Final   04/10/2023 4 4 3 5 - 5 3 mmol/L Final   02/27/2023 4 0 3 5 - 5 3 mmol/L Final     Chloride   Date Value Ref Range Status   04/28/2023 110 (H) 96 - 108 mmol/L Final   04/10/2023 105 96 - 108 mmol/L Final   02/27/2023 107 96 - 108 mmol/L Final     CO2   Date Value Ref Range Status   04/28/2023 28 21 - 32 mmol/L Final   04/10/2023 30 21 - 32 mmol/L Final   02/27/2023 27 21 - 32 mmol/L Final     BUN   Date Value Ref Range Status   04/28/2023 29 (H) 5 - 25 mg/dL Final   04/10/2023 24 5 - 25 mg/dL Final   02/27/2023 29 (H) 5 - 25 mg/dL Final     Creatinine   Date Value Ref Range Status   04/28/2023 1 39 (H) 0 60 - 1 30 mg/dL Final     Comment:     Standardized to IDMS reference method   04/10/2023 1 43 (H) 0 60 - 1 30 mg/dL Final     Comment:     Standardized to IDMS reference method   02/27/2023 1 65 (H) 0 60 - 1 30 " mg/dL Final     Comment:     Standardized to IDMS reference method     Glucose   Date Value Ref Range Status   04/28/2023 187 (H) 65 - 140 mg/dL Final     Comment:     If the patient is fasting, the ADA then defines impaired fasting glucose as > 100 mg/dL and diabetes as > or equal to 123 mg/dL  Specimen collection should occur prior to Sulfasalazine administration due to the potential for falsely depressed results  Specimen collection should occur prior to Sulfapyridine administration due to the potential for falsely elevated results  01/16/2023 181 (H) 65 - 140 mg/dL Final     Comment:     If the patient is fasting, the ADA then defines impaired fasting glucose as > 100 mg/dL and diabetes as > or equal to 123 mg/dL  Specimen collection should occur prior to Sulfasalazine administration due to the potential for falsely depressed results  Specimen collection should occur prior to Sulfapyridine administration due to the potential for falsely elevated results  12/05/2022 142 (H) 65 - 140 mg/dL Final     Comment:     If the patient is fasting, the ADA then defines impaired fasting glucose as > 100 mg/dL and diabetes as > or equal to 123 mg/dL  Specimen collection should occur prior to Sulfasalazine administration due to the potential for falsely depressed results  Specimen collection should occur prior to Sulfapyridine administration due to the potential for falsely elevated results       Calcium   Date Value Ref Range Status   04/28/2023 8 7 8 3 - 10 1 mg/dL Final   04/10/2023 8 9 8 3 - 10 1 mg/dL Final   02/27/2023 8 8 8 3 - 10 1 mg/dL Final     Albumin   Date Value Ref Range Status   04/28/2023 3 3 (L) 3 5 - 5 0 g/dL Final   04/10/2023 3 8 3 5 - 5 0 g/dL Final   02/27/2023 3 6 3 5 - 5 0 g/dL Final     Total Bilirubin   Date Value Ref Range Status   04/28/2023 0 64 0 20 - 1 00 mg/dL Final     Comment:     Use of this assay is not recommended for patients undergoing treatment with eltrombopag due to the potential for falsely elevated results  04/10/2023 1 39 (H) 0 20 - 1 00 mg/dL Final     Comment:     Use of this assay is not recommended for patients undergoing treatment with eltrombopag due to the potential for falsely elevated results  02/27/2023 1 14 (H) 0 20 - 1 00 mg/dL Final     Comment:     Use of this assay is not recommended for patients undergoing treatment with eltrombopag due to the potential for falsely elevated results  Alkaline Phosphatase   Date Value Ref Range Status   04/28/2023 83 46 - 116 U/L Final   04/10/2023 91 46 - 116 U/L Final   02/27/2023 70 46 - 116 U/L Final     AST   Date Value Ref Range Status   04/28/2023 26 5 - 45 U/L Final     Comment:     Specimen collection should occur prior to Sulfasalazine administration due to the potential for falsely depressed results  04/10/2023 17 5 - 45 U/L Final     Comment:     Specimen collection should occur prior to Sulfasalazine administration due to the potential for falsely depressed results  02/27/2023 15 5 - 45 U/L Final     Comment:     Specimen collection should occur prior to Sulfasalazine administration due to the potential for falsely depressed results  ALT   Date Value Ref Range Status   04/28/2023 69 12 - 78 U/L Final     Comment:     Specimen collection should occur prior to Sulfasalazine and/or Sulfapyridine administration due to the potential for falsely depressed results  04/10/2023 21 12 - 78 U/L Final     Comment:     Specimen collection should occur prior to Sulfasalazine and/or Sulfapyridine administration due to the potential for falsely depressed results  02/27/2023 19 12 - 78 U/L Final     Comment:     Specimen collection should occur prior to Sulfasalazine and/or Sulfapyridine administration due to the potential for falsely depressed results         LD   Date Value Ref Range Status   04/28/2023 135 81 - 234 U/L Final   04/10/2023 137 81 - 234 U/L Final   02/27/2023 151 81 - 234 U/L Final     No results found for: TSH  No results found for: [id number]   Free T4   Date Value Ref Range Status   04/28/2023 1 03 0 76 - 1 46 ng/dL Final     Comment:     Specimen collection should occur prior to Sulfasalazine administration due to the potential for falsely elevated results  04/10/2023 0 95 0 76 - 1 46 ng/dL Final     Comment:     Specimen collection should occur prior to Sulfasalazine administration due to the potential for falsely elevated results  02/27/2023 0 96 0 76 - 1 46 ng/dL Final     Comment:     Specimen collection should occur prior to Sulfasalazine administration due to the potential for falsely elevated results  RECENT IMAGING:  No results found  Assessment     Assessment/Plan  No problem-specific Assessment & Plan notes found for this encounter  Return in about 6 weeks (around 6/14/2023) for Office Visit, Infusion - See Treatment Plan, labs  Enrique Osler, MD  5/3/2023  8:26 AM  Sign when Signing Visit  [address] [place] [address] 58  [phone]  [phone]     Date of Visit: 5/3/2023  Name: Byron Tilley   YOB: 1940        Subjective    VISIT DIAGNOSIS:  Diagnoses and all orders for this visit:    Malignant melanoma of skin of face ([address] )  -     NM pet ct tumor imaging whole body; Future    Malignant melanoma of forehead ([address] )  -     NM pet ct tumor imaging whole body; Future    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[address] )  -     NM pet ct tumor imaging whole body;  Future    High risk medication use        Oncology History   Malignant melanoma of skin of face ([address] )   9/8/2021 -  Cancer Staged    Staging form: Melanoma of the Skin, AJCC 8th Edition  - Clinical stage from 9/8/2021: Stage III (cT2a, cN1a, cM0) - Signed by Enrique Osler, MD on 11/7/2021 9/8/2021 -  Cancer Staged    Staging form: Melanoma of the Skin, AJCC 8th Edition  - Pathologic stage from 9/8/2021: Stage IIIA (pT2a, pN1a, cM0) - Signed by Peace Benjamin MD on 11/7/2021 9/22/2021 Initial Diagnosis    Malignant melanoma of skin of face ([facility] )     4/4/2022 -  Chemotherapy    pembrolizumab (KEYTRUDA) IVPB, 400 mg, Intravenous, Once, 9 of 12 cycles  Administration: 400 mg (4/4/2022), 400 mg (5/16/2022), 400 mg (6/27/2022), 400 mg (8/8/2022), 400 mg (9/19/2022), 400 mg (10/31/2022), 400 mg (12/12/2022), 400 mg (1/23/2023), 400 mg (3/6/2023)     Malignant melanoma of forehead ([facility] )   11/29/2021 Initial Diagnosis    Malignant melanoma of forehead ([facility] )     4/4/2022 -  Chemotherapy    pembrolizumab (KEYTRUDA) IVPB, 400 mg, Intravenous, Once, 9 of 12 cycles  Administration: 400 mg (4/4/2022), 400 mg (5/16/2022), 400 mg (6/27/2022), 400 mg (8/8/2022), 400 mg (9/19/2022), 400 mg (10/31/2022), 400 mg (12/12/2022), 400 mg (1/23/2023), 400 mg (3/6/2023)        Cancer Staging   Malignant melanoma of skin of face [facility])  Staging form: Melanoma of the Skin, AJCC 8th Edition  - Clinical stage from 9/8/2021: Stage III (cT2a, cN1a, cM0) - Signed by Peace Benjamin MD on 11/7/2021  - Pathologic stage from 9/8/2021: Stage IIIA (pT2a, pN1a, cM0) - Signed by Peace Benjamin MD on 11/7/2021     Treatment Details   Treatment goal Curative   Plan Name OP Pembrolizumab Q 42 Days   Status Active   Start Date 4/4/2022   End Date 7/10/2023 (Planned)   Provider Peace Benjamin MD   Chemotherapy pembrolizumab [facility]) IVPB, 400 mg, Intravenous, Once, 9 of 12 cycles  Administration: 400 mg (4/4/2022), 400 mg (5/16/2022), 400 mg (6/27/2022), 400 mg (8/8/2022), 400 mg (9/19/2022), 400 mg (10/31/2022), 400 mg (12/12/2022), 400 mg (1/23/2023), 400 mg (3/6/2023)          HISTORY OF PRESENT ILLNESS: Kavin Gee is a 79 y o  male  who has stage IIIa melanoma on treatment with adjuvant pembrolizumab here for continued follow-up, monitoring, and surveillance while on treatment  He is doing well and feels okay  He states that he has continued shortness of breath, which is stable for him, and proceeding starting treatment  He states it just takes a while to walk where he needs to go especially down the end of the driveway to get his mail from the [facility]  Otherwise doing okay  Eating well  Minimal fatigue  Denies any new, changing, concerning skin lesions  Denies any lymphadenopathy  Denies any headaches, double vision, rash, itching, chest pain, shortness of breath, and diarrhea  No muscle weakness  REVIEW OF SYSTEMS:  Review of Systems   Constitutional: Negative for appetite change, fatigue, fever and unexpected weight change  HENT:   Negative for lump/mass  Eyes: Negative for icterus  Respiratory: Positive for shortness of breath (stable, present prior to treatment)  Negative for cough and wheezing  Cardiovascular: Negative for leg swelling  Gastrointestinal: Negative for abdominal pain, constipation, diarrhea, nausea and vomiting  Genitourinary: Negative for difficulty urinating and hematuria  Musculoskeletal: Negative for arthralgias, gait problem and myalgias  Skin: Negative for itching and rash  No new, changing, or concerning lesions  Neurological: Negative for extremity weakness, gait problem, headaches, light-headedness and numbness  Hematological: Negative for adenopathy          MEDICATIONS:    Current Outpatient Medications:   •  atenolol (TENORMIN) 100 mg tablet, Take 1 tablet (100 mg total) by mouth daily, Disp: 90 tablet, Rfl: 3  •  glimepiride (AMARYL) 4 mg tablet, Take 1 tablet (4 mg total) by mouth daily, Disp: 90 tablet, Rfl: 3  •  lisinopril-hydrochlorothiazide (PRINZIDE,ZESTORETIC) 20-25 MG per tablet, Take 1 tablet by mouth daily, Disp: 90 tablet, Rfl: 3  •  lovastatin (MEVACOR) 40 MG tablet, Take 1 tablet (40 mg total) by mouth daily, Disp: 90 tablet, Rfl: 3  •  metFORMIN (GLUCOPHAGE) 1000 MG tablet, Take 1 tablet (1,000 mg total) by mouth 2 (two) times a day with meals, Disp: 180 tablet, Rfl: 3  •  Lidocaine Viscous HCl (XYLOCAINE) 2 % mucosal solution, Swish and swallow 15 mL 3 (three) times a day as needed for mouth pain or discomfort or mucositis (Patient not taking: Reported on 5/2/2023), Disp: 100 mL, Rfl: 0  •  meclizine (ANTIVERT) 12 5 MG tablet, Take 1 tablet (12 5 mg total) by mouth 3 (three) times a day as needed for dizziness for up to 5 days (Patient not taking: Reported on 2/1/2023), Disp: 15 tablet, Rfl: 0  •  methylPREDNISolone 4 MG tablet therapy pack, Use as directed on package (Patient not taking: Reported on 2/1/2023), Disp: 1 each, Rfl: 0     ALLERGIES:  No Known Allergies     ACTIVE PROBLEMS:  Patient Active Problem List   Diagnosis   • Essential hypertension   • Hiatal hernia with GERD   • Status post laparoscopic cholecystectomy   • Status post umbilical hernia repair, follow-up exam   • Type 2 diabetes mellitus without complication, without long-term current use of insulin (HCC)   • Mixed hyperlipidemia   • Parkinson's disease ([address] )   • Microalbuminuria   • Malignant melanoma of skin of face ([address] )   • Thrombocytopenia ([address] )   • Malignant melanoma of forehead ([address] )   • 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[address] )   • Stage 3 chronic kidney disease, unspecified whether stage 3a or 3b CKD ([address] )   • Oral lesion   • Advanced care planning/counseling discussion   • COVID-19          PAST MEDICAL HISTORY:   Past Medical History:   Diagnosis Date   • Diabetes mellitus ([address] )    • Hyperlipidemia    • Hypertension         PAST SURGICAL HISTORY:  Past Surgical History:   Procedure Laterality Date   • CATARACT EXTRACTION, BILATERAL     • FLAP LOCAL HEAD / NECK Left 10/12/2021    Procedure: RECONSTRUCTION OF LEFT TEMPLE DEFECT AFTER RESCECTION MELANOMA;  Surgeon: Brad Major MD;  [facility];  Service: Plastics   • FULL THICKNESS SKIN GRAFT Left 10/12/2021    Procedure: SKIN GRAFT FULL THICKNESS LEFT TEMPLE;  Surgeon: Oxana Bell MD;  [facility];  Service: Plastics   • GALLBLADDER SURGERY     • HAND SURGERY Left    • LYMPH NODE BIOPSY Left 10/12/2021    Procedure: BIOPSY LYMPH NODE SENTINEL, LYMPHOSCINTIGRAPHY, INTRAOPERATIVE LYMPHATIC MAPPING (INJECT AT 1200); Surgeon: Sy Ramires MD;  [facility];  Service: Surgical Oncology   • NOSE SURGERY     • SKIN CANCER EXCISION  10/2021   • SKIN LESION EXCISION Left 10/12/2021    Procedure: [address];  Surgeon: Sy Ramires MD;  [facility];  Service: Surgical Oncology   • SPLIT THICKNESS SKIN GRAFT Left 10/12/2021    Procedure: SKIN GRAFT SPLIT THICKNESS LEFT TEMPLE;  Surgeon: Oxana Bell MD;  [facility];  Service: Plastics   • US GUIDED LYMPH NODE BIOPSY LEFT  03/21/2022        SOCIAL HISTORY:  Social History     Socioeconomic History   • Marital status: /Civil Union     Spouse name: None   • Number of children: None   • Years of education: None   • Highest education level: None   Occupational History   • None   Tobacco Use   • Smoking status: Never   • Smokeless tobacco: Never   Vaping Use   • Vaping Use: Never used   Substance and Sexual Activity   • Alcohol use: Not Currently   • Drug use: Never   • Sexual activity: Not Currently   Other Topics Concern   • None   Social History Narrative   • None     Social Determinants of Health     Financial Resource Strain: Low Risk    • Difficulty of Paying Living Expenses: Not hard at all   Food Insecurity: Not on file   Transportation Needs: No Transportation Needs   • Lack of Transportation (Medical): No   • Lack of Transportation (Non-Medical):  No   Physical Activity: Not on file   Stress: Not on file   Social Connections: Not on file   Intimate Partner Violence: Not on file   Housing Stability: Not on file        FAMILY HISTORY:  Family History   Problem Relation Age of Onset   • No Known Problems Mother    • No Known "Problems Father    • Colon cancer Other 60           Objective    PHYSICAL EXAMINATION:   Blood pressure 120/80, pulse 62, temperature (!) 97 2 °F (36 2 °C), temperature source Temporal, resp  rate 16, height 5' 7\" (1 702 m), weight 73 9 kg (163 lb), SpO2 96 %  Pain Score: 0-No pain     ECOG Performance Status    Flowsheet Row Most Recent Value   ECOG Performance Status 1 - Restricted in physically strenuous activity but ambulatory and able to carry out work of a light or sedentary nature, e g , light house work, office work             Physical Exam  Constitutional:       General: He is not in acute distress  Appearance: Normal appearance  He is not toxic-appearing  HENT:      Mouth/Throat:      Mouth: Mucous membranes are moist       Pharynx: Oropharynx is clear  Eyes:      General: No scleral icterus  Conjunctiva/sclera: Conjunctivae normal    Cardiovascular:      Rate and Rhythm: Normal rate and regular rhythm  Pulses: Normal pulses  Heart sounds: No murmur heard  No friction rub  No gallop  Pulmonary:      Effort: Pulmonary effort is normal  No respiratory distress  Breath sounds: Normal breath sounds  No wheezing or rales  Abdominal:      General: There is no distension  Palpations: There is no mass  Tenderness: There is no abdominal tenderness  There is no rebound  Musculoskeletal:         General: No swelling or tenderness  Normal range of motion  Cervical back: Normal range of motion  Right lower leg: No edema  Left lower leg: No edema  Lymphadenopathy:      Head:      Right side of head: No submandibular, preauricular or posterior auricular adenopathy  Left side of head: No submandibular, preauricular or posterior auricular adenopathy  Cervical: No cervical adenopathy  Right cervical: No superficial or posterior cervical adenopathy  Left cervical: No superficial or posterior cervical adenopathy        Upper Body:      " Right upper body: No supraclavicular or axillary adenopathy  Left upper body: No supraclavicular or axillary adenopathy  Skin:     Coloration: Skin is not jaundiced  Findings: No rash  Comments: Well healed surgical scar  No evidence of recurrence at primary site  Neurological:      General: No focal deficit present  Mental Status: He is alert and oriented to person, place, and time  Psychiatric:         Mood and Affect: Mood normal          Behavior: Behavior normal          Thought Content: Thought content normal          Judgment: Judgment normal          I reviewed lab data in the chart      WBC   Date Value Ref Range Status   04/28/2023 7 03 4 31 - 10 16 Thousand/uL Final   04/10/2023 7 39 4 31 - 10 16 Thousand/uL Final   02/27/2023 8 05 4 31 - 10 16 Thousand/uL Final     Hemoglobin   Date Value Ref Range Status   04/28/2023 12 6 12 0 - 17 0 g/dL Final   04/10/2023 13 9 12 0 - 17 0 g/dL Final   02/27/2023 13 0 12 0 - 17 0 g/dL Final     Platelets   Date Value Ref Range Status   04/28/2023 172 149 - 390 Thousands/uL Final   04/10/2023 131 (L) 149 - 390 Thousands/uL Final   02/27/2023 137 (L) 149 - 390 Thousands/uL Final     MCV   Date Value Ref Range Status   04/28/2023 89 82 - 98 fL Final   04/10/2023 90 82 - 98 fL Final   02/27/2023 90 82 - 98 fL Final      Potassium   Date Value Ref Range Status   04/28/2023 4 6 3 5 - 5 3 mmol/L Final   04/10/2023 4 4 3 5 - 5 3 mmol/L Final   02/27/2023 4 0 3 5 - 5 3 mmol/L Final     Chloride   Date Value Ref Range Status   04/28/2023 110 (H) 96 - 108 mmol/L Final   04/10/2023 105 96 - 108 mmol/L Final   02/27/2023 107 96 - 108 mmol/L Final     CO2   Date Value Ref Range Status   04/28/2023 28 21 - 32 mmol/L Final   04/10/2023 30 21 - 32 mmol/L Final   02/27/2023 27 21 - 32 mmol/L Final     BUN   Date Value Ref Range Status   04/28/2023 29 (H) 5 - 25 mg/dL Final   04/10/2023 24 5 - 25 mg/dL Final   02/27/2023 29 (H) 5 - 25 mg/dL Final     Creatinine Date Value Ref Range Status   04/28/2023 1 39 (H) 0 60 - 1 30 mg/dL Final     Comment:     Standardized to IDMS reference method   04/10/2023 1 43 (H) 0 60 - 1 30 mg/dL Final     Comment:     Standardized to IDMS reference method   02/27/2023 1 65 (H) 0 60 - 1 30 mg/dL Final     Comment:     Standardized to IDMS reference method     Glucose   Date Value Ref Range Status   04/28/2023 187 (H) 65 - 140 mg/dL Final     Comment:     If the patient is fasting, the ADA then defines impaired fasting glucose as > 100 mg/dL and diabetes as > or equal to 123 mg/dL  Specimen collection should occur prior to Sulfasalazine administration due to the potential for falsely depressed results  Specimen collection should occur prior to Sulfapyridine administration due to the potential for falsely elevated results  01/16/2023 181 (H) 65 - 140 mg/dL Final     Comment:     If the patient is fasting, the ADA then defines impaired fasting glucose as > 100 mg/dL and diabetes as > or equal to 123 mg/dL  Specimen collection should occur prior to Sulfasalazine administration due to the potential for falsely depressed results  Specimen collection should occur prior to Sulfapyridine administration due to the potential for falsely elevated results  12/05/2022 142 (H) 65 - 140 mg/dL Final     Comment:     If the patient is fasting, the ADA then defines impaired fasting glucose as > 100 mg/dL and diabetes as > or equal to 123 mg/dL  Specimen collection should occur prior to Sulfasalazine administration due to the potential for falsely depressed results  Specimen collection should occur prior to Sulfapyridine administration due to the potential for falsely elevated results       Calcium   Date Value Ref Range Status   04/28/2023 8 7 8 3 - 10 1 mg/dL Final   04/10/2023 8 9 8 3 - 10 1 mg/dL Final   02/27/2023 8 8 8 3 - 10 1 mg/dL Final     Albumin   Date Value Ref Range Status   04/28/2023 3 3 (L) 3 5 - 5 0 g/dL Final   04/10/2023 3 8 3 5 - 5 0 g/dL Final   02/27/2023 3 6 3 5 - 5 0 g/dL Final     Total Bilirubin   Date Value Ref Range Status   04/28/2023 0 64 0 20 - 1 00 mg/dL Final     Comment:     Use of this assay is not recommended for patients undergoing treatment with eltrombopag due to the potential for falsely elevated results  04/10/2023 1 39 (H) 0 20 - 1 00 mg/dL Final     Comment:     Use of this assay is not recommended for patients undergoing treatment with eltrombopag due to the potential for falsely elevated results  02/27/2023 1 14 (H) 0 20 - 1 00 mg/dL Final     Comment:     Use of this assay is not recommended for patients undergoing treatment with eltrombopag due to the potential for falsely elevated results  Alkaline Phosphatase   Date Value Ref Range Status   04/28/2023 83 46 - 116 U/L Final   04/10/2023 91 46 - 116 U/L Final   02/27/2023 70 46 - 116 U/L Final     AST   Date Value Ref Range Status   04/28/2023 26 5 - 45 U/L Final     Comment:     Specimen collection should occur prior to Sulfasalazine administration due to the potential for falsely depressed results  04/10/2023 17 5 - 45 U/L Final     Comment:     Specimen collection should occur prior to Sulfasalazine administration due to the potential for falsely depressed results  02/27/2023 15 5 - 45 U/L Final     Comment:     Specimen collection should occur prior to Sulfasalazine administration due to the potential for falsely depressed results  ALT   Date Value Ref Range Status   04/28/2023 69 12 - 78 U/L Final     Comment:     Specimen collection should occur prior to Sulfasalazine and/or Sulfapyridine administration due to the potential for falsely depressed results  04/10/2023 21 12 - 78 U/L Final     Comment:     Specimen collection should occur prior to Sulfasalazine and/or Sulfapyridine administration due to the potential for falsely depressed results      02/27/2023 19 12 - 78 U/L Final     Comment:     Specimen collection should occur prior to Sulfasalazine and/or Sulfapyridine administration due to the potential for falsely depressed results  LD   Date Value Ref Range Status   04/28/2023 135 81 - 234 U/L Final   04/10/2023 137 81 - 234 U/L Final   02/27/2023 151 81 - 234 U/L Final     No results found for: TSH  No results found for: [id number]   Free T4   Date Value Ref Range Status   04/28/2023 1 03 0 76 - 1 46 ng/dL Final     Comment:     Specimen collection should occur prior to Sulfasalazine administration due to the potential for falsely elevated results  04/10/2023 0 95 0 76 - 1 46 ng/dL Final     Comment:     Specimen collection should occur prior to Sulfasalazine administration due to the potential for falsely elevated results  02/27/2023 0 96 0 76 - 1 46 ng/dL Final     Comment:     Specimen collection should occur prior to Sulfasalazine administration due to the potential for falsely elevated results  RECENT IMAGING:  No results found  Assessment/Plan  Mr Sunitha Arita is a 79 yr male with stage IIIa melanoma on adjuvant treatment pembrolizumab here for continued monitoring, follow-up, and surveillance while on treatment  1  Malignant melanoma of skin of face ([address] )  2  Malignant melanoma of forehead ([address] )  3  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[address] )  He is doing well and tolerating treatment  He is stable shortness of breath predated treatment, we will continue to monitor  No abnormal heart rhythm on exam   Labs reviewed and okay  He will continue with treatment and it is okay to continue with pembrolizumab  He has standing orders for blood work prior to each treatment  He is due for full body imaging to assess disease status at this time  Orders placed for PET given his decreased kidney function in addition to the disease being on his forehead  He did recently have continued monitoring of pulmonary nodules that were identified and these seem to be stable to date  He knows to continue to monitor for immune mediated side effects and call with issues or concerns prior to his next visit       - NM pet ct tumor imaging whole body; Future    4  High risk medication use  He is on treatment with immunotherapy and we will continue to monitor for side effects and lab abnormalities  We will monitor labs with each treatment to ensure safety of continuing on treatment  He knows to watch for signs and symptoms for immune mediated side effects  Denies any immune mediated side effects at this time  Return in about 6 weeks (around 6/14/2023) for Office Visit, Infusion - See Treatment Plan, labs       Walter Foss MD, PhD

## 2023-05-05 ENCOUNTER — HOSPITAL ENCOUNTER (OUTPATIENT)
Dept: INFUSION CENTER | Facility: CLINIC | Age: 83
End: 2023-05-05

## 2023-05-05 VITALS
RESPIRATION RATE: 16 BRPM | TEMPERATURE: 97.9 F | OXYGEN SATURATION: 97 % | DIASTOLIC BLOOD PRESSURE: 80 MMHG | HEART RATE: 60 BPM | SYSTOLIC BLOOD PRESSURE: 151 MMHG

## 2023-05-05 DIAGNOSIS — C43.30 MALIGNANT MELANOMA OF SKIN OF FACE (HCC): Primary | ICD-10-CM

## 2023-05-05 DIAGNOSIS — C43.39 MALIGNANT MELANOMA OF FOREHEAD (HCC): ICD-10-CM

## 2023-05-05 RX ORDER — SODIUM CHLORIDE 9 MG/ML
20 INJECTION, SOLUTION INTRAVENOUS ONCE
Status: COMPLETED | OUTPATIENT
Start: 2023-05-05 | End: 2023-05-05

## 2023-05-05 RX ADMIN — SODIUM CHLORIDE 400 MG: 9 INJECTION, SOLUTION INTRAVENOUS at 08:53

## 2023-05-05 RX ADMIN — SODIUM CHLORIDE 20 ML/HR: 0.9 INJECTION, SOLUTION INTRAVENOUS at 08:25

## 2023-05-05 NOTE — PROGRESS NOTES
Patient is here for day 1 cycle 10 of [facility] ([facility])  He offers no complaints at this time   Labs reviewed and meet parameters

## 2023-05-22 ENCOUNTER — TELEPHONE (OUTPATIENT)
Dept: HEMATOLOGY ONCOLOGY | Facility: CLINIC | Age: 83
End: 2023-05-22

## 2023-05-24 ENCOUNTER — APPOINTMENT (OUTPATIENT)
Dept: LAB | Facility: IMAGING CENTER | Age: 83
End: 2023-05-24

## 2023-05-24 DIAGNOSIS — N18.30 STAGE 3 CHRONIC KIDNEY DISEASE, UNSPECIFIED WHETHER STAGE 3A OR 3B CKD (HCC): ICD-10-CM

## 2023-05-24 DIAGNOSIS — I10 ESSENTIAL HYPERTENSION: ICD-10-CM

## 2023-05-24 DIAGNOSIS — E11.9 TYPE 2 DIABETES MELLITUS WITHOUT COMPLICATION, WITHOUT LONG-TERM CURRENT USE OF INSULIN (HCC): ICD-10-CM

## 2023-05-24 DIAGNOSIS — E78.2 MIXED HYPERLIPIDEMIA: ICD-10-CM

## 2023-05-24 LAB
CHOLEST SERPL-MCNC: 144 MG/DL
CREAT UR-MCNC: 90.5 MG/DL
EST. AVERAGE GLUCOSE BLD GHB EST-MCNC: 128 MG/DL
HBA1C MFR BLD: 6.1 %
HDLC SERPL-MCNC: 51 MG/DL
LDLC SERPL CALC-MCNC: 69 MG/DL (ref 0–100)
MICROALBUMIN UR-MCNC: 76.5 MG/L (ref 0–20)
MICROALBUMIN/CREAT 24H UR: 85 MG/G CREATININE (ref 0–30)
NONHDLC SERPL-MCNC: 93 MG/DL
TRIGL SERPL-MCNC: 119 MG/DL

## 2023-05-25 ENCOUNTER — TELEPHONE (OUTPATIENT)
Dept: HEMATOLOGY ONCOLOGY | Facility: CLINIC | Age: 83
End: 2023-05-25

## 2023-05-25 ENCOUNTER — OFFICE VISIT (OUTPATIENT)
Dept: HEMATOLOGY ONCOLOGY | Facility: CLINIC | Age: 83
End: 2023-05-25

## 2023-05-25 VITALS
OXYGEN SATURATION: 99 % | HEIGHT: 67 IN | RESPIRATION RATE: 16 BRPM | HEART RATE: 53 BPM | TEMPERATURE: 97.4 F | DIASTOLIC BLOOD PRESSURE: 82 MMHG | WEIGHT: 163 LBS | BODY MASS INDEX: 25.58 KG/M2 | SYSTOLIC BLOOD PRESSURE: 120 MMHG

## 2023-05-25 DIAGNOSIS — C77.3 SECONDARY AND UNSPECIFIED MALIGNANT NEOPLASM OF AXILLA AND UPPER LIMB LYMPH NODES (HCC): ICD-10-CM

## 2023-05-25 DIAGNOSIS — C43.39 MALIGNANT MELANOMA OF FOREHEAD (HCC): Primary | ICD-10-CM

## 2023-05-25 DIAGNOSIS — C43.30 MALIGNANT MELANOMA OF SKIN OF FACE (HCC): ICD-10-CM

## 2023-05-25 DIAGNOSIS — Z79.899 HIGH RISK MEDICATION USE: ICD-10-CM

## 2023-05-25 NOTE — LETTER
June 4, 2023     Fred Michelle MD  [address] [place] [address]    Patient: Shona Macias   YOB: 1940   Date of Visit: 5/25/2023       Dear Dr Chanell Blue:    Thank you for referring Shona Macias to me for evaluation  Below are my notes for this consultation  If you have questions, please do not hesitate to call me  I look forward to following your patient along with you  Sincerely,        Vivian Deng MD        CC: MD Alejandro Grayson MD Ricki Bookman, MD Mordecai Fortis, MD  6/4/2023  6:38 PM  Attested  [address] [facility] 15  [facility] 58  [phone]  [phone]     Date of Visit: 5/25/2023  Name: Shona Macias   YOB: 1940     Subjective    Subjective    VISIT DIAGNOSIS:  There are no diagnoses linked to this encounter      Oncology History   Malignant melanoma of skin of face ([facility] [address] )   9/8/2021 -  Cancer Staged    Staging form: Melanoma of the Skin, AJCC 8th Edition  - Clinical stage from 9/8/2021: Stage III (cT2a, cN1a, cM0) - Signed by Vivian Deng MD on 11/7/2021 9/8/2021 -  Cancer Staged    Staging form: Melanoma of the Skin, AJCC 8th Edition  - Pathologic stage from 9/8/2021: Stage IIIA (pT2a, pN1a, cM0) - Signed by Vivian Deng MD on 11/7/2021 9/22/2021 Initial Diagnosis    Malignant melanoma of skin of face ([facility] [address] )     4/4/2022 -  Chemotherapy    pembrolizumab (KEYTRUDA) IVPB, 400 mg, Intravenous, Once, 10 of 12 cycles  Administration: 400 mg (4/4/2022), 400 mg (5/16/2022), 400 mg (6/27/2022), 400 mg (8/8/2022), 400 mg (9/19/2022), 400 mg (10/31/2022), 400 mg (12/12/2022), 400 mg (1/23/2023), 400 mg (3/6/2023), 400 mg (5/5/2023)     Malignant melanoma of forehead ([facility] [address] )   11/29/2021 Initial Diagnosis    Malignant melanoma of forehead ([facility] [address] )     4/4/2022 -  Chemotherapy    pembrolizumab (KEYTRUDA) IVPB, 400 mg, Intravenous, Once, 10 of 12 cycles  Administration: 400 mg (4/4/2022), 400 mg (5/16/2022), 400 mg (6/27/2022), 400 mg (8/8/2022), 400 mg (9/19/2022), 400 mg (10/31/2022), 400 mg (12/12/2022), 400 mg (1/23/2023), 400 mg (3/6/2023), 400 mg (5/5/2023)        Cancer Staging   Malignant melanoma of skin of face [facility])  Staging form: Melanoma of the Skin, AJCC 8th Edition  - Clinical stage from 9/8/2021: Stage III (cT2a, cN1a, cM0) - Signed by Gerardo Miller MD on 11/7/2021  - Pathologic stage from 9/8/2021: Stage IIIA (pT2a, pN1a, cM0) - Signed by Gerardo Miller MD on 11/7/2021     Treatment Details   Treatment goal Curative   Plan Name OP Pembrolizumab Q 42 Days   Status Active   Start Date 4/4/2022   End Date 7/27/2023 (Planned)   Provider Gerardo Miller MD   Chemotherapy pembrolizumab [facility]) IVPB, 400 mg, Intravenous, Once, 10 of 12 cycles  Administration: 400 mg (4/4/2022), 400 mg (5/16/2022), 400 mg (6/27/2022), 400 mg (8/8/2022), 400 mg (9/19/2022), 400 mg (10/31/2022), 400 mg (12/12/2022), 400 mg (1/23/2023), 400 mg (3/6/2023), 400 mg (5/5/2023)          HISTORY OF PRESENT ILLNESS: Summer Dhaliwal is a 79 y o  male  who HISTORY OF PRESENT ILLNESS: Summer Dhaliwal is a 79 y o  male  who has stage IIIa melanoma on treatment with adjuvant pembrolizumab here for continued follow-up, monitoring, and surveillance while on treatment  He is doing well and feels okay  He states that he has continued shortness of breath with activity  Otherwise doing okay  NM PET CT scheduled for 6/5     INTERIM HISTORY: He reports pain in the left knee that started 6-8 weeks ago  Hurts intermittently  He reports SOB with activity that is at baseline  Mild fatigue which is at baseline  No fever, headaches, changes in vision, rash, chest pain, vomiting, diarrhea, myalgias  No lymphadenopathy  No new skin lesions of concern  No changes to appetite  REVIEW OF SYSTEMS:  Review of Systems   Constitutional: Negative for appetite change, fatigue and fever  Respiratory: Negative for cough  Cardiovascular: Negative for chest pain  Gastrointestinal: Negative for diarrhea and vomiting  Genitourinary: Negative for dysuria  Musculoskeletal: Negative for arthralgias and myalgias  Skin: Negative for itching and rash  Hematological: Negative for adenopathy          MEDICATIONS:    Current Outpatient Medications:   •  atenolol (TENORMIN) 100 mg tablet, Take 1 tablet (100 mg total) by mouth daily, Disp: 90 tablet, Rfl: 3  •  glimepiride (AMARYL) 4 mg tablet, Take 1 tablet (4 mg total) by mouth daily, Disp: 90 tablet, Rfl: 3  •  Lidocaine Viscous HCl (XYLOCAINE) 2 % mucosal solution, Swish and swallow 15 mL 3 (three) times a day as needed for mouth pain or discomfort or mucositis (Patient not taking: Reported on 5/2/2023), Disp: 100 mL, Rfl: 0  •  lisinopril-hydrochlorothiazide (PRINZIDE,ZESTORETIC) 20-25 MG per tablet, Take 1 tablet by mouth daily, Disp: 90 tablet, Rfl: 3  •  lovastatin (MEVACOR) 40 MG tablet, Take 1 tablet (40 mg total) by mouth daily, Disp: 90 tablet, Rfl: 3  •  meclizine (ANTIVERT) 12 5 MG tablet, Take 1 tablet (12 5 mg total) by mouth 3 (three) times a day as needed for dizziness for up to 5 days (Patient not taking: Reported on 2/1/2023), Disp: 15 tablet, Rfl: 0  •  metFORMIN (GLUCOPHAGE) 1000 MG tablet, Take 1 tablet (1,000 mg total) by mouth 2 (two) times a day with meals, Disp: 180 tablet, Rfl: 3  •  methylPREDNISolone 4 MG tablet therapy pack, Use as directed on package (Patient not taking: Reported on 2/1/2023), Disp: 1 each, Rfl: 0     ALLERGIES:  No Known Allergies     ACTIVE PROBLEMS:  Patient Active Problem List   Diagnosis   • Essential hypertension   • Hiatal hernia with GERD   • Status post laparoscopic cholecystectomy   • Status post umbilical hernia repair, follow-up exam   • Type 2 diabetes mellitus without complication, without long-term current use of insulin ([facility] [address] )   • Mixed hyperlipidemia   • Parkinson's disease ([facility] [address] )   • Microalbuminuria   • Malignant melanoma of skin of face (HCC)   • Thrombocytopenia (HCC)   • Malignant melanoma of forehead (HCC)   • 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 [address] )   • Oral lesion   • Advanced care planning/counseling discussion   • COVID-19          PAST MEDICAL HISTORY:   Past Medical History:   Diagnosis Date   • Diabetes mellitus ([facility] [address] )    • Hyperlipidemia    • Hypertension         PAST SURGICAL HISTORY:  Past Surgical History:   Procedure Laterality Date   • CATARACT EXTRACTION, BILATERAL     • FLAP LOCAL HEAD / NECK Left 10/12/2021    Procedure: RECONSTRUCTION OF LEFT TEMPLE DEFECT AFTER RESCECTION MELANOMA;  Surgeon: Alan Hui MD;  [facility];  Service: Plastics   • FULL THICKNESS SKIN GRAFT Left 10/12/2021    Procedure: SKIN GRAFT FULL THICKNESS LEFT TEMPLE;  Surgeon: Alan Hui MD;  [facility];  Service: Plastics   • GALLBLADDER SURGERY     • HAND SURGERY Left    • LYMPH NODE BIOPSY Left 10/12/2021    Procedure: BIOPSY LYMPH NODE SENTINEL, LYMPHOSCINTIGRAPHY, INTRAOPERATIVE LYMPHATIC MAPPING (INJECT AT 1200);   Surgeon: Christina Cabezas MD;  [facility];  Service: Surgical Oncology   • NOSE SURGERY     • SKIN CANCER EXCISION  10/2021   • SKIN LESION EXCISION Left 10/12/2021    Procedure: [address];  Surgeon: Christina Cabezas MD;  [facility];  Service: Surgical Oncology   • SPLIT THICKNESS SKIN GRAFT Left 10/12/2021    Procedure: SKIN GRAFT SPLIT THICKNESS LEFT TEMPLE;  Surgeon: Alan Hui MD;  [facility];  Service: Plastics   • US GUIDED LYMPH NODE BIOPSY LEFT  03/21/2022        SOCIAL HISTORY:  Social History     Socioeconomic History   • Marital status: /Civil Union     Spouse name: Not on file   • Number of children: Not on file   • Years of education: Not on file   • Highest education level: Not on file   Occupational History   • Not on file   Tobacco Use   • Smoking status: Never   • Smokeless tobacco: Never   Vaping Use   • Vaping Use: Never used   Substance and Sexual Activity   • Alcohol use: Not Currently   • Drug use: Never   • Sexual activity: Not Currently   Other Topics Concern   • Not on file   Social History Narrative   • Not on file     Social Determinants of Health     Financial Resource Strain: Low Risk  (11/29/2022)    Overall Financial Resource Strain (CARDIA)    • Difficulty of Paying Living Expenses: Not hard at all   Food Insecurity: Not on file   Transportation Needs: No Transportation Needs (11/29/2022)    PRAPARE - Transportation    • Lack of Transportation (Medical): No    • Lack of Transportation (Non-Medical): No   Physical Activity: Not on file   Stress: Not on file   Social Connections: Not on file   Intimate Partner Violence: Not on file   Housing Stability: Not on file        FAMILY HISTORY:  Family History   Problem Relation Age of Onset   • No Known Problems Mother    • No Known Problems Father    • Colon cancer Other 60        Objective    Objective    PHYSICAL EXAMINATION:   There were no vitals taken for this visit  Physical Exam  Constitutional:       Appearance: Normal appearance  Cardiovascular:      Rate and Rhythm: Normal rate and regular rhythm  Pulses: Normal pulses  Heart sounds: Normal heart sounds  Pulmonary:      Effort: Pulmonary effort is normal       Breath sounds: Normal breath sounds  Abdominal:      General: Abdomen is flat  Bowel sounds are normal       Palpations: Abdomen is soft  Lymphadenopathy:      Cervical: No cervical adenopathy  Upper Body:      Right upper body: No supraclavicular or axillary adenopathy  Left upper body: No supraclavicular or axillary adenopathy  Lower Body: No right inguinal adenopathy  No left inguinal adenopathy  Skin:     General: Skin is warm and dry  Neurological:      Mental Status: He is alert and oriented to person, place, and time  Mental status is at baseline  Psychiatric:         Mood and Affect: Mood normal          Behavior: Behavior normal          I reviewed lab data in the chart  Hemoglobin   Date Value Ref Range Status   05/24/2023 14 9 12 0 - 17 0 g/dL Final   04/28/2023 12 6 12 0 - 17 0 g/dL Final   04/10/2023 13 9 12 0 - 17 0 g/dL Final     MCV   Date Value Ref Range Status   05/24/2023 90 82 - 98 fL Final   04/28/2023 89 82 - 98 fL Final   04/10/2023 90 82 - 98 fL Final     Platelets   Date Value Ref Range Status   05/24/2023 136 (L) 149 - 390 Thousands/uL Final   04/28/2023 172 149 - 390 Thousands/uL Final   04/10/2023 131 (L) 149 - 390 Thousands/uL Final     WBC   Date Value Ref Range Status   05/24/2023 8 65 4 31 - 10 16 Thousand/uL Final   04/28/2023 7 03 4 31 - 10 16 Thousand/uL Final   04/10/2023 7 39 4 31 - 10 16 Thousand/uL Final      Albumin   Date Value Ref Range Status   05/24/2023 4 0 3 5 - 5 0 g/dL Final   04/28/2023 3 3 (L) 3 5 - 5 0 g/dL Final   04/10/2023 3 8 3 5 - 5 0 g/dL Final     Alkaline Phosphatase   Date Value Ref Range Status   05/24/2023 89 46 - 116 U/L Final   04/28/2023 83 46 - 116 U/L Final   04/10/2023 91 46 - 116 U/L Final     ALT   Date Value Ref Range Status   05/24/2023 21 12 - 78 U/L Final     Comment:     Specimen collection should occur prior to Sulfasalazine and/or Sulfapyridine administration due to the potential for falsely depressed results  04/28/2023 69 12 - 78 U/L Final     Comment:     Specimen collection should occur prior to Sulfasalazine and/or Sulfapyridine administration due to the potential for falsely depressed results  04/10/2023 21 12 - 78 U/L Final     Comment:     Specimen collection should occur prior to Sulfasalazine and/or Sulfapyridine administration due to the potential for falsely depressed results        AST   Date Value Ref Range Status   05/24/2023 18 5 - 45 U/L Final Comment:     Specimen collection should occur prior to Sulfasalazine administration due to the potential for falsely depressed results  04/28/2023 26 5 - 45 U/L Final     Comment:     Specimen collection should occur prior to Sulfasalazine administration due to the potential for falsely depressed results  04/10/2023 17 5 - 45 U/L Final     Comment:     Specimen collection should occur prior to Sulfasalazine administration due to the potential for falsely depressed results  BUN   Date Value Ref Range Status   05/24/2023 31 (H) 5 - 25 mg/dL Final   04/28/2023 29 (H) 5 - 25 mg/dL Final   04/10/2023 24 5 - 25 mg/dL Final     Calcium   Date Value Ref Range Status   05/24/2023 9 3 8 3 - 10 1 mg/dL Final   04/28/2023 8 7 8 3 - 10 1 mg/dL Final   04/10/2023 8 9 8 3 - 10 1 mg/dL Final     Chloride   Date Value Ref Range Status   05/24/2023 107 96 - 108 mmol/L Final   04/28/2023 110 (H) 96 - 108 mmol/L Final   04/10/2023 105 96 - 108 mmol/L Final     CO2   Date Value Ref Range Status   05/24/2023 27 21 - 32 mmol/L Final   04/28/2023 28 21 - 32 mmol/L Final   04/10/2023 30 21 - 32 mmol/L Final     Creatinine   Date Value Ref Range Status   05/24/2023 1 42 (H) 0 60 - 1 30 mg/dL Final     Comment:     Standardized to IDMS reference method   04/28/2023 1 39 (H) 0 60 - 1 30 mg/dL Final     Comment:     Standardized to IDMS reference method   04/10/2023 1 43 (H) 0 60 - 1 30 mg/dL Final     Comment:     Standardized to IDMS reference method     Glucose   Date Value Ref Range Status   04/28/2023 187 (H) 65 - 140 mg/dL Final     Comment:     If the patient is fasting, the ADA then defines impaired fasting glucose as > 100 mg/dL and diabetes as > or equal to 123 mg/dL  Specimen collection should occur prior to Sulfasalazine administration due to the potential for falsely depressed results  Specimen collection should occur prior to Sulfapyridine administration due to the potential for falsely elevated results     01/16/2023 "181 (H) 65 - 140 mg/dL Final     Comment:     If the patient is fasting, the ADA then defines impaired fasting glucose as > 100 mg/dL and diabetes as > or equal to 123 mg/dL  Specimen collection should occur prior to Sulfasalazine administration due to the potential for falsely depressed results  Specimen collection should occur prior to Sulfapyridine administration due to the potential for falsely elevated results  12/05/2022 142 (H) 65 - 140 mg/dL Final     Comment:     If the patient is fasting, the ADA then defines impaired fasting glucose as > 100 mg/dL and diabetes as > or equal to 123 mg/dL  Specimen collection should occur prior to Sulfasalazine administration due to the potential for falsely depressed results  Specimen collection should occur prior to Sulfapyridine administration due to the potential for falsely elevated results  Potassium   Date Value Ref Range Status   05/24/2023 3 9 3 5 - 5 3 mmol/L Final   04/28/2023 4 6 3 5 - 5 3 mmol/L Final   04/10/2023 4 4 3 5 - 5 3 mmol/L Final     Total Bilirubin   Date Value Ref Range Status   05/24/2023 1 57 (H) 0 20 - 1 00 mg/dL Final     Comment:     Use of this assay is not recommended for patients undergoing treatment with eltrombopag due to the potential for falsely elevated results  04/28/2023 0 64 0 20 - 1 00 mg/dL Final     Comment:     Use of this assay is not recommended for patients undergoing treatment with eltrombopag due to the potential for falsely elevated results  04/10/2023 1 39 (H) 0 20 - 1 00 mg/dL Final     Comment:     Use of this assay is not recommended for patients undergoing treatment with eltrombopag due to the potential for falsely elevated results        LD   Date Value Ref Range Status   05/24/2023 143 81 - 234 U/L Final   04/28/2023 135 81 - 234 U/L Final   04/10/2023 137 81 - 234 U/L Final     No results found for: \"TSH\"  No results found for: \"X9DJRPC\"   Free T4   Date Value Ref Range Status   05/24/2023 0 80 0 61 - " 1 12 ng/dL Final     Comment:     Specimens with biotin concentrations > 10 ng/mL can lead to significant (> 10%) positive bias in result  04/28/2023 1 03 0 76 - 1 46 ng/dL Final     Comment:     Specimen collection should occur prior to Sulfasalazine administration due to the potential for falsely elevated results  04/10/2023 0 95 0 76 - 1 46 ng/dL Final     Comment:     Specimen collection should occur prior to Sulfasalazine administration due to the potential for falsely elevated results  RECENT IMAGING:  No results found  Assessment    Assessment/Plan  No problem-specific Assessment & Plan notes found for this encounter  Assessment/Plan  Mr Angel Doe is a 79 yr male with stage IIIa melanoma on adjuvant treatment pembrolizumab here for continued monitoring, follow-up, and surveillance while on treatment  1  Malignant melanoma of skin of face ([facility] [address] )  2  Malignant melanoma of forehead ([facility] [address] )  3  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[facility] [address] )  He is doing well and tolerating treatment  He is stable shortness of breath predated treatment, we will continue to monitor  Labs reviewed and okay  He will continue with treatment and it is okay to continue with pembrolizumab  He has standing orders for blood work prior to each treatment  He is due for full body imaging to assess disease status scheduled for 6/5/23  He did recently have continued monitoring of pulmonary nodules that were identified and these seem to be stable to date  He knows to continue to monitor for immune mediated side effects and call with issues or concerns prior to his next visit   - NM pet ct tumor imaging whole body; Future     4  High risk medication use  He is on treatment with immunotherapy and we will continue to monitor for side effects and lab abnormalities  We will monitor labs with each treatment to ensure safety of continuing on treatment   He knows to watch for signs and symptoms for immune mediated side effects  Denies any immune mediated side effects at this time  No follow-ups on file  Attestation signed by Narcisa Dang MD at 6/4/2023  6:38 PM:  I interviewed, took the history and examined the patient on 5/25/2023  I discussed the case with the Resident and reviewed the Resident’s note , prescribed medications, and orders placed  I supervised the Resident and I agree with the Resident management plan as it was presented to me  I was present in the clinic and examined the patient  Mr Sunita Siegel is a 79 yr male with stage IIIa melanoma on treatment with adjuvant pembrolizumab here for continued monitoring, follow-up, and surveillance while on treatment  He is doing okay  He has baseline fatigue and shortness of breath especially with excessive exertion  His knee started hurting him intermittently  Otherwise denies any new, changing, concerning skin lesions  Denies any lymphadenopathy  Denies any immune mediated side effects  Denies headaches, double vision, rash, itching, chest pain, shortness of breath associated with immunotherapy above and beyond his baseline, and diarrhea  Denies muscle weakness has some fatigue but it again is at baseline and not excessive  On exam ECOG PS 1  No concerning skin lesions  No lymphadenopathy  No evidence of recurrence  Labs reviewed  Mr Sunita Siegel is a 79 yr male with stage IIIa melanoma on treatment pembrolizumab here for continued monitoring, follow-up and surveillance  He is tolerating treatment and has no immune mediated side effects  Labs reviewed and okay  He will continue on treatment with pembrolizumab  He is due for full body imaging scheduled on 6/5/2023  He has standing orders for blood work prior to each treatment  He knows to continue to watch for immune mediated side effects  He knows to call with issues or concerns prior to his next visit      Narcisa Dang MD 06/04/23

## 2023-05-25 NOTE — PROGRESS NOTES
[facility] HEMATOLOGY ONCOLOGY SPECIALISTS [place]  [address]  [address] [id number] [phone]     Date of Visit: 5/25/2023  Name: Roxann Benjamin   YOB: 1940     Subjective    Subjective    VISIT DIAGNOSIS:  There are no diagnoses linked to this encounter      Oncology History   Malignant melanoma of skin of face ([facility] )   9/8/2021 -  Cancer Staged    Staging form: Melanoma of the Skin, AJCC 8th Edition  - Clinical stage from 9/8/2021: Stage III (cT2a, cN1a, cM0) - Signed by Simone Scott MD on 11/7/2021 9/8/2021 -  Cancer Staged    Staging form: Melanoma of the Skin, AJCC 8th Edition  - Pathologic stage from 9/8/2021: Stage IIIA (pT2a, pN1a, cM0) - Signed by Simone Scott MD on 11/7/2021 9/22/2021 Initial Diagnosis    Malignant melanoma of skin of face ([facility] )     4/4/2022 -  Chemotherapy    pembrolizumab (KEYTRUDA) IVPB, 400 mg, Intravenous, Once, 10 of 12 cycles  Administration: 400 mg (4/4/2022), 400 mg (5/16/2022), 400 mg (6/27/2022), 400 mg (8/8/2022), 400 mg (9/19/2022), 400 mg (10/31/2022), 400 mg (12/12/2022), 400 mg (1/23/2023), 400 mg (3/6/2023), 400 mg (5/5/2023)     Malignant melanoma of forehead ([facility] [address] )   11/29/2021 Initial Diagnosis    Malignant melanoma of forehead ([facility] [address] )     4/4/2022 -  Chemotherapy    pembrolizumab (KEYTRUDA) IVPB, 400 mg, Intravenous, Once, 10 of 12 cycles  Administration: 400 mg (4/4/2022), 400 mg (5/16/2022), 400 mg (6/27/2022), 400 mg (8/8/2022), 400 mg (9/19/2022), 400 mg (10/31/2022), 400 mg (12/12/2022), 400 mg (1/23/2023), 400 mg (3/6/2023), 400 mg (5/5/2023)        Cancer Staging   Malignant melanoma of skin of face [facility])  Staging form: Melanoma of the Skin, AJCC 8th Edition  - Clinical stage from 9/8/2021: Stage III (cT2a, cN1a, cM0) - Signed by Simone Scott MD on 11/7/2021  - Pathologic stage from 9/8/2021: Stage IIIA (pT2a, pN1a, cM0) - Signed by Simone Scott MD on 11/7/2021     Treatment Details   Treatment goal Curative   Plan Name OP Pembrolizumab Q 42 Days   Status Active   Start Date 4/4/2022   End Date 7/27/2023 (Planned)   Provider Jennifer Sterling MD   Chemotherapy pembrolizumab [facility]) IVPB, 400 mg, Intravenous, Once, 10 of 12 cycles  Administration: 400 mg (4/4/2022), 400 mg (5/16/2022), 400 mg (6/27/2022), 400 mg (8/8/2022), 400 mg (9/19/2022), 400 mg (10/31/2022), 400 mg (12/12/2022), 400 mg (1/23/2023), 400 mg (3/6/2023), 400 mg (5/5/2023)          HISTORY OF PRESENT ILLNESS: Julianna Avila is a 79 y o  male  who HISTORY OF PRESENT ILLNESS: Julianna Avila is a 79 y o  male  who has stage IIIa melanoma on treatment with adjuvant pembrolizumab here for continued follow-up, monitoring, and surveillance while on treatment      He is doing well and feels okay  He states that he has continued shortness of breath with activity  Otherwise doing okay  NM PET CT scheduled for 6/5     INTERIM HISTORY: He reports pain in the left knee that started 6-8 weeks ago  Hurts intermittently  He reports SOB with activity that is at baseline  Mild fatigue which is at baseline  No fever, headaches, changes in vision, rash, chest pain, vomiting, diarrhea, myalgias  No lymphadenopathy  No new skin lesions of concern  No changes to appetite  REVIEW OF SYSTEMS:  Review of Systems   Constitutional: Negative for appetite change, fatigue and fever  Respiratory: Negative for cough  Cardiovascular: Negative for chest pain  Gastrointestinal: Negative for diarrhea and vomiting  Genitourinary: Negative for dysuria  Musculoskeletal: Negative for arthralgias and myalgias  Skin: Negative for itching and rash  Hematological: Negative for adenopathy          MEDICATIONS:    Current Outpatient Medications:   •  atenolol (TENORMIN) 100 mg tablet, Take 1 tablet (100 mg total) by mouth daily, Disp: 90 tablet, Rfl: 3  •  glimepiride (AMARYL) 4 mg tablet, Take 1 tablet (4 mg total) by mouth daily, Disp: 90 tablet, Rfl: 3  • Lidocaine Viscous HCl (XYLOCAINE) 2 % mucosal solution, Swish and swallow 15 mL 3 (three) times a day as needed for mouth pain or discomfort or mucositis (Patient not taking: Reported on 5/2/2023), Disp: 100 mL, Rfl: 0  •  lisinopril-hydrochlorothiazide (PRINZIDE,ZESTORETIC) 20-25 MG per tablet, Take 1 tablet by mouth daily, Disp: 90 tablet, Rfl: 3  •  lovastatin (MEVACOR) 40 MG tablet, Take 1 tablet (40 mg total) by mouth daily, Disp: 90 tablet, Rfl: 3  •  meclizine (ANTIVERT) 12 5 MG tablet, Take 1 tablet (12 5 mg total) by mouth 3 (three) times a day as needed for dizziness for up to 5 days (Patient not taking: Reported on 2/1/2023), Disp: 15 tablet, Rfl: 0  •  metFORMIN (GLUCOPHAGE) 1000 MG tablet, Take 1 tablet (1,000 mg total) by mouth 2 (two) times a day with meals, Disp: 180 tablet, Rfl: 3  •  methylPREDNISolone 4 MG tablet therapy pack, Use as directed on package (Patient not taking: Reported on 2/1/2023), Disp: 1 each, Rfl: 0     ALLERGIES:  No Known Allergies     ACTIVE PROBLEMS:  Patient Active Problem List   Diagnosis   • Essential hypertension   • Hiatal hernia with GERD   • Status post laparoscopic cholecystectomy   • Status post umbilical hernia repair, follow-up exam   • Type 2 diabetes mellitus without complication, without long-term current use of insulin (HCC)   • Mixed hyperlipidemia   • Parkinson's disease ([address] )   • Microalbuminuria   • Malignant melanoma of skin of face (HCC)   • Thrombocytopenia (HCC)   • Malignant melanoma of forehead ([address] )   • 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)   • Stage 3 chronic kidney disease, unspecified whether stage 3a or 3b CKD ([address] )   • Oral lesion   • Advanced care planning/counseling discussion   • COVID-19          PAST MEDICAL HISTORY:   Past Medical History:   Diagnosis Date   • Diabetes mellitus ([address] )    • Hyperlipidemia    • Hypertension         PAST SURGICAL HISTORY:  Past Surgical History:   Procedure Laterality Date   • CATARACT EXTRACTION, BILATERAL     • FLAP LOCAL HEAD / NECK Left 10/12/2021    Procedure: RECONSTRUCTION OF LEFT TEMPLE DEFECT AFTER RESCECTION MELANOMA;  Surgeon: Fletcher Quiles MD;  [facility];  Service: Plastics   • FULL THICKNESS SKIN GRAFT Left 10/12/2021    Procedure: SKIN GRAFT FULL THICKNESS LEFT TEMPLE;  Surgeon: Fletcher Quiles MD;  [facility];  Service: Plastics   • GALLBLADDER SURGERY     • HAND SURGERY Left    • LYMPH NODE BIOPSY Left 10/12/2021    Procedure: BIOPSY LYMPH NODE SENTINEL, LYMPHOSCINTIGRAPHY, INTRAOPERATIVE LYMPHATIC MAPPING (INJECT AT 1200);   Surgeon: Alejandro Jackson MD;  [facility];  Service: Surgical Oncology   • NOSE SURGERY     • SKIN CANCER EXCISION  10/2021   • SKIN LESION EXCISION Left 10/12/2021    Procedure: [address];  Surgeon: Alejandro Jackson MD;  [facility];  Service: Surgical Oncology   • SPLIT THICKNESS SKIN GRAFT Left 10/12/2021    Procedure: SKIN GRAFT SPLIT THICKNESS LEFT TEMPLE;  Surgeon: Fletcher Quiles MD;  [facility];  Service: Plastics   • US GUIDED LYMPH NODE BIOPSY LEFT  03/21/2022        SOCIAL HISTORY:  Social History     Socioeconomic History   • Marital status: /Civil Union     Spouse name: Not on file   • Number of children: Not on file   • Years of education: Not on file   • Highest education level: Not on file   Occupational History   • Not on file   Tobacco Use   • Smoking status: Never   • Smokeless tobacco: Never   Vaping Use   • Vaping Use: Never used   Substance and Sexual Activity   • Alcohol use: Not Currently   • Drug use: Never   • Sexual activity: Not Currently   Other Topics Concern   • Not on file   Social History Narrative   • Not on file     Social Determinants of Health     Financial Resource Strain: Low Risk  (11/29/2022)    Overall Financial Resource Strain (CARDIA)    • Difficulty of Paying Living Expenses: Not hard at all   Food Insecurity: Not on file   Transportation Needs: No Transportation Needs (11/29/2022)    PRAPARE - Transportation    • Lack of Transportation (Medical): No    • Lack of Transportation (Non-Medical): No   Physical Activity: Not on file   Stress: Not on file   Social Connections: Not on file   Intimate Partner Violence: Not on file   Housing Stability: Not on file        FAMILY HISTORY:  Family History   Problem Relation Age of Onset   • No Known Problems Mother    • No Known Problems Father    • Colon cancer Other 60        Objective    Objective    PHYSICAL EXAMINATION:   There were no vitals taken for this visit  Physical Exam  Constitutional:       Appearance: Normal appearance  Cardiovascular:      Rate and Rhythm: Normal rate and regular rhythm  Pulses: Normal pulses  Heart sounds: Normal heart sounds  Pulmonary:      Effort: Pulmonary effort is normal       Breath sounds: Normal breath sounds  Abdominal:      General: Abdomen is flat  Bowel sounds are normal       Palpations: Abdomen is soft  Lymphadenopathy:      Cervical: No cervical adenopathy  Upper Body:      Right upper body: No supraclavicular or axillary adenopathy  Left upper body: No supraclavicular or axillary adenopathy  Lower Body: No right inguinal adenopathy  No left inguinal adenopathy  Skin:     General: Skin is warm and dry  Neurological:      Mental Status: He is alert and oriented to person, place, and time  Mental status is at baseline  Psychiatric:         Mood and Affect: Mood normal          Behavior: Behavior normal          I reviewed lab data in the chart      Hemoglobin   Date Value Ref Range Status   05/24/2023 14 9 12 0 - 17 0 g/dL Final   04/28/2023 12 6 12 0 - 17 0 g/dL Final   04/10/2023 13 9 12 0 - 17 0 g/dL Final     MCV   Date Value Ref Range Status   05/24/2023 90 82 - 98 fL Final   04/28/2023 89 82 - 98 fL Final   04/10/2023 90 82 - 98 fL Final     Platelets   Date Value Ref Range Status   05/24/2023 136 (L) 149 - 390 Thousands/uL Final   04/28/2023 172 149 - 390 Thousands/uL Final   04/10/2023 131 (L) 149 - 390 Thousands/uL Final     WBC   Date Value Ref Range Status   05/24/2023 8 65 4 31 - 10 16 Thousand/uL Final   04/28/2023 7 03 4 31 - 10 16 Thousand/uL Final   04/10/2023 7 39 4 31 - 10 16 Thousand/uL Final      Albumin   Date Value Ref Range Status   05/24/2023 4 0 3 5 - 5 0 g/dL Final   04/28/2023 3 3 (L) 3 5 - 5 0 g/dL Final   04/10/2023 3 8 3 5 - 5 0 g/dL Final     Alkaline Phosphatase   Date Value Ref Range Status   05/24/2023 89 46 - 116 U/L Final   04/28/2023 83 46 - 116 U/L Final   04/10/2023 91 46 - 116 U/L Final     ALT   Date Value Ref Range Status   05/24/2023 21 12 - 78 U/L Final     Comment:     Specimen collection should occur prior to Sulfasalazine and/or Sulfapyridine administration due to the potential for falsely depressed results  04/28/2023 69 12 - 78 U/L Final     Comment:     Specimen collection should occur prior to Sulfasalazine and/or Sulfapyridine administration due to the potential for falsely depressed results  04/10/2023 21 12 - 78 U/L Final     Comment:     Specimen collection should occur prior to Sulfasalazine and/or Sulfapyridine administration due to the potential for falsely depressed results  AST   Date Value Ref Range Status   05/24/2023 18 5 - 45 U/L Final     Comment:     Specimen collection should occur prior to Sulfasalazine administration due to the potential for falsely depressed results  04/28/2023 26 5 - 45 U/L Final     Comment:     Specimen collection should occur prior to Sulfasalazine administration due to the potential for falsely depressed results  04/10/2023 17 5 - 45 U/L Final     Comment:     Specimen collection should occur prior to Sulfasalazine administration due to the potential for falsely depressed results        BUN   Date Value Ref Range Status   05/24/2023 31 (H) 5 - 25 mg/dL Final   04/28/2023 29 (H) 5 - 25 mg/dL Final   04/10/2023 24 5 - 25 mg/dL Final     Calcium   Date Value Ref Range Status   05/24/2023 9 3 8 3 - 10 1 mg/dL Final   04/28/2023 8 7 8 3 - 10 1 mg/dL Final   04/10/2023 8 9 8 3 - 10 1 mg/dL Final     Chloride   Date Value Ref Range Status   05/24/2023 107 96 - 108 mmol/L Final   04/28/2023 110 (H) 96 - 108 mmol/L Final   04/10/2023 105 96 - 108 mmol/L Final     CO2   Date Value Ref Range Status   05/24/2023 27 21 - 32 mmol/L Final   04/28/2023 28 21 - 32 mmol/L Final   04/10/2023 30 21 - 32 mmol/L Final     Creatinine   Date Value Ref Range Status   05/24/2023 1 42 (H) 0 60 - 1 30 mg/dL Final     Comment:     Standardized to IDMS reference method   04/28/2023 1 39 (H) 0 60 - 1 30 mg/dL Final     Comment:     Standardized to IDMS reference method   04/10/2023 1 43 (H) 0 60 - 1 30 mg/dL Final     Comment:     Standardized to IDMS reference method     Glucose   Date Value Ref Range Status   04/28/2023 187 (H) 65 - 140 mg/dL Final     Comment:     If the patient is fasting, the ADA then defines impaired fasting glucose as > 100 mg/dL and diabetes as > or equal to 123 mg/dL  Specimen collection should occur prior to Sulfasalazine administration due to the potential for falsely depressed results  Specimen collection should occur prior to Sulfapyridine administration due to the potential for falsely elevated results  01/16/2023 181 (H) 65 - 140 mg/dL Final     Comment:     If the patient is fasting, the ADA then defines impaired fasting glucose as > 100 mg/dL and diabetes as > or equal to 123 mg/dL  Specimen collection should occur prior to Sulfasalazine administration due to the potential for falsely depressed results  Specimen collection should occur prior to Sulfapyridine administration due to the potential for falsely elevated results     12/05/2022 142 (H) 65 - 140 mg/dL Final     Comment:     If the patient is fasting, the ADA then defines impaired fasting glucose as > 100 mg/dL and diabetes as > "or equal to 123 mg/dL  Specimen collection should occur prior to Sulfasalazine administration due to the potential for falsely depressed results  Specimen collection should occur prior to Sulfapyridine administration due to the potential for falsely elevated results  Potassium   Date Value Ref Range Status   05/24/2023 3 9 3 5 - 5 3 mmol/L Final   04/28/2023 4 6 3 5 - 5 3 mmol/L Final   04/10/2023 4 4 3 5 - 5 3 mmol/L Final     Total Bilirubin   Date Value Ref Range Status   05/24/2023 1 57 (H) 0 20 - 1 00 mg/dL Final     Comment:     Use of this assay is not recommended for patients undergoing treatment with eltrombopag due to the potential for falsely elevated results  04/28/2023 0 64 0 20 - 1 00 mg/dL Final     Comment:     Use of this assay is not recommended for patients undergoing treatment with eltrombopag due to the potential for falsely elevated results  04/10/2023 1 39 (H) 0 20 - 1 00 mg/dL Final     Comment:     Use of this assay is not recommended for patients undergoing treatment with eltrombopag due to the potential for falsely elevated results  LD   Date Value Ref Range Status   05/24/2023 143 81 - 234 U/L Final   04/28/2023 135 81 - 234 U/L Final   04/10/2023 137 81 - 234 U/L Final     No results found for: \"TSH\"  No results found for: \"W7ECHPE\"   Free T4   Date Value Ref Range Status   05/24/2023 0 80 0 61 - 1 12 ng/dL Final     Comment:     Specimens with biotin concentrations > 10 ng/mL can lead to significant (> 10%) positive bias in result  04/28/2023 1 03 0 76 - 1 46 ng/dL Final     Comment:     Specimen collection should occur prior to Sulfasalazine administration due to the potential for falsely elevated results  04/10/2023 0 95 0 76 - 1 46 ng/dL Final     Comment:     Specimen collection should occur prior to Sulfasalazine administration due to the potential for falsely elevated results  RECENT IMAGING:  No results found         Assessment    Assessment/Plan  No " problem-specific Assessment & Plan notes found for this encounter  Assessment/Plan  Mr Kristofer Ramirez is a 79 yr male with stage IIIa melanoma on adjuvant treatment pembrolizumab here for continued monitoring, follow-up, and surveillance while on treatment      1  Malignant melanoma of skin of face ([address] )  2  Malignant melanoma of forehead ([facility] )  3  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[facility] )  He is doing well and tolerating treatment  He is stable shortness of breath predated treatment, we will continue to monitor  Labs reviewed and okay  He will continue with treatment and it is okay to continue with pembrolizumab  He has standing orders for blood work prior to each treatment  He is due for full body imaging to assess disease status scheduled for 6/5/23  He did recently have continued monitoring of pulmonary nodules that were identified and these seem to be stable to date       He knows to continue to monitor for immune mediated side effects and call with issues or concerns prior to his next visit   - NM pet ct tumor imaging whole body; Future     4  High risk medication use  He is on treatment with immunotherapy and we will continue to monitor for side effects and lab abnormalities   We will monitor labs with each treatment to ensure safety of continuing on treatment  He knows to watch for signs and symptoms for immune mediated side effects   Denies any immune mediated side effects at this time  No follow-ups on file

## 2023-05-30 ENCOUNTER — OFFICE VISIT (OUTPATIENT)
Dept: INTERNAL MEDICINE CLINIC | Facility: OTHER | Age: 83
End: 2023-05-30

## 2023-05-30 ENCOUNTER — HOSPITAL ENCOUNTER (OUTPATIENT)
Dept: INFUSION CENTER | Facility: CLINIC | Age: 83
Discharge: HOME | End: 2023-05-30

## 2023-05-30 VITALS
OXYGEN SATURATION: 99 % | BODY MASS INDEX: 25.43 KG/M2 | HEART RATE: 57 BPM | RESPIRATION RATE: 18 BRPM | HEIGHT: 67 IN | WEIGHT: 162 LBS | DIASTOLIC BLOOD PRESSURE: 74 MMHG | SYSTOLIC BLOOD PRESSURE: 130 MMHG | TEMPERATURE: 98.5 F

## 2023-05-30 DIAGNOSIS — I10 ESSENTIAL HYPERTENSION: ICD-10-CM

## 2023-05-30 DIAGNOSIS — D69.6 THROMBOCYTOPENIA (HCC): ICD-10-CM

## 2023-05-30 DIAGNOSIS — I10 BENIGN ESSENTIAL HTN: ICD-10-CM

## 2023-05-30 DIAGNOSIS — G20 PARKINSON'S DISEASE (HCC): ICD-10-CM

## 2023-05-30 DIAGNOSIS — E78.2 MIXED HYPERLIPIDEMIA: ICD-10-CM

## 2023-05-30 DIAGNOSIS — R80.9 MICROALBUMINURIA: ICD-10-CM

## 2023-05-30 DIAGNOSIS — M17.12 ARTHRITIS OF LEFT KNEE: ICD-10-CM

## 2023-05-30 DIAGNOSIS — E11.9 TYPE 2 DIABETES MELLITUS WITHOUT COMPLICATION, WITHOUT LONG-TERM CURRENT USE OF INSULIN (HCC): Primary | ICD-10-CM

## 2023-05-30 DIAGNOSIS — N18.30 STAGE 3 CHRONIC KIDNEY DISEASE, UNSPECIFIED WHETHER STAGE 3A OR 3B CKD (HCC): ICD-10-CM

## 2023-05-30 DIAGNOSIS — C43.30 MALIGNANT MELANOMA OF SKIN OF FACE (HCC): ICD-10-CM

## 2023-05-30 PROBLEM — K13.70 ORAL LESION: Status: RESOLVED | Noted: 2022-11-29 | Resolved: 2023-05-30

## 2023-05-30 PROBLEM — U07.1 COVID-19: Status: RESOLVED | Noted: 2023-02-01 | Resolved: 2023-05-30

## 2023-05-30 RX ORDER — ATENOLOL 100 MG/1
100 TABLET ORAL DAILY
Qty: 90 TABLET | Refills: 3 | Status: SHIPPED | OUTPATIENT
Start: 2023-05-30

## 2023-05-30 RX ORDER — LISINOPRIL AND HYDROCHLOROTHIAZIDE 25; 20 MG/1; MG/1
1 TABLET ORAL DAILY
Qty: 90 TABLET | Refills: 3 | Status: SHIPPED | OUTPATIENT
Start: 2023-05-30

## 2023-05-30 RX ORDER — GLIMEPIRIDE 4 MG/1
4 TABLET ORAL DAILY
Qty: 90 TABLET | Refills: 3 | Status: SHIPPED | OUTPATIENT
Start: 2023-05-30

## 2023-05-30 RX ORDER — LOVASTATIN 40 MG/1
40 TABLET ORAL DAILY
Qty: 90 TABLET | Refills: 3 | Status: SHIPPED | OUTPATIENT
Start: 2023-05-30

## 2023-05-30 NOTE — ASSESSMENT & PLAN NOTE
Stable, controlled  Continue glimepiride 4 mg daily and metformin 1000 mg twice a day    Lab Results   Component Value Date    HGBA1C 6 1 (H) 05/24/2023

## 2023-05-30 NOTE — ASSESSMENT & PLAN NOTE
Well-controlled  Continue current antihypertensive regimen: Atenolol 100 mg daily and lisinopril-hydrochlorothiazide 20-25 mg daily

## 2023-05-30 NOTE — ASSESSMENT & PLAN NOTE
Lab Results   Component Value Date    CREATININE 1 42 (H) 05/24/2023    CREATININE 1 39 (H) 04/28/2023    CREATININE 1 43 (H) 04/10/2023    EGFR 45 05/24/2023    EGFR 46 04/28/2023    EGFR 44 04/10/2023   Continue to trend kidney function  Avoid nephrotoxic agents

## 2023-05-30 NOTE — PROGRESS NOTES
Assessment/Plan:    Essential hypertension  Well-controlled  Continue current antihypertensive regimen: Atenolol 100 mg daily and lisinopril-hydrochlorothiazide 20-25 mg daily  Type 2 diabetes mellitus without complication, without long-term current use of insulin ([facility])  Stable, controlled  Continue glimepiride 4 mg daily and metformin 1000 mg twice a day  Lab Results   Component Value Date    HGBA1C 6 1 (H) 05/24/2023       Malignant melanoma of skin of face ([facility] )  Continue follow-up with oncology  Thrombocytopenia (HCC)  Asymptomatic, continue to trend CBC  Stage 3 chronic kidney disease, unspecified whether stage 3a or 3b CKD ([place] [address] )  Lab Results   Component Value Date    CREATININE 1 42 (H) 05/24/2023    CREATININE 1 39 (H) 04/28/2023    CREATININE 1 43 (H) 04/10/2023    EGFR 45 05/24/2023    EGFR 46 04/28/2023    EGFR 44 04/10/2023   Continue to trend kidney function  Avoid nephrotoxic agents  Mixed hyperlipidemia  Controlled  Continue lovastatin 40 mg daily  Microalbuminuria  Improving  Continue to trend  Arthritis of left knee  Recommend that he take Tylenol as needed  Diagnoses and all orders for this visit:    Type 2 diabetes mellitus without complication, without long-term current use of insulin ([facility])  -     metFORMIN (GLUCOPHAGE) 1000 MG tablet; Take 1 tablet (1,000 mg total) by mouth 2 (two) times a day with meals  -     glimepiride (AMARYL) 4 mg tablet; Take 1 tablet (4 mg total) by mouth daily    Mixed hyperlipidemia  -     lovastatin (MEVACOR) 40 MG tablet; Take 1 tablet (40 mg total) by mouth daily    Benign essential HTN  -     lisinopril-hydrochlorothiazide (PRINZIDE,ZESTORETIC) 20-25 MG per tablet; Take 1 tablet by mouth daily  -     atenolol (TENORMIN) 100 mg tablet;  Take 1 tablet (100 mg total) by mouth daily    Parkinson's disease ([facility] )    Thrombocytopenia (HCC)    Essential hypertension    Malignant melanoma of skin of face ([place] [address] )    Stage 3 chronic kidney disease, unspecified whether stage 3a or 3b CKD ([facility] )    Microalbuminuria    Arthritis of left knee                  Subjective:      Patient ID: Willie Muniz is a 79 y o  male  Chief Complaint   Patient presents with   • Follow-up     6 month, labs done 5/24/23   • hm     Phq, bmi f/u plan   • Knee Pain     Left knee pain for past few weeks, no injury noted       79year old male is seen today for follow up of chronic conditions  Laboratory studies reviewed today, hemoglobin A1c controlled at 6 1%  Microalbumin-creatinine ratio improved to 85 from 200s  Remaining laboratory studies are within acceptable range and stable  CBC shows stable thrombocytopenia  He has been experiencing left knee pain  Denies any swelling or recent injury  Pain occurs in the morning  He has not been taking any medications for his pain  He has no other complaints or concerns at this time  Knee Pain   Pertinent negatives include no numbness  Diabetes  He presents for his follow-up diabetic visit  He has type 2 diabetes mellitus  His disease course has been stable  Pertinent negatives for hypoglycemia include no dizziness or headaches  There are no diabetic associated symptoms  Pertinent negatives for diabetes include no chest pain, no fatigue and no weakness  Current diabetic treatment includes diet and oral agent (dual therapy)  He is compliant with treatment all of the time  An ACE inhibitor/angiotensin II receptor blocker is being taken  Hyperlipidemia  This is a chronic problem  The current episode started more than 1 year ago  The problem is controlled  Recent lipid tests were reviewed and are normal  Pertinent negatives include no chest pain or shortness of breath  Current antihyperlipidemic treatment includes statins  The current treatment provides moderate improvement of lipids  There are no compliance problems  Hypertension  This is a chronic problem  The current episode started more than 1 year ago   The problem is unchanged  The problem is controlled  Pertinent negatives include no chest pain, headaches, palpitations or shortness of breath  Past treatments include diuretics, beta blockers and ACE inhibitors  The current treatment provides moderate improvement  There are no compliance problems  The following portions of the patient's history were reviewed and updated as appropriate: allergies, current medications, past family history, past medical history, past social history, past surgical history and problem list     Review of Systems   Constitutional: Negative for activity change, appetite change, chills, diaphoresis, fatigue and fever  HENT: Negative for congestion, postnasal drip, rhinorrhea, sinus pressure, sinus pain, sneezing and sore throat  Eyes: Negative for visual disturbance  Respiratory: Negative for apnea, cough, choking, chest tightness, shortness of breath and wheezing  Cardiovascular: Negative for chest pain, palpitations and leg swelling  Gastrointestinal: Negative for abdominal distention, abdominal pain, anal bleeding, blood in stool, constipation, diarrhea, nausea and vomiting  Endocrine: Negative for cold intolerance and heat intolerance  Genitourinary: Negative for difficulty urinating, dysuria and hematuria  Musculoskeletal: Positive for arthralgias  Skin: Negative  Neurological: Negative for dizziness, weakness, light-headedness, numbness and headaches  Hematological: Negative for adenopathy  Psychiatric/Behavioral: Negative for agitation, sleep disturbance and suicidal ideas  All other systems reviewed and are negative          Past Medical History:   Diagnosis Date   • Diabetes mellitus ([facility] )    • Hyperlipidemia    • Hypertension          Current Outpatient Medications:   •  atenolol (TENORMIN) 100 mg tablet, Take 1 tablet (100 mg total) by mouth daily, Disp: 90 tablet, Rfl: 3  •  glimepiride (AMARYL) 4 mg tablet, Take 1 tablet (4 mg total) by mouth "daily, Disp: 90 tablet, Rfl: 3  •  lisinopril-hydrochlorothiazide (PRINZIDE,ZESTORETIC) 20-25 MG per tablet, Take 1 tablet by mouth daily, Disp: 90 tablet, Rfl: 3  •  lovastatin (MEVACOR) 40 MG tablet, Take 1 tablet (40 mg total) by mouth daily, Disp: 90 tablet, Rfl: 3  •  metFORMIN (GLUCOPHAGE) 1000 MG tablet, Take 1 tablet (1,000 mg total) by mouth 2 (two) times a day with meals, Disp: 180 tablet, Rfl: 3    No Known Allergies    Social History   Past Surgical History:   Procedure Laterality Date   • CATARACT EXTRACTION, BILATERAL     • FLAP LOCAL HEAD / NECK Left 10/12/2021    Procedure: RECONSTRUCTION OF LEFT TEMPLE DEFECT AFTER RESCECTION MELANOMA;  Surgeon: Yeni Duffy MD;  [facility];  Service: Plastics   • FULL THICKNESS SKIN GRAFT Left 10/12/2021    Procedure: SKIN GRAFT FULL THICKNESS LEFT TEMPLE;  Surgeon: Yeni Duffy MD;  [facility];  Service: Plastics   • GALLBLADDER SURGERY     • HAND SURGERY Left    • LYMPH NODE BIOPSY Left 10/12/2021    Procedure: BIOPSY LYMPH NODE SENTINEL, LYMPHOSCINTIGRAPHY, INTRAOPERATIVE LYMPHATIC MAPPING (INJECT AT 1200);   Surgeon: Chloe White MD;  [facility];  Service: Surgical Oncology   • NOSE SURGERY     • SKIN CANCER EXCISION  10/2021   • SKIN LESION EXCISION Left 10/12/2021    Procedure: [address];  Surgeon: Chloe White MD;  [facility];  Service: Surgical Oncology   • SPLIT THICKNESS SKIN GRAFT Left 10/12/2021    Procedure: SKIN GRAFT SPLIT THICKNESS LEFT TEMPLE;  Surgeon: Yeni Duffy MD;  [facility];  Service: Plastics   • US GUIDED LYMPH NODE BIOPSY LEFT  03/21/2022     Family History   Problem Relation Age of Onset   • No Known Problems Mother    • No Known Problems Father    • Colon cancer Other 60       Objective:  /74 (BP Location: Left arm, Patient Position: Sitting, Cuff Size: Standard)   Pulse 57   Temp 98 5 °F (36 9 °C) (Temporal)   Resp 18   Ht 5' 7\" " (1 702 m)   Wt 73 5 kg (162 lb)   SpO2 99%   BMI 25 37 kg/m²     Recent Results (from the past 1344 hour(s))   CBC and differential    Collection Time: 04/10/23  9:52 AM   Result Value Ref Range    WBC 7 39 4 31 - 10 16 Thousand/uL    RBC 4 68 3 88 - 5 62 Million/uL    Hemoglobin 13 9 12 0 - 17 0 g/dL    Hematocrit 42 3 36 5 - 49 3 %    MCV 90 82 - 98 fL    MCH 29 7 26 8 - 34 3 pg    MCHC 32 9 31 4 - 37 4 g/dL    RDW 13 3 11 6 - 15 1 %    MPV 10 9 8 9 - 12 7 fL    Platelets 602 (L) 250 - 390 Thousands/uL    nRBC 0 /100 WBCs    Neutrophils Relative 45 43 - 75 %    Immat GRANS % 0 0 - 2 %    Lymphocytes Relative 43 14 - 44 %    Monocytes Relative 7 4 - 12 %    Eosinophils Relative 5 0 - 6 %    Basophils Relative 0 0 - 1 %    Neutrophils Absolute 3 31 1 85 - 7 62 Thousands/µL    Immature Grans Absolute 0 02 0 00 - 0 20 Thousand/uL    Lymphocytes Absolute 3 14 0 60 - 4 47 Thousands/µL    Monocytes Absolute 0 51 0 17 - 1 22 Thousand/µL    Eosinophils Absolute 0 39 0 00 - 0 61 Thousand/µL    Basophils Absolute 0 02 0 00 - 0 10 Thousands/µL   Comprehensive metabolic panel    Collection Time: 04/10/23  9:52 AM   Result Value Ref Range    Sodium 136 135 - 147 mmol/L    Potassium 4 4 3 5 - 5 3 mmol/L    Chloride 105 96 - 108 mmol/L    CO2 30 21 - 32 mmol/L    ANION GAP 1 (L) 4 - 13 mmol/L    BUN 24 5 - 25 mg/dL    Creatinine 1 43 (H) 0 60 - 1 30 mg/dL    Glucose, Fasting 208 (H) 65 - 99 mg/dL    Calcium 8 9 8 3 - 10 1 mg/dL    AST 17 5 - 45 U/L    ALT 21 12 - 78 U/L    Alkaline Phosphatase 91 46 - 116 U/L    Total Protein 6 8 6 4 - 8 4 g/dL    Albumin 3 8 3 5 - 5 0 g/dL    Total Bilirubin 1 39 (H) 0 20 - 1 00 mg/dL    eGFR 44 ml/min/1 73sq m   LD,Blood    Collection Time: 04/10/23  9:52 AM   Result Value Ref Range     81 - 234 U/L   T3, free    Collection Time: 04/10/23  9:52 AM   Result Value Ref Range    T3, Free 2 28 (L) 2 30 - 4 20 pg/mL   T4, free    Collection Time: 04/10/23  9:52 AM   Result Value Ref Range Free T4 0 95 0 76 - 1 46 ng/dL   TSH, 3rd generation    Collection Time: 04/10/23  9:52 AM   Result Value Ref Range    TSH 3RD GENERATON 2 000 0 450 - 4 500 uIU/mL   CBC and differential    Collection Time: 04/28/23  9:57 AM   Result Value Ref Range    WBC 7 03 4 31 - 10 16 Thousand/uL    RBC 4 24 3 88 - 5 62 Million/uL    Hemoglobin 12 6 12 0 - 17 0 g/dL    Hematocrit 37 8 36 5 - 49 3 %    MCV 89 82 - 98 fL    MCH 29 7 26 8 - 34 3 pg    MCHC 33 3 31 4 - 37 4 g/dL    RDW 13 2 11 6 - 15 1 %    MPV 9 8 8 9 - 12 7 fL    Platelets 822 821 - 626 Thousands/uL    nRBC 0 /100 WBCs    Neutrophils Relative 46 43 - 75 %    Immat GRANS % 0 0 - 2 %    Lymphocytes Relative 43 14 - 44 %    Monocytes Relative 8 4 - 12 %    Eosinophils Relative 3 0 - 6 %    Basophils Relative 0 0 - 1 %    Neutrophils Absolute 3 19 1 85 - 7 62 Thousands/µL    Immature Grans Absolute 0 03 0 00 - 0 20 Thousand/uL    Lymphocytes Absolute 3 02 0 60 - 4 47 Thousands/µL    Monocytes Absolute 0 54 0 17 - 1 22 Thousand/µL    Eosinophils Absolute 0 23 0 00 - 0 61 Thousand/µL    Basophils Absolute 0 02 0 00 - 0 10 Thousands/µL   Comprehensive metabolic panel    Collection Time: 04/28/23  9:57 AM   Result Value Ref Range    Sodium 139 135 - 147 mmol/L    Potassium 4 6 3 5 - 5 3 mmol/L    Chloride 110 (H) 96 - 108 mmol/L    CO2 28 21 - 32 mmol/L    ANION GAP 1 (L) 4 - 13 mmol/L    BUN 29 (H) 5 - 25 mg/dL    Creatinine 1 39 (H) 0 60 - 1 30 mg/dL    Glucose 187 (H) 65 - 140 mg/dL    Calcium 8 7 8 3 - 10 1 mg/dL    Corrected Calcium 9 3 8 3 - 10 1 mg/dL    AST 26 5 - 45 U/L    ALT 69 12 - 78 U/L    Alkaline Phosphatase 83 46 - 116 U/L    Total Protein 5 8 (L) 6 4 - 8 4 g/dL    Albumin 3 3 (L) 3 5 - 5 0 g/dL    Total Bilirubin 0 64 0 20 - 1 00 mg/dL    eGFR 46 ml/min/1 73sq m   LD,Blood    Collection Time: 04/28/23  9:57 AM   Result Value Ref Range     81 - 234 U/L   T3, free    Collection Time: 04/28/23  9:57 AM   Result Value Ref Range    T3, Free 2 13 (L) 2 30 - 4 20 pg/mL   T4, free    Collection Time: 04/28/23  9:57 AM   Result Value Ref Range    Free T4 1 03 0 76 - 1 46 ng/dL   TSH, 3rd generation    Collection Time: 04/28/23  9:57 AM   Result Value Ref Range    TSH 3RD GENERATON 1 660 0 450 - 4 500 uIU/mL   CBC and differential    Collection Time: 05/24/23  8:36 AM   Result Value Ref Range    WBC 8 65 4 31 - 10 16 Thousand/uL    RBC 4 98 3 88 - 5 62 Million/uL    Hemoglobin 14 9 12 0 - 17 0 g/dL    Hematocrit 44 6 36 5 - 49 3 %    MCV 90 82 - 98 fL    MCH 29 9 26 8 - 34 3 pg    MCHC 33 4 31 4 - 37 4 g/dL    RDW 13 2 11 6 - 15 1 %    MPV 10 3 8 9 - 12 7 fL    Platelets 572 (L) 882 - 390 Thousands/uL    nRBC 0 /100 WBCs    Neutrophils Relative 44 43 - 75 %    Immat GRANS % 1 0 - 2 %    Lymphocytes Relative 43 14 - 44 %    Monocytes Relative 8 4 - 12 %    Eosinophils Relative 4 0 - 6 %    Basophils Relative 0 0 - 1 %    Neutrophils Absolute 3 83 1 85 - 7 62 Thousands/µL    Immature Grans Absolute 0 04 0 00 - 0 20 Thousand/uL    Lymphocytes Absolute 3 68 0 60 - 4 47 Thousands/µL    Monocytes Absolute 0 69 0 17 - 1 22 Thousand/µL    Eosinophils Absolute 0 38 0 00 - 0 61 Thousand/µL    Basophils Absolute 0 03 0 00 - 0 10 Thousands/µL   Comprehensive metabolic panel    Collection Time: 05/24/23  8:36 AM   Result Value Ref Range    Sodium 135 135 - 147 mmol/L    Potassium 3 9 3 5 - 5 3 mmol/L    Chloride 107 96 - 108 mmol/L    CO2 27 21 - 32 mmol/L    ANION GAP 1 (L) 4 - 13 mmol/L    BUN 31 (H) 5 - 25 mg/dL    Creatinine 1 42 (H) 0 60 - 1 30 mg/dL    Glucose, Fasting 80 65 - 99 mg/dL    Calcium 9 3 8 3 - 10 1 mg/dL    AST 18 5 - 45 U/L    ALT 21 12 - 78 U/L    Alkaline Phosphatase 89 46 - 116 U/L    Total Protein 7 1 6 4 - 8 4 g/dL    Albumin 4 0 3 5 - 5 0 g/dL    Total Bilirubin 1 57 (H) 0 20 - 1 00 mg/dL    eGFR 45 ml/min/1 73sq m   LD,Blood    Collection Time: 05/24/23  8:36 AM   Result Value Ref Range     81 - 234 U/L   T3, free    Collection Time: 05/24/23 8:36 AM   Result Value Ref Range    T3, Free 2 89 2 50 - 3 90 pg/mL   T4, free    Collection Time: 05/24/23  8:36 AM   Result Value Ref Range    Free T4 0 80 0 61 - 1 12 ng/dL   TSH, 3rd generation    Collection Time: 05/24/23  8:36 AM   Result Value Ref Range    TSH 3RD GENERATON 2 922 0 450 - 4 500 uIU/mL   Hemoglobin A1C    Collection Time: 05/24/23  8:36 AM   Result Value Ref Range    Hemoglobin A1C 6 1 (H) Normal 3 8-5 6%; PreDiabetic 5 7-6 4%; Diabetic >=6 5%; Glycemic control for adults with diabetes <7 0% %     mg/dl   Lipid panel    Collection Time: 05/24/23  8:36 AM   Result Value Ref Range    Cholesterol 144 See Comment mg/dL    Triglycerides 119 See Comment mg/dL    HDL, Direct 51 >=40 mg/dL    LDL Calculated 69 0 - 100 mg/dL    Non-HDL-Chol (CHOL-HDL) 93 mg/dl   Microalbumin / creatinine urine ratio    Collection Time: 05/24/23  8:36 AM   Result Value Ref Range    Creatinine, Ur 90 5 mg/dL    Albumin,U,Random 76 5 (H) 0 0 - 20 0 mg/L    Albumin Creat Ratio 85 (H) 0 - 30 mg/g creatinine            Physical Exam  Vitals and nursing note reviewed  Constitutional:       General: He is not in acute distress  Appearance: He is well-developed  He is not diaphoretic  HENT:      Head: Normocephalic and atraumatic  Eyes:      General: No scleral icterus  Right eye: No discharge  Left eye: No discharge  Conjunctiva/sclera: Conjunctivae normal       Pupils: Pupils are equal, round, and reactive to light  Neck:      Thyroid: No thyromegaly  Vascular: No JVD  Cardiovascular:      Rate and Rhythm: Normal rate and regular rhythm  Heart sounds: Normal heart sounds  No murmur heard  No friction rub  No gallop  Pulmonary:      Effort: Pulmonary effort is normal  No respiratory distress  Breath sounds: Normal breath sounds  No wheezing or rales  Chest:      Chest wall: No tenderness  Abdominal:      General: Bowel sounds are normal  There is no distension  Palpations: Abdomen is soft  There is no mass  Tenderness: There is no abdominal tenderness  There is no guarding or rebound  Musculoskeletal:         General: No tenderness or deformity  Normal range of motion  Cervical back: Normal range of motion and neck supple  Lymphadenopathy:      Cervical: No cervical adenopathy  Skin:     General: Skin is warm and dry  Coloration: Skin is not pale  Findings: No erythema or rash  Neurological:      Mental Status: He is alert and oriented to person, place, and time  Cranial Nerves: No cranial nerve deficit  Coordination: Coordination normal       Deep Tendon Reflexes: Reflexes are normal and symmetric  Psychiatric:         Behavior: Behavior normal          Thought Content:  Thought content normal          Judgment: Judgment normal

## 2023-06-05 ENCOUNTER — HOSPITAL ENCOUNTER (OUTPATIENT)
Dept: RADIOLOGY | Age: 83
Discharge: HOME | End: 2023-06-05
Payer: COMMERCIAL

## 2023-06-05 DIAGNOSIS — C77.3 SECONDARY AND UNSPECIFIED MALIGNANT NEOPLASM OF AXILLA AND UPPER LIMB LYMPH NODES (HCC): ICD-10-CM

## 2023-06-05 DIAGNOSIS — C43.39 MALIGNANT MELANOMA OF FOREHEAD (HCC): ICD-10-CM

## 2023-06-05 DIAGNOSIS — C43.30 MALIGNANT MELANOMA OF SKIN OF FACE (HCC): ICD-10-CM

## 2023-06-05 LAB — GLUCOSE SERPL-MCNC: 152 MG/DL (ref 65–140)

## 2023-06-05 PROCEDURE — G1004 CDSM NDSC: HCPCS

## 2023-06-05 PROCEDURE — 78816 PET IMAGE W/CT FULL BODY: CPT

## 2023-06-05 PROCEDURE — 82948 REAGENT STRIP/BLOOD GLUCOSE: CPT

## 2023-06-05 PROCEDURE — A9552 F18 FDG: HCPCS

## 2023-06-08 ENCOUNTER — APPOINTMENT (OUTPATIENT)
Dept: LAB | Facility: IMAGING CENTER | Age: 83
End: 2023-06-08
Payer: COMMERCIAL

## 2023-06-08 DIAGNOSIS — C43.39 MALIGNANT MELANOMA OF FOREHEAD (HCC): ICD-10-CM

## 2023-06-08 DIAGNOSIS — C43.30 MALIGNANT MELANOMA OF SKIN OF FACE (HCC): ICD-10-CM

## 2023-06-08 DIAGNOSIS — Z79.899 HIGH RISK MEDICATION USE: ICD-10-CM

## 2023-06-08 LAB
ALBUMIN SERPL BCP-MCNC: 3.9 G/DL (ref 3.5–5)
ALP SERPL-CCNC: 89 U/L (ref 46–116)
ALT SERPL W P-5'-P-CCNC: 24 U/L (ref 12–78)
ANION GAP SERPL CALCULATED.3IONS-SCNC: 3 MMOL/L (ref 4–13)
AST SERPL W P-5'-P-CCNC: 13 U/L (ref 5–45)
BASOPHILS # BLD AUTO: 0.02 THOUSANDS/ÂΜL (ref 0–0.1)
BASOPHILS NFR BLD AUTO: 0 % (ref 0–1)
BILIRUB SERPL-MCNC: 1.02 MG/DL (ref 0.2–1)
BUN SERPL-MCNC: 41 MG/DL (ref 5–25)
CALCIUM SERPL-MCNC: 9.1 MG/DL (ref 8.3–10.1)
CHLORIDE SERPL-SCNC: 110 MMOL/L (ref 96–108)
CO2 SERPL-SCNC: 28 MMOL/L (ref 21–32)
CREAT SERPL-MCNC: 1.74 MG/DL (ref 0.6–1.3)
EOSINOPHIL # BLD AUTO: 0.31 THOUSAND/ÂΜL (ref 0–0.61)
EOSINOPHIL NFR BLD AUTO: 4 % (ref 0–6)
ERYTHROCYTE [DISTWIDTH] IN BLOOD BY AUTOMATED COUNT: 13.2 % (ref 11.6–15.1)
GFR SERPL CREATININE-BSD FRML MDRD: 35 ML/MIN/1.73SQ M
GLUCOSE P FAST SERPL-MCNC: 147 MG/DL (ref 65–99)
HCT VFR BLD AUTO: 42.9 % (ref 36.5–49.3)
HGB BLD-MCNC: 13.9 G/DL (ref 12–17)
IMM GRANULOCYTES # BLD AUTO: 0.03 THOUSAND/UL (ref 0–0.2)
IMM GRANULOCYTES NFR BLD AUTO: 0 % (ref 0–2)
LDH SERPL-CCNC: 123 U/L (ref 81–234)
LYMPHOCYTES # BLD AUTO: 3.46 THOUSANDS/ÂΜL (ref 0.6–4.47)
LYMPHOCYTES NFR BLD AUTO: 44 % (ref 14–44)
MCH RBC QN AUTO: 29 PG (ref 26.8–34.3)
MCHC RBC AUTO-ENTMCNC: 32.4 G/DL (ref 31.4–37.4)
MCV RBC AUTO: 90 FL (ref 82–98)
MONOCYTES # BLD AUTO: 0.57 THOUSAND/ÂΜL (ref 0.17–1.22)
MONOCYTES NFR BLD AUTO: 7 % (ref 4–12)
NEUTROPHILS # BLD AUTO: 3.48 THOUSANDS/ÂΜL (ref 1.85–7.62)
NEUTS SEG NFR BLD AUTO: 45 % (ref 43–75)
NRBC BLD AUTO-RTO: 0 /100 WBCS
PLATELET # BLD AUTO: 136 THOUSANDS/UL (ref 149–390)
PMV BLD AUTO: 10.6 FL (ref 8.9–12.7)
POTASSIUM SERPL-SCNC: 4.1 MMOL/L (ref 3.5–5.3)
PROT SERPL-MCNC: 6.6 G/DL (ref 6.4–8.4)
RBC # BLD AUTO: 4.79 MILLION/UL (ref 3.88–5.62)
SODIUM SERPL-SCNC: 141 MMOL/L (ref 135–147)
T3FREE SERPL-MCNC: 2.64 PG/ML (ref 2.5–3.9)
T4 FREE SERPL-MCNC: 0.77 NG/DL (ref 0.61–1.12)
TSH SERPL DL<=0.05 MIU/L-ACNC: 1.9 UIU/ML (ref 0.45–4.5)
WBC # BLD AUTO: 7.87 THOUSAND/UL (ref 4.31–10.16)

## 2023-06-08 PROCEDURE — 84439 ASSAY OF FREE THYROXINE: CPT

## 2023-06-08 PROCEDURE — 84481 FREE ASSAY (FT-3): CPT

## 2023-06-08 PROCEDURE — 80053 COMPREHEN METABOLIC PANEL: CPT

## 2023-06-08 PROCEDURE — 85025 COMPLETE CBC W/AUTO DIFF WBC: CPT

## 2023-06-08 PROCEDURE — 84443 ASSAY THYROID STIM HORMONE: CPT

## 2023-06-08 PROCEDURE — 83615 LACTATE (LD) (LDH) ENZYME: CPT

## 2023-06-08 PROCEDURE — 36415 COLL VENOUS BLD VENIPUNCTURE: CPT

## 2023-06-08 RX ORDER — SODIUM CHLORIDE 9 MG/ML
20 INJECTION, SOLUTION INTRAVENOUS ONCE
Status: CANCELLED | OUTPATIENT
Start: 2023-06-15

## 2023-06-14 ENCOUNTER — OFFICE VISIT (OUTPATIENT)
Dept: HEMATOLOGY ONCOLOGY | Facility: CLINIC | Age: 83
End: 2023-06-14
Payer: COMMERCIAL

## 2023-06-14 VITALS
SYSTOLIC BLOOD PRESSURE: 136 MMHG | OXYGEN SATURATION: 91 % | BODY MASS INDEX: 25.58 KG/M2 | DIASTOLIC BLOOD PRESSURE: 80 MMHG | TEMPERATURE: 96.8 F | WEIGHT: 163 LBS | HEIGHT: 67 IN | HEART RATE: 58 BPM | RESPIRATION RATE: 16 BRPM

## 2023-06-14 DIAGNOSIS — C43.39 MALIGNANT MELANOMA OF FOREHEAD (HCC): ICD-10-CM

## 2023-06-14 DIAGNOSIS — N18.30 STAGE 3 CHRONIC KIDNEY DISEASE, UNSPECIFIED WHETHER STAGE 3A OR 3B CKD (HCC): ICD-10-CM

## 2023-06-14 DIAGNOSIS — C43.30 MALIGNANT MELANOMA OF SKIN OF FACE (HCC): Primary | ICD-10-CM

## 2023-06-14 DIAGNOSIS — Z79.899 HIGH RISK MEDICATION USE: ICD-10-CM

## 2023-06-14 DIAGNOSIS — C77.3 SECONDARY AND UNSPECIFIED MALIGNANT NEOPLASM OF AXILLA AND UPPER LIMB LYMPH NODES (HCC): ICD-10-CM

## 2023-06-14 PROCEDURE — 99214 OFFICE O/P EST MOD 30 MIN: CPT | Performed by: INTERNAL MEDICINE

## 2023-06-15 ENCOUNTER — TELEPHONE (OUTPATIENT)
Dept: HEMATOLOGY ONCOLOGY | Facility: CLINIC | Age: 83
End: 2023-06-15

## 2023-06-15 ENCOUNTER — HOSPITAL ENCOUNTER (OUTPATIENT)
Dept: INFUSION CENTER | Facility: CLINIC | Age: 83
Discharge: HOME | End: 2023-06-15
Payer: COMMERCIAL

## 2023-06-15 ENCOUNTER — DOCUMENTATION (OUTPATIENT)
Dept: HEMATOLOGY ONCOLOGY | Facility: CLINIC | Age: 83
End: 2023-06-15

## 2023-06-15 VITALS
OXYGEN SATURATION: 95 % | BODY MASS INDEX: 25.43 KG/M2 | HEIGHT: 67 IN | WEIGHT: 162 LBS | RESPIRATION RATE: 16 BRPM | DIASTOLIC BLOOD PRESSURE: 73 MMHG | SYSTOLIC BLOOD PRESSURE: 151 MMHG | TEMPERATURE: 96.2 F | HEART RATE: 56 BPM

## 2023-06-15 DIAGNOSIS — C43.30 MALIGNANT MELANOMA OF SKIN OF FACE (HCC): ICD-10-CM

## 2023-06-15 DIAGNOSIS — C43.39 MALIGNANT MELANOMA OF FOREHEAD (HCC): ICD-10-CM

## 2023-06-15 DIAGNOSIS — C43.39 MALIGNANT MELANOMA OF FOREHEAD (HCC): Primary | ICD-10-CM

## 2023-06-15 DIAGNOSIS — C43.30 MALIGNANT MELANOMA OF SKIN OF FACE (HCC): Primary | ICD-10-CM

## 2023-06-15 PROCEDURE — 96413 CHEMO IV INFUSION 1 HR: CPT

## 2023-06-15 RX ORDER — SODIUM CHLORIDE 9 MG/ML
20 INJECTION, SOLUTION INTRAVENOUS ONCE
Status: COMPLETED | OUTPATIENT
Start: 2023-06-15 | End: 2023-06-15

## 2023-06-15 RX ADMIN — SODIUM CHLORIDE 400 MG: 9 INJECTION, SOLUTION INTRAVENOUS at 08:32

## 2023-06-15 RX ADMIN — SODIUM CHLORIDE 20 ML/HR: 0.9 INJECTION, SOLUTION INTRAVENOUS at 08:05

## 2023-06-15 NOTE — PROGRESS NOTES
I spoke with patients wife and let her know that Salvatore's f/u with Dr Won Garner has been rescheduled to Thurs   8/3/23 at 8:20am followed by his treatment at the infusion center at 8:45am

## 2023-06-15 NOTE — PROGRESS NOTES
Pt to clinic for keytruda infusion, pt tolerated without comp[lications, aware of next appointment, schedule printed

## 2023-06-15 NOTE — PLAN OF CARE
Problem: Potential for Falls  Goal: Patient will remain free of falls  Description: INTERVENTIONS:  - Educate patient/family on patient safety including physical limitations  - Instruct patient to call for assistance with activity   - Consult OT/PT to assist with strengthening/mobility   - Keep Call bell within reach  - Keep bed low and locked with side rails adjusted as appropriate  - Keep care items and personal belongings within reach  - Initiate and maintain comfort rounds  - Make Fall Risk Sign visible to staff  - Apply yellow socks and bracelet for high fall risk patients  - Consider moving patient to room near nurses station  Outcome: Completed     Problem: Knowledge Deficit  Goal: Patient/family/caregiver demonstrates understanding of disease process, treatment plan, medications, and discharge instructions  Description: Complete learning assessment and assess knowledge base    Interventions:  - Provide teaching at level of understanding  - Provide teaching via preferred learning methods  Outcome: Completed

## 2023-06-15 NOTE — TELEPHONE ENCOUNTER
Appointment Confirmation   Who are you speaking with? Patient   If it is not the patient, are they listed on an active communication consent form? N/A   Which provider is the appointment scheduled with? Dr Abhilash Ellsworth   When is the appointment scheduled? Please list date and time 8/3/23 860   At which location is the appointment scheduled to take place? [facility]   Did caller verbalize understanding of appointment details?  Yes

## 2023-07-19 NOTE — PROGRESS NOTES
[facility] HEMATOLOGY ONCOLOGY SPECIALISTS [address]  [address] [address] [phone]     Date of Visit: 6/14/2023  Name: Bindu Gerber   YOB: 1940        Subjective    VISIT DIAGNOSIS:  Diagnoses and all orders for this visit:    Malignant melanoma of skin of face ([address])    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[address])    Malignant melanoma of forehead ([address])    High risk medication use    Stage 3 chronic kidney disease, unspecified whether stage 3a or 3b CKD ([address])        Oncology History   Malignant melanoma of skin of face ([address])   9/8/2021 -  Cancer Staged    Staging form: Melanoma of the Skin, AJCC 8th Edition  - Clinical stage from 9/8/2021: Stage III (cT2a, cN1a, cM0) - Signed by Jessee Hickey MD on 11/7/2021 9/8/2021 -  Cancer Staged    Staging form: Melanoma of the Skin, AJCC 8th Edition  - Pathologic stage from 9/8/2021: Stage IIIA (pT2a, pN1a, cM0) - Signed by Jessee Hickey MD on 11/7/2021 9/22/2021 Initial Diagnosis    Malignant melanoma of skin of face ([address])     4/4/2022 -  Chemotherapy    pembrolizumab (KEYTRUDA) IVPB, 400 mg, Intravenous, Once, 11 of 15 cycles  Administration: 400 mg (4/4/2022), 400 mg (5/16/2022), 400 mg (6/27/2022), 400 mg (8/8/2022), 400 mg (9/19/2022), 400 mg (10/31/2022), 400 mg (12/12/2022), 400 mg (1/23/2023), 400 mg (3/6/2023), 400 mg (5/5/2023), 400 mg (6/15/2023)     Malignant melanoma of forehead ([address])   11/29/2021 Initial Diagnosis    Malignant melanoma of forehead ([address])     4/4/2022 -  Chemotherapy    pembrolizumab (KEYTRUDA) IVPB, 400 mg, Intravenous, Once, 11 of 15 cycles  Administration: 400 mg (4/4/2022), 400 mg (5/16/2022), 400 mg (6/27/2022), 400 mg (8/8/2022), 400 mg (9/19/2022), 400 mg (10/31/2022), 400 mg (12/12/2022), 400 mg (1/23/2023), 400 mg (3/6/2023), 400 mg (5/5/2023), 400 mg (6/15/2023)        Cancer Staging   Malignant melanoma of skin of face [facility])  Staging form: Melanoma of the Skin, AJCC 8th Edition  - Clinical stage from 9/8/2021: Stage III (cT2a, cN1a, cM0) - Signed by Maggie Crane MD on 11/7/2021  - Pathologic stage from 9/8/2021: Stage IIIA (pT2a, pN1a, cM0) - Signed by Maggie Crane MD on 11/7/2021     Treatment Details   Treatment goal Curative   Plan Name OP Pembrolizumab Q 42 Days   Status Active   Start Date 4/4/2022   End Date 12/28/2023 (Planned)   Provider Maggie Crane MD   Chemotherapy pembrolizumab [facility]) IVPB, 400 mg, Intravenous, Once, 11 of 15 cycles  Administration: 400 mg (4/4/2022), 400 mg (5/16/2022), 400 mg (6/27/2022), 400 mg (8/8/2022), 400 mg (9/19/2022), 400 mg (10/31/2022), 400 mg (12/12/2022), 400 mg (1/23/2023), 400 mg (3/6/2023), 400 mg (5/5/2023), 400 mg (6/15/2023)          HISTORY OF PRESENT ILLNESS: Julien Sawyer is a 79 y.o. male  who has stage IIIa melanoma although concern for involvement in lymph nodes with indeterminate biopsy for which he is on treatment for pembrolizumab and he is here for continued monitoring, follow-up, and surveillance while on treatment. He is doing well and tolerating treatment. He denies any issues concerns or complaints at this time. Denies any concerning skin lesions. Denies any lymphadenopathy. Denies immune mediated side effects. Denies headache, double vision, rash, itching, chest pain, shortness of breath, and diarrhea. Denies fatigue or muscle weakness. Eating well. He did undergo PET scan on 6/5/2023 to evaluate treatment response and PET scan demonstrates improved small bilateral pulmonary nodules that do not have significant FDG uptake and no other findings concerning for metastatic disease. REVIEW OF SYSTEMS:  Review of Systems   Constitutional: Negative for appetite change, fatigue, fever and unexpected weight change. HENT:   Negative for lump/mass, trouble swallowing and voice change. Eyes: Negative for icterus.    Respiratory: Negative for cough, shortness of breath and wheezing. Cardiovascular: Negative for leg swelling. Gastrointestinal: Negative for abdominal pain, constipation, diarrhea, nausea and vomiting. Genitourinary: Negative for difficulty urinating and hematuria. Musculoskeletal: Negative for arthralgias, gait problem and myalgias. Skin: Negative for itching and rash. No new, changing, or concerning lesions. Neurological: Negative for extremity weakness, gait problem, headaches, light-headedness and numbness. Hematological: Negative for adenopathy.         MEDICATIONS:    Current Outpatient Medications:   •  atenolol (TENORMIN) 100 mg tablet, Take 1 tablet (100 mg total) by mouth daily, Disp: 90 tablet, Rfl: 3  •  glimepiride (AMARYL) 4 mg tablet, Take 1 tablet (4 mg total) by mouth daily, Disp: 90 tablet, Rfl: 3  •  lisinopril-hydrochlorothiazide (PRINZIDE,ZESTORETIC) 20-25 MG per tablet, Take 1 tablet by mouth daily, Disp: 90 tablet, Rfl: 3  •  lovastatin (MEVACOR) 40 MG tablet, Take 1 tablet (40 mg total) by mouth daily, Disp: 90 tablet, Rfl: 3  •  metFORMIN (GLUCOPHAGE) 1000 MG tablet, Take 1 tablet (1,000 mg total) by mouth 2 (two) times a day with meals, Disp: 180 tablet, Rfl: 3     ALLERGIES:  No Known Allergies     ACTIVE PROBLEMS:  Patient Active Problem List   Diagnosis   • Essential hypertension   • Hiatal hernia with GERD   • Status post laparoscopic cholecystectomy   • Status post umbilical hernia repair, follow-up exam   • Type 2 diabetes mellitus without complication, without long-term current use of insulin (HCC)   • Mixed hyperlipidemia   • Parkinson's disease ([facility])   • Microalbuminuria   • Malignant melanoma of skin of face (HCC)   • Thrombocytopenia (HCC)   • Malignant melanoma of forehead (HCC)   • 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)   • Stage 3 chronic kidney disease, unspecified whether stage 3a or 3b CKD ([address] [facility])   • Advanced care planning/counseling discussion   • Arthritis of left knee          PAST MEDICAL HISTORY:   Past Medical History:   Diagnosis Date   • Diabetes mellitus ([address])    • Hyperlipidemia    • Hypertension         PAST SURGICAL HISTORY:  Past Surgical History:   Procedure Laterality Date   • CATARACT EXTRACTION, BILATERAL     • FLAP LOCAL HEAD / NECK Left 10/12/2021    Procedure: RECONSTRUCTION OF LEFT TEMPLE DEFECT AFTER RESCECTION MELANOMA;  Surgeon: Michele Lu MD;  [facility];  Service: Plastics   • FULL THICKNESS SKIN GRAFT Left 10/12/2021    Procedure: SKIN GRAFT FULL THICKNESS LEFT TEMPLE;  Surgeon: Michele Lu MD;  [facility];  Service: Plastics   • GALLBLADDER SURGERY     • HAND SURGERY Left    • LYMPH NODE BIOPSY Left 10/12/2021    Procedure: BIOPSY LYMPH NODE SENTINEL, LYMPHOSCINTIGRAPHY, INTRAOPERATIVE LYMPHATIC MAPPING (INJECT AT 1200);   Surgeon: Enrrique Faust MD;  [facility];  Service: Surgical Oncology   • NOSE SURGERY     • SKIN CANCER EXCISION  10/2021   • SKIN LESION EXCISION Left 10/12/2021    Procedure: [address];  Surgeon: Enrrique Faust MD;  [facility];  Service: Surgical Oncology   • SPLIT THICKNESS SKIN GRAFT Left 10/12/2021    Procedure: SKIN GRAFT SPLIT THICKNESS LEFT TEMPLE;  Surgeon: Michele Lu MD;  [facility];  Service: Plastics   • US GUIDED LYMPH NODE BIOPSY LEFT  03/21/2022        SOCIAL HISTORY:  Social History     Socioeconomic History   • Marital status: /Civil Union     Spouse name: None   • Number of children: None   • Years of education: None   • Highest education level: None   Occupational History   • None   Tobacco Use   • Smoking status: Never   • Smokeless tobacco: Never   Vaping Use   • Vaping Use: Never used   Substance and Sexual Activity   • Alcohol use: Not Currently   • Drug use: Never   • Sexual activity: Not Currently   Other Topics Concern   • None   Social History Narrative   • None     Social Determinants of Health     Financial Resource Strain: Low Risk  (11/29/2022)    Overall Financial Resource Strain (CARDIA)    • Difficulty of Paying Living Expenses: Not hard at all   Food Insecurity: Not on file   Transportation Needs: No Transportation Needs (11/29/2022)    PRAPARE - Transportation    • Lack of Transportation (Medical): No    • Lack of Transportation (Non-Medical): No   Physical Activity: Not on file   Stress: Not on file   Social Connections: Not on file   Intimate Partner Violence: Not on file   Housing Stability: Not on file        FAMILY HISTORY:  Family History   Problem Relation Age of Onset   • No Known Problems Mother    • No Known Problems Father    • Colon cancer Other 60           Objective    PHYSICAL EXAMINATION:   Blood pressure 136/80, pulse 58, temperature (!) 96.8 °F (36 °C), temperature source Temporal, resp. rate 16, height 5' 7" (1.702 m), weight 73.9 kg (163 lb), SpO2 91 %. Pain Score: 0-No pain     ECOG Performance Status    Flowsheet Row Most Recent Value   ECOG Performance Status 1 - Restricted in physically strenuous activity but ambulatory and able to carry out work of a light or sedentary nature, e.g., light house work, office work             Physical Exam  Constitutional:       General: He is not in acute distress. Appearance: Normal appearance. He is not toxic-appearing. HENT:      Head: Normocephalic and atraumatic. Right Ear: External ear normal.      Left Ear: External ear normal.      Nose: Nose normal.      Mouth/Throat:      Mouth: Mucous membranes are moist.      Pharynx: Oropharynx is clear. Eyes:      General: No scleral icterus. Right eye: No discharge. Left eye: No discharge. Conjunctiva/sclera: Conjunctivae normal.   Cardiovascular:      Rate and Rhythm: Normal rate and regular rhythm. Pulses: Normal pulses. Heart sounds: No murmur heard. No friction rub. No gallop. Pulmonary:      Effort: Pulmonary effort is normal. No respiratory distress. Breath sounds: Normal breath sounds. No wheezing or rales. Abdominal:      General: Bowel sounds are normal. There is no distension. Palpations: There is no mass. Tenderness: There is no abdominal tenderness. There is no rebound. Musculoskeletal:         General: No swelling or tenderness. Cervical back: Normal range of motion. No rigidity. Right lower leg: No edema. Left lower leg: No edema. Lymphadenopathy:      Head:      Right side of head: No submandibular, preauricular or posterior auricular adenopathy. Left side of head: No submandibular, preauricular or posterior auricular adenopathy. Cervical: No cervical adenopathy. Right cervical: No superficial or posterior cervical adenopathy. Left cervical: No superficial or posterior cervical adenopathy. Upper Body:      Right upper body: No supraclavicular or axillary adenopathy. Left upper body: No supraclavicular or axillary adenopathy. Lower Body: No right inguinal adenopathy. No left inguinal adenopathy. Skin:     General: Skin is warm. Coloration: Skin is not jaundiced. Findings: No lesion or rash. Comments: Well healed surgical scar. No evidence of recurrence at primary site. Neurological:      General: No focal deficit present. Mental Status: He is alert and oriented to person, place, and time. Cranial Nerves: No cranial nerve deficit. Psychiatric:         Mood and Affect: Mood normal.         Behavior: Behavior normal.         Thought Content: Thought content normal.         Judgment: Judgment normal.         I reviewed lab data in the chart.     WBC   Date Value Ref Range Status   06/08/2023 7.87 4.31 - 10.16 Thousand/uL Final   05/24/2023 8.65 4.31 - 10.16 Thousand/uL Final   04/28/2023 7.03 4.31 - 10.16 Thousand/uL Final     Hemoglobin   Date Value Ref Range Status   06/08/2023 13.9 12.0 - 17.0 g/dL Final   05/24/2023 14.9 12.0 - 17.0 g/dL Final   04/28/2023 12.6 12.0 - 17.0 g/dL Final     Platelets   Date Value Ref Range Status   06/08/2023 136 (L) 149 - 390 Thousands/uL Final   05/24/2023 136 (L) 149 - 390 Thousands/uL Final   04/28/2023 172 149 - 390 Thousands/uL Final     MCV   Date Value Ref Range Status   06/08/2023 90 82 - 98 fL Final   05/24/2023 90 82 - 98 fL Final   04/28/2023 89 82 - 98 fL Final      Potassium   Date Value Ref Range Status   06/08/2023 4.1 3.5 - 5.3 mmol/L Final   05/24/2023 3.9 3.5 - 5.3 mmol/L Final   04/28/2023 4.6 3.5 - 5.3 mmol/L Final     Chloride   Date Value Ref Range Status   06/08/2023 110 (H) 96 - 108 mmol/L Final   05/24/2023 107 96 - 108 mmol/L Final   04/28/2023 110 (H) 96 - 108 mmol/L Final     CO2   Date Value Ref Range Status   06/08/2023 28 21 - 32 mmol/L Final   05/24/2023 27 21 - 32 mmol/L Final   04/28/2023 28 21 - 32 mmol/L Final     BUN   Date Value Ref Range Status   06/08/2023 41 (H) 5 - 25 mg/dL Final   05/24/2023 31 (H) 5 - 25 mg/dL Final   04/28/2023 29 (H) 5 - 25 mg/dL Final     Creatinine   Date Value Ref Range Status   06/08/2023 1.74 (H) 0.60 - 1.30 mg/dL Final     Comment:     Standardized to IDMS reference method   05/24/2023 1.42 (H) 0.60 - 1.30 mg/dL Final     Comment:     Standardized to IDMS reference method   04/28/2023 1.39 (H) 0.60 - 1.30 mg/dL Final     Comment:     Standardized to IDMS reference method     Glucose   Date Value Ref Range Status   04/28/2023 187 (H) 65 - 140 mg/dL Final     Comment:     If the patient is fasting, the ADA then defines impaired fasting glucose as > 100 mg/dL and diabetes as > or equal to 123 mg/dL. Specimen collection should occur prior to Sulfasalazine administration due to the potential for falsely depressed results. Specimen collection should occur prior to Sulfapyridine administration due to the potential for falsely elevated results.    01/16/2023 181 (H) 65 - 140 mg/dL Final Comment:     If the patient is fasting, the ADA then defines impaired fasting glucose as > 100 mg/dL and diabetes as > or equal to 123 mg/dL. Specimen collection should occur prior to Sulfasalazine administration due to the potential for falsely depressed results. Specimen collection should occur prior to Sulfapyridine administration due to the potential for falsely elevated results. 12/05/2022 142 (H) 65 - 140 mg/dL Final     Comment:     If the patient is fasting, the ADA then defines impaired fasting glucose as > 100 mg/dL and diabetes as > or equal to 123 mg/dL. Specimen collection should occur prior to Sulfasalazine administration due to the potential for falsely depressed results. Specimen collection should occur prior to Sulfapyridine administration due to the potential for falsely elevated results. Calcium   Date Value Ref Range Status   06/08/2023 9.1 8.3 - 10.1 mg/dL Final   05/24/2023 9.3 8.3 - 10.1 mg/dL Final   04/28/2023 8.7 8.3 - 10.1 mg/dL Final     Albumin   Date Value Ref Range Status   06/08/2023 3.9 3.5 - 5.0 g/dL Final   05/24/2023 4.0 3.5 - 5.0 g/dL Final   04/28/2023 3.3 (L) 3.5 - 5.0 g/dL Final     Total Bilirubin   Date Value Ref Range Status   06/08/2023 1.02 (H) 0.20 - 1.00 mg/dL Final     Comment:     Use of this assay is not recommended for patients undergoing treatment with eltrombopag due to the potential for falsely elevated results. 05/24/2023 1.57 (H) 0.20 - 1.00 mg/dL Final     Comment:     Use of this assay is not recommended for patients undergoing treatment with eltrombopag due to the potential for falsely elevated results. 04/28/2023 0.64 0.20 - 1.00 mg/dL Final     Comment:     Use of this assay is not recommended for patients undergoing treatment with eltrombopag due to the potential for falsely elevated results.      Alkaline Phosphatase   Date Value Ref Range Status   06/08/2023 89 46 - 116 U/L Final   05/24/2023 89 46 - 116 U/L Final   04/28/2023 83 46 - 116 U/L Final     AST   Date Value Ref Range Status   06/08/2023 13 5 - 45 U/L Final     Comment:     Specimen collection should occur prior to Sulfasalazine administration due to the potential for falsely depressed results. 05/24/2023 18 5 - 45 U/L Final     Comment:     Specimen collection should occur prior to Sulfasalazine administration due to the potential for falsely depressed results. 04/28/2023 26 5 - 45 U/L Final     Comment:     Specimen collection should occur prior to Sulfasalazine administration due to the potential for falsely depressed results. ALT   Date Value Ref Range Status   06/08/2023 24 12 - 78 U/L Final     Comment:     Specimen collection should occur prior to Sulfasalazine and/or Sulfapyridine administration due to the potential for falsely depressed results. 05/24/2023 21 12 - 78 U/L Final     Comment:     Specimen collection should occur prior to Sulfasalazine and/or Sulfapyridine administration due to the potential for falsely depressed results. 04/28/2023 69 12 - 78 U/L Final     Comment:     Specimen collection should occur prior to Sulfasalazine and/or Sulfapyridine administration due to the potential for falsely depressed results. LD   Date Value Ref Range Status   06/08/2023 123 81 - 234 U/L Final   05/24/2023 143 81 - 234 U/L Final   04/28/2023 135 81 - 234 U/L Final     No results found for: "TSH"  No results found for: "X6SKPJF"   Free T4   Date Value Ref Range Status   06/08/2023 0.77 0.61 - 1.12 ng/dL Final     Comment:     Specimens with biotin concentrations > 10 ng/mL can lead to significant (> 10%) positive bias in result. 05/24/2023 0.80 0.61 - 1.12 ng/dL Final     Comment:     Specimens with biotin concentrations > 10 ng/mL can lead to significant (> 10%) positive bias in result.    04/28/2023 1.03 0.76 - 1.46 ng/dL Final     Comment:     Specimen collection should occur prior to Sulfasalazine administration due to the potential for falsely elevated results. RECENT IMAGIN2023  PET scan  1. Improving small bilateral pulmonary nodules now without significant FDG uptake. 2.  No new findings suspicious for hypermetabolic metastasis. Assessment/Plan  Mr. Madhavi Adame is a 79-year-old gentleman with originally stage IIIa melanoma with concerns for spread to draining lymph nodes on treatment with pembrolizumab here for continued monitoring, follow-up, and surveillance while on treatment. 1. Malignant melanoma of skin of face ([address])  2. 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[address])  3. Malignant melanoma of forehead ([address])  He is doing well and tolerating treatment without any side effects at this time. Labs reviewed and okay. We discussed his recent imaging which demonstrates response to treatment and no concerns for new metastatic disease at this time. We will continue with treatment with pembrolizumab. We discussed whether or not we we will continue with full 2 years of treatment, as he is a little over 1 year of treatment. We will continue to monitor and see how he does. He understands and is in agreement with this plan. He will continue with treatment and okay for his next cycle. He will return in 6 weeks prior to his next dose. He has standing orders for blood work prior to each treatment. He knows to watch for immune mediated side effects. He knows to call with issues or concerns prior to his next visit. 4. High risk medication use  5. Stage 3 chronic kidney disease, unspecified whether stage 3a or 3b CKD ([address])    He is on treatment with immunotherapy and we will continue to monitor for side effects and lab abnormalities. We will monitor labs with each treatment to ensure safety of continuing on treatment. He knows to watch for signs and symptoms for immune mediated side effects. Denies any immune mediated side effects at this time.     Continue to monitor for immune mediated side effects while on treatment. He does have chronic kidney disease for which we usually obtain PET scans for treatment response monitoring protect kidneys. Return for Infusion - See Treatment Plan, Office Visit, labs.      Freddie Martin MD, PhD

## 2023-07-27 ENCOUNTER — APPOINTMENT (OUTPATIENT)
Dept: LAB | Facility: IMAGING CENTER | Age: 83
End: 2023-07-27
Payer: COMMERCIAL

## 2023-07-27 DIAGNOSIS — Z79.899 HIGH RISK MEDICATION USE: ICD-10-CM

## 2023-07-27 DIAGNOSIS — C43.39 MALIGNANT MELANOMA OF FOREHEAD (HCC): ICD-10-CM

## 2023-07-27 DIAGNOSIS — C43.30 MALIGNANT MELANOMA OF SKIN OF FACE (HCC): ICD-10-CM

## 2023-07-27 LAB
ALBUMIN SERPL BCP-MCNC: 4 G/DL (ref 3.5–5)
ALP SERPL-CCNC: 93 U/L (ref 46–116)
ALT SERPL W P-5'-P-CCNC: 24 U/L (ref 12–78)
ANION GAP SERPL CALCULATED.3IONS-SCNC: 5 MMOL/L
AST SERPL W P-5'-P-CCNC: 14 U/L (ref 5–45)
BASOPHILS # BLD AUTO: 0.03 THOUSANDS/ÂΜL (ref 0–0.1)
BASOPHILS NFR BLD AUTO: 0 % (ref 0–1)
BILIRUB SERPL-MCNC: 1.35 MG/DL (ref 0.2–1)
BUN SERPL-MCNC: 33 MG/DL (ref 5–25)
CALCIUM SERPL-MCNC: 9.1 MG/DL (ref 8.3–10.1)
CHLORIDE SERPL-SCNC: 106 MMOL/L (ref 96–108)
CO2 SERPL-SCNC: 28 MMOL/L (ref 21–32)
CREAT SERPL-MCNC: 1.56 MG/DL (ref 0.6–1.3)
EOSINOPHIL # BLD AUTO: 0.35 THOUSAND/ÂΜL (ref 0–0.61)
EOSINOPHIL NFR BLD AUTO: 4 % (ref 0–6)
ERYTHROCYTE [DISTWIDTH] IN BLOOD BY AUTOMATED COUNT: 13.2 % (ref 11.6–15.1)
GFR SERPL CREATININE-BSD FRML MDRD: 40 ML/MIN/1.73SQ M
GLUCOSE P FAST SERPL-MCNC: 226 MG/DL (ref 65–99)
HCT VFR BLD AUTO: 44 % (ref 36.5–49.3)
HGB BLD-MCNC: 14.6 G/DL (ref 12–17)
IMM GRANULOCYTES # BLD AUTO: 0.04 THOUSAND/UL (ref 0–0.2)
IMM GRANULOCYTES NFR BLD AUTO: 0 % (ref 0–2)
LDH SERPL-CCNC: 135 U/L (ref 81–234)
LYMPHOCYTES # BLD AUTO: 3.65 THOUSANDS/ÂΜL (ref 0.6–4.47)
LYMPHOCYTES NFR BLD AUTO: 39 % (ref 14–44)
MCH RBC QN AUTO: 29.6 PG (ref 26.8–34.3)
MCHC RBC AUTO-ENTMCNC: 33.2 G/DL (ref 31.4–37.4)
MCV RBC AUTO: 89 FL (ref 82–98)
MONOCYTES # BLD AUTO: 0.58 THOUSAND/ÂΜL (ref 0.17–1.22)
MONOCYTES NFR BLD AUTO: 6 % (ref 4–12)
NEUTROPHILS # BLD AUTO: 4.69 THOUSANDS/ÂΜL (ref 1.85–7.62)
NEUTS SEG NFR BLD AUTO: 51 % (ref 43–75)
NRBC BLD AUTO-RTO: 0 /100 WBCS
PLATELET # BLD AUTO: 137 THOUSANDS/UL (ref 149–390)
PMV BLD AUTO: 10.7 FL (ref 8.9–12.7)
POTASSIUM SERPL-SCNC: 4.3 MMOL/L (ref 3.5–5.3)
PROT SERPL-MCNC: 7.3 G/DL (ref 6.4–8.4)
RBC # BLD AUTO: 4.93 MILLION/UL (ref 3.88–5.62)
SODIUM SERPL-SCNC: 139 MMOL/L (ref 135–147)
T3FREE SERPL-MCNC: 2.92 PG/ML (ref 2.5–3.9)
T4 FREE SERPL-MCNC: 0.91 NG/DL (ref 0.61–1.12)
TSH SERPL DL<=0.05 MIU/L-ACNC: 2.72 UIU/ML (ref 0.45–4.5)
WBC # BLD AUTO: 9.34 THOUSAND/UL (ref 4.31–10.16)

## 2023-07-27 PROCEDURE — 84443 ASSAY THYROID STIM HORMONE: CPT

## 2023-07-27 PROCEDURE — 84481 FREE ASSAY (FT-3): CPT

## 2023-07-27 PROCEDURE — 36415 COLL VENOUS BLD VENIPUNCTURE: CPT

## 2023-07-27 PROCEDURE — 80053 COMPREHEN METABOLIC PANEL: CPT

## 2023-07-27 PROCEDURE — 85025 COMPLETE CBC W/AUTO DIFF WBC: CPT

## 2023-07-27 PROCEDURE — 84439 ASSAY OF FREE THYROXINE: CPT

## 2023-07-27 PROCEDURE — 83615 LACTATE (LD) (LDH) ENZYME: CPT

## 2023-08-01 RX ORDER — SODIUM CHLORIDE 9 MG/ML
20 INJECTION, SOLUTION INTRAVENOUS ONCE
Status: CANCELLED | OUTPATIENT
Start: 2023-08-03

## 2023-08-03 ENCOUNTER — OFFICE VISIT (OUTPATIENT)
Dept: HEMATOLOGY ONCOLOGY | Facility: CLINIC | Age: 83
End: 2023-08-03
Payer: COMMERCIAL

## 2023-08-03 ENCOUNTER — HOSPITAL ENCOUNTER (OUTPATIENT)
Dept: INFUSION CENTER | Facility: CLINIC | Age: 83
Discharge: HOME | End: 2023-08-03
Payer: COMMERCIAL

## 2023-08-03 VITALS
HEART RATE: 53 BPM | RESPIRATION RATE: 18 BRPM | SYSTOLIC BLOOD PRESSURE: 132 MMHG | OXYGEN SATURATION: 94 % | DIASTOLIC BLOOD PRESSURE: 78 MMHG | TEMPERATURE: 96.3 F

## 2023-08-03 VITALS
TEMPERATURE: 97 F | DIASTOLIC BLOOD PRESSURE: 60 MMHG | WEIGHT: 163 LBS | RESPIRATION RATE: 17 BRPM | BODY MASS INDEX: 25.58 KG/M2 | HEART RATE: 63 BPM | OXYGEN SATURATION: 94 % | HEIGHT: 67 IN | SYSTOLIC BLOOD PRESSURE: 122 MMHG

## 2023-08-03 DIAGNOSIS — C43.39 MALIGNANT MELANOMA OF FOREHEAD (HCC): ICD-10-CM

## 2023-08-03 DIAGNOSIS — N18.30 STAGE 3 CHRONIC KIDNEY DISEASE, UNSPECIFIED WHETHER STAGE 3A OR 3B CKD (HCC): ICD-10-CM

## 2023-08-03 DIAGNOSIS — C77.3 SECONDARY AND UNSPECIFIED MALIGNANT NEOPLASM OF AXILLA AND UPPER LIMB LYMPH NODES (HCC): ICD-10-CM

## 2023-08-03 DIAGNOSIS — Z79.899 HIGH RISK MEDICATION USE: ICD-10-CM

## 2023-08-03 DIAGNOSIS — C43.30 MALIGNANT MELANOMA OF SKIN OF FACE (HCC): Primary | ICD-10-CM

## 2023-08-03 PROCEDURE — 99214 OFFICE O/P EST MOD 30 MIN: CPT | Performed by: INTERNAL MEDICINE

## 2023-08-03 PROCEDURE — 96413 CHEMO IV INFUSION 1 HR: CPT

## 2023-08-03 RX ORDER — SODIUM CHLORIDE 9 MG/ML
20 INJECTION, SOLUTION INTRAVENOUS ONCE
Status: COMPLETED | OUTPATIENT
Start: 2023-08-03 | End: 2023-08-03

## 2023-08-03 RX ADMIN — SODIUM CHLORIDE 20 ML/HR: 0.9 INJECTION, SOLUTION INTRAVENOUS at 09:20

## 2023-08-03 RX ADMIN — SODIUM CHLORIDE 400 MG: 9 INJECTION, SOLUTION INTRAVENOUS at 09:54

## 2023-08-03 NOTE — PROGRESS NOTES
Pt resting with no complaints, vitals stable, labs within parameters for treatment, call bell within reach. All questions answered and emotional support given. All needs met at this time.

## 2023-08-03 NOTE — LETTER
September 23, 2023     Christian Mason MD  [address]    Patient: Marky Hernandez   YOB: 1940   Date of Visit: 8/3/2023       Dear Dr. Clive Rader:    Thank you for referring Marky Hernandez to me for evaluation. Below are my notes for this consultation. If you have questions, please do not hesitate to call me. I look forward to following your patient along with you. 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)    High risk medication use  -     Cortisol Level, AM Specimen; Future  -     ACTH;  Future    Stage 3 chronic kidney disease, unspecified whether stage 3a or 3b CKD ([address])        Oncology History   Malignant melanoma of skin of face ([address])   9/8/2021 -  Cancer Staged    Staging form: Melanoma of the Skin, AJCC 8th Edition  - Clinical stage from 9/8/2021: Stage III (cT2a, cN1a, cM0) - Signed by Elizabeth Vegas MD on 11/7/2021 9/8/2021 -  Cancer Staged    Staging form: Melanoma of the Skin, AJCC 8th Edition  - Pathologic stage from 9/8/2021: Stage IIIA (pT2a, pN1a, cM0) - Signed by Elizabeth Vegas MD on 11/7/2021 9/22/2021 Initial Diagnosis    Malignant melanoma of skin of face ([address])     4/4/2022 -  Chemotherapy    pembrolizumab (KEYTRUDA) IVPB, 400 mg, Intravenous, Once, 13 of 15 cycles  Administration: 400 mg (4/4/2022), 400 mg (5/16/2022), 400 mg (6/27/2022), 400 mg (8/8/2022), 400 mg (9/19/2022), 400 mg (10/31/2022), 400 mg (12/12/2022), 400 mg (1/23/2023), 400 mg (3/6/2023), 400 mg (5/5/2023), 400 mg (6/15/2023), 400 mg (8/3/2023), 400 mg (9/21/2023)     Malignant melanoma of forehead ([address])   11/29/2021 Initial Diagnosis    Malignant melanoma of forehead ([address])     4/4/2022 -  Chemotherapy    pembrolizumab (KEYTRUDA) IVPB, 400 mg, Intravenous, Once, 13 of 15 cycles  Administration: 400 mg (4/4/2022), 400 mg (5/16/2022), 400 mg (6/27/2022), 400 mg (8/8/2022), 400 mg (9/19/2022), 400 mg (10/31/2022), 400 mg (12/12/2022), 400 mg (1/23/2023), 400 mg (3/6/2023), 400 mg (5/5/2023), 400 mg (6/15/2023), 400 mg (8/3/2023), 400 mg (9/21/2023)        Cancer Staging   Malignant melanoma of skin of face [facility])  Staging form: Melanoma of the Skin, AJCC 8th Edition  - Clinical stage from 9/8/2021: Stage III (cT2a, cN1a, cM0) - Signed by Mich Campos MD on 11/7/2021  - Pathologic stage from 9/8/2021: Stage IIIA (pT2a, pN1a, cM0) - Signed by Mich Campos MD on 11/7/2021     Treatment Details   Treatment goal Curative   Plan Name OP Pembrolizumab Q 42 Days   Status Active   Start Date 4/4/2022   End Date 12/28/2023 (Planned)   Provider Mich Campos MD   Chemotherapy pembrolizumab [facility]) IVPB, 400 mg, Intravenous, Once, 13 of 15 cycles  Administration: 400 mg (4/4/2022), 400 mg (5/16/2022), 400 mg (6/27/2022), 400 mg (8/8/2022), 400 mg (9/19/2022), 400 mg (10/31/2022), 400 mg (12/12/2022), 400 mg (1/23/2023), 400 mg (3/6/2023), 400 mg (5/5/2023), 400 mg (6/15/2023), 400 mg (8/3/2023),           HISTORY OF PRESENT ILLNESS: Edelmira Jorge is a 79 y.o. male  who has stage IIIa melanoma with concern for metastatic disease involving lymph nodes at the time of treatment initiation continuing on pembrolizumab here for continued monitoring, follow-up, and surveillance.     He is doing well and continues to tolerate treatment. Still with some fatigue. Denies any new, changing, concerning skin lesions. Denies any lymphadenopathy. Denies immune mediated side effects other than fatigue. Denies headaches, double vision, rash, itching, chest pain, shortness of breath, diarrhea. Denies muscle weakness. REVIEW OF SYSTEMS:  Review of Systems   Constitutional: Positive for fatigue. Negative for appetite change, fever and unexpected weight change. HENT:   Negative for lump/mass, trouble swallowing and voice change. Eyes: Negative for icterus. Respiratory: Negative for cough, shortness of breath and wheezing. Cardiovascular: Negative for leg swelling. Gastrointestinal: Negative for abdominal pain, constipation, diarrhea, nausea and vomiting. Genitourinary: Negative for difficulty urinating and hematuria. Musculoskeletal: Negative for arthralgias, gait problem and myalgias. Skin: Negative for itching and rash. No new, changing, or concerning lesions. Neurological: Negative for extremity weakness, gait problem, headaches, light-headedness and numbness. Hematological: Negative for adenopathy.         MEDICATIONS:    Current Outpatient Medications:   •  atenolol (TENORMIN) 100 mg tablet, Take 1 tablet (100 mg total) by mouth daily, Disp: 90 tablet, Rfl: 3  •  glimepiride (AMARYL) 4 mg tablet, Take 1 tablet (4 mg total) by mouth daily, Disp: 90 tablet, Rfl: 3  •  lisinopril-hydrochlorothiazide (PRINZIDE,ZESTORETIC) 20-25 MG per tablet, Take 1 tablet by mouth daily, Disp: 90 tablet, Rfl: 3  •  lovastatin (MEVACOR) 40 MG tablet, Take 1 tablet (40 mg total) by mouth daily, Disp: 90 tablet, Rfl: 3  •  metFORMIN (GLUCOPHAGE) 1000 MG tablet, Take 1 tablet (1,000 mg total) by mouth 2 (two) times a day with meals, Disp: 180 tablet, Rfl: 3  •  carbamide peroxide (DEBROX) 6.5 % otic solution, Administer 5 drops into both ears 2 (two) times a day for 7 days, Disp: 15 mL, Rfl: 0  • cetirizine (ZyrTEC) 5 MG tablet, Take 1 tablet (5 mg total) by mouth daily, Disp: 90 tablet, Rfl: 1  •  fluticasone (FLONASE) 50 mcg/act nasal spray, 1 spray into each nostril daily, Disp: 16 g, Rfl: 1     ALLERGIES:  No Known Allergies     ACTIVE PROBLEMS:  Patient Active Problem List   Diagnosis   • Essential hypertension   • Hiatal hernia with GERD   • Status post laparoscopic cholecystectomy   • Status post umbilical hernia repair, follow-up exam   • Type 2 diabetes mellitus without complication, without long-term current use of insulin (HCC)   • Mixed hyperlipidemia   • Parkinson's disease ([address])   • Microalbuminuria   • Malignant melanoma of skin of face ([address])   • Thrombocytopenia ([address])   • Malignant melanoma of forehead ([address])   • 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)   • Stage 3 chronic kidney disease, unspecified whether stage 3a or 3b CKD ([address])   • Dizziness   • Advanced care planning/counseling discussion   • Arthritis of left knee          PAST MEDICAL HISTORY:   Past Medical History:   Diagnosis Date   • Diabetes mellitus ([address])    • Hyperlipidemia    • Hypertension         PAST SURGICAL HISTORY:  Past Surgical History:   Procedure Laterality Date   • CATARACT EXTRACTION, BILATERAL     • FLAP LOCAL HEAD / NECK Left 10/12/2021    Procedure: RECONSTRUCTION OF LEFT TEMPLE DEFECT AFTER RESCECTION MELANOMA;  Surgeon: Shireen Zhu MD;  [facility];  Service: Plastics   • FULL THICKNESS SKIN GRAFT Left 10/12/2021    Procedure: SKIN GRAFT FULL THICKNESS LEFT TEMPLE;  Surgeon: Shireen Zhu MD;  [facility];  Service: Plastics   • GALLBLADDER SURGERY     • HAND SURGERY Left    • LYMPH NODE BIOPSY Left 10/12/2021    Procedure: BIOPSY LYMPH NODE SENTINEL, LYMPHOSCINTIGRAPHY, INTRAOPERATIVE LYMPHATIC MAPPING (INJECT AT 1200);   Surgeon: Moni Cardona MD;  [facility];  Service: Surgical Oncology   • NOSE SURGERY     • SKIN CANCER EXCISION  10/2021   • SKIN LESION EXCISION Left 10/12/2021    Procedure: [address];  Surgeon: Sharon Cohen MD;  [facility];  Service: Surgical Oncology   • SPLIT THICKNESS SKIN GRAFT Left 10/12/2021    Procedure: SKIN GRAFT SPLIT THICKNESS LEFT TEMPLE;  Surgeon: Aysha Delgadillo MD;  [facility];  Service: Plastics   • US GUIDED LYMPH NODE BIOPSY LEFT  03/21/2022        SOCIAL HISTORY:  Social History     Socioeconomic History   • Marital status: /Civil Union     Spouse name: None   • Number of children: None   • Years of education: None   • Highest education level: None   Occupational History   • None   Tobacco Use   • Smoking status: Never   • Smokeless tobacco: Never   Vaping Use   • Vaping Use: Never used   Substance and Sexual Activity   • Alcohol use: Not Currently   • Drug use: Never   • Sexual activity: Not Currently   Other Topics Concern   • None   Social History Narrative   • None     Social Determinants of Health     Financial Resource Strain: Low Risk  (11/29/2022)    Overall Financial Resource Strain (CARDIA)    • Difficulty of Paying Living Expenses: Not hard at all   Food Insecurity: Not on file   Transportation Needs: No Transportation Needs (11/29/2022)    PRAPARE - Transportation    • Lack of Transportation (Medical): No    • Lack of Transportation (Non-Medical): No   Physical Activity: Not on file   Stress: Not on file   Social Connections: Not on file   Intimate Partner Violence: Not on file   Housing Stability: Not on file        FAMILY HISTORY:  Family History   Problem Relation Age of Onset   • No Known Problems Mother    • No Known Problems Father    • Colon cancer Other 60           Objective    PHYSICAL EXAMINATION:   Blood pressure 122/60, pulse 63, temperature (!) 97 °F (36.1 °C), resp. rate 17, height 5' 7" (1.702 m), weight 73.9 kg (163 lb), SpO2 94 %.      Pain Score: 0-No pain     ECOG Performance Status      Flowsheet Row Most Recent Value   ECOG Performance Status 1 - Restricted in physically strenuous activity but ambulatory and able to carry out work of a light or sedentary nature, e.g., light house work, office work               Physical Exam  Constitutional:       General: He is not in acute distress. Appearance: Normal appearance. He is not toxic-appearing. HENT:      Head: Normocephalic and atraumatic. Right Ear: External ear normal.      Left Ear: External ear normal.      Nose: Nose normal.      Mouth/Throat:      Mouth: Mucous membranes are moist.      Pharynx: Oropharynx is clear. Eyes:      General: No scleral icterus. Right eye: No discharge. Left eye: No discharge. Conjunctiva/sclera: Conjunctivae normal.   Cardiovascular:      Rate and Rhythm: Normal rate and regular rhythm. Pulses: Normal pulses. Heart sounds: No murmur heard. No friction rub. No gallop. Pulmonary:      Effort: Pulmonary effort is normal. No respiratory distress. Breath sounds: Normal breath sounds. No wheezing or rales. Abdominal:      General: Bowel sounds are normal. There is no distension. Palpations: There is no mass. Tenderness: There is no abdominal tenderness. There is no rebound. Musculoskeletal:         General: No swelling or tenderness. Cervical back: Normal range of motion. No rigidity. Right lower leg: No edema. Left lower leg: No edema. Lymphadenopathy:      Head:      Right side of head: No submandibular, preauricular or posterior auricular adenopathy. Left side of head: No submandibular, preauricular or posterior auricular adenopathy. Cervical: No cervical adenopathy. Right cervical: No superficial or posterior cervical adenopathy. Left cervical: No superficial or posterior cervical adenopathy. Upper Body:      Right upper body: No supraclavicular or axillary adenopathy.       Left upper body: No supraclavicular or axillary adenopathy. Skin:     General: Skin is warm. Coloration: Skin is not jaundiced. Findings: No lesion or rash. Comments: Well healed surgical scar. No evidence of recurrence at primary site. Neurological:      General: No focal deficit present. Mental Status: He is alert and oriented to person, place, and time. Psychiatric:         Mood and Affect: Mood normal.         Behavior: Behavior normal.         Thought Content: Thought content normal.         Judgment: Judgment normal.         I reviewed lab data in the chart.      Latest Reference Range & Units 07/27/23 09:43   Sodium 135 - 147 mmol/L 139   Potassium 3.5 - 5.3 mmol/L 4.3   Chloride 96 - 108 mmol/L 106   CO2 21 - 32 mmol/L 28   Anion Gap mmol/L 5   BUN 5 - 25 mg/dL 33 (H)   Creatinine 0.60 - 1.30 mg/dL 1.56 (H)   GLUCOSE FASTING 65 - 99 mg/dL 226 (H)   Calcium 8.3 - 10.1 mg/dL 9.1   AST 5 - 45 U/L 14   ALT 12 - 78 U/L 24   Alkaline Phosphatase 46 - 116 U/L 93   Total Protein 6.4 - 8.4 g/dL 7.3   Albumin 3.5 - 5.0 g/dL 4.0   TOTAL BILIRUBIN 0.20 - 1.00 mg/dL 1.35 (H)   eGFR ml/min/1.73sq m 40   LD (LDH) 81 - 234 U/L 135   WBC 4.31 - 10.16 Thousand/uL 9.34   Red Blood Cell Count 3.88 - 5.62 Million/uL 4.93   Hemoglobin 12.0 - 17.0 g/dL 14.6   HCT 36.5 - 49.3 % 44.0   MCV 82 - 98 fL 89   MCH 26.8 - 34.3 pg 29.6   MCHC 31.4 - 37.4 g/dL 33.2   RDW 11.6 - 15.1 % 13.2   Platelet Count 608 - 390 Thousands/uL 137 (L)   MPV 8.9 - 12.7 fL 10.7   nRBC /100 WBCs 0   Neutrophils % 43 - 75 % 51   Immat GRANS % 0 - 2 % 0   Lymphocytes Relative 14 - 44 % 39   Monocytes Relative 4 - 12 % 6   Eosinophils 0 - 6 % 4   Basophils Relative 0 - 1 % 0   Immature Grans Absolute 0.00 - 0.20 Thousand/uL 0.04   Absolute Neutrophils 1.85 - 7.62 Thousands/µL 4.69   Lymphocytes Absolute 0.60 - 4.47 Thousands/µL 3.65   Absolute Monocytes 0.17 - 1.22 Thousand/µL 0.58   Absolute Eosinophils 0.00 - 0.61 Thousand/µL 0.35   Basophils Absolute 0.00 - 0.10 Thousands/µL 0.03 TSH 3RD GENERATON 0.450 - 4.500 uIU/mL 2.719   Free T4 0.61 - 1.12 ng/dL 0.91   T3, Free 2.50 - 3.90 pg/mL 2.92   (H): Data is abnormally high  (L): Data is abnormally low    RECENT IMAGING:  No results found. Assessment/Plan  Mr. Connor Kellogg is a 70-year-old gentleman with stage IIIa melanoma with concern for local spread here for continued monitoring, follow-up, and surveillance while on treatment with pembrolizumab. 1. Malignant melanoma of skin of face ([address])  2. Malignant melanoma of forehead ([address])  3. 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 Does endorse some fatigue. No immune mediated side effects. Labs reviewed and okay. He can continue on treatment with pembrolizumab. He knows to continue to monitor for immune mediated side effects. He is standing orders for blood work prior to each treatment. We will also check ACTH and cortisol to determine whether or not he may have some adrenal insufficiency causing his fatigue. He will return in 6 weeks for his next treatment. He knows to call with any issues or concerns prior to his next visit. - Cortisol Level, AM Specimen; Future  - ACTH; Future    4. High risk medication use  5. Stage 3 chronic kidney disease, unspecified whether stage 3a or 3b CKD ([address])  He is on treatment with immunotherapy and we will continue to monitor for side effects and lab abnormalities. We will monitor labs with each treatment to ensure safety of continuing on treatment. He knows to watch for signs and symptoms for immune mediated side effects. Denies any immune mediated side effects at this time. We will check a.m. cortisol and ACTH to evaluate for adrenal insufficiency given his continued fatigue.      - Cortisol Level, AM Specimen; Future  - ACTH; Future        Return in about 6 weeks (around 9/14/2023) for Office Visit, labs.      Wil Winston MD, PhD

## 2023-08-04 ENCOUNTER — APPOINTMENT (OUTPATIENT)
Dept: LAB | Facility: IMAGING CENTER | Age: 83
End: 2023-08-04
Payer: COMMERCIAL

## 2023-08-04 DIAGNOSIS — C43.30 MALIGNANT MELANOMA OF SKIN OF FACE (HCC): ICD-10-CM

## 2023-08-04 DIAGNOSIS — Z79.899 HIGH RISK MEDICATION USE: ICD-10-CM

## 2023-08-04 LAB — CORTIS AM PEAK SERPL-MCNC: 10.8 UG/DL (ref 6.7–22.6)

## 2023-08-04 PROCEDURE — 82024 ASSAY OF ACTH: CPT

## 2023-08-04 PROCEDURE — 36415 COLL VENOUS BLD VENIPUNCTURE: CPT

## 2023-08-04 PROCEDURE — 82533 TOTAL CORTISOL: CPT

## 2023-08-05 LAB — ACTH PLAS-MCNC: 42.4 PG/ML (ref 7.2–63.3)

## 2023-08-07 ENCOUNTER — TELEPHONE (OUTPATIENT)
Dept: HEMATOLOGY ONCOLOGY | Facility: CLINIC | Age: 83
End: 2023-08-07

## 2023-08-07 NOTE — TELEPHONE ENCOUNTER
----- Message from Fred Joyce MD sent at 8/7/2023  4:12 PM EDT -----  Can you let him know that he doesn't have an underlying issue causing fatigue  Thanks  Virgil Chavez  ----- Message -----  From: Lab, Background User  Sent: 8/4/2023   1:08 PM EDT  To: Fred Joyce MD

## 2023-08-09 ENCOUNTER — ESTABLISHED COMPREHENSIVE EXAM (OUTPATIENT)
Dept: URBAN - METROPOLITAN AREA CLINIC 6 | Facility: CLINIC | Age: 83
End: 2023-08-09

## 2023-08-09 DIAGNOSIS — H02.831: ICD-10-CM

## 2023-08-09 DIAGNOSIS — H04.123: ICD-10-CM

## 2023-08-09 DIAGNOSIS — H02.881: ICD-10-CM

## 2023-08-09 DIAGNOSIS — E11.9: ICD-10-CM

## 2023-08-09 DIAGNOSIS — H02.884: ICD-10-CM

## 2023-08-09 DIAGNOSIS — H35.363: ICD-10-CM

## 2023-08-09 DIAGNOSIS — H02.834: ICD-10-CM

## 2023-08-09 LAB
LEFT EYE DIABETIC RETINOPATHY: NORMAL
RIGHT EYE DIABETIC RETINOPATHY: NORMAL

## 2023-08-09 PROCEDURE — 92014 COMPRE OPH EXAM EST PT 1/>: CPT

## 2023-08-09 ASSESSMENT — TONOMETRY
OS_IOP_MMHG: 10
OD_IOP_MMHG: 11

## 2023-08-09 ASSESSMENT — KERATOMETRY
OD_K1POWER_DIOPTERS: 45.75
OS_K1POWER_DIOPTERS: 46.00
OD_K2POWER_DIOPTERS: 46.50
OS_AXISANGLE_DEGREES: 160
OD_AXISANGLE2_DEGREES: 76
OD_AXISANGLE_DEGREES: 166
OS_K2POWER_DIOPTERS: 46.25
OS_AXISANGLE2_DEGREES: 70

## 2023-08-09 ASSESSMENT — VISUAL ACUITY
OD_CC: J1+
OD_CC: 20/30-2
OS_CC: J1+
OS_CC: 20/30+2

## 2023-08-22 DIAGNOSIS — R42 DIZZINESS: ICD-10-CM

## 2023-08-22 DIAGNOSIS — R26.81 UNSTEADINESS ON FEET: ICD-10-CM

## 2023-08-22 DIAGNOSIS — H61.23 BILATERAL IMPACTED CERUMEN: Primary | ICD-10-CM

## 2023-08-30 ENCOUNTER — OFFICE VISIT (OUTPATIENT)
Dept: INTERNAL MEDICINE CLINIC | Facility: OTHER | Age: 83
End: 2023-08-30
Payer: COMMERCIAL

## 2023-08-30 VITALS
TEMPERATURE: 98 F | HEART RATE: 65 BPM | OXYGEN SATURATION: 99 % | WEIGHT: 163 LBS | HEIGHT: 67 IN | DIASTOLIC BLOOD PRESSURE: 78 MMHG | BODY MASS INDEX: 25.58 KG/M2 | SYSTOLIC BLOOD PRESSURE: 118 MMHG

## 2023-08-30 DIAGNOSIS — R42 DIZZINESS: Primary | ICD-10-CM

## 2023-08-30 PROCEDURE — 99213 OFFICE O/P EST LOW 20 MIN: CPT | Performed by: NURSE PRACTITIONER

## 2023-08-30 RX ORDER — FLUTICASONE PROPIONATE 50 MCG
1 SPRAY, SUSPENSION (ML) NASAL DAILY
Qty: 16 G | Refills: 1 | Status: SHIPPED | OUTPATIENT
Start: 2023-08-30

## 2023-08-30 RX ORDER — CETIRIZINE HYDROCHLORIDE 5 MG/1
5 TABLET ORAL DAILY
Qty: 90 TABLET | Refills: 1 | Status: SHIPPED | OUTPATIENT
Start: 2023-08-30

## 2023-08-30 NOTE — PROGRESS NOTES
Assessment/Plan:    Dizziness  Prescription for vestibular therapy  Orthostatic blood pressures intact  Start Flonase and Zyrtec              Diagnoses and all orders for this visit:    Dizziness  -     Ambulatory Referral to Physical Therapy; Future  -     fluticasone (FLONASE) 50 mcg/act nasal spray; 1 spray into each nostril daily  -     cetirizine (ZyrTEC) 5 MG tablet; Take 1 tablet (5 mg total) by mouth daily          Subjective:      Patient ID: Lydia Balderrama is a 79 y.o. male. Presents today for cerumen impaction removal.  He was seen by Dr. Heath Lopez who started him on Debrox drops. Patient had previous complaints of dizziness with change of position, and putting his head back to take his pills in the morning. Debrox drops seem to have cleared up his ears however patient is still experiencing dizziness with changes of position. Patient has history of vertigo and improvement with vestibular therapy. Next step was for patient to have at home physical therapy, vestibular therapy and gait disturbance,  however this was denied. Denies any falls at home   Denies syncope, chest pain or headaches    Denies hypoglycemia       The following portions of the patient's history were reviewed and updated as appropriate: allergies, current medications, past family history, past medical history, past social history, past surgical history and problem list.    Review of Systems   Constitutional: Negative for activity change, appetite change, chills, diaphoresis and fever. HENT: Negative for congestion, ear discharge, ear pain, postnasal drip, rhinorrhea, sinus pressure, sinus pain and sore throat. Eyes: Negative for pain, discharge, itching and visual disturbance. Respiratory: Negative for cough, chest tightness, shortness of breath and wheezing. Cardiovascular: Negative for chest pain, palpitations and leg swelling.    Gastrointestinal: Negative for abdominal pain, constipation, diarrhea, nausea and vomiting. Endocrine: Negative for polydipsia, polyphagia and polyuria. Genitourinary: Negative for difficulty urinating, dysuria and urgency. Musculoskeletal: Negative for arthralgias, back pain and neck pain. Skin: Negative for rash and wound. Neurological: Positive for dizziness. Negative for syncope, weakness, numbness and headaches.          Past Medical History:   Diagnosis Date   • Diabetes mellitus ([address])    • Hyperlipidemia    • Hypertension          Current Outpatient Medications:   •  atenolol (TENORMIN) 100 mg tablet, Take 1 tablet (100 mg total) by mouth daily, Disp: 90 tablet, Rfl: 3  •  cetirizine (ZyrTEC) 5 MG tablet, Take 1 tablet (5 mg total) by mouth daily, Disp: 90 tablet, Rfl: 1  •  fluticasone (FLONASE) 50 mcg/act nasal spray, 1 spray into each nostril daily, Disp: 16 g, Rfl: 1  •  glimepiride (AMARYL) 4 mg tablet, Take 1 tablet (4 mg total) by mouth daily, Disp: 90 tablet, Rfl: 3  •  lisinopril-hydrochlorothiazide (PRINZIDE,ZESTORETIC) 20-25 MG per tablet, Take 1 tablet by mouth daily, Disp: 90 tablet, Rfl: 3  •  lovastatin (MEVACOR) 40 MG tablet, Take 1 tablet (40 mg total) by mouth daily, Disp: 90 tablet, Rfl: 3  •  metFORMIN (GLUCOPHAGE) 1000 MG tablet, Take 1 tablet (1,000 mg total) by mouth 2 (two) times a day with meals, Disp: 180 tablet, Rfl: 3  •  carbamide peroxide (DEBROX) 6.5 % otic solution, Administer 5 drops into both ears 2 (two) times a day for 7 days, Disp: 15 mL, Rfl: 0    No Known Allergies    Social History   Past Surgical History:   Procedure Laterality Date   • CATARACT EXTRACTION, BILATERAL     • FLAP LOCAL HEAD / NECK Left 10/12/2021    Procedure: RECONSTRUCTION OF LEFT TEMPLE DEFECT AFTER RESCECTION MELANOMA;  Surgeon: Bong iFnney MD;  [facility];  Service: Plastics   • FULL THICKNESS SKIN GRAFT Left 10/12/2021    Procedure: SKIN GRAFT FULL THICKNESS LEFT TEMPLE;  Surgeon: Bong Finney MD;  [facility];  Service: Plastics   • GALLBLADDER SURGERY     • HAND SURGERY Left    • LYMPH NODE BIOPSY Left 10/12/2021    Procedure: BIOPSY LYMPH NODE SENTINEL, LYMPHOSCINTIGRAPHY, INTRAOPERATIVE LYMPHATIC MAPPING (INJECT AT 1200);   Surgeon: Marisol Anderson MD;  [facility];  Service: Surgical Oncology   • NOSE SURGERY     • SKIN CANCER EXCISION  10/2021   • SKIN LESION EXCISION Left 10/12/2021    Procedure: [address];  Surgeon: Marisol Anderson MD;  [facility];  Service: Surgical Oncology   • SPLIT THICKNESS SKIN GRAFT Left 10/12/2021    Procedure: SKIN GRAFT SPLIT THICKNESS LEFT TEMPLE;  Surgeon: Jose Molina MD;  [facility];  Service: Plastics   • US GUIDED LYMPH NODE BIOPSY LEFT  03/21/2022     Family History   Problem Relation Age of Onset   • No Known Problems Mother    • No Known Problems Father    • Colon cancer Other 60       Objective:  /78 (BP Location: Left arm, Patient Position: Sitting, Cuff Size: Adult)   Pulse 65   Temp 98 °F (36.7 °C)   Ht 5' 7" (1.702 m)   Wt 73.9 kg (163 lb)   SpO2 99%   BMI 25.53 kg/m²     Recent Results (from the past 1344 hour(s))   CBC and differential    Collection Time: 07/27/23  9:43 AM   Result Value Ref Range    WBC 9.34 4.31 - 10.16 Thousand/uL    RBC 4.93 3.88 - 5.62 Million/uL    Hemoglobin 14.6 12.0 - 17.0 g/dL    Hematocrit 44.0 36.5 - 49.3 %    MCV 89 82 - 98 fL    MCH 29.6 26.8 - 34.3 pg    MCHC 33.2 31.4 - 37.4 g/dL    RDW 13.2 11.6 - 15.1 %    MPV 10.7 8.9 - 12.7 fL    Platelets 016 (L) 783 - 390 Thousands/uL    nRBC 0 /100 WBCs    Neutrophils Relative 51 43 - 75 %    Immat GRANS % 0 0 - 2 %    Lymphocytes Relative 39 14 - 44 %    Monocytes Relative 6 4 - 12 %    Eosinophils Relative 4 0 - 6 %    Basophils Relative 0 0 - 1 %    Neutrophils Absolute 4.69 1.85 - 7.62 Thousands/µL    Immature Grans Absolute 0.04 0.00 - 0.20 Thousand/uL    Lymphocytes Absolute 3.65 0.60 - 4.47 Thousands/µL    Monocytes Absolute 0.58 0.17 - 1.22 Thousand/µL    Eosinophils Absolute 0.35 0.00 - 0.61 Thousand/µL    Basophils Absolute 0.03 0.00 - 0.10 Thousands/µL   Comprehensive metabolic panel    Collection Time: 07/27/23  9:43 AM   Result Value Ref Range    Sodium 139 135 - 147 mmol/L    Potassium 4.3 3.5 - 5.3 mmol/L    Chloride 106 96 - 108 mmol/L    CO2 28 21 - 32 mmol/L    ANION GAP 5 mmol/L    BUN 33 (H) 5 - 25 mg/dL    Creatinine 1.56 (H) 0.60 - 1.30 mg/dL    Glucose, Fasting 226 (H) 65 - 99 mg/dL    Calcium 9.1 8.3 - 10.1 mg/dL    AST 14 5 - 45 U/L    ALT 24 12 - 78 U/L    Alkaline Phosphatase 93 46 - 116 U/L    Total Protein 7.3 6.4 - 8.4 g/dL    Albumin 4.0 3.5 - 5.0 g/dL    Total Bilirubin 1.35 (H) 0.20 - 1.00 mg/dL    eGFR 40 ml/min/1.73sq m   LD,Blood    Collection Time: 07/27/23  9:43 AM   Result Value Ref Range     81 - 234 U/L   T3, free    Collection Time: 07/27/23  9:43 AM   Result Value Ref Range    T3, Free 2.92 2.50 - 3.90 pg/mL   T4, free    Collection Time: 07/27/23  9:43 AM   Result Value Ref Range    Free T4 0.91 0.61 - 1.12 ng/dL   TSH, 3rd generation    Collection Time: 07/27/23  9:43 AM   Result Value Ref Range    TSH 3RD GENERATON 2.719 0.450 - 4.500 uIU/mL   Cortisol Level, AM Specimen    Collection Time: 08/04/23  8:09 AM   Result Value Ref Range    Cortisol - AM 10.8 6.7 - 22.6 ug/dL   ACTH    Collection Time: 08/04/23  8:09 AM   Result Value Ref Range    ACTH 42.4 7.2 - 63.3 pg/mL   Hm Diabetes Eye Exam    Collection Time: 08/09/23  2:55 PM   Result Value Ref Range    Right Eye Diabetic Retinopathy None     Left Eye Diabetic Retinopathy None             Physical Exam  Constitutional:       General: He is not in acute distress. Appearance: He is well-developed. He is not diaphoretic. HENT:      Head: Normocephalic and atraumatic. Right Ear: External ear normal.      Left Ear: External ear normal.      Nose: Nose normal.      Mouth/Throat:      Pharynx: No oropharyngeal exudate.    Eyes:      General: Right eye: No discharge. Left eye: No discharge. Conjunctiva/sclera: Conjunctivae normal.      Pupils: Pupils are equal, round, and reactive to light. Neck:      Thyroid: No thyromegaly. Cardiovascular:      Rate and Rhythm: Normal rate and regular rhythm. Heart sounds: Normal heart sounds. No murmur heard. No friction rub. No gallop. Pulmonary:      Effort: Pulmonary effort is normal. No respiratory distress. Breath sounds: Normal breath sounds. No stridor. No wheezing or rales. Abdominal:      General: Bowel sounds are normal. There is no distension. Palpations: Abdomen is soft. Tenderness: There is no abdominal tenderness. Musculoskeletal:      Cervical back: Normal range of motion and neck supple. Lymphadenopathy:      Cervical: No cervical adenopathy. Skin:     General: Skin is warm and dry. Findings: No erythema or rash. Neurological:      Mental Status: He is alert and oriented to person, place, and time. Psychiatric:         Behavior: Behavior normal.         Thought Content:  Thought content normal.         Judgment: Judgment normal.

## 2023-09-07 ENCOUNTER — EVALUATION (OUTPATIENT)
Dept: PHYSICAL THERAPY | Age: 83
End: 2023-09-07
Payer: COMMERCIAL

## 2023-09-07 VITALS — HEART RATE: 60 BPM | DIASTOLIC BLOOD PRESSURE: 68 MMHG | SYSTOLIC BLOOD PRESSURE: 116 MMHG

## 2023-09-07 DIAGNOSIS — R42 DIZZINESS: ICD-10-CM

## 2023-09-07 DIAGNOSIS — R42 VERTIGO: ICD-10-CM

## 2023-09-07 DIAGNOSIS — R26.9 GAIT ABNORMALITY: Primary | ICD-10-CM

## 2023-09-07 PROCEDURE — 97162 PT EVAL MOD COMPLEX 30 MIN: CPT

## 2023-09-07 PROCEDURE — 97140 MANUAL THERAPY 1/> REGIONS: CPT

## 2023-09-08 NOTE — PROGRESS NOTES
PT Evaluation     Today's date: 2023  Patient name: Fabio Mondragon  : 1940  MRN: [id number]  Referring provider: REGINA Harvey  Dx:   Encounter Diagnosis     ICD-10-CM    1. Gait abnormality  R26.9       2. Dizziness  R42 Ambulatory Referral to Physical Therapy      3. Vertigo  R42                      Assessment  Assessment details: Fabio Mondragon is an 79year old male referred to outpt PT with c/o exacerbation of veritgo , gait abnormality and imbalance for the past 4 months with symptoms especially noted with tilting his head backwards to put in his eyedrops per his report. PT is warranted to address the above stated deficits in efforts to return to all prior activities without onset of vertigo or loss of balance. A left epley was performed today to address posterior canal BPPV symptoms . PT will retest next session . Emphasis also to be placed on cervical rom and cervical stabilization exer,   Impairments: abnormal gait, abnormal or restricted ROM, activity intolerance, impaired balance, impaired physical strength, lacks appropriate home exercise program, pain with function, poor posture  and poor body mechanics  Other impairment: cervical pain/stiffness, decreased cervical rom, decreased cervical stabilization, hx of intermittnet vertigo in the past   Functional limitations: decreased bed mobility, unstable gait, imbalance, + BPPV symptoms left post canal,   Symptom irritability: moderateUnderstanding of Dx/Px/POC: good   Prognosis: good    Goals  Short Term  1. The pt shall achieve vertigo reduction by 50 percent in two weeks. 2. The pt shall achieve independent bed mobility in two weeks. 3. The pt shall prove independent in a home exer program in two weeks. Long Term Goals Vestibular  1. The pt shall achieve 80 percent reduction in vertigo in 4 weeks   2.   The pt shall achieve quick turns with gait with not loss of balance in 4 weeks    Plan  Patient would benefit from: skilled physical therapy and PT eval  Referral necessary: Yes  Other planned modality interventions: prn  Planned therapy interventions: manual therapy, neuromuscular re-education, balance, patient education, postural training, sensory integrative techniques, strengthening, stretching, canalith repositioning, body mechanics training, gait training, home exercise program, therapeutic activities and therapeutic exercise  Frequency: 2x week  Duration in weeks: 8  Plan of Care beginning date: 2023  Plan of Care expiration date: 2023  Treatment plan discussed with: patient        Subjective Evaluation    History of Present Illness  Onset date: 4 months ago exacerbation hx of vertigo in the past.  Mechanism of injury: Collins Starks is an 79year old male referred to outpt PT with a hx of intermittent vertigo  with recent exacerbation of symptoms noted approx 4 months ago with trying to put drops in his eyes and symptoms persisting at this time. Symptoms are not as bad as was reported in the Fall of  but also present with sit to stand and at times with looking downward. He reports after receiving PT last year he now has stopped all of his cervical stretching and stabilization exer because he had felt better per his report. He presents with  Significant forward head with posture in sitting and in standing with rom restrictions present. Recurrent probem    Quality of life: good    Patient Goals  Patient goals for therapy: decreased pain, increased motion, independence with ADLs/IADLs, return to sport/leisure activities and improved balance  Patient goal: reduction in dizziness by 80 percent and no loss of balance with quick turns with gait.    Pain  Current pain ratin  At best pain ratin  At worst pain rating: 3  Location: cervical stifffness  Quality: dull ache, tight and pulling  Relieving factors: heat, change in position and rest  Aggravating factors: overhead activity, walking and lifting  Progression: worsening    Social Support  Steps to enter house: no  Lives with: spouse (takes care of his spouse who is in a wheelchair)    Employment status: not working  Exercise history: likes to walk, household chores.     Treatments  Previous treatment: physical therapy  Current treatment: physical therapy        Objective    Flowsheet Rows    Flowsheet Row Most Recent Value   PT/OT G-Codes    Current Score 39   Projected Score 60   Assessment Type Evaluation   G code set Mobility: Walking & Moving Around   Mobility: Walking and Moving Around Current Status () CK   Mobility: Walking and Moving Around Goal Status () CJ             Precautions: no known allergies      Manuals 9/7/23            [employer] balance testing and training I eval             Left epley perf             Retest danita hallpike left, right and rolling r/l                          Neuro Re-Ed             Chin tucks  5 reps 5 sec hold             Cervical isometrics all planes 5 sec hold x 5 reps             Tandem gait in ll bars 1 min x 1            Tandem stance 30 sec x 3 start with 10 seconds hold            Ankle swaying flat and foam, eo, ec in ll bars             Upper trap stretch             Levator stretch             Ther Ex             Self rotation stretch             Squats with chin tuck hi lo mat table                                                                                            Ther Activity                                       Gait Training                                       Modalities c/o dizziness with looking upwards,  Static standing flat surface eyes open wfl's,  Eyes closed increased ant and post sway noted,  Foam standing eyes open excessive ant post sway, eyes closed pt falls in 3 seconds , foam standing eyes open and eyes closed pt falls with vertical and horizontal head turns , visual testing smooth pursuit, saccades, convergence 3 inches from nose intact,   HTT -   , VOR cancellation neg,   VOR testing very difficulty for pt to perform needing min assist no c/o dizziness,  Pt wears bifocal however glasses broken today    ( his wife is in a wheelchair and has him lie down to put his eye drops in his eyes as pt unable to perform himself due to ue tremors in hands this causes vertigo per hip report)( report per dr. Vance Almonte excessive wax build up in left ear pt also given allergy pill)    Supine lying slight dizziness noted,   Right and left rolling ++ vertigo with left rolling 5 sec nystagmus , right rolling slight vertigo no nystagmus noted, danita hallpike + left with 10 sec nystagmus upbeating and left rotation noted,     Ambulation: Stairs   Ascend stairs: independent  Pattern: reciprocal  Railings: one rail  Descend stairs: independent  Pattern: reciprocal  Railings: one rail      Flowsheet Rows    Flowsheet Row Most Recent Value   PT/OT G-Codes    Current Score 39   Projected Score 60   Assessment Type Evaluation   G code set Mobility: Walking & Moving Around   Mobility: Walking and Moving Around Current Status () CK   Mobility: Walking and Moving Around Goal Status () CJ            PMH: hiatal hernia with GERD, DM type 2, essential HTN, Parkinson's disease, Malig melanoma of skin of face , multiple skin grafts 10/12/21, arthritis left knee , thrombocytopenia, stage 3 CKD, s/p lap cholecystectomy, s/p umb. hernia repair, left hand surgery hx , c/o vertigo + 9 months, cataract removal both eyes, pt broke his glasses today will be getting them fixed after therapy asap    Precautions: no known allergies      Manuals 9/7/23            bi[employer] balance testing and training I eval             Left epley perf             Retest danita hallpike left, right and rolling r/l                          Neuro Re-Ed             Chin tucks  5 reps 5 sec hold             Cervical isometrics all planes 5 sec hold x 5 reps             Tandem gait in ll bars 1 min x 1            Tandem stance 30 sec x 3 start with 10 seconds hold            Ankle swaying flat and foam, eo, ec in ll bars             Upper trap stretch             Levator stretch             Ther Ex             Self rotation stretch             Squats with chin tuck hi lo mat table                                                                                            Ther Activity                                       Gait Training                                       Modalities

## 2023-09-12 ENCOUNTER — OFFICE VISIT (OUTPATIENT)
Dept: PHYSICAL THERAPY | Age: 83
End: 2023-09-12
Payer: COMMERCIAL

## 2023-09-12 ENCOUNTER — APPOINTMENT (OUTPATIENT)
Dept: LAB | Facility: IMAGING CENTER | Age: 83
End: 2023-09-12
Payer: COMMERCIAL

## 2023-09-12 DIAGNOSIS — Z79.899 HIGH RISK MEDICATION USE: ICD-10-CM

## 2023-09-12 DIAGNOSIS — C43.30 MALIGNANT MELANOMA OF SKIN OF FACE (HCC): ICD-10-CM

## 2023-09-12 DIAGNOSIS — C43.39 MALIGNANT MELANOMA OF FOREHEAD (HCC): ICD-10-CM

## 2023-09-12 DIAGNOSIS — R26.9 GAIT ABNORMALITY: Primary | ICD-10-CM

## 2023-09-12 DIAGNOSIS — R42 VERTIGO: ICD-10-CM

## 2023-09-12 LAB
ALBUMIN SERPL BCP-MCNC: 4.4 G/DL (ref 3.5–5)
ALP SERPL-CCNC: 92 U/L (ref 34–104)
ALT SERPL W P-5'-P-CCNC: 16 U/L (ref 7–52)
ANION GAP SERPL CALCULATED.3IONS-SCNC: 7 MMOL/L
AST SERPL W P-5'-P-CCNC: 16 U/L (ref 13–39)
BASOPHILS # BLD AUTO: 0.03 THOUSANDS/ÂΜL (ref 0–0.1)
BASOPHILS NFR BLD AUTO: 0 % (ref 0–1)
BILIRUB SERPL-MCNC: 1.21 MG/DL (ref 0.2–1)
BUN SERPL-MCNC: 32 MG/DL (ref 5–25)
CALCIUM SERPL-MCNC: 9.9 MG/DL (ref 8.4–10.2)
CHLORIDE SERPL-SCNC: 103 MMOL/L (ref 96–108)
CO2 SERPL-SCNC: 30 MMOL/L (ref 21–32)
CREAT SERPL-MCNC: 1.53 MG/DL (ref 0.6–1.3)
EOSINOPHIL # BLD AUTO: 2.19 THOUSAND/ÂΜL (ref 0–0.61)
EOSINOPHIL NFR BLD AUTO: 20 % (ref 0–6)
ERYTHROCYTE [DISTWIDTH] IN BLOOD BY AUTOMATED COUNT: 13.2 % (ref 11.6–15.1)
GFR SERPL CREATININE-BSD FRML MDRD: 41 ML/MIN/1.73SQ M
GLUCOSE SERPL-MCNC: 96 MG/DL (ref 65–140)
HCT VFR BLD AUTO: 41.2 % (ref 36.5–49.3)
HGB BLD-MCNC: 13.8 G/DL (ref 12–17)
IMM GRANULOCYTES # BLD AUTO: 0.03 THOUSAND/UL (ref 0–0.2)
IMM GRANULOCYTES NFR BLD AUTO: 0 % (ref 0–2)
LDH SERPL-CCNC: 160 U/L (ref 140–271)
LYMPHOCYTES # BLD AUTO: 4.4 THOUSANDS/ÂΜL (ref 0.6–4.47)
LYMPHOCYTES NFR BLD AUTO: 41 % (ref 14–44)
MCH RBC QN AUTO: 30.3 PG (ref 26.8–34.3)
MCHC RBC AUTO-ENTMCNC: 33.5 G/DL (ref 31.4–37.4)
MCV RBC AUTO: 91 FL (ref 82–98)
MONOCYTES # BLD AUTO: 0.63 THOUSAND/ÂΜL (ref 0.17–1.22)
MONOCYTES NFR BLD AUTO: 6 % (ref 4–12)
NEUTROPHILS # BLD AUTO: 3.52 THOUSANDS/ÂΜL (ref 1.85–7.62)
NEUTS SEG NFR BLD AUTO: 33 % (ref 43–75)
NRBC BLD AUTO-RTO: 0 /100 WBCS
PLATELET # BLD AUTO: 121 THOUSANDS/UL (ref 149–390)
PMV BLD AUTO: 10.4 FL (ref 8.9–12.7)
POTASSIUM SERPL-SCNC: 4.2 MMOL/L (ref 3.5–5.3)
PROT SERPL-MCNC: 6.9 G/DL (ref 6.4–8.4)
RBC # BLD AUTO: 4.55 MILLION/UL (ref 3.88–5.62)
SODIUM SERPL-SCNC: 140 MMOL/L (ref 135–147)
T3FREE SERPL-MCNC: 2.99 PG/ML (ref 2.5–3.9)
T4 FREE SERPL-MCNC: 0.87 NG/DL (ref 0.61–1.12)
TSH SERPL DL<=0.05 MIU/L-ACNC: 2.33 UIU/ML (ref 0.45–4.5)
WBC # BLD AUTO: 10.8 THOUSAND/UL (ref 4.31–10.16)

## 2023-09-12 PROCEDURE — 85025 COMPLETE CBC W/AUTO DIFF WBC: CPT

## 2023-09-12 PROCEDURE — 84439 ASSAY OF FREE THYROXINE: CPT

## 2023-09-12 PROCEDURE — 36415 COLL VENOUS BLD VENIPUNCTURE: CPT

## 2023-09-12 PROCEDURE — 97110 THERAPEUTIC EXERCISES: CPT

## 2023-09-12 PROCEDURE — 83615 LACTATE (LD) (LDH) ENZYME: CPT

## 2023-09-12 PROCEDURE — 97530 THERAPEUTIC ACTIVITIES: CPT

## 2023-09-12 PROCEDURE — 84443 ASSAY THYROID STIM HORMONE: CPT

## 2023-09-12 PROCEDURE — 97112 NEUROMUSCULAR REEDUCATION: CPT

## 2023-09-12 PROCEDURE — 84481 FREE ASSAY (FT-3): CPT

## 2023-09-12 PROCEDURE — 80053 COMPREHEN METABOLIC PANEL: CPT

## 2023-09-12 NOTE — PROGRESS NOTES
Daily Note     Today's date: 2023  Patient name: Jesus Amaro  : 1940  MRN: [id number]  Referring provider: REGINA Guzman  Dx:   Encounter Diagnosis     ICD-10-CM    1. Gait abnormality  R26.9       2. Vertigo  R42                      Subjective: "I get dizzy when my wife puts my eye drops in when I'm laying down."      Objective: See treatment diary below      Assessment: Tolerated treatment well. Patient would benefit from continued PT, head diagonal written exer program issued today      Plan: Continue per plan of care.       Precautions: no known allergies      Manuals 23           bi[employer] balance testing and training I eval             Left epley perf             Retest danita hallpike left, right and rolling r/l                          Neuro Re-Ed             Chin tucks  5 reps 5 sec hold  10 reps            Cervical isometrics all planes 5 sec hold x 5 reps  5 x 5           Tandem gait in ll bars 1 min x 1 1 min x 1 offset feet           Tandem stance 30 sec x 3 start with 10 seconds hold 30 sec x 3 offset feet           Ankle swaying flat and foam, eo, ec in ll bars  10 reps flat surface eo ec close s           Upper trap stretch  3 reps 10 sec           Levator stretch  3 reps 10 sesc           Ther Ex             Self  cervicalrotation stretch  3 reps 10 sec            Squats with chin tuck hi lo mat table   3 x 10                                                                                         Ther Activity                                       Gait Training                                       Modalities

## 2023-09-14 ENCOUNTER — OFFICE VISIT (OUTPATIENT)
Dept: PHYSICAL THERAPY | Age: 83
End: 2023-09-14
Payer: COMMERCIAL

## 2023-09-14 DIAGNOSIS — R26.9 GAIT ABNORMALITY: Primary | ICD-10-CM

## 2023-09-14 PROCEDURE — 97110 THERAPEUTIC EXERCISES: CPT

## 2023-09-14 NOTE — PROGRESS NOTES
Daily Note     Today's date: 2023  Patient name: Susana Ling  : 1940  MRN: [id number]  Referring provider: REGINA John  Dx:   Encounter Diagnosis     ICD-10-CM    1. Gait abnormality  R26.9                      Subjective: VC's for upright posture. Without loss of balance with eyes closed today. Objective: See treatment diary below      Assessment: Tolerated treatment well. Patient would benefit from continued PT      Plan: Continue per plan of care.       Precautions: no known allergies      Manuals 23          [employer] balance testing and training I eval             Left epley perf             Retest danita hallpike left, right and rolling r/l                          Neuro Re-Ed             Chin tucks  5 reps 5 sec hold  10 reps  10x          Cervical isometrics all planes 5 sec hold x 5 reps  5 x 5 5sec x 5          Tandem gait in ll bars 1 min x 1 1 min x 1 offset feet 2 min          Tandem stance 30 sec x 3 start with 10 seconds hold 30 sec x 3 offset feet 20sec x 5          Ankle swaying flat and foam, eo, ec in ll bars  10 reps flat surface eo ec close s 10x          Upper trap stretch  3 reps 10 sec 20sec x 5          Levator stretch  3 reps 10 sesc 20sec x 5          Ther Ex             Self  cervicalrotation stretch  3 reps 10 sec  5sec x 5          Squats with chin tuck hi lo mat table   3 x 10 3 x 10          Rows/ext   30x          Nu-step   15 min                                                              Ther Activity                                       Gait Training                                       Modalities

## 2023-09-15 RX ORDER — SODIUM CHLORIDE 9 MG/ML
20 INJECTION, SOLUTION INTRAVENOUS ONCE
Status: CANCELLED | OUTPATIENT
Start: 2023-09-21

## 2023-09-18 ENCOUNTER — OFFICE VISIT (OUTPATIENT)
Dept: PHYSICAL THERAPY | Age: 83
End: 2023-09-18
Payer: COMMERCIAL

## 2023-09-18 DIAGNOSIS — R26.9 GAIT ABNORMALITY: Primary | ICD-10-CM

## 2023-09-18 DIAGNOSIS — R42 DIZZINESS: ICD-10-CM

## 2023-09-18 DIAGNOSIS — R42 VERTIGO: ICD-10-CM

## 2023-09-18 PROCEDURE — 97110 THERAPEUTIC EXERCISES: CPT | Performed by: PHYSICAL THERAPIST

## 2023-09-18 PROCEDURE — 97112 NEUROMUSCULAR REEDUCATION: CPT | Performed by: PHYSICAL THERAPIST

## 2023-09-20 ENCOUNTER — OFFICE VISIT (OUTPATIENT)
Dept: PHYSICAL THERAPY | Age: 83
End: 2023-09-20
Payer: COMMERCIAL

## 2023-09-20 DIAGNOSIS — R42 VERTIGO: ICD-10-CM

## 2023-09-20 DIAGNOSIS — R26.9 GAIT ABNORMALITY: Primary | ICD-10-CM

## 2023-09-20 PROCEDURE — 97140 MANUAL THERAPY 1/> REGIONS: CPT

## 2023-09-20 PROCEDURE — 97112 NEUROMUSCULAR REEDUCATION: CPT

## 2023-09-20 PROCEDURE — 97110 THERAPEUTIC EXERCISES: CPT

## 2023-09-21 ENCOUNTER — HOSPITAL ENCOUNTER (OUTPATIENT)
Dept: INFUSION CENTER | Facility: CLINIC | Age: 83
Discharge: HOME | End: 2023-09-21
Payer: COMMERCIAL

## 2023-09-21 ENCOUNTER — OFFICE VISIT (OUTPATIENT)
Dept: HEMATOLOGY ONCOLOGY | Facility: CLINIC | Age: 83
End: 2023-09-21
Payer: COMMERCIAL

## 2023-09-21 VITALS
SYSTOLIC BLOOD PRESSURE: 155 MMHG | OXYGEN SATURATION: 93 % | HEART RATE: 76 BPM | RESPIRATION RATE: 17 BRPM | TEMPERATURE: 97.1 F | DIASTOLIC BLOOD PRESSURE: 73 MMHG

## 2023-09-21 VITALS
HEIGHT: 67 IN | SYSTOLIC BLOOD PRESSURE: 140 MMHG | OXYGEN SATURATION: 99 % | RESPIRATION RATE: 16 BRPM | BODY MASS INDEX: 25.74 KG/M2 | WEIGHT: 164 LBS | DIASTOLIC BLOOD PRESSURE: 84 MMHG | HEART RATE: 78 BPM | TEMPERATURE: 97.3 F

## 2023-09-21 DIAGNOSIS — C43.30 MALIGNANT MELANOMA OF SKIN OF FACE (HCC): Primary | ICD-10-CM

## 2023-09-21 DIAGNOSIS — E11.9 TYPE 2 DIABETES MELLITUS WITHOUT COMPLICATION, WITHOUT LONG-TERM CURRENT USE OF INSULIN (HCC): ICD-10-CM

## 2023-09-21 DIAGNOSIS — C43.39 MALIGNANT MELANOMA OF FOREHEAD (HCC): ICD-10-CM

## 2023-09-21 DIAGNOSIS — Z79.899 HIGH RISK MEDICATION USE: ICD-10-CM

## 2023-09-21 DIAGNOSIS — C77.3 SECONDARY AND UNSPECIFIED MALIGNANT NEOPLASM OF AXILLA AND UPPER LIMB LYMPH NODES (HCC): ICD-10-CM

## 2023-09-21 DIAGNOSIS — N18.30 STAGE 3 CHRONIC KIDNEY DISEASE, UNSPECIFIED WHETHER STAGE 3A OR 3B CKD (HCC): ICD-10-CM

## 2023-09-21 PROCEDURE — 96413 CHEMO IV INFUSION 1 HR: CPT

## 2023-09-21 PROCEDURE — 99214 OFFICE O/P EST MOD 30 MIN: CPT | Performed by: INTERNAL MEDICINE

## 2023-09-21 RX ORDER — SODIUM CHLORIDE 9 MG/ML
20 INJECTION, SOLUTION INTRAVENOUS ONCE
Status: COMPLETED | OUTPATIENT
Start: 2023-09-21 | End: 2023-09-21

## 2023-09-21 RX ADMIN — SODIUM CHLORIDE 20 ML/HR: 0.9 INJECTION, SOLUTION INTRAVENOUS at 12:30

## 2023-09-21 RX ADMIN — SODIUM CHLORIDE 400 MG: 9 INJECTION, SOLUTION INTRAVENOUS at 12:38

## 2023-09-21 NOTE — PROGRESS NOTES
Patient tolerated treatment today without complications. Patient confirmed upcoming appointment and print out provided.

## 2023-09-21 NOTE — PROGRESS NOTES
Patient to infusion for Keytruda. Ok to use labs from 9/12/23 per order and meet parameters for treatment today. Call bell within reach.

## 2023-09-21 NOTE — PROGRESS NOTES
Daily Note     Today's date: 2023  Patient name: Collins Starks  : 1940  MRN: [id number]  Referring provider: REGINA Cabrera  Dx:   Encounter Diagnosis     ICD-10-CM    1. Gait abnormality  R26.9       2. Vertigo  R42                      Subjective: Pt at least 50 percent improved in vertigo. Cueing with proper head alignment on pillow avoid hyperextension over pillow      Objective: See treatment diary below      Assessment: Tolerated treatment well. Patient demonstrated fatigue post treatment      Plan: Continue per plan of care.          Precautions: no known allergies      Manuals 23        [employer] balance testing and training I eval             Left epley perf             Retest danita hallpike left, right and rolling r/l     Right epley 4 beat nystagmus man 15min                     Neuro Re-Ed             Chin tucks  5 reps 5 sec hold  10 reps  10x [id number] 10 reps         Cervical isometrics all planes 5 sec hold x 5 reps  5 x 5 5sec x 5 5 sec x 5 5 x 5        Tandem gait in ll bars 1 min x 1 1 min x 1 offset feet 2 min 2 min  1 min offset feet        Tandem stance 30 sec x 3 start with 10 seconds hold 30 sec x 3 offset feet 20sec x 5 20 sec x 5 30 sec x 3        Ankle swaying flat and foam, eo, ec in ll bars  10 reps flat surface eo ec close s 10x 10x  10        Upper trap stretch    3 reps 10 sec 20sec x 5 :20x5         Levator stretch  3 reps 10 sesc 20sec x 5 :20x5         Ther Ex             Self  cervicalrotation stretch  3 reps 10 sec  5sec x 5 5 sec x5         Squats with chin tuck hi lo mat table   3 x 10 3 x 10 3x10 3 x 10        Rows/ext   30x RTB 30x         Nu-step   15 min 15 min  15 min r 3                                                            Ther Activity                                       Gait Training                                       Modalities

## 2023-09-21 NOTE — LETTER
September 23, 2023     Anne Perales MD  [address]    Patient: Jennifer Sandoval   YOB: 1940   Date of Visit: 9/21/2023       Dear Dr. Joslyn Starks:    Thank you for referring Jennifer Sandoval to me for evaluation. Below are my notes for this consultation. If you have questions, please do not hesitate to call me. I look forward to following your patient along with you.          Sincerely,        Kati Vela MD        CC: Brandon Toth., MD Yovany De La Cruz MD Jewelene Sherry, MD  9/23/2023 11:19 AM  Sign when Signing Visit  [address] [facility] [address] [place] [address]  [phone]     Date of Visit: 9/21/2023  Name: Jennifer Sandoval   YOB: 1940        Subjective    VISIT DIAGNOSIS:  Diagnoses and all orders for this visit:    Malignant melanoma of skin of face ([address])    Malignant melanoma of forehead ([address])    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[address])    High risk medication use    Stage 3 chronic kidney disease, unspecified whether stage 3a or 3b CKD ([address])    Type 2 diabetes mellitus without complication, without long-term current use of insulin ([address])        Oncology History   Malignant melanoma of skin of face ([address])   9/8/2021 -  Cancer Staged    Staging form: Melanoma of the Skin, AJCC 8th Edition  - Clinical stage from 9/8/2021: Stage III (cT2a, cN1a, cM0) - Signed by Kati Vela MD on 11/7/2021 9/8/2021 -  Cancer Staged    Staging form: Melanoma of the Skin, AJCC 8th Edition  - Pathologic stage from 9/8/2021: Stage IIIA (pT2a, pN1a, cM0) - Signed by Kati Vela MD on 11/7/2021 9/22/2021 Initial Diagnosis    Malignant melanoma of skin of face ([address])     4/4/2022 -  Chemotherapy    pembrolizumab (KEYTRUDA) IVPB, 400 mg, Intravenous, Once, 13 of 15 cycles  Administration: 400 mg (4/4/2022), 400 mg (5/16/2022), 400 mg (6/27/2022), 400 mg (8/8/2022), 400 mg (9/19/2022), 400 mg (10/31/2022), 400 mg (12/12/2022), 400 mg (1/23/2023), 400 mg (3/6/2023), 400 mg (5/5/2023), 400 mg (6/15/2023), 400 mg (8/3/2023), 400 mg (9/21/2023)     Malignant melanoma of forehead ([address])   11/29/2021 Initial Diagnosis    Malignant melanoma of forehead ([address])     4/4/2022 -  Chemotherapy    pembrolizumab (KEYTRUDA) IVPB, 400 mg, Intravenous, Once, 13 of 15 cycles  Administration: 400 mg (4/4/2022), 400 mg (5/16/2022), 400 mg (6/27/2022), 400 mg (8/8/2022), 400 mg (9/19/2022), 400 mg (10/31/2022), 400 mg (12/12/2022), 400 mg (1/23/2023), 400 mg (3/6/2023), 400 mg (5/5/2023), 400 mg (6/15/2023), 400 mg (8/3/2023), 400 mg (9/21/2023)        Cancer Staging   Malignant melanoma of skin of face [facility])  Staging form: Melanoma of the Skin, AJCC 8th Edition  - Clinical stage from 9/8/2021: Stage III (cT2a, cN1a, cM0) - Signed by Ebonie Maharaj MD on 11/7/2021  - Pathologic stage from 9/8/2021: Stage IIIA (pT2a, pN1a, cM0) - Signed by Ebonie Maharaj MD on 11/7/2021     Treatment Details   Treatment goal Curative   Plan Name OP Pembrolizumab Q 42 Days   Status Active   Start Date 4/4/2022   End Date 12/28/2023 (Planned)   Provider Ebonie Maharaj MD   Chemotherapy pembrolizumab [facility]) IVPB, 400 mg, Intravenous, Once, 13 of 15 cycles  Administration: 400 mg (4/4/2022), 400 mg (5/16/2022), 400 mg (6/27/2022), 400 mg (8/8/2022), 400 mg (9/19/2022), 400 mg (10/31/2022), 400 mg (12/12/2022), 400 mg (1/23/2023), 400 mg (3/6/2023), 400 mg (5/5/2023), 400 mg (6/15/2023), 400 mg (8/3/2023), 400 mg (9/21/2023)          HISTORY OF PRESENT ILLNESS: Claudio Veliz is a 79 y.o. male  who originally had stage IIIa melanoma with concerns for local involvement of lymph nodes that were unresectable on treatment with pembrolizumab here for continued monitoring, follow-up, and surveillance. He is doing well with no issues concerns or complaints. Still fatigued. He did get a.m. cortisol and ACTH checked which were within normal limits so no evidence of adrenal insufficiency. Otherwise doing well. Eating well and energy is okay but he does feel tired at times. Denies any new, changing, concerning skin lesions. Denies lymphadenopathy. Denies immune mediated side effects. Denies headaches, double vision, rash, itching, chest pain, shortness of breath, diarrhea. Denies muscle weakness and fatigue. REVIEW OF SYSTEMS:  Review of Systems   Constitutional: Positive for fatigue. Negative for appetite change, fever and unexpected weight change. HENT:   Negative for lump/mass, trouble swallowing and voice change. Eyes: Negative for icterus. Respiratory: Negative for cough, shortness of breath and wheezing. Cardiovascular: Negative for leg swelling. Gastrointestinal: Negative for abdominal pain, constipation, diarrhea, nausea and vomiting. Genitourinary: Negative for difficulty urinating and hematuria. Musculoskeletal: Negative for arthralgias, gait problem and myalgias. Skin: Negative for itching and rash. No new, changing, or concerning lesions. Neurological: Negative for extremity weakness, gait problem, headaches, light-headedness and numbness. Hematological: Negative for adenopathy.         MEDICATIONS:    Current Outpatient Medications:   •  atenolol (TENORMIN) 100 mg tablet, Take 1 tablet (100 mg total) by mouth daily, Disp: 90 tablet, Rfl: 3  •  cetirizine (ZyrTEC) 5 MG tablet, Take 1 tablet (5 mg total) by mouth daily, Disp: 90 tablet, Rfl: 1  •  fluticasone (FLONASE) 50 mcg/act nasal spray, 1 spray into each nostril daily, Disp: 16 g, Rfl: 1  •  glimepiride (AMARYL) 4 mg tablet, Take 1 tablet (4 mg total) by mouth daily, Disp: 90 tablet, Rfl: 3  •  lisinopril-hydrochlorothiazide (PRINZIDE,ZESTORETIC) 20-25 MG per tablet, Take 1 tablet by mouth daily, Disp: 90 tablet, Rfl: 3  •  lovastatin (MEVACOR) 40 MG tablet, Take 1 tablet (40 mg total) by mouth daily, Disp: 90 tablet, Rfl: 3  •  metFORMIN (GLUCOPHAGE) 1000 MG tablet, Take 1 tablet (1,000 mg total) by mouth 2 (two) times a day with meals, Disp: 180 tablet, Rfl: 3  •  carbamide peroxide (DEBROX) 6.5 % otic solution, Administer 5 drops into both ears 2 (two) times a day for 7 days, Disp: 15 mL, Rfl: 0     ALLERGIES:  No Known Allergies     ACTIVE PROBLEMS:  Patient Active Problem List   Diagnosis   • Essential hypertension   • Hiatal hernia with GERD   • Status post laparoscopic cholecystectomy   • Status post umbilical hernia repair, follow-up exam   • Type 2 diabetes mellitus without complication, without long-term current use of insulin (HCC)   • Mixed hyperlipidemia   • Parkinson's disease ([address])   • Microalbuminuria   • Malignant melanoma of skin of face ([address])   • Thrombocytopenia ([address])   • Malignant melanoma of forehead ([address])   • 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)   • Stage 3 chronic kidney disease, unspecified whether stage 3a or 3b CKD ([address])   • High risk medication use   • Dizziness   • Advanced care planning/counseling discussion   • Arthritis of left knee          PAST MEDICAL HISTORY:   Past Medical History:   Diagnosis Date   • Diabetes mellitus ([address])    • Hyperlipidemia    • Hypertension         PAST SURGICAL HISTORY:  Past Surgical History:   Procedure Laterality Date   • CATARACT EXTRACTION, BILATERAL     • FLAP LOCAL HEAD / NECK Left 10/12/2021    Procedure: RECONSTRUCTION OF LEFT TEMPLE DEFECT AFTER RESCECTION MELANOMA;  Surgeon: Mya Fernandez MD;  [facility];  Service: Plastics   • FULL THICKNESS SKIN GRAFT Left 10/12/2021    Procedure: SKIN GRAFT FULL THICKNESS LEFT TEMPLE;  Surgeon: Mya Fernandez MD;  [facility];  Service: Plastics   • GALLBLADDER SURGERY     • HAND SURGERY Left    • LYMPH NODE BIOPSY Left 10/12/2021    Procedure: BIOPSY LYMPH NODE SENTINEL, LYMPHOSCINTIGRAPHY, INTRAOPERATIVE LYMPHATIC MAPPING (INJECT AT 1200); Surgeon: Cody Reynolds MD;  [facility];  Service: Surgical Oncology   • NOSE SURGERY     • SKIN CANCER EXCISION  10/2021   • SKIN LESION EXCISION Left 10/12/2021    Procedure: [address];  Surgeon: Cody Reynolds MD;  [facility];  Service: Surgical Oncology   • SPLIT THICKNESS SKIN GRAFT Left 10/12/2021    Procedure: SKIN GRAFT SPLIT THICKNESS LEFT TEMPLE;  Surgeon: Carly Hallman MD;  [facility];  Service: Plastics   • US GUIDED LYMPH NODE BIOPSY LEFT  03/21/2022        SOCIAL HISTORY:  Social History     Socioeconomic History   • Marital status: /Civil Union     Spouse name: None   • Number of children: None   • Years of education: None   • Highest education level: None   Occupational History   • None   Tobacco Use   • Smoking status: Never   • Smokeless tobacco: Never   Vaping Use   • Vaping Use: Never used   Substance and Sexual Activity   • Alcohol use: Not Currently   • Drug use: Never   • Sexual activity: Not Currently   Other Topics Concern   • None   Social History Narrative   • None     Social Determinants of Health     Financial Resource Strain: Low Risk  (11/29/2022)    Overall Financial Resource Strain (CARDIA)    • Difficulty of Paying Living Expenses: Not hard at all   Food Insecurity: Not on file   Transportation Needs: No Transportation Needs (11/29/2022)    PRAPARE - Transportation    • Lack of Transportation (Medical): No    • Lack of Transportation (Non-Medical):  No   Physical Activity: Not on file   Stress: Not on file   Social Connections: Not on file   Intimate Partner Violence: Not on file   Housing Stability: Not on file        FAMILY HISTORY:  Family History   Problem Relation Age of Onset   • No Known Problems Mother    • No Known Problems Father    • Colon cancer Other 60           Objective    PHYSICAL EXAMINATION:   Blood pressure 140/84, pulse 78, temperature (!) 97.3 °F (36.3 °C), temperature source Temporal, resp. rate 16, height 5' 7" (1.702 m), weight 74.4 kg (164 lb), SpO2 99 %. Pain Score: 0-No pain     ECOG Performance Status      Flowsheet Row Most Recent Value   ECOG Performance Status 0 - Fully active, able to carry on all pre-disease performance without restriction               Physical Exam  Constitutional:       General: He is not in acute distress. Appearance: Normal appearance. He is not toxic-appearing. HENT:      Head: Normocephalic and atraumatic. Right Ear: External ear normal.      Left Ear: External ear normal.      Nose: Nose normal.      Mouth/Throat:      Mouth: Mucous membranes are moist.      Pharynx: Oropharynx is clear. Eyes:      General: No scleral icterus. Right eye: No discharge. Left eye: No discharge. Conjunctiva/sclera: Conjunctivae normal.   Cardiovascular:      Rate and Rhythm: Normal rate and regular rhythm. Pulses: Normal pulses. Heart sounds: No murmur heard. No friction rub. No gallop. Pulmonary:      Effort: Pulmonary effort is normal. No respiratory distress. Breath sounds: Normal breath sounds. No wheezing or rales. Abdominal:      General: Bowel sounds are normal. There is no distension. Palpations: There is no mass. Tenderness: There is no abdominal tenderness. There is no rebound. Musculoskeletal:         General: No swelling or tenderness. Cervical back: Normal range of motion. No rigidity. Right lower leg: No edema. Left lower leg: No edema. Lymphadenopathy:      Head:      Right side of head: No submandibular, preauricular or posterior auricular adenopathy. Left side of head: No submandibular, preauricular or posterior auricular adenopathy. Cervical: No cervical adenopathy. Right cervical: No superficial or posterior cervical adenopathy. Left cervical: No superficial or posterior cervical adenopathy.       Upper Body: Right upper body: No supraclavicular or axillary adenopathy. Left upper body: No supraclavicular or axillary adenopathy. Skin:     General: Skin is warm. Coloration: Skin is not jaundiced. Findings: No lesion or rash. Comments: Well healed surgical scar. No evidence of recurrence at primary site. Neurological:      General: No focal deficit present. Mental Status: He is alert and oriented to person, place, and time. Cranial Nerves: No cranial nerve deficit. Motor: No weakness. Gait: Gait normal.   Psychiatric:         Mood and Affect: Mood normal.         Behavior: Behavior normal.         Thought Content: Thought content normal.         Judgment: Judgment normal.         I reviewed lab data in the chart.     WBC   Date Value Ref Range Status   09/12/2023 10.80 (H) 4.31 - 10.16 Thousand/uL Final   07/27/2023 9.34 4.31 - 10.16 Thousand/uL Final   06/08/2023 7.87 4.31 - 10.16 Thousand/uL Final     Hemoglobin   Date Value Ref Range Status   09/12/2023 13.8 12.0 - 17.0 g/dL Final   07/27/2023 14.6 12.0 - 17.0 g/dL Final   06/08/2023 13.9 12.0 - 17.0 g/dL Final     Platelets   Date Value Ref Range Status   09/12/2023 121 (L) 149 - 390 Thousands/uL Final   07/27/2023 137 (L) 149 - 390 Thousands/uL Final   06/08/2023 136 (L) 149 - 390 Thousands/uL Final     MCV   Date Value Ref Range Status   09/12/2023 91 82 - 98 fL Final   07/27/2023 89 82 - 98 fL Final   06/08/2023 90 82 - 98 fL Final      Potassium   Date Value Ref Range Status   09/12/2023 4.2 3.5 - 5.3 mmol/L Final   07/27/2023 4.3 3.5 - 5.3 mmol/L Final   06/08/2023 4.1 3.5 - 5.3 mmol/L Final     Chloride   Date Value Ref Range Status   09/12/2023 103 96 - 108 mmol/L Final   07/27/2023 106 96 - 108 mmol/L Final   06/08/2023 110 (H) 96 - 108 mmol/L Final     CO2   Date Value Ref Range Status   09/12/2023 30 21 - 32 mmol/L Final   07/27/2023 28 21 - 32 mmol/L Final   06/08/2023 28 21 - 32 mmol/L Final     BUN   Date Value Ref Range Status   09/12/2023 32 (H) 5 - 25 mg/dL Final   07/27/2023 33 (H) 5 - 25 mg/dL Final   06/08/2023 41 (H) 5 - 25 mg/dL Final     Creatinine   Date Value Ref Range Status   09/12/2023 1.53 (H) 0.60 - 1.30 mg/dL Final     Comment:     Standardized to IDMS reference method   07/27/2023 1.56 (H) 0.60 - 1.30 mg/dL Final     Comment:     Standardized to IDMS reference method   06/08/2023 1.74 (H) 0.60 - 1.30 mg/dL Final     Comment:     Standardized to IDMS reference method     Glucose   Date Value Ref Range Status   09/12/2023 96 65 - 140 mg/dL Final     Comment:     If the patient is fasting, the ADA then defines impaired fasting glucose as > 100 mg/dL and diabetes as > or equal to 123 mg/dL. 04/28/2023 187 (H) 65 - 140 mg/dL Final     Comment:     If the patient is fasting, the ADA then defines impaired fasting glucose as > 100 mg/dL and diabetes as > or equal to 123 mg/dL. Specimen collection should occur prior to Sulfasalazine administration due to the potential for falsely depressed results. Specimen collection should occur prior to Sulfapyridine administration due to the potential for falsely elevated results. 01/16/2023 181 (H) 65 - 140 mg/dL Final     Comment:     If the patient is fasting, the ADA then defines impaired fasting glucose as > 100 mg/dL and diabetes as > or equal to 123 mg/dL. Specimen collection should occur prior to Sulfasalazine administration due to the potential for falsely depressed results. Specimen collection should occur prior to Sulfapyridine administration due to the potential for falsely elevated results.      Calcium   Date Value Ref Range Status   09/12/2023 9.9 8.4 - 10.2 mg/dL Final   07/27/2023 9.1 8.3 - 10.1 mg/dL Final   06/08/2023 9.1 8.3 - 10.1 mg/dL Final     Albumin   Date Value Ref Range Status   09/12/2023 4.4 3.5 - 5.0 g/dL Final   07/27/2023 4.0 3.5 - 5.0 g/dL Final   06/08/2023 3.9 3.5 - 5.0 g/dL Final     Total Bilirubin   Date Value Ref Range Status 09/12/2023 1.21 (H) 0.20 - 1.00 mg/dL Final     Comment:     Use of this assay is not recommended for patients undergoing treatment with eltrombopag due to the potential for falsely elevated results. N-acetyl-p-benzoquinone imine (metabolite of Acetaminophen) will generate erroneously low results in samples for patients that have taken an overdose of Acetaminophen.   07/27/2023 1.35 (H) 0.20 - 1.00 mg/dL Final     Comment:     Use of this assay is not recommended for patients undergoing treatment with eltrombopag due to the potential for falsely elevated results. 06/08/2023 1.02 (H) 0.20 - 1.00 mg/dL Final     Comment:     Use of this assay is not recommended for patients undergoing treatment with eltrombopag due to the potential for falsely elevated results. Alkaline Phosphatase   Date Value Ref Range Status   09/12/2023 92 34 - 104 U/L Final   07/27/2023 93 46 - 116 U/L Final   06/08/2023 89 46 - 116 U/L Final     AST   Date Value Ref Range Status   09/12/2023 16 13 - 39 U/L Final   07/27/2023 14 5 - 45 U/L Final     Comment:     Specimen collection should occur prior to Sulfasalazine administration due to the potential for falsely depressed results. 06/08/2023 13 5 - 45 U/L Final     Comment:     Specimen collection should occur prior to Sulfasalazine administration due to the potential for falsely depressed results. ALT   Date Value Ref Range Status   09/12/2023 16 7 - 52 U/L Final     Comment:     Specimen collection should occur prior to Sulfasalazine administration due to the potential for falsely depressed results. 07/27/2023 24 12 - 78 U/L Final     Comment:     Specimen collection should occur prior to Sulfasalazine and/or Sulfapyridine administration due to the potential for falsely depressed results.     06/08/2023 24 12 - 78 U/L Final     Comment:     Specimen collection should occur prior to Sulfasalazine and/or Sulfapyridine administration due to the potential for falsely depressed results. LD   Date Value Ref Range Status   09/12/2023 160 140 - 271 U/L Final   07/27/2023 135 81 - 234 U/L Final   06/08/2023 123 81 - 234 U/L Final     No results found for: "TSH"  No results found for: "[id number]"   Free T4   Date Value Ref Range Status   09/12/2023 0.87 0.61 - 1.12 ng/dL Final     Comment:     Specimens with biotin concentrations > 10 ng/mL can lead to significant (> 10%) positive bias in result.   07/27/2023 0.91 0.61 - 1.12 ng/dL Final     Comment:     Specimens with biotin concentrations > 10 ng/mL can lead to significant (> 10%) positive bias in result. 06/08/2023 0.77 0.61 - 1.12 ng/dL Final     Comment:     Specimens with biotin concentrations > 10 ng/mL can lead to significant (> 10%) positive bias in result. RECENT IMAGING:  No results found. Assessment/Plan  Mr. Fely Wilkinson is a  79 yr male with stage IIIa melanoma concerns for localized disease in his neck on treatment for pembrolizumab here for continued monitoring, follow-up, and surveillance while on treatment. 1. Malignant melanoma of skin of face ([address])  2. Malignant melanoma of forehead ([address])  3. 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[address])  Doing well and tolerating treatment. No clinical evidence of disease recurrence. Due for imaging of his head and neck at this time with ultrasound. Labs reviewed and okay to continue treatment. He knows to continue to monitor for immune mediated side effects. He knows to call with any issues or concerns prior to his next visit. He has standing orders for blood work prior to treatment. He will return in 6 weeks. 4. High risk medication use  5. Stage 3 chronic kidney disease, unspecified whether stage 3a or 3b CKD ([address])  6.  Type 2 diabetes mellitus without complication, without long-term current use of insulin ([address])  He is on treatment with immunotherapy and we will continue to monitor for side effects and lab abnormalities. We will monitor labs with each treatment to ensure safety of continuing on treatment. He knows to watch for signs and symptoms for immune mediated side effects. Denies any immune mediated side effects at this time. Continue to monitor creatinine while on treatment. Has not required steroids for immune mediated side effects, but will be mindful of his underlying diagnosis of diabetes as using steroid would increase blood sugar. Continue current treatment regimen as planned. Return in about 6 weeks (around 11/2/2023) for Office Visit, Infusion - See Treatment Plan, labs, scans.      Susan Workman MD, PhD

## 2023-09-23 NOTE — PROGRESS NOTES
[facility] [address] [place] [address]  [facility] [place] [address]  [phone]     Date of Visit: 9/21/2023  Name: Collins Starks   YOB: 1940        Subjective    VISIT DIAGNOSIS:  Diagnoses and all orders for this visit:    Malignant melanoma of skin of face ([address])    Malignant melanoma of forehead ([address])    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[address])    High risk medication use    Stage 3 chronic kidney disease, unspecified whether stage 3a or 3b CKD ([address])    Type 2 diabetes mellitus without complication, without long-term current use of insulin ([address])        Oncology History   Malignant melanoma of skin of face ([address])   9/8/2021 -  Cancer Staged    Staging form: Melanoma of the Skin, AJCC 8th Edition  - Clinical stage from 9/8/2021: Stage III (cT2a, cN1a, cM0) - Signed by Ebony Hong MD on 11/7/2021 9/8/2021 -  Cancer Staged    Staging form: Melanoma of the Skin, AJCC 8th Edition  - Pathologic stage from 9/8/2021: Stage IIIA (pT2a, pN1a, cM0) - Signed by Ebony Hong MD on 11/7/2021 9/22/2021 Initial Diagnosis    Malignant melanoma of skin of face ([address])     4/4/2022 -  Chemotherapy    pembrolizumab (KEYTRUDA) IVPB, 400 mg, Intravenous, Once, 13 of 15 cycles  Administration: 400 mg (4/4/2022), 400 mg (5/16/2022), 400 mg (6/27/2022), 400 mg (8/8/2022), 400 mg (9/19/2022), 400 mg (10/31/2022), 400 mg (12/12/2022), 400 mg (1/23/2023), 400 mg (3/6/2023), 400 mg (5/5/2023), 400 mg (6/15/2023), 400 mg (8/3/2023), 400 mg (9/21/2023)     Malignant melanoma of forehead ([address])   11/29/2021 Initial Diagnosis    Malignant melanoma of forehead ([address])     4/4/2022 -  Chemotherapy    pembrolizumab (KEYTRUDA) IVPB, 400 mg, Intravenous, Once, 13 of 15 cycles  Administration: 400 mg (4/4/2022), 400 mg (5/16/2022), 400 mg (6/27/2022), 400 mg (8/8/2022), 400 mg (9/19/2022), 400 mg (10/31/2022), 400 mg (12/12/2022), 400 mg (1/23/2023), 400 mg (3/6/2023), 400 mg (5/5/2023), 400 mg (6/15/2023), 400 mg (8/3/2023), 400 mg (9/21/2023)        Cancer Staging   Malignant melanoma of skin of face [facility])  Staging form: Melanoma of the Skin, AJCC 8th Edition  - Clinical stage from 9/8/2021: Stage III (cT2a, cN1a, cM0) - Signed by Ophelia Morrow MD on 11/7/2021  - Pathologic stage from 9/8/2021: Stage IIIA (pT2a, pN1a, cM0) - Signed by Ophelia Morrow MD on 11/7/2021     Treatment Details   Treatment goal Curative   Plan Name OP Pembrolizumab Q 42 Days   Status Active   Start Date 4/4/2022   End Date 12/28/2023 (Planned)   Provider Ophelia Morrow MD   Chemotherapy pembrolizumab [facility]) IVPB, 400 mg, Intravenous, Once, 13 of 15 cycles  Administration: 400 mg (4/4/2022), 400 mg (5/16/2022), 400 mg (6/27/2022), 400 mg (8/8/2022), 400 mg (9/19/2022), 400 mg (10/31/2022), 400 mg (12/12/2022), 400 mg (1/23/2023), 400 mg (3/6/2023), 400 mg (5/5/2023), 400 mg (6/15/2023), 400 mg (8/3/2023), 400 mg (9/21/2023)          HISTORY OF PRESENT ILLNESS: Jesus Amaro is a 79 y.o. male  who originally had stage IIIa melanoma with concerns for local involvement of lymph nodes that were unresectable on treatment with pembrolizumab here for continued monitoring, follow-up, and surveillance. He is doing well with no issues concerns or complaints. Still fatigued. He did get a.m. cortisol and ACTH checked which were within normal limits so no evidence of adrenal insufficiency. Otherwise doing well. Eating well and energy is okay but he does feel tired at times. Denies any new, changing, concerning skin lesions. Denies lymphadenopathy. Denies immune mediated side effects. Denies headaches, double vision, rash, itching, chest pain, shortness of breath, diarrhea. Denies muscle weakness and fatigue. REVIEW OF SYSTEMS:  Review of Systems   Constitutional: Positive for fatigue.  Negative for appetite change, fever and unexpected weight change. HENT:   Negative for lump/mass, trouble swallowing and voice change. Eyes: Negative for icterus. Respiratory: Negative for cough, shortness of breath and wheezing. Cardiovascular: Negative for leg swelling. Gastrointestinal: Negative for abdominal pain, constipation, diarrhea, nausea and vomiting. Genitourinary: Negative for difficulty urinating and hematuria. Musculoskeletal: Negative for arthralgias, gait problem and myalgias. Skin: Negative for itching and rash. No new, changing, or concerning lesions. Neurological: Negative for extremity weakness, gait problem, headaches, light-headedness and numbness. Hematological: Negative for adenopathy.         MEDICATIONS:    Current Outpatient Medications:   •  atenolol (TENORMIN) 100 mg tablet, Take 1 tablet (100 mg total) by mouth daily, Disp: 90 tablet, Rfl: 3  •  cetirizine (ZyrTEC) 5 MG tablet, Take 1 tablet (5 mg total) by mouth daily, Disp: 90 tablet, Rfl: 1  •  fluticasone (FLONASE) 50 mcg/act nasal spray, 1 spray into each nostril daily, Disp: 16 g, Rfl: 1  •  glimepiride (AMARYL) 4 mg tablet, Take 1 tablet (4 mg total) by mouth daily, Disp: 90 tablet, Rfl: 3  •  lisinopril-hydrochlorothiazide (PRINZIDE,ZESTORETIC) 20-25 MG per tablet, Take 1 tablet by mouth daily, Disp: 90 tablet, Rfl: 3  •  lovastatin (MEVACOR) 40 MG tablet, Take 1 tablet (40 mg total) by mouth daily, Disp: 90 tablet, Rfl: 3  •  metFORMIN (GLUCOPHAGE) 1000 MG tablet, Take 1 tablet (1,000 mg total) by mouth 2 (two) times a day with meals, Disp: 180 tablet, Rfl: 3  •  carbamide peroxide (DEBROX) 6.5 % otic solution, Administer 5 drops into both ears 2 (two) times a day for 7 days, Disp: 15 mL, Rfl: 0     ALLERGIES:  No Known Allergies     ACTIVE PROBLEMS:  Patient Active Problem List   Diagnosis   • Essential hypertension   • Hiatal hernia with GERD   • Status post laparoscopic cholecystectomy   • Status post umbilical hernia repair, follow-up exam   • Type 2 diabetes mellitus without complication, without long-term current use of insulin ([address])   • Mixed hyperlipidemia   • Parkinson's disease ([address])   • Microalbuminuria   • Malignant melanoma of skin of face ([address])   • Thrombocytopenia ([address])   • Malignant melanoma of forehead ([facility])   • 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)   • Stage 3 chronic kidney disease, unspecified whether stage 3a or 3b CKD ([address])   • High risk medication use   • Dizziness   • Advanced care planning/counseling discussion   • Arthritis of left knee          PAST MEDICAL HISTORY:   Past Medical History:   Diagnosis Date   • Diabetes mellitus ([address])    • Hyperlipidemia    • Hypertension         PAST SURGICAL HISTORY:  Past Surgical History:   Procedure Laterality Date   • CATARACT EXTRACTION, BILATERAL     • FLAP LOCAL HEAD / NECK Left 10/12/2021    Procedure: RECONSTRUCTION OF LEFT TEMPLE DEFECT AFTER RESCECTION MELANOMA;  Surgeon: Bong Finney MD;  [facility];  Service: Plastics   • FULL THICKNESS SKIN GRAFT Left 10/12/2021    Procedure: SKIN GRAFT FULL THICKNESS LEFT TEMPLE;  Surgeon: Bong Finney MD;  [facility];  Service: Plastics   • GALLBLADDER SURGERY     • HAND SURGERY Left    • LYMPH NODE BIOPSY Left 10/12/2021    Procedure: BIOPSY LYMPH NODE SENTINEL, LYMPHOSCINTIGRAPHY, INTRAOPERATIVE LYMPHATIC MAPPING (INJECT AT 1200);   Surgeon: Tammi Hurtado MD;  [facility];  Service: Surgical Oncology   • NOSE SURGERY     • SKIN CANCER EXCISION  10/2021   • SKIN LESION EXCISION Left 10/12/2021    Procedure: FACE MELANOMA SCAR WIDE EXCISION  FACE;  Surgeon: Tammi Hurtado MD;  [facility];  Service: Surgical Oncology   • SPLIT THICKNESS SKIN GRAFT Left 10/12/2021    Procedure: SKIN GRAFT SPLIT THICKNESS LEFT TEMPLE;  Surgeon: Bong Finney MD;  [facility];  Service: Plastics   • US GUIDED LYMPH NODE BIOPSY LEFT  03/21/2022        SOCIAL HISTORY:  Social History     Socioeconomic History   • Marital status: /Civil Union     Spouse name: None   • Number of children: None   • Years of education: None   • Highest education level: None   Occupational History   • None   Tobacco Use   • Smoking status: Never   • Smokeless tobacco: Never   Vaping Use   • Vaping Use: Never used   Substance and Sexual Activity   • Alcohol use: Not Currently   • Drug use: Never   • Sexual activity: Not Currently   Other Topics Concern   • None   Social History Narrative   • None     Social Determinants of Health     Financial Resource Strain: Low Risk  (11/29/2022)    Overall Financial Resource Strain (CARDIA)    • Difficulty of Paying Living Expenses: Not hard at all   Food Insecurity: Not on file   Transportation Needs: No Transportation Needs (11/29/2022)    PRAPARE - Transportation    • Lack of Transportation (Medical): No    • Lack of Transportation (Non-Medical): No   Physical Activity: Not on file   Stress: Not on file   Social Connections: Not on file   Intimate Partner Violence: Not on file   Housing Stability: Not on file        FAMILY HISTORY:  Family History   Problem Relation Age of Onset   • No Known Problems Mother    • No Known Problems Father    • Colon cancer Other 60           Objective    PHYSICAL EXAMINATION:   Blood pressure 140/84, pulse 78, temperature (!) 97.3 °F (36.3 °C), temperature source Temporal, resp. rate 16, height 5' 7" (1.702 m), weight 74.4 kg (164 lb), SpO2 99 %. Pain Score: 0-No pain     ECOG Performance Status    Flowsheet Row Most Recent Value   ECOG Performance Status 0 - Fully active, able to carry on all pre-disease performance without restriction             Physical Exam  Constitutional:       General: He is not in acute distress. Appearance: Normal appearance. He is not toxic-appearing. HENT:      Head: Normocephalic and atraumatic.       Right Ear: External ear normal.      Left Ear: External ear normal. Nose: Nose normal.      Mouth/Throat:      Mouth: Mucous membranes are moist.      Pharynx: Oropharynx is clear. Eyes:      General: No scleral icterus. Right eye: No discharge. Left eye: No discharge. Conjunctiva/sclera: Conjunctivae normal.   Cardiovascular:      Rate and Rhythm: Normal rate and regular rhythm. Pulses: Normal pulses. Heart sounds: No murmur heard. No friction rub. No gallop. Pulmonary:      Effort: Pulmonary effort is normal. No respiratory distress. Breath sounds: Normal breath sounds. No wheezing or rales. Abdominal:      General: Bowel sounds are normal. There is no distension. Palpations: There is no mass. Tenderness: There is no abdominal tenderness. There is no rebound. Musculoskeletal:         General: No swelling or tenderness. Cervical back: Normal range of motion. No rigidity. Right lower leg: No edema. Left lower leg: No edema. Lymphadenopathy:      Head:      Right side of head: No submandibular, preauricular or posterior auricular adenopathy. Left side of head: No submandibular, preauricular or posterior auricular adenopathy. Cervical: No cervical adenopathy. Right cervical: No superficial or posterior cervical adenopathy. Left cervical: No superficial or posterior cervical adenopathy. Upper Body:      Right upper body: No supraclavicular or axillary adenopathy. Left upper body: No supraclavicular or axillary adenopathy. Skin:     General: Skin is warm. Coloration: Skin is not jaundiced. Findings: No lesion or rash. Comments: Well healed surgical scar. No evidence of recurrence at primary site. Neurological:      General: No focal deficit present. Mental Status: He is alert and oriented to person, place, and time. Cranial Nerves: No cranial nerve deficit. Motor: No weakness.       Gait: Gait normal.   Psychiatric:         Mood and Affect: Mood normal.         Behavior: Behavior normal.         Thought Content: Thought content normal.         Judgment: Judgment normal.         I reviewed lab data in the chart.     WBC   Date Value Ref Range Status   09/12/2023 10.80 (H) 4.31 - 10.16 Thousand/uL Final   07/27/2023 9.34 4.31 - 10.16 Thousand/uL Final   06/08/2023 7.87 4.31 - 10.16 Thousand/uL Final     Hemoglobin   Date Value Ref Range Status   09/12/2023 13.8 12.0 - 17.0 g/dL Final   07/27/2023 14.6 12.0 - 17.0 g/dL Final   06/08/2023 13.9 12.0 - 17.0 g/dL Final     Platelets   Date Value Ref Range Status   09/12/2023 121 (L) 149 - 390 Thousands/uL Final   07/27/2023 137 (L) 149 - 390 Thousands/uL Final   06/08/2023 136 (L) 149 - 390 Thousands/uL Final     MCV   Date Value Ref Range Status   09/12/2023 91 82 - 98 fL Final   07/27/2023 89 82 - 98 fL Final   06/08/2023 90 82 - 98 fL Final      Potassium   Date Value Ref Range Status   09/12/2023 4.2 3.5 - 5.3 mmol/L Final   07/27/2023 4.3 3.5 - 5.3 mmol/L Final   06/08/2023 4.1 3.5 - 5.3 mmol/L Final     Chloride   Date Value Ref Range Status   09/12/2023 103 96 - 108 mmol/L Final   07/27/2023 106 96 - 108 mmol/L Final   06/08/2023 110 (H) 96 - 108 mmol/L Final     CO2   Date Value Ref Range Status   09/12/2023 30 21 - 32 mmol/L Final   07/27/2023 28 21 - 32 mmol/L Final   06/08/2023 28 21 - 32 mmol/L Final     BUN   Date Value Ref Range Status   09/12/2023 32 (H) 5 - 25 mg/dL Final   07/27/2023 33 (H) 5 - 25 mg/dL Final   06/08/2023 41 (H) 5 - 25 mg/dL Final     Creatinine   Date Value Ref Range Status   09/12/2023 1.53 (H) 0.60 - 1.30 mg/dL Final     Comment:     Standardized to IDMS reference method   07/27/2023 1.56 (H) 0.60 - 1.30 mg/dL Final     Comment:     Standardized to IDMS reference method   06/08/2023 1.74 (H) 0.60 - 1.30 mg/dL Final     Comment:     Standardized to IDMS reference method     Glucose   Date Value Ref Range Status   09/12/2023 96 65 - 140 mg/dL Final     Comment:     If the patient is fasting, the ADA then defines impaired fasting glucose as > 100 mg/dL and diabetes as > or equal to 123 mg/dL. 04/28/2023 187 (H) 65 - 140 mg/dL Final     Comment:     If the patient is fasting, the ADA then defines impaired fasting glucose as > 100 mg/dL and diabetes as > or equal to 123 mg/dL. Specimen collection should occur prior to Sulfasalazine administration due to the potential for falsely depressed results. Specimen collection should occur prior to Sulfapyridine administration due to the potential for falsely elevated results. 01/16/2023 181 (H) 65 - 140 mg/dL Final     Comment:     If the patient is fasting, the ADA then defines impaired fasting glucose as > 100 mg/dL and diabetes as > or equal to 123 mg/dL. Specimen collection should occur prior to Sulfasalazine administration due to the potential for falsely depressed results. Specimen collection should occur prior to Sulfapyridine administration due to the potential for falsely elevated results. Calcium   Date Value Ref Range Status   09/12/2023 9.9 8.4 - 10.2 mg/dL Final   07/27/2023 9.1 8.3 - 10.1 mg/dL Final   06/08/2023 9.1 8.3 - 10.1 mg/dL Final     Albumin   Date Value Ref Range Status   09/12/2023 4.4 3.5 - 5.0 g/dL Final   07/27/2023 4.0 3.5 - 5.0 g/dL Final   06/08/2023 3.9 3.5 - 5.0 g/dL Final     Total Bilirubin   Date Value Ref Range Status   09/12/2023 1.21 (H) 0.20 - 1.00 mg/dL Final     Comment:     Use of this assay is not recommended for patients undergoing treatment with eltrombopag due to the potential for falsely elevated results. N-acetyl-p-benzoquinone imine (metabolite of Acetaminophen) will generate erroneously low results in samples for patients that have taken an overdose of Acetaminophen.   07/27/2023 1.35 (H) 0.20 - 1.00 mg/dL Final     Comment:     Use of this assay is not recommended for patients undergoing treatment with eltrombopag due to the potential for falsely elevated results.    06/08/2023 1.02 (H) 0.20 - 1.00 mg/dL Final     Comment:     Use of this assay is not recommended for patients undergoing treatment with eltrombopag due to the potential for falsely elevated results. Alkaline Phosphatase   Date Value Ref Range Status   09/12/2023 92 34 - 104 U/L Final   07/27/2023 93 46 - 116 U/L Final   06/08/2023 89 46 - 116 U/L Final     AST   Date Value Ref Range Status   09/12/2023 16 13 - 39 U/L Final   07/27/2023 14 5 - 45 U/L Final     Comment:     Specimen collection should occur prior to Sulfasalazine administration due to the potential for falsely depressed results. 06/08/2023 13 5 - 45 U/L Final     Comment:     Specimen collection should occur prior to Sulfasalazine administration due to the potential for falsely depressed results. ALT   Date Value Ref Range Status   09/12/2023 16 7 - 52 U/L Final     Comment:     Specimen collection should occur prior to Sulfasalazine administration due to the potential for falsely depressed results. 07/27/2023 24 12 - 78 U/L Final     Comment:     Specimen collection should occur prior to Sulfasalazine and/or Sulfapyridine administration due to the potential for falsely depressed results. 06/08/2023 24 12 - 78 U/L Final     Comment:     Specimen collection should occur prior to Sulfasalazine and/or Sulfapyridine administration due to the potential for falsely depressed results. LD   Date Value Ref Range Status   09/12/2023 160 140 - 271 U/L Final   07/27/2023 135 81 - 234 U/L Final   06/08/2023 123 81 - 234 U/L Final     No results found for: "TSH"  No results found for: "Q7ROHHF"   Free T4   Date Value Ref Range Status   09/12/2023 0.87 0.61 - 1.12 ng/dL Final     Comment:     Specimens with biotin concentrations > 10 ng/mL can lead to significant (> 10%) positive bias in result.   07/27/2023 0.91 0.61 - 1.12 ng/dL Final     Comment:     Specimens with biotin concentrations > 10 ng/mL can lead to significant (> 10%) positive bias in result. 06/08/2023 0.77 0.61 - 1.12 ng/dL Final     Comment:     Specimens with biotin concentrations > 10 ng/mL can lead to significant (> 10%) positive bias in result. RECENT IMAGING:  No results found. Assessment/Plan  Mr. Loc Mario is a  79 yr male with stage IIIa melanoma concerns for localized disease in his neck on treatment for pembrolizumab here for continued monitoring, follow-up, and surveillance while on treatment. 1. Malignant melanoma of skin of face ([address])  2. Malignant melanoma of forehead ([address])  3. 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[address])  Doing well and tolerating treatment. No clinical evidence of disease recurrence. Due for imaging of his head and neck at this time with ultrasound. Labs reviewed and okay to continue treatment. He knows to continue to monitor for immune mediated side effects. He knows to call with any issues or concerns prior to his next visit. He has standing orders for blood work prior to treatment. He will return in 6 weeks. 4. High risk medication use  5. Stage 3 chronic kidney disease, unspecified whether stage 3a or 3b CKD ([address])  6. Type 2 diabetes mellitus without complication, without long-term current use of insulin ([address])  He is on treatment with immunotherapy and we will continue to monitor for side effects and lab abnormalities. We will monitor labs with each treatment to ensure safety of continuing on treatment. He knows to watch for signs and symptoms for immune mediated side effects. Denies any immune mediated side effects at this time. Continue to monitor creatinine while on treatment. Has not required steroids for immune mediated side effects, but will be mindful of his underlying diagnosis of diabetes as using steroid would increase blood sugar. Continue current treatment regimen as planned.       Return in about 6 weeks (around 11/2/2023) for Office Visit, Infusion - See Treatment Plan, labs, scans.      Ra Daniel MD, PhD

## 2023-09-23 NOTE — PROGRESS NOTES
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)    High risk medication use  -     Cortisol Level, AM Specimen; Future  -     ACTH;  Future    Stage 3 chronic kidney disease, unspecified whether stage 3a or 3b CKD ([address])        Oncology History   Malignant melanoma of skin of face ([address])   9/8/2021 -  Cancer Staged    Staging form: Melanoma of the Skin, AJCC 8th Edition  - Clinical stage from 9/8/2021: Stage III (cT2a, cN1a, cM0) - Signed by Ophelia Morrow MD on 11/7/2021 9/8/2021 -  Cancer Staged    Staging form: Melanoma of the Skin, AJCC 8th Edition  - Pathologic stage from 9/8/2021: Stage IIIA (pT2a, pN1a, cM0) - Signed by Ophelia Morrow MD on 11/7/2021 9/22/2021 Initial Diagnosis    Malignant melanoma of skin of face ([address])     4/4/2022 -  Chemotherapy    pembrolizumab (KEYTRUDA) IVPB, 400 mg, Intravenous, Once, 13 of 15 cycles  Administration: 400 mg (4/4/2022), 400 mg (5/16/2022), 400 mg (6/27/2022), 400 mg (8/8/2022), 400 mg (9/19/2022), 400 mg (10/31/2022), 400 mg (12/12/2022), 400 mg (1/23/2023), 400 mg (3/6/2023), 400 mg (5/5/2023), 400 mg (6/15/2023), 400 mg (8/3/2023), 400 mg (9/21/2023)     Malignant melanoma of forehead ([address])   11/29/2021 Initial Diagnosis    Malignant melanoma of forehead ([address])     4/4/2022 -  Chemotherapy    pembrolizumab (KEYTRUDA) IVPB, 400 mg, Intravenous, Once, 13 of 15 cycles  Administration: 400 mg (4/4/2022), 400 mg (5/16/2022), 400 mg (6/27/2022), 400 mg (8/8/2022), 400 mg (9/19/2022), 400 mg (10/31/2022), 400 mg (12/12/2022), 400 mg (1/23/2023), 400 mg (3/6/2023), 400 mg (5/5/2023), 400 mg (6/15/2023), 400 mg (8/3/2023), 400 mg (9/21/2023)        Cancer Staging   Malignant melanoma of skin of face [facility])  Staging form: Melanoma of the Skin, AJCC 8th Edition  - Clinical stage from 9/8/2021: Stage III (cT2a, cN1a, cM0) - Signed by Lisa Rajput MD on 11/7/2021  - Pathologic stage from 9/8/2021: Stage IIIA (pT2a, pN1a, cM0) - Signed by Lisa Rajput MD on 11/7/2021     Treatment Details   Treatment goal Curative   Plan Name OP Pembrolizumab Q 42 Days   Status Active   Start Date 4/4/2022   End Date 12/28/2023 (Planned)   Provider Lisa Rajput MD   Chemotherapy pembrolizumab [facility]) IVPB, 400 mg, Intravenous, Once, 13 of 15 cycles  Administration: 400 mg (4/4/2022), 400 mg (5/16/2022), 400 mg (6/27/2022), 400 mg (8/8/2022), 400 mg (9/19/2022), 400 mg (10/31/2022), 400 mg (12/12/2022), 400 mg (1/23/2023), 400 mg (3/6/2023), 400 mg (5/5/2023), 400 mg (6/15/2023), 400 mg (8/3/2023),           HISTORY OF PRESENT ILLNESS: Dacia Nguyen is a 79 y.o. male  who has stage IIIa melanoma with concern for metastatic disease involving lymph nodes at the time of treatment initiation continuing on pembrolizumab here for continued monitoring, follow-up, and surveillance. He is doing well and continues to tolerate treatment. Still with some fatigue. Denies any new, changing, concerning skin lesions. Denies any lymphadenopathy. Denies immune mediated side effects other than fatigue. Denies headaches, double vision, rash, itching, chest pain, shortness of breath, diarrhea. Denies muscle weakness. REVIEW OF SYSTEMS:  Review of Systems   Constitutional: Positive for fatigue. Negative for appetite change, fever and unexpected weight change. HENT:   Negative for lump/mass, trouble swallowing and voice change. Eyes: Negative for icterus. Respiratory: Negative for cough, shortness of breath and wheezing. Cardiovascular: Negative for leg swelling. Gastrointestinal: Negative for abdominal pain, constipation, diarrhea, nausea and vomiting. Genitourinary: Negative for difficulty urinating and hematuria. Musculoskeletal: Negative for arthralgias, gait problem and myalgias. Skin: Negative for itching and rash. No new, changing, or concerning lesions. Neurological: Negative for extremity weakness, gait problem, headaches, light-headedness and numbness. Hematological: Negative for adenopathy.         MEDICATIONS:    Current Outpatient Medications:   •  atenolol (TENORMIN) 100 mg tablet, Take 1 tablet (100 mg total) by mouth daily, Disp: 90 tablet, Rfl: 3  •  glimepiride (AMARYL) 4 mg tablet, Take 1 tablet (4 mg total) by mouth daily, Disp: 90 tablet, Rfl: 3  •  lisinopril-hydrochlorothiazide (PRINZIDE,ZESTORETIC) 20-25 MG per tablet, Take 1 tablet by mouth daily, Disp: 90 tablet, Rfl: 3  •  lovastatin (MEVACOR) 40 MG tablet, Take 1 tablet (40 mg total) by mouth daily, Disp: 90 tablet, Rfl: 3  •  metFORMIN (GLUCOPHAGE) 1000 MG tablet, Take 1 tablet (1,000 mg total) by mouth 2 (two) times a day with meals, Disp: 180 tablet, Rfl: 3  •  carbamide peroxide (DEBROX) 6.5 % otic solution, Administer 5 drops into both ears 2 (two) times a day for 7 days, Disp: 15 mL, Rfl: 0  •  cetirizine (ZyrTEC) 5 MG tablet, Take 1 tablet (5 mg total) by mouth daily, Disp: 90 tablet, Rfl: 1  •  fluticasone (FLONASE) 50 mcg/act nasal spray, 1 spray into each nostril daily, Disp: 16 g, Rfl: 1     ALLERGIES:  No Known Allergies     ACTIVE PROBLEMS:  Patient Active Problem List   Diagnosis   • Essential hypertension   • Hiatal hernia with GERD   • Status post laparoscopic cholecystectomy   • Status post umbilical hernia repair, follow-up exam   • Type 2 diabetes mellitus without complication, without long-term current use of insulin (HCC)   • Mixed hyperlipidemia   • Parkinson's disease ([address])   • Microalbuminuria   • Malignant melanoma of skin of face ([address])   • Thrombocytopenia ([address])   • Malignant melanoma of forehead ([address])   • 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)   • Stage 3 chronic kidney disease, unspecified whether stage 3a or 3b CKD ([address])   • Dizziness   • Advanced care planning/counseling discussion   • Arthritis of left knee          PAST MEDICAL HISTORY:   Past Medical History:   Diagnosis Date   • Diabetes mellitus ([address])    • Hyperlipidemia    • Hypertension         PAST SURGICAL HISTORY:  Past Surgical History:   Procedure Laterality Date   • CATARACT EXTRACTION, BILATERAL     • FLAP LOCAL HEAD / NECK Left 10/12/2021    Procedure: RECONSTRUCTION OF LEFT TEMPLE DEFECT AFTER RESCECTION MELANOMA;  Surgeon: Sissy Sofia MD;  [facility];  Service: Plastics   • FULL THICKNESS SKIN GRAFT Left 10/12/2021    Procedure: SKIN GRAFT FULL THICKNESS LEFT TEMPLE;  Surgeon: Sissy Sofia MD;  [facility];  Service: Plastics   • GALLBLADDER SURGERY     • HAND SURGERY Left    • LYMPH NODE BIOPSY Left 10/12/2021    Procedure: BIOPSY LYMPH NODE SENTINEL, LYMPHOSCINTIGRAPHY, INTRAOPERATIVE LYMPHATIC MAPPING (INJECT AT 1200);   Surgeon: Josh Hall MD;  [facility];  Service: Surgical Oncology   • NOSE SURGERY     • SKIN CANCER EXCISION  10/2021   • SKIN LESION EXCISION Left 10/12/2021    Procedure: FACE MELANOMA SCAR WIDE EXCISION  FACE;  Surgeon: Josh Hall MD;  [facility];  Service: Surgical Oncology   • SPLIT THICKNESS SKIN GRAFT Left 10/12/2021    Procedure: SKIN GRAFT SPLIT THICKNESS LEFT TEMPLE;  Surgeon: Sissy Sofia MD;  [facility];  Service: Plastics   • US GUIDED LYMPH NODE BIOPSY LEFT  03/21/2022        SOCIAL HISTORY:  Social History     Socioeconomic History   • Marital status: /Civil Union     Spouse name: None   • Number of children: None   • Years of education: None   • Highest education level: None Occupational History   • None   Tobacco Use   • Smoking status: Never   • Smokeless tobacco: Never   Vaping Use   • Vaping Use: Never used   Substance and Sexual Activity   • Alcohol use: Not Currently   • Drug use: Never   • Sexual activity: Not Currently   Other Topics Concern   • None   Social History Narrative   • None     Social Determinants of Health     Financial Resource Strain: Low Risk  (11/29/2022)    Overall Financial Resource Strain (CARDIA)    • Difficulty of Paying Living Expenses: Not hard at all   Food Insecurity: Not on file   Transportation Needs: No Transportation Needs (11/29/2022)    PRAPARE - Transportation    • Lack of Transportation (Medical): No    • Lack of Transportation (Non-Medical): No   Physical Activity: Not on file   Stress: Not on file   Social Connections: Not on file   Intimate Partner Violence: Not on file   Housing Stability: Not on file        FAMILY HISTORY:  Family History   Problem Relation Age of Onset   • No Known Problems Mother    • No Known Problems Father    • Colon cancer Other 60           Objective    PHYSICAL EXAMINATION:   Blood pressure 122/60, pulse 63, temperature (!) 97 °F (36.1 °C), resp. rate 17, height 5' 7" (1.702 m), weight 73.9 kg (163 lb), SpO2 94 %. Pain Score: 0-No pain     ECOG Performance Status    Flowsheet Row Most Recent Value   ECOG Performance Status 1 - Restricted in physically strenuous activity but ambulatory and able to carry out work of a light or sedentary nature, e.g., light house work, office work             Physical Exam  Constitutional:       General: He is not in acute distress. Appearance: Normal appearance. He is not toxic-appearing. HENT:      Head: Normocephalic and atraumatic. Right Ear: External ear normal.      Left Ear: External ear normal.      Nose: Nose normal.      Mouth/Throat:      Mouth: Mucous membranes are moist.      Pharynx: Oropharynx is clear. Eyes:      General: No scleral icterus. Right eye: No discharge. Left eye: No discharge. Conjunctiva/sclera: Conjunctivae normal.   Cardiovascular:      Rate and Rhythm: Normal rate and regular rhythm. Pulses: Normal pulses. Heart sounds: No murmur heard. No friction rub. No gallop. Pulmonary:      Effort: Pulmonary effort is normal. No respiratory distress. Breath sounds: Normal breath sounds. No wheezing or rales. Abdominal:      General: Bowel sounds are normal. There is no distension. Palpations: There is no mass. Tenderness: There is no abdominal tenderness. There is no rebound. Musculoskeletal:         General: No swelling or tenderness. Cervical back: Normal range of motion. No rigidity. Right lower leg: No edema. Left lower leg: No edema. Lymphadenopathy:      Head:      Right side of head: No submandibular, preauricular or posterior auricular adenopathy. Left side of head: No submandibular, preauricular or posterior auricular adenopathy. Cervical: No cervical adenopathy. Right cervical: No superficial or posterior cervical adenopathy. Left cervical: No superficial or posterior cervical adenopathy. Upper Body:      Right upper body: No supraclavicular or axillary adenopathy. Left upper body: No supraclavicular or axillary adenopathy. Skin:     General: Skin is warm. Coloration: Skin is not jaundiced. Findings: No lesion or rash. Comments: Well healed surgical scar. No evidence of recurrence at primary site. Neurological:      General: No focal deficit present. Mental Status: He is alert and oriented to person, place, and time. Psychiatric:         Mood and Affect: Mood normal.         Behavior: Behavior normal.         Thought Content: Thought content normal.         Judgment: Judgment normal.         I reviewed lab data in the chart.      Latest Reference Range & Units 07/27/23 09:43   Sodium 135 - 147 mmol/L 139   Potassium 3.5 - 5.3 mmol/L 4.3   Chloride 96 - 108 mmol/L 106   CO2 21 - 32 mmol/L 28   Anion Gap mmol/L 5   BUN 5 - 25 mg/dL 33 (H)   Creatinine 0.60 - 1.30 mg/dL 1.56 (H)   GLUCOSE FASTING 65 - 99 mg/dL 226 (H)   Calcium 8.3 - 10.1 mg/dL 9.1   AST 5 - 45 U/L 14   ALT 12 - 78 U/L 24   Alkaline Phosphatase 46 - 116 U/L 93   Total Protein 6.4 - 8.4 g/dL 7.3   Albumin 3.5 - 5.0 g/dL 4.0   TOTAL BILIRUBIN 0.20 - 1.00 mg/dL 1.35 (H)   eGFR ml/min/1.73sq m 40   LD (LDH) 81 - 234 U/L 135   WBC 4.31 - 10.16 Thousand/uL 9.34   Red Blood Cell Count 3.88 - 5.62 Million/uL 4.93   Hemoglobin 12.0 - 17.0 g/dL 14.6   HCT 36.5 - 49.3 % 44.0   MCV 82 - 98 fL 89   MCH 26.8 - 34.3 pg 29.6   MCHC 31.4 - 37.4 g/dL 33.2   RDW 11.6 - 15.1 % 13.2   Platelet Count 068 - 390 Thousands/uL 137 (L)   MPV 8.9 - 12.7 fL 10.7   nRBC /100 WBCs 0   Neutrophils % 43 - 75 % 51   Immat GRANS % 0 - 2 % 0   Lymphocytes Relative 14 - 44 % 39   Monocytes Relative 4 - 12 % 6   Eosinophils 0 - 6 % 4   Basophils Relative 0 - 1 % 0   Immature Grans Absolute 0.00 - 0.20 Thousand/uL 0.04   Absolute Neutrophils 1.85 - 7.62 Thousands/µL 4.69   Lymphocytes Absolute 0.60 - 4.47 Thousands/µL 3.65   Absolute Monocytes 0.17 - 1.22 Thousand/µL 0.58   Absolute Eosinophils 0.00 - 0.61 Thousand/µL 0.35   Basophils Absolute 0.00 - 0.10 Thousands/µL 0.03   TSH 3RD GENERATON 0.450 - 4.500 uIU/mL 2.719   Free T4 0.61 - 1.12 ng/dL 0.91   T3, Free 2.50 - 3.90 pg/mL 2.92   (H): Data is abnormally high  (L): Data is abnormally low    RECENT IMAGING:  No results found. Assessment/Plan  Mr. Dar Fairbanks is a 79-year-old gentleman with stage IIIa melanoma with concern for local spread here for continued monitoring, follow-up, and surveillance while on treatment with pembrolizumab. 1. Malignant melanoma of skin of face ([address])  2. Malignant melanoma of forehead ([address])  3.  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[address])  Continues to tolerate treatment with pembrolizumab. Does endorse some fatigue. No immune mediated side effects. Labs reviewed and okay. He can continue on treatment with pembrolizumab. He knows to continue to monitor for immune mediated side effects. He is standing orders for blood work prior to each treatment. We will also check ACTH and cortisol to determine whether or not he may have some adrenal insufficiency causing his fatigue. He will return in 6 weeks for his next treatment. He knows to call with any issues or concerns prior to his next visit. - Cortisol Level, AM Specimen; Future  - ACTH; Future    4. High risk medication use  5. Stage 3 chronic kidney disease, unspecified whether stage 3a or 3b CKD ([address])  He is on treatment with immunotherapy and we will continue to monitor for side effects and lab abnormalities. We will monitor labs with each treatment to ensure safety of continuing on treatment. He knows to watch for signs and symptoms for immune mediated side effects. Denies any immune mediated side effects at this time. We will check a.m. cortisol and ACTH to evaluate for adrenal insufficiency given his continued fatigue.      - Cortisol Level, AM Specimen; Future  - ACTH; Future        Return in about 6 weeks (around 9/14/2023) for Office Visit, labs.      Faina Samuel MD, PhD

## 2023-09-25 ENCOUNTER — OFFICE VISIT (OUTPATIENT)
Dept: PHYSICAL THERAPY | Age: 83
End: 2023-09-25
Payer: COMMERCIAL

## 2023-09-25 ENCOUNTER — TELEPHONE (OUTPATIENT)
Dept: HEMATOLOGY ONCOLOGY | Facility: CLINIC | Age: 83
End: 2023-09-25

## 2023-09-25 DIAGNOSIS — R26.9 GAIT ABNORMALITY: Primary | ICD-10-CM

## 2023-09-25 DIAGNOSIS — R42 VERTIGO: ICD-10-CM

## 2023-09-25 DIAGNOSIS — C43.39 MALIGNANT MELANOMA OF FOREHEAD (HCC): ICD-10-CM

## 2023-09-25 DIAGNOSIS — C43.30 MALIGNANT MELANOMA OF SKIN OF FACE (HCC): Primary | ICD-10-CM

## 2023-09-25 DIAGNOSIS — R93.89 ABNORMAL FINDING OF DIAGNOSTIC IMAGING: ICD-10-CM

## 2023-09-25 PROCEDURE — 97140 MANUAL THERAPY 1/> REGIONS: CPT

## 2023-09-25 PROCEDURE — 97112 NEUROMUSCULAR REEDUCATION: CPT

## 2023-09-25 PROCEDURE — 97110 THERAPEUTIC EXERCISES: CPT

## 2023-09-25 NOTE — TELEPHONE ENCOUNTER
----- Message from Mich Campos MD sent at 9/23/2023 11:16 AM EDT -----  Can we get him an US head and neck soft issue like before  Thanks  [facility]

## 2023-09-25 NOTE — PROGRESS NOTES
Daily Note     Today's date: 2023  Patient name: Vicenta Layton  : 1940  MRN: [id number]  Referring provider: REGINA Rai  Dx:   Encounter Diagnosis     ICD-10-CM    1. Gait abnormality  R26.9       2. Vertigo  R42                      Subjective: "I was dizzy this morning looking into the mirror."  Pt with excessive forward head encourage chin tuck then extension       Objective: See treatment diary below      Assessment: Tolerated treatment well. Patient would benefit from continued PT      Plan: Continue per plan of care.        Precautions: no known allergies      Manuals 23       [employer] balance testing and training I eval             Left epley perf      Left neg,        Retest danita hallpike left, right and rolling r/l     Right epley 4 beat nystagmus man 15min right + epley perform today 15min man                    Neuro Re-Ed             Chin tucks  5 reps 5 sec hold  10 reps  10x [id number] 10 reps  10 sec       Cervical isometrics all planes 5 sec hold x 5 reps  5 x 5 5sec x 5 5 sec x 5 5 x 5 5 x 5       Tandem gait in ll bars 1 min x 1 1 min x 1 offset feet 2 min 2 min  1 min offset feet 1 mn x1       Tandem stance 30 sec x 3 start with 10 seconds hold 30 sec x 3 offset feet 20sec x 5 20 sec x 5 30 sec x 3 30 sec x 3       Ankle swaying flat and foam, eo, ec in ll bars  10 reps flat surface eo ec close s 10x 10x  10 10 reps each       Upper trap stretch    3 reps 10 sec 20sec x 5 :20x5         Levator stretch  3 reps 10 sesc 20sec x 5 :20x5         Ther Ex             Self  cervicalrotation stretch  3 reps 10 sec  5sec x 5 5 sec x5  5       Squats with chin tuck hi lo mat table   3 x 10 3 x 10 3x10 3 x 10 3 x 10       Rows/ext   30x RTB 30x  green 3 x 10       Nu-step   15 min 15 min  15 min r 3 15 min r 4                                                           Ther Activity                                       Gait Training Modalities

## 2023-09-28 ENCOUNTER — OFFICE VISIT (OUTPATIENT)
Dept: PHYSICAL THERAPY | Age: 83
End: 2023-09-28
Payer: COMMERCIAL

## 2023-09-28 DIAGNOSIS — R42 VERTIGO: Primary | ICD-10-CM

## 2023-09-28 DIAGNOSIS — R26.9 GAIT ABNORMALITY: ICD-10-CM

## 2023-09-28 PROCEDURE — 97110 THERAPEUTIC EXERCISES: CPT

## 2023-09-28 PROCEDURE — 97140 MANUAL THERAPY 1/> REGIONS: CPT

## 2023-09-28 PROCEDURE — 97112 NEUROMUSCULAR REEDUCATION: CPT

## 2023-09-29 NOTE — PROGRESS NOTES
Daily Note     Today's date: 2023  Patient name: Raul Dugan  : 1940  MRN: [id number]  Referring provider: REGINA Bingham  Dx:   Encounter Diagnosis     ICD-10-CM    1. Vertigo  R42       2. Gait abnormality  R26.9                      Subjective: "I'm having a good day today."        Objective: See treatment diary below      Assessment: Tolerated treatment well. Patient would benefit from continued PT      Plan: Continue per plan of care.        Precautions: no known allergies      Manuals 23      biodex balance testing and training I eval             Left epley perf      Left neg,        Retest danita hallpike left, right and rolling r/l     Right epley 4 beat nystagmus man 15min right + epley perform today 15min man rolling neg                   Neuro Re-Ed             Chin tucks  5 reps 5 sec hold  10 reps  10x [id number] 10 reps  10 sec 10 reps      Cervical isometrics all planes 5 sec hold x 5 reps  5 x 5 5sec x 5 5 sec x 5 5 x 5 5 x 5 5 x 5      Tandem gait in ll bars 1 min x 1 1 min x 1 offset feet 2 min 2 min  1 min offset feet 1 mn x1 1minx 1      Tandem stance 30 sec x 3 start with 10 seconds hold 30 sec x 3 offset feet 20sec x 5 20 sec x 5 30 sec x 3 30 sec x 3 30 sec x 3      Ankle swaying flat and foam, eo, ec in ll bars  10 reps flat surface eo ec close s 10x 10x  10 10 reps each 10      Upper trap stretch    3 reps 10 sec 20sec x 5 :20x5         Levator stretch  3 reps 10 sesc 20sec x 5 :20x5         Ther Ex             Self  cervicalrotation stretch  3 reps 10 sec  5sec x 5 5 sec x5  5       Squats with chin tuck hi lo mat table   3 x 10 3 x 10 3x10 3 x 10 3 x 10 3 x 10      Rows/ext   30x RTB 30x  green 3 x 10       Nu-step   15 min 15 min  15 min r 3 15 min r 4 15 min r 5      Hand to opposite knee gait cg of 1/ close s       40 ft x 2      Sidestepping        10ft x 4      theraband rows, sh ext        3 x 10 green                   Ther Activity                                       Gait Training                                       Modalities

## 2023-10-02 ENCOUNTER — OFFICE VISIT (OUTPATIENT)
Dept: PHYSICAL THERAPY | Age: 83
End: 2023-10-02
Payer: COMMERCIAL

## 2023-10-02 DIAGNOSIS — R26.9 GAIT ABNORMALITY: ICD-10-CM

## 2023-10-02 DIAGNOSIS — R42 DIZZINESS: ICD-10-CM

## 2023-10-02 DIAGNOSIS — R42 VERTIGO: Primary | ICD-10-CM

## 2023-10-02 PROCEDURE — 97112 NEUROMUSCULAR REEDUCATION: CPT | Performed by: PHYSICAL THERAPIST

## 2023-10-02 PROCEDURE — 97110 THERAPEUTIC EXERCISES: CPT | Performed by: PHYSICAL THERAPIST

## 2023-10-02 NOTE — PROGRESS NOTES
Daily Note     Today's date: 10/2/2023  Patient name: Pura Newman  : 1940  MRN: [id number]  Referring provider: REGINA Wilson  Dx:   Encounter Diagnosis     ICD-10-CM    1. Vertigo  R42       2. Gait abnormality  R26.9       3. Dizziness  R42           Start Time: 0900  Stop Time: 6626  Total time in clinic (min): 53 minutes    Subjective: Eleazar Gan reports "doing okay" upon arrival to therapy today. He denies dizziness upon arrival to therapy today, denies any issues or falls since last session. Objective: See treatment diary below      Assessment: Tolerated treatment well. Patient would benefit from continued PT  Patient progressed in repetitions with chin tucks and cervical isometrics today with good tolerance overall. Patient demonstrating significant instability with opposite hand to knee walking today, modified to marching only, with some improvements in stability, PT contact guard still  needed at times. Patient reporting LE fatigue at end of session today, reporting "we did a lot of legs, I'm tired."     Plan: Continue per plan of care. Progress treatment as tolerated.         Precautions: no known allergies      Manuals 9/7/23 9/12 9/14 9/18 9/20 9/25 9/28 10/2     [employer] balance testing and training I eval             Left epley perf      Left neg,        Retest danita hallpike left, right and rolling r/l     Right epley 4 beat nystagmus man 15min right + epley perform today 15min man rolling neg                   Neuro Re-Ed             Chin tucks  5 reps 5 sec hold  10 reps  10x [id number] 10 reps  10 sec 10 reps 2x10     Cervical isometrics all planes 5 sec hold x 5 reps  5 x 5 5sec x 5 5 sec x 5 5 x 5 5 x 5 5 x 5 5 sec x 10 reps     Tandem gait in ll bars 1 min x 1 1 min x 1 offset feet 2 min 2 min  1 min offset feet 1 mn x1 1minx 1 3 laps no // bars      Tandem stance 30 sec x 3 start with 10 seconds hold 30 sec x 3 offset feet 20sec x 5 20 sec x 5 30 sec x 3 30 sec x 3 30 sec x 3      Ankle swaying flat and foam, eo, ec in ll bars  10 reps flat surface eo ec close s 10x 10x 3 10 10 reps each 10      Upper trap stretch    3 reps 10 sec 20sec x 5 :20x5         Levator stretch  3 reps 10 sesc 20sec x 5 :20x5         Ther Ex             Self  cervicalrotation stretch  3 reps 10 sec  5sec x 5 5 sec x5  5  10 sec x 3     Squats with chin tuck hi lo mat table   3 x 10 3 x 10 3x10 3 x 10 3 x 10 3 x 10 2x10     Rows/ext   30x RTB 30x  green 3 x 10       Nu-step   15 min 15 min  15 min r 3 15 min r 4 15 min r 5 15 min r4     Hand to opposite knee gait cg of 1/ close s       40 ft x 2 marching only 3 laps x 10 feet     Sidestepping        10ft x 4 10ft x 4     theraband rows, sh ext        3 x 10 green 3x10 green                  Ther Activity                                       Gait Training                                       Modalities

## 2023-10-03 PROCEDURE — 88305 TISSUE EXAM BY PATHOLOGIST: CPT | Performed by: STUDENT IN AN ORGANIZED HEALTH CARE EDUCATION/TRAINING PROGRAM

## 2023-10-04 ENCOUNTER — HOSPITAL ENCOUNTER (OUTPATIENT)
Dept: RADIOLOGY | Facility: IMAGING CENTER | Age: 83
Discharge: HOME | End: 2023-10-04
Payer: COMMERCIAL

## 2023-10-04 DIAGNOSIS — C43.39 MALIGNANT MELANOMA OF FOREHEAD (HCC): ICD-10-CM

## 2023-10-04 DIAGNOSIS — C43.30 MALIGNANT MELANOMA OF SKIN OF FACE (HCC): ICD-10-CM

## 2023-10-04 DIAGNOSIS — R93.89 ABNORMAL FINDING OF DIAGNOSTIC IMAGING: ICD-10-CM

## 2023-10-04 PROCEDURE — 76536 US EXAM OF HEAD AND NECK: CPT

## 2023-10-05 ENCOUNTER — EVALUATION (OUTPATIENT)
Dept: PHYSICAL THERAPY | Age: 83
End: 2023-10-05
Payer: COMMERCIAL

## 2023-10-05 ENCOUNTER — LAB REQUISITION (OUTPATIENT)
Dept: LAB | Facility: HOSPITAL | Age: 83
End: 2023-10-05
Payer: COMMERCIAL

## 2023-10-05 DIAGNOSIS — R42 VERTIGO: ICD-10-CM

## 2023-10-05 DIAGNOSIS — D48.5 NEOPLASM OF UNCERTAIN BEHAVIOR OF SKIN: ICD-10-CM

## 2023-10-05 DIAGNOSIS — R26.9 GAIT ABNORMALITY: Primary | ICD-10-CM

## 2023-10-05 PROCEDURE — 97112 NEUROMUSCULAR REEDUCATION: CPT

## 2023-10-05 PROCEDURE — 97110 THERAPEUTIC EXERCISES: CPT

## 2023-10-05 PROCEDURE — 97530 THERAPEUTIC ACTIVITIES: CPT

## 2023-10-06 NOTE — PROGRESS NOTES
Daily Note / reassessment of status /d/c summary    Today's date: 10/5/2023  Patient name: Nikolay Metcalf  : 1940  MRN: [id number]  Referring provider: REGINA Mcguire  Dx:   Encounter Diagnosis     ICD-10-CM    1. Gait abnormality  R26.9       2. Vertigo  R42                      Subjective: "I am feeling good again. No dizziness today." Discussion with pt when extending head backwards chin tuck first f/b neck extension as pts head resting position is significantly forward and requires correction. Objective: See treatment diary below      Assessment: Tolerated treatment well. Patient is to continue with cervical chin tucks , and isometric exer, daily and balance exer at least 2 x a week . He has been given info on It's a Matter of Balance exer programming. Set goals achieved however further discussion with the pt on how he could benefit in the future from the Big program at the [address] [place] [address] Neuro PT center due to hx of Parkinson's. 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 danita hallpike negative bilaterally , rolling and supine to sit neg,   Tandem stance 30 sec eo, eyes closed 5 seconds. TUG 10 seconds, static standing eyes open wfl's, eyes closed slight backward sway wfl's,  Foam standing eyes open slight ant post sway , eyes closed increased backward sway. Pt to have night lights in hallway due to decreased balance with eyes closed.  Functional gait assessment       Plan: d/c of PT continue with a home exer program      Precautions: no known allergies      Manuals 9/7/23 9/12 9/14 9/18 9/20 9/25 9/28 10/2 10/5    [facility] balance testing and training I eval             Left epley perf      Left neg,        Retest danita hallpike left, right and rolling r/l     Right epley 4 beat nystagmus man 15min right + epley perform today 15min man rolling neg  neg                 Neuro Re-Ed             Chin tucks  5 reps 5 sec hold  10 reps  10x [id number] 10 reps  10 sec 10 reps 2x10 5 reps     Cervical isometrics all planes 5 sec hold x 5 reps  5 x 5 5sec x 5 5 sec x 5 5 x 5 5 x 5 5 x 5 5 sec x 10 reps 5 reps     Tandem gait in ll bars 1 min x 1 1 min x 1 offset feet 2 min 2 min  1 min offset feet 1 mn x1 1minx 1 3 laps no // bars  1 min x 1    Tandem stance 30 sec x 3 start with 10 seconds hold 30 sec x 3 offset feet 20sec x 5 20 sec x 5 30 sec x 3 30 sec x 3 30 sec x 3  30 sec x 3    Ankle swaying flat and foam, eo, ec in ll bars  10 reps flat surface eo ec close s 10x 10x 3 10 10 reps each 10  10    Upper trap stretch    3 reps 10 sec 20sec x 5 :20x5     3 reps 10 sec    Levator stretch  3 reps 10 sesc 20sec x 5 :20x5     3 reps 10 sec    Ther Ex             Self  cervicalrotation stretch  3 reps 10 sec  5sec x 5 5 sec x5  5  10 sec x 3 3 reps 10 sec    Squats with chin tuck hi lo mat table   3 x 10 3 x 10 3x10 3 x 10 3 x 10 3 x 10 2x10 3 x 10    Rows/ext   30x RTB 30x  green 3 x 10   Green 3 x 10    Nu-step   15 min 15 min  15 min r 3 15 min r 4 15 min r 5 15 min r4 15 min r 5    Hand to opposite knee gait cg of 1/ close s       40 ft x 2 marching only 3 laps x 10 feet 40 ft close sup    Sidestepping        10ft x 4 10ft x 4 10 ft x 4    theraband rows, sh ext        3 x 10 green 3x10 green                  Ther Activity                                       Gait Training                                         Modalities

## 2023-10-09 PROCEDURE — 88305 TISSUE EXAM BY PATHOLOGIST: CPT | Performed by: STUDENT IN AN ORGANIZED HEALTH CARE EDUCATION/TRAINING PROGRAM

## 2023-10-19 ENCOUNTER — TELEPHONE (OUTPATIENT)
Dept: HEMATOLOGY ONCOLOGY | Facility: CLINIC | Age: 83
End: 2023-10-19

## 2023-10-19 NOTE — TELEPHONE ENCOUNTER
----- Message from Ophelia Morrow MD sent at 10/18/2023 10:35 PM EDT -----  Cn you let him know that his US looks good - Lns appear stable and we will continue to monitor    Jim   ----- Message -----  From: Interface, Radiology Results In  Sent: 10/12/2023   8:54 AM EDT  To: Ophelia Morrow MD

## 2023-11-07 ENCOUNTER — OFFICE VISIT (OUTPATIENT)
Dept: SURGICAL ONCOLOGY | Facility: CLINIC | Age: 83
End: 2023-11-07
Payer: COMMERCIAL

## 2023-11-07 VITALS
HEIGHT: 67 IN | SYSTOLIC BLOOD PRESSURE: 126 MMHG | TEMPERATURE: 98.4 F | WEIGHT: 164.2 LBS | RESPIRATION RATE: 14 BRPM | OXYGEN SATURATION: 95 % | HEART RATE: 69 BPM | DIASTOLIC BLOOD PRESSURE: 77 MMHG | BODY MASS INDEX: 25.77 KG/M2

## 2023-11-07 DIAGNOSIS — C43.30 MALIGNANT MELANOMA OF SKIN OF FACE (HCC): Primary | ICD-10-CM

## 2023-11-07 PROCEDURE — 99214 OFFICE O/P EST MOD 30 MIN: CPT | Performed by: STUDENT IN AN ORGANIZED HEALTH CARE EDUCATION/TRAINING PROGRAM

## 2023-11-07 NOTE — PROGRESS NOTES
Surgical Oncology F/u    [address] [facility] SURGICAL ONCOLOGY [place]  [address]  [address] [address] [phone]    Patient:  Jose Luis Land  1940  [id number]    Primary Care provider: Lorie Neal MD  [facility] [address]  [address] [address]    Referring provider:  No referring provider defined for this encounter. Diagnoses and all orders for this visit:    Malignant melanoma of skin of face [facility])        Chief Complaint   Patient presents with    Follow-up       No follow-ups on file.     Oncology History   Malignant melanoma of skin of face ([address])   9/8/2021 -  Cancer Staged    Staging form: Melanoma of the Skin, AJCC 8th Edition  - Clinical stage from 9/8/2021: Stage III (cT2a, cN1a, cM0) - Signed by Betina Brown MD on 11/7/2021 9/8/2021 -  Cancer Staged    Staging form: Melanoma of the Skin, AJCC 8th Edition  - Pathologic stage from 9/8/2021: Stage IIIA (pT2a, pN1a, cM0) - Signed by Betina Brown MD on 11/7/2021 9/22/2021 Initial Diagnosis    Malignant melanoma of skin of face ([address])     4/4/2022 -  Chemotherapy    pembrolizumab (KEYTRUDA) IVPB, 400 mg, Intravenous, Once, 13 of 15 cycles  Administration: 400 mg (4/4/2022), 400 mg (5/16/2022), 400 mg (6/27/2022), 400 mg (8/8/2022), 400 mg (9/19/2022), 400 mg (10/31/2022), 400 mg (12/12/2022), 400 mg (1/23/2023), 400 mg (3/6/2023), 400 mg (5/5/2023), 400 mg (6/15/2023), 400 mg (8/3/2023), 400 mg (9/21/2023)     Malignant melanoma of forehead ([address])   11/29/2021 Initial Diagnosis    Malignant melanoma of forehead ([address])     4/4/2022 -  Chemotherapy    pembrolizumab (KEYTRUDA) IVPB, 400 mg, Intravenous, Once, 13 of 15 cycles  Administration: 400 mg (4/4/2022), 400 mg (5/16/2022), 400 mg (6/27/2022), 400 mg (8/8/2022), 400 mg (9/19/2022), 400 mg (10/31/2022), 400 mg (12/12/2022), 400 mg (1/23/2023), 400 mg (3/6/2023), 400 mg (5/5/2023), 400 mg (6/15/2023), 400 mg (8/3/2023), 400 mg (9/21/2023)       STAGE IIIA: Nadeem Lazcano    Doing relatively well. No immune mediated side effects. No new lumps or bumps to report. PET in June with smaller lung nodules as compared to 12/2022. US of head and neck in Oct with no new suspicious nodes and overall stability of pre-auricular soft tissue. Sees Dr Dm Leary regularly. He had shave bx of scalp lesion with invasive SCC in Sep, followed by excision. No HA, bone pain, abd pain, n/v.     Review of Systems  Complete ROS Surg Onc:   Constitutional: The patient denies new or recent history of general fatigue, no recent weight loss, no change in appetite. Eyes: No complaints of visual problems, no scleral icterus. ENT: No complaints of ear pain, no hoarseness, no difficulty swallowing,  no tinnitus and no new masses in head, oral cavity, or neck. Cardiovascular: No complaints of chest pain, no palpitations, no ankle edema. Respiratory: No complaints of shortness of breath, no cough. Gastrointestinal: No complaints of jaundice, no bloody stools, no pale stools. Genitourinary: No complaints of dysuria, no hematuria, no nocturia, no frequent urination, no urethral discharge. Musculoskeletal: No complaints of weakness, paralysis, joint stiffness or arthralgias. Integumentary: No complaints of rash, no new lesions. Neurological: No complaints of convulsions, no seizures, no dizziness. Hematologic/Lymphatic: No complaints of easy bruising. Endocrine:  No hot or cold intolerance. No polydipsia, polyphagia, or polyuria. Allergy/immunology:  No environmental allergies. No food allergies. Not immunocompromised.       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)    Stage 3 chronic kidney disease, unspecified whether stage 3a or 3b CKD (HCC)    High risk medication use    Dizziness    Advanced care planning/counseling discussion    Arthritis of left knee     Past Medical History:   Diagnosis Date    Diabetes mellitus ([address])     Hyperlipidemia     Hypertension      Past Surgical History:   Procedure Laterality Date    CATARACT EXTRACTION, BILATERAL      FLAP LOCAL HEAD / NECK Left 10/12/2021    Procedure: RECONSTRUCTION OF LEFT TEMPLE DEFECT AFTER RESCECTION MELANOMA;  Surgeon: Darrick Montoya MD;  [facility];  Service: Plastics    FULL THICKNESS SKIN GRAFT Left 10/12/2021    Procedure: SKIN GRAFT FULL THICKNESS LEFT TEMPLE;  Surgeon: Darrick Montoya MD;  [facility];  Service: Plastics    GALLBLADDER SURGERY      HAND SURGERY Left     LYMPH NODE BIOPSY Left 10/12/2021    Procedure: BIOPSY LYMPH NODE SENTINEL, LYMPHOSCINTIGRAPHY, INTRAOPERATIVE LYMPHATIC MAPPING (INJECT AT 1200);   Surgeon: Aaron Robertson MD;  [facility];  Service: Surgical Oncology    NOSE SURGERY      SKIN CANCER EXCISION  10/2021    SKIN LESION EXCISION Left 10/12/2021    Procedure: [address] [facility] [address];  Surgeon: Aaron Robertson MD;  [facility];  Service: Surgical Oncology    SPLIT THICKNESS SKIN GRAFT Left 10/12/2021    Procedure: SKIN GRAFT SPLIT THICKNESS LEFT TEMPLE;  Surgeon: Darrick Montoya MD;  [facility];  Service: Plastics    US GUIDED LYMPH NODE BIOPSY LEFT  03/21/2022     Family History   Problem Relation Age of Onset    No Known Problems Mother     No Known Problems Father     Colon cancer Other 61     Social History     Socioeconomic History    Marital status: /Civil Union     Spouse name: Not on file Number of children: Not on file    Years of education: Not on file    Highest education level: Not on file   Occupational History    Not on file   Tobacco Use    Smoking status: Never    Smokeless tobacco: Never   Vaping Use    Vaping Use: Never used   Substance and Sexual Activity    Alcohol use: Not Currently    Drug use: Never    Sexual activity: Not Currently   Other Topics Concern    Not on file   Social History Narrative    Not on file     Social Determinants of Health     Financial Resource Strain: Low Risk  (11/29/2022)    Overall Financial Resource Strain (CARDIA)     Difficulty of Paying Living Expenses: Not hard at all   Food Insecurity: Not on file   Transportation Needs: No Transportation Needs (11/29/2022)    PRAPARE - Transportation     Lack of Transportation (Medical): No     Lack of Transportation (Non-Medical):  No   Physical Activity: Not on file   Stress: Not on file   Social Connections: Not on file   Intimate Partner Violence: Not on file   Housing Stability: Not on file       Current Outpatient Medications:     atenolol (TENORMIN) 100 mg tablet, Take 1 tablet (100 mg total) by mouth daily, Disp: 90 tablet, Rfl: 3    cetirizine (ZyrTEC) 5 MG tablet, Take 1 tablet (5 mg total) by mouth daily, Disp: 90 tablet, Rfl: 1    fluticasone (FLONASE) 50 mcg/act nasal spray, 1 spray into each nostril daily, Disp: 16 g, Rfl: 1    glimepiride (AMARYL) 4 mg tablet, Take 1 tablet (4 mg total) by mouth daily, Disp: 90 tablet, Rfl: 3    lisinopril-hydrochlorothiazide (PRINZIDE,ZESTORETIC) 20-25 MG per tablet, Take 1 tablet by mouth daily, Disp: 90 tablet, Rfl: 3    lovastatin (MEVACOR) 40 MG tablet, Take 1 tablet (40 mg total) by mouth daily, Disp: 90 tablet, Rfl: 3    metFORMIN (GLUCOPHAGE) 1000 MG tablet, Take 1 tablet (1,000 mg total) by mouth 2 (two) times a day with meals, Disp: 180 tablet, Rfl: 3    carbamide peroxide (DEBROX) 6.5 % otic solution, Administer 5 drops into both ears 2 (two) times a day for 7 days (Patient not taking: Reported on 11/7/2023), Disp: 15 mL, Rfl: 0  No Known Allergies    Vitals:    11/07/23 1109   BP: 126/77   Pulse: 69   Resp: 14   Temp: 98.4 °F (36.9 °C)   SpO2: 95%       Physical Exam     General: Appears well, appears stated age  Skin: Warm, anicteric  HEENT: Normocephalic, atraumatic; sclera aniceteric, mucous membranes moist. Well-healed graft site and pre-auricular incision. Several new pigments lesions between primary and node incision  Cardiopulmonary: RRR, Easy WOB, no BLE edema  Abd: Flat and soft, nontender, no masses appreciated, no hepatosplenomegaly  MSK: Symmetric, no cyanosis, no overt weakness  Lymphatic: No cervical, axillary or inguinal lymphadenopathy  Neuro: Affect appropriate, no gross motor abnormalities            Pathology:  Final Diagnosis   A. Lymph node, sentinel, #1,  pre-auricular, biopsy:  One lymph node with subcapsular rare melanoma cells consistent with sub-micrometastasis (<0.1 mm). See synoptic report and note. B. Skin, left forehead, excision:  Residual invasive melanoma and scar (cicatrix), margins free. See synoptic report. Labs: Reviewed in EPIC    Imaging  No results found. I independently reviewed and interpreted the above laboratory and imaging data, including Dr. Ruben Lee consultation, PET scans, US, path from [facility]. I discussed this pt with Dr Evi Abrams. Discussion/Summary:   25-year-old gentleman status post wide local excision with sentinel lymph node biopsy of what ultimately was determined to be a [id number] melanoma 10/2021. Ultrasound of the neck completed early 2022 revealed concern for recurrence in the preauricular region as well as the left cervical chain. He underwent biopsy of this which was inconclusive, and elected to pursue adjuvant therapy and follow clinically. PET in Dec with ?pulm mets which had improved in June. Recent US WNL. Will see him again in 6 mo. Doing well.

## 2023-11-09 ENCOUNTER — APPOINTMENT (OUTPATIENT)
Dept: LAB | Facility: IMAGING CENTER | Age: 83
End: 2023-11-09
Payer: COMMERCIAL

## 2023-11-09 RX ORDER — SODIUM CHLORIDE 9 MG/ML
20 INJECTION, SOLUTION INTRAVENOUS ONCE
Status: CANCELLED | OUTPATIENT
Start: 2023-11-16

## 2023-11-16 ENCOUNTER — OFFICE VISIT (OUTPATIENT)
Dept: HEMATOLOGY ONCOLOGY | Facility: CLINIC | Age: 83
End: 2023-11-16
Payer: COMMERCIAL

## 2023-11-16 ENCOUNTER — HOSPITAL ENCOUNTER (OUTPATIENT)
Dept: INFUSION CENTER | Facility: CLINIC | Age: 83
Discharge: HOME | End: 2023-11-16
Payer: COMMERCIAL

## 2023-11-16 ENCOUNTER — TELEPHONE (OUTPATIENT)
Dept: HEMATOLOGY ONCOLOGY | Facility: CLINIC | Age: 83
End: 2023-11-16

## 2023-11-16 VITALS
DIASTOLIC BLOOD PRESSURE: 84 MMHG | TEMPERATURE: 98.2 F | HEART RATE: 68 BPM | HEIGHT: 67 IN | BODY MASS INDEX: 25.74 KG/M2 | SYSTOLIC BLOOD PRESSURE: 128 MMHG | OXYGEN SATURATION: 98 % | WEIGHT: 164 LBS | RESPIRATION RATE: 16 BRPM

## 2023-11-16 DIAGNOSIS — R91.8 LUNG NODULES: ICD-10-CM

## 2023-11-16 DIAGNOSIS — R59.0 LEFT CERVICAL LYMPHADENOPATHY: ICD-10-CM

## 2023-11-16 DIAGNOSIS — C43.30 MALIGNANT MELANOMA OF SKIN OF FACE (HCC): Primary | ICD-10-CM

## 2023-11-16 DIAGNOSIS — Z79.899 HIGH RISK MEDICATION USE: ICD-10-CM

## 2023-11-16 DIAGNOSIS — C43.39 MALIGNANT MELANOMA OF FOREHEAD (HCC): ICD-10-CM

## 2023-11-16 DIAGNOSIS — C43.39 MALIGNANT MELANOMA OF FOREHEAD (HCC): Primary | ICD-10-CM

## 2023-11-16 DIAGNOSIS — C43.30 MALIGNANT MELANOMA OF SKIN OF FACE (HCC): ICD-10-CM

## 2023-11-16 PROCEDURE — 99214 OFFICE O/P EST MOD 30 MIN: CPT | Performed by: INTERNAL MEDICINE

## 2023-11-16 PROCEDURE — 96413 CHEMO IV INFUSION 1 HR: CPT

## 2023-11-16 RX ORDER — SODIUM CHLORIDE 9 MG/ML
20 INJECTION, SOLUTION INTRAVENOUS ONCE
Status: COMPLETED | OUTPATIENT
Start: 2023-11-16 | End: 2023-11-16

## 2023-11-16 RX ADMIN — SODIUM CHLORIDE 400 MG: 9 INJECTION, SOLUTION INTRAVENOUS at 13:26

## 2023-11-16 RX ADMIN — SODIUM CHLORIDE 20 ML/HR: 0.9 INJECTION, SOLUTION INTRAVENOUS at 13:05

## 2023-11-16 NOTE — PROGRESS NOTES
Hematology/Oncology Outpatient Follow- up Note  Vicenta Layton 79 y.o. male MRN: @ Encounter: [id number]        Date:  11/15/2023  HPI:  Vicenta Layton 79years old male with history of facial skin melanoma s/p wide local excision was sentinel lymph node biopsy September 2021, pT2a cN1a cM0,     Ultrasound head and neck 2/16/2022 with irregular hypoechoic left preauricular lesion extending from the skin to the superficial aspect of left parotid correlating with area of mild FDG uptake on PET scan November 2021, and 1.1 cm lymph node inferior to left parotid s/p biopsy 3/21/2022, inconclusive    On pembrolizumab 4/4/22 -last given 9/21/2023    Interval History/updates:    - 10/3/23 Skin, mid scalp, shave biopsy: SQUAMOUS CELL CARCINOMA, WELL DIFFERENTIATED, INVASIVE    Patient had ultrasound head neck soft tissue 10//2023 with now no suspicious adenopathy compared to previous ultrasound September 2022, also his most recent PET scan in June 2023 showed improving of his small bilateral pulmonary nodules without significant FDG uptake    -11/9/2023 WBC 7.9, hemoglobin 13.4, platelets 821 L but stable at baseline, AST 15, ALT 13, ALP 62, creatinine 1.36 at baseline of Hannis CKD 3A. Subjective  Patient presents today to follow-up, prior to pembrolizumab dose planned later today  Patient tolerating treatment without any reported side effects or complaint  Denies any new lesions or lumps felt anywhere, and on exam today no cervical or submandibular lymphadenopathy appreciated on exam  Denies any B symptoms or respiratory symptoms, eating/drinking okay and completing his daily activities without any limitation  As above patient had recent scalp Mohs procedure for squamous cell carcinoma and he continues to follow-up with dermatology surgery at site healing well without any inflammation or discharge on exam today    Assessment / Plan:    1. Malignant melanoma of skin of face ([address])  2.  Left cervical lymphadenopathy  Biopsy of the lymph nodes left neck November 2021 was nonconclusive, suspected melanoma recurrence and patient opted in pembrolizumab, started 4//2022 to present  Repeat ultrasound 10/4/23 with no suspicious of adenopathy/radiologically resolved  On Pembrolizumab as above, will repeate imaging for revaluate     3.  Lung nodules  Smaller on repeated PET scan as above  ;no respiratory symptoms  Currently on pembrolizumab as above   Will repeate imaging, and potentially continue on Pembrolizumab fot total 2 years vs stop and surveillance , TBD on revaluation and ongoing discussion with the patient    Cancer Staging:  Cancer Staging   Malignant melanoma of skin of face [facility])  Staging form: Melanoma of the Skin, AJCC 8th Edition  - Clinical stage from 9/8/2021: Stage III (cT2a, cN1a, cM0) - Signed by Betina Brown MD on 11/7/2021  - Pathologic stage from 9/8/2021: Stage IIIA (pT2a, pN1a, cM0) - Signed by Betina Brown MD on 11/7/2021      Previous Hematologic/ Oncologic History:    Oncology History   Malignant melanoma of skin of face ([address])   9/8/2021 -  Cancer Staged    Staging form: Melanoma of the Skin, AJCC 8th Edition  - Clinical stage from 9/8/2021: Stage III (cT2a, cN1a, cM0) - Signed by Betina Brown MD on 11/7/2021 9/8/2021 -  Cancer Staged    Staging form: Melanoma of the Skin, AJCC 8th Edition  - Pathologic stage from 9/8/2021: Stage IIIA (pT2a, pN1a, cM0) - Signed by Betina Bronw MD on 11/7/2021 9/22/2021 Initial Diagnosis    Malignant melanoma of skin of face ([address])     4/4/2022 -  Chemotherapy    pembrolizumab (KEYTRUDA) IVPB, 400 mg, Intravenous, Once, 13 of 15 cycles  Administration: 400 mg (4/4/2022), 400 mg (5/16/2022), 400 mg (6/27/2022), 400 mg (8/8/2022), 400 mg (9/19/2022), 400 mg (10/31/2022), 400 mg (12/12/2022), 400 mg (1/23/2023), 400 mg (3/6/2023), 400 mg (5/5/2023), 400 mg (6/15/2023), 400 mg (8/3/2023), 400 mg (9/21/2023)     Malignant melanoma of forehead ([address])   11/29/2021 Initial Diagnosis    Malignant melanoma of forehead ([address])     4/4/2022 -  Chemotherapy    pembrolizumab (KEYTRUDA) IVPB, 400 mg, Intravenous, Once, 13 of 15 cycles  Administration: 400 mg (4/4/2022), 400 mg (5/16/2022), 400 mg (6/27/2022), 400 mg (8/8/2022), 400 mg (9/19/2022), 400 mg (10/31/2022), 400 mg (12/12/2022), 400 mg (1/23/2023), 400 mg (3/6/2023), 400 mg (5/5/2023), 400 mg (6/15/2023), 400 mg (8/3/2023), 400 mg (9/21/2023)         Test Results:    Imaging: No results found. Labs:   Lab Results   Component Value Date    WBC 7.91 11/09/2023    HGB 13.4 11/09/2023    HCT 41.2 11/09/2023    MCV 92 11/09/2023     (L) 11/09/2023     Lab Results   Component Value Date    K 4.1 11/09/2023     11/09/2023    CO2 27 11/09/2023    BUN 29 (H) 11/09/2023    CREATININE 1.36 (H) 11/09/2023    GLUF 226 (H) 07/27/2023    CALCIUM 8.9 11/09/2023    CORRECTEDCA 9.3 04/28/2023    AST 15 11/09/2023    ALT 13 11/09/2023    ALKPHOS 62 11/09/2023    EGFR 47 11/09/2023       ROS: Review of Systems  - GENERAL: Negative for any nausea, vomiting, fevers, chills, or weight loss. - HEENT: Negative for any head/Neck trauma, pain, double/blurry vision, sinusitis, rhinitis, nose bleeding.  - CARDIAC: Negative for any chest pain, palpitation, Dyspnea on exertion, peripheral edema. - PULMONARY: chronic baseline MCMILLAN on walking 1-2 block, unchanged x years ; Negative for any SOB at regular activity, cough, wheezing.   - GASTROINTESTINAL: Negative for any abdominal pain, N/V/D/C, blood in stool.   - GENITOURINARY: Negative for any dysuria, hematuria, incontinence.  - NEUROLOGIC: Negative for any muscle weakness, numbness/tingling, memory changes. - MUSCULOSKELETAL: Negative for any joint pains/swelling, limited ROM. - INTEGUMENTARY: Negative for any rashes, cuts/ lesions.  - HEMATOLOGIC: Negative for any abnormal bruising, frequent infections or bleeding.       Current Medications: Reviewed  Allergies: Reviewed  PMH/FH/SH:  Reviewed    Physical Exam:    There is no height or weight on file to calculate BSA. Wt Readings from Last 3 Encounters:   11/07/23 74.5 kg (164 lb 3.2 oz)   09/21/23 74.4 kg (164 lb)   08/30/23 73.9 kg (163 lb)        Temp Readings from Last 3 Encounters:   11/07/23 98.4 °F (36.9 °C) (Temporal)   09/21/23 (!) 97.1 °F (36.2 °C) (Temporal)   09/21/23 (!) 97.3 °F (36.3 °C) (Temporal)        BP Readings from Last 3 Encounters:   11/07/23 126/77   09/21/23 155/73   09/21/23 140/84         Pulse Readings from Last 3 Encounters:   11/07/23 69   09/21/23 76   09/21/23 78     @LASTSAO2(3)@    Physical Exam  - GEN: Appears well, alert and oriented x 3, pleasant and cooperative, in no acute distress  - HEENT: Anicteric, mucous membranes moist, PERRL and EOMI   - NECK: No lymphadenopathy, JVD or carotid bruits   - HEART: RRR, normal S1 and S2, no murmurs, clicks, gallops or rubs   - LUNGS: Clear to auscultation bilaterally; no wheezes, rales, or rhonchi  - ABDOMEN: Normal bowel sounds, soft, no tenderness, no distention, no organomegaly or masses felt on exam.   - EXTREMITIES: Peripheral pulses normal; no clubbing, cyanosis, or edema  - NEURO: No focal findings, CN II-XII are grossly intact. - Musculoskeletal: 5/5 strength, normal ROM, no swollen or erythematous joints. - SKIN: top scalp post mohs site well healing , dry and clean; left forehead / lateral to left eye site of old melanoma post resection well healed with no suspicious of new growing or swelling.   otherwise Normal without suspicious lesions on exposed skin    Mayra Lieberman, DO   Hematology and Medical Oncology - PGY [facility]

## 2023-11-16 NOTE — PLAN OF CARE
Problem: Knowledge Deficit  Goal: Patient/family/caregiver demonstrates understanding of disease process, treatment plan, medications, and discharge instructions  Description: Complete learning assessment and assess knowledge base. Interventions:  - Provide teaching at level of understanding  - Provide teaching via preferred learning methods  Outcome: Completed     Problem: Knowledge Deficit  Goal: Patient/family/caregiver demonstrates understanding of disease process, treatment plan, medications, and discharge instructions  Description: Complete learning assessment and assess knowledge base.   Interventions:  - Provide teaching at level of understanding  - Provide teaching via preferred learning methods  Outcome: Completed

## 2023-12-01 ENCOUNTER — OFFICE VISIT (OUTPATIENT)
Dept: INTERNAL MEDICINE CLINIC | Facility: OTHER | Age: 83
End: 2023-12-01
Payer: COMMERCIAL

## 2023-12-01 VITALS
OXYGEN SATURATION: 99 % | HEART RATE: 60 BPM | DIASTOLIC BLOOD PRESSURE: 72 MMHG | SYSTOLIC BLOOD PRESSURE: 138 MMHG | TEMPERATURE: 98.2 F | WEIGHT: 164 LBS | BODY MASS INDEX: 25.69 KG/M2

## 2023-12-01 DIAGNOSIS — Z13.6 SCREENING FOR CARDIOVASCULAR CONDITION: ICD-10-CM

## 2023-12-01 DIAGNOSIS — C43.30 MALIGNANT MELANOMA OF SKIN OF FACE (HCC): ICD-10-CM

## 2023-12-01 DIAGNOSIS — Z00.00 MEDICARE ANNUAL WELLNESS VISIT, SUBSEQUENT: ICD-10-CM

## 2023-12-01 DIAGNOSIS — E11.9 TYPE 2 DIABETES MELLITUS WITHOUT COMPLICATION, WITHOUT LONG-TERM CURRENT USE OF INSULIN (HCC): Primary | ICD-10-CM

## 2023-12-01 DIAGNOSIS — E78.2 MIXED HYPERLIPIDEMIA: ICD-10-CM

## 2023-12-01 DIAGNOSIS — I10 ESSENTIAL HYPERTENSION: ICD-10-CM

## 2023-12-01 DIAGNOSIS — Z71.89 ADVANCED CARE PLANNING/COUNSELING DISCUSSION: ICD-10-CM

## 2023-12-01 DIAGNOSIS — D69.6 THROMBOCYTOPENIA (HCC): ICD-10-CM

## 2023-12-01 DIAGNOSIS — I10 BENIGN ESSENTIAL HTN: ICD-10-CM

## 2023-12-01 PROBLEM — R42 DIZZINESS: Status: RESOLVED | Noted: 2022-09-27 | Resolved: 2023-12-01

## 2023-12-01 LAB — SL AMB POCT HEMOGLOBIN AIC: 6.3 (ref ?–6.5)

## 2023-12-01 PROCEDURE — 99497 ADVNCD CARE PLAN 30 MIN: CPT | Performed by: INTERNAL MEDICINE

## 2023-12-01 PROCEDURE — 83036 HEMOGLOBIN GLYCOSYLATED A1C: CPT | Performed by: INTERNAL MEDICINE

## 2023-12-01 PROCEDURE — G0439 PPPS, SUBSEQ VISIT: HCPCS | Performed by: INTERNAL MEDICINE

## 2023-12-01 PROCEDURE — 99214 OFFICE O/P EST MOD 30 MIN: CPT | Performed by: INTERNAL MEDICINE

## 2023-12-01 PROCEDURE — G0444 DEPRESSION SCREEN ANNUAL: HCPCS | Performed by: INTERNAL MEDICINE

## 2023-12-01 RX ORDER — LOVASTATIN 40 MG/1
40 TABLET ORAL DAILY
Qty: 90 TABLET | Refills: 3 | Status: SHIPPED | OUTPATIENT
Start: 2023-12-01

## 2023-12-01 RX ORDER — GLIMEPIRIDE 2 MG/1
2 TABLET ORAL
Qty: 90 TABLET | Refills: 3 | Status: SHIPPED | OUTPATIENT
Start: 2023-12-01

## 2023-12-01 RX ORDER — LISINOPRIL AND HYDROCHLOROTHIAZIDE 25; 20 MG/1; MG/1
1 TABLET ORAL DAILY
Qty: 90 TABLET | Refills: 3 | Status: SHIPPED | OUTPATIENT
Start: 2023-12-01

## 2023-12-01 RX ORDER — ATENOLOL 100 MG/1
100 TABLET ORAL DAILY
Qty: 90 TABLET | Refills: 3 | Status: SHIPPED | OUTPATIENT
Start: 2023-12-01

## 2023-12-01 RX ORDER — GLIMEPIRIDE 2 MG/1
2 TABLET ORAL
Qty: 90 TABLET | Refills: 1 | Status: SHIPPED | OUTPATIENT
Start: 2023-12-01 | End: 2023-12-01 | Stop reason: SDUPTHER

## 2023-12-01 NOTE — PATIENT INSTRUCTIONS
Medicare Preventive Visit Patient Instructions  Thank you for completing your Welcome to Medicare Visit or Medicare Annual Wellness Visit today. Your next wellness visit will be due in one year (12/1/2024). The screening/preventive services that you may require over the next 5-10 years are detailed below. Some tests may not apply to you based off risk factors and/or age. Screening tests ordered at today's visit but not completed yet may show as past due. Also, please note that scanned in results may not display below. Preventive Screenings:  Service Recommendations Previous Testing/Comments   Colorectal Cancer Screening  Colonoscopy    Fecal Occult Blood Test (FOBT)/Fecal Immunochemical Test (FIT)  Fecal DNA/Cologuard Test  Flexible Sigmoidoscopy Age: 43-73 years old   Colonoscopy: every 10 years (May be performed more frequently if at higher risk)  OR  FOBT/FIT: every 1 year  OR  Cologuard: every 3 years  OR  Sigmoidoscopy: every 5 years  Screening may be recommended earlier than age 39 if at higher risk for colorectal cancer. Also, an individualized decision between you and your healthcare provider will decide whether screening between the ages of 77-80 would be appropriate.  Colonoscopy: Not on file  FOBT/FIT: Not on file  Cologuard: Not on file  Sigmoidoscopy: Not on file          Prostate Cancer Screening Individualized decision between patient and health care provider in men between ages of 53-66   Medicare will cover every 12 months beginning on the day after your 50th birthday PSA: No results in last 5 years     Screening Not Indicated     Hepatitis C Screening Once for adults born between 1945 and 1965  More frequently in patients at high risk for Hepatitis C Hep C Antibody: Not on file        Diabetes Screening 1-2 times per year if you're at risk for diabetes or have pre-diabetes Fasting glucose: 226 mg/dL (7/27/2023)  A1C: 6.1 % (5/24/2023)  Screening Not Indicated  History Diabetes   Cholesterol Screening Once every 5 years if you don't have a lipid disorder. May order more often based on risk factors. Lipid panel: 05/24/2023  Screening Not Indicated  History Lipid Disorder      Other Preventive Screenings Covered by Medicare:  Abdominal Aortic Aneurysm (AAA) Screening: covered once if your at risk. You're considered to be at risk if you have a family history of AAA or a male between the age of 70-76 who smoking at least 100 cigarettes in your lifetime. Lung Cancer Screening: covers low dose CT scan once per year if you meet all of the following conditions: (1) Age 48-67; (2) No signs or symptoms of lung cancer; (3) Current smoker or have quit smoking within the last 15 years; (4) You have a tobacco smoking history of at least 20 pack years (packs per day x number of years you smoked); (5) You get a written order from a healthcare provider. Glaucoma Screening: covered annually if you're considered high risk: (1) You have diabetes OR (2) Family history of glaucoma OR (3)  aged 48 and older OR (3)  American aged 72 and older  Osteoporosis Screening: covered every 2 years if you meet one of the following conditions: (1) Have a vertebral abnormality; (2) On glucocorticoid therapy for more than 3 months; (3) Have primary hyperparathyroidism; (4) On osteoporosis medications and need to assess response to drug therapy. HIV Screening: covered annually if you're between the age of 14-79. Also covered annually if you are younger than 13 and older than 72 with risk factors for HIV infection. For pregnant patients, it is covered up to 3 times per pregnancy.     Immunizations:  Immunization Recommendations   Influenza Vaccine Annual influenza vaccination during flu season is recommended for all persons aged >= 6 months who do not have contraindications   Pneumococcal Vaccine   * Pneumococcal conjugate vaccine = PCV13 (Prevnar 13), PCV15 (Vaxneuvance), PCV20 (Prevnar 20)  * Pneumococcal polysaccharide vaccine = PPSV23 (Pneumovax) Adults 21-81 yo with certain risk factors or if 69+ yo  If never received any pneumonia vaccine: recommend Prevnar 20 (PCV20)  Give PCV20 if previously received 1 dose of PCV13 or PPSV23   Hepatitis B Vaccine 3 dose series if at intermediate or high risk (ex: diabetes, end stage renal disease, liver disease)   Respiratory syncytial virus (RSV) Vaccine - COVERED BY MEDICARE PART D  * RSVPreF3 (Arexvy) CDC recommends that adults 61years of age and older may receive a single dose of RSV vaccine using shared clinical decision-making (SCDM)   Tetanus (Td) Vaccine - COST NOT COVERED BY MEDICARE PART B Following completion of primary series, a booster dose should be given every 10 years to maintain immunity against tetanus. Td may also be given as tetanus wound prophylaxis. Tdap Vaccine - COST NOT COVERED BY MEDICARE PART B Recommended at least once for all adults. For pregnant patients, recommended with each pregnancy. Shingles Vaccine (Shingrix) - COST NOT COVERED BY MEDICARE PART B  2 shot series recommended in those 19 years and older who have or will have weakened immune systems or those 50 years and older     Health Maintenance Due:  There are no preventive care reminders to display for this patient. Immunizations Due:      Topic Date Due   • Pneumococcal Vaccine: 65+ Years (1 - PCV) Never done   • COVID-19 Vaccine (5 - Booster for Moderna series) 05/31/2022   • Influenza Vaccine (1) Never done     Advance Directives   What are advance directives? Advance directives are legal documents that state your wishes and plans for medical care. These plans are made ahead of time in case you lose your ability to make decisions for yourself. Advance directives can apply to any medical decision, such as the treatments you want, and if you want to donate organs. What are the types of advance directives?   There are many types of advance directives, and each state has rules about how to use them. You may choose a combination of any of the following:  Living will: This is a written record of the treatment you want. You can also choose which treatments you do not want, which to limit, and which to stop at a certain time. This includes surgery, medicine, IV fluid, and tube feedings. Durable power of  for [facility]): This is a written record that states who you want to make healthcare choices for you when you are unable to make them for yourself. This person, called a proxy, is usually a family member or a friend. You may choose more than 1 proxy. Do not resuscitate (DNR) order:  A DNR order is used in case your heart stops beating or you stop breathing. It is a request not to have certain forms of treatment, such as CPR. A DNR order may be included in other types of advance directives. Medical directive: This covers the care that you want if you are in a coma, near death, or unable to make decisions for yourself. You can list the treatments you want for each condition. Treatment may include pain medicine, surgery, blood transfusions, dialysis, IV or tube feedings, and a ventilator (breathing machine). Values history: This document has questions about your views, beliefs, and how you feel and think about life. This information can help others choose the care that you would choose. Why are advance directives important? An advance directive helps you control your care. Although spoken wishes may be used, it is better to have your wishes written down. Spoken wishes can be misunderstood, or not followed. Treatments may be given even if you do not want them. An advance directive may make it easier for your family to make difficult choices about your care. Weight Management   Why it is important to manage your weight:  Being overweight increases your risk of health conditions such as heart disease, high blood pressure, type 2 diabetes, and certain types of cancer.  It can also increase your risk for osteoarthritis, sleep apnea, and other respiratory problems. Aim for a slow, steady weight loss. Even a small amount of weight loss can lower your risk of health problems. How to lose weight safely:  A safe and healthy way to lose weight is to eat fewer calories and get regular exercise. You can lose up about 1 pound a week by decreasing the number of calories you eat by 500 calories each day. Healthy meal plan for weight management:  A healthy meal plan includes a variety of foods, contains fewer calories, and helps you stay healthy. A healthy meal plan includes the following:  Eat whole-grain foods more often. A healthy meal plan should contain fiber. Fiber is the part of grains, fruits, and vegetables that is not broken down by your body. Whole-grain foods are healthy and provide extra fiber in your diet. Some examples of whole-grain foods are whole-wheat breads and pastas, oatmeal, brown rice, and bulgur. Eat a variety of vegetables every day. Include dark, leafy greens such as spinach, kale, jean greens, and mustard greens. Eat yellow and orange vegetables such as carrots, sweet potatoes, and winter squash. Eat a variety of fruits every day. Choose fresh or canned fruit (canned in its own juice or light syrup) instead of juice. Fruit juice has very little or no fiber. Eat low-fat dairy foods. Drink fat-free (skim) milk or 1% milk. Eat fat-free yogurt and low-fat cottage cheese. Try low-fat cheeses such as mozzarella and other reduced-fat cheeses. Choose meat and other protein foods that are low in fat. Choose beans or other legumes such as split peas or lentils. Choose fish, skinless poultry (chicken or turkey), or lean cuts of red meat (beef or pork). Before you cook meat or poultry, cut off any visible fat. Use less fat and oil. Try baking foods instead of frying them. Add less fat, such as margarine, sour cream, regular salad dressing and mayonnaise to foods. Eat fewer high-fat foods. Some examples of high-fat foods include french fries, doughnuts, ice cream, and cakes. Eat fewer sweets. Limit foods and drinks that are high in sugar. This includes candy, cookies, regular soda, and sweetened drinks. Exercise:  Exercise at least 30 minutes per day on most days of the week. Some examples of exercise include walking, biking, dancing, and swimming. You can also fit in more physical activity by taking the stairs instead of the elevator or parking farther away from stores. Ask your healthcare provider about the best exercise plan for you. © Copyright [address] 2018 Information is for End User's use only and may not be sold, redistributed or otherwise used for commercial purposes.  All illustrations and images included in CareNotes® are the copyrighted property of [employer] or [address] [place] [address]

## 2023-12-01 NOTE — ASSESSMENT & PLAN NOTE
Continue metformin 1000 mg twice a day. We will decrease glimepiride from 4 to 2 mg daily.   Lab Results   Component Value Date    HGBA1C 6.1 (H) 05/24/2023

## 2023-12-01 NOTE — ASSESSMENT & PLAN NOTE
. Adwoa Gerber and I had a thorough discussion regarding advance care planning. he  has a Living Will at home to which I recommended he provide a copy to be scanned for his medical records. ACP documentation provided to patient today. he  understands that today's visit is a billable service. Refer to ACP note.

## 2023-12-01 NOTE — PROGRESS NOTES
Diabetic Foot Exam    Patient's shoes and socks removed. Right Foot/Ankle   Right Foot Inspection  Skin Exam: skin normal and skin intact. No dry skin, no warmth, no callus, no erythema, no maceration, no abnormal color, no pre-ulcer, no ulcer and no callus. Toe Exam: ROM and strength within normal limits. Sensory   Monofilament testing: diminished    Vascular  Capillary refills: < 3 seconds  The right DP pulse is 2+. The right PT pulse is 2+. Left Foot/Ankle  Left Foot Inspection  Skin Exam: skin normal and skin intact. No dry skin, no warmth, no erythema, no maceration, normal color, no pre-ulcer, no ulcer and no callus. Toe Exam: ROM and strength within normal limits. Sensory   Monofilament testing: diminished    Vascular  Capillary refills: < 3 seconds  The left DP pulse is 2+. The left PT pulse is 2+. Assign Risk Category  No deformity present  Loss of protective sensation  No weak pulses  Risk: 1   Assessment and Plan:     Problem List Items Addressed This Visit        Endocrine    Type 2 diabetes mellitus without complication, without long-term current use of insulin (HCC) - Primary     Continue metformin 1000 mg twice a day. We will decrease glimepiride from 4 to 2 mg daily. Lab Results   Component Value Date    HGBA1C 6.1 (H) 05/24/2023            Relevant Medications    metFORMIN (GLUCOPHAGE) 1000 MG tablet    glimepiride (AMARYL) 2 mg tablet    Other Relevant Orders    POCT hemoglobin A1c (Completed)    Hemoglobin A1C    Lipid panel    Comprehensive metabolic panel    CBC    Albumin / creatinine urine ratio       Cardiovascular and Mediastinum    Essential hypertension     Controlled. Continue current antihypertensive regimen.          Relevant Medications    lisinopril-hydrochlorothiazide (PRINZIDE,ZESTORETIC) 20-25 MG per tablet    atenolol (TENORMIN) 100 mg tablet       Musculoskeletal and Integument    Malignant melanoma of skin of face ([address])     Continue follow-up with oncology. Hematopoietic and Hemostatic    Thrombocytopenia (HCC)     Stable, asymptomatic. Continue to trend CBC. Other    Mixed hyperlipidemia     Continue lovastatin 40 mg daily. Relevant Medications    lovastatin (MEVACOR) 40 MG tablet    Advanced care planning/counseling discussion     Mr. Sandra Yarbrough and I had a thorough discussion regarding advance care planning. he  has a Living Will at home to which I recommended he provide a copy to be scanned for his medical records. ACP documentation provided to patient today. he  understands that today's visit is a billable service. Refer to ACP note. Other Visit Diagnoses     Benign essential HTN        Relevant Medications    lisinopril-hydrochlorothiazide (PRINZIDE,ZESTORETIC) 20-25 MG per tablet    atenolol (TENORMIN) 100 mg tablet    Medicare annual wellness visit, subsequent        Screening for cardiovascular condition            Serious Illness Conversation    1. What is your understanding now of where you are with your illness? Prognostic Understanding: appropriate understanding of prognosis     2. How much information about what is likely to be ahead with your illness would you like to have? Information: patient wants to be fully informed     3. What did you (clinician) communicate to the patient? Prognostic Communication: Uncertain - It can be difficult to predict what will happen with your illness. I hope you will continue to live well for a long time but I’m worried that you could get sick quickly, and I think it is important to prepare for that possibility. 4. If your health situation worsens, what are your most important goals? Goals: be mentally aware, have my medical decisions respected, be spiritually and emotionally at peace, not be a burden, be physically comfortable     5. What are the biggest fears and worries about the future and your health?   Fears/Worries: loss of control, being a financial burden, being a physical burden, burdening others     6. What abilities are so critical to your life that you cannot imagine living without them? Unacceptable Function: not being myself, being unconscious, being unable to interact with others, being in pain or very uncomfortable, being chronically confused or not being myself, being in chronic severe pain, being unable to communicate effectively, being unable to talk, not being able to care for myself, including toileting and feeding     7. What gives you strength as you think about the future with your illness? 8. If you become sicker, how much are you willing to go through for the possibility of gaining more time? Be in the hospital: Yes Have a feeding tube: Yes   Be in the ICU: Yes Live in a nursing home: Yes   Be on a ventilator: Yes Be uncomfortable: Yes   Be on dialysis: Yes Undergo aggressive test and/or procedures: No   9. How much does your proxy and family know about your priorities and wishes? Discussion Discussion: extensive discussion with family about goals and wishes        How does this plan sound to you? I will do everything I can to help you through this. Patient verbalized understanding of the plan     I have spent 16 minutes speaking with my patient on advanced care planning today or during this visit     Advanced directives  Five Wishes: Patient does not have Five Wishes- would like information         BMI Counseling: Body mass index is 25.69 kg/m². The BMI is above normal. Nutrition recommendations include encouraging healthy choices of fruits and vegetables, decreasing fast food intake, consuming healthier snacks, limiting drinks that contain sugar, moderation in carbohydrate intake, increasing intake of lean protein, reducing intake of saturated and trans fat and reducing intake of cholesterol. Exercise recommendations include exercising 3-5 times per week. No pharmacotherapy was ordered. Patient referred to PCP.  Rationale for BMI follow-up plan is due to patient being overweight or obese. Depression Screening and Follow-up Plan: Patient was screened for depression during today's encounter. They screened negative with a PHQ-2 score of 1. Preventive health issues were discussed with patient, and age appropriate screening tests were ordered as noted in patient's After Visit Summary. Personalized health advice and appropriate referrals for health education or preventive services given if needed, as noted in patient's After Visit Summary. History of Present Illness:     Patient presents for a Medicare Wellness Visit    65-year-old male seen today for follow-up of chronic conditions. Recent laboratory studies reviewed today. He continues to have thrombocytopenia which is stable, 117. He also has hyperbilirubinemia which is also chronic and stable. Point-of-care A1c: 6.3%. He has been compliant with his medication regimen. He has no acute complaints or concerns at this time. He follows up with his medical and surgical oncologist regularly. He has been tolerating Keytruda chemotherapy well. Diabetes  He presents for his follow-up diabetic visit. He has type 2 diabetes mellitus. His disease course has been stable. There are no hypoglycemic associated symptoms. Pertinent negatives for hypoglycemia include no dizziness or headaches. Associated symptoms include foot paresthesias. Pertinent negatives for diabetes include no chest pain, no fatigue and no weakness. There are no hypoglycemic complications. Symptoms are stable. Current diabetic treatment includes oral agent (dual therapy). He is compliant with treatment all of the time. An ACE inhibitor/angiotensin II receptor blocker is being taken. Eye exam is current. Hypertension  This is a chronic problem. The current episode started more than 1 year ago. The problem is unchanged. The problem is controlled.  Pertinent negatives include no chest pain, headaches, palpitations or shortness of breath. Past treatments include beta blockers, ACE inhibitors and diuretics. The current treatment provides moderate improvement. There are no compliance problems. Hyperlipidemia  This is a chronic problem. The current episode started more than 1 year ago. The problem is controlled. Recent lipid tests were reviewed and are normal. Pertinent negatives include no chest pain or shortness of breath. Current antihyperlipidemic treatment includes statins. The current treatment provides moderate improvement of lipids. There are no compliance problems. Patient Care Team:  Christian Mason MD as PCP - General (Internal Medicine)  Christian Mason [facility]  Joel Gaines MD (Dermatology)  Yonny Lutz MD (Surgical Oncology)  Norberto Yu MD (Plastic Surgery)     Review of Systems:     Review of Systems   Constitutional:  Negative for activity change, appetite change, chills, diaphoresis, fatigue and fever. HENT:  Negative for congestion, postnasal drip, rhinorrhea, sinus pressure, sinus pain, sneezing and sore throat. Eyes:  Negative for visual disturbance. Respiratory:  Negative for apnea, cough, choking, chest tightness, shortness of breath and wheezing. Cardiovascular:  Negative for chest pain, palpitations and leg swelling. Gastrointestinal:  Negative for abdominal distention, abdominal pain, anal bleeding, blood in stool, constipation, diarrhea, nausea and vomiting. Endocrine: Negative for cold intolerance and heat intolerance. Genitourinary:  Negative for difficulty urinating, dysuria and hematuria. Musculoskeletal: Negative. Skin: Negative. Neurological:  Negative for dizziness, weakness, light-headedness, numbness and headaches. Hematological:  Negative for adenopathy. Psychiatric/Behavioral:  Negative for agitation, sleep disturbance and suicidal ideas. All other systems reviewed and are negative.        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)   • Stage 3 chronic kidney disease, unspecified whether stage 3a or 3b CKD ([address])   • High risk medication use   • Advanced care planning/counseling discussion   • Arthritis of left knee   • Left cervical lymphadenopathy   • Lung nodules      Past Medical and Surgical History:     Past Medical History:   Diagnosis Date   • Diabetes mellitus ([address])    • Hyperlipidemia    • Hypertension      Past Surgical History:   Procedure Laterality Date   • CATARACT EXTRACTION, BILATERAL     • FLAP LOCAL HEAD / NECK Left 10/12/2021    Procedure: RECONSTRUCTION OF LEFT TEMPLE DEFECT AFTER RESCECTION MELANOMA;  Surgeon: Mary Mancera MD;  [facility];  Service: Plastics   • FULL THICKNESS SKIN GRAFT Left 10/12/2021    Procedure: SKIN GRAFT FULL THICKNESS LEFT TEMPLE;  Surgeon: Mary Mancera MD;  [facility];  Service: Plastics   • GALLBLADDER SURGERY     • HAND SURGERY Left    • LYMPH NODE BIOPSY Left 10/12/2021    Procedure: BIOPSY LYMPH NODE SENTINEL, LYMPHOSCINTIGRAPHY, INTRAOPERATIVE LYMPHATIC MAPPING (INJECT AT 1200);   Surgeon: Heike Devlin MD;  [facility];  Service: Surgical Oncology   • NOSE SURGERY     • SKIN CANCER EXCISION  10/2021   • SKIN LESION EXCISION Left 10/12/2021    Procedure: [address];  Surgeon: Heike Devlin MD;  [facility];  Service: Surgical Oncology   • SPLIT THICKNESS SKIN GRAFT Left 10/12/2021    Procedure: SKIN GRAFT SPLIT THICKNESS LEFT TEMPLE;  Surgeon: Carly Hallman MD;  [facility];  Service: Plastics   • US GUIDED LYMPH NODE BIOPSY LEFT  03/21/2022      Family History:     Family History   Problem Relation Age of Onset   • No Known Problems Mother    • No Known Problems Father    • Colon cancer Other 61      Social History:     Social History     Socioeconomic History   • Marital status: /Civil Union     Spouse name: None   • Number of children: None   • Years of education: None   • Highest education level: None   Occupational History   • None   Tobacco Use   • Smoking status: Never   • Smokeless tobacco: Never   Vaping Use   • Vaping Use: Never used   Substance and Sexual Activity   • Alcohol use: Not Currently   • Drug use: Never   • Sexual activity: Not Currently   Other Topics Concern   • None   Social History Narrative   • None     Social Determinants of Health     Financial Resource Strain: Low Risk  (12/1/2023)    Overall Financial Resource Strain (CARDIA)    • Difficulty of Paying Living Expenses: Not hard at all   Food Insecurity: Not on file   Transportation Needs: No Transportation Needs (12/1/2023)    PRAPARE - Transportation    • Lack of Transportation (Medical): No    • Lack of Transportation (Non-Medical):  No   Physical Activity: Not on file   Stress: Not on file   Social Connections: Not on file   Intimate Partner Violence: Not on file   Housing Stability: Not on file      Medications and Allergies:     Current Outpatient Medications   Medication Sig Dispense Refill   • atenolol (TENORMIN) 100 mg tablet Take 1 tablet (100 mg total) by mouth daily 90 tablet 3   • glimepiride (AMARYL) 2 mg tablet Take 1 tablet (2 mg total) by mouth daily with breakfast 90 tablet 3   • lisinopril-hydrochlorothiazide (PRINZIDE,ZESTORETIC) 20-25 MG per tablet Take 1 tablet by mouth daily 90 tablet 3   • lovastatin (MEVACOR) 40 MG tablet Take 1 tablet (40 mg total) by mouth daily 90 tablet 3   • metFORMIN (GLUCOPHAGE) 1000 MG tablet Take 1 tablet (1,000 mg total) by mouth 2 (two) times a day with meals 180 tablet 3     No current facility-administered medications for this visit. No Known Allergies   Immunizations:     Immunization History   Administered Date(s) Administered   • COVID-19 MODERNA VACC 0.5 ML IM 01/19/2021, 02/19/2021, 11/12/2021, 04/05/2022      Health Maintenance: There are no preventive care reminders to display for this patient. Topic Date Due   • Pneumococcal Vaccine: 65+ Years (1 - PCV) Never done   • COVID-19 Vaccine (5 - Booster for Moderna series) 05/31/2022   • Influenza Vaccine (1) Never done      Medicare Screening Tests and Risk Assessments:     Woodrow Camacho is here for his Subsequent Wellness visit. Last Medicare Wellness visit information reviewed, patient interviewed and updates made to the record today. Health Risk Assessment:   Patient rates overall health as good. Patient feels that their physical health rating is same. Patient is satisfied with their life. Eyesight was rated as same. Hearing was rated as same. Patient feels that their emotional and mental health rating is same. Patients states they are never, rarely angry. Patient states they are often unusually tired/fatigued. Pain experienced in the last 7 days has been some. Patient's pain rating has been 4/10. Patient states that he has experienced no weight loss or gain in last 6 months. Depression Screening:   PHQ-2 Score: 1      Fall Risk Screening: In the past year, patient has experienced: no history of falling in past year      Home Safety:  Patient has trouble with stairs inside or outside of their home. Patient has working smoke alarms and has no working carbon monoxide detector. Home safety hazards include: none. Nutrition:   Current diet is Frequent junk food and Regular.      Medications:   Patient is not currently taking any over-the-counter supplements. Patient is able to manage medications. Activities of Daily Living (ADLs)/Instrumental Activities of Daily Living (IADLs):   Walk and transfer into and out of bed and chair?: Yes  Dress and groom yourself?: Yes    Bathe or shower yourself?: Yes    Feed yourself? Yes  Do your laundry/housekeeping?: Yes  Manage your money, pay your bills and track your expenses?: Yes  Make your own meals?: Yes    Do your own shopping?: Yes    Previous Hospitalizations:   Any hospitalizations or ED visits within the last 12 months?: No      Advance Care Planning:   Living will: Yes    Durable POA for healthcare: Yes    Advanced directive: Yes    Advanced directive counseling given: Yes    End of Life Decisions reviewed with patient: Yes    Provider agrees with end of life decisions: Yes      Cognitive Screening:   Provider or family/friend/caregiver concerned regarding cognition?: No    PREVENTIVE SCREENINGS      Cardiovascular Screening:    General: Screening Not Indicated, History Lipid Disorder, Risks and Benefits Discussed and Screening Current      Diabetes Screening:     General: Screening Not Indicated, History Diabetes, Risks and Benefits Discussed and Screening Current      Colorectal Cancer Screening:     General: Screening Not Indicated      Prostate Cancer Screening:    General: Screening Not Indicated      Osteoporosis Screening:    General: Screening Not Indicated      Abdominal Aortic Aneurysm (AAA) Screening:        General: Screening Not Indicated      Lung Cancer Screening:     General: Screening Not Indicated      Hepatitis C Screening:    General: Risks and Benefits Discussed and Screening Current    Screening, Brief Intervention, and Referral to Treatment (SBIRT)    Screening  Typical number of drinks in a day: 0  Typical number of drinks in a week: 0  Interpretation: Low risk drinking behavior. Brief Intervention  Alcohol & drug use screenings were reviewed.  No concerns regarding substance use disorder identified. Annual Depression Screening  Time spent screening and evaluating the patient for depression during today's encounter was 5 minutes. Other Counseling Topics:   Car/seat belt/driving safety, skin self-exam, sunscreen and calcium and vitamin D intake and regular weightbearing exercise. No results found. Physical Exam:     /72 (BP Location: Left arm, Patient Position: Sitting, Cuff Size: Standard)   Pulse 60   Temp 98.2 °F (36.8 °C) (Temporal)   Wt 74.4 kg (164 lb)   SpO2 99%   BMI 25.69 kg/m²     Physical Exam  Vitals and nursing note reviewed. Constitutional:       General: He is not in acute distress. Appearance: Normal appearance. He is normal weight. He is not ill-appearing, toxic-appearing or diaphoretic. HENT:      Head: Normocephalic and atraumatic. Right Ear: Tympanic membrane, ear canal and external ear normal. There is no impacted cerumen. Left Ear: Tympanic membrane, ear canal and external ear normal. There is no impacted cerumen. Nose: Nose normal. No congestion or rhinorrhea. Mouth/Throat:      Mouth: Mucous membranes are moist.      Pharynx: Oropharynx is clear. No oropharyngeal exudate or posterior oropharyngeal erythema. Eyes:      General: No scleral icterus. Right eye: No discharge. Left eye: No discharge. Extraocular Movements: Extraocular movements intact. Conjunctiva/sclera: Conjunctivae normal.      Pupils: Pupils are equal, round, and reactive to light. Neck:      Vascular: No carotid bruit. Cardiovascular:      Rate and Rhythm: Normal rate and regular rhythm. Pulses: Normal pulses. no weak pulses          Dorsalis pedis pulses are 2+ on the right side and 2+ on the left side. Posterior tibial pulses are 2+ on the right side and 2+ on the left side. Heart sounds: Normal heart sounds. No murmur heard. No friction rub. No gallop.    Pulmonary:      Effort: Pulmonary effort is normal. No respiratory distress. Breath sounds: Normal breath sounds. No wheezing or rales. Abdominal:      General: Abdomen is flat. Bowel sounds are normal. There is no distension. Palpations: Abdomen is soft. There is no mass. Tenderness: There is no abdominal tenderness. There is no guarding. Hernia: No hernia is present. Musculoskeletal:         General: No swelling, tenderness, deformity or signs of injury. Normal range of motion. Cervical back: Normal range of motion and neck supple. No rigidity. No muscular tenderness. Right lower leg: No edema. Left lower leg: No edema. Feet:      Right foot:      Skin integrity: No ulcer, skin breakdown, erythema, warmth, callus or dry skin. Left foot:      Skin integrity: No ulcer, skin breakdown, erythema, warmth, callus or dry skin. Lymphadenopathy:      Cervical: No cervical adenopathy. Skin:     General: Skin is warm and dry. Capillary Refill: Capillary refill takes less than 2 seconds. Coloration: Skin is not jaundiced or pale. Findings: No bruising, erythema, lesion or rash. Neurological:      General: No focal deficit present. Mental Status: He is alert and oriented to person, place, and time. Mental status is at baseline. Cranial Nerves: No cranial nerve deficit. Sensory: No sensory deficit. Motor: No weakness. Coordination: Coordination normal.      Gait: Gait normal.      Deep Tendon Reflexes: Reflexes normal.   Psychiatric:         Mood and Affect: Mood normal.         Behavior: Behavior normal.         Thought Content:  Thought content normal.         Judgment: Judgment normal.          Isidra Romero MD

## 2023-12-01 NOTE — ASSESSMENT & PLAN NOTE
Lab Results   Component Value Date    EGFR 47 11/09/2023    EGFR 41 09/12/2023    EGFR 40 07/27/2023    CREATININE 1.36 (H) 11/09/2023    CREATININE 1.53 (H) 09/12/2023    CREATININE 1.56 (H) 07/27/2023   Stable. Continue to trend kidney function. Avoid nephrotoxic agents.

## 2023-12-18 ENCOUNTER — HOSPITAL ENCOUNTER (OUTPATIENT)
Dept: RADIOLOGY | Age: 83
Discharge: HOME | End: 2023-12-18
Payer: COMMERCIAL

## 2023-12-18 DIAGNOSIS — R91.8 LUNG NODULES: ICD-10-CM

## 2023-12-18 DIAGNOSIS — C43.30 MALIGNANT MELANOMA OF SKIN OF FACE (HCC): ICD-10-CM

## 2023-12-18 DIAGNOSIS — R59.0 LEFT CERVICAL LYMPHADENOPATHY: ICD-10-CM

## 2023-12-18 LAB — GLUCOSE SERPL-MCNC: 87 MG/DL (ref 65–140)

## 2023-12-18 PROCEDURE — 78816 PET IMAGE W/CT FULL BODY: CPT

## 2023-12-18 PROCEDURE — 82948 REAGENT STRIP/BLOOD GLUCOSE: CPT

## 2023-12-18 PROCEDURE — A9552 F18 FDG: HCPCS

## 2023-12-18 PROCEDURE — G1004 CDSM NDSC: HCPCS

## 2023-12-18 NOTE — LETTER
[facility]  [address] [place] [address]      December 20, 2023    MRN: [id number]     Phone: [phone]     Dear  Arleen,    Audi recently had a(n) Nuclear Medicine performed on 12/18/2023 at  [facility] that was requested by Sara Purdy MD. The study was reviewed by a radiologist, which is a physician who specializes in medical imaging. The radiologist issued a report describing his or her findings. In that report there was a finding that the radiologist felt warranted further discussion with your health care provider and that discussion would be beneficial to you.      The results were sent to Sara Purdy MD on 12/18/2023 12:49 PM. We recommend that you contact Sara Purdy MD at [phone] or set up an appointment to discuss the results of the imaging test. If you have already heard from Sara Purdy MD regarding the results of your study, you can disregard this letter.     This letter is not meant to alarm you, but intended to encourage you to follow-up on your results with the provider that sent you for the imaging study. In addition, we have enclosed answers to frequently asked questions by other patients who have also received a letter to review results with their health care provider (see page two).      Thank you for choosing [facility] for your medical imaging needs.                                                                                                                                                        FREQUENTLY ASKED QUESTIONS    Why am I receiving this letter?  [place] State Law requires us to notify patients who have findings on imaging exams that may require more testing or follow-up with a health professional within the next 3 months.        How serious is the finding on the imaging test?  This letter is sent to all patients who may need follow-up or more testing within  the next 3 months.  Receiving this letter does not necessarily mean you have a life-threatening imaging finding or disease.  Recommendations in the radiologist’s imaging report are general in nature and it is up to your healthcare provider to say whether those recommendations make sense for your situation.  You are strongly encouraged to talk to your health care provider about the results and ask whether additional steps need to be taken.    Where can I get a copy of the final report for my recent radiology exam?  To get a full copy of the report you can access your records online at [email or web address] or please contact [facility] Medical Records Department at [phone] Monday through Friday between 8 am and 6 pm.         What do I need to do now?           Please contact your health care provider who requested the imaging study to discuss what further actions (if any) are needed.  You may have already heard from (your ordering provider) in regard to this test in which case you can disregard this letter.        NOTICE IN ACCORDANCE WITH THE [place] STATE “PATIENT TEST RESULT INFORMATION ACT OF 2018”    You are receiving this notice as a result of a determination by your diagnostic imaging service that further discussions of your test results are warranted and would be beneficial to you.    The complete results of your test or tests have been or will be sent to the health care practitioner that ordered the test or tests. It is recommended that you contact your health care practitioner to discuss your results as soon as possible.

## 2023-12-21 ENCOUNTER — APPOINTMENT (OUTPATIENT)
Dept: LAB | Facility: IMAGING CENTER | Age: 83
End: 2023-12-21
Payer: COMMERCIAL

## 2023-12-21 RX ORDER — SODIUM CHLORIDE 9 MG/ML
20 INJECTION, SOLUTION INTRAVENOUS ONCE
Status: CANCELLED | OUTPATIENT
Start: 2023-12-28

## 2023-12-28 ENCOUNTER — OFFICE VISIT (OUTPATIENT)
Dept: HEMATOLOGY ONCOLOGY | Facility: CLINIC | Age: 83
End: 2023-12-28
Payer: COMMERCIAL

## 2023-12-28 ENCOUNTER — HOSPITAL ENCOUNTER (OUTPATIENT)
Dept: INFUSION CENTER | Facility: CLINIC | Age: 83
Discharge: HOME | End: 2023-12-28
Payer: COMMERCIAL

## 2023-12-28 VITALS
SYSTOLIC BLOOD PRESSURE: 138 MMHG | OXYGEN SATURATION: 96 % | HEIGHT: 67 IN | TEMPERATURE: 97.7 F | HEART RATE: 60 BPM | DIASTOLIC BLOOD PRESSURE: 78 MMHG | WEIGHT: 163.5 LBS | BODY MASS INDEX: 25.66 KG/M2 | RESPIRATION RATE: 15 BRPM

## 2023-12-28 VITALS
DIASTOLIC BLOOD PRESSURE: 83 MMHG | HEART RATE: 54 BPM | RESPIRATION RATE: 18 BRPM | TEMPERATURE: 97.5 F | OXYGEN SATURATION: 99 % | SYSTOLIC BLOOD PRESSURE: 164 MMHG

## 2023-12-28 DIAGNOSIS — C77.3 SECONDARY AND UNSPECIFIED MALIGNANT NEOPLASM OF AXILLA AND UPPER LIMB LYMPH NODES (HCC): ICD-10-CM

## 2023-12-28 DIAGNOSIS — C43.39 MALIGNANT MELANOMA OF FOREHEAD (HCC): Primary | ICD-10-CM

## 2023-12-28 DIAGNOSIS — C43.30 MALIGNANT MELANOMA OF SKIN OF FACE (HCC): ICD-10-CM

## 2023-12-28 DIAGNOSIS — Z79.899 HIGH RISK MEDICATION USE: ICD-10-CM

## 2023-12-28 DIAGNOSIS — C43.30 MALIGNANT MELANOMA OF SKIN OF FACE (HCC): Primary | ICD-10-CM

## 2023-12-28 DIAGNOSIS — C43.39 MALIGNANT MELANOMA OF FOREHEAD (HCC): ICD-10-CM

## 2023-12-28 PROCEDURE — 99214 OFFICE O/P EST MOD 30 MIN: CPT | Performed by: INTERNAL MEDICINE

## 2023-12-28 PROCEDURE — 96413 CHEMO IV INFUSION 1 HR: CPT

## 2023-12-28 RX ORDER — SODIUM CHLORIDE 9 MG/ML
20 INJECTION, SOLUTION INTRAVENOUS ONCE
Status: COMPLETED | OUTPATIENT
Start: 2023-12-28 | End: 2023-12-28

## 2023-12-28 RX ADMIN — SODIUM CHLORIDE 400 MG: 9 INJECTION, SOLUTION INTRAVENOUS at 12:54

## 2023-12-28 RX ADMIN — SODIUM CHLORIDE 20 ML/HR: 0.9 INJECTION, SOLUTION INTRAVENOUS at 12:50

## 2023-12-28 NOTE — LETTER
January 31, 2024     Joana Mg MD  [address]  [facility] [address]    Patient: Salvatore Bacon   YOB: 1940   Date of Visit: 12/28/2023       Dear Dr. Mg:    Thank you for referring Salvatore Bacon to me for evaluation. Below are my notes for this consultation.    If you have questions, please do not hesitate to call me. I look forward to following your patient along with you.         Sincerely,        Sara Purdy MD        CC: MD Vance Farias Jr., MD Jonathan S Lam, MD Melissa A Wilson, MD  1/31/2024  5:12 PM  Sign when Signing Visit  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)    High risk medication use        Oncology History   Malignant melanoma of skin of face (HCC)   9/8/2021 -  Cancer Staged    Staging form: Melanoma of the Skin, AJCC 8th Edition  - Clinical stage from 9/8/2021: Stage III (cT2a, cN1a, cM0) - Signed by Sara Purdy MD on 11/7/2021 9/8/2021 -  Cancer Staged    Staging form: Melanoma of the Skin, AJCC 8th Edition  - Pathologic stage from 9/8/2021: Stage IIIA (pT2a, pN1a, cM0) - Signed by Sara Purdy MD on 11/7/2021 9/22/2021 Initial Diagnosis    Malignant melanoma of skin of face (HCC)     4/4/2022 -  Chemotherapy    pembrolizumab (KEYTRUDA) IVPB, 400 mg, Intravenous, Once, 15 of 17 cycles  Administration: 400 mg (4/4/2022), 400 mg (5/16/2022), 400 mg (6/27/2022), 400 mg (8/8/2022), 400 mg (9/19/2022), 400 mg (10/31/2022), 400 mg (12/12/2022), 400 mg (1/23/2023), 400 mg (3/6/2023), 400 mg (5/5/2023), 400 mg  (6/15/2023), 400 mg (8/3/2023), 400 mg (9/21/2023), 400 mg (11/16/2023), 400 mg (12/28/2023)     Malignant melanoma of forehead (HCC)   11/29/2021 Initial Diagnosis    Malignant melanoma of forehead (HCC)     4/4/2022 -  Chemotherapy    pembrolizumab (KEYTRUDA) IVPB, 400 mg, Intravenous, Once, 15 of 17 cycles  Administration: 400 mg (4/4/2022), 400 mg (5/16/2022), 400 mg (6/27/2022), 400 mg (8/8/2022), 400 mg (9/19/2022), 400 mg (10/31/2022), 400 mg (12/12/2022), 400 mg (1/23/2023), 400 mg (3/6/2023), 400 mg (5/5/2023), 400 mg (6/15/2023), 400 mg (8/3/2023), 400 mg (9/21/2023), 400 mg (11/16/2023), 400 mg (12/28/2023)        Cancer Staging   Malignant melanoma of skin of face (HCC)  Staging form: Melanoma of the Skin, AJCC 8th Edition  - Clinical stage from 9/8/2021: Stage III (cT2a, cN1a, cM0) - Signed by Sara Purdy MD on 11/7/2021  - Pathologic stage from 9/8/2021: Stage IIIA (pT2a, pN1a, cM0) - Signed by Sara Purdy MD on 11/7/2021     Treatment Details   Treatment goal Curative   Plan Name OP Pembrolizumab Q 42 Days   Status Active   Start Date 4/4/2022   End Date 3/28/2024 (Planned)   Provider Sara Purdy MD   Chemotherapy pembrolizumab (KEYTRUDA) IVPB, 400 mg, Intravenous, Once, 15 of 17 cycles  Administration: 400 mg (4/4/2022), 400 mg (5/16/2022), 400 mg (6/27/2022), 400 mg (8/8/2022), 400 mg (9/19/2022), 400 mg (10/31/2022), 400 mg (12/12/2022), 400 mg (1/23/2023), 400 mg (3/6/2023), 400 mg (5/5/2023), 400 mg (6/15/2023), 400 mg (8/3/2023), 400 mg (9/21/2023), 400 mg (11/16/2023), 400 mg (12/28/2023)          HISTORY OF PRESENT ILLNESS: Salvatore Bacon is a 83 y.o. male  who has stage IIIa/questionable distant disease on treatment pembrolizumab here for continued monitoring, follow-up and surveillance.    He is doing well and tolerating treatment.  No issues concerns or complaints.  Energy good.  Eating okay.  Denies any new, changing, concerning skin lesions.  Denies  lymphadenopathy.    Denies immune mediated side effects per denies headaches, double vision, rash, itching, chest pain, shortness of breath, and diarrhea.  Denies muscle weakness.  Some fatigue.  Had a biopsy on his left posterior scalp positive for SCC.    We discussed his recent imaging that was done on 12/18/2023 with a PET scan.  He has some asymmetric focal activity left posterior scalp which is a spot on the back of his head that was recently biopsied.  No evidence of distant disease or metastatic disease.      REVIEW OF SYSTEMS:  Review of Systems   Constitutional:  Positive for fatigue. Negative for appetite change, fever and unexpected weight change.   HENT:   Negative for lump/mass, trouble swallowing and voice change.    Eyes:  Negative for icterus.   Respiratory:  Negative for cough, shortness of breath and wheezing.    Cardiovascular:  Negative for leg swelling.   Gastrointestinal:  Negative for abdominal pain, constipation, diarrhea, nausea and vomiting.   Genitourinary:  Negative for difficulty urinating and hematuria.    Musculoskeletal:  Negative for arthralgias, gait problem and myalgias.   Skin:  Negative for itching and rash.        No new, changing, or concerning lesions.   Neurological:  Negative for extremity weakness, gait problem, headaches, light-headedness and numbness.   Hematological:  Negative for adenopathy.        MEDICATIONS:    Current Outpatient Medications:   •  atenolol (TENORMIN) 100 mg tablet, Take 1 tablet (100 mg total) by mouth daily, Disp: 90 tablet, Rfl: 3  •  glimepiride (AMARYL) 2 mg tablet, Take 1 tablet (2 mg total) by mouth daily with breakfast, Disp: 90 tablet, Rfl: 3  •  lisinopril-hydrochlorothiazide (PRINZIDE,ZESTORETIC) 20-25 MG per tablet, Take 1 tablet by mouth daily, Disp: 90 tablet, Rfl: 3  •  lovastatin (MEVACOR) 40 MG tablet, Take 1 tablet (40 mg total) by mouth daily, Disp: 90 tablet, Rfl: 3  •  metFORMIN (GLUCOPHAGE) 1000 MG tablet, Take 1 tablet (1,000  mg total) by mouth 2 (two) times a day with meals, Disp: 180 tablet, Rfl: 3     ALLERGIES:  No Known Allergies     ACTIVE PROBLEMS:  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)   • Stage 3 chronic kidney disease, unspecified whether stage 3a or 3b CKD (HCC)   • High risk medication use   • Advanced care planning/counseling discussion   • Arthritis of left knee   • Left cervical lymphadenopathy   • Lung nodules          PAST MEDICAL HISTORY:   Past Medical History:   Diagnosis Date   • Diabetes mellitus (HCC)    • Hyperlipidemia    • Hypertension         PAST SURGICAL HISTORY:  Past Surgical History:   Procedure Laterality Date   • CATARACT EXTRACTION, BILATERAL     • FLAP LOCAL HEAD / NECK Left 10/12/2021    Procedure: RECONSTRUCTION OF LEFT TEMPLE DEFECT AFTER RESCECTION MELANOMA;  Surgeon: Kingston Oviedo MD;  [facility];  Service: Plastics   • FULL THICKNESS SKIN GRAFT Left 10/12/2021    Procedure: SKIN GRAFT FULL THICKNESS LEFT TEMPLE;  Surgeon: Kingston Oviedo MD;  [facility];  Service: Plastics   • GALLBLADDER SURGERY     • HAND SURGERY Left    • LYMPH NODE BIOPSY Left 10/12/2021    Procedure: BIOPSY LYMPH NODE SENTINEL, LYMPHOSCINTIGRAPHY, INTRAOPERATIVE LYMPHATIC MAPPING (INJECT AT 1200);  Surgeon: Joana Mg MD;  [facility];  Service: Surgical Oncology   • NOSE SURGERY     • SKIN CANCER EXCISION  10/2021   • SKIN LESION EXCISION Left 10/12/2021     "Procedure: FACE MELANOMA SCAR WIDE EXCISION  FACE;  Surgeon: Joana Mg MD;  [facility];  Service: Surgical Oncology   • SPLIT THICKNESS SKIN GRAFT Left 10/12/2021    Procedure: SKIN GRAFT SPLIT THICKNESS LEFT TEMPLE;  Surgeon: Kingston Oviedo MD;  [facility];  Service: Plastics   • US GUIDED LYMPH NODE BIOPSY LEFT  03/21/2022        SOCIAL HISTORY:  Social History     Socioeconomic History   • Marital status: /Civil Union     Spouse name: None   • Number of children: None   • Years of education: None   • Highest education level: None   Occupational History   • None   Tobacco Use   • Smoking status: Never   • Smokeless tobacco: Never   Vaping Use   • Vaping status: Never Used   Substance and Sexual Activity   • Alcohol use: Not Currently   • Drug use: Never   • Sexual activity: Not Currently   Other Topics Concern   • None   Social History Narrative   • None     Social Determinants of Health     Financial Resource Strain: Low Risk  (12/1/2023)    Overall Financial Resource Strain (CARDIA)    • Difficulty of Paying Living Expenses: Not hard at all   Food Insecurity: Not on file   Transportation Needs: No Transportation Needs (12/1/2023)    PRAPARE - Transportation    • Lack of Transportation (Medical): No    • Lack of Transportation (Non-Medical): No   Physical Activity: Not on file   Stress: Not on file   Social Connections: Not on file   Intimate Partner Violence: Not on file   Housing Stability: Not on file        FAMILY HISTORY:  Family History   Problem Relation Age of Onset   • No Known Problems Mother    • No Known Problems Father    • Colon cancer Other 60           Objective    PHYSICAL EXAMINATION:   Blood pressure 138/78, pulse 60, temperature 97.7 °F (36.5 °C), temperature source Temporal, resp. rate 15, height 5' 7\" (1.702 m), weight 74.2 kg (163 lb 8 oz), SpO2 96%.     Pain Score: 0-No pain     ECOG Performance Status      Flowsheet Row Most Recent Value   ECOG " Performance Status 1 - Restricted in physically strenuous activity but ambulatory and able to carry out work of a light or sedentary nature, e.g., light house work, office work               Physical Exam  Constitutional:       General: He is not in acute distress.     Appearance: Normal appearance. He is not ill-appearing or toxic-appearing.   HENT:      Head: Normocephalic and atraumatic.      Right Ear: External ear normal.      Left Ear: External ear normal.      Nose: Nose normal.      Mouth/Throat:      Mouth: Mucous membranes are moist.      Pharynx: Oropharynx is clear.   Eyes:      General: No scleral icterus.        Right eye: No discharge.         Left eye: No discharge.      Conjunctiva/sclera: Conjunctivae normal.   Cardiovascular:      Rate and Rhythm: Normal rate and regular rhythm.      Pulses: Normal pulses.      Heart sounds: No murmur heard.     No friction rub. No gallop.   Pulmonary:      Effort: Pulmonary effort is normal. No respiratory distress.      Breath sounds: Normal breath sounds. No wheezing or rales.   Abdominal:      General: Bowel sounds are normal. There is no distension.      Palpations: There is no mass.      Tenderness: There is no abdominal tenderness. There is no rebound.   Musculoskeletal:         General: No swelling or tenderness.      Cervical back: Normal range of motion. No rigidity.      Right lower leg: No edema.      Left lower leg: No edema.   Lymphadenopathy:      Head:      Right side of head: No submandibular, preauricular or posterior auricular adenopathy.      Left side of head: No submandibular, preauricular or posterior auricular adenopathy.      Cervical: No cervical adenopathy.      Right cervical: No superficial or posterior cervical adenopathy.     Left cervical: No superficial or posterior cervical adenopathy.      Upper Body:      Right upper body: No supraclavicular or axillary adenopathy.      Left upper body: No supraclavicular or axillary adenopathy.    Skin:     General: Skin is warm.      Coloration: Skin is not jaundiced.      Findings: No lesion or rash.      Comments: Well healed surgical scar.  No evidence of recurrence at primary site.   Neurological:      General: No focal deficit present.      Mental Status: He is alert and oriented to person, place, and time.      Cranial Nerves: No cranial nerve deficit.      Motor: No weakness.      Gait: Gait normal.   Psychiatric:         Mood and Affect: Mood normal.         Behavior: Behavior normal.         Thought Content: Thought content normal.         Judgment: Judgment normal.         I reviewed lab data in the chart.    WBC   Date Value Ref Range Status   12/21/2023 10.10 4.31 - 10.16 Thousand/uL Final   11/09/2023 7.91 4.31 - 10.16 Thousand/uL Final   09/12/2023 10.80 (H) 4.31 - 10.16 Thousand/uL Final     Hemoglobin   Date Value Ref Range Status   12/21/2023 15.0 12.0 - 17.0 g/dL Final   11/09/2023 13.4 12.0 - 17.0 g/dL Final   09/12/2023 13.8 12.0 - 17.0 g/dL Final     Platelets   Date Value Ref Range Status   12/21/2023 144 (L) 149 - 390 Thousands/uL Final   11/09/2023 117 (L) 149 - 390 Thousands/uL Final   09/12/2023 121 (L) 149 - 390 Thousands/uL Final     MCV   Date Value Ref Range Status   12/21/2023 92 82 - 98 fL Final   11/09/2023 92 82 - 98 fL Final   09/12/2023 91 82 - 98 fL Final      Potassium   Date Value Ref Range Status   12/21/2023 4.3 3.5 - 5.3 mmol/L Final   11/09/2023 4.1 3.5 - 5.3 mmol/L Final   09/12/2023 4.2 3.5 - 5.3 mmol/L Final     Chloride   Date Value Ref Range Status   12/21/2023 101 96 - 108 mmol/L Final   11/09/2023 104 96 - 108 mmol/L Final   09/12/2023 103 96 - 108 mmol/L Final     CO2   Date Value Ref Range Status   12/21/2023 28 21 - 32 mmol/L Final   11/09/2023 27 21 - 32 mmol/L Final   09/12/2023 30 21 - 32 mmol/L Final     BUN   Date Value Ref Range Status   12/21/2023 35 (H) 5 - 25 mg/dL Final   11/09/2023 29 (H) 5 - 25 mg/dL Final   09/12/2023 32 (H) 5 - 25 mg/dL  Final     Creatinine   Date Value Ref Range Status   12/21/2023 1.65 (H) 0.60 - 1.30 mg/dL Final     Comment:     Standardized to IDMS reference method   11/09/2023 1.36 (H) 0.60 - 1.30 mg/dL Final     Comment:     Standardized to IDMS reference method   09/12/2023 1.53 (H) 0.60 - 1.30 mg/dL Final     Comment:     Standardized to IDMS reference method     Glucose   Date Value Ref Range Status   11/09/2023 203 (H) 65 - 140 mg/dL Final     Comment:     If the patient is fasting, the ADA then defines impaired fasting glucose as > 100 mg/dL and diabetes as > or equal to 123 mg/dL.   09/12/2023 96 65 - 140 mg/dL Final     Comment:     If the patient is fasting, the ADA then defines impaired fasting glucose as > 100 mg/dL and diabetes as > or equal to 123 mg/dL.   04/28/2023 187 (H) 65 - 140 mg/dL Final     Comment:     If the patient is fasting, the ADA then defines impaired fasting glucose as > 100 mg/dL and diabetes as > or equal to 123 mg/dL.  Specimen collection should occur prior to Sulfasalazine administration due to the potential for falsely depressed results. Specimen collection should occur prior to Sulfapyridine administration due to the potential for falsely elevated results.     Calcium   Date Value Ref Range Status   12/21/2023 9.3 8.4 - 10.2 mg/dL Final   11/09/2023 8.9 8.4 - 10.2 mg/dL Final   09/12/2023 9.9 8.4 - 10.2 mg/dL Final     Albumin   Date Value Ref Range Status   12/21/2023 4.5 3.5 - 5.0 g/dL Final   11/09/2023 4.2 3.5 - 5.0 g/dL Final   09/12/2023 4.4 3.5 - 5.0 g/dL Final     Total Bilirubin   Date Value Ref Range Status   12/21/2023 1.42 (H) 0.20 - 1.00 mg/dL Final     Comment:     Use of this assay is not recommended for patients undergoing treatment with eltrombopag due to the potential for falsely elevated results.  N-acetyl-p-benzoquinone imine (metabolite of Acetaminophen) will generate erroneously low results in samples for patients that have taken an overdose of Acetaminophen.  "  11/09/2023 1.48 (H) 0.20 - 1.00 mg/dL Final     Comment:     Use of this assay is not recommended for patients undergoing treatment with eltrombopag due to the potential for falsely elevated results.  N-acetyl-p-benzoquinone imine (metabolite of Acetaminophen) will generate erroneously low results in samples for patients that have taken an overdose of Acetaminophen.   09/12/2023 1.21 (H) 0.20 - 1.00 mg/dL Final     Comment:     Use of this assay is not recommended for patients undergoing treatment with eltrombopag due to the potential for falsely elevated results.  N-acetyl-p-benzoquinone imine (metabolite of Acetaminophen) will generate erroneously low results in samples for patients that have taken an overdose of Acetaminophen.     Alkaline Phosphatase   Date Value Ref Range Status   12/21/2023 78 34 - 104 U/L Final   11/09/2023 62 34 - 104 U/L Final   09/12/2023 92 34 - 104 U/L Final     AST   Date Value Ref Range Status   12/21/2023 19 13 - 39 U/L Final   11/09/2023 15 13 - 39 U/L Final   09/12/2023 16 13 - 39 U/L Final     ALT   Date Value Ref Range Status   12/21/2023 15 7 - 52 U/L Final     Comment:     Specimen collection should occur prior to Sulfasalazine administration due to the potential for falsely depressed results.    11/09/2023 13 7 - 52 U/L Final     Comment:     Specimen collection should occur prior to Sulfasalazine administration due to the potential for falsely depressed results.    09/12/2023 16 7 - 52 U/L Final     Comment:     Specimen collection should occur prior to Sulfasalazine administration due to the potential for falsely depressed results.       LD   Date Value Ref Range Status   12/21/2023 178 140 - 271 U/L Final   11/09/2023 163 140 - 271 U/L Final   09/12/2023 160 140 - 271 U/L Final     No results found for: \"TSH\"  No results found for: \"G2IMARH\"   Free T4   Date Value Ref Range Status   12/21/2023 0.87 0.61 - 1.12 ng/dL Final     Comment:     Specimens with biotin " concentrations > 10 ng/mL can lead to significant (> 10%) positive bias in result.   11/09/2023 0.76 0.61 - 1.12 ng/dL Final     Comment:     Specimens with biotin concentrations > 10 ng/mL can lead to significant (> 10%) positive bias in result.   09/12/2023 0.87 0.61 - 1.12 ng/dL Final     Comment:     Specimens with biotin concentrations > 10 ng/mL can lead to significant (> 10%) positive bias in result.         RECENT IMAGING:  No results found.          Assessment/Plan  Mr. Bacon is a 83 yr male with stage IIIa/questionable local regional disease on treatment with pembrolizumab here for continued monitoring, follow-up and surveillance while on treatment.    1. Malignant melanoma of skin of face (HCC)  2. Malignant melanoma of forehead (HCC)  3. 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  Discussed that imaging demonstrates no evidence of melanoma recurrence or metastasis.  He initially had some concerning cervical lymphadenopathy that is not seen on imaging.  Labs reviewed and okay.  He will continue treatment with pembrolizumab we discussed that we will continue treatment until 2 years out.  He will complete treatment in April 2024.    He has standing orders for blood work prior to his treatment.  He knows to monitor for immune mediated side effects.  He knows to call with any issues or concerns prior to his next visit.    4. High risk medication use  He is on treatment with immunotherapy and we will continue to monitor for side effects and lab abnormalities.  We will monitor labs with each treatment to ensure safety of continuing on treatment.  He knows to watch for signs and symptoms for immune mediated side effects.  Denies any immune mediated side effects at this time.          Return in about 6 weeks (around 2/8/2024) for Office Visit, labs.     Sara Purdy MD, PhD

## 2024-01-18 ENCOUNTER — TELEPHONE (OUTPATIENT)
Dept: HEMATOLOGY ONCOLOGY | Facility: CLINIC | Age: 84
End: 2024-01-18

## 2024-01-18 NOTE — TELEPHONE ENCOUNTER
I am reaching out regarding your appointment with Dr. Purdy on 2/8/24.    There has been a change to the providers schedule, and we will need to reschedule your appointment.    I left a voicemail explaining to patient that this appointment will need to be rescheduled due to a change in the providers schedule. Patient was advised to call [facility] [phone] to reschedule.

## 2024-01-31 NOTE — PROGRESS NOTES
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)    High risk medication use        Oncology History   Malignant melanoma of skin of face (HCC)   9/8/2021 -  Cancer Staged    Staging form: Melanoma of the Skin, AJCC 8th Edition  - Clinical stage from 9/8/2021: Stage III (cT2a, cN1a, cM0) - Signed by Sara Purdy MD on 11/7/2021 9/8/2021 -  Cancer Staged    Staging form: Melanoma of the Skin, AJCC 8th Edition  - Pathologic stage from 9/8/2021: Stage IIIA (pT2a, pN1a, cM0) - Signed by Sara Purdy MD on 11/7/2021 9/22/2021 Initial Diagnosis    Malignant melanoma of skin of face (HCC)     4/4/2022 -  Chemotherapy    pembrolizumab (KEYTRUDA) IVPB, 400 mg, Intravenous, Once, 15 of 17 cycles  Administration: 400 mg (4/4/2022), 400 mg (5/16/2022), 400 mg (6/27/2022), 400 mg (8/8/2022), 400 mg (9/19/2022), 400 mg (10/31/2022), 400 mg (12/12/2022), 400 mg (1/23/2023), 400 mg (3/6/2023), 400 mg (5/5/2023), 400 mg (6/15/2023), 400 mg (8/3/2023), 400 mg (9/21/2023), 400 mg (11/16/2023), 400 mg (12/28/2023)     Malignant melanoma of forehead (HCC)   11/29/2021 Initial Diagnosis    Malignant melanoma of forehead (HCC)     4/4/2022 -  Chemotherapy    pembrolizumab (KEYTRUDA) IVPB, 400 mg, Intravenous, Once, 15 of 17 cycles  Administration: 400 mg (4/4/2022), 400 mg (5/16/2022), 400 mg (6/27/2022), 400 mg (8/8/2022), 400 mg (9/19/2022), 400 mg (10/31/2022), 400 mg (12/12/2022), 400 mg (1/23/2023), 400 mg (3/6/2023), 400 mg (5/5/2023), 400 mg (6/15/2023), 400 mg (8/3/2023), 400 mg (9/21/2023), 400 mg (11/16/2023),  400 mg (12/28/2023)        Cancer Staging   Malignant melanoma of skin of face (HCC)  Staging form: Melanoma of the Skin, AJCC 8th Edition  - Clinical stage from 9/8/2021: Stage III (cT2a, cN1a, cM0) - Signed by Sara Purdy MD on 11/7/2021  - Pathologic stage from 9/8/2021: Stage IIIA (pT2a, pN1a, cM0) - Signed by Sara Purdy MD on 11/7/2021     Treatment Details   Treatment goal Curative   Plan Name OP Pembrolizumab Q 42 Days   Status Active   Start Date 4/4/2022   End Date 3/28/2024 (Planned)   Provider Sara Purdy MD   Chemotherapy pembrolizumab (KEYTRUDA) IVPB, 400 mg, Intravenous, Once, 15 of 17 cycles  Administration: 400 mg (4/4/2022), 400 mg (5/16/2022), 400 mg (6/27/2022), 400 mg (8/8/2022), 400 mg (9/19/2022), 400 mg (10/31/2022), 400 mg (12/12/2022), 400 mg (1/23/2023), 400 mg (3/6/2023), 400 mg (5/5/2023), 400 mg (6/15/2023), 400 mg (8/3/2023), 400 mg (9/21/2023), 400 mg (11/16/2023), 400 mg (12/28/2023)          HISTORY OF PRESENT ILLNESS: Salvatore Bacon is a 83 y.o. male  who has stage IIIa/questionable distant disease on treatment pembrolizumab here for continued monitoring, follow-up and surveillance.    He is doing well and tolerating treatment.  No issues concerns or complaints.  Energy good.  Eating okay.  Denies any new, changing, concerning skin lesions.  Denies lymphadenopathy.    Denies immune mediated side effects per denies headaches, double vision, rash, itching, chest pain, shortness of breath, and diarrhea.  Denies muscle weakness.  Some fatigue.  Had a biopsy on his left posterior scalp positive for SCC.    We discussed his recent imaging that was done on 12/18/2023 with a PET scan.  He has some asymmetric focal activity left posterior scalp which is a spot on the back of his head that was recently biopsied.  No evidence of distant disease or metastatic disease.      REVIEW OF SYSTEMS:  Review of Systems   Constitutional:  Positive for fatigue. Negative for appetite  change, fever and unexpected weight change.   HENT:   Negative for lump/mass, trouble swallowing and voice change.    Eyes:  Negative for icterus.   Respiratory:  Negative for cough, shortness of breath and wheezing.    Cardiovascular:  Negative for leg swelling.   Gastrointestinal:  Negative for abdominal pain, constipation, diarrhea, nausea and vomiting.   Genitourinary:  Negative for difficulty urinating and hematuria.    Musculoskeletal:  Negative for arthralgias, gait problem and myalgias.   Skin:  Negative for itching and rash.        No new, changing, or concerning lesions.   Neurological:  Negative for extremity weakness, gait problem, headaches, light-headedness and numbness.   Hematological:  Negative for adenopathy.        MEDICATIONS:    Current Outpatient Medications:     atenolol (TENORMIN) 100 mg tablet, Take 1 tablet (100 mg total) by mouth daily, Disp: 90 tablet, Rfl: 3    glimepiride (AMARYL) 2 mg tablet, Take 1 tablet (2 mg total) by mouth daily with breakfast, Disp: 90 tablet, Rfl: 3    lisinopril-hydrochlorothiazide (PRINZIDE,ZESTORETIC) 20-25 MG per tablet, Take 1 tablet by mouth daily, Disp: 90 tablet, Rfl: 3    lovastatin (MEVACOR) 40 MG tablet, Take 1 tablet (40 mg total) by mouth daily, Disp: 90 tablet, Rfl: 3    metFORMIN (GLUCOPHAGE) 1000 MG tablet, Take 1 tablet (1,000 mg total) by mouth 2 (two) times a day with meals, Disp: 180 tablet, Rfl: 3     ALLERGIES:  No Known Allergies     ACTIVE PROBLEMS:  Patient Active Problem List   Diagnosis    Essential hypertension    Hiatal hernia with GERD    Status post laparoscopic cholecystectomy    Status post umbilical hernia repair, follow-up exam    Type 2 diabetes mellitus without complication, without long-term current use of insulin (HCC)    Mixed hyperlipidemia    Parkinson's disease    Microalbuminuria    Malignant melanoma of skin of face (HCC)    Thrombocytopenia (HCC)    Malignant melanoma of forehead (HCC)    Secondary and unspecified  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)    Stage 3 chronic kidney disease, unspecified whether stage 3a or 3b CKD (HCC)    High risk medication use    Advanced care planning/counseling discussion    Arthritis of left knee    Left cervical lymphadenopathy    Lung nodules          PAST MEDICAL HISTORY:   Past Medical History:   Diagnosis Date    Diabetes mellitus (HCC)     Hyperlipidemia     Hypertension         PAST SURGICAL HISTORY:  Past Surgical History:   Procedure Laterality Date    CATARACT EXTRACTION, BILATERAL      FLAP LOCAL HEAD / NECK Left 10/12/2021    Procedure: RECONSTRUCTION OF LEFT TEMPLE DEFECT AFTER RESCECTION MELANOMA;  Surgeon: Kingston Oviedo MD;  Location: [facility];  Service: Plastics    FULL THICKNESS SKIN GRAFT Left 10/12/2021    Procedure: SKIN GRAFT FULL THICKNESS LEFT TEMPLE;  Surgeon: Kingston Oviedo MD;  [facility];  Service: Plastics    GALLBLADDER SURGERY      HAND SURGERY Left     LYMPH NODE BIOPSY Left 10/12/2021    Procedure: BIOPSY LYMPH NODE SENTINEL, LYMPHOSCINTIGRAPHY, INTRAOPERATIVE LYMPHATIC MAPPING (INJECT AT 1200);  Surgeon: Joana Mg MD;  Location[facility];  Service: Surgical Oncology    NOSE SURGERY      SKIN CANCER EXCISION  10/2021    SKIN LESION EXCISION Left 10/12/2021    Procedure: FACE MELANOMA SCAR WIDE EXCISION  FACE;  Surgeon: Joana Mg MD;  [facility];  Service: Surgical Oncology    SPLIT THICKNESS SKIN GRAFT Left 10/12/2021    Procedure: SKIN GRAFT SPLIT THICKNESS LEFT TEMPLE;  Surgeon: Kingston Oviedo MD;  [facility];  Service: Plastics    US GUIDED LYMPH NODE BIOPSY LEFT  03/21/2022        SOCIAL HISTORY:  Social History     Socioeconomic History    Marital status: /Civil Union     Spouse name: None    Number of children: None    Years of education: None    Highest education level: None   Occupational History    None   Tobacco Use    Smoking status: Never    Smokeless tobacco: Never  "  Vaping Use    Vaping status: Never Used   Substance and Sexual Activity    Alcohol use: Not Currently    Drug use: Never    Sexual activity: Not Currently   Other Topics Concern    None   Social History Narrative    None     Social Determinants of Health     Financial Resource Strain: Low Risk  (12/1/2023)    Overall Financial Resource Strain (CARDIA)     Difficulty of Paying Living Expenses: Not hard at all   Food Insecurity: Not on file   Transportation Needs: No Transportation Needs (12/1/2023)    PRAPARE - Transportation     Lack of Transportation (Medical): No     Lack of Transportation (Non-Medical): No   Physical Activity: Not on file   Stress: Not on file   Social Connections: Not on file   Intimate Partner Violence: Not on file   Housing Stability: Not on file        FAMILY HISTORY:  Family History   Problem Relation Age of Onset    No Known Problems Mother     No Known Problems Father     Colon cancer Other 60           Objective    PHYSICAL EXAMINATION:   Blood pressure 138/78, pulse 60, temperature 97.7 °F (36.5 °C), temperature source Temporal, resp. rate 15, height 5' 7\" (1.702 m), weight 74.2 kg (163 lb 8 oz), SpO2 96%.     Pain Score: 0-No pain     ECOG Performance Status      Flowsheet Row Most Recent Value   ECOG Performance Status 1 - Restricted in physically strenuous activity but ambulatory and able to carry out work of a light or sedentary nature, e.g., light house work, office work               Physical Exam  Constitutional:       General: He is not in acute distress.     Appearance: Normal appearance. He is not ill-appearing or toxic-appearing.   HENT:      Head: Normocephalic and atraumatic.      Right Ear: External ear normal.      Left Ear: External ear normal.      Nose: Nose normal.      Mouth/Throat:      Mouth: Mucous membranes are moist.      Pharynx: Oropharynx is clear.   Eyes:      General: No scleral icterus.        Right eye: No discharge.         Left eye: No discharge.      " Conjunctiva/sclera: Conjunctivae normal.   Cardiovascular:      Rate and Rhythm: Normal rate and regular rhythm.      Pulses: Normal pulses.      Heart sounds: No murmur heard.     No friction rub. No gallop.   Pulmonary:      Effort: Pulmonary effort is normal. No respiratory distress.      Breath sounds: Normal breath sounds. No wheezing or rales.   Abdominal:      General: Bowel sounds are normal. There is no distension.      Palpations: There is no mass.      Tenderness: There is no abdominal tenderness. There is no rebound.   Musculoskeletal:         General: No swelling or tenderness.      Cervical back: Normal range of motion. No rigidity.      Right lower leg: No edema.      Left lower leg: No edema.   Lymphadenopathy:      Head:      Right side of head: No submandibular, preauricular or posterior auricular adenopathy.      Left side of head: No submandibular, preauricular or posterior auricular adenopathy.      Cervical: No cervical adenopathy.      Right cervical: No superficial or posterior cervical adenopathy.     Left cervical: No superficial or posterior cervical adenopathy.      Upper Body:      Right upper body: No supraclavicular or axillary adenopathy.      Left upper body: No supraclavicular or axillary adenopathy.   Skin:     General: Skin is warm.      Coloration: Skin is not jaundiced.      Findings: No lesion or rash.      Comments: Well healed surgical scar.  No evidence of recurrence at primary site.   Neurological:      General: No focal deficit present.      Mental Status: He is alert and oriented to person, place, and time.      Cranial Nerves: No cranial nerve deficit.      Motor: No weakness.      Gait: Gait normal.   Psychiatric:         Mood and Affect: Mood normal.         Behavior: Behavior normal.         Thought Content: Thought content normal.         Judgment: Judgment normal.         I reviewed lab data in the chart.    WBC   Date Value Ref Range Status   12/21/2023 10.10 4.31  - 10.16 Thousand/uL Final   11/09/2023 7.91 4.31 - 10.16 Thousand/uL Final   09/12/2023 10.80 (H) 4.31 - 10.16 Thousand/uL Final     Hemoglobin   Date Value Ref Range Status   12/21/2023 15.0 12.0 - 17.0 g/dL Final   11/09/2023 13.4 12.0 - 17.0 g/dL Final   09/12/2023 13.8 12.0 - 17.0 g/dL Final     Platelets   Date Value Ref Range Status   12/21/2023 144 (L) 149 - 390 Thousands/uL Final   11/09/2023 117 (L) 149 - 390 Thousands/uL Final   09/12/2023 121 (L) 149 - 390 Thousands/uL Final     MCV   Date Value Ref Range Status   12/21/2023 92 82 - 98 fL Final   11/09/2023 92 82 - 98 fL Final   09/12/2023 91 82 - 98 fL Final      Potassium   Date Value Ref Range Status   12/21/2023 4.3 3.5 - 5.3 mmol/L Final   11/09/2023 4.1 3.5 - 5.3 mmol/L Final   09/12/2023 4.2 3.5 - 5.3 mmol/L Final     Chloride   Date Value Ref Range Status   12/21/2023 101 96 - 108 mmol/L Final   11/09/2023 104 96 - 108 mmol/L Final   09/12/2023 103 96 - 108 mmol/L Final     CO2   Date Value Ref Range Status   12/21/2023 28 21 - 32 mmol/L Final   11/09/2023 27 21 - 32 mmol/L Final   09/12/2023 30 21 - 32 mmol/L Final     BUN   Date Value Ref Range Status   12/21/2023 35 (H) 5 - 25 mg/dL Final   11/09/2023 29 (H) 5 - 25 mg/dL Final   09/12/2023 32 (H) 5 - 25 mg/dL Final     Creatinine   Date Value Ref Range Status   12/21/2023 1.65 (H) 0.60 - 1.30 mg/dL Final     Comment:     Standardized to IDMS reference method   11/09/2023 1.36 (H) 0.60 - 1.30 mg/dL Final     Comment:     Standardized to IDMS reference method   09/12/2023 1.53 (H) 0.60 - 1.30 mg/dL Final     Comment:     Standardized to IDMS reference method     Glucose   Date Value Ref Range Status   11/09/2023 203 (H) 65 - 140 mg/dL Final     Comment:     If the patient is fasting, the ADA then defines impaired fasting glucose as > 100 mg/dL and diabetes as > or equal to 123 mg/dL.   09/12/2023 96 65 - 140 mg/dL Final     Comment:     If the patient is fasting, the ADA then defines impaired  fasting glucose as > 100 mg/dL and diabetes as > or equal to 123 mg/dL.   04/28/2023 187 (H) 65 - 140 mg/dL Final     Comment:     If the patient is fasting, the ADA then defines impaired fasting glucose as > 100 mg/dL and diabetes as > or equal to 123 mg/dL.  Specimen collection should occur prior to Sulfasalazine administration due to the potential for falsely depressed results. Specimen collection should occur prior to Sulfapyridine administration due to the potential for falsely elevated results.     Calcium   Date Value Ref Range Status   12/21/2023 9.3 8.4 - 10.2 mg/dL Final   11/09/2023 8.9 8.4 - 10.2 mg/dL Final   09/12/2023 9.9 8.4 - 10.2 mg/dL Final     Albumin   Date Value Ref Range Status   12/21/2023 4.5 3.5 - 5.0 g/dL Final   11/09/2023 4.2 3.5 - 5.0 g/dL Final   09/12/2023 4.4 3.5 - 5.0 g/dL Final     Total Bilirubin   Date Value Ref Range Status   12/21/2023 1.42 (H) 0.20 - 1.00 mg/dL Final     Comment:     Use of this assay is not recommended for patients undergoing treatment with eltrombopag due to the potential for falsely elevated results.  N-acetyl-p-benzoquinone imine (metabolite of Acetaminophen) will generate erroneously low results in samples for patients that have taken an overdose of Acetaminophen.   11/09/2023 1.48 (H) 0.20 - 1.00 mg/dL Final     Comment:     Use of this assay is not recommended for patients undergoing treatment with eltrombopag due to the potential for falsely elevated results.  N-acetyl-p-benzoquinone imine (metabolite of Acetaminophen) will generate erroneously low results in samples for patients that have taken an overdose of Acetaminophen.   09/12/2023 1.21 (H) 0.20 - 1.00 mg/dL Final     Comment:     Use of this assay is not recommended for patients undergoing treatment with eltrombopag due to the potential for falsely elevated results.  N-acetyl-p-benzoquinone imine (metabolite of Acetaminophen) will generate erroneously low results in samples for patients that  "have taken an overdose of Acetaminophen.     Alkaline Phosphatase   Date Value Ref Range Status   12/21/2023 78 34 - 104 U/L Final   11/09/2023 62 34 - 104 U/L Final   09/12/2023 92 34 - 104 U/L Final     AST   Date Value Ref Range Status   12/21/2023 19 13 - 39 U/L Final   11/09/2023 15 13 - 39 U/L Final   09/12/2023 16 13 - 39 U/L Final     ALT   Date Value Ref Range Status   12/21/2023 15 7 - 52 U/L Final     Comment:     Specimen collection should occur prior to Sulfasalazine administration due to the potential for falsely depressed results.    11/09/2023 13 7 - 52 U/L Final     Comment:     Specimen collection should occur prior to Sulfasalazine administration due to the potential for falsely depressed results.    09/12/2023 16 7 - 52 U/L Final     Comment:     Specimen collection should occur prior to Sulfasalazine administration due to the potential for falsely depressed results.       LD   Date Value Ref Range Status   12/21/2023 178 140 - 271 U/L Final   11/09/2023 163 140 - 271 U/L Final   09/12/2023 160 140 - 271 U/L Final     No results found for: \"TSH\"  No results found for: \"Q1IQVKP\"   Free T4   Date Value Ref Range Status   12/21/2023 0.87 0.61 - 1.12 ng/dL Final     Comment:     Specimens with biotin concentrations > 10 ng/mL can lead to significant (> 10%) positive bias in result.   11/09/2023 0.76 0.61 - 1.12 ng/dL Final     Comment:     Specimens with biotin concentrations > 10 ng/mL can lead to significant (> 10%) positive bias in result.   09/12/2023 0.87 0.61 - 1.12 ng/dL Final     Comment:     Specimens with biotin concentrations > 10 ng/mL can lead to significant (> 10%) positive bias in result.         RECENT IMAGING:  No results found.          Assessment/Plan  Mr. Bacon is a 83 yr male with stage IIIa/questionable local regional disease on treatment with pembrolizumab here for continued monitoring, follow-up and surveillance while on treatment.    1. Malignant melanoma of skin of face " (HCC)  2. Malignant melanoma of forehead (HCC)  3. 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  Discussed that imaging demonstrates no evidence of melanoma recurrence or metastasis.  He initially had some concerning cervical lymphadenopathy that is not seen on imaging.  Labs reviewed and okay.  He will continue treatment with pembrolizumab we discussed that we will continue treatment until 2 years out.  He will complete treatment in April 2024.    He has standing orders for blood work prior to his treatment.  He knows to monitor for immune mediated side effects.  He knows to call with any issues or concerns prior to his next visit.    4. High risk medication use  He is on treatment with immunotherapy and we will continue to monitor for side effects and lab abnormalities.  We will monitor labs with each treatment to ensure safety of continuing on treatment.  He knows to watch for signs and symptoms for immune mediated side effects.  Denies any immune mediated side effects at this time.          Return in about 6 weeks (around 2/8/2024) for Office Visit, labs.     Sara Purdy MD, PhD

## 2024-02-01 ENCOUNTER — APPOINTMENT (OUTPATIENT)
Dept: LAB | Facility: IMAGING CENTER | Age: 84
End: 2024-02-01
Payer: COMMERCIAL

## 2024-02-01 RX ORDER — SODIUM CHLORIDE 9 MG/ML
20 INJECTION, SOLUTION INTRAVENOUS ONCE
Status: CANCELLED | OUTPATIENT
Start: 2024-02-08

## 2024-02-08 ENCOUNTER — HOSPITAL ENCOUNTER (OUTPATIENT)
Dept: INFUSION CENTER | Facility: CLINIC | Age: 84
Discharge: HOME | End: 2024-02-08
Payer: COMMERCIAL

## 2024-02-08 VITALS
BODY MASS INDEX: 25.45 KG/M2 | WEIGHT: 162.5 LBS | RESPIRATION RATE: 18 BRPM | DIASTOLIC BLOOD PRESSURE: 71 MMHG | SYSTOLIC BLOOD PRESSURE: 135 MMHG | TEMPERATURE: 96.9 F | OXYGEN SATURATION: 98 % | HEART RATE: 57 BPM

## 2024-02-08 DIAGNOSIS — C43.30 MALIGNANT MELANOMA OF SKIN OF FACE (HCC): ICD-10-CM

## 2024-02-08 DIAGNOSIS — C43.39 MALIGNANT MELANOMA OF FOREHEAD (HCC): Primary | ICD-10-CM

## 2024-02-08 PROCEDURE — 96413 CHEMO IV INFUSION 1 HR: CPT

## 2024-02-08 RX ORDER — SODIUM CHLORIDE 9 MG/ML
20 INJECTION, SOLUTION INTRAVENOUS ONCE
Status: COMPLETED | OUTPATIENT
Start: 2024-02-08 | End: 2024-02-08

## 2024-02-08 RX ADMIN — SODIUM CHLORIDE 400 MG: 9 INJECTION, SOLUTION INTRAVENOUS at 13:06

## 2024-02-08 RX ADMIN — SODIUM CHLORIDE 20 ML/HR: 0.9 INJECTION, SOLUTION INTRAVENOUS at 13:07

## 2024-02-08 NOTE — PROGRESS NOTES
Patient presents today for treatment with Keytruda. Patient offers no complaints. VSS. Labs from 2/1/2024 reviewed and within parameters to treat. Peripheral IV inserted without incident.

## 2024-02-08 NOTE — PROGRESS NOTES
Patient tolerated treatment without incident. Peripheral IV removed. Next appointment confirmed for 3/21/2024 at 12:30. AVS/updated calendar printed and given to patient.

## 2024-02-26 ENCOUNTER — OFFICE VISIT (OUTPATIENT)
Dept: HEMATOLOGY ONCOLOGY | Facility: CLINIC | Age: 84
End: 2024-02-26
Payer: COMMERCIAL

## 2024-02-26 VITALS
WEIGHT: 164 LBS | HEART RATE: 54 BPM | OXYGEN SATURATION: 95 % | HEIGHT: 67 IN | SYSTOLIC BLOOD PRESSURE: 142 MMHG | TEMPERATURE: 98.1 F | BODY MASS INDEX: 25.74 KG/M2 | DIASTOLIC BLOOD PRESSURE: 74 MMHG | RESPIRATION RATE: 18 BRPM

## 2024-02-26 DIAGNOSIS — C43.30 MALIGNANT MELANOMA OF SKIN OF FACE (HCC): Primary | ICD-10-CM

## 2024-02-26 DIAGNOSIS — C77.3 SECONDARY AND UNSPECIFIED MALIGNANT NEOPLASM OF AXILLA AND UPPER LIMB LYMPH NODES (HCC): ICD-10-CM

## 2024-02-26 DIAGNOSIS — C43.39 MALIGNANT MELANOMA OF FOREHEAD (HCC): ICD-10-CM

## 2024-02-26 DIAGNOSIS — Z79.899 HIGH RISK MEDICATION USE: ICD-10-CM

## 2024-02-26 PROCEDURE — 99214 OFFICE O/P EST MOD 30 MIN: CPT | Performed by: INTERNAL MEDICINE

## 2024-02-26 PROCEDURE — G2211 COMPLEX E/M VISIT ADD ON: HCPCS | Performed by: INTERNAL MEDICINE

## 2024-02-26 NOTE — ASSESSMENT & PLAN NOTE
The patient is on treatment with immunotherapy and we will continue to monitor for side effects and lab abnormalities. We will monitor labs with each treatment to ensure safety of continuing on treatment. The patient knows to watch for signs and symptoms for immune mediated side effects.  Denies any immune mediated side effects at this time.

## 2024-02-26 NOTE — PROGRESS NOTES
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)    High risk medication use        Oncology History   Malignant melanoma of skin of face (HCC)   9/8/2021 -  Cancer Staged    Staging form: Melanoma of the Skin, AJCC 8th Edition  - Clinical stage from 9/8/2021: Stage III (cT2a, cN1a, cM0) - Signed by Sara Purdy MD on 11/7/2021 9/8/2021 -  Cancer Staged    Staging form: Melanoma of the Skin, AJCC 8th Edition  - Pathologic stage from 9/8/2021: Stage IIIA (pT2a, pN1a, cM0) - Signed by Sara Purdy MD on 11/7/2021 9/22/2021 Initial Diagnosis    Malignant melanoma of skin of face (HCC)     4/4/2022 -  Chemotherapy    pembrolizumab (KEYTRUDA) IVPB, 400 mg, Intravenous, Once, 16 of 17 cycles  Administration: 400 mg (4/4/2022), 400 mg (5/16/2022), 400 mg (6/27/2022), 400 mg (8/8/2022), 400 mg (9/19/2022), 400 mg (10/31/2022), 400 mg (12/12/2022), 400 mg (1/23/2023), 400 mg (3/6/2023), 400 mg (5/5/2023), 400 mg (6/15/2023), 400 mg (8/3/2023), 400 mg (9/21/2023), 400 mg (11/16/2023), 400 mg (12/28/2023), 400 mg (2/8/2024)     Malignant melanoma of forehead (HCC)   11/29/2021 Initial Diagnosis    Malignant melanoma of forehead (HCC)     4/4/2022 -  Chemotherapy    pembrolizumab (KEYTRUDA) IVPB, 400 mg, Intravenous, Once, 16 of 17 cycles  Administration: 400 mg (4/4/2022), 400 mg (5/16/2022), 400 mg (6/27/2022), 400 mg (8/8/2022), 400 mg (9/19/2022), 400 mg (10/31/2022), 400 mg (12/12/2022), 400 mg (1/23/2023), 400 mg (3/6/2023), 400 mg (5/5/2023), 400 mg (6/15/2023), 400 mg (8/3/2023), 400 mg (9/21/2023), 400 mg  (11/16/2023), 400 mg (12/28/2023), 400 mg (2/8/2024)        Cancer Staging   Malignant melanoma of skin of face (HCC)  Staging form: Melanoma of the Skin, AJCC 8th Edition  - Clinical stage from 9/8/2021: Stage III (cT2a, cN1a, cM0) - Signed by Sara Purdy MD on 11/7/2021  - Pathologic stage from 9/8/2021: Stage IIIA (pT2a, pN1a, cM0) - Signed by Sara Purdy MD on 11/7/2021     Treatment Details   Treatment goal Curative   Plan Name OP Pembrolizumab Q 42 Days   Status Active   Start Date 4/4/2022   End Date 3/28/2024 (Planned)   Provider Sara Purdy MD   Chemotherapy pembrolizumab (KEYTRUDA) IVPB, 400 mg, Intravenous, Once, 16 of 17 cycles  Administration: 400 mg (4/4/2022), 400 mg (5/16/2022), 400 mg (6/27/2022), 400 mg (8/8/2022), 400 mg (9/19/2022), 400 mg (10/31/2022), 400 mg (12/12/2022), 400 mg (1/23/2023), 400 mg (3/6/2023), 400 mg (5/5/2023), 400 mg (6/15/2023), 400 mg (8/3/2023), 400 mg (9/21/2023), 400 mg (11/16/2023), 400 mg (12/28/2023), 400 mg (2/8/2024)          HISTORY OF PRESENT ILLNESS: Salvatore Bacon is a 84 y.o. male  who is here for follow-up.    He is feeling okay. He denies any skin concerns with no lumps or bumps. He denies any headaches, double vision, rash or itchy skin. He denies any chest pain, shortness of breath, abdominal pain, nausea, or vomiting. He has occasional diarrhea, which he attributes to what he eats. His last infusion was on 02/08/2024. His next infusion is scheduled for 03/21/2024. He drinks a bowl of cereal with milk and occasionally Ensure. He does not drink much water or juice.        Review of Systems   Constitutional:  Negative for appetite change, fatigue, fever and unexpected weight change.   HENT:   Negative for lump/mass, trouble swallowing and voice change.    Eyes:  Negative for icterus.   Respiratory:  Negative for cough, shortness of breath and wheezing.    Cardiovascular:  Negative for leg swelling.   Gastrointestinal:  Negative for abdominal  pain, constipation, diarrhea, nausea and vomiting.   Genitourinary:  Negative for difficulty urinating and hematuria.    Musculoskeletal:  Negative for arthralgias, gait problem and myalgias.   Skin:  Negative for itching and rash.        No new, changing, or concerning lesions.   Neurological:  Negative for extremity weakness, gait problem, headaches, light-headedness and numbness.   Hematological:  Negative for adenopathy.        MEDICATIONS:    Current Outpatient Medications:     atenolol (TENORMIN) 100 mg tablet, Take 1 tablet (100 mg total) by mouth daily, Disp: 90 tablet, Rfl: 3    glimepiride (AMARYL) 2 mg tablet, Take 1 tablet (2 mg total) by mouth daily with breakfast, Disp: 90 tablet, Rfl: 3    lisinopril-hydrochlorothiazide (PRINZIDE,ZESTORETIC) 20-25 MG per tablet, Take 1 tablet by mouth daily, Disp: 90 tablet, Rfl: 3    lovastatin (MEVACOR) 40 MG tablet, Take 1 tablet (40 mg total) by mouth daily, Disp: 90 tablet, Rfl: 3    metFORMIN (GLUCOPHAGE) 1000 MG tablet, Take 1 tablet (1,000 mg total) by mouth 2 (two) times a day with meals, Disp: 180 tablet, Rfl: 3     ALLERGIES:  No Known Allergies     ACTIVE PROBLEMS:  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)    Stage 3 chronic kidney disease, unspecified whether stage 3a or 3b CKD (HCC)    High risk medication use    Advanced care planning/counseling discussion    Arthritis of left knee    Left cervical lymphadenopathy    Lung nodules          PAST  MEDICAL HISTORY:   Past Medical History:   Diagnosis Date    Diabetes mellitus (HCC)     Hyperlipidemia     Hypertension         PAST SURGICAL HISTORY:  Past Surgical History:   Procedure Laterality Date    CATARACT EXTRACTION, BILATERAL      FLAP LOCAL HEAD / NECK Left 10/12/2021    Procedure: RECONSTRUCTION OF LEFT TEMPLE DEFECT AFTER RESCECTION MELANOMA;  Surgeon: Kingston Oviedo MD;  [facility];  Service: Plastics    FULL THICKNESS SKIN GRAFT Left 10/12/2021    Procedure: SKIN GRAFT FULL THICKNESS LEFT TEMPLE;  Surgeon: Kingston Oviedo MD;  [facility];  Service: Plastics    GALLBLADDER SURGERY      HAND SURGERY Left     LYMPH NODE BIOPSY Left 10/12/2021    Procedure: BIOPSY LYMPH NODE SENTINEL, LYMPHOSCINTIGRAPHY, INTRAOPERATIVE LYMPHATIC MAPPING (INJECT AT 1200);  Surgeon: Joana Mg MD;  [facility];  Service: Surgical Oncology    NOSE SURGERY      SKIN CANCER EXCISION  10/2021    SKIN LESION EXCISION Left 10/12/2021    Procedure: FACE MELANOMA SCAR WIDE EXCISION  FACE;  Surgeon: Joana Mg MD;  [facility];  Service: Surgical Oncology    SPLIT THICKNESS SKIN GRAFT Left 10/12/2021    Procedure: SKIN GRAFT SPLIT THICKNESS LEFT TEMPLE;  Surgeon: Kingston Oviedo MD;  [facility];  Service: Plastics    US GUIDED LYMPH NODE BIOPSY LEFT  03/21/2022        SOCIAL HISTORY:  Social History     Socioeconomic History    Marital status: /Civil Union     Spouse name: None    Number of children: None    Years of education: None    Highest education level: None   Occupational History    None   Tobacco Use    Smoking status: Never    Smokeless tobacco: Never   Vaping Use    Vaping status: Never Used   Substance and Sexual Activity    Alcohol use: Not Currently    Drug use: Never    Sexual activity: Not Currently   Other Topics Concern    None   Social History Narrative    None     Social Determinants of Health     Financial Resource Strain: Low Risk   "(12/1/2023)    Overall Financial Resource Strain (CARDIA)     Difficulty of Paying Living Expenses: Not hard at all   Food Insecurity: Not on file   Transportation Needs: No Transportation Needs (12/1/2023)    PRAPARE - Transportation     Lack of Transportation (Medical): No     Lack of Transportation (Non-Medical): No   Physical Activity: Not on file   Stress: Not on file   Social Connections: Not on file   Intimate Partner Violence: Not on file   Housing Stability: Not on file        FAMILY HISTORY:  Family History   Problem Relation Age of Onset    No Known Problems Mother     No Known Problems Father     Colon cancer Other 60          Objective    PHYSICAL EXAMINATION:   Blood pressure 142/74, pulse (!) 54, temperature 98.1 °F (36.7 °C), temperature source Temporal, resp. rate 18, height 5' 7\", weight 74.4 kg (164 lb), SpO2 95%.     Pain Score: 0-No pain     ECOG Performance Status      Flowsheet Row Most Recent Value   ECOG Performance Status 1 - Restricted in physically strenuous activity but ambulatory and able to carry out work of a light or sedentary nature, e.g., light house work, office work               Physical Exam  Constitutional:       General: He is not in acute distress.     Appearance: Normal appearance. He is not ill-appearing or toxic-appearing.   HENT:      Head: Normocephalic and atraumatic.      Right Ear: External ear normal.      Left Ear: External ear normal.      Nose: Nose normal.      Mouth/Throat:      Mouth: Mucous membranes are moist.      Pharynx: Oropharynx is clear.   Eyes:      General: No scleral icterus.        Right eye: No discharge.         Left eye: No discharge.      Conjunctiva/sclera: Conjunctivae normal.   Cardiovascular:      Rate and Rhythm: Normal rate and regular rhythm.      Pulses: Normal pulses.      Heart sounds: No murmur heard.     No friction rub. No gallop.   Pulmonary:      Effort: Pulmonary effort is normal. No respiratory distress.      Breath sounds: " Examination of the left-lower field reveals decreased breath sounds. Decreased breath sounds present. No wheezing or rales.   Abdominal:      General: Bowel sounds are normal. There is no distension.      Palpations: There is no mass.      Tenderness: There is no abdominal tenderness. There is no rebound.   Musculoskeletal:         General: No swelling or tenderness.      Cervical back: Normal range of motion. No rigidity.      Right lower leg: No edema.      Left lower leg: No edema.   Lymphadenopathy:      Head:      Right side of head: No submandibular, preauricular or posterior auricular adenopathy.      Left side of head: No submandibular, preauricular or posterior auricular adenopathy.      Cervical: No cervical adenopathy.      Right cervical: No superficial or posterior cervical adenopathy.     Left cervical: No superficial or posterior cervical adenopathy.      Upper Body:      Right upper body: No supraclavicular or axillary adenopathy.      Left upper body: No supraclavicular or axillary adenopathy.   Skin:     General: Skin is warm.      Coloration: Skin is not jaundiced.      Findings: No lesion or rash.      Comments: Well healed surgical scar.  No evidence of recurrence at primary site.   Neurological:      General: No focal deficit present.      Mental Status: He is alert and oriented to person, place, and time.      Cranial Nerves: No cranial nerve deficit.      Motor: No weakness.      Gait: Gait normal.   Psychiatric:         Mood and Affect: Mood normal.         Behavior: Behavior normal.         Thought Content: Thought content normal.         Judgment: Judgment normal.         I reviewed lab data in the chart.    I have personally reviewed results with the patient. Creatinine is slightly elevated.    WBC   Date Value Ref Range Status   02/01/2024 9.69 4.31 - 10.16 Thousand/uL Final   12/21/2023 10.10 4.31 - 10.16 Thousand/uL Final   11/09/2023 7.91 4.31 - 10.16 Thousand/uL Final     Hemoglobin    Date Value Ref Range Status   02/01/2024 14.4 12.0 - 17.0 g/dL Final   12/21/2023 15.0 12.0 - 17.0 g/dL Final   11/09/2023 13.4 12.0 - 17.0 g/dL Final     Platelets   Date Value Ref Range Status   02/01/2024 149 149 - 390 Thousands/uL Final   12/21/2023 144 (L) 149 - 390 Thousands/uL Final   11/09/2023 117 (L) 149 - 390 Thousands/uL Final     MCV   Date Value Ref Range Status   02/01/2024 91 82 - 98 fL Final   12/21/2023 92 82 - 98 fL Final   11/09/2023 92 82 - 98 fL Final      Potassium   Date Value Ref Range Status   02/01/2024 4.3 3.5 - 5.3 mmol/L Final   12/21/2023 4.3 3.5 - 5.3 mmol/L Final   11/09/2023 4.1 3.5 - 5.3 mmol/L Final   07/20/2020 4.6 3.5 - 5.2 mmol/L Final   05/01/2019 3.6 3.5 - 5.2 mmol/L Final   04/30/2019 3.2 (L) 3.5 - 5.2 mmol/L Final     Chloride   Date Value Ref Range Status   02/01/2024 101 96 - 108 mmol/L Final   12/21/2023 101 96 - 108 mmol/L Final   11/09/2023 104 96 - 108 mmol/L Final   07/20/2020 108 100 - 109 mmol/L Final   05/01/2019 112 (H) 100 - 109 mmol/L Final   04/30/2019 108 100 - 109 mmol/L Final     Carbon Dioxide   Date Value Ref Range Status   07/20/2020 28 23 - 31 mmol/L Final   05/01/2019 24 23 - 31 mmol/L Final   04/30/2019 25 23 - 31 mmol/L Final     CO2   Date Value Ref Range Status   02/01/2024 31 21 - 32 mmol/L Final   12/21/2023 28 21 - 32 mmol/L Final   11/09/2023 27 21 - 32 mmol/L Final     BUN   Date Value Ref Range Status   02/01/2024 41 (H) 5 - 25 mg/dL Final   12/21/2023 35 (H) 5 - 25 mg/dL Final   11/09/2023 29 (H) 5 - 25 mg/dL Final   07/20/2020 26 7 - 28 mg/dL Final   05/01/2019 35 (H) 7 - 28 mg/dL Final   04/30/2019 42 (H) 7 - 28 mg/dL Final     Creatinine   Date Value Ref Range Status   02/01/2024 1.74 (H) 0.60 - 1.30 mg/dL Final     Comment:     Standardized to IDMS reference method   12/21/2023 1.65 (H) 0.60 - 1.30 mg/dL Final     Comment:     Standardized to IDMS reference method   11/09/2023 1.36 (H) 0.60 - 1.30 mg/dL Final     Comment:      Standardized to IDMS reference method   07/20/2020 1.25 0.53 - 1.30 mg/dL Final   05/01/2019 1.18 0.53 - 1.30 mg/dL Final   04/30/2019 1.28 0.53 - 1.30 mg/dL Final     Glucose   Date Value Ref Range Status   02/01/2024 230 (H) 65 - 140 mg/dL Final     Comment:     If the patient is fasting, the ADA then defines impaired fasting glucose as > 100 mg/dL and diabetes as > or equal to 123 mg/dL.   11/09/2023 203 (H) 65 - 140 mg/dL Final     Comment:     If the patient is fasting, the ADA then defines impaired fasting glucose as > 100 mg/dL and diabetes as > or equal to 123 mg/dL.   09/12/2023 96 65 - 140 mg/dL Final     Comment:     If the patient is fasting, the ADA then defines impaired fasting glucose as > 100 mg/dL and diabetes as > or equal to 123 mg/dL.   07/20/2020 87 65 - 99 mg/dL Final   05/01/2019 108 (H) 65 - 99 mg/dL Final   04/30/2019 140 (H) 65 - 99 mg/dL Final     Calcium   Date Value Ref Range Status   02/01/2024 9.3 8.4 - 10.2 mg/dL Final   12/21/2023 9.3 8.4 - 10.2 mg/dL Final   11/09/2023 8.9 8.4 - 10.2 mg/dL Final   07/20/2020 8.8 8.5 - 10.1 mg/dL Final   05/01/2019 7.1 (L) 8.5 - 10.1 mg/dL Final   04/30/2019 6.9 (L) 8.5 - 10.1 mg/dL Final     Albumin   Date Value Ref Range Status   02/01/2024 4.4 3.5 - 5.0 g/dL Final   12/21/2023 4.5 3.5 - 5.0 g/dL Final   11/09/2023 4.2 3.5 - 5.0 g/dL Final     ALBUMIN   Date Value Ref Range Status   07/20/2020 4.0 3.5 - 4.8 g/dL Final   05/01/2019 2.2 (L) 3.5 - 4.8 g/dL Final   04/30/2019 2.2 (L) 3.5 - 4.8 g/dL Final     Total Bilirubin   Date Value Ref Range Status   02/01/2024 1.38 (H) 0.20 - 1.00 mg/dL Final     Comment:     Use of this assay is not recommended for patients undergoing treatment with eltrombopag due to the potential for falsely elevated results.  N-acetyl-p-benzoquinone imine (metabolite of Acetaminophen) will generate erroneously low results in samples for patients that have taken an overdose of Acetaminophen.   12/21/2023 1.42 (H) 0.20 - 1.00  mg/dL Final     Comment:     Use of this assay is not recommended for patients undergoing treatment with eltrombopag due to the potential for falsely elevated results.  N-acetyl-p-benzoquinone imine (metabolite of Acetaminophen) will generate erroneously low results in samples for patients that have taken an overdose of Acetaminophen.   11/09/2023 1.48 (H) 0.20 - 1.00 mg/dL Final     Comment:     Use of this assay is not recommended for patients undergoing treatment with eltrombopag due to the potential for falsely elevated results.  N-acetyl-p-benzoquinone imine (metabolite of Acetaminophen) will generate erroneously low results in samples for patients that have taken an overdose of Acetaminophen.   07/20/2020 1.2 (H) 0.2 - 1.0 mg/dL Final     Comment:     Use of this assay is not recommended for patients undergoing treatment with eltrombopag due to the potential for falsely elevated results.   05/01/2019 1.4 (H) 0.2 - 1.0 mg/dL Final   04/30/2019 1.9 (H) 0.2 - 1.0 mg/dL Final     Alkaline Phosphatase   Date Value Ref Range Status   02/01/2024 74 34 - 104 U/L Final   12/21/2023 78 34 - 104 U/L Final   11/09/2023 62 34 - 104 U/L Final   07/20/2020 63 35 - 120 U/L Final   05/01/2019 116 35 - 120 U/L Final   04/30/2019 129 (H) 35 - 120 U/L Final     AST   Date Value Ref Range Status   02/01/2024 13 13 - 39 U/L Final   12/21/2023 19 13 - 39 U/L Final   11/09/2023 15 13 - 39 U/L Final   07/20/2020 17 <41 U/L Final   05/01/2019 83 (H) <41 U/L Final   04/30/2019 100 (H) <41 U/L Final     ALT   Date Value Ref Range Status   02/01/2024 11 7 - 52 U/L Final     Comment:     Specimen collection should occur prior to Sulfasalazine administration due to the potential for falsely depressed results.    12/21/2023 15 7 - 52 U/L Final     Comment:     Specimen collection should occur prior to Sulfasalazine administration due to the potential for falsely depressed results.    11/09/2023 13 7 - 52 U/L Final     Comment:     Specimen  "collection should occur prior to Sulfasalazine administration due to the potential for falsely depressed results.    07/20/2020 22 <56 U/L Final   05/01/2019 68 (H) <56 U/L Final   04/30/2019 118 (H) <56 U/L Final      LD   Date Value Ref Range Status   02/01/2024 125 (L) 140 - 271 U/L Final   12/21/2023 178 140 - 271 U/L Final   11/09/2023 163 140 - 271 U/L Final     TSH   Date Value Ref Range Status   07/20/2020 1.47 0.36 - 3.74 uIU/mL Final   07/18/2018 2.20 0.36 - 3.74 uIU/mL Final     No results found for: \"[id number]\"   Free T4   Date Value Ref Range Status   02/01/2024 0.84 0.61 - 1.12 ng/dL Final     Comment:     Specimens with biotin concentrations > 10 ng/mL can lead to significant (> 10%) positive bias in result.   12/21/2023 0.87 0.61 - 1.12 ng/dL Final     Comment:     Specimens with biotin concentrations > 10 ng/mL can lead to significant (> 10%) positive bias in result.   11/09/2023 0.76 0.61 - 1.12 ng/dL Final     Comment:     Specimens with biotin concentrations > 10 ng/mL can lead to significant (> 10%) positive bias in result.         RECENT IMAGING:  No results found.       Assessment/Plan  Salvatore Bacon is an 84-year-old gentleman with recurrent unresectable stage 3A melanoma/metastatic disease on treatment with pembrolizumab, here for continued monitoring and follow up on surveillance.    1. Malignant melanoma of skin of face (HCC)    2. Malignant melanoma of forehead (HCC)    3. 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 He received his last cycle on 02/08/2024 and is here for follow-up. Labs reviewed and okay. He is tolerating treatment. He has his next infusion and last cycle of pembrolizumab on 03/21/2024. He will return for follow-up at that time. He has standing orders for blood work prior to each treatment. I will order full body imaging at that time to assess " disease status. He knows to continue to watch for immune mediated side effects. He knows to call with issues or concerns prior to his next visit.      4. High risk medication use  Assessment & Plan:  The patient is on treatment with immunotherapy and we will continue to monitor for side effects and lab abnormalities. We will monitor labs with each treatment to ensure safety of continuing on treatment. The patient knows to watch for signs and symptoms for immune mediated side effects.  Denies any immune mediated side effects at this time.      Elevated creatinine certainly can be a side effect of immunotherapy, however I feel she is elevated creatinine is more likely due to decreased p.o. fluid intake with some dehydration.  I have encouraged him to increase his fluid intake.  He drinks very minimally and I explained that I would like him to drink at least 4 glasses of water or noncaffeinated beverages.  He states he will try to do this but that might be too much for him.  Will continue to monitor creatinine with each treatment.    Return as scheduled, for Office Visit, Infusion - See Treatment Plan, labs.     Sara Purdy MD, PhD    Transcribed for Sara Purdy MD, by Kelly Christopher and reviewed by Tamie MILLER on 02/26/24 at 5:52 PM. Powered by Dragon [employer].

## 2024-02-26 NOTE — LETTER
February 28, 2024     Joana Mg MD  [address] [facility] [address]  [facility] [address]    Patient: Salvatore Bacon   YOB: 1940   Date of Visit: 2/26/2024       Dear Dr. Mg:    Thank you for referring Salvatore Bacon to me for evaluation. Below are my notes for this consultation.    If you have questions, please do not hesitate to call me. I look forward to following your patient along with you.         Sincerely,        Sraa Purdy MD        CC: MD Vance Farias Jr., MD Jonathan S Lam, MD Melissa A Wilson, MD  2/28/2024 12:45 AM  Sign when Signing Visit  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)    High risk medication use        Oncology History   Malignant melanoma of skin of face (HCC)   9/8/2021 -  Cancer Staged    Staging form: Melanoma of the Skin, AJCC 8th Edition  - Clinical stage from 9/8/2021: Stage III (cT2a, cN1a, cM0) - Signed by Sara Purdy MD on 11/7/2021 9/8/2021 -  Cancer Staged    Staging form: Melanoma of the Skin, AJCC 8th Edition  - Pathologic stage from 9/8/2021: Stage IIIA (pT2a, pN1a, cM0) - Signed by Sara Purdy MD on 11/7/2021 9/22/2021 Initial Diagnosis    Malignant melanoma of skin of face (HCC)     4/4/2022 -  Chemotherapy    pembrolizumab (KEYTRUDA) IVPB, 400 mg, Intravenous, Once, 16 of 17 cycles  Administration: 400 mg (4/4/2022), 400 mg (5/16/2022), 400 mg (6/27/2022), 400 mg (8/8/2022), 400 mg (9/19/2022), 400 mg (10/31/2022), 400 mg (12/12/2022), 400 mg (1/23/2023), 400 mg (3/6/2023), 400 mg (5/5/2023), 400 mg  (6/15/2023), 400 mg (8/3/2023), 400 mg (9/21/2023), 400 mg (11/16/2023), 400 mg (12/28/2023), 400 mg (2/8/2024)     Malignant melanoma of forehead (HCC)   11/29/2021 Initial Diagnosis    Malignant melanoma of forehead (HCC)     4/4/2022 -  Chemotherapy    pembrolizumab (KEYTRUDA) IVPB, 400 mg, Intravenous, Once, 16 of 17 cycles  Administration: 400 mg (4/4/2022), 400 mg (5/16/2022), 400 mg (6/27/2022), 400 mg (8/8/2022), 400 mg (9/19/2022), 400 mg (10/31/2022), 400 mg (12/12/2022), 400 mg (1/23/2023), 400 mg (3/6/2023), 400 mg (5/5/2023), 400 mg (6/15/2023), 400 mg (8/3/2023), 400 mg (9/21/2023), 400 mg (11/16/2023), 400 mg (12/28/2023), 400 mg (2/8/2024)        Cancer Staging   Malignant melanoma of skin of face (HCC)  Staging form: Melanoma of the Skin, AJCC 8th Edition  - Clinical stage from 9/8/2021: Stage III (cT2a, cN1a, cM0) - Signed by Sara Purdy MD on 11/7/2021  - Pathologic stage from 9/8/2021: Stage IIIA (pT2a, pN1a, cM0) - Signed by Sara Purdy MD on 11/7/2021     Treatment Details   Treatment goal Curative   Plan Name OP Pembrolizumab Q 42 Days   Status Active   Start Date 4/4/2022   End Date 3/28/2024 (Planned)   Provider Sara Purdy MD   Chemotherapy pembrolizumab (KEYTRUDA) IVPB, 400 mg, Intravenous, Once, 16 of 17 cycles  Administration: 400 mg (4/4/2022), 400 mg (5/16/2022), 400 mg (6/27/2022), 400 mg (8/8/2022), 400 mg (9/19/2022), 400 mg (10/31/2022), 400 mg (12/12/2022), 400 mg (1/23/2023), 400 mg (3/6/2023), 400 mg (5/5/2023), 400 mg (6/15/2023), 400 mg (8/3/2023), 400 mg (9/21/2023), 400 mg (11/16/2023), 400 mg (12/28/2023), 400 mg (2/8/2024)          HISTORY OF PRESENT ILLNESS: Salvatore Bacon is a 84 y.o. male  who is here for follow-up.    He is feeling okay. He denies any skin concerns with no lumps or bumps. He denies any headaches, double vision, rash or itchy skin. He denies any chest pain, shortness of breath, abdominal pain, nausea, or vomiting. He has occasional  diarrhea, which he attributes to what he eats. His last infusion was on 02/08/2024. His next infusion is scheduled for 03/21/2024. He drinks a bowl of cereal with milk and occasionally Ensure. He does not drink much water or juice.        Review of Systems   Constitutional:  Negative for appetite change, fatigue, fever and unexpected weight change.   HENT:   Negative for lump/mass, trouble swallowing and voice change.    Eyes:  Negative for icterus.   Respiratory:  Negative for cough, shortness of breath and wheezing.    Cardiovascular:  Negative for leg swelling.   Gastrointestinal:  Negative for abdominal pain, constipation, diarrhea, nausea and vomiting.   Genitourinary:  Negative for difficulty urinating and hematuria.    Musculoskeletal:  Negative for arthralgias, gait problem and myalgias.   Skin:  Negative for itching and rash.        No new, changing, or concerning lesions.   Neurological:  Negative for extremity weakness, gait problem, headaches, light-headedness and numbness.   Hematological:  Negative for adenopathy.        MEDICATIONS:    Current Outpatient Medications:   •  atenolol (TENORMIN) 100 mg tablet, Take 1 tablet (100 mg total) by mouth daily, Disp: 90 tablet, Rfl: 3  •  glimepiride (AMARYL) 2 mg tablet, Take 1 tablet (2 mg total) by mouth daily with breakfast, Disp: 90 tablet, Rfl: 3  •  lisinopril-hydrochlorothiazide (PRINZIDE,ZESTORETIC) 20-25 MG per tablet, Take 1 tablet by mouth daily, Disp: 90 tablet, Rfl: 3  •  lovastatin (MEVACOR) 40 MG tablet, Take 1 tablet (40 mg total) by mouth daily, Disp: 90 tablet, Rfl: 3  •  metFORMIN (GLUCOPHAGE) 1000 MG tablet, Take 1 tablet (1,000 mg total) by mouth 2 (two) times a day with meals, Disp: 180 tablet, Rfl: 3     ALLERGIES:  No Known Allergies     ACTIVE PROBLEMS:  Patient Active Problem List   Diagnosis   • Essential hypertension   • Hiatal hernia with GERD   • Status post laparoscopic cholecystectomy   • Status post umbilical hernia repair,  follow-up exam   • Type 2 diabetes mellitus without complication, without long-term current use of insulin (HCC)   • Mixed hyperlipidemia   • Parkinson's disease   • Microalbuminuria   • Malignant melanoma of skin of face (HCC)   • Thrombocytopenia (HCC)   • Malignant melanoma of forehead (HCC)   • 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)   • Stage 3 chronic kidney disease, unspecified whether stage 3a or 3b CKD (HCC)   • High risk medication use   • Advanced care planning/counseling discussion   • Arthritis of left knee   • Left cervical lymphadenopathy   • Lung nodules          PAST MEDICAL HISTORY:   Past Medical History:   Diagnosis Date   • Diabetes mellitus (HCC)    • Hyperlipidemia    • Hypertension         PAST SURGICAL HISTORY:  Past Surgical History:   Procedure Laterality Date   • CATARACT EXTRACTION, BILATERAL     • FLAP LOCAL HEAD / NECK Left 10/12/2021    Procedure: RECONSTRUCTION OF LEFT TEMPLE DEFECT AFTER RESCECTION MELANOMA;  Surgeon: Kingston Oviedo MD;  [facility];  Service: Plastics   • FULL THICKNESS SKIN GRAFT Left 10/12/2021    Procedure: SKIN GRAFT FULL THICKNESS LEFT TEMPLE;  Surgeon: Kingston Oviedo MD;  [facility];  Service: Plastics   • GALLBLADDER SURGERY     • HAND SURGERY Left    • LYMPH NODE BIOPSY Left 10/12/2021    Procedure: BIOPSY LYMPH NODE SENTINEL, LYMPHOSCINTIGRAPHY, INTRAOPERATIVE LYMPHATIC MAPPING (INJECT AT 1200);  Surgeon: Joana Mg MD;  [facility];  Service: Surgical Oncology   • NOSE SURGERY     • SKIN CANCER EXCISION  10/2021   • SKIN LESION EXCISION Left 10/12/2021    Procedure: FACE MELANOMA SCAR WIDE EXCISION  FACE;  Surgeon: Joana Mg MD;  [facility];  Service: Surgical Oncology   • SPLIT THICKNESS SKIN GRAFT Left 10/12/2021    Procedure: SKIN GRAFT SPLIT THICKNESS LEFT TEMPLE;  Surgeon: Kingston Oviedo MD;  [facility];  Service: Plastics   • US GUIDED  "LYMPH NODE BIOPSY LEFT  03/21/2022        SOCIAL HISTORY:  Social History     Socioeconomic History   • Marital status: /Civil Union     Spouse name: None   • Number of children: None   • Years of education: None   • Highest education level: None   Occupational History   • None   Tobacco Use   • Smoking status: Never   • Smokeless tobacco: Never   Vaping Use   • Vaping status: Never Used   Substance and Sexual Activity   • Alcohol use: Not Currently   • Drug use: Never   • Sexual activity: Not Currently   Other Topics Concern   • None   Social History Narrative   • None     Social Determinants of Health     Financial Resource Strain: Low Risk  (12/1/2023)    Overall Financial Resource Strain (CARDIA)    • Difficulty of Paying Living Expenses: Not hard at all   Food Insecurity: Not on file   Transportation Needs: No Transportation Needs (12/1/2023)    PRAPARE - Transportation    • Lack of Transportation (Medical): No    • Lack of Transportation (Non-Medical): No   Physical Activity: Not on file   Stress: Not on file   Social Connections: Not on file   Intimate Partner Violence: Not on file   Housing Stability: Not on file        FAMILY HISTORY:  Family History   Problem Relation Age of Onset   • No Known Problems Mother    • No Known Problems Father    • Colon cancer Other 60          Objective    PHYSICAL EXAMINATION:   Blood pressure 142/74, pulse (!) 54, temperature 98.1 °F (36.7 °C), temperature source Temporal, resp. rate 18, height 5' 7\", weight 74.4 kg (164 lb), SpO2 95%.     Pain Score: 0-No pain     ECOG Performance Status      Flowsheet Row Most Recent Value   ECOG Performance Status 1 - Restricted in physically strenuous activity but ambulatory and able to carry out work of a light or sedentary nature, e.g., light house work, office work               Physical Exam  Constitutional:       General: He is not in acute distress.     Appearance: Normal appearance. He is not ill-appearing or " toxic-appearing.   HENT:      Head: Normocephalic and atraumatic.      Right Ear: External ear normal.      Left Ear: External ear normal.      Nose: Nose normal.      Mouth/Throat:      Mouth: Mucous membranes are moist.      Pharynx: Oropharynx is clear.   Eyes:      General: No scleral icterus.        Right eye: No discharge.         Left eye: No discharge.      Conjunctiva/sclera: Conjunctivae normal.   Cardiovascular:      Rate and Rhythm: Normal rate and regular rhythm.      Pulses: Normal pulses.      Heart sounds: No murmur heard.     No friction rub. No gallop.   Pulmonary:      Effort: Pulmonary effort is normal. No respiratory distress.      Breath sounds: Examination of the left-lower field reveals decreased breath sounds. Decreased breath sounds present. No wheezing or rales.   Abdominal:      General: Bowel sounds are normal. There is no distension.      Palpations: There is no mass.      Tenderness: There is no abdominal tenderness. There is no rebound.   Musculoskeletal:         General: No swelling or tenderness.      Cervical back: Normal range of motion. No rigidity.      Right lower leg: No edema.      Left lower leg: No edema.   Lymphadenopathy:      Head:      Right side of head: No submandibular, preauricular or posterior auricular adenopathy.      Left side of head: No submandibular, preauricular or posterior auricular adenopathy.      Cervical: No cervical adenopathy.      Right cervical: No superficial or posterior cervical adenopathy.     Left cervical: No superficial or posterior cervical adenopathy.      Upper Body:      Right upper body: No supraclavicular or axillary adenopathy.      Left upper body: No supraclavicular or axillary adenopathy.   Skin:     General: Skin is warm.      Coloration: Skin is not jaundiced.      Findings: No lesion or rash.      Comments: Well healed surgical scar.  No evidence of recurrence at primary site.   Neurological:      General: No focal deficit  present.      Mental Status: He is alert and oriented to person, place, and time.      Cranial Nerves: No cranial nerve deficit.      Motor: No weakness.      Gait: Gait normal.   Psychiatric:         Mood and Affect: Mood normal.         Behavior: Behavior normal.         Thought Content: Thought content normal.         Judgment: Judgment normal.         I reviewed lab data in the chart.    I have personally reviewed results with the patient. Creatinine is slightly elevated.    WBC   Date Value Ref Range Status   02/01/2024 9.69 4.31 - 10.16 Thousand/uL Final   12/21/2023 10.10 4.31 - 10.16 Thousand/uL Final   11/09/2023 7.91 4.31 - 10.16 Thousand/uL Final     Hemoglobin   Date Value Ref Range Status   02/01/2024 14.4 12.0 - 17.0 g/dL Final   12/21/2023 15.0 12.0 - 17.0 g/dL Final   11/09/2023 13.4 12.0 - 17.0 g/dL Final     Platelets   Date Value Ref Range Status   02/01/2024 149 149 - 390 Thousands/uL Final   12/21/2023 144 (L) 149 - 390 Thousands/uL Final   11/09/2023 117 (L) 149 - 390 Thousands/uL Final     MCV   Date Value Ref Range Status   02/01/2024 91 82 - 98 fL Final   12/21/2023 92 82 - 98 fL Final   11/09/2023 92 82 - 98 fL Final      Potassium   Date Value Ref Range Status   02/01/2024 4.3 3.5 - 5.3 mmol/L Final   12/21/2023 4.3 3.5 - 5.3 mmol/L Final   11/09/2023 4.1 3.5 - 5.3 mmol/L Final   07/20/2020 4.6 3.5 - 5.2 mmol/L Final   05/01/2019 3.6 3.5 - 5.2 mmol/L Final   04/30/2019 3.2 (L) 3.5 - 5.2 mmol/L Final     Chloride   Date Value Ref Range Status   02/01/2024 101 96 - 108 mmol/L Final   12/21/2023 101 96 - 108 mmol/L Final   11/09/2023 104 96 - 108 mmol/L Final   07/20/2020 108 100 - 109 mmol/L Final   05/01/2019 112 (H) 100 - 109 mmol/L Final   04/30/2019 108 100 - 109 mmol/L Final     Carbon Dioxide   Date Value Ref Range Status   07/20/2020 28 23 - 31 mmol/L Final   05/01/2019 24 23 - 31 mmol/L Final   04/30/2019 25 23 - 31 mmol/L Final     CO2   Date Value Ref Range Status   02/01/2024 31  21 - 32 mmol/L Final   12/21/2023 28 21 - 32 mmol/L Final   11/09/2023 27 21 - 32 mmol/L Final     BUN   Date Value Ref Range Status   02/01/2024 41 (H) 5 - 25 mg/dL Final   12/21/2023 35 (H) 5 - 25 mg/dL Final   11/09/2023 29 (H) 5 - 25 mg/dL Final   07/20/2020 26 7 - 28 mg/dL Final   05/01/2019 35 (H) 7 - 28 mg/dL Final   04/30/2019 42 (H) 7 - 28 mg/dL Final     Creatinine   Date Value Ref Range Status   02/01/2024 1.74 (H) 0.60 - 1.30 mg/dL Final     Comment:     Standardized to IDMS reference method   12/21/2023 1.65 (H) 0.60 - 1.30 mg/dL Final     Comment:     Standardized to IDMS reference method   11/09/2023 1.36 (H) 0.60 - 1.30 mg/dL Final     Comment:     Standardized to IDMS reference method   07/20/2020 1.25 0.53 - 1.30 mg/dL Final   05/01/2019 1.18 0.53 - 1.30 mg/dL Final   04/30/2019 1.28 0.53 - 1.30 mg/dL Final     Glucose   Date Value Ref Range Status   02/01/2024 230 (H) 65 - 140 mg/dL Final     Comment:     If the patient is fasting, the ADA then defines impaired fasting glucose as > 100 mg/dL and diabetes as > or equal to 123 mg/dL.   11/09/2023 203 (H) 65 - 140 mg/dL Final     Comment:     If the patient is fasting, the ADA then defines impaired fasting glucose as > 100 mg/dL and diabetes as > or equal to 123 mg/dL.   09/12/2023 96 65 - 140 mg/dL Final     Comment:     If the patient is fasting, the ADA then defines impaired fasting glucose as > 100 mg/dL and diabetes as > or equal to 123 mg/dL.   07/20/2020 87 65 - 99 mg/dL Final   05/01/2019 108 (H) 65 - 99 mg/dL Final   04/30/2019 140 (H) 65 - 99 mg/dL Final     Calcium   Date Value Ref Range Status   02/01/2024 9.3 8.4 - 10.2 mg/dL Final   12/21/2023 9.3 8.4 - 10.2 mg/dL Final   11/09/2023 8.9 8.4 - 10.2 mg/dL Final   07/20/2020 8.8 8.5 - 10.1 mg/dL Final   05/01/2019 7.1 (L) 8.5 - 10.1 mg/dL Final   04/30/2019 6.9 (L) 8.5 - 10.1 mg/dL Final     Albumin   Date Value Ref Range Status   02/01/2024 4.4 3.5 - 5.0 g/dL Final   12/21/2023 4.5 3.5 -  5.0 g/dL Final   11/09/2023 4.2 3.5 - 5.0 g/dL Final     ALBUMIN   Date Value Ref Range Status   07/20/2020 4.0 3.5 - 4.8 g/dL Final   05/01/2019 2.2 (L) 3.5 - 4.8 g/dL Final   04/30/2019 2.2 (L) 3.5 - 4.8 g/dL Final     Total Bilirubin   Date Value Ref Range Status   02/01/2024 1.38 (H) 0.20 - 1.00 mg/dL Final     Comment:     Use of this assay is not recommended for patients undergoing treatment with eltrombopag due to the potential for falsely elevated results.  N-acetyl-p-benzoquinone imine (metabolite of Acetaminophen) will generate erroneously low results in samples for patients that have taken an overdose of Acetaminophen.   12/21/2023 1.42 (H) 0.20 - 1.00 mg/dL Final     Comment:     Use of this assay is not recommended for patients undergoing treatment with eltrombopag due to the potential for falsely elevated results.  N-acetyl-p-benzoquinone imine (metabolite of Acetaminophen) will generate erroneously low results in samples for patients that have taken an overdose of Acetaminophen.   11/09/2023 1.48 (H) 0.20 - 1.00 mg/dL Final     Comment:     Use of this assay is not recommended for patients undergoing treatment with eltrombopag due to the potential for falsely elevated results.  N-acetyl-p-benzoquinone imine (metabolite of Acetaminophen) will generate erroneously low results in samples for patients that have taken an overdose of Acetaminophen.   07/20/2020 1.2 (H) 0.2 - 1.0 mg/dL Final     Comment:     Use of this assay is not recommended for patients undergoing treatment with eltrombopag due to the potential for falsely elevated results.   05/01/2019 1.4 (H) 0.2 - 1.0 mg/dL Final   04/30/2019 1.9 (H) 0.2 - 1.0 mg/dL Final     Alkaline Phosphatase   Date Value Ref Range Status   02/01/2024 74 34 - 104 U/L Final   12/21/2023 78 34 - 104 U/L Final   11/09/2023 62 34 - 104 U/L Final   07/20/2020 63 35 - 120 U/L Final   05/01/2019 116 35 - 120 U/L Final   04/30/2019 129 (H) 35 - 120 U/L Final     AST  "  Date Value Ref Range Status   02/01/2024 13 13 - 39 U/L Final   12/21/2023 19 13 - 39 U/L Final   11/09/2023 15 13 - 39 U/L Final   07/20/2020 17 <41 U/L Final   05/01/2019 83 (H) <41 U/L Final   04/30/2019 100 (H) <41 U/L Final     ALT   Date Value Ref Range Status   02/01/2024 11 7 - 52 U/L Final     Comment:     Specimen collection should occur prior to Sulfasalazine administration due to the potential for falsely depressed results.    12/21/2023 15 7 - 52 U/L Final     Comment:     Specimen collection should occur prior to Sulfasalazine administration due to the potential for falsely depressed results.    11/09/2023 13 7 - 52 U/L Final     Comment:     Specimen collection should occur prior to Sulfasalazine administration due to the potential for falsely depressed results.    07/20/2020 22 <56 U/L Final   05/01/2019 68 (H) <56 U/L Final   04/30/2019 118 (H) <56 U/L Final      LD   Date Value Ref Range Status   02/01/2024 125 (L) 140 - 271 U/L Final   12/21/2023 178 140 - 271 U/L Final   11/09/2023 163 140 - 271 U/L Final     TSH   Date Value Ref Range Status   07/20/2020 1.47 0.36 - 3.74 uIU/mL Final   07/18/2018 2.20 0.36 - 3.74 uIU/mL Final     No results found for: \"L1CAYLX\"   Free T4   Date Value Ref Range Status   02/01/2024 0.84 0.61 - 1.12 ng/dL Final     Comment:     Specimens with biotin concentrations > 10 ng/mL can lead to significant (> 10%) positive bias in result.   12/21/2023 0.87 0.61 - 1.12 ng/dL Final     Comment:     Specimens with biotin concentrations > 10 ng/mL can lead to significant (> 10%) positive bias in result.   11/09/2023 0.76 0.61 - 1.12 ng/dL Final     Comment:     Specimens with biotin concentrations > 10 ng/mL can lead to significant (> 10%) positive bias in result.         RECENT IMAGING:  No results found.       Assessment/Plan  Salvatore Bacon is an 84-year-old gentleman with recurrent unresectable stage 3A melanoma/metastatic disease on treatment with pembrolizumab, here " for continued monitoring and follow up on surveillance.    1. Malignant melanoma of skin of face (HCC)    2. Malignant melanoma of forehead (HCC)    3. 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 He received his last cycle on 02/08/2024 and is here for follow-up. Labs reviewed and okay. He is tolerating treatment. He has his next infusion and last cycle of pembrolizumab on 03/21/2024. He will return for follow-up at that time. He has standing orders for blood work prior to each treatment. I will order full body imaging at that time to assess disease status. He knows to continue to watch for immune mediated side effects. He knows to call with issues or concerns prior to his next visit.      4. High risk medication use  Assessment & Plan:  The patient is on treatment with immunotherapy and we will continue to monitor for side effects and lab abnormalities. We will monitor labs with each treatment to ensure safety of continuing on treatment. The patient knows to watch for signs and symptoms for immune mediated side effects.  Denies any immune mediated side effects at this time.      Elevated creatinine certainly can be a side effect of immunotherapy, however I feel she is elevated creatinine is more likely due to decreased p.o. fluid intake with some dehydration.  I have encouraged him to increase his fluid intake.  He drinks very minimally and I explained that I would like him to drink at least 4 glasses of water or noncaffeinated beverages.  He states he will try to do this but that might be too much for him.  Will continue to monitor creatinine with each treatment.    Return as scheduled, for Office Visit, Infusion - See Treatment Plan, labs.     Sara Purdy MD, PhD    Transcribed for Sara Purdy MD, by Kelly Christopher and reviewed by Tamie MILLER on 02/26/24 at 5:52 PM. Powered by  [employer].

## 2024-02-26 NOTE — ASSESSMENT & PLAN NOTE
He is doing well and tolerating treatment with pembrolizumab. He received his last cycle on 02/08/2024 and is here for follow-up. Labs reviewed and okay. He is tolerating treatment. He has his next infusion and last cycle of pembrolizumab on 03/21/2024. He will return for follow-up at that time. He has standing orders for blood work prior to each treatment. I will order full body imaging at that time to assess disease status. He knows to continue to watch for immune mediated side effects. He knows to call with issues or concerns prior to his next visit.

## 2024-03-14 ENCOUNTER — APPOINTMENT (OUTPATIENT)
Dept: LAB | Facility: IMAGING CENTER | Age: 84
End: 2024-03-14
Payer: COMMERCIAL

## 2024-03-20 RX ORDER — SODIUM CHLORIDE 9 MG/ML
20 INJECTION, SOLUTION INTRAVENOUS ONCE
Status: CANCELLED | OUTPATIENT
Start: 2024-03-21

## 2024-03-21 ENCOUNTER — HOSPITAL ENCOUNTER (OUTPATIENT)
Dept: INFUSION CENTER | Facility: CLINIC | Age: 84
Discharge: HOME | End: 2024-03-21
Payer: COMMERCIAL

## 2024-03-21 ENCOUNTER — OFFICE VISIT (OUTPATIENT)
Dept: HEMATOLOGY ONCOLOGY | Facility: CLINIC | Age: 84
End: 2024-03-21
Payer: COMMERCIAL

## 2024-03-21 VITALS
BODY MASS INDEX: 25.43 KG/M2 | OXYGEN SATURATION: 97 % | HEART RATE: 58 BPM | DIASTOLIC BLOOD PRESSURE: 68 MMHG | TEMPERATURE: 97.8 F | SYSTOLIC BLOOD PRESSURE: 128 MMHG | RESPIRATION RATE: 18 BRPM | HEIGHT: 67 IN | WEIGHT: 162 LBS

## 2024-03-21 VITALS
TEMPERATURE: 97.9 F | OXYGEN SATURATION: 97 % | RESPIRATION RATE: 18 BRPM | HEART RATE: 58 BPM | SYSTOLIC BLOOD PRESSURE: 128 MMHG | DIASTOLIC BLOOD PRESSURE: 68 MMHG

## 2024-03-21 DIAGNOSIS — C43.39 MALIGNANT MELANOMA OF FOREHEAD (HCC): ICD-10-CM

## 2024-03-21 DIAGNOSIS — C43.39 MALIGNANT MELANOMA OF FOREHEAD (HCC): Primary | ICD-10-CM

## 2024-03-21 DIAGNOSIS — C43.30 MALIGNANT MELANOMA OF SKIN OF FACE (HCC): Primary | ICD-10-CM

## 2024-03-21 DIAGNOSIS — Z79.899 HIGH RISK MEDICATION USE: ICD-10-CM

## 2024-03-21 DIAGNOSIS — C77.3 SECONDARY AND UNSPECIFIED MALIGNANT NEOPLASM OF AXILLA AND UPPER LIMB LYMPH NODES (HCC): ICD-10-CM

## 2024-03-21 DIAGNOSIS — C43.30 MALIGNANT MELANOMA OF SKIN OF FACE (HCC): ICD-10-CM

## 2024-03-21 PROCEDURE — G2211 COMPLEX E/M VISIT ADD ON: HCPCS | Performed by: INTERNAL MEDICINE

## 2024-03-21 PROCEDURE — 96413 CHEMO IV INFUSION 1 HR: CPT

## 2024-03-21 PROCEDURE — 99214 OFFICE O/P EST MOD 30 MIN: CPT | Performed by: INTERNAL MEDICINE

## 2024-03-21 RX ORDER — SODIUM CHLORIDE 9 MG/ML
20 INJECTION, SOLUTION INTRAVENOUS ONCE
Status: COMPLETED | OUTPATIENT
Start: 2024-03-21 | End: 2024-03-21

## 2024-03-21 RX ADMIN — SODIUM CHLORIDE 20 ML/HR: 9 INJECTION, SOLUTION INTRAVENOUS at 12:28

## 2024-03-21 RX ADMIN — SODIUM CHLORIDE 400 MG: 9 INJECTION, SOLUTION INTRAVENOUS at 13:00

## 2024-03-21 NOTE — PROGRESS NOTES
Patient tolerated infusion without issue. PIV removed L FA, bleeding controlled, coban applied. Patient confirmed no further treatments at this time, follow up with Dr. Purdy scheduled 5/9, patient declined AVS, ambulatory from department.

## 2024-03-21 NOTE — ASSESSMENT & PLAN NOTE
The patient is tolerating treatment with pembrolizumab. He is here today for his last infusion of pembrolizumab, which will have been 2 years. Blood work reviewed and okay for treatment. He knows to continue to monitor for immune mediated side effects. He will return in approximately 6 weeks for follow-up and toxicity check. He is due for full body imaging at that time. He will also be due for blood work. All orders placed and scripts provided for imaging and blood work. He knows to call with issues or concerns prior to his next visit. We did discuss that following infusion and toxicity check, we will then continue to monitor him closely every 3 months for at least 2 additional years. Every visit will encompass physical exam and blood work. We will also continue with periodic full body imaging as well.

## 2024-03-21 NOTE — PROGRESS NOTES
[facility] [place]  [address]  [place] [address]  [phone]     Date of Visit: 3/21/2024  Name: Salvatore Bacon   YOB: 1940       Subjective    VISIT DIAGNOSIS:  Diagnoses and all orders for this visit:    Malignant melanoma of skin of face (HCC)  -     NM pet ct tumor imaging whole body; Future  -     CBC and differential; Future  -     Comprehensive metabolic panel; Future  -     LD,Blood; Future  -     T3, free; Future  -     T4, free; Future  -     TSH, 3rd generation; Future    Malignant melanoma of forehead (HCC)  -     NM pet ct tumor imaging whole body; Future  -     CBC and differential; Future  -     Comprehensive metabolic panel; Future  -     LD,Blood; Future  -     T3, free; Future  -     T4, free; Future  -     TSH, 3rd generation; Future    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)  -     NM pet ct tumor imaging whole body; Future  -     CBC and differential; Future  -     Comprehensive metabolic panel; Future  -     LD,Blood; Future  -     T3, free; Future  -     T4, free; Future  -     TSH, 3rd generation; Future    High risk medication use  -     NM pet ct tumor imaging whole body; Future  -     CBC and differential; Future  -     Comprehensive metabolic panel; Future  -     LD,Blood; Future  -     T3, free; Future  -     T4, free; Future  -     TSH, 3rd generation; Future        Oncology History   Malignant melanoma of skin of face (HCC)   9/8/2021 -  Cancer Staged    Staging form: Melanoma of the Skin, AJCC 8th Edition  - Clinical stage from 9/8/2021: Stage III (cT2a, cN1a, cM0) - Signed by Sara Purdy MD on 11/7/2021 9/8/2021 -  Cancer Staged    Staging form: Melanoma of the Skin, AJCC 8th Edition  - Pathologic stage from 9/8/2021: Stage IIIA (pT2a, pN1a, cM0) - Signed by Sara Purdy MD on 11/7/2021 9/22/2021 Initial Diagnosis    Malignant melanoma of skin  of face (HCC)     4/4/2022 -  Chemotherapy    pembrolizumab (KEYTRUDA) IVPB, 400 mg, Intravenous, Once, 17 of 17 cycles  Administration: 400 mg (4/4/2022), 400 mg (5/16/2022), 400 mg (6/27/2022), 400 mg (8/8/2022), 400 mg (9/19/2022), 400 mg (10/31/2022), 400 mg (12/12/2022), 400 mg (1/23/2023), 400 mg (3/6/2023), 400 mg (5/5/2023), 400 mg (6/15/2023), 400 mg (8/3/2023), 400 mg (9/21/2023), 400 mg (11/16/2023), 400 mg (12/28/2023), 400 mg (2/8/2024)     Malignant melanoma of forehead (HCC)   11/29/2021 Initial Diagnosis    Malignant melanoma of forehead (HCC)     4/4/2022 -  Chemotherapy    pembrolizumab (KEYTRUDA) IVPB, 400 mg, Intravenous, Once, 17 of 17 cycles  Administration: 400 mg (4/4/2022), 400 mg (5/16/2022), 400 mg (6/27/2022), 400 mg (8/8/2022), 400 mg (9/19/2022), 400 mg (10/31/2022), 400 mg (12/12/2022), 400 mg (1/23/2023), 400 mg (3/6/2023), 400 mg (5/5/2023), 400 mg (6/15/2023), 400 mg (8/3/2023), 400 mg (9/21/2023), 400 mg (11/16/2023), 400 mg (12/28/2023), 400 mg (2/8/2024)        Cancer Staging   Malignant melanoma of skin of face (HCC)  Staging form: Melanoma of the Skin, AJCC 8th Edition  - Clinical stage from 9/8/2021: Stage III (cT2a, cN1a, cM0) - Signed by Sara Purdy MD on 11/7/2021  - Pathologic stage from 9/8/2021: Stage IIIA (pT2a, pN1a, cM0) - Signed by Sara Purdy MD on 11/7/2021     Treatment Details   Treatment goal Curative   Plan Name OP Pembrolizumab Q 42 Days   Status Active   Start Date 4/4/2022   End Date 3/21/2024   Provider Sara Purdy MD   Chemotherapy pembrolizumab (KEYTRUDA) IVPB, 400 mg, Intravenous, Once, 17 of 17 cycles  Administration: 400 mg (4/4/2022), 400 mg (5/16/2022), 400 mg (6/27/2022), 400 mg (8/8/2022), 400 mg (9/19/2022), 400 mg (10/31/2022), 400 mg (12/12/2022), 400 mg (1/23/2023), 400 mg (3/6/2023), 400 mg (5/5/2023), 400 mg (6/15/2023), 400 mg (8/3/2023), 400 mg (9/21/2023), 400 mg (11/16/2023), 400 mg (12/28/2023), 400 mg (2/8/2024), 400  mg (3/21/2024)          HISTORY OF PRESENT ILLNESS: Salvatore Bacon is a 84 y.o. male  who is here for follow-up and follow-up on treatment. He is accompanied by his wife.    The patient is doing well. This is his last infusion. He denies any headaches, double vision, chest pain, dyspnea, abdominal pain, nausea, vomiting, or diarrhea. His energy level is not too bad. He denies any lumps or bumps on his skin or any new skin lesions. He has no concerns.         Review of Systems   Constitutional:  Negative for appetite change, fatigue, fever and unexpected weight change.   HENT:   Negative for lump/mass, trouble swallowing and voice change.    Eyes:  Negative for icterus.   Respiratory:  Negative for cough, shortness of breath and wheezing.    Cardiovascular:  Negative for leg swelling.   Gastrointestinal:  Negative for abdominal pain, constipation, diarrhea, nausea and vomiting.   Genitourinary:  Negative for difficulty urinating and hematuria.    Musculoskeletal:  Negative for arthralgias, gait problem and myalgias.   Skin:  Negative for itching and rash.        No new, changing, or concerning lesions.   Neurological:  Negative for extremity weakness, gait problem, headaches, light-headedness and numbness.   Hematological:  Negative for adenopathy.        MEDICATIONS:    Current Outpatient Medications:     atenolol (TENORMIN) 100 mg tablet, Take 1 tablet (100 mg total) by mouth daily, Disp: 90 tablet, Rfl: 3    glimepiride (AMARYL) 2 mg tablet, Take 1 tablet (2 mg total) by mouth daily with breakfast, Disp: 90 tablet, Rfl: 3    lisinopril-hydrochlorothiazide (PRINZIDE,ZESTORETIC) 20-25 MG per tablet, Take 1 tablet by mouth daily, Disp: 90 tablet, Rfl: 3    lovastatin (MEVACOR) 40 MG tablet, Take 1 tablet (40 mg total) by mouth daily, Disp: 90 tablet, Rfl: 3    metFORMIN (GLUCOPHAGE) 1000 MG tablet, Take 1 tablet (1,000 mg total) by mouth 2 (two) times a day with meals, Disp: 180 tablet, Rfl: 3     ALLERGIES:  No  Known Allergies     ACTIVE PROBLEMS:  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)    Stage 3 chronic kidney disease, unspecified whether stage 3a or 3b CKD (HCC)    High risk medication use    Advanced care planning/counseling discussion    Arthritis of left knee    Left cervical lymphadenopathy    Lung nodules          PAST MEDICAL HISTORY:   Past Medical History:   Diagnosis Date    Diabetes mellitus (HCC)     Hyperlipidemia     Hypertension         PAST SURGICAL HISTORY:  Past Surgical History:   Procedure Laterality Date    CATARACT EXTRACTION, BILATERAL      FLAP LOCAL HEAD / NECK Left 10/12/2021    Procedure: RECONSTRUCTION OF LEFT TEMPLE DEFECT AFTER RESCECTION MELANOMA;  Surgeon: Kingston Oviedo MD;  [facility];  Service: Plastics    FULL THICKNESS SKIN GRAFT Left 10/12/2021    Procedure: SKIN GRAFT FULL THICKNESS LEFT TEMPLE;  Surgeon: Kingston Oviedo MD;  [facility];  Service: Plastics    GALLBLADDER SURGERY      HAND SURGERY Left     LYMPH NODE BIOPSY Left 10/12/2021    Procedure: BIOPSY LYMPH NODE SENTINEL, LYMPHOSCINTIGRAPHY, INTRAOPERATIVE LYMPHATIC MAPPING (INJECT AT 1200);  Surgeon: Joana Mg MD;  Location[facility];  Service: Surgical Oncology    NOSE SURGERY      SKIN CANCER EXCISION  10/2021    SKIN LESION EXCISION Left 10/12/2021    Procedure: FACE MELANOMA SCAR WIDE EXCISION  FACE;  Surgeon: Joana Mg MD;  [facility];  Service: Surgical Oncology  "   SPLIT THICKNESS SKIN GRAFT Left 10/12/2021    Procedure: SKIN GRAFT SPLIT THICKNESS LEFT TEMPLE;  Surgeon: Kingston Oviedo MD;  [facility];  Service: Plastics    US GUIDED LYMPH NODE BIOPSY LEFT  03/21/2022        SOCIAL HISTORY:  Social History     Socioeconomic History    Marital status: /Civil Union     Spouse name: Not on file    Number of children: Not on file    Years of education: Not on file    Highest education level: Not on file   Occupational History    Not on file   Tobacco Use    Smoking status: Never    Smokeless tobacco: Never   Vaping Use    Vaping status: Never Used   Substance and Sexual Activity    Alcohol use: Not Currently    Drug use: Never    Sexual activity: Not Currently   Other Topics Concern    Not on file   Social History Narrative    Not on file     Social Determinants of Health     Financial Resource Strain: Low Risk  (12/1/2023)    Overall Financial Resource Strain (CARDIA)     Difficulty of Paying Living Expenses: Not hard at all   Food Insecurity: Not on file   Transportation Needs: No Transportation Needs (12/1/2023)    PRAPARE - Transportation     Lack of Transportation (Medical): No     Lack of Transportation (Non-Medical): No   Physical Activity: Not on file   Stress: Not on file   Social Connections: Not on file   Intimate Partner Violence: Not on file   Housing Stability: Not on file        FAMILY HISTORY:  Family History   Problem Relation Age of Onset    No Known Problems Mother     No Known Problems Father     Colon cancer Other 60          Objective    PHYSICAL EXAMINATION:   Blood pressure 128/68, pulse 58, temperature 97.8 °F (36.6 °C), temperature source Temporal, resp. rate 18, height 5' 7\", weight 73.5 kg (162 lb), SpO2 97%.     Pain Score: 0-No pain     ECOG Performance Status      Flowsheet Row Most Recent Value   ECOG Performance Status 1 - Restricted in physically strenuous activity but ambulatory and able to carry out work of a light or " sedentary nature, e.g., light house work, office work               Physical Exam  Constitutional:       General: He is not in acute distress.     Appearance: Normal appearance. He is not ill-appearing or toxic-appearing.   HENT:      Head: Normocephalic and atraumatic.      Right Ear: External ear normal.      Left Ear: External ear normal.      Nose: Nose normal.      Mouth/Throat:      Mouth: Mucous membranes are moist.      Pharynx: Oropharynx is clear.   Eyes:      General: No scleral icterus.        Right eye: No discharge.         Left eye: No discharge.      Conjunctiva/sclera: Conjunctivae normal.   Cardiovascular:      Rate and Rhythm: Normal rate and regular rhythm.      Pulses: Normal pulses.      Heart sounds: No murmur heard.     No friction rub. No gallop.   Pulmonary:      Effort: Pulmonary effort is normal. No respiratory distress.      Breath sounds: Normal breath sounds. No wheezing or rales.   Abdominal:      General: Bowel sounds are normal. There is no distension.      Palpations: There is no mass.      Tenderness: There is no abdominal tenderness. There is no rebound.   Musculoskeletal:         General: No swelling or tenderness.      Cervical back: Normal range of motion. No rigidity.      Right lower leg: No edema.      Left lower leg: No edema.   Lymphadenopathy:      Head:      Right side of head: No submandibular, preauricular or posterior auricular adenopathy.      Left side of head: No submandibular, preauricular or posterior auricular adenopathy.      Cervical: No cervical adenopathy.      Right cervical: No superficial or posterior cervical adenopathy.     Left cervical: No superficial or posterior cervical adenopathy.      Upper Body:      Right upper body: No supraclavicular or axillary adenopathy.      Left upper body: No supraclavicular or axillary adenopathy.   Skin:     General: Skin is warm.      Coloration: Skin is not jaundiced.      Findings: No lesion or rash.       Comments: Well healed surgical scar.  No evidence of recurrence at primary site.   Neurological:      General: No focal deficit present.      Mental Status: He is alert and oriented to person, place, and time.      Cranial Nerves: No cranial nerve deficit.      Motor: No weakness.      Gait: Gait normal.   Psychiatric:         Mood and Affect: Mood normal.         Behavior: Behavior normal.         Thought Content: Thought content normal.         Judgment: Judgment normal.         I reviewed lab data in the chart.    WBC   Date Value Ref Range Status   03/14/2024 9.28 4.31 - 10.16 Thousand/uL Final   02/01/2024 9.69 4.31 - 10.16 Thousand/uL Final   12/21/2023 10.10 4.31 - 10.16 Thousand/uL Final     Hemoglobin   Date Value Ref Range Status   03/14/2024 14.5 12.0 - 17.0 g/dL Final   02/01/2024 14.4 12.0 - 17.0 g/dL Final   12/21/2023 15.0 12.0 - 17.0 g/dL Final     Platelets   Date Value Ref Range Status   03/14/2024 149 149 - 390 Thousands/uL Final   02/01/2024 149 149 - 390 Thousands/uL Final   12/21/2023 144 (L) 149 - 390 Thousands/uL Final     MCV   Date Value Ref Range Status   03/14/2024 89 82 - 98 fL Final   02/01/2024 91 82 - 98 fL Final   12/21/2023 92 82 - 98 fL Final      Potassium   Date Value Ref Range Status   03/14/2024 4.2 3.5 - 5.3 mmol/L Final   02/01/2024 4.3 3.5 - 5.3 mmol/L Final   12/21/2023 4.3 3.5 - 5.3 mmol/L Final   07/20/2020 4.6 3.5 - 5.2 mmol/L Final   05/01/2019 3.6 3.5 - 5.2 mmol/L Final   04/30/2019 3.2 (L) 3.5 - 5.2 mmol/L Final     Chloride   Date Value Ref Range Status   03/14/2024 101 96 - 108 mmol/L Final   02/01/2024 101 96 - 108 mmol/L Final   12/21/2023 101 96 - 108 mmol/L Final   07/20/2020 108 100 - 109 mmol/L Final   05/01/2019 112 (H) 100 - 109 mmol/L Final   04/30/2019 108 100 - 109 mmol/L Final     Carbon Dioxide   Date Value Ref Range Status   07/20/2020 28 23 - 31 mmol/L Final   05/01/2019 24 23 - 31 mmol/L Final   04/30/2019 25 23 - 31 mmol/L Final     CO2   Date Value  Ref Range Status   03/14/2024 29 21 - 32 mmol/L Final   02/01/2024 31 21 - 32 mmol/L Final   12/21/2023 28 21 - 32 mmol/L Final     BUN   Date Value Ref Range Status   03/14/2024 33 (H) 5 - 25 mg/dL Final   02/01/2024 41 (H) 5 - 25 mg/dL Final   12/21/2023 35 (H) 5 - 25 mg/dL Final   07/20/2020 26 7 - 28 mg/dL Final   05/01/2019 35 (H) 7 - 28 mg/dL Final   04/30/2019 42 (H) 7 - 28 mg/dL Final     Creatinine   Date Value Ref Range Status   03/14/2024 1.69 (H) 0.60 - 1.30 mg/dL Final     Comment:     Standardized to IDMS reference method   02/01/2024 1.74 (H) 0.60 - 1.30 mg/dL Final     Comment:     Standardized to IDMS reference method   12/21/2023 1.65 (H) 0.60 - 1.30 mg/dL Final     Comment:     Standardized to IDMS reference method   07/20/2020 1.25 0.53 - 1.30 mg/dL Final   05/01/2019 1.18 0.53 - 1.30 mg/dL Final   04/30/2019 1.28 0.53 - 1.30 mg/dL Final     Glucose   Date Value Ref Range Status   03/14/2024 246 (H) 65 - 140 mg/dL Final     Comment:     If the patient is fasting, the ADA then defines impaired fasting glucose as > 100 mg/dL and diabetes as > or equal to 123 mg/dL.   02/01/2024 230 (H) 65 - 140 mg/dL Final     Comment:     If the patient is fasting, the ADA then defines impaired fasting glucose as > 100 mg/dL and diabetes as > or equal to 123 mg/dL.   11/09/2023 203 (H) 65 - 140 mg/dL Final     Comment:     If the patient is fasting, the ADA then defines impaired fasting glucose as > 100 mg/dL and diabetes as > or equal to 123 mg/dL.   07/20/2020 87 65 - 99 mg/dL Final   05/01/2019 108 (H) 65 - 99 mg/dL Final   04/30/2019 140 (H) 65 - 99 mg/dL Final     Calcium   Date Value Ref Range Status   03/14/2024 9.7 8.4 - 10.2 mg/dL Final   02/01/2024 9.3 8.4 - 10.2 mg/dL Final   12/21/2023 9.3 8.4 - 10.2 mg/dL Final   07/20/2020 8.8 8.5 - 10.1 mg/dL Final   05/01/2019 7.1 (L) 8.5 - 10.1 mg/dL Final   04/30/2019 6.9 (L) 8.5 - 10.1 mg/dL Final     Albumin   Date Value Ref Range Status   03/14/2024 4.6 3.5 -  5.0 g/dL Final   02/01/2024 4.4 3.5 - 5.0 g/dL Final   12/21/2023 4.5 3.5 - 5.0 g/dL Final     ALBUMIN   Date Value Ref Range Status   07/20/2020 4.0 3.5 - 4.8 g/dL Final   05/01/2019 2.2 (L) 3.5 - 4.8 g/dL Final   04/30/2019 2.2 (L) 3.5 - 4.8 g/dL Final     Total Bilirubin   Date Value Ref Range Status   03/14/2024 1.62 (H) 0.20 - 1.00 mg/dL Final     Comment:     Use of this assay is not recommended for patients undergoing treatment with eltrombopag due to the potential for falsely elevated results.  N-acetyl-p-benzoquinone imine (metabolite of Acetaminophen) will generate erroneously low results in samples for patients that have taken an overdose of Acetaminophen.   02/01/2024 1.38 (H) 0.20 - 1.00 mg/dL Final     Comment:     Use of this assay is not recommended for patients undergoing treatment with eltrombopag due to the potential for falsely elevated results.  N-acetyl-p-benzoquinone imine (metabolite of Acetaminophen) will generate erroneously low results in samples for patients that have taken an overdose of Acetaminophen.   12/21/2023 1.42 (H) 0.20 - 1.00 mg/dL Final     Comment:     Use of this assay is not recommended for patients undergoing treatment with eltrombopag due to the potential for falsely elevated results.  N-acetyl-p-benzoquinone imine (metabolite of Acetaminophen) will generate erroneously low results in samples for patients that have taken an overdose of Acetaminophen.   07/20/2020 1.2 (H) 0.2 - 1.0 mg/dL Final     Comment:     Use of this assay is not recommended for patients undergoing treatment with eltrombopag due to the potential for falsely elevated results.   05/01/2019 1.4 (H) 0.2 - 1.0 mg/dL Final   04/30/2019 1.9 (H) 0.2 - 1.0 mg/dL Final     Alkaline Phosphatase   Date Value Ref Range Status   03/14/2024 75 34 - 104 U/L Final   02/01/2024 74 34 - 104 U/L Final   12/21/2023 78 34 - 104 U/L Final   07/20/2020 63 35 - 120 U/L Final   05/01/2019 116 35 - 120 U/L Final   04/30/2019  "129 (H) 35 - 120 U/L Final     AST   Date Value Ref Range Status   03/14/2024 18 13 - 39 U/L Final   02/01/2024 13 13 - 39 U/L Final   12/21/2023 19 13 - 39 U/L Final   07/20/2020 17 <41 U/L Final   05/01/2019 83 (H) <41 U/L Final   04/30/2019 100 (H) <41 U/L Final     ALT   Date Value Ref Range Status   03/14/2024 20 7 - 52 U/L Final     Comment:     Specimen collection should occur prior to Sulfasalazine administration due to the potential for falsely depressed results.    02/01/2024 11 7 - 52 U/L Final     Comment:     Specimen collection should occur prior to Sulfasalazine administration due to the potential for falsely depressed results.    12/21/2023 15 7 - 52 U/L Final     Comment:     Specimen collection should occur prior to Sulfasalazine administration due to the potential for falsely depressed results.    07/20/2020 22 <56 U/L Final   05/01/2019 68 (H) <56 U/L Final   04/30/2019 118 (H) <56 U/L Final      LD   Date Value Ref Range Status   03/14/2024 136 (L) 140 - 271 U/L Final   02/01/2024 125 (L) 140 - 271 U/L Final   12/21/2023 178 140 - 271 U/L Final     TSH   Date Value Ref Range Status   07/20/2020 1.47 0.36 - 3.74 uIU/mL Final   07/18/2018 2.20 0.36 - 3.74 uIU/mL Final     No results found for: \"I9PCPDH\"   Free T4   Date Value Ref Range Status   03/14/2024 0.87 0.61 - 1.12 ng/dL Final     Comment:     Specimens with biotin concentrations > 10 ng/mL can lead to significant (> 10%) positive bias in result.   02/01/2024 0.84 0.61 - 1.12 ng/dL Final     Comment:     Specimens with biotin concentrations > 10 ng/mL can lead to significant (> 10%) positive bias in result.   12/21/2023 0.87 0.61 - 1.12 ng/dL Final     Comment:     Specimens with biotin concentrations > 10 ng/mL can lead to significant (> 10%) positive bias in result.         RECENT IMAGING:  I have personally reviewed results with the patient.    No results found.       Assessment/Plan  The patient is an 84-year-old gentleman with locally " advanced/possibly metastatic melanoma on treatment with pembrolizumab, here for continued monitoring, follow-up on surveillance.    1. Malignant melanoma of skin of face (HCC)  -     NM pet ct tumor imaging whole body; Future; Expected date: 06/21/2024  -     CBC and differential; Future; Expected date: 05/02/2024  -     Comprehensive metabolic panel; Future; Expected date: 05/02/2024  -     LD,Blood; Future; Expected date: 05/02/2024  -     T3, free; Future; Expected date: 05/02/2024  -     T4, free; Future; Expected date: 05/02/2024  -     TSH, 3rd generation; Future; Expected date: 05/02/2024    2. Malignant melanoma of forehead (HCC)  -     NM pet ct tumor imaging whole body; Future; Expected date: 06/21/2024  -     CBC and differential; Future; Expected date: 05/02/2024  -     Comprehensive metabolic panel; Future; Expected date: 05/02/2024  -     LD,Blood; Future; Expected date: 05/02/2024  -     T3, free; Future; Expected date: 05/02/2024  -     T4, free; Future; Expected date: 05/02/2024  -     TSH, 3rd generation; Future; Expected date: 05/02/2024    3. 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 He is here today for his last infusion of pembrolizumab, which will have been 2 years. Blood work reviewed and okay for treatment. He knows to continue to monitor for immune mediated side effects. He will return in approximately 6 weeks for follow-up and toxicity check. He is due for full body imaging at that time. He will also be due for blood work. All orders placed and scripts provided for imaging and blood work. He knows to call with issues or concerns prior to his next visit. We did discuss that following infusion and toxicity check, we will then continue to monitor him closely every 3 months for at least 2 additional years. Every visit will encompass physical exam and blood work. We will  also continue with periodic full body imaging as well.    Orders:  -     NM pet ct tumor imaging whole body; Future; Expected date: 06/21/2024  -     CBC and differential; Future; Expected date: 05/02/2024  -     Comprehensive metabolic panel; Future; Expected date: 05/02/2024  -     LD,Blood; Future; Expected date: 05/02/2024  -     T3, free; Future; Expected date: 05/02/2024  -     T4, free; Future; Expected date: 05/02/2024  -     TSH, 3rd generation; Future; Expected date: 05/02/2024    4. High risk medication use  Assessment & Plan:  The patient is on treatment with immunotherapy and we will continue to monitor for side effects and lab abnormalities. We will monitor labs with each treatment to ensure safety of continuing on treatment. The patient knows to watch for signs and symptoms for immune mediated side effects.  Denies any immune mediated side effects at this time.      Orders:  -     NM pet ct tumor imaging whole body; Future; Expected date: 06/21/2024  -     CBC and differential; Future; Expected date: 05/02/2024  -     Comprehensive metabolic panel; Future; Expected date: 05/02/2024  -     LD,Blood; Future; Expected date: 05/02/2024  -     T3, free; Future; Expected date: 05/02/2024  -     T4, free; Future; Expected date: 05/02/2024  -     TSH, 3rd generation; Future; Expected date: 05/02/2024         Return in about 6 weeks (around 5/2/2024) for Office Visit, scans, labs.     Sara Purdy MD, PhD    Transcribed for Sara Purdy MD, by Debra dial on 03/21/24 at 2:00 PM. Powered by [employer].

## 2024-03-21 NOTE — PROGRESS NOTES
Patient arrives for D1C17 Keytruda. Labs reviewed from 3/14 and within parameters for treatment today. PIV placed L FA, brisk blood return, flushed without resistance. Patient resting comfortably one recliner, provided refreshments, call bell within reach.

## 2024-03-21 NOTE — LETTER
March 21, 2024     Joana Mg MD  [address] [facility] [address]  [facility] [address]    Patient: Salvatore Bacon   YOB: 1940   Date of Visit: 3/21/2024       Dear Dr. Mg:    Thank you for referring Salvatore Bacon to me for evaluation. Below are my notes for this consultation.    If you have questions, please do not hesitate to call me. I look forward to following your patient along with you.         Sincerely,        Sara Purdy MD        CC: MD Joey Gregg Jr., MD Jonathan S Lam, MD Melissa A Wilson, MD  3/21/2024  2:34 PM  Sign when Signing Visit  [facility] [place]  [address] [facility] [address]  [phone]  [phone]     Date of Visit: 3/21/2024  Name: Salvatore Bacon   YOB: 1940       Subjective    VISIT DIAGNOSIS:  Diagnoses and all orders for this visit:    Malignant melanoma of skin of face (HCC)  -     NM pet ct tumor imaging whole body; Future  -     CBC and differential; Future  -     Comprehensive metabolic panel; Future  -     LD,Blood; Future  -     T3, free; Future  -     T4, free; Future  -     TSH, 3rd generation; Future    Malignant melanoma of forehead (HCC)  -     NM pet ct tumor imaging whole body; Future  -     CBC and differential; Future  -     Comprehensive metabolic panel; Future  -     LD,Blood; Future  -     T3, free; Future  -     T4, free; Future  -     TSH, 3rd generation; Future    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)  -     NM pet ct tumor imaging whole body; Future  -     CBC and differential; Future  -     Comprehensive metabolic panel; Future  -     LD,Blood; Future  -     T3, free; Future  -     T4, free; Future  -     TSH, 3rd generation; Future    High risk medication use  -     NM pet ct tumor imaging whole body; Future  -     CBC and differential; Future  -     Comprehensive metabolic panel; Future  -     LD,Blood;  Future  -     T3, free; Future  -     T4, free; Future  -     TSH, 3rd generation; Future        Oncology History   Malignant melanoma of skin of face (HCC)   9/8/2021 -  Cancer Staged    Staging form: Melanoma of the Skin, AJCC 8th Edition  - Clinical stage from 9/8/2021: Stage III (cT2a, cN1a, cM0) - Signed by Sara Purdy MD on 11/7/2021 9/8/2021 -  Cancer Staged    Staging form: Melanoma of the Skin, AJCC 8th Edition  - Pathologic stage from 9/8/2021: Stage IIIA (pT2a, pN1a, cM0) - Signed by Sara Purdy MD on 11/7/2021 9/22/2021 Initial Diagnosis    Malignant melanoma of skin of face (HCC)     4/4/2022 -  Chemotherapy    pembrolizumab (KEYTRUDA) IVPB, 400 mg, Intravenous, Once, 17 of 17 cycles  Administration: 400 mg (4/4/2022), 400 mg (5/16/2022), 400 mg (6/27/2022), 400 mg (8/8/2022), 400 mg (9/19/2022), 400 mg (10/31/2022), 400 mg (12/12/2022), 400 mg (1/23/2023), 400 mg (3/6/2023), 400 mg (5/5/2023), 400 mg (6/15/2023), 400 mg (8/3/2023), 400 mg (9/21/2023), 400 mg (11/16/2023), 400 mg (12/28/2023), 400 mg (2/8/2024)     Malignant melanoma of forehead (HCC)   11/29/2021 Initial Diagnosis    Malignant melanoma of forehead (HCC)     4/4/2022 -  Chemotherapy    pembrolizumab (KEYTRUDA) IVPB, 400 mg, Intravenous, Once, 17 of 17 cycles  Administration: 400 mg (4/4/2022), 400 mg (5/16/2022), 400 mg (6/27/2022), 400 mg (8/8/2022), 400 mg (9/19/2022), 400 mg (10/31/2022), 400 mg (12/12/2022), 400 mg (1/23/2023), 400 mg (3/6/2023), 400 mg (5/5/2023), 400 mg (6/15/2023), 400 mg (8/3/2023), 400 mg (9/21/2023), 400 mg (11/16/2023), 400 mg (12/28/2023), 400 mg (2/8/2024)        Cancer Staging   Malignant melanoma of skin of face (HCC)  Staging form: Melanoma of the Skin, AJCC 8th Edition  - Clinical stage from 9/8/2021: Stage III (cT2a, cN1a, cM0) - Signed by Sara Purdy MD on 11/7/2021  - Pathologic stage from 9/8/2021: Stage IIIA (pT2a, pN1a, cM0) - Signed by Sara Purdy MD on  11/7/2021     Treatment Details   Treatment goal Curative   Plan Name OP Pembrolizumab Q 42 Days   Status Active   Start Date 4/4/2022   End Date 3/21/2024   Provider Sara Purdy MD   Chemotherapy pembrolizumab (KEYTRUDA) IVPB, 400 mg, Intravenous, Once, 17 of 17 cycles  Administration: 400 mg (4/4/2022), 400 mg (5/16/2022), 400 mg (6/27/2022), 400 mg (8/8/2022), 400 mg (9/19/2022), 400 mg (10/31/2022), 400 mg (12/12/2022), 400 mg (1/23/2023), 400 mg (3/6/2023), 400 mg (5/5/2023), 400 mg (6/15/2023), 400 mg (8/3/2023), 400 mg (9/21/2023), 400 mg (11/16/2023), 400 mg (12/28/2023), 400 mg (2/8/2024), 400 mg (3/21/2024)          HISTORY OF PRESENT ILLNESS: Salvatore Bacon is a 84 y.o. male  who is here for follow-up and follow-up on treatment. He is accompanied by his wife.    The patient is doing well. This is his last infusion. He denies any headaches, double vision, chest pain, dyspnea, abdominal pain, nausea, vomiting, or diarrhea. His energy level is not too bad. He denies any lumps or bumps on his skin or any new skin lesions. He has no concerns.         Review of Systems   Constitutional:  Negative for appetite change, fatigue, fever and unexpected weight change.   HENT:   Negative for lump/mass, trouble swallowing and voice change.    Eyes:  Negative for icterus.   Respiratory:  Negative for cough, shortness of breath and wheezing.    Cardiovascular:  Negative for leg swelling.   Gastrointestinal:  Negative for abdominal pain, constipation, diarrhea, nausea and vomiting.   Genitourinary:  Negative for difficulty urinating and hematuria.    Musculoskeletal:  Negative for arthralgias, gait problem and myalgias.   Skin:  Negative for itching and rash.        No new, changing, or concerning lesions.   Neurological:  Negative for extremity weakness, gait problem, headaches, light-headedness and numbness.   Hematological:  Negative for adenopathy.        MEDICATIONS:    Current Outpatient Medications:   •   atenolol (TENORMIN) 100 mg tablet, Take 1 tablet (100 mg total) by mouth daily, Disp: 90 tablet, Rfl: 3  •  glimepiride (AMARYL) 2 mg tablet, Take 1 tablet (2 mg total) by mouth daily with breakfast, Disp: 90 tablet, Rfl: 3  •  lisinopril-hydrochlorothiazide (PRINZIDE,ZESTORETIC) 20-25 MG per tablet, Take 1 tablet by mouth daily, Disp: 90 tablet, Rfl: 3  •  lovastatin (MEVACOR) 40 MG tablet, Take 1 tablet (40 mg total) by mouth daily, Disp: 90 tablet, Rfl: 3  •  metFORMIN (GLUCOPHAGE) 1000 MG tablet, Take 1 tablet (1,000 mg total) by mouth 2 (two) times a day with meals, Disp: 180 tablet, Rfl: 3     ALLERGIES:  No Known Allergies     ACTIVE PROBLEMS:  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)   • Stage 3 chronic kidney disease, unspecified whether stage 3a or 3b CKD (HCC)   • High risk medication use   • Advanced care planning/counseling discussion   • Arthritis of left knee   • Left cervical lymphadenopathy   • Lung nodules          PAST MEDICAL HISTORY:   Past Medical History:   Diagnosis Date   • Diabetes mellitus (HCC)    • Hyperlipidemia    • Hypertension         PAST SURGICAL HISTORY:  Past Surgical History:   Procedure Laterality Date   • CATARACT EXTRACTION, BILATERAL     • FLAP LOCAL HEAD / NECK Left 10/12/2021    Procedure: RECONSTRUCTION OF LEFT TEMPLE DEFECT AFTER RESCECTION MELANOMA;  Surgeon: Kingston Oviedo MD;  [facility];  Service: Plastics   • FULL THICKNESS SKIN GRAFT  Left 10/12/2021    Procedure: SKIN GRAFT FULL THICKNESS LEFT TEMPLE;  Surgeon: Kingston Oviedo MD;  [facility];  Service: Plastics   • GALLBLADDER SURGERY     • HAND SURGERY Left    • LYMPH NODE BIOPSY Left 10/12/2021    Procedure: BIOPSY LYMPH NODE SENTINEL, LYMPHOSCINTIGRAPHY, INTRAOPERATIVE LYMPHATIC MAPPING (INJECT AT 1200);  Surgeon: Joana Mg MD;  [facility];  Service: Surgical Oncology   • NOSE SURGERY     • SKIN CANCER EXCISION  10/2021   • SKIN LESION EXCISION Left 10/12/2021    Procedure: FACE MELANOMA SCAR WIDE EXCISION  FACE;  Surgeon: Joana Mg MD;  [facility];  Service: Surgical Oncology   • SPLIT THICKNESS SKIN GRAFT Left 10/12/2021    Procedure: SKIN GRAFT SPLIT THICKNESS LEFT TEMPLE;  Surgeon: Kingston Oviedo MD;  [facility];  Service: Plastics   • US GUIDED LYMPH NODE BIOPSY LEFT  03/21/2022        SOCIAL HISTORY:  Social History     Socioeconomic History   • Marital status: /Civil Union     Spouse name: Not on file   • Number of children: Not on file   • Years of education: Not on file   • Highest education level: Not on file   Occupational History   • Not on file   Tobacco Use   • Smoking status: Never   • Smokeless tobacco: Never   Vaping Use   • Vaping status: Never Used   Substance and Sexual Activity   • Alcohol use: Not Currently   • Drug use: Never   • Sexual activity: Not Currently   Other Topics Concern   • Not on file   Social History Narrative   • Not on file     Social Determinants of Health     Financial Resource Strain: Low Risk  (12/1/2023)    Overall Financial Resource Strain (CARDIA)    • Difficulty of Paying Living Expenses: Not hard at all   Food Insecurity: Not on file   Transportation Needs: No Transportation Needs (12/1/2023)    PRAPARE - Transportation    • Lack of Transportation (Medical): No    • Lack of Transportation (Non-Medical): No   Physical Activity: Not on file   Stress: Not on file   Social  "Connections: Not on file   Intimate Partner Violence: Not on file   Housing Stability: Not on file        FAMILY HISTORY:  Family History   Problem Relation Age of Onset   • No Known Problems Mother    • No Known Problems Father    • Colon cancer Other 60          Objective    PHYSICAL EXAMINATION:   Blood pressure 128/68, pulse 58, temperature 97.8 °F (36.6 °C), temperature source Temporal, resp. rate 18, height 5' 7\", weight 73.5 kg (162 lb), SpO2 97%.     Pain Score: 0-No pain     ECOG Performance Status      Flowsheet Row Most Recent Value   ECOG Performance Status 1 - Restricted in physically strenuous activity but ambulatory and able to carry out work of a light or sedentary nature, e.g., light house work, office work               Physical Exam  Constitutional:       General: He is not in acute distress.     Appearance: Normal appearance. He is not ill-appearing or toxic-appearing.   HENT:      Head: Normocephalic and atraumatic.      Right Ear: External ear normal.      Left Ear: External ear normal.      Nose: Nose normal.      Mouth/Throat:      Mouth: Mucous membranes are moist.      Pharynx: Oropharynx is clear.   Eyes:      General: No scleral icterus.        Right eye: No discharge.         Left eye: No discharge.      Conjunctiva/sclera: Conjunctivae normal.   Cardiovascular:      Rate and Rhythm: Normal rate and regular rhythm.      Pulses: Normal pulses.      Heart sounds: No murmur heard.     No friction rub. No gallop.   Pulmonary:      Effort: Pulmonary effort is normal. No respiratory distress.      Breath sounds: Normal breath sounds. No wheezing or rales.   Abdominal:      General: Bowel sounds are normal. There is no distension.      Palpations: There is no mass.      Tenderness: There is no abdominal tenderness. There is no rebound.   Musculoskeletal:         General: No swelling or tenderness.      Cervical back: Normal range of motion. No rigidity.      Right lower leg: No edema.      Left " lower leg: No edema.   Lymphadenopathy:      Head:      Right side of head: No submandibular, preauricular or posterior auricular adenopathy.      Left side of head: No submandibular, preauricular or posterior auricular adenopathy.      Cervical: No cervical adenopathy.      Right cervical: No superficial or posterior cervical adenopathy.     Left cervical: No superficial or posterior cervical adenopathy.      Upper Body:      Right upper body: No supraclavicular or axillary adenopathy.      Left upper body: No supraclavicular or axillary adenopathy.   Skin:     General: Skin is warm.      Coloration: Skin is not jaundiced.      Findings: No lesion or rash.      Comments: Well healed surgical scar.  No evidence of recurrence at primary site.   Neurological:      General: No focal deficit present.      Mental Status: He is alert and oriented to person, place, and time.      Cranial Nerves: No cranial nerve deficit.      Motor: No weakness.      Gait: Gait normal.   Psychiatric:         Mood and Affect: Mood normal.         Behavior: Behavior normal.         Thought Content: Thought content normal.         Judgment: Judgment normal.         I reviewed lab data in the chart.    WBC   Date Value Ref Range Status   03/14/2024 9.28 4.31 - 10.16 Thousand/uL Final   02/01/2024 9.69 4.31 - 10.16 Thousand/uL Final   12/21/2023 10.10 4.31 - 10.16 Thousand/uL Final     Hemoglobin   Date Value Ref Range Status   03/14/2024 14.5 12.0 - 17.0 g/dL Final   02/01/2024 14.4 12.0 - 17.0 g/dL Final   12/21/2023 15.0 12.0 - 17.0 g/dL Final     Platelets   Date Value Ref Range Status   03/14/2024 149 149 - 390 Thousands/uL Final   02/01/2024 149 149 - 390 Thousands/uL Final   12/21/2023 144 (L) 149 - 390 Thousands/uL Final     MCV   Date Value Ref Range Status   03/14/2024 89 82 - 98 fL Final   02/01/2024 91 82 - 98 fL Final   12/21/2023 92 82 - 98 fL Final      Potassium   Date Value Ref Range Status   03/14/2024 4.2 3.5 - 5.3 mmol/L  Final   02/01/2024 4.3 3.5 - 5.3 mmol/L Final   12/21/2023 4.3 3.5 - 5.3 mmol/L Final   07/20/2020 4.6 3.5 - 5.2 mmol/L Final   05/01/2019 3.6 3.5 - 5.2 mmol/L Final   04/30/2019 3.2 (L) 3.5 - 5.2 mmol/L Final     Chloride   Date Value Ref Range Status   03/14/2024 101 96 - 108 mmol/L Final   02/01/2024 101 96 - 108 mmol/L Final   12/21/2023 101 96 - 108 mmol/L Final   07/20/2020 108 100 - 109 mmol/L Final   05/01/2019 112 (H) 100 - 109 mmol/L Final   04/30/2019 108 100 - 109 mmol/L Final     Carbon Dioxide   Date Value Ref Range Status   07/20/2020 28 23 - 31 mmol/L Final   05/01/2019 24 23 - 31 mmol/L Final   04/30/2019 25 23 - 31 mmol/L Final     CO2   Date Value Ref Range Status   03/14/2024 29 21 - 32 mmol/L Final   02/01/2024 31 21 - 32 mmol/L Final   12/21/2023 28 21 - 32 mmol/L Final     BUN   Date Value Ref Range Status   03/14/2024 33 (H) 5 - 25 mg/dL Final   02/01/2024 41 (H) 5 - 25 mg/dL Final   12/21/2023 35 (H) 5 - 25 mg/dL Final   07/20/2020 26 7 - 28 mg/dL Final   05/01/2019 35 (H) 7 - 28 mg/dL Final   04/30/2019 42 (H) 7 - 28 mg/dL Final     Creatinine   Date Value Ref Range Status   03/14/2024 1.69 (H) 0.60 - 1.30 mg/dL Final     Comment:     Standardized to IDMS reference method   02/01/2024 1.74 (H) 0.60 - 1.30 mg/dL Final     Comment:     Standardized to IDMS reference method   12/21/2023 1.65 (H) 0.60 - 1.30 mg/dL Final     Comment:     Standardized to IDMS reference method   07/20/2020 1.25 0.53 - 1.30 mg/dL Final   05/01/2019 1.18 0.53 - 1.30 mg/dL Final   04/30/2019 1.28 0.53 - 1.30 mg/dL Final     Glucose   Date Value Ref Range Status   03/14/2024 246 (H) 65 - 140 mg/dL Final     Comment:     If the patient is fasting, the ADA then defines impaired fasting glucose as > 100 mg/dL and diabetes as > or equal to 123 mg/dL.   02/01/2024 230 (H) 65 - 140 mg/dL Final     Comment:     If the patient is fasting, the ADA then defines impaired fasting glucose as > 100 mg/dL and diabetes as > or equal  to 123 mg/dL.   11/09/2023 203 (H) 65 - 140 mg/dL Final     Comment:     If the patient is fasting, the ADA then defines impaired fasting glucose as > 100 mg/dL and diabetes as > or equal to 123 mg/dL.   07/20/2020 87 65 - 99 mg/dL Final   05/01/2019 108 (H) 65 - 99 mg/dL Final   04/30/2019 140 (H) 65 - 99 mg/dL Final     Calcium   Date Value Ref Range Status   03/14/2024 9.7 8.4 - 10.2 mg/dL Final   02/01/2024 9.3 8.4 - 10.2 mg/dL Final   12/21/2023 9.3 8.4 - 10.2 mg/dL Final   07/20/2020 8.8 8.5 - 10.1 mg/dL Final   05/01/2019 7.1 (L) 8.5 - 10.1 mg/dL Final   04/30/2019 6.9 (L) 8.5 - 10.1 mg/dL Final     Albumin   Date Value Ref Range Status   03/14/2024 4.6 3.5 - 5.0 g/dL Final   02/01/2024 4.4 3.5 - 5.0 g/dL Final   12/21/2023 4.5 3.5 - 5.0 g/dL Final     ALBUMIN   Date Value Ref Range Status   07/20/2020 4.0 3.5 - 4.8 g/dL Final   05/01/2019 2.2 (L) 3.5 - 4.8 g/dL Final   04/30/2019 2.2 (L) 3.5 - 4.8 g/dL Final     Total Bilirubin   Date Value Ref Range Status   03/14/2024 1.62 (H) 0.20 - 1.00 mg/dL Final     Comment:     Use of this assay is not recommended for patients undergoing treatment with eltrombopag due to the potential for falsely elevated results.  N-acetyl-p-benzoquinone imine (metabolite of Acetaminophen) will generate erroneously low results in samples for patients that have taken an overdose of Acetaminophen.   02/01/2024 1.38 (H) 0.20 - 1.00 mg/dL Final     Comment:     Use of this assay is not recommended for patients undergoing treatment with eltrombopag due to the potential for falsely elevated results.  N-acetyl-p-benzoquinone imine (metabolite of Acetaminophen) will generate erroneously low results in samples for patients that have taken an overdose of Acetaminophen.   12/21/2023 1.42 (H) 0.20 - 1.00 mg/dL Final     Comment:     Use of this assay is not recommended for patients undergoing treatment with eltrombopag due to the potential for falsely elevated results.  N-acetyl-p-benzoquinone  "imine (metabolite of Acetaminophen) will generate erroneously low results in samples for patients that have taken an overdose of Acetaminophen.   07/20/2020 1.2 (H) 0.2 - 1.0 mg/dL Final     Comment:     Use of this assay is not recommended for patients undergoing treatment with eltrombopag due to the potential for falsely elevated results.   05/01/2019 1.4 (H) 0.2 - 1.0 mg/dL Final   04/30/2019 1.9 (H) 0.2 - 1.0 mg/dL Final     Alkaline Phosphatase   Date Value Ref Range Status   03/14/2024 75 34 - 104 U/L Final   02/01/2024 74 34 - 104 U/L Final   12/21/2023 78 34 - 104 U/L Final   07/20/2020 63 35 - 120 U/L Final   05/01/2019 116 35 - 120 U/L Final   04/30/2019 129 (H) 35 - 120 U/L Final     AST   Date Value Ref Range Status   03/14/2024 18 13 - 39 U/L Final   02/01/2024 13 13 - 39 U/L Final   12/21/2023 19 13 - 39 U/L Final   07/20/2020 17 <41 U/L Final   05/01/2019 83 (H) <41 U/L Final   04/30/2019 100 (H) <41 U/L Final     ALT   Date Value Ref Range Status   03/14/2024 20 7 - 52 U/L Final     Comment:     Specimen collection should occur prior to Sulfasalazine administration due to the potential for falsely depressed results.    02/01/2024 11 7 - 52 U/L Final     Comment:     Specimen collection should occur prior to Sulfasalazine administration due to the potential for falsely depressed results.    12/21/2023 15 7 - 52 U/L Final     Comment:     Specimen collection should occur prior to Sulfasalazine administration due to the potential for falsely depressed results.    07/20/2020 22 <56 U/L Final   05/01/2019 68 (H) <56 U/L Final   04/30/2019 118 (H) <56 U/L Final      LD   Date Value Ref Range Status   03/14/2024 136 (L) 140 - 271 U/L Final   02/01/2024 125 (L) 140 - 271 U/L Final   12/21/2023 178 140 - 271 U/L Final     TSH   Date Value Ref Range Status   07/20/2020 1.47 0.36 - 3.74 uIU/mL Final   07/18/2018 2.20 0.36 - 3.74 uIU/mL Final     No results found for: \"[id number]\"   Free T4   Date Value Ref Range " Status   03/14/2024 0.87 0.61 - 1.12 ng/dL Final     Comment:     Specimens with biotin concentrations > 10 ng/mL can lead to significant (> 10%) positive bias in result.   02/01/2024 0.84 0.61 - 1.12 ng/dL Final     Comment:     Specimens with biotin concentrations > 10 ng/mL can lead to significant (> 10%) positive bias in result.   12/21/2023 0.87 0.61 - 1.12 ng/dL Final     Comment:     Specimens with biotin concentrations > 10 ng/mL can lead to significant (> 10%) positive bias in result.         RECENT IMAGING:  I have personally reviewed results with the patient.    No results found.       Assessment/Plan  The patient is an 84-year-old gentleman with locally advanced/possibly metastatic melanoma on treatment with pembrolizumab, here for continued monitoring, follow-up on surveillance.    1. Malignant melanoma of skin of face (HCC)  -     NM pet ct tumor imaging whole body; Future; Expected date: 06/21/2024  -     CBC and differential; Future; Expected date: 05/02/2024  -     Comprehensive metabolic panel; Future; Expected date: 05/02/2024  -     LD,Blood; Future; Expected date: 05/02/2024  -     T3, free; Future; Expected date: 05/02/2024  -     T4, free; Future; Expected date: 05/02/2024  -     TSH, 3rd generation; Future; Expected date: 05/02/2024    2. Malignant melanoma of forehead (HCC)  -     NM pet ct tumor imaging whole body; Future; Expected date: 06/21/2024  -     CBC and differential; Future; Expected date: 05/02/2024  -     Comprehensive metabolic panel; Future; Expected date: 05/02/2024  -     LD,Blood; Future; Expected date: 05/02/2024  -     T3, free; Future; Expected date: 05/02/2024  -     T4, free; Future; Expected date: 05/02/2024  -     TSH, 3rd generation; Future; Expected date: 05/02/2024    3. 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 He is here today for his last  infusion of pembrolizumab, which will have been 2 years. Blood work reviewed and okay for treatment. He knows to continue to monitor for immune mediated side effects. He will return in approximately 6 weeks for follow-up and toxicity check. He is due for full body imaging at that time. He will also be due for blood work. All orders placed and scripts provided for imaging and blood work. He knows to call with issues or concerns prior to his next visit. We did discuss that following infusion and toxicity check, we will then continue to monitor him closely every 3 months for at least 2 additional years. Every visit will encompass physical exam and blood work. We will also continue with periodic full body imaging as well.    Orders:  -     NM pet ct tumor imaging whole body; Future; Expected date: 06/21/2024  -     CBC and differential; Future; Expected date: 05/02/2024  -     Comprehensive metabolic panel; Future; Expected date: 05/02/2024  -     LD,Blood; Future; Expected date: 05/02/2024  -     T3, free; Future; Expected date: 05/02/2024  -     T4, free; Future; Expected date: 05/02/2024  -     TSH, 3rd generation; Future; Expected date: 05/02/2024    4. High risk medication use  Assessment & Plan:  The patient is on treatment with immunotherapy and we will continue to monitor for side effects and lab abnormalities. We will monitor labs with each treatment to ensure safety of continuing on treatment. The patient knows to watch for signs and symptoms for immune mediated side effects.  Denies any immune mediated side effects at this time.      Orders:  -     NM pet ct tumor imaging whole body; Future; Expected date: 06/21/2024  -     CBC and differential; Future; Expected date: 05/02/2024  -     Comprehensive metabolic panel; Future; Expected date: 05/02/2024  -     LD,Blood; Future; Expected date: 05/02/2024  -     T3, free; Future; Expected date: 05/02/2024  -     T4, free; Future; Expected date: 05/02/2024  -      TSH, 3rd generation; Future; Expected date: 05/02/2024         Return in about 6 weeks (around 5/2/2024) for Office Visit, scans, labs.     Sara Purdy MD, PhD    Transcribed for Sara Prudy MD, by Debra dial on 03/21/24 at 2:00 PM. Powered by Dragon [employer].

## 2024-04-25 ENCOUNTER — APPOINTMENT (OUTPATIENT)
Dept: LAB | Facility: IMAGING CENTER | Age: 84
End: 2024-04-25
Payer: COMMERCIAL

## 2024-04-25 DIAGNOSIS — Z79.899 HIGH RISK MEDICATION USE: ICD-10-CM

## 2024-04-25 DIAGNOSIS — C43.39 MALIGNANT MELANOMA OF FOREHEAD (HCC): ICD-10-CM

## 2024-04-25 DIAGNOSIS — C77.3 SECONDARY AND UNSPECIFIED MALIGNANT NEOPLASM OF AXILLA AND UPPER LIMB LYMPH NODES (HCC): ICD-10-CM

## 2024-04-25 DIAGNOSIS — C43.30 MALIGNANT MELANOMA OF SKIN OF FACE (HCC): ICD-10-CM

## 2024-04-25 LAB
ALBUMIN SERPL BCP-MCNC: 4.6 G/DL (ref 3.5–5)
ALP SERPL-CCNC: 75 U/L (ref 34–104)
ALT SERPL W P-5'-P-CCNC: 12 U/L (ref 7–52)
ANION GAP SERPL CALCULATED.3IONS-SCNC: 11 MMOL/L (ref 4–13)
AST SERPL W P-5'-P-CCNC: 14 U/L (ref 13–39)
BASOPHILS # BLD AUTO: 0.03 THOUSANDS/ÂΜL (ref 0–0.1)
BASOPHILS NFR BLD AUTO: 0 % (ref 0–1)
BILIRUB SERPL-MCNC: 1.41 MG/DL (ref 0.2–1)
BUN SERPL-MCNC: 27 MG/DL (ref 5–25)
CALCIUM SERPL-MCNC: 9.1 MG/DL (ref 8.4–10.2)
CHLORIDE SERPL-SCNC: 101 MMOL/L (ref 96–108)
CO2 SERPL-SCNC: 28 MMOL/L (ref 21–32)
CREAT SERPL-MCNC: 1.53 MG/DL (ref 0.6–1.3)
EOSINOPHIL # BLD AUTO: 0.78 THOUSAND/ÂΜL (ref 0–0.61)
EOSINOPHIL NFR BLD AUTO: 8 % (ref 0–6)
ERYTHROCYTE [DISTWIDTH] IN BLOOD BY AUTOMATED COUNT: 13 % (ref 11.6–15.1)
GFR SERPL CREATININE-BSD FRML MDRD: 40 ML/MIN/1.73SQ M
GLUCOSE SERPL-MCNC: 218 MG/DL (ref 65–140)
HCT VFR BLD AUTO: 43.9 % (ref 36.5–49.3)
HGB BLD-MCNC: 14.5 G/DL (ref 12–17)
IMM GRANULOCYTES # BLD AUTO: 0.02 THOUSAND/UL (ref 0–0.2)
IMM GRANULOCYTES NFR BLD AUTO: 0 % (ref 0–2)
LDH SERPL-CCNC: 165 U/L (ref 140–271)
LYMPHOCYTES # BLD AUTO: 4.48 THOUSANDS/ÂΜL (ref 0.6–4.47)
LYMPHOCYTES NFR BLD AUTO: 46 % (ref 14–44)
MCH RBC QN AUTO: 30.1 PG (ref 26.8–34.3)
MCHC RBC AUTO-ENTMCNC: 33 G/DL (ref 31.4–37.4)
MCV RBC AUTO: 91 FL (ref 82–98)
MONOCYTES # BLD AUTO: 0.56 THOUSAND/ÂΜL (ref 0.17–1.22)
MONOCYTES NFR BLD AUTO: 6 % (ref 4–12)
NEUTROPHILS # BLD AUTO: 3.84 THOUSANDS/ÂΜL (ref 1.85–7.62)
NEUTS SEG NFR BLD AUTO: 40 % (ref 43–75)
NRBC BLD AUTO-RTO: 0 /100 WBCS
PLATELET # BLD AUTO: 138 THOUSANDS/UL (ref 149–390)
PMV BLD AUTO: 10.9 FL (ref 8.9–12.7)
POTASSIUM SERPL-SCNC: 4.4 MMOL/L (ref 3.5–5.3)
PROT SERPL-MCNC: 6.7 G/DL (ref 6.4–8.4)
RBC # BLD AUTO: 4.81 MILLION/UL (ref 3.88–5.62)
SODIUM SERPL-SCNC: 140 MMOL/L (ref 135–147)
T3FREE SERPL-MCNC: 2.9 PG/ML (ref 2.5–3.9)
T4 FREE SERPL-MCNC: 0.75 NG/DL (ref 0.61–1.12)
TSH SERPL DL<=0.05 MIU/L-ACNC: 2.55 UIU/ML (ref 0.45–4.5)
WBC # BLD AUTO: 9.71 THOUSAND/UL (ref 4.31–10.16)

## 2024-04-25 PROCEDURE — 80053 COMPREHEN METABOLIC PANEL: CPT

## 2024-04-25 PROCEDURE — 36415 COLL VENOUS BLD VENIPUNCTURE: CPT

## 2024-04-25 PROCEDURE — 85025 COMPLETE CBC W/AUTO DIFF WBC: CPT

## 2024-04-25 PROCEDURE — 84481 FREE ASSAY (FT-3): CPT

## 2024-04-25 PROCEDURE — 84443 ASSAY THYROID STIM HORMONE: CPT

## 2024-04-25 PROCEDURE — 83615 LACTATE (LD) (LDH) ENZYME: CPT

## 2024-04-25 PROCEDURE — 84439 ASSAY OF FREE THYROXINE: CPT

## 2024-05-02 ENCOUNTER — HOSPITAL ENCOUNTER (OUTPATIENT)
Dept: RADIOLOGY | Age: 84
Discharge: HOME | End: 2024-05-02
Payer: COMMERCIAL

## 2024-05-02 DIAGNOSIS — C43.30 MALIGNANT MELANOMA OF SKIN OF FACE (HCC): ICD-10-CM

## 2024-05-02 DIAGNOSIS — C43.39 MALIGNANT MELANOMA OF FOREHEAD (HCC): ICD-10-CM

## 2024-05-02 DIAGNOSIS — Z79.899 HIGH RISK MEDICATION USE: ICD-10-CM

## 2024-05-02 DIAGNOSIS — C77.3 SECONDARY AND UNSPECIFIED MALIGNANT NEOPLASM OF AXILLA AND UPPER LIMB LYMPH NODES (HCC): ICD-10-CM

## 2024-05-02 LAB — GLUCOSE SERPL-MCNC: 102 MG/DL (ref 65–140)

## 2024-05-02 PROCEDURE — 82948 REAGENT STRIP/BLOOD GLUCOSE: CPT

## 2024-05-02 PROCEDURE — 78816 PET IMAGE W/CT FULL BODY: CPT

## 2024-05-02 PROCEDURE — A9552 F18 FDG: HCPCS

## 2024-05-08 ENCOUNTER — OFFICE VISIT (OUTPATIENT)
Dept: SURGICAL ONCOLOGY | Facility: CLINIC | Age: 84
End: 2024-05-08
Payer: COMMERCIAL

## 2024-05-08 VITALS
SYSTOLIC BLOOD PRESSURE: 144 MMHG | HEIGHT: 67 IN | DIASTOLIC BLOOD PRESSURE: 80 MMHG | OXYGEN SATURATION: 99 % | TEMPERATURE: 98.1 F | RESPIRATION RATE: 18 BRPM | BODY MASS INDEX: 25.55 KG/M2 | WEIGHT: 162.8 LBS | HEART RATE: 49 BPM

## 2024-05-08 DIAGNOSIS — C43.30 MALIGNANT MELANOMA OF SKIN OF FACE (HCC): Primary | ICD-10-CM

## 2024-05-08 DIAGNOSIS — C77.3 SECONDARY AND UNSPECIFIED MALIGNANT NEOPLASM OF AXILLA AND UPPER LIMB LYMPH NODES (HCC): ICD-10-CM

## 2024-05-08 PROCEDURE — 99214 OFFICE O/P EST MOD 30 MIN: CPT | Performed by: STUDENT IN AN ORGANIZED HEALTH CARE EDUCATION/TRAINING PROGRAM

## 2024-05-08 NOTE — PROGRESS NOTES
Surgical Oncology F/u    [facility] [place]  [address] [facility] [address] [phone]    Patient:  Salvatore Bacon  1940  [id number]    Primary Care provider:  Joey Jarrell MD  [address] [place] [address] [address]  [facility] [address]    Referring provider:  No referring provider defined for this encounter.    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 Last PET 5/2/2024.       No follow-ups on file.    Oncology History   Malignant melanoma of skin of face (HCC)   9/8/2021 -  Cancer Staged    Staging form: Melanoma of the Skin, AJCC 8th Edition  - Clinical stage from 9/8/2021: Stage III (cT2a, cN1a, cM0) - Signed by Sara Purdy MD on 11/7/2021 9/8/2021 -  Cancer Staged    Staging form: Melanoma of the Skin, AJCC 8th Edition  - Pathologic stage from 9/8/2021: Stage IIIA (pT2a, pN1a, cM0) - Signed by Sara Purdy MD on 11/7/2021 9/22/2021 Initial Diagnosis    Malignant melanoma of skin of face (HCC)     4/4/2022 -  Chemotherapy    pembrolizumab (KEYTRUDA) IVPB, 400 mg, Intravenous, Once, 17 of 17 cycles  Administration: 400 mg (4/4/2022), 400 mg (5/16/2022), 400 mg (6/27/2022), 400 mg (8/8/2022), 400 mg (9/19/2022), 400 mg (10/31/2022), 400 mg (12/12/2022), 400 mg (1/23/2023), 400 mg (3/6/2023), 400 mg (5/5/2023), 400 mg (6/15/2023), 400 mg (8/3/2023), 400 mg (9/21/2023), 400 mg (11/16/2023), 400 mg (12/28/2023), 400 mg (2/8/2024), 400 mg (3/21/2024)     Malignant melanoma of forehead (HCC)   11/29/2021 Initial Diagnosis    Malignant melanoma of forehead (HCC)     4/4/2022 -  Chemotherapy    pembrolizumab (KEYTRUDA) IVPB, 400 mg, Intravenous, Once, 17 of 17 cycles  Administration: 400 mg (4/4/2022), 400  mg (5/16/2022), 400 mg (6/27/2022), 400 mg (8/8/2022), 400 mg (9/19/2022), 400 mg (10/31/2022), 400 mg (12/12/2022), 400 mg (1/23/2023), 400 mg (3/6/2023), 400 mg (5/5/2023), 400 mg (6/15/2023), 400 mg (8/3/2023), 400 mg (9/21/2023), 400 mg (11/16/2023), 400 mg (12/28/2023), 400 mg (2/8/2024), 400 mg (3/21/2024)       STAGE IIIA: [id number]    Doing well. Completed 2 years of immunotherapy and is doing well! PET last year with smaller lung nodules as compared to 12/2022. New PET now with no findings! US of head and neck in Oct 2023 with no new suspicious nodes and overall stability of pre-auricular soft tissue. Sees Dr Purdy regularly and is seeing her tmw. He had shave bx of scalp lesion with invasive SCC in Sep, followed by excision. No new lumps or bumps to report. No HA, bone pain, abd pain, n/v. Pigmented lesions at left face have resolved    Review of Systems  Complete ROS Surg Onc:   Constitutional: The patient denies new or recent history of general fatigue, no recent weight loss, no change in appetite.   Eyes: No complaints of visual problems, no scleral icterus.   ENT: No complaints of ear pain, no hoarseness, no difficulty swallowing,  no tinnitus and no new masses in head, oral cavity, or neck.   Cardiovascular: No complaints of chest pain, no palpitations, no ankle edema.   Respiratory: No complaints of shortness of breath, no cough.   Gastrointestinal: No complaints of jaundice, no bloody stools, no pale stools.   Genitourinary: No complaints of dysuria, no hematuria, no nocturia, no frequent urination, no urethral discharge.   Musculoskeletal: No complaints of weakness, paralysis, joint stiffness or arthralgias.  Integumentary: No complaints of rash, no new lesions.   Neurological: No complaints of convulsions, no seizures, no dizziness.   Hematologic/Lymphatic: No complaints of easy bruising.   Endocrine:  No hot or cold intolerance.  No polydipsia, polyphagia, or polyuria.  Allergy/immunology:  No  environmental allergies.  No food allergies.  Not immunocompromised.      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)    Stage 3 chronic kidney disease, unspecified whether stage 3a or 3b CKD (HCC)    High risk medication use    Advanced care planning/counseling discussion    Arthritis of left knee    Left cervical lymphadenopathy    Lung nodules     Past Medical History:   Diagnosis Date    Diabetes mellitus (HCC)     Hyperlipidemia     Hypertension      Past Surgical History:   Procedure Laterality Date    CATARACT EXTRACTION, BILATERAL      FLAP LOCAL HEAD / NECK Left 10/12/2021    Procedure: RECONSTRUCTION OF LEFT TEMPLE DEFECT AFTER RESCECTION MELANOMA;  Surgeon: Kingston Oviedo MD;  [facility];  Service: Plastics    FULL THICKNESS SKIN GRAFT Left 10/12/2021    Procedure: SKIN GRAFT FULL THICKNESS LEFT TEMPLE;  Surgeon: Kingston Oviedo MD;  [facility];  Service: Plastics    GALLBLADDER SURGERY      HAND SURGERY Left     LYMPH NODE BIOPSY Left 10/12/2021    Procedure: BIOPSY LYMPH NODE SENTINEL, LYMPHOSCINTIGRAPHY, INTRAOPERATIVE LYMPHATIC MAPPING (INJECT AT 1200);  Surgeon: Joana Mg MD;  [facility];  Service: Surgical Oncology    NOSE SURGERY      SKIN CANCER EXCISION  10/2021    SKIN LESION EXCISION Left 10/12/2021    Procedure: FACE MELANOMA SCAR WIDE EXCISION  FACE;  Surgeon: Joana Mg MD;  [facility];  Service: Surgical Oncology    SPLIT THICKNESS  SKIN GRAFT Left 10/12/2021    Procedure: SKIN GRAFT SPLIT THICKNESS LEFT TEMPLE;  Surgeon: Kingston Oviedo MD;  [facility];  Service: Plastics    US GUIDED LYMPH NODE BIOPSY LEFT  03/21/2022     Family History   Problem Relation Age of Onset    No Known Problems Mother     No Known Problems Father     Colon cancer Other 60     Social History     Socioeconomic History    Marital status: /Civil Union     Spouse name: Not on file    Number of children: Not on file    Years of education: Not on file    Highest education level: Not on file   Occupational History    Not on file   Tobacco Use    Smoking status: Never    Smokeless tobacco: Never   Vaping Use    Vaping status: Never Used   Substance and Sexual Activity    Alcohol use: Not Currently    Drug use: Never    Sexual activity: Not Currently   Other Topics Concern    Not on file   Social History Narrative    Not on file     Social Determinants of Health     Financial Resource Strain: Low Risk  (12/1/2023)    Overall Financial Resource Strain (CARDIA)     Difficulty of Paying Living Expenses: Not hard at all   Food Insecurity: Not on file   Transportation Needs: No Transportation Needs (12/1/2023)    PRAPARE - Transportation     Lack of Transportation (Medical): No     Lack of Transportation (Non-Medical): No   Physical Activity: Not on file   Stress: Not on file   Social Connections: Not on file   Intimate Partner Violence: Not on file   Housing Stability: Not on file       Current Outpatient Medications:     atenolol (TENORMIN) 100 mg tablet, Take 1 tablet (100 mg total) by mouth daily, Disp: 90 tablet, Rfl: 3    glimepiride (AMARYL) 2 mg tablet, Take 1 tablet (2 mg total) by mouth daily with breakfast, Disp: 90 tablet, Rfl: 3    lisinopril-hydrochlorothiazide (PRINZIDE,ZESTORETIC) 20-25 MG per tablet, Take 1 tablet by mouth daily, Disp: 90 tablet, Rfl: 3    lovastatin (MEVACOR) 40 MG tablet, Take 1 tablet (40 mg total) by mouth daily, Disp: 90  tablet, Rfl: 3    metFORMIN (GLUCOPHAGE) 1000 MG tablet, Take 1 tablet (1,000 mg total) by mouth 2 (two) times a day with meals, Disp: 180 tablet, Rfl: 3  No Known Allergies    Vitals:    05/08/24 0910   BP: 144/80   Pulse: (!) 49   Resp: 18   Temp: 98.1 °F (36.7 °C)   SpO2: 99%       Physical Exam     General: Appears well, appears stated age  Skin: Warm, anicteric  HEENT: Normocephalic, atraumatic; sclera aniceteric, mucous membranes moist. Well-healed graft site and pre-auricular incision. Several new pigments lesions between primary and node incision have resolved!  Cardiopulmonary: RRR, Easy WOB, no BLE edema  Abd: Flat and soft, nontender, no masses appreciated, no hepatosplenomegaly  MSK: Symmetric, no cyanosis, no overt weakness  Lymphatic: No cervical, axillary or inguinal lymphadenopathy  Neuro: Affect appropriate, no gross motor abnormalities            Pathology:  Final Diagnosis   A. Lymph node, sentinel, #1,  pre-auricular, biopsy:  One lymph node with subcapsular rare melanoma cells consistent with sub-micrometastasis (<0.1 mm). See synoptic report and note.      B. Skin, left forehead, excision:  Residual invasive melanoma and scar (cicatrix), margins free. See synoptic report.         Labs: Reviewed in EPIC    Imaging  No results found.    I independently reviewed and interpreted the available laboratory and imaging data, including Dr. Purdy's consultation, PET scans, US, path from SCC.     Discussion/Summary:   83-year-old gentleman status post wide local excision with sentinel lymph node biopsy of what ultimately was determined to be a [id number] melanoma 10/2021.  Ultrasound of the neck completed early 2022 revealed concern for recurrence in the preauricular region as well as the left cervical chain.  He underwent biopsy of this which was inconclusive, and elected to pursue adjuvant therapy and follow clinically. PET in Dec with ?pulm mets which had improved in June. Completed immuno and recent PET  with no disease! He is due for H&N US in 6 mo. He wishes to cut down on the number of doctors he is seeing and thus I will have Dr Purdy follow up with this study since she needs to see him q3 mo. Encouraged to continue seeing derm, I will see him prn!

## 2024-05-08 NOTE — LETTER
May 8, 2024     Sara Purdy MD  [address] [facility] [address]    Patient: Salvatore Bacon   YOB: 1940   Date of Visit: 5/8/2024       Dear Dr. Purdy:    Thank you for referring Salvatore Bacon to me for evaluation. Below are my notes for this consultation.    If you have questions, please do not hesitate to call me. I look forward to following your patient along with you.         Sincerely,        Joana Mg MD        CC: No Recipients    Joana Mg MD  5/8/2024 10:59 AM  Sign when Signing Visit  Surgical Oncology F/u    [facility] [address] [address]  [phone]    Patient:  Salvatore Bacon  1940  [id number]    Primary Care provider:  Joey Jarrell MD  [address]  [facility] [address]    Referring provider:  No referring provider defined for this encounter.    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 Last PET 5/2/2024.       No follow-ups on file.    Oncology History   Malignant melanoma of skin of face (HCC)   9/8/2021 -  Cancer Staged    Staging form: Melanoma of the Skin, AJCC 8th Edition  - Clinical stage from 9/8/2021: Stage III (cT2a, cN1a, cM0) - Signed by Sara Purdy MD on 11/7/2021 9/8/2021 -  Cancer Staged    Staging form: Melanoma of the Skin, AJCC 8th Edition  - Pathologic stage from 9/8/2021: Stage IIIA (pT2a, pN1a, cM0) - Signed by Sara Purdy MD on 11/7/2021 9/22/2021 Initial Diagnosis    Malignant melanoma of skin of face (HCC)     4/4/2022 -  Chemotherapy    pembrolizumab (KEYTRUDA) IVPB, 400 mg, Intravenous, Once, 17 of 17 cycles  Administration: 400 mg (4/4/2022), 400 mg (5/16/2022), 400 mg  (6/27/2022), 400 mg (8/8/2022), 400 mg (9/19/2022), 400 mg (10/31/2022), 400 mg (12/12/2022), 400 mg (1/23/2023), 400 mg (3/6/2023), 400 mg (5/5/2023), 400 mg (6/15/2023), 400 mg (8/3/2023), 400 mg (9/21/2023), 400 mg (11/16/2023), 400 mg (12/28/2023), 400 mg (2/8/2024), 400 mg (3/21/2024)     Malignant melanoma of forehead (HCC)   11/29/2021 Initial Diagnosis    Malignant melanoma of forehead (HCC)     4/4/2022 -  Chemotherapy    pembrolizumab (KEYTRUDA) IVPB, 400 mg, Intravenous, Once, 17 of 17 cycles  Administration: 400 mg (4/4/2022), 400 mg (5/16/2022), 400 mg (6/27/2022), 400 mg (8/8/2022), 400 mg (9/19/2022), 400 mg (10/31/2022), 400 mg (12/12/2022), 400 mg (1/23/2023), 400 mg (3/6/2023), 400 mg (5/5/2023), 400 mg (6/15/2023), 400 mg (8/3/2023), 400 mg (9/21/2023), 400 mg (11/16/2023), 400 mg (12/28/2023), 400 mg (2/8/2024), 400 mg (3/21/2024)       STAGE IIIA: [id number]    Doing well. Completed 2 years of immunotherapy and is doing well! PET last year with smaller lung nodules as compared to 12/2022. New PET now with no findings! US of head and neck in Oct 2023 with no new suspicious nodes and overall stability of pre-auricular soft tissue. Sees Dr Purdy regularly and is seeing her tmw. He had shave bx of scalp lesion with invasive SCC in Sep, followed by excision. No new lumps or bumps to report. No HA, bone pain, abd pain, n/v. Pigmented lesions at left face have resolved    Review of Systems  Complete ROS Surg Onc:   Constitutional: The patient denies new or recent history of general fatigue, no recent weight loss, no change in appetite.   Eyes: No complaints of visual problems, no scleral icterus.   ENT: No complaints of ear pain, no hoarseness, no difficulty swallowing,  no tinnitus and no new masses in head, oral cavity, or neck.   Cardiovascular: No complaints of chest pain, no palpitations, no ankle edema.   Respiratory: No complaints of shortness of breath, no cough.   Gastrointestinal: No complaints  of jaundice, no bloody stools, no pale stools.   Genitourinary: No complaints of dysuria, no hematuria, no nocturia, no frequent urination, no urethral discharge.   Musculoskeletal: No complaints of weakness, paralysis, joint stiffness or arthralgias.  Integumentary: No complaints of rash, no new lesions.   Neurological: No complaints of convulsions, no seizures, no dizziness.   Hematologic/Lymphatic: No complaints of easy bruising.   Endocrine:  No hot or cold intolerance.  No polydipsia, polyphagia, or polyuria.  Allergy/immunology:  No environmental allergies.  No food allergies.  Not immunocompromised.      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)   • Stage 3 chronic kidney disease, unspecified whether stage 3a or 3b CKD (HCC)   • High risk medication use   • Advanced care planning/counseling discussion   • Arthritis of left knee   • Left cervical lymphadenopathy   • Lung nodules     Past Medical History:   Diagnosis Date   • Diabetes mellitus (HCC)    • Hyperlipidemia    • Hypertension      Past Surgical History:   Procedure Laterality Date   • CATARACT EXTRACTION, BILATERAL     • FLAP LOCAL HEAD / NECK Left 10/12/2021    Procedure: RECONSTRUCTION OF LEFT TEMPLE DEFECT AFTER RESCECTION MELANOMA;  Surgeon: Kingston Oviedo MD;  [facility];  Service: Plastics   • FULL THICKNESS SKIN GRAFT Left 10/12/2021    Procedure: SKIN GRAFT FULL THICKNESS LEFT TEMPLE;  Surgeon: Kingston Oviedo MD;   [facility];  Service: Plastics   • GALLBLADDER SURGERY     • HAND SURGERY Left    • LYMPH NODE BIOPSY Left 10/12/2021    Procedure: BIOPSY LYMPH NODE SENTINEL, LYMPHOSCINTIGRAPHY, INTRAOPERATIVE LYMPHATIC MAPPING (INJECT AT 1200);  Surgeon: Joana Mg MD;  [facility];  Service: Surgical Oncology   • NOSE SURGERY     • SKIN CANCER EXCISION  10/2021   • SKIN LESION EXCISION Left 10/12/2021    Procedure: FACE MELANOMA SCAR WIDE EXCISION  FACE;  Surgeon: Joana Mg MD;  [facility];  Service: Surgical Oncology   • SPLIT THICKNESS SKIN GRAFT Left 10/12/2021    Procedure: SKIN GRAFT SPLIT THICKNESS LEFT TEMPLE;  Surgeon: Kingston Oviedo MD;  [facility];  Service: Plastics   • US GUIDED LYMPH NODE BIOPSY LEFT  03/21/2022     Family History   Problem Relation Age of Onset   • No Known Problems Mother    • No Known Problems Father    • Colon cancer Other 60     Social History     Socioeconomic History   • Marital status: /Civil Union     Spouse name: Not on file   • Number of children: Not on file   • Years of education: Not on file   • Highest education level: Not on file   Occupational History   • Not on file   Tobacco Use   • Smoking status: Never   • Smokeless tobacco: Never   Vaping Use   • Vaping status: Never Used   Substance and Sexual Activity   • Alcohol use: Not Currently   • Drug use: Never   • Sexual activity: Not Currently   Other Topics Concern   • Not on file   Social History Narrative   • Not on file     Social Determinants of Health     Financial Resource Strain: Low Risk  (12/1/2023)    Overall Financial Resource Strain (CARDIA)    • Difficulty of Paying Living Expenses: Not hard at all   Food Insecurity: Not on file   Transportation Needs: No Transportation Needs (12/1/2023)    PRAPARE - Transportation    • Lack of Transportation (Medical): No    • Lack of Transportation (Non-Medical): No   Physical Activity: Not on file   Stress: Not on file    Social Connections: Not on file   Intimate Partner Violence: Not on file   Housing Stability: Not on file       Current Outpatient Medications:   •  atenolol (TENORMIN) 100 mg tablet, Take 1 tablet (100 mg total) by mouth daily, Disp: 90 tablet, Rfl: 3  •  glimepiride (AMARYL) 2 mg tablet, Take 1 tablet (2 mg total) by mouth daily with breakfast, Disp: 90 tablet, Rfl: 3  •  lisinopril-hydrochlorothiazide (PRINZIDE,ZESTORETIC) 20-25 MG per tablet, Take 1 tablet by mouth daily, Disp: 90 tablet, Rfl: 3  •  lovastatin (MEVACOR) 40 MG tablet, Take 1 tablet (40 mg total) by mouth daily, Disp: 90 tablet, Rfl: 3  •  metFORMIN (GLUCOPHAGE) 1000 MG tablet, Take 1 tablet (1,000 mg total) by mouth 2 (two) times a day with meals, Disp: 180 tablet, Rfl: 3  No Known Allergies    Vitals:    05/08/24 0910   BP: 144/80   Pulse: (!) 49   Resp: 18   Temp: 98.1 °F (36.7 °C)   SpO2: 99%       Physical Exam     General: Appears well, appears stated age  Skin: Warm, anicteric  HEENT: Normocephalic, atraumatic; sclera aniceteric, mucous membranes moist. Well-healed graft site and pre-auricular incision. Several new pigments lesions between primary and node incision have resolved!  Cardiopulmonary: RRR, Easy WOB, no BLE edema  Abd: Flat and soft, nontender, no masses appreciated, no hepatosplenomegaly  MSK: Symmetric, no cyanosis, no overt weakness  Lymphatic: No cervical, axillary or inguinal lymphadenopathy  Neuro: Affect appropriate, no gross motor abnormalities            Pathology:  Final Diagnosis   A. Lymph node, sentinel, #1,  pre-auricular, biopsy:  One lymph node with subcapsular rare melanoma cells consistent with sub-micrometastasis (<0.1 mm). See synoptic report and note.      B. Skin, left forehead, excision:  Residual invasive melanoma and scar (cicatrix), margins free. See synoptic report.         Labs: Reviewed in EPIC    Imaging  No results found.    I independently reviewed and interpreted the available laboratory and  imaging data, including Dr. uPrdy's consultation, PET scans, US, path from SCC.     Discussion/Summary:   83-year-old gentleman status post wide local excision with sentinel lymph node biopsy of what ultimately was determined to be a [id number] melanoma 10/2021.  Ultrasound of the neck completed early 2022 revealed concern for recurrence in the preauricular region as well as the left cervical chain.  He underwent biopsy of this which was inconclusive, and elected to pursue adjuvant therapy and follow clinically. PET in Dec with ?pulm mets which had improved in June. Completed immuno and recent PET with no disease! He is due for H&N US in 6 mo. He wishes to cut down on the number of doctors he is seeing and thus I will have Dr Purdy follow up with this study since she needs to see him q3 mo. Encouraged to continue seeing derm, I will see him prn!

## 2024-05-09 ENCOUNTER — OFFICE VISIT (OUTPATIENT)
Dept: HEMATOLOGY ONCOLOGY | Facility: CLINIC | Age: 84
End: 2024-05-09
Payer: COMMERCIAL

## 2024-05-09 VITALS
TEMPERATURE: 98 F | HEIGHT: 67 IN | WEIGHT: 162.5 LBS | OXYGEN SATURATION: 97 % | DIASTOLIC BLOOD PRESSURE: 84 MMHG | BODY MASS INDEX: 25.51 KG/M2 | RESPIRATION RATE: 18 BRPM | HEART RATE: 63 BPM | SYSTOLIC BLOOD PRESSURE: 138 MMHG

## 2024-05-09 DIAGNOSIS — Z85.820 ENCOUNTER FOR FOLLOW-UP SURVEILLANCE OF MELANOMA: Primary | ICD-10-CM

## 2024-05-09 DIAGNOSIS — E11.9 TYPE 2 DIABETES MELLITUS WITHOUT COMPLICATION, WITHOUT LONG-TERM CURRENT USE OF INSULIN (HCC): ICD-10-CM

## 2024-05-09 DIAGNOSIS — C77.3 SECONDARY AND UNSPECIFIED MALIGNANT NEOPLASM OF AXILLA AND UPPER LIMB LYMPH NODES (HCC): ICD-10-CM

## 2024-05-09 DIAGNOSIS — C43.30 MALIGNANT MELANOMA OF SKIN OF FACE (HCC): ICD-10-CM

## 2024-05-09 DIAGNOSIS — C43.39 MALIGNANT MELANOMA OF FOREHEAD (HCC): ICD-10-CM

## 2024-05-09 DIAGNOSIS — Z79.899 HIGH RISK MEDICATION USE: ICD-10-CM

## 2024-05-09 DIAGNOSIS — Z08 ENCOUNTER FOR FOLLOW-UP SURVEILLANCE OF MELANOMA: Primary | ICD-10-CM

## 2024-05-09 PROCEDURE — G2211 COMPLEX E/M VISIT ADD ON: HCPCS | Performed by: INTERNAL MEDICINE

## 2024-05-09 PROCEDURE — 99214 OFFICE O/P EST MOD 30 MIN: CPT | Performed by: INTERNAL MEDICINE

## 2024-05-09 NOTE — LETTER
May 9, 2024     Joana Mg MD  [address] [facility] [address]  [facility] [address]    Patient: Salvatore Bacon   YOB: 1940   Date of Visit: 5/9/2024       Dear Dr. Mg:    Thank you for referring Salvatore Bacon to me for evaluation. Below are my notes for this consultation.    If you have questions, please do not hesitate to call me. I look forward to following your patient along with you.         Sincerely,        aSra Purdy MD        CC: MD Joey Hill MD Joseph J Zaladonis Jr., MD Melissa A Wilson, MD  5/9/2024  1:13 PM  Sign when Signing Visit  [facility] [place]  [address] [address]  [phone]  [phone]     Date of Visit: 5/9/2024  Name: Salvatore Bacon   YOB: 1940        Subjective    VISIT DIAGNOSIS:  Diagnoses and all orders for this visit:    Encounter for follow-up surveillance of melanoma    Malignant melanoma of skin of face (HCC)  -     CBC and differential; Future  -     Comprehensive metabolic panel; Future  -     LD,Blood; Future  -     TSH, 3rd generation; Future  -     T3, free; Future  -     T4, free; Future    Malignant melanoma of forehead (HCC)  -     CBC and differential; Future  -     Comprehensive metabolic panel; Future  -     LD,Blood; Future  -     TSH, 3rd generation; Future  -     T3, free; Future  -     T4, free; Future    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)  -     CBC and differential; Future  -     Comprehensive metabolic panel; Future  -     LD,Blood; Future  -     TSH, 3rd generation; Future  -     T3, free; Future  -     T4, free; Future    High risk medication use  -     TSH, 3rd generation; Future  -     T3, free; Future  -     T4, free; Future    Type 2 diabetes mellitus without complication, without long-term current use of insulin (HCC)        Oncology History   Malignant melanoma of skin of face (HCC)    9/8/2021 -  Cancer Staged    Staging form: Melanoma of the Skin, AJCC 8th Edition  - Clinical stage from 9/8/2021: Stage III (cT2a, cN1a, cM0) - Signed by Sara Purdy MD on 11/7/2021 9/8/2021 -  Cancer Staged    Staging form: Melanoma of the Skin, AJCC 8th Edition  - Pathologic stage from 9/8/2021: Stage IIIA (pT2a, pN1a, cM0) - Signed by Sara Purdy MD on 11/7/2021 9/22/2021 Initial Diagnosis    Malignant melanoma of skin of face (HCC)     4/4/2022 -  Chemotherapy    pembrolizumab (KEYTRUDA) IVPB, 400 mg, Intravenous, Once, 17 of 17 cycles  Administration: 400 mg (4/4/2022), 400 mg (5/16/2022), 400 mg (6/27/2022), 400 mg (8/8/2022), 400 mg (9/19/2022), 400 mg (10/31/2022), 400 mg (12/12/2022), 400 mg (1/23/2023), 400 mg (3/6/2023), 400 mg (5/5/2023), 400 mg (6/15/2023), 400 mg (8/3/2023), 400 mg (9/21/2023), 400 mg (11/16/2023), 400 mg (12/28/2023), 400 mg (2/8/2024), 400 mg (3/21/2024)     Malignant melanoma of forehead (HCC)   11/29/2021 Initial Diagnosis    Malignant melanoma of forehead (HCC)     4/4/2022 -  Chemotherapy    pembrolizumab (KEYTRUDA) IVPB, 400 mg, Intravenous, Once, 17 of 17 cycles  Administration: 400 mg (4/4/2022), 400 mg (5/16/2022), 400 mg (6/27/2022), 400 mg (8/8/2022), 400 mg (9/19/2022), 400 mg (10/31/2022), 400 mg (12/12/2022), 400 mg (1/23/2023), 400 mg (3/6/2023), 400 mg (5/5/2023), 400 mg (6/15/2023), 400 mg (8/3/2023), 400 mg (9/21/2023), 400 mg (11/16/2023), 400 mg (12/28/2023), 400 mg (2/8/2024), 400 mg (3/21/2024)        Cancer Staging   Malignant melanoma of skin of face (HCC)  Staging form: Melanoma of the Skin, AJCC 8th Edition  - Clinical stage from 9/8/2021: Stage III (cT2a, cN1a, cM0) - Signed by Sara Purdy MD on 11/7/2021  - Pathologic stage from 9/8/2021: Stage IIIA (pT2a, pN1a, cM0) - Signed by Sara Purdy MD on 11/7/2021     Treatment Details   Treatment goal Curative   Plan Name OP Pembrolizumab Q 42 Days   Status Active   Start  Date 4/4/2022   End Date 3/21/2024   Provider Sara Purdy MD   Chemotherapy pembrolizumab (KEYTRUDA) IVPB, 400 mg, Intravenous, Once, 17 of 17 cycles  Administration: 400 mg (4/4/2022), 400 mg (5/16/2022), 400 mg (6/27/2022), 400 mg (8/8/2022), 400 mg (9/19/2022), 400 mg (10/31/2022), 400 mg (12/12/2022), 400 mg (1/23/2023), 400 mg (3/6/2023), 400 mg (5/5/2023), 400 mg (6/15/2023), 400 mg (8/3/2023), 400 mg (9/21/2023), 400 mg (11/16/2023), 400 mg (12/28/2023), 400 mg (2/8/2024), 400 mg (3/21/2024)          HISTORY OF PRESENT ILLNESS: Salvatore Bacon is a 84 y.o. male  who has stage IIIa melanoma with some concerns for metastatic disease who was treated on pembrolizumab for 2 years and completed treatment approximately 6 weeks ago here for continued monitoring, follow-up and surveillance.    He is doing well.  Feels good.  Energy is stable.  He is a little tired, but unsure if it is related to medication.  It has not changed.  We will continue to monitor how he is doing now that he is off treatment.  Denies any new, changing, concerning skin lesions.  Denies any lymphadenopathy.      He did have a PET scan on 5/2/2024 and I independently reviewed imaging it demonstrates no evidence of melanoma recurrence or metastasis.        REVIEW OF SYSTEMS:  Review of Systems   Constitutional:  Positive for fatigue. Negative for appetite change, fever and unexpected weight change.   HENT:   Negative for lump/mass, trouble swallowing and voice change.    Eyes:  Negative for icterus.   Respiratory:  Negative for cough, shortness of breath and wheezing.    Cardiovascular:  Negative for leg swelling.   Gastrointestinal:  Negative for abdominal pain, constipation, diarrhea, nausea and vomiting.   Genitourinary:  Negative for difficulty urinating and hematuria.    Musculoskeletal:  Negative for arthralgias, gait problem and myalgias.   Skin:  Negative for itching and rash.        No new, changing, or concerning lesions.    Neurological:  Negative for extremity weakness, gait problem, headaches, light-headedness and numbness.   Hematological:  Negative for adenopathy.        MEDICATIONS:    Current Outpatient Medications:   •  atenolol (TENORMIN) 100 mg tablet, Take 1 tablet (100 mg total) by mouth daily, Disp: 90 tablet, Rfl: 3  •  glimepiride (AMARYL) 2 mg tablet, Take 1 tablet (2 mg total) by mouth daily with breakfast, Disp: 90 tablet, Rfl: 3  •  lisinopril-hydrochlorothiazide (PRINZIDE,ZESTORETIC) 20-25 MG per tablet, Take 1 tablet by mouth daily, Disp: 90 tablet, Rfl: 3  •  lovastatin (MEVACOR) 40 MG tablet, Take 1 tablet (40 mg total) by mouth daily, Disp: 90 tablet, Rfl: 3  •  metFORMIN (GLUCOPHAGE) 1000 MG tablet, Take 1 tablet (1,000 mg total) by mouth 2 (two) times a day with meals, Disp: 180 tablet, Rfl: 3     ALLERGIES:  No Known Allergies     ACTIVE PROBLEMS:  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)   • Stage 3 chronic kidney disease, unspecified whether stage 3a or 3b CKD (HCC)   • High risk medication use   • Advanced care planning/counseling discussion   • Arthritis of left knee   • Left cervical lymphadenopathy   • Lung nodules          PAST MEDICAL HISTORY:   Past Medical History:   Diagnosis Date   • Diabetes mellitus (HCC)    • Hyperlipidemia    • Hypertension         PAST SURGICAL HISTORY:  Past Surgical History:   Procedure Laterality Date   • CATARACT EXTRACTION, BILATERAL     • FLAP LOCAL  HEAD / NECK Left 10/12/2021    Procedure: RECONSTRUCTION OF LEFT TEMPLE DEFECT AFTER RESCECTION MELANOMA;  Surgeon: Kingston Oviedo MD;  [facility];  Service: Plastics   • FULL THICKNESS SKIN GRAFT Left 10/12/2021    Procedure: SKIN GRAFT FULL THICKNESS LEFT TEMPLE;  Surgeon: Kingston Oviedo MD;  [facility];  Service: Plastics   • GALLBLADDER SURGERY     • HAND SURGERY Left    • LYMPH NODE BIOPSY Left 10/12/2021    Procedure: BIOPSY LYMPH NODE SENTINEL, LYMPHOSCINTIGRAPHY, INTRAOPERATIVE LYMPHATIC MAPPING (INJECT AT 1200);  Surgeon: Joana Mg MD;  [facility];  Service: Surgical Oncology   • NOSE SURGERY     • SKIN CANCER EXCISION  10/2021   • SKIN LESION EXCISION Left 10/12/2021    Procedure: FACE MELANOMA SCAR WIDE EXCISION  FACE;  Surgeon: Joana Mg MD;  [facility];  Service: Surgical Oncology   • SPLIT THICKNESS SKIN GRAFT Left 10/12/2021    Procedure: SKIN GRAFT SPLIT THICKNESS LEFT TEMPLE;  Surgeon: Kingston Oviedo MD;  [facility];  Service: Plastics   • US GUIDED LYMPH NODE BIOPSY LEFT  03/21/2022        SOCIAL HISTORY:  Social History     Socioeconomic History   • Marital status: /Civil Union     Spouse name: Not on file   • Number of children: Not on file   • Years of education: Not on file   • Highest education level: Not on file   Occupational History   • Not on file   Tobacco Use   • Smoking status: Never   • Smokeless tobacco: Never   Vaping Use   • Vaping status: Never Used   Substance and Sexual Activity   • Alcohol use: Not Currently   • Drug use: Never   • Sexual activity: Not Currently   Other Topics Concern   • Not on file   Social History Narrative   • Not on file     Social Determinants of Health     Financial Resource Strain: Low Risk  (12/1/2023)    Overall Financial Resource Strain (CARDIA)    • Difficulty of Paying Living Expenses: Not hard at all   Food Insecurity: Not on file   Transportation Needs: No Transportation  "Needs (12/1/2023)    PRAPARE - Transportation    • Lack of Transportation (Medical): No    • Lack of Transportation (Non-Medical): No   Physical Activity: Not on file   Stress: Not on file   Social Connections: Not on file   Intimate Partner Violence: Not on file   Housing Stability: Not on file        FAMILY HISTORY:  Family History   Problem Relation Age of Onset   • No Known Problems Mother    • No Known Problems Father    • Colon cancer Other 60           Objective    PHYSICAL EXAMINATION:   Blood pressure 138/84, pulse 63, temperature 98 °F (36.7 °C), temperature source Temporal, resp. rate 18, height 5' 7\" (1.702 m), weight 73.7 kg (162 lb 8 oz), SpO2 97%.     Pain Score: 0-No pain     ECOG Performance Status      Flowsheet Row Most Recent Value   ECOG Performance Status 1 - Restricted in physically strenuous activity but ambulatory and able to carry out work of a light or sedentary nature, e.g., light house work, office work               Physical Exam  Constitutional:       General: He is not in acute distress.     Appearance: Normal appearance. He is not ill-appearing or toxic-appearing.   HENT:      Head: Normocephalic and atraumatic.      Right Ear: External ear normal.      Left Ear: External ear normal.      Nose: Nose normal.      Mouth/Throat:      Mouth: Mucous membranes are moist.      Pharynx: Oropharynx is clear.   Eyes:      General: No scleral icterus.        Right eye: No discharge.         Left eye: No discharge.      Conjunctiva/sclera: Conjunctivae normal.   Cardiovascular:      Rate and Rhythm: Normal rate and regular rhythm.      Pulses: Normal pulses.      Heart sounds: No murmur heard.     No friction rub. No gallop.   Pulmonary:      Effort: Pulmonary effort is normal. No respiratory distress.      Breath sounds: Normal breath sounds. No wheezing or rales.   Abdominal:      General: Bowel sounds are normal. There is no distension.      Palpations: There is no mass.      Tenderness: " There is no abdominal tenderness. There is no rebound.   Musculoskeletal:         General: No swelling or tenderness.      Cervical back: Normal range of motion. No rigidity.      Right lower leg: No edema.      Left lower leg: No edema.   Lymphadenopathy:      Head:      Right side of head: No submandibular, preauricular or posterior auricular adenopathy.      Left side of head: No submandibular, preauricular or posterior auricular adenopathy.      Cervical: No cervical adenopathy.      Right cervical: No superficial or posterior cervical adenopathy.     Left cervical: No superficial or posterior cervical adenopathy.      Upper Body:      Right upper body: No supraclavicular or axillary adenopathy.      Left upper body: No supraclavicular or axillary adenopathy.   Skin:     General: Skin is warm.      Coloration: Skin is not jaundiced.      Findings: No lesion or rash.      Comments: No concerning skin lesions   Neurological:      General: No focal deficit present.      Mental Status: He is alert and oriented to person, place, and time.      Cranial Nerves: No cranial nerve deficit.      Motor: No weakness.      Gait: Gait normal.   Psychiatric:         Mood and Affect: Mood normal.         Behavior: Behavior normal.         Thought Content: Thought content normal.         Judgment: Judgment normal.         I reviewed lab data in the chart.    WBC   Date Value Ref Range Status   04/25/2024 9.71 4.31 - 10.16 Thousand/uL Final   03/14/2024 9.28 4.31 - 10.16 Thousand/uL Final   02/01/2024 9.69 4.31 - 10.16 Thousand/uL Final     Hemoglobin   Date Value Ref Range Status   04/25/2024 14.5 12.0 - 17.0 g/dL Final   03/14/2024 14.5 12.0 - 17.0 g/dL Final   02/01/2024 14.4 12.0 - 17.0 g/dL Final     Platelets   Date Value Ref Range Status   04/25/2024 138 (L) 149 - 390 Thousands/uL Final   03/14/2024 149 149 - 390 Thousands/uL Final   02/01/2024 149 149 - 390 Thousands/uL Final     MCV   Date Value Ref Range Status    04/25/2024 91 82 - 98 fL Final   03/14/2024 89 82 - 98 fL Final   02/01/2024 91 82 - 98 fL Final      Potassium   Date Value Ref Range Status   04/25/2024 4.4 3.5 - 5.3 mmol/L Final   03/14/2024 4.2 3.5 - 5.3 mmol/L Final   02/01/2024 4.3 3.5 - 5.3 mmol/L Final   07/20/2020 4.6 3.5 - 5.2 mmol/L Final   05/01/2019 3.6 3.5 - 5.2 mmol/L Final   04/30/2019 3.2 (L) 3.5 - 5.2 mmol/L Final     Chloride   Date Value Ref Range Status   04/25/2024 101 96 - 108 mmol/L Final   03/14/2024 101 96 - 108 mmol/L Final   02/01/2024 101 96 - 108 mmol/L Final   07/20/2020 108 100 - 109 mmol/L Final   05/01/2019 112 (H) 100 - 109 mmol/L Final   04/30/2019 108 100 - 109 mmol/L Final     Carbon Dioxide   Date Value Ref Range Status   07/20/2020 28 23 - 31 mmol/L Final   05/01/2019 24 23 - 31 mmol/L Final   04/30/2019 25 23 - 31 mmol/L Final     CO2   Date Value Ref Range Status   04/25/2024 28 21 - 32 mmol/L Final   03/14/2024 29 21 - 32 mmol/L Final   02/01/2024 31 21 - 32 mmol/L Final     BUN   Date Value Ref Range Status   04/25/2024 27 (H) 5 - 25 mg/dL Final   03/14/2024 33 (H) 5 - 25 mg/dL Final   02/01/2024 41 (H) 5 - 25 mg/dL Final   07/20/2020 26 7 - 28 mg/dL Final   05/01/2019 35 (H) 7 - 28 mg/dL Final   04/30/2019 42 (H) 7 - 28 mg/dL Final     Creatinine   Date Value Ref Range Status   04/25/2024 1.53 (H) 0.60 - 1.30 mg/dL Final     Comment:     Standardized to IDMS reference method   03/14/2024 1.69 (H) 0.60 - 1.30 mg/dL Final     Comment:     Standardized to IDMS reference method   02/01/2024 1.74 (H) 0.60 - 1.30 mg/dL Final     Comment:     Standardized to IDMS reference method   07/20/2020 1.25 0.53 - 1.30 mg/dL Final   05/01/2019 1.18 0.53 - 1.30 mg/dL Final   04/30/2019 1.28 0.53 - 1.30 mg/dL Final     Glucose   Date Value Ref Range Status   04/25/2024 218 (H) 65 - 140 mg/dL Final     Comment:     If the patient is fasting, the ADA then defines impaired fasting glucose as > 100 mg/dL and diabetes as > or equal to 123  mg/dL.   03/14/2024 246 (H) 65 - 140 mg/dL Final     Comment:     If the patient is fasting, the ADA then defines impaired fasting glucose as > 100 mg/dL and diabetes as > or equal to 123 mg/dL.   02/01/2024 230 (H) 65 - 140 mg/dL Final     Comment:     If the patient is fasting, the ADA then defines impaired fasting glucose as > 100 mg/dL and diabetes as > or equal to 123 mg/dL.   07/20/2020 87 65 - 99 mg/dL Final   05/01/2019 108 (H) 65 - 99 mg/dL Final   04/30/2019 140 (H) 65 - 99 mg/dL Final     Calcium   Date Value Ref Range Status   04/25/2024 9.1 8.4 - 10.2 mg/dL Final   03/14/2024 9.7 8.4 - 10.2 mg/dL Final   02/01/2024 9.3 8.4 - 10.2 mg/dL Final   07/20/2020 8.8 8.5 - 10.1 mg/dL Final   05/01/2019 7.1 (L) 8.5 - 10.1 mg/dL Final   04/30/2019 6.9 (L) 8.5 - 10.1 mg/dL Final     Albumin   Date Value Ref Range Status   04/25/2024 4.6 3.5 - 5.0 g/dL Final   03/14/2024 4.6 3.5 - 5.0 g/dL Final   02/01/2024 4.4 3.5 - 5.0 g/dL Final     ALBUMIN   Date Value Ref Range Status   07/20/2020 4.0 3.5 - 4.8 g/dL Final   05/01/2019 2.2 (L) 3.5 - 4.8 g/dL Final   04/30/2019 2.2 (L) 3.5 - 4.8 g/dL Final     Total Bilirubin   Date Value Ref Range Status   04/25/2024 1.41 (H) 0.20 - 1.00 mg/dL Final     Comment:     Use of this assay is not recommended for patients undergoing treatment with eltrombopag due to the potential for falsely elevated results.  N-acetyl-p-benzoquinone imine (metabolite of Acetaminophen) will generate erroneously low results in samples for patients that have taken an overdose of Acetaminophen.   03/14/2024 1.62 (H) 0.20 - 1.00 mg/dL Final     Comment:     Use of this assay is not recommended for patients undergoing treatment with eltrombopag due to the potential for falsely elevated results.  N-acetyl-p-benzoquinone imine (metabolite of Acetaminophen) will generate erroneously low results in samples for patients that have taken an overdose of Acetaminophen.   02/01/2024 1.38 (H) 0.20 - 1.00 mg/dL Final      Comment:     Use of this assay is not recommended for patients undergoing treatment with eltrombopag due to the potential for falsely elevated results.  N-acetyl-p-benzoquinone imine (metabolite of Acetaminophen) will generate erroneously low results in samples for patients that have taken an overdose of Acetaminophen.   07/20/2020 1.2 (H) 0.2 - 1.0 mg/dL Final     Comment:     Use of this assay is not recommended for patients undergoing treatment with eltrombopag due to the potential for falsely elevated results.   05/01/2019 1.4 (H) 0.2 - 1.0 mg/dL Final   04/30/2019 1.9 (H) 0.2 - 1.0 mg/dL Final     Alkaline Phosphatase   Date Value Ref Range Status   04/25/2024 75 34 - 104 U/L Final   03/14/2024 75 34 - 104 U/L Final   02/01/2024 74 34 - 104 U/L Final   07/20/2020 63 35 - 120 U/L Final   05/01/2019 116 35 - 120 U/L Final   04/30/2019 129 (H) 35 - 120 U/L Final     AST   Date Value Ref Range Status   04/25/2024 14 13 - 39 U/L Final   03/14/2024 18 13 - 39 U/L Final   02/01/2024 13 13 - 39 U/L Final   07/20/2020 17 <41 U/L Final   05/01/2019 83 (H) <41 U/L Final   04/30/2019 100 (H) <41 U/L Final     ALT   Date Value Ref Range Status   04/25/2024 12 7 - 52 U/L Final     Comment:     Specimen collection should occur prior to Sulfasalazine administration due to the potential for falsely depressed results.    03/14/2024 20 7 - 52 U/L Final     Comment:     Specimen collection should occur prior to Sulfasalazine administration due to the potential for falsely depressed results.    02/01/2024 11 7 - 52 U/L Final     Comment:     Specimen collection should occur prior to Sulfasalazine administration due to the potential for falsely depressed results.    07/20/2020 22 <56 U/L Final   05/01/2019 68 (H) <56 U/L Final   04/30/2019 118 (H) <56 U/L Final      LD   Date Value Ref Range Status   04/25/2024 165 140 - 271 U/L Final   03/14/2024 136 (L) 140 - 271 U/L Final   02/01/2024 125 (L) 140 - 271 U/L Final     TSH  "  Date Value Ref Range Status   07/20/2020 1.47 0.36 - 3.74 uIU/mL Final   07/18/2018 2.20 0.36 - 3.74 uIU/mL Final     No results found for: \"A1HOTFW\"   Free T4   Date Value Ref Range Status   04/25/2024 0.75 0.61 - 1.12 ng/dL Final     Comment:     Specimens with biotin concentrations > 10 ng/mL can lead to significant (> 10%) positive bias in result.   03/14/2024 0.87 0.61 - 1.12 ng/dL Final     Comment:     Specimens with biotin concentrations > 10 ng/mL can lead to significant (> 10%) positive bias in result.   02/01/2024 0.84 0.61 - 1.12 ng/dL Final     Comment:     Specimens with biotin concentrations > 10 ng/mL can lead to significant (> 10%) positive bias in result.         RECENT IMAGING:  No results found.          Assessment/Plan  Mr. Bacon is a 84 yr male with history of/concerning for metastatic melanoma who underwent treatment with pembrolizumab completing treatment 6 weeks ago here for continued monitoring, follow-up and surveillance.    1. Encounter for follow-up surveillance of melanoma  2. Malignant melanoma of skin of face (HCC)  3. Malignant melanoma of forehead (HCC)  4. 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  We discussed his imaging was also negative for melanoma recurrence or metastasis.  Labs reviewed and okay.  We will continue to monitor him closely now that he is completed treatment with immunotherapy.  He will return in 3 months for follow-up.  All orders placed and prescription provided for blood work to be done in 3 months.  He knows to call with any issues or concerns prior to his next visit.  He knows to continue to monitor for any immune mediated side effects as delayed onset side effects can occur.  He knows to monitor for any concerning skin lesions.      - CBC and differential; Future  - Comprehensive metabolic panel; Future  - LD,Blood; Future  - TSH, 3rd " generation; Future  - T3, free; Future  - T4, free; Future      5. High risk medication use  6. Type 2 diabetes mellitus without complication, without long-term current use of insulin (HCC)  The patient is on treatment with immunotherapy and we will continue to monitor for side effects and lab abnormalities. We will monitor labs with each treatment to ensure safety of continuing on treatment. The patient knows to watch for signs and symptoms for immune mediated side effects. Denies any immune mediated side effects at this time.     Would monitor sugars if needed to treat with steroids for immune mediated side effects.    - TSH, 3rd generation; Future  - T3, free; Future  - T4, free; Future        Return in about 3 months (around 8/9/2024) for Office Visit, labs.

## 2024-05-09 NOTE — PROGRESS NOTES
[facility] [place]  [address]  [place] [address]  [phone]     Date of Visit: 5/9/2024  Name: Salvatore Bacon   YOB: 1940        Subjective    VISIT DIAGNOSIS:  Diagnoses and all orders for this visit:    Encounter for follow-up surveillance of melanoma    Malignant melanoma of skin of face (HCC)  -     CBC and differential; Future  -     Comprehensive metabolic panel; Future  -     LD,Blood; Future  -     TSH, 3rd generation; Future  -     T3, free; Future  -     T4, free; Future    Malignant melanoma of forehead (HCC)  -     CBC and differential; Future  -     Comprehensive metabolic panel; Future  -     LD,Blood; Future  -     TSH, 3rd generation; Future  -     T3, free; Future  -     T4, free; Future    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)  -     CBC and differential; Future  -     Comprehensive metabolic panel; Future  -     LD,Blood; Future  -     TSH, 3rd generation; Future  -     T3, free; Future  -     T4, free; Future    High risk medication use  -     TSH, 3rd generation; Future  -     T3, free; Future  -     T4, free; Future    Type 2 diabetes mellitus without complication, without long-term current use of insulin (HCC)        Oncology History   Malignant melanoma of skin of face (HCC)   9/8/2021 -  Cancer Staged    Staging form: Melanoma of the Skin, AJCC 8th Edition  - Clinical stage from 9/8/2021: Stage III (cT2a, cN1a, cM0) - Signed by Sara Purdy MD on 11/7/2021 9/8/2021 -  Cancer Staged    Staging form: Melanoma of the Skin, AJCC 8th Edition  - Pathologic stage from 9/8/2021: Stage IIIA (pT2a, pN1a, cM0) - Signed by Sara Purdy MD on 11/7/2021 9/22/2021 Initial Diagnosis    Malignant melanoma of skin of face (HCC)     4/4/2022 -  Chemotherapy    pembrolizumab (KEYTRUDA) IVPB, 400 mg, Intravenous, Once, 17 of 17 cycles  Administration: 400 mg (4/4/2022),  400 mg (5/16/2022), 400 mg (6/27/2022), 400 mg (8/8/2022), 400 mg (9/19/2022), 400 mg (10/31/2022), 400 mg (12/12/2022), 400 mg (1/23/2023), 400 mg (3/6/2023), 400 mg (5/5/2023), 400 mg (6/15/2023), 400 mg (8/3/2023), 400 mg (9/21/2023), 400 mg (11/16/2023), 400 mg (12/28/2023), 400 mg (2/8/2024), 400 mg (3/21/2024)     Malignant melanoma of forehead (HCC)   11/29/2021 Initial Diagnosis    Malignant melanoma of forehead (HCC)     4/4/2022 -  Chemotherapy    pembrolizumab (KEYTRUDA) IVPB, 400 mg, Intravenous, Once, 17 of 17 cycles  Administration: 400 mg (4/4/2022), 400 mg (5/16/2022), 400 mg (6/27/2022), 400 mg (8/8/2022), 400 mg (9/19/2022), 400 mg (10/31/2022), 400 mg (12/12/2022), 400 mg (1/23/2023), 400 mg (3/6/2023), 400 mg (5/5/2023), 400 mg (6/15/2023), 400 mg (8/3/2023), 400 mg (9/21/2023), 400 mg (11/16/2023), 400 mg (12/28/2023), 400 mg (2/8/2024), 400 mg (3/21/2024)        Cancer Staging   Malignant melanoma of skin of face (HCC)  Staging form: Melanoma of the Skin, AJCC 8th Edition  - Clinical stage from 9/8/2021: Stage III (cT2a, cN1a, cM0) - Signed by Sara Purdy MD on 11/7/2021  - Pathologic stage from 9/8/2021: Stage IIIA (pT2a, pN1a, cM0) - Signed by Sara Purdy MD on 11/7/2021     Treatment Details   Treatment goal Curative   Plan Name OP Pembrolizumab Q 42 Days   Status Active   Start Date 4/4/2022   End Date 3/21/2024   Provider Sara Purdy MD   Chemotherapy pembrolizumab (KEYTRUDA) IVPB, 400 mg, Intravenous, Once, 17 of 17 cycles  Administration: 400 mg (4/4/2022), 400 mg (5/16/2022), 400 mg (6/27/2022), 400 mg (8/8/2022), 400 mg (9/19/2022), 400 mg (10/31/2022), 400 mg (12/12/2022), 400 mg (1/23/2023), 400 mg (3/6/2023), 400 mg (5/5/2023), 400 mg (6/15/2023), 400 mg (8/3/2023), 400 mg (9/21/2023), 400 mg (11/16/2023), 400 mg (12/28/2023), 400 mg (2/8/2024), 400 mg (3/21/2024)          HISTORY OF PRESENT ILLNESS: Salvatore Bacon is a 84 y.o. male  who has stage IIIa melanoma  with some concerns for metastatic disease who was treated on pembrolizumab for 2 years and completed treatment approximately 6 weeks ago here for continued monitoring, follow-up and surveillance.    He is doing well.  Feels good.  Energy is stable.  He is a little tired, but unsure if it is related to medication.  It has not changed.  We will continue to monitor how he is doing now that he is off treatment.  Denies any new, changing, concerning skin lesions.  Denies any lymphadenopathy.      He did have a PET scan on 5/2/2024 and I independently reviewed imaging it demonstrates no evidence of melanoma recurrence or metastasis.        REVIEW OF SYSTEMS:  Review of Systems   Constitutional:  Positive for fatigue. Negative for appetite change, fever and unexpected weight change.   HENT:   Negative for lump/mass, trouble swallowing and voice change.    Eyes:  Negative for icterus.   Respiratory:  Negative for cough, shortness of breath and wheezing.    Cardiovascular:  Negative for leg swelling.   Gastrointestinal:  Negative for abdominal pain, constipation, diarrhea, nausea and vomiting.   Genitourinary:  Negative for difficulty urinating and hematuria.    Musculoskeletal:  Negative for arthralgias, gait problem and myalgias.   Skin:  Negative for itching and rash.        No new, changing, or concerning lesions.   Neurological:  Negative for extremity weakness, gait problem, headaches, light-headedness and numbness.   Hematological:  Negative for adenopathy.        MEDICATIONS:    Current Outpatient Medications:     atenolol (TENORMIN) 100 mg tablet, Take 1 tablet (100 mg total) by mouth daily, Disp: 90 tablet, Rfl: 3    glimepiride (AMARYL) 2 mg tablet, Take 1 tablet (2 mg total) by mouth daily with breakfast, Disp: 90 tablet, Rfl: 3    lisinopril-hydrochlorothiazide (PRINZIDE,ZESTORETIC) 20-25 MG per tablet, Take 1 tablet by mouth daily, Disp: 90 tablet, Rfl: 3    lovastatin (MEVACOR) 40 MG tablet, Take 1 tablet (40  mg total) by mouth daily, Disp: 90 tablet, Rfl: 3    metFORMIN (GLUCOPHAGE) 1000 MG tablet, Take 1 tablet (1,000 mg total) by mouth 2 (two) times a day with meals, Disp: 180 tablet, Rfl: 3     ALLERGIES:  No Known Allergies     ACTIVE PROBLEMS:  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)    Stage 3 chronic kidney disease, unspecified whether stage 3a or 3b CKD (HCC)    High risk medication use    Advanced care planning/counseling discussion    Arthritis of left knee    Left cervical lymphadenopathy    Lung nodules          PAST MEDICAL HISTORY:   Past Medical History:   Diagnosis Date    Diabetes mellitus (HCC)     Hyperlipidemia     Hypertension         PAST SURGICAL HISTORY:  Past Surgical History:   Procedure Laterality Date    CATARACT EXTRACTION, BILATERAL      FLAP LOCAL HEAD / NECK Left 10/12/2021    Procedure: RECONSTRUCTION OF LEFT TEMPLE DEFECT AFTER RESCECTION MELANOMA;  Surgeon: Kingston Oviedo MD;  [facility];  Service: Plastics    FULL THICKNESS SKIN GRAFT Left 10/12/2021    Procedure: SKIN GRAFT FULL THICKNESS LEFT TEMPLE;  Surgeon: Kingston Oviedo MD;  [facility];  Service: Plastics    GALLBLADDER SURGERY      HAND SURGERY Left     LYMPH NODE BIOPSY Left 10/12/2021    Procedure: BIOPSY LYMPH NODE SENTINEL, LYMPHOSCINTIGRAPHY, INTRAOPERATIVE LYMPHATIC MAPPING (INJECT AT 1200);  Surgeon: Joana Mg MD;  [facility];  Service: Surgical Oncology    NOSE SURGERY    "   SKIN CANCER EXCISION  10/2021    SKIN LESION EXCISION Left 10/12/2021    Procedure: FACE MELANOMA SCAR WIDE EXCISION  FACE;  Surgeon: Joana Mg MD;  [facility];  Service: Surgical Oncology    SPLIT THICKNESS SKIN GRAFT Left 10/12/2021    Procedure: SKIN GRAFT SPLIT THICKNESS LEFT TEMPLE;  Surgeon: Kingston Oviedo MD;  [facility];  Service: Plastics    US GUIDED LYMPH NODE BIOPSY LEFT  03/21/2022        SOCIAL HISTORY:  Social History     Socioeconomic History    Marital status: /Civil Union     Spouse name: Not on file    Number of children: Not on file    Years of education: Not on file    Highest education level: Not on file   Occupational History    Not on file   Tobacco Use    Smoking status: Never    Smokeless tobacco: Never   Vaping Use    Vaping status: Never Used   Substance and Sexual Activity    Alcohol use: Not Currently    Drug use: Never    Sexual activity: Not Currently   Other Topics Concern    Not on file   Social History Narrative    Not on file     Social Determinants of Health     Financial Resource Strain: Low Risk  (12/1/2023)    Overall Financial Resource Strain (CARDIA)     Difficulty of Paying Living Expenses: Not hard at all   Food Insecurity: Not on file   Transportation Needs: No Transportation Needs (12/1/2023)    PRAPARE - Transportation     Lack of Transportation (Medical): No     Lack of Transportation (Non-Medical): No   Physical Activity: Not on file   Stress: Not on file   Social Connections: Not on file   Intimate Partner Violence: Not on file   Housing Stability: Not on file        FAMILY HISTORY:  Family History   Problem Relation Age of Onset    No Known Problems Mother     No Known Problems Father     Colon cancer Other 60           Objective    PHYSICAL EXAMINATION:   Blood pressure 138/84, pulse 63, temperature 98 °F (36.7 °C), temperature source Temporal, resp. rate 18, height 5' 7\" (1.702 m), weight 73.7 kg (162 lb 8 oz), SpO2 97%. "     Pain Score: 0-No pain     ECOG Performance Status      Flowsheet Row Most Recent Value   ECOG Performance Status 1 - Restricted in physically strenuous activity but ambulatory and able to carry out work of a light or sedentary nature, e.g., light house work, office work               Physical Exam  Constitutional:       General: He is not in acute distress.     Appearance: Normal appearance. He is not ill-appearing or toxic-appearing.   HENT:      Head: Normocephalic and atraumatic.      Right Ear: External ear normal.      Left Ear: External ear normal.      Nose: Nose normal.      Mouth/Throat:      Mouth: Mucous membranes are moist.      Pharynx: Oropharynx is clear.   Eyes:      General: No scleral icterus.        Right eye: No discharge.         Left eye: No discharge.      Conjunctiva/sclera: Conjunctivae normal.   Cardiovascular:      Rate and Rhythm: Normal rate and regular rhythm.      Pulses: Normal pulses.      Heart sounds: No murmur heard.     No friction rub. No gallop.   Pulmonary:      Effort: Pulmonary effort is normal. No respiratory distress.      Breath sounds: Normal breath sounds. No wheezing or rales.   Abdominal:      General: Bowel sounds are normal. There is no distension.      Palpations: There is no mass.      Tenderness: There is no abdominal tenderness. There is no rebound.   Musculoskeletal:         General: No swelling or tenderness.      Cervical back: Normal range of motion. No rigidity.      Right lower leg: No edema.      Left lower leg: No edema.   Lymphadenopathy:      Head:      Right side of head: No submandibular, preauricular or posterior auricular adenopathy.      Left side of head: No submandibular, preauricular or posterior auricular adenopathy.      Cervical: No cervical adenopathy.      Right cervical: No superficial or posterior cervical adenopathy.     Left cervical: No superficial or posterior cervical adenopathy.      Upper Body:      Right upper body: No  supraclavicular or axillary adenopathy.      Left upper body: No supraclavicular or axillary adenopathy.   Skin:     General: Skin is warm.      Coloration: Skin is not jaundiced.      Findings: No lesion or rash.      Comments: No concerning skin lesions   Neurological:      General: No focal deficit present.      Mental Status: He is alert and oriented to person, place, and time.      Cranial Nerves: No cranial nerve deficit.      Motor: No weakness.      Gait: Gait normal.   Psychiatric:         Mood and Affect: Mood normal.         Behavior: Behavior normal.         Thought Content: Thought content normal.         Judgment: Judgment normal.         I reviewed lab data in the chart.    WBC   Date Value Ref Range Status   04/25/2024 9.71 4.31 - 10.16 Thousand/uL Final   03/14/2024 9.28 4.31 - 10.16 Thousand/uL Final   02/01/2024 9.69 4.31 - 10.16 Thousand/uL Final     Hemoglobin   Date Value Ref Range Status   04/25/2024 14.5 12.0 - 17.0 g/dL Final   03/14/2024 14.5 12.0 - 17.0 g/dL Final   02/01/2024 14.4 12.0 - 17.0 g/dL Final     Platelets   Date Value Ref Range Status   04/25/2024 138 (L) 149 - 390 Thousands/uL Final   03/14/2024 149 149 - 390 Thousands/uL Final   02/01/2024 149 149 - 390 Thousands/uL Final     MCV   Date Value Ref Range Status   04/25/2024 91 82 - 98 fL Final   03/14/2024 89 82 - 98 fL Final   02/01/2024 91 82 - 98 fL Final      Potassium   Date Value Ref Range Status   04/25/2024 4.4 3.5 - 5.3 mmol/L Final   03/14/2024 4.2 3.5 - 5.3 mmol/L Final   02/01/2024 4.3 3.5 - 5.3 mmol/L Final   07/20/2020 4.6 3.5 - 5.2 mmol/L Final   05/01/2019 3.6 3.5 - 5.2 mmol/L Final   04/30/2019 3.2 (L) 3.5 - 5.2 mmol/L Final     Chloride   Date Value Ref Range Status   04/25/2024 101 96 - 108 mmol/L Final   03/14/2024 101 96 - 108 mmol/L Final   02/01/2024 101 96 - 108 mmol/L Final   07/20/2020 108 100 - 109 mmol/L Final   05/01/2019 112 (H) 100 - 109 mmol/L Final   04/30/2019 108 100 - 109 mmol/L Final      Carbon Dioxide   Date Value Ref Range Status   07/20/2020 28 23 - 31 mmol/L Final   05/01/2019 24 23 - 31 mmol/L Final   04/30/2019 25 23 - 31 mmol/L Final     CO2   Date Value Ref Range Status   04/25/2024 28 21 - 32 mmol/L Final   03/14/2024 29 21 - 32 mmol/L Final   02/01/2024 31 21 - 32 mmol/L Final     BUN   Date Value Ref Range Status   04/25/2024 27 (H) 5 - 25 mg/dL Final   03/14/2024 33 (H) 5 - 25 mg/dL Final   02/01/2024 41 (H) 5 - 25 mg/dL Final   07/20/2020 26 7 - 28 mg/dL Final   05/01/2019 35 (H) 7 - 28 mg/dL Final   04/30/2019 42 (H) 7 - 28 mg/dL Final     Creatinine   Date Value Ref Range Status   04/25/2024 1.53 (H) 0.60 - 1.30 mg/dL Final     Comment:     Standardized to IDMS reference method   03/14/2024 1.69 (H) 0.60 - 1.30 mg/dL Final     Comment:     Standardized to IDMS reference method   02/01/2024 1.74 (H) 0.60 - 1.30 mg/dL Final     Comment:     Standardized to IDMS reference method   07/20/2020 1.25 0.53 - 1.30 mg/dL Final   05/01/2019 1.18 0.53 - 1.30 mg/dL Final   04/30/2019 1.28 0.53 - 1.30 mg/dL Final     Glucose   Date Value Ref Range Status   04/25/2024 218 (H) 65 - 140 mg/dL Final     Comment:     If the patient is fasting, the ADA then defines impaired fasting glucose as > 100 mg/dL and diabetes as > or equal to 123 mg/dL.   03/14/2024 246 (H) 65 - 140 mg/dL Final     Comment:     If the patient is fasting, the ADA then defines impaired fasting glucose as > 100 mg/dL and diabetes as > or equal to 123 mg/dL.   02/01/2024 230 (H) 65 - 140 mg/dL Final     Comment:     If the patient is fasting, the ADA then defines impaired fasting glucose as > 100 mg/dL and diabetes as > or equal to 123 mg/dL.   07/20/2020 87 65 - 99 mg/dL Final   05/01/2019 108 (H) 65 - 99 mg/dL Final   04/30/2019 140 (H) 65 - 99 mg/dL Final     Calcium   Date Value Ref Range Status   04/25/2024 9.1 8.4 - 10.2 mg/dL Final   03/14/2024 9.7 8.4 - 10.2 mg/dL Final   02/01/2024 9.3 8.4 - 10.2 mg/dL Final   07/20/2020  8.8 8.5 - 10.1 mg/dL Final   05/01/2019 7.1 (L) 8.5 - 10.1 mg/dL Final   04/30/2019 6.9 (L) 8.5 - 10.1 mg/dL Final     Albumin   Date Value Ref Range Status   04/25/2024 4.6 3.5 - 5.0 g/dL Final   03/14/2024 4.6 3.5 - 5.0 g/dL Final   02/01/2024 4.4 3.5 - 5.0 g/dL Final     ALBUMIN   Date Value Ref Range Status   07/20/2020 4.0 3.5 - 4.8 g/dL Final   05/01/2019 2.2 (L) 3.5 - 4.8 g/dL Final   04/30/2019 2.2 (L) 3.5 - 4.8 g/dL Final     Total Bilirubin   Date Value Ref Range Status   04/25/2024 1.41 (H) 0.20 - 1.00 mg/dL Final     Comment:     Use of this assay is not recommended for patients undergoing treatment with eltrombopag due to the potential for falsely elevated results.  N-acetyl-p-benzoquinone imine (metabolite of Acetaminophen) will generate erroneously low results in samples for patients that have taken an overdose of Acetaminophen.   03/14/2024 1.62 (H) 0.20 - 1.00 mg/dL Final     Comment:     Use of this assay is not recommended for patients undergoing treatment with eltrombopag due to the potential for falsely elevated results.  N-acetyl-p-benzoquinone imine (metabolite of Acetaminophen) will generate erroneously low results in samples for patients that have taken an overdose of Acetaminophen.   02/01/2024 1.38 (H) 0.20 - 1.00 mg/dL Final     Comment:     Use of this assay is not recommended for patients undergoing treatment with eltrombopag due to the potential for falsely elevated results.  N-acetyl-p-benzoquinone imine (metabolite of Acetaminophen) will generate erroneously low results in samples for patients that have taken an overdose of Acetaminophen.   07/20/2020 1.2 (H) 0.2 - 1.0 mg/dL Final     Comment:     Use of this assay is not recommended for patients undergoing treatment with eltrombopag due to the potential for falsely elevated results.   05/01/2019 1.4 (H) 0.2 - 1.0 mg/dL Final   04/30/2019 1.9 (H) 0.2 - 1.0 mg/dL Final     Alkaline Phosphatase   Date Value Ref Range Status  "  04/25/2024 75 34 - 104 U/L Final   03/14/2024 75 34 - 104 U/L Final   02/01/2024 74 34 - 104 U/L Final   07/20/2020 63 35 - 120 U/L Final   05/01/2019 116 35 - 120 U/L Final   04/30/2019 129 (H) 35 - 120 U/L Final     AST   Date Value Ref Range Status   04/25/2024 14 13 - 39 U/L Final   03/14/2024 18 13 - 39 U/L Final   02/01/2024 13 13 - 39 U/L Final   07/20/2020 17 <41 U/L Final   05/01/2019 83 (H) <41 U/L Final   04/30/2019 100 (H) <41 U/L Final     ALT   Date Value Ref Range Status   04/25/2024 12 7 - 52 U/L Final     Comment:     Specimen collection should occur prior to Sulfasalazine administration due to the potential for falsely depressed results.    03/14/2024 20 7 - 52 U/L Final     Comment:     Specimen collection should occur prior to Sulfasalazine administration due to the potential for falsely depressed results.    02/01/2024 11 7 - 52 U/L Final     Comment:     Specimen collection should occur prior to Sulfasalazine administration due to the potential for falsely depressed results.    07/20/2020 22 <56 U/L Final   05/01/2019 68 (H) <56 U/L Final   04/30/2019 118 (H) <56 U/L Final      LD   Date Value Ref Range Status   04/25/2024 165 140 - 271 U/L Final   03/14/2024 136 (L) 140 - 271 U/L Final   02/01/2024 125 (L) 140 - 271 U/L Final     TSH   Date Value Ref Range Status   07/20/2020 1.47 0.36 - 3.74 uIU/mL Final   07/18/2018 2.20 0.36 - 3.74 uIU/mL Final     No results found for: \"[id number]\"   Free T4   Date Value Ref Range Status   04/25/2024 0.75 0.61 - 1.12 ng/dL Final     Comment:     Specimens with biotin concentrations > 10 ng/mL can lead to significant (> 10%) positive bias in result.   03/14/2024 0.87 0.61 - 1.12 ng/dL Final     Comment:     Specimens with biotin concentrations > 10 ng/mL can lead to significant (> 10%) positive bias in result.   02/01/2024 0.84 0.61 - 1.12 ng/dL Final     Comment:     Specimens with biotin concentrations > 10 ng/mL can lead to significant (> 10%) positive " bias in result.         RECENT IMAGING:  No results found.          Assessment/Plan  Mr. Bacon is a 84 yr male with history of/concerning for metastatic melanoma who underwent treatment with pembrolizumab completing treatment 6 weeks ago here for continued monitoring, follow-up and surveillance.    1. Encounter for follow-up surveillance of melanoma  2. Malignant melanoma of skin of face (HCC)  3. Malignant melanoma of forehead (HCC)  4. 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  We discussed his imaging was also negative for melanoma recurrence or metastasis.  Labs reviewed and okay.  We will continue to monitor him closely now that he is completed treatment with immunotherapy.  He will return in 3 months for follow-up.  All orders placed and prescription provided for blood work to be done in 3 months.  He knows to call with any issues or concerns prior to his next visit.  He knows to continue to monitor for any immune mediated side effects as delayed onset side effects can occur.  He knows to monitor for any concerning skin lesions.      - CBC and differential; Future  - Comprehensive metabolic panel; Future  - LD,Blood; Future  - TSH, 3rd generation; Future  - T3, free; Future  - T4, free; Future      5. High risk medication use  6. Type 2 diabetes mellitus without complication, without long-term current use of insulin (HCC)  The patient is on treatment with immunotherapy and we will continue to monitor for side effects and lab abnormalities. We will monitor labs with each treatment to ensure safety of continuing on treatment. The patient knows to watch for signs and symptoms for immune mediated side effects. Denies any immune mediated side effects at this time.     Would monitor sugars if needed to treat with steroids for immune mediated side effects.    - TSH, 3rd generation; Future  - T3, free;  Future  - T4, free; Future        Return in about 3 months (around 8/9/2024) for Office Visit, labs.

## 2024-05-31 DIAGNOSIS — E11.9 TYPE 2 DIABETES MELLITUS WITHOUT COMPLICATION, WITHOUT LONG-TERM CURRENT USE OF INSULIN (HCC): ICD-10-CM

## 2024-05-31 RX ORDER — GLIMEPIRIDE 2 MG/1
2 TABLET ORAL
Qty: 30 TABLET | Refills: 0 | Status: SHIPPED | OUTPATIENT
Start: 2024-05-31 | End: 2024-06-06 | Stop reason: SDUPTHER

## 2024-05-31 NOTE — TELEPHONE ENCOUNTER
Medication: glimepiride (AMARYL)     Dose/Frequency: 1 tablet (2mg) by mouth daily with breakfast    Quantity: 90 day (patient states that pharmacy needs a new prescription)    Pharmacy: [facility], [facility]    Office:   [x] PCP/Provider - Dr Jarrell  [] Speciality/Provider -     Does the patient have enough for 3 days?   [x] Yes   [] No - Send as HP to POD         Next Office Visit 6/6/24

## 2024-06-03 ENCOUNTER — APPOINTMENT (OUTPATIENT)
Dept: LAB | Facility: IMAGING CENTER | Age: 84
End: 2024-06-03
Payer: COMMERCIAL

## 2024-06-03 DIAGNOSIS — E11.9 TYPE 2 DIABETES MELLITUS WITHOUT COMPLICATION, WITHOUT LONG-TERM CURRENT USE OF INSULIN (HCC): ICD-10-CM

## 2024-06-03 LAB
ALBUMIN SERPL BCP-MCNC: 4.3 G/DL (ref 3.5–5)
ALP SERPL-CCNC: 76 U/L (ref 34–104)
ALT SERPL W P-5'-P-CCNC: 21 U/L (ref 7–52)
ANION GAP SERPL CALCULATED.3IONS-SCNC: 12 MMOL/L (ref 4–13)
AST SERPL W P-5'-P-CCNC: 18 U/L (ref 13–39)
BILIRUB SERPL-MCNC: 1.59 MG/DL (ref 0.2–1)
BUN SERPL-MCNC: 33 MG/DL (ref 5–25)
CALCIUM SERPL-MCNC: 8.8 MG/DL (ref 8.4–10.2)
CHLORIDE SERPL-SCNC: 105 MMOL/L (ref 96–108)
CHOLEST SERPL-MCNC: 126 MG/DL
CO2 SERPL-SCNC: 25 MMOL/L (ref 21–32)
CREAT SERPL-MCNC: 1.49 MG/DL (ref 0.6–1.3)
CREAT UR-MCNC: 122.8 MG/DL
ERYTHROCYTE [DISTWIDTH] IN BLOOD BY AUTOMATED COUNT: 12.8 % (ref 11.6–15.1)
EST. AVERAGE GLUCOSE BLD GHB EST-MCNC: 151 MG/DL
GFR SERPL CREATININE-BSD FRML MDRD: 42 ML/MIN/1.73SQ M
GLUCOSE P FAST SERPL-MCNC: 98 MG/DL (ref 65–99)
HBA1C MFR BLD: 6.9 %
HCT VFR BLD AUTO: 41.9 % (ref 36.5–49.3)
HDLC SERPL-MCNC: 38 MG/DL
HGB BLD-MCNC: 13.8 G/DL (ref 12–17)
LDLC SERPL CALC-MCNC: 57 MG/DL (ref 0–100)
MCH RBC QN AUTO: 30.3 PG (ref 26.8–34.3)
MCHC RBC AUTO-ENTMCNC: 32.9 G/DL (ref 31.4–37.4)
MCV RBC AUTO: 92 FL (ref 82–98)
MICROALBUMIN UR-MCNC: 35.9 MG/L
MICROALBUMIN/CREAT 24H UR: 29 MG/G CREATININE (ref 0–30)
NONHDLC SERPL-MCNC: 88 MG/DL
PLATELET # BLD AUTO: 136 THOUSANDS/UL (ref 149–390)
PMV BLD AUTO: 10.3 FL (ref 8.9–12.7)
POTASSIUM SERPL-SCNC: 3.8 MMOL/L (ref 3.5–5.3)
PROT SERPL-MCNC: 6.6 G/DL (ref 6.4–8.4)
RBC # BLD AUTO: 4.56 MILLION/UL (ref 3.88–5.62)
SODIUM SERPL-SCNC: 142 MMOL/L (ref 135–147)
TRIGL SERPL-MCNC: 155 MG/DL
WBC # BLD AUTO: 9.39 THOUSAND/UL (ref 4.31–10.16)

## 2024-06-03 PROCEDURE — 85027 COMPLETE CBC AUTOMATED: CPT

## 2024-06-03 PROCEDURE — 82570 ASSAY OF URINE CREATININE: CPT

## 2024-06-03 PROCEDURE — 80061 LIPID PANEL: CPT

## 2024-06-03 PROCEDURE — 82043 UR ALBUMIN QUANTITATIVE: CPT

## 2024-06-03 PROCEDURE — 83036 HEMOGLOBIN GLYCOSYLATED A1C: CPT

## 2024-06-03 PROCEDURE — 36415 COLL VENOUS BLD VENIPUNCTURE: CPT

## 2024-06-03 PROCEDURE — 80053 COMPREHEN METABOLIC PANEL: CPT

## 2024-06-06 ENCOUNTER — OFFICE VISIT (OUTPATIENT)
Dept: INTERNAL MEDICINE CLINIC | Facility: OTHER | Age: 84
End: 2024-06-06
Payer: COMMERCIAL

## 2024-06-06 VITALS
SYSTOLIC BLOOD PRESSURE: 130 MMHG | WEIGHT: 160 LBS | BODY MASS INDEX: 25.11 KG/M2 | HEART RATE: 59 BPM | DIASTOLIC BLOOD PRESSURE: 74 MMHG | HEIGHT: 67 IN | TEMPERATURE: 98.4 F | RESPIRATION RATE: 20 BRPM | OXYGEN SATURATION: 97 %

## 2024-06-06 DIAGNOSIS — I10 BENIGN ESSENTIAL HTN: ICD-10-CM

## 2024-06-06 DIAGNOSIS — I10 ESSENTIAL HYPERTENSION: Primary | ICD-10-CM

## 2024-06-06 DIAGNOSIS — R53.83 FATIGUE, UNSPECIFIED TYPE: ICD-10-CM

## 2024-06-06 DIAGNOSIS — E11.9 TYPE 2 DIABETES MELLITUS WITHOUT COMPLICATION, WITHOUT LONG-TERM CURRENT USE OF INSULIN (HCC): ICD-10-CM

## 2024-06-06 DIAGNOSIS — E78.2 MIXED HYPERLIPIDEMIA: ICD-10-CM

## 2024-06-06 DIAGNOSIS — G20.A1 PARKINSON'S DISEASE WITHOUT FLUCTUATING MANIFESTATIONS, UNSPECIFIED WHETHER DYSKINESIA PRESENT: ICD-10-CM

## 2024-06-06 DIAGNOSIS — R06.09 DOE (DYSPNEA ON EXERTION): ICD-10-CM

## 2024-06-06 DIAGNOSIS — D69.6 THROMBOCYTOPENIA (HCC): ICD-10-CM

## 2024-06-06 DIAGNOSIS — N18.30 STAGE 3 CHRONIC KIDNEY DISEASE, UNSPECIFIED WHETHER STAGE 3A OR 3B CKD (HCC): ICD-10-CM

## 2024-06-06 PROCEDURE — 93000 ELECTROCARDIOGRAM COMPLETE: CPT | Performed by: INTERNAL MEDICINE

## 2024-06-06 PROCEDURE — 99214 OFFICE O/P EST MOD 30 MIN: CPT | Performed by: INTERNAL MEDICINE

## 2024-06-06 RX ORDER — LOVASTATIN 40 MG/1
40 TABLET ORAL DAILY
Qty: 90 TABLET | Refills: 3 | Status: SHIPPED | OUTPATIENT
Start: 2024-06-06

## 2024-06-06 RX ORDER — LISINOPRIL AND HYDROCHLOROTHIAZIDE 25; 20 MG/1; MG/1
1 TABLET ORAL DAILY
Qty: 90 TABLET | Refills: 3 | Status: SHIPPED | OUTPATIENT
Start: 2024-06-06

## 2024-06-06 RX ORDER — ATENOLOL 100 MG/1
100 TABLET ORAL DAILY
Qty: 90 TABLET | Refills: 3 | Status: SHIPPED | OUTPATIENT
Start: 2024-06-06

## 2024-06-06 RX ORDER — GLIMEPIRIDE 2 MG/1
2 TABLET ORAL
Qty: 90 TABLET | Refills: 3 | Status: SHIPPED | OUTPATIENT
Start: 2024-06-06

## 2024-06-06 NOTE — ASSESSMENT & PLAN NOTE
Lab Results   Component Value Date    EGFR 42 06/03/2024    EGFR 40 04/25/2024    EGFR 36 03/14/2024    CREATININE 1.49 (H) 06/03/2024    CREATININE 1.53 (H) 04/25/2024    CREATININE 1.69 (H) 03/14/2024   Stable.  Discussed adequate oral hydration.

## 2024-06-06 NOTE — ASSESSMENT & PLAN NOTE
Discussed diet.  Continue glimepiride 2 mg daily and metformin 1000 mg twice a day.  Lab Results   Component Value Date    HGBA1C 6.9 (H) 06/03/2024

## 2024-06-06 NOTE — PROGRESS NOTES
Assessment/Plan:    Essential hypertension  Controlled, continue current antihypertensive regimen.    Type 2 diabetes mellitus without complication, without long-term current use of insulin (HCC)  Discussed diet.  Continue glimepiride 2 mg daily and metformin 1000 mg twice a day.  Lab Results   Component Value Date    HGBA1C 6.9 (H) 06/03/2024     Stage 3 chronic kidney disease, unspecified whether stage 3a or 3b CKD (HCC)  Lab Results   Component Value Date    EGFR 42 06/03/2024    EGFR 40 04/25/2024    EGFR 36 03/14/2024    CREATININE 1.49 (H) 06/03/2024    CREATININE 1.53 (H) 04/25/2024    CREATININE 1.69 (H) 03/14/2024   Stable.  Discussed adequate oral hydration.    Thrombocytopenia (HCC)  Stable, asymptomatic.  Continue to trend CBC.    Mixed hyperlipidemia  Stable.  Continue lovastatin.    MCMILLAN (dyspnea on exertion)  EKG: Sinus rhythm with first-degree AV block.  Will order an echocardiogram.       Diagnoses and all orders for this visit:    Essential hypertension    Type 2 diabetes mellitus without complication, without long-term current use of insulin (HCC)  -     metFORMIN (GLUCOPHAGE) 1000 MG tablet; Take 1 tablet (1,000 mg total) by mouth 2 (two) times a day with meals  -     glimepiride (AMARYL) 2 mg tablet; Take 1 tablet (2 mg total) by mouth daily with breakfast    Mixed hyperlipidemia  -     lovastatin (MEVACOR) 40 MG tablet; Take 1 tablet (40 mg total) by mouth daily    Benign essential HTN  -     atenolol (TENORMIN) 100 mg tablet; Take 1 tablet (100 mg total) by mouth daily  -     lisinopril-hydrochlorothiazide (PRINZIDE,ZESTORETIC) 20-25 MG per tablet; Take 1 tablet by mouth daily    Stage 3 chronic kidney disease, unspecified whether stage 3a or 3b CKD (HCC)    Thrombocytopenia (HCC)    MCMILLAN (dyspnea on exertion)  -     Echo complete w/ contrast if indicated; Future  -     POCT ECG    Fatigue, unspecified type  -     Echo complete w/ contrast if indicated; Future  -     POCT ECG    Parkinson's  disease without fluctuating manifestations, unspecified whether dyskinesia present                  Subjective:      Patient ID: Salvatore Bacon is a 84 y.o. male.    Chief Complaint   Patient presents with   • Follow-up     6 month - labs done 6/3/24 -    • hm     Nothing due   • Fatigue     No pep can't do anything gets tired very fast        Salvatore Bacon is seen today for follow up of chronic conditions.   Recent laboratory studies reviewed today with the patient. A1c increased to 6.9%.   he has been compliant with his medication regimen.   He has noticed that he is easily fatigued. He denies any leg swelling, chest pain, palpitations. He has MCMILLAN which is relieved with rest.   he has no complaints or concerns at this time.       Fatigue  This is a new problem. The current episode started more than 1 month ago. The problem occurs daily. Pertinent negatives include no abdominal pain, chest pain, chills, congestion, coughing, diaphoresis, fever, headaches, nausea, numbness, sore throat, vomiting or weakness.   Hypertension  This is a chronic problem. The current episode started more than 1 year ago. The problem is unchanged. The problem is controlled. Pertinent negatives include no chest pain, headaches, palpitations or shortness of breath. Past treatments include beta blockers, ACE inhibitors and diuretics. The current treatment provides moderate improvement. There are no compliance problems.    Hyperlipidemia  This is a chronic problem. The current episode started more than 1 year ago. The problem is controlled. Recent lipid tests were reviewed and are normal. Pertinent negatives include no chest pain or shortness of breath. Current antihyperlipidemic treatment includes statins. The current treatment provides moderate improvement of lipids. There are no compliance problems.    Diabetes  He presents for his follow-up diabetic visit. He has type 2 diabetes mellitus. His disease course has been worsening. Pertinent  negatives for hypoglycemia include no dizziness or headaches. There are no diabetic associated symptoms. Pertinent negatives for diabetes include no chest pain and no weakness. Current diabetic treatment includes oral agent (dual therapy). He is compliant with treatment all of the time. An ACE inhibitor/angiotensin II receptor blocker is being taken.       The following portions of the patient's history were reviewed and updated as appropriate: allergies, current medications, past family history, past medical history, past social history, past surgical history, and problem list.    Review of Systems   Constitutional:  Negative for activity change, appetite change, chills, diaphoresis and fever.   HENT:  Negative for congestion, postnasal drip, rhinorrhea, sinus pressure, sinus pain, sneezing and sore throat.    Eyes:  Negative for visual disturbance.   Respiratory:  Negative for apnea, cough, choking, chest tightness, shortness of breath and wheezing.    Cardiovascular:  Negative for chest pain, palpitations and leg swelling.   Gastrointestinal:  Negative for abdominal distention, abdominal pain, anal bleeding, blood in stool, constipation, diarrhea, nausea and vomiting.   Endocrine: Negative for cold intolerance and heat intolerance.   Genitourinary:  Negative for difficulty urinating, dysuria and hematuria.   Musculoskeletal: Negative.    Skin: Negative.    Neurological:  Negative for dizziness, weakness, light-headedness, numbness and headaches.   Hematological:  Negative for adenopathy.   Psychiatric/Behavioral:  Negative for agitation, sleep disturbance and suicidal ideas.    All other systems reviewed and are negative.        Past Medical History:   Diagnosis Date   • Diabetes mellitus (HCC)    • Hyperlipidemia    • Hypertension          Current Outpatient Medications:   •  atenolol (TENORMIN) 100 mg tablet, Take 1 tablet (100 mg total) by mouth daily, Disp: 90 tablet, Rfl: 3  •  glimepiride (AMARYL) 2 mg  tablet, Take 1 tablet (2 mg total) by mouth daily with breakfast, Disp: 90 tablet, Rfl: 3  •  lisinopril-hydrochlorothiazide (PRINZIDE,ZESTORETIC) 20-25 MG per tablet, Take 1 tablet by mouth daily, Disp: 90 tablet, Rfl: 3  •  lovastatin (MEVACOR) 40 MG tablet, Take 1 tablet (40 mg total) by mouth daily, Disp: 90 tablet, Rfl: 3  •  metFORMIN (GLUCOPHAGE) 1000 MG tablet, Take 1 tablet (1,000 mg total) by mouth 2 (two) times a day with meals, Disp: 180 tablet, Rfl: 3    No Known Allergies    Social History   Past Surgical History:   Procedure Laterality Date   • CATARACT EXTRACTION, BILATERAL     • FLAP LOCAL HEAD / NECK Left 10/12/2021    Procedure: RECONSTRUCTION OF LEFT TEMPLE DEFECT AFTER RESCECTION MELANOMA;  Surgeon: Kingston Oviedo MD;  Location: [facility];  Service: Plastics   • FULL THICKNESS SKIN GRAFT Left 10/12/2021    Procedure: SKIN GRAFT FULL THICKNESS LEFT TEMPLE;  Surgeon: Kingston Oviedo MD;  [facility]: [facility];  Service: Plastics   • GALLBLADDER SURGERY     • HAND SURGERY Left    • LYMPH NODE BIOPSY Left 10/12/2021    Procedure: BIOPSY LYMPH NODE SENTINEL, LYMPHOSCINTIGRAPHY, INTRAOPERATIVE LYMPHATIC MAPPING (INJECT AT 1200);  Surgeon: Joana Mg MD;  Location: [facility];  Service: Surgical Oncology   • NOSE SURGERY     • SKIN CANCER EXCISION  10/2021   • SKIN LESION EXCISION Left 10/12/2021    Procedure: FACE MELANOMA SCAR WIDE EXCISION  FACE;  Surgeon: Joana Mg MD;  [facility];  Service: Surgical Oncology   • SPLIT THICKNESS SKIN GRAFT Left 10/12/2021    Procedure: SKIN GRAFT SPLIT THICKNESS LEFT TEMPLE;  Surgeon: Kingston Oviedo MD;  [facility]: [facility];  Service: Plastics   • US GUIDED LYMPH NODE BIOPSY LEFT  03/21/2022     Family History   Problem Relation Age of Onset   • No Known Problems Mother    • No Known Problems Father    • Colon cancer Other 60       Objective:  /74 (BP Location: Left arm, Patient Position: Sitting, Cuff Size: Standard)   Pulse 59   " Temp 98.4 °F (36.9 °C) (Temporal)   Resp 20   Ht 5' 7\" (1.702 m)   Wt 72.6 kg (160 lb)   SpO2 97%   BMI 25.06 kg/m²     Recent Results (from the past 1344 hour(s))   CBC and differential    Collection Time: 04/25/24  9:55 AM   Result Value Ref Range    WBC 9.71 4.31 - 10.16 Thousand/uL    RBC 4.81 3.88 - 5.62 Million/uL    Hemoglobin 14.5 12.0 - 17.0 g/dL    Hematocrit 43.9 36.5 - 49.3 %    MCV 91 82 - 98 fL    MCH 30.1 26.8 - 34.3 pg    MCHC 33.0 31.4 - 37.4 g/dL    RDW 13.0 11.6 - 15.1 %    MPV 10.9 8.9 - 12.7 fL    Platelets 138 (L) 149 - 390 Thousands/uL    nRBC 0 /100 WBCs    Segmented % 40 (L) 43 - 75 %    Immature Grans % 0 0 - 2 %    Lymphocytes % 46 (H) 14 - 44 %    Monocytes % 6 4 - 12 %    Eosinophils Relative 8 (H) 0 - 6 %    Basophils Relative 0 0 - 1 %    Absolute Neutrophils 3.84 1.85 - 7.62 Thousands/µL    Absolute Immature Grans 0.02 0.00 - 0.20 Thousand/uL    Absolute Lymphocytes 4.48 (H) 0.60 - 4.47 Thousands/µL    Absolute Monocytes 0.56 0.17 - 1.22 Thousand/µL    Eosinophils Absolute 0.78 (H) 0.00 - 0.61 Thousand/µL    Basophils Absolute 0.03 0.00 - 0.10 Thousands/µL   Comprehensive metabolic panel    Collection Time: 04/25/24  9:55 AM   Result Value Ref Range    Sodium 140 135 - 147 mmol/L    Potassium 4.4 3.5 - 5.3 mmol/L    Chloride 101 96 - 108 mmol/L    CO2 28 21 - 32 mmol/L    ANION GAP 11 4 - 13 mmol/L    BUN 27 (H) 5 - 25 mg/dL    Creatinine 1.53 (H) 0.60 - 1.30 mg/dL    Glucose 218 (H) 65 - 140 mg/dL    Calcium 9.1 8.4 - 10.2 mg/dL    AST 14 13 - 39 U/L    ALT 12 7 - 52 U/L    Alkaline Phosphatase 75 34 - 104 U/L    Total Protein 6.7 6.4 - 8.4 g/dL    Albumin 4.6 3.5 - 5.0 g/dL    Total Bilirubin 1.41 (H) 0.20 - 1.00 mg/dL    eGFR 40 ml/min/1.73sq m   LD,Blood    Collection Time: 04/25/24  9:55 AM   Result Value Ref Range     140 - 271 U/L   T3, free    Collection Time: 04/25/24  9:55 AM   Result Value Ref Range    T3, Free 2.90 2.50 - 3.90 pg/mL   T4, free    Collection " Time: 04/25/24  9:55 AM   Result Value Ref Range    Free T4 0.75 0.61 - 1.12 ng/dL   TSH, 3rd generation    Collection Time: 04/25/24  9:55 AM   Result Value Ref Range    TSH 3RD GENERATON 2.551 0.450 - 4.500 uIU/mL   Fingerstick Glucose (POCT)    Collection Time: 05/02/24  8:05 AM   Result Value Ref Range    POC Glucose 102 65 - 140 mg/dl   Hemoglobin A1C    Collection Time: 06/03/24  8:36 AM   Result Value Ref Range    Hemoglobin A1C 6.9 (H) Normal 4.0-5.6%; PreDiabetic 5.7-6.4%; Diabetic >=6.5%; Glycemic control for adults with diabetes <7.0% %     mg/dl   Lipid panel    Collection Time: 06/03/24  8:36 AM   Result Value Ref Range    Cholesterol 126 See Comment mg/dL    Triglycerides 155 (H) See Comment mg/dL    HDL, Direct 38 (L) >=40 mg/dL    LDL Calculated 57 0 - 100 mg/dL    Non-HDL-Chol (CHOL-HDL) 88 mg/dl   Comprehensive metabolic panel    Collection Time: 06/03/24  8:36 AM   Result Value Ref Range    Sodium 142 135 - 147 mmol/L    Potassium 3.8 3.5 - 5.3 mmol/L    Chloride 105 96 - 108 mmol/L    CO2 25 21 - 32 mmol/L    ANION GAP 12 4 - 13 mmol/L    BUN 33 (H) 5 - 25 mg/dL    Creatinine 1.49 (H) 0.60 - 1.30 mg/dL    Glucose, Fasting 98 65 - 99 mg/dL    Calcium 8.8 8.4 - 10.2 mg/dL    AST 18 13 - 39 U/L    ALT 21 7 - 52 U/L    Alkaline Phosphatase 76 34 - 104 U/L    Total Protein 6.6 6.4 - 8.4 g/dL    Albumin 4.3 3.5 - 5.0 g/dL    Total Bilirubin 1.59 (H) 0.20 - 1.00 mg/dL    eGFR 42 ml/min/1.73sq m   CBC    Collection Time: 06/03/24  8:36 AM   Result Value Ref Range    WBC 9.39 4.31 - 10.16 Thousand/uL    RBC 4.56 3.88 - 5.62 Million/uL    Hemoglobin 13.8 12.0 - 17.0 g/dL    Hematocrit 41.9 36.5 - 49.3 %    MCV 92 82 - 98 fL    MCH 30.3 26.8 - 34.3 pg    MCHC 32.9 31.4 - 37.4 g/dL    RDW 12.8 11.6 - 15.1 %    Platelets 136 (L) 149 - 390 Thousands/uL    MPV 10.3 8.9 - 12.7 fL   Albumin / creatinine urine ratio    Collection Time: 06/03/24  8:36 AM   Result Value Ref Range    Creatinine, Ur 122.8  Reference range not established. mg/dL    Albumin,U,Random 35.9 (H) <20.0 mg/L    Albumin Creat Ratio 29 0 - 30 mg/g creatinine            Physical Exam  Vitals and nursing note reviewed.   Constitutional:       General: He is not in acute distress.     Appearance: He is well-developed. He is not diaphoretic.   HENT:      Head: Normocephalic and atraumatic.   Eyes:      General: No scleral icterus.        Right eye: No discharge.         Left eye: No discharge.      Conjunctiva/sclera: Conjunctivae normal.      Pupils: Pupils are equal, round, and reactive to light.   Neck:      Thyroid: No thyromegaly.      Vascular: No JVD.   Cardiovascular:      Rate and Rhythm: Normal rate and regular rhythm.      Heart sounds: Normal heart sounds. No murmur heard.     No friction rub. No gallop.   Pulmonary:      Effort: Pulmonary effort is normal. No respiratory distress.      Breath sounds: Normal breath sounds. No wheezing or rales.   Chest:      Chest wall: No tenderness.   Abdominal:      General: Bowel sounds are normal. There is no distension.      Palpations: Abdomen is soft. There is no mass.      Tenderness: There is no abdominal tenderness. There is no guarding or rebound.   Musculoskeletal:         General: No tenderness or deformity. Normal range of motion.      Cervical back: Normal range of motion and neck supple.   Lymphadenopathy:      Cervical: No cervical adenopathy.   Skin:     General: Skin is warm and dry.      Coloration: Skin is not pale.      Findings: No erythema or rash.   Neurological:      Mental Status: He is alert and oriented to person, place, and time.      Cranial Nerves: No cranial nerve deficit.      Coordination: Coordination normal.      Deep Tendon Reflexes: Reflexes are normal and symmetric.   Psychiatric:         Behavior: Behavior normal.         Thought Content: Thought content normal.         Judgment: Judgment normal.

## 2024-07-08 ENCOUNTER — HOSPITAL ENCOUNTER (OUTPATIENT)
Dept: NON INVASIVE DIAGNOSTICS | Facility: CLINIC | Age: 84
Discharge: HOME | End: 2024-07-08
Payer: COMMERCIAL

## 2024-07-08 VITALS
HEART RATE: 59 BPM | BODY MASS INDEX: 25.11 KG/M2 | SYSTOLIC BLOOD PRESSURE: 130 MMHG | WEIGHT: 160 LBS | HEIGHT: 67 IN | DIASTOLIC BLOOD PRESSURE: 74 MMHG

## 2024-07-08 DIAGNOSIS — R53.83 FATIGUE, UNSPECIFIED TYPE: ICD-10-CM

## 2024-07-08 DIAGNOSIS — R06.09 DOE (DYSPNEA ON EXERTION): ICD-10-CM

## 2024-07-08 LAB
AORTIC ROOT: 3.5 CM
APICAL FOUR CHAMBER EJECTION FRACTION: 65 %
ASCENDING AORTA: 3.9 CM
BSA FOR ECHO PROCEDURE: 1.84 M2
E WAVE DECELERATION TIME: 289 MS
E/A RATIO: 0.74
FRACTIONAL SHORTENING: 31 (ref 28–44)
INTERVENTRICULAR SEPTUM IN DIASTOLE (PARASTERNAL SHORT AXIS VIEW): 1.1 CM
INTERVENTRICULAR SEPTUM: 1.1 CM (ref 0.6–1.1)
LAAS-AP2: 22.6 CM2
LAAS-AP4: 20.2 CM2
LEFT ATRIUM SIZE: 4.6 CM
LEFT ATRIUM VOLUME (MOD BIPLANE): 63 ML
LEFT ATRIUM VOLUME INDEX (MOD BIPLANE): 34.2 ML/M2
LEFT INTERNAL DIMENSION IN SYSTOLE: 2.9 CM (ref 2.1–4)
LEFT VENTRICULAR INTERNAL DIMENSION IN DIASTOLE: 4.2 CM (ref 3.5–6)
LEFT VENTRICULAR POSTERIOR WALL IN END DIASTOLE: 1 CM
LEFT VENTRICULAR STROKE VOLUME: 48 ML
LVSV (TEICH): 48 ML
MV E'TISSUE VEL-SEP: 7 CM/S
MV PEAK A VEL: 0.73 M/S
MV PEAK E VEL: 54 CM/S
MV STENOSIS PRESSURE HALF TIME: 84 MS
MV VALVE AREA P 1/2 METHOD: 2.6
RIGHT ATRIUM AREA SYSTOLE A4C: 21.2 CM2
RIGHT VENTRICLE ID DIMENSION: 4.1 CM
SL CV LEFT ATRIUM LENGTH A2C: 6 CM
SL CV LV EF: 65
SL CV PED ECHO LEFT VENTRICLE DIASTOLIC VOLUME (MOD BIPLANE) 2D: 80 ML
SL CV PED ECHO LEFT VENTRICLE SYSTOLIC VOLUME (MOD BIPLANE) 2D: 32 ML
TRICUSPID ANNULAR PLANE SYSTOLIC EXCURSION: 2.5 CM

## 2024-07-08 PROCEDURE — 93306 TTE W/DOPPLER COMPLETE: CPT | Performed by: INTERNAL MEDICINE

## 2024-07-08 PROCEDURE — 93306 TTE W/DOPPLER COMPLETE: CPT

## 2024-07-18 DIAGNOSIS — R53.83 FATIGUE, UNSPECIFIED TYPE: ICD-10-CM

## 2024-07-18 DIAGNOSIS — R06.09 DOE (DYSPNEA ON EXERTION): Primary | ICD-10-CM

## 2024-07-18 DIAGNOSIS — I51.7 LEFT ATRIAL DILATATION: ICD-10-CM

## 2024-08-13 ENCOUNTER — APPOINTMENT (OUTPATIENT)
Dept: LAB | Facility: IMAGING CENTER | Age: 84
End: 2024-08-13
Payer: COMMERCIAL

## 2024-08-13 DIAGNOSIS — C43.30 MALIGNANT MELANOMA OF SKIN OF FACE (HCC): ICD-10-CM

## 2024-08-13 DIAGNOSIS — C43.39 MALIGNANT MELANOMA OF FOREHEAD (HCC): ICD-10-CM

## 2024-08-13 DIAGNOSIS — Z79.899 HIGH RISK MEDICATION USE: ICD-10-CM

## 2024-08-13 DIAGNOSIS — C77.3 SECONDARY AND UNSPECIFIED MALIGNANT NEOPLASM OF AXILLA AND UPPER LIMB LYMPH NODES (HCC): ICD-10-CM

## 2024-08-13 LAB
ALBUMIN SERPL BCG-MCNC: 4.4 G/DL (ref 3.5–5)
ALP SERPL-CCNC: 75 U/L (ref 34–104)
ALT SERPL W P-5'-P-CCNC: 14 U/L (ref 7–52)
ANION GAP SERPL CALCULATED.3IONS-SCNC: 10 MMOL/L (ref 4–13)
AST SERPL W P-5'-P-CCNC: 16 U/L (ref 13–39)
BASOPHILS # BLD AUTO: 0.02 THOUSANDS/ÂΜL (ref 0–0.1)
BASOPHILS NFR BLD AUTO: 0 % (ref 0–1)
BILIRUB SERPL-MCNC: 1.54 MG/DL (ref 0.2–1)
BUN SERPL-MCNC: 27 MG/DL (ref 5–25)
CALCIUM SERPL-MCNC: 9.2 MG/DL (ref 8.4–10.2)
CHLORIDE SERPL-SCNC: 104 MMOL/L (ref 96–108)
CO2 SERPL-SCNC: 28 MMOL/L (ref 21–32)
CREAT SERPL-MCNC: 1.43 MG/DL (ref 0.6–1.3)
CREAT UR-MCNC: 98.7 MG/DL
EOSINOPHIL # BLD AUTO: 0.42 THOUSAND/ÂΜL (ref 0–0.61)
EOSINOPHIL NFR BLD AUTO: 4 % (ref 0–6)
ERYTHROCYTE [DISTWIDTH] IN BLOOD BY AUTOMATED COUNT: 13 % (ref 11.6–15.1)
GFR SERPL CREATININE-BSD FRML MDRD: 44 ML/MIN/1.73SQ M
GLUCOSE P FAST SERPL-MCNC: 102 MG/DL (ref 65–99)
HCT VFR BLD AUTO: 44.3 % (ref 36.5–49.3)
HGB BLD-MCNC: 14.5 G/DL (ref 12–17)
IMM GRANULOCYTES # BLD AUTO: 0.02 THOUSAND/UL (ref 0–0.2)
IMM GRANULOCYTES NFR BLD AUTO: 0 % (ref 0–2)
LDH SERPL-CCNC: 123 U/L (ref 140–271)
LYMPHOCYTES # BLD AUTO: 4.58 THOUSANDS/ÂΜL (ref 0.6–4.47)
LYMPHOCYTES NFR BLD AUTO: 44 % (ref 14–44)
MCH RBC QN AUTO: 30.3 PG (ref 26.8–34.3)
MCHC RBC AUTO-ENTMCNC: 32.7 G/DL (ref 31.4–37.4)
MCV RBC AUTO: 93 FL (ref 82–98)
MICROALBUMIN UR-MCNC: 38.9 MG/L
MICROALBUMIN/CREAT 24H UR: 39 MG/G CREATININE (ref 0–30)
MONOCYTES # BLD AUTO: 0.65 THOUSAND/ÂΜL (ref 0.17–1.22)
MONOCYTES NFR BLD AUTO: 6 % (ref 4–12)
NEUTROPHILS # BLD AUTO: 4.63 THOUSANDS/ÂΜL (ref 1.85–7.62)
NEUTS SEG NFR BLD AUTO: 46 % (ref 43–75)
NRBC BLD AUTO-RTO: 0 /100 WBCS
PLATELET # BLD AUTO: 144 THOUSANDS/UL (ref 149–390)
PMV BLD AUTO: 10.7 FL (ref 8.9–12.7)
POTASSIUM SERPL-SCNC: 4 MMOL/L (ref 3.5–5.3)
PROT SERPL-MCNC: 6.8 G/DL (ref 6.4–8.4)
RBC # BLD AUTO: 4.79 MILLION/UL (ref 3.88–5.62)
SODIUM SERPL-SCNC: 142 MMOL/L (ref 135–147)
T3FREE SERPL-MCNC: 2.84 PG/ML (ref 2.5–3.9)
T4 FREE SERPL-MCNC: 0.82 NG/DL (ref 0.61–1.12)
TSH SERPL DL<=0.05 MIU/L-ACNC: 1.87 UIU/ML (ref 0.45–4.5)
WBC # BLD AUTO: 10.32 THOUSAND/UL (ref 4.31–10.16)

## 2024-08-13 PROCEDURE — 83615 LACTATE (LD) (LDH) ENZYME: CPT

## 2024-08-13 PROCEDURE — 84443 ASSAY THYROID STIM HORMONE: CPT

## 2024-08-13 PROCEDURE — 84481 FREE ASSAY (FT-3): CPT

## 2024-08-13 PROCEDURE — 85025 COMPLETE CBC W/AUTO DIFF WBC: CPT

## 2024-08-13 PROCEDURE — 36415 COLL VENOUS BLD VENIPUNCTURE: CPT

## 2024-08-13 PROCEDURE — 84439 ASSAY OF FREE THYROXINE: CPT

## 2024-08-13 PROCEDURE — 80053 COMPREHEN METABOLIC PANEL: CPT

## 2024-08-14 ENCOUNTER — ESTABLISHED COMPREHENSIVE EXAM (OUTPATIENT)
Dept: URBAN - METROPOLITAN AREA CLINIC 6 | Facility: CLINIC | Age: 84
End: 2024-08-14

## 2024-08-14 DIAGNOSIS — E11.9: ICD-10-CM

## 2024-08-14 DIAGNOSIS — H35.363: ICD-10-CM

## 2024-08-14 DIAGNOSIS — H04.123: ICD-10-CM

## 2024-08-14 DIAGNOSIS — H02.831: ICD-10-CM

## 2024-08-14 DIAGNOSIS — H02.881: ICD-10-CM

## 2024-08-14 DIAGNOSIS — H02.884: ICD-10-CM

## 2024-08-14 DIAGNOSIS — H02.834: ICD-10-CM

## 2024-08-14 DIAGNOSIS — Z96.1: ICD-10-CM

## 2024-08-14 PROCEDURE — 92014 COMPRE OPH EXAM EST PT 1/>: CPT

## 2024-08-14 ASSESSMENT — VISUAL ACUITY
OU_CC: 20/20-1
OU_CC: J1+
OS_CC: 20/25-1
OD_CC: 20/20

## 2024-08-14 ASSESSMENT — KERATOMETRY
OD_AXISANGLE2_DEGREES: 76
OD_AXISANGLE_DEGREES: 166
OS_K2POWER_DIOPTERS: 46.25
OS_AXISANGLE2_DEGREES: 70
OS_AXISANGLE_DEGREES: 160
OS_K1POWER_DIOPTERS: 46.00
OD_K2POWER_DIOPTERS: 46.50
OD_K1POWER_DIOPTERS: 45.75

## 2024-08-19 ENCOUNTER — TELEPHONE (OUTPATIENT)
Dept: HEMATOLOGY ONCOLOGY | Facility: CLINIC | Age: 84
End: 2024-08-19

## 2024-08-19 NOTE — TELEPHONE ENCOUNTER
Advised patient we were cancelling the appt for today due to the provider not being in office. Informed him someone would call back later today to R/S.

## 2024-08-29 ENCOUNTER — OFFICE VISIT (OUTPATIENT)
Dept: HEMATOLOGY ONCOLOGY | Facility: CLINIC | Age: 84
End: 2024-08-29
Payer: COMMERCIAL

## 2024-08-29 VITALS
OXYGEN SATURATION: 97 % | WEIGHT: 157 LBS | HEIGHT: 67 IN | SYSTOLIC BLOOD PRESSURE: 122 MMHG | TEMPERATURE: 98.2 F | RESPIRATION RATE: 18 BRPM | HEART RATE: 64 BPM | DIASTOLIC BLOOD PRESSURE: 68 MMHG | BODY MASS INDEX: 24.64 KG/M2

## 2024-08-29 DIAGNOSIS — N18.30 STAGE 3 CHRONIC KIDNEY DISEASE, UNSPECIFIED WHETHER STAGE 3A OR 3B CKD (HCC): ICD-10-CM

## 2024-08-29 DIAGNOSIS — Z08 ENCOUNTER FOR FOLLOW-UP SURVEILLANCE OF MELANOMA: Primary | ICD-10-CM

## 2024-08-29 DIAGNOSIS — Z79.899 HIGH RISK MEDICATION USE: ICD-10-CM

## 2024-08-29 DIAGNOSIS — C43.39 MALIGNANT MELANOMA OF FOREHEAD (HCC): ICD-10-CM

## 2024-08-29 DIAGNOSIS — Z85.820 ENCOUNTER FOR FOLLOW-UP SURVEILLANCE OF MELANOMA: Primary | ICD-10-CM

## 2024-08-29 DIAGNOSIS — C43.30 MALIGNANT MELANOMA OF SKIN OF FACE (HCC): ICD-10-CM

## 2024-08-29 DIAGNOSIS — C77.3 SECONDARY AND UNSPECIFIED MALIGNANT NEOPLASM OF AXILLA AND UPPER LIMB LYMPH NODES (HCC): ICD-10-CM

## 2024-08-29 PROCEDURE — 99214 OFFICE O/P EST MOD 30 MIN: CPT | Performed by: INTERNAL MEDICINE

## 2024-08-29 PROCEDURE — 3078F DIAST BP <80 MM HG: CPT | Performed by: INTERNAL MEDICINE

## 2024-08-29 PROCEDURE — 3074F SYST BP LT 130 MM HG: CPT | Performed by: INTERNAL MEDICINE

## 2024-08-29 PROCEDURE — G2211 COMPLEX E/M VISIT ADD ON: HCPCS | Performed by: INTERNAL MEDICINE

## 2024-08-29 NOTE — LETTER
September 4, 2024     Joana Mg MD  [address]  [facility] [address]    Patient: Salvatore Bacon   YOB: 1940   Date of Visit: 8/29/2024       Dear Dr. Mg:    Thank you for referring Salvatore Bacon to me for evaluation. Below are my notes for this consultation.    If you have questions, please do not hesitate to call me. I look forward to following your patient along with you.         Sincerely,        Sara Purdy MD        CC: MD Vance Farias Jr., MD Jonathan S Lam, MD Melissa A Wilson, MD  9/4/2024  3:49 AM  Sign when Signing Visit  [facility] [place]  [address] [address]  [phone]  [phone]     Date of Visit: 8/29/2024  Name: Salvatore Bacon   YOB: 1940        Subjective    VISIT DIAGNOSIS:  Diagnoses and all orders for this visit:    Encounter for follow-up surveillance of melanoma    Malignant melanoma of skin of face (HCC)  -     CBC and differential; Future  -     Comprehensive metabolic panel; Future  -     LD,Blood; Future  -     T3, free; Future  -     T4, free; Future  -     TSH, 3rd generation; Future  -     NM pet ct tumor imaging whole body; Future    Malignant melanoma of forehead (HCC)  -     CBC and differential; Future  -     Comprehensive metabolic panel; Future  -     LD,Blood; Future  -     T3, free; Future  -     T4, free; Future  -     TSH, 3rd generation; Future  -     NM pet ct tumor imaging whole body; Future    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)  -     CBC and differential; Future  -     Comprehensive metabolic panel; Future  -     LD,Blood; Future  -     T3, free; Future  -     T4, free; Future  -     TSH, 3rd generation; Future  -     NM pet ct tumor imaging whole body; Future    Stage 3 chronic kidney disease, unspecified whether stage 3a or 3b CKD (HCC)  -     CBC and differential; Future  -      Comprehensive metabolic panel; Future  -     LD,Blood; Future  -     T3, free; Future  -     T4, free; Future  -     TSH, 3rd generation; Future  -     NM pet ct tumor imaging whole body; Future    High risk medication use  -     CBC and differential; Future  -     Comprehensive metabolic panel; Future  -     LD,Blood; Future  -     T3, free; Future  -     T4, free; Future  -     TSH, 3rd generation; Future  -     NM pet ct tumor imaging whole body; Future        Oncology History   Malignant melanoma of skin of face (HCC)   9/8/2021 -  Cancer Staged    Staging form: Melanoma of the Skin, AJCC 8th Edition  - Clinical stage from 9/8/2021: Stage III (cT2a, cN1a, cM0) - Signed by Sara Purdy MD on 11/7/2021 9/8/2021 -  Cancer Staged    Staging form: Melanoma of the Skin, AJCC 8th Edition  - Pathologic stage from 9/8/2021: Stage IIIA (pT2a, pN1a, cM0) - Signed by Sara Purdy MD on 11/7/2021 9/22/2021 Initial Diagnosis    Malignant melanoma of skin of face (HCC)     4/4/2022 -  Chemotherapy    pembrolizumab (KEYTRUDA) IVPB, 400 mg, Intravenous, Once, 17 of 17 cycles  Administration: 400 mg (4/4/2022), 400 mg (5/16/2022), 400 mg (6/27/2022), 400 mg (8/8/2022), 400 mg (9/19/2022), 400 mg (10/31/2022), 400 mg (12/12/2022), 400 mg (1/23/2023), 400 mg (3/6/2023), 400 mg (5/5/2023), 400 mg (6/15/2023), 400 mg (8/3/2023), 400 mg (9/21/2023), 400 mg (11/16/2023), 400 mg (12/28/2023), 400 mg (2/8/2024), 400 mg (3/21/2024)     Malignant melanoma of forehead (HCC)   11/29/2021 Initial Diagnosis    Malignant melanoma of forehead (HCC)     4/4/2022 -  Chemotherapy    pembrolizumab (KEYTRUDA) IVPB, 400 mg, Intravenous, Once, 17 of 17 cycles  Administration: 400 mg (4/4/2022), 400 mg (5/16/2022), 400 mg (6/27/2022), 400 mg (8/8/2022), 400 mg (9/19/2022), 400 mg (10/31/2022), 400 mg (12/12/2022), 400 mg (1/23/2023), 400 mg (3/6/2023), 400 mg (5/5/2023), 400 mg (6/15/2023), 400 mg (8/3/2023), 400 mg (9/21/2023),  400 mg (11/16/2023), 400 mg (12/28/2023), 400 mg (2/8/2024), 400 mg (3/21/2024)        Cancer Staging   Malignant melanoma of skin of face (HCC)  Staging form: Melanoma of the Skin, AJCC 8th Edition  - Clinical stage from 9/8/2021: Stage III (cT2a, cN1a, cM0) - Signed by Sara Purdy MD on 11/7/2021  - Pathologic stage from 9/8/2021: Stage IIIA (pT2a, pN1a, cM0) - Signed by Sara Purdy MD on 11/7/2021     Treatment Details   Treatment goal Curative   Plan Name OP Pembrolizumab Q 42 Days   Status Active   Start Date 4/4/2022   End Date 3/21/2024   Provider Sara Purdy MD   Chemotherapy pembrolizumab (KEYTRUDA) IVPB, 400 mg, Intravenous, Once, 17 of 17 cycles  Administration: 400 mg (4/4/2022), 400 mg (5/16/2022), 400 mg (6/27/2022), 400 mg (8/8/2022), 400 mg (9/19/2022), 400 mg (10/31/2022), 400 mg (12/12/2022), 400 mg (1/23/2023), 400 mg (3/6/2023), 400 mg (5/5/2023), 400 mg (6/15/2023), 400 mg (8/3/2023), 400 mg (9/21/2023), 400 mg (11/16/2023), 400 mg (12/28/2023), 400 mg (2/8/2024), 400 mg (3/21/2024)          HISTORY OF PRESENT ILLNESS: Salvatore Bacon is a 84 y.o. male  who has stage IIIa melanoma with a questionable metastatic disease with lymph  node involvement that was equivocal and evaluation who received treatment with pembrolizumab with his last treatment on 3/21/2024 here for continued monitoring, follow-up and surveillance.    He is doing well.  Feels good.  Energy doing okay.  No issues concerns or complaints today.  Denies any new, changing, concerning skin lesions.  Denies any lymphadenopathy.    Denies any delayed onset of immune mediated side effects.  Denies headaches, double vision, rash, itching, chest pain, shortness breath, diarrhea.  No muscle weakness fatigue.        REVIEW OF SYSTEMS:  Review of Systems   Constitutional:  Negative for appetite change, fatigue, fever and unexpected weight change.   HENT:   Negative for lump/mass, trouble swallowing and voice change.    Eyes:   Negative for icterus.   Respiratory:  Negative for cough, shortness of breath and wheezing.    Cardiovascular:  Negative for leg swelling.   Gastrointestinal:  Negative for abdominal pain, constipation, diarrhea, nausea and vomiting.   Genitourinary:  Negative for difficulty urinating and hematuria.    Musculoskeletal:  Negative for arthralgias, gait problem and myalgias.   Skin:  Negative for itching and rash.        No new, changing, or concerning lesions.   Neurological:  Negative for extremity weakness, gait problem, headaches, light-headedness and numbness.   Hematological:  Negative for adenopathy.        MEDICATIONS:    Current Outpatient Medications:   •  atenolol (TENORMIN) 100 mg tablet, Take 1 tablet (100 mg total) by mouth daily, Disp: 90 tablet, Rfl: 3  •  glimepiride (AMARYL) 2 mg tablet, Take 1 tablet (2 mg total) by mouth daily with breakfast, Disp: 90 tablet, Rfl: 3  •  lisinopril-hydrochlorothiazide (PRINZIDE,ZESTORETIC) 20-25 MG per tablet, Take 1 tablet by mouth daily, Disp: 90 tablet, Rfl: 3  •  lovastatin (MEVACOR) 40 MG tablet, Take 1 tablet (40 mg total) by mouth daily, Disp: 90 tablet, Rfl: 3  •  metFORMIN (GLUCOPHAGE) 1000 MG tablet, Take 1 tablet (1,000 mg total) by mouth 2 (two) times a day with meals, Disp: 180 tablet, Rfl: 3     ALLERGIES:  No Known Allergies     ACTIVE PROBLEMS:  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)   • Stage 3 chronic kidney disease, unspecified  whether stage 3a or 3b CKD (HCC)   • High risk medication use   • Advanced care planning/counseling discussion   • Arthritis of left knee   • Left cervical lymphadenopathy   • Lung nodules   • MCMILLAN (dyspnea on exertion)   • Fatigue          PAST MEDICAL HISTORY:   Past Medical History:   Diagnosis Date   • Diabetes mellitus (HCC)    • Hyperlipidemia    • Hypertension         PAST SURGICAL HISTORY:  Past Surgical History:   Procedure Laterality Date   • CATARACT EXTRACTION, BILATERAL     • FLAP LOCAL HEAD / NECK Left 10/12/2021    Procedure: RECONSTRUCTION OF LEFT TEMPLE DEFECT AFTER RESCECTION MELANOMA;  Surgeon: Kingston Oviedo MD;  [facility];  Service: Plastics   • FULL THICKNESS SKIN GRAFT Left 10/12/2021    Procedure: SKIN GRAFT FULL THICKNESS LEFT TEMPLE;  Surgeon: Kingston Oviedo MD;  [facility];  Service: Plastics   • GALLBLADDER SURGERY     • HAND SURGERY Left    • LYMPH NODE BIOPSY Left 10/12/2021    Procedure: BIOPSY LYMPH NODE SENTINEL, LYMPHOSCINTIGRAPHY, INTRAOPERATIVE LYMPHATIC MAPPING (INJECT AT 1200);  Surgeon: Joana Mg MD;  [facility];  Service: Surgical Oncology   • NOSE SURGERY     • SKIN CANCER EXCISION  10/2021   • SKIN LESION EXCISION Left 10/12/2021    Procedure: FACE MELANOMA SCAR WIDE EXCISION  FACE;  Surgeon: Joana Mg MD;  [facility];  Service: Surgical Oncology   • SPLIT THICKNESS SKIN GRAFT Left 10/12/2021    Procedure: SKIN GRAFT SPLIT THICKNESS LEFT TEMPLE;  Surgeon: Kingston Oviedo MD;  [facility];  Service: Plastics   • US GUIDED LYMPH NODE BIOPSY LEFT  03/21/2022        SOCIAL HISTORY:  Social History     Socioeconomic History   • Marital status: /Civil Union     Spouse name: Not on file   • Number of children: Not on file   • Years of education: Not on file   • Highest education level: Not on file   Occupational History   • Not on file   Tobacco Use   • Smoking status: Never   • Smokeless tobacco: Never  "  Vaping Use   • Vaping status: Never Used   Substance and Sexual Activity   • Alcohol use: Not Currently   • Drug use: Never   • Sexual activity: Not Currently   Other Topics Concern   • Not on file   Social History Narrative   • Not on file     Social Determinants of Health     Financial Resource Strain: Low Risk  (12/1/2023)    Overall Financial Resource Strain (CARDIA)    • Difficulty of Paying Living Expenses: Not hard at all   Food Insecurity: Not on file   Transportation Needs: No Transportation Needs (12/1/2023)    PRAPARE - Transportation    • Lack of Transportation (Medical): No    • Lack of Transportation (Non-Medical): No   Physical Activity: Not on file   Stress: Not on file   Social Connections: Not on file   Intimate Partner Violence: Not on file   Housing Stability: Not on file        FAMILY HISTORY:  Family History   Problem Relation Age of Onset   • No Known Problems Mother    • No Known Problems Father    • Colon cancer Other 60           Objective    PHYSICAL EXAMINATION:   Blood pressure 122/68, pulse 64, temperature 98.2 °F (36.8 °C), temperature source Temporal, resp. rate 18, height 5' 7\" (1.702 m), weight 71.2 kg (157 lb), SpO2 97%.     Pain Score: 0-No pain     ECOG Performance Status      Flowsheet Row Most Recent Value   ECOG Performance Status 1 - Restricted in physically strenuous activity but ambulatory and able to carry out work of a light or sedentary nature, e.g., light house work, office work               Physical Exam  Constitutional:       General: He is not in acute distress.     Appearance: Normal appearance. He is not ill-appearing or toxic-appearing.   HENT:      Head: Normocephalic and atraumatic.      Right Ear: External ear normal.      Left Ear: External ear normal.      Nose: Nose normal.      Mouth/Throat:      Mouth: Mucous membranes are moist.      Pharynx: Oropharynx is clear.   Eyes:      General: No scleral icterus.        Right eye: No discharge.         Left " eye: No discharge.      Conjunctiva/sclera: Conjunctivae normal.   Cardiovascular:      Rate and Rhythm: Normal rate and regular rhythm.      Pulses: Normal pulses.      Heart sounds: No murmur heard.     No friction rub. No gallop.   Pulmonary:      Effort: Pulmonary effort is normal. No respiratory distress.      Breath sounds: Normal breath sounds. No wheezing or rales.   Abdominal:      General: Bowel sounds are normal. There is no distension.      Palpations: There is no mass.      Tenderness: There is no abdominal tenderness. There is no rebound.   Musculoskeletal:         General: No swelling or tenderness.      Cervical back: Normal range of motion. No rigidity.      Right lower leg: No edema.      Left lower leg: No edema.   Lymphadenopathy:      Head:      Right side of head: No submandibular, preauricular or posterior auricular adenopathy.      Left side of head: No submandibular, preauricular or posterior auricular adenopathy.      Cervical: No cervical adenopathy.      Right cervical: No superficial or posterior cervical adenopathy.     Left cervical: No superficial or posterior cervical adenopathy.      Upper Body:      Right upper body: No supraclavicular or axillary adenopathy.      Left upper body: No supraclavicular or axillary adenopathy.   Skin:     General: Skin is warm.      Coloration: Skin is not jaundiced.      Findings: No lesion or rash.      Comments: Well healed surgical scar.  No evidence of recurrence at primary site.   Neurological:      General: No focal deficit present.      Mental Status: He is alert and oriented to person, place, and time.      Cranial Nerves: No cranial nerve deficit.      Motor: No weakness.      Gait: Gait normal.   Psychiatric:         Mood and Affect: Mood normal.         Behavior: Behavior normal.         Thought Content: Thought content normal.         Judgment: Judgment normal.         I reviewed lab data in the chart.    WBC   Date Value Ref Range Status    08/13/2024 10.32 (H) 4.31 - 10.16 Thousand/uL Final   06/03/2024 9.39 4.31 - 10.16 Thousand/uL Final   04/25/2024 9.71 4.31 - 10.16 Thousand/uL Final     Hemoglobin   Date Value Ref Range Status   08/13/2024 14.5 12.0 - 17.0 g/dL Final   06/03/2024 13.8 12.0 - 17.0 g/dL Final   04/25/2024 14.5 12.0 - 17.0 g/dL Final     Platelets   Date Value Ref Range Status   08/13/2024 144 (L) 149 - 390 Thousands/uL Final   06/03/2024 136 (L) 149 - 390 Thousands/uL Final   04/25/2024 138 (L) 149 - 390 Thousands/uL Final     MCV   Date Value Ref Range Status   08/13/2024 93 82 - 98 fL Final   06/03/2024 92 82 - 98 fL Final   04/25/2024 91 82 - 98 fL Final      Potassium   Date Value Ref Range Status   08/13/2024 4.0 3.5 - 5.3 mmol/L Final   06/03/2024 3.8 3.5 - 5.3 mmol/L Final   04/25/2024 4.4 3.5 - 5.3 mmol/L Final   07/20/2020 4.6 3.5 - 5.2 mmol/L Final   05/01/2019 3.6 3.5 - 5.2 mmol/L Final   04/30/2019 3.2 (L) 3.5 - 5.2 mmol/L Final     Chloride   Date Value Ref Range Status   08/13/2024 104 96 - 108 mmol/L Final   06/03/2024 105 96 - 108 mmol/L Final   04/25/2024 101 96 - 108 mmol/L Final   07/20/2020 108 100 - 109 mmol/L Final   05/01/2019 112 (H) 100 - 109 mmol/L Final   04/30/2019 108 100 - 109 mmol/L Final     Carbon Dioxide   Date Value Ref Range Status   07/20/2020 28 23 - 31 mmol/L Final   05/01/2019 24 23 - 31 mmol/L Final   04/30/2019 25 23 - 31 mmol/L Final     CO2   Date Value Ref Range Status   08/13/2024 28 21 - 32 mmol/L Final   06/03/2024 25 21 - 32 mmol/L Final   04/25/2024 28 21 - 32 mmol/L Final     BUN   Date Value Ref Range Status   08/13/2024 27 (H) 5 - 25 mg/dL Final   06/03/2024 33 (H) 5 - 25 mg/dL Final   04/25/2024 27 (H) 5 - 25 mg/dL Final   07/20/2020 26 7 - 28 mg/dL Final   05/01/2019 35 (H) 7 - 28 mg/dL Final   04/30/2019 42 (H) 7 - 28 mg/dL Final     Creatinine   Date Value Ref Range Status   08/13/2024 1.43 (H) 0.60 - 1.30 mg/dL Final     Comment:     Standardized to IDMS reference method    06/03/2024 1.49 (H) 0.60 - 1.30 mg/dL Final     Comment:     Standardized to IDMS reference method   04/25/2024 1.53 (H) 0.60 - 1.30 mg/dL Final     Comment:     Standardized to IDMS reference method   07/20/2020 1.25 0.53 - 1.30 mg/dL Final   05/01/2019 1.18 0.53 - 1.30 mg/dL Final   04/30/2019 1.28 0.53 - 1.30 mg/dL Final     Glucose   Date Value Ref Range Status   04/25/2024 218 (H) 65 - 140 mg/dL Final     Comment:     If the patient is fasting, the ADA then defines impaired fasting glucose as > 100 mg/dL and diabetes as > or equal to 123 mg/dL.   03/14/2024 246 (H) 65 - 140 mg/dL Final     Comment:     If the patient is fasting, the ADA then defines impaired fasting glucose as > 100 mg/dL and diabetes as > or equal to 123 mg/dL.   02/01/2024 230 (H) 65 - 140 mg/dL Final     Comment:     If the patient is fasting, the ADA then defines impaired fasting glucose as > 100 mg/dL and diabetes as > or equal to 123 mg/dL.   07/20/2020 87 65 - 99 mg/dL Final   05/01/2019 108 (H) 65 - 99 mg/dL Final   04/30/2019 140 (H) 65 - 99 mg/dL Final     Calcium   Date Value Ref Range Status   08/13/2024 9.2 8.4 - 10.2 mg/dL Final   06/03/2024 8.8 8.4 - 10.2 mg/dL Final   04/25/2024 9.1 8.4 - 10.2 mg/dL Final   07/20/2020 8.8 8.5 - 10.1 mg/dL Final   05/01/2019 7.1 (L) 8.5 - 10.1 mg/dL Final   04/30/2019 6.9 (L) 8.5 - 10.1 mg/dL Final     Albumin   Date Value Ref Range Status   08/13/2024 4.4 3.5 - 5.0 g/dL Final   06/03/2024 4.3 3.5 - 5.0 g/dL Final   04/25/2024 4.6 3.5 - 5.0 g/dL Final     ALBUMIN   Date Value Ref Range Status   07/20/2020 4.0 3.5 - 4.8 g/dL Final   05/01/2019 2.2 (L) 3.5 - 4.8 g/dL Final   04/30/2019 2.2 (L) 3.5 - 4.8 g/dL Final     Total Bilirubin   Date Value Ref Range Status   08/13/2024 1.54 (H) 0.20 - 1.00 mg/dL Final     Comment:     Use of this assay is not recommended for patients undergoing treatment with eltrombopag due to the potential for falsely elevated results.  N-acetyl-p-benzoquinone imine  (metabolite of Acetaminophen) will generate erroneously low results in samples for patients that have taken an overdose of Acetaminophen.   06/03/2024 1.59 (H) 0.20 - 1.00 mg/dL Final     Comment:     Use of this assay is not recommended for patients undergoing treatment with eltrombopag due to the potential for falsely elevated results.  N-acetyl-p-benzoquinone imine (metabolite of Acetaminophen) will generate erroneously low results in samples for patients that have taken an overdose of Acetaminophen.   04/25/2024 1.41 (H) 0.20 - 1.00 mg/dL Final     Comment:     Use of this assay is not recommended for patients undergoing treatment with eltrombopag due to the potential for falsely elevated results.  N-acetyl-p-benzoquinone imine (metabolite of Acetaminophen) will generate erroneously low results in samples for patients that have taken an overdose of Acetaminophen.   07/20/2020 1.2 (H) 0.2 - 1.0 mg/dL Final     Comment:     Use of this assay is not recommended for patients undergoing treatment with eltrombopag due to the potential for falsely elevated results.   05/01/2019 1.4 (H) 0.2 - 1.0 mg/dL Final   04/30/2019 1.9 (H) 0.2 - 1.0 mg/dL Final     Alkaline Phosphatase   Date Value Ref Range Status   08/13/2024 75 34 - 104 U/L Final   06/03/2024 76 34 - 104 U/L Final   04/25/2024 75 34 - 104 U/L Final   07/20/2020 63 35 - 120 U/L Final   05/01/2019 116 35 - 120 U/L Final   04/30/2019 129 (H) 35 - 120 U/L Final     AST   Date Value Ref Range Status   08/13/2024 16 13 - 39 U/L Final   06/03/2024 18 13 - 39 U/L Final   04/25/2024 14 13 - 39 U/L Final   07/20/2020 17 <41 U/L Final   05/01/2019 83 (H) <41 U/L Final   04/30/2019 100 (H) <41 U/L Final     ALT   Date Value Ref Range Status   08/13/2024 14 7 - 52 U/L Final     Comment:     Specimen collection should occur prior to Sulfasalazine administration due to the potential for falsely depressed results.    06/03/2024 21 7 - 52 U/L Final     Comment:     Specimen  "collection should occur prior to Sulfasalazine administration due to the potential for falsely depressed results.    04/25/2024 12 7 - 52 U/L Final     Comment:     Specimen collection should occur prior to Sulfasalazine administration due to the potential for falsely depressed results.    07/20/2020 22 <56 U/L Final   05/01/2019 68 (H) <56 U/L Final   04/30/2019 118 (H) <56 U/L Final      LD   Date Value Ref Range Status   08/13/2024 123 (L) 140 - 271 U/L Final   04/25/2024 165 140 - 271 U/L Final   03/14/2024 136 (L) 140 - 271 U/L Final     TSH   Date Value Ref Range Status   07/20/2020 1.47 0.36 - 3.74 uIU/mL Final   07/18/2018 2.20 0.36 - 3.74 uIU/mL Final     No results found for: \"[id number]\"   Free T4   Date Value Ref Range Status   08/13/2024 0.82 0.61 - 1.12 ng/dL Final     Comment:     Specimens with biotin concentrations > 10 ng/mL can lead to significant (> 10%) positive bias in result.   04/25/2024 0.75 0.61 - 1.12 ng/dL Final     Comment:     Specimens with biotin concentrations > 10 ng/mL can lead to significant (> 10%) positive bias in result.   03/14/2024 0.87 0.61 - 1.12 ng/dL Final     Comment:     Specimens with biotin concentrations > 10 ng/mL can lead to significant (> 10%) positive bias in result.         RECENT IMAGING:  No results found.          Assessment/Plan  Mr. Bacon is a 84-year-old gentleman with stage IIIa melanoma with concerns for metastatic disease with lymphadenopathy in his head and neck area here for continued monitoring, follow-up and surveillance after completing treatment with pembrolizumab in March 2024.    1. Encounter for follow-up surveillance of melanoma  2. Malignant melanoma of skin of face (HCC)  3. Malignant melanoma of forehead (HCC)  4. 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  Labs reviewed and okay.  We will continue to monitor him closely.  He is due for full " body imaging prior to his next visit with me.  He also has head and neck ultrasound ordered by Dr. Mg.  He knows to continue to monitor for any delayed onset of immune mediated side effects.  He knows to monitor for any changing skin lesions or development of lymphadenopathy.  He knows to call with issues or concerns prior to his next visit.  All orders placed in prescription provided for blood work to be done prior to his next visit as well as imaging.      - CBC and differential; Future  - Comprehensive metabolic panel; Future  - LD,Blood; Future  - T3, free; Future  - T4, free; Future  - TSH, 3rd generation; Future  - NM pet ct tumor imaging whole body; Future    5. Stage 3 chronic kidney disease, unspecified whether stage 3a or 3b CKD (HCC)  Given chronic kidney disease and elevated creatinine, we do perform surveillance imaging with PET scans.  He is due for imaging prior to his next visit in 3 months.  PET scan ordered.      - CBC and differential; Future  - Comprehensive metabolic panel; Future  - LD,Blood; Future  - T3, free; Future  - T4, free; Future  - TSH, 3rd generation; Future  - NM pet ct tumor imaging whole body; Future    6. High risk medication use  Previously treated with pembrolizumab.  We continue to monitor for any delayed onset of immune mediated side effects as they can occur even after you are on treatment.  We do continue to monitor thyroid functions.      - CBC and differential; Future  - Comprehensive metabolic panel; Future  - LD,Blood; Future  - T3, free; Future  - T4, free; Future  - TSH, 3rd generation; Future  - NM pet ct tumor imaging whole body; Future      Follow  up in three months    Sara Purdy MD, PhD

## 2024-09-04 ENCOUNTER — APPOINTMENT (OUTPATIENT)
Dept: RADIOLOGY | Age: 84
End: 2024-09-04
Payer: COMMERCIAL

## 2024-09-04 ENCOUNTER — OFFICE VISIT (OUTPATIENT)
Dept: CARDIOLOGY CLINIC | Facility: CLINIC | Age: 84
End: 2024-09-04
Payer: COMMERCIAL

## 2024-09-04 VITALS
BODY MASS INDEX: 25.18 KG/M2 | HEART RATE: 60 BPM | OXYGEN SATURATION: 94 % | DIASTOLIC BLOOD PRESSURE: 74 MMHG | WEIGHT: 160.4 LBS | HEIGHT: 67 IN | SYSTOLIC BLOOD PRESSURE: 150 MMHG

## 2024-09-04 DIAGNOSIS — R06.09 DOE (DYSPNEA ON EXERTION): ICD-10-CM

## 2024-09-04 DIAGNOSIS — E11.9 TYPE 2 DIABETES MELLITUS WITHOUT COMPLICATION, WITHOUT LONG-TERM CURRENT USE OF INSULIN (HCC): ICD-10-CM

## 2024-09-04 DIAGNOSIS — I51.7 LEFT ATRIAL DILATATION: ICD-10-CM

## 2024-09-04 DIAGNOSIS — I10 ESSENTIAL HYPERTENSION: ICD-10-CM

## 2024-09-04 DIAGNOSIS — E78.2 MIXED HYPERLIPIDEMIA: Primary | ICD-10-CM

## 2024-09-04 DIAGNOSIS — R53.83 FATIGUE, UNSPECIFIED TYPE: ICD-10-CM

## 2024-09-04 PROCEDURE — 71046 X-RAY EXAM CHEST 2 VIEWS: CPT

## 2024-09-04 PROCEDURE — 99203 OFFICE O/P NEW LOW 30 MIN: CPT | Performed by: INTERNAL MEDICINE

## 2024-09-04 PROCEDURE — 1160F RVW MEDS BY RX/DR IN RCRD: CPT | Performed by: INTERNAL MEDICINE

## 2024-09-04 PROCEDURE — 1159F MED LIST DOCD IN RCRD: CPT | Performed by: INTERNAL MEDICINE

## 2024-09-04 NOTE — PROGRESS NOTES
[facility] [place]  [address]  [place] [address]  [phone]  [phone]     Date of Visit: 8/29/2024  Name: Salvatore Bacon   YOB: 1940        Subjective    VISIT DIAGNOSIS:  Diagnoses and all orders for this visit:    Encounter for follow-up surveillance of melanoma    Malignant melanoma of skin of face (HCC)  -     CBC and differential; Future  -     Comprehensive metabolic panel; Future  -     LD,Blood; Future  -     T3, free; Future  -     T4, free; Future  -     TSH, 3rd generation; Future  -     NM pet ct tumor imaging whole body; Future    Malignant melanoma of forehead (HCC)  -     CBC and differential; Future  -     Comprehensive metabolic panel; Future  -     LD,Blood; Future  -     T3, free; Future  -     T4, free; Future  -     TSH, 3rd generation; Future  -     NM pet ct tumor imaging whole body; Future    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)  -     CBC and differential; Future  -     Comprehensive metabolic panel; Future  -     LD,Blood; Future  -     T3, free; Future  -     T4, free; Future  -     TSH, 3rd generation; Future  -     NM pet ct tumor imaging whole body; Future    Stage 3 chronic kidney disease, unspecified whether stage 3a or 3b CKD (HCC)  -     CBC and differential; Future  -     Comprehensive metabolic panel; Future  -     LD,Blood; Future  -     T3, free; Future  -     T4, free; Future  -     TSH, 3rd generation; Future  -     NM pet ct tumor imaging whole body; Future    High risk medication use  -     CBC and differential; Future  -     Comprehensive metabolic panel; Future  -     LD,Blood; Future  -     T3, free; Future  -     T4, free; Future  -     TSH, 3rd generation; Future  -     NM pet ct tumor imaging whole body; Future        Oncology History   Malignant melanoma of skin of face (HCC)   9/8/2021 -  Cancer Staged    Staging form: Melanoma of the Skin, AJCC 8th  Edition  - Clinical stage from 9/8/2021: Stage III (cT2a, cN1a, cM0) - Signed by Sara Purdy MD on 11/7/2021 9/8/2021 -  Cancer Staged    Staging form: Melanoma of the Skin, AJCC 8th Edition  - Pathologic stage from 9/8/2021: Stage IIIA (pT2a, pN1a, cM0) - Signed by Sara Purdy MD on 11/7/2021 9/22/2021 Initial Diagnosis    Malignant melanoma of skin of face (HCC)     4/4/2022 -  Chemotherapy    pembrolizumab (KEYTRUDA) IVPB, 400 mg, Intravenous, Once, 17 of 17 cycles  Administration: 400 mg (4/4/2022), 400 mg (5/16/2022), 400 mg (6/27/2022), 400 mg (8/8/2022), 400 mg (9/19/2022), 400 mg (10/31/2022), 400 mg (12/12/2022), 400 mg (1/23/2023), 400 mg (3/6/2023), 400 mg (5/5/2023), 400 mg (6/15/2023), 400 mg (8/3/2023), 400 mg (9/21/2023), 400 mg (11/16/2023), 400 mg (12/28/2023), 400 mg (2/8/2024), 400 mg (3/21/2024)     Malignant melanoma of forehead (HCC)   11/29/2021 Initial Diagnosis    Malignant melanoma of forehead (HCC)     4/4/2022 -  Chemotherapy    pembrolizumab (KEYTRUDA) IVPB, 400 mg, Intravenous, Once, 17 of 17 cycles  Administration: 400 mg (4/4/2022), 400 mg (5/16/2022), 400 mg (6/27/2022), 400 mg (8/8/2022), 400 mg (9/19/2022), 400 mg (10/31/2022), 400 mg (12/12/2022), 400 mg (1/23/2023), 400 mg (3/6/2023), 400 mg (5/5/2023), 400 mg (6/15/2023), 400 mg (8/3/2023), 400 mg (9/21/2023), 400 mg (11/16/2023), 400 mg (12/28/2023), 400 mg (2/8/2024), 400 mg (3/21/2024)        Cancer Staging   Malignant melanoma of skin of face (HCC)  Staging form: Melanoma of the Skin, AJCC 8th Edition  - Clinical stage from 9/8/2021: Stage III (cT2a, cN1a, cM0) - Signed by Sara Purdy MD on 11/7/2021  - Pathologic stage from 9/8/2021: Stage IIIA (pT2a, pN1a, cM0) - Signed by Sara Purdy MD on 11/7/2021     Treatment Details   Treatment goal Curative   Plan Name OP Pembrolizumab Q 42 Days   Status Active   Start Date 4/4/2022   End Date 3/21/2024   Provider Sara Purdy MD    Chemotherapy pembrolizumab (KEYTRUDA) IVPB, 400 mg, Intravenous, Once, 17 of 17 cycles  Administration: 400 mg (4/4/2022), 400 mg (5/16/2022), 400 mg (6/27/2022), 400 mg (8/8/2022), 400 mg (9/19/2022), 400 mg (10/31/2022), 400 mg (12/12/2022), 400 mg (1/23/2023), 400 mg (3/6/2023), 400 mg (5/5/2023), 400 mg (6/15/2023), 400 mg (8/3/2023), 400 mg (9/21/2023), 400 mg (11/16/2023), 400 mg (12/28/2023), 400 mg (2/8/2024), 400 mg (3/21/2024)          HISTORY OF PRESENT ILLNESS: Salvatore Bacon is a 84 y.o. male  who has stage IIIa melanoma with a questionable metastatic disease with lymph  node involvement that was equivocal and evaluation who received treatment with pembrolizumab with his last treatment on 3/21/2024 here for continued monitoring, follow-up and surveillance.    He is doing well.  Feels good.  Energy doing okay.  No issues concerns or complaints today.  Denies any new, changing, concerning skin lesions.  Denies any lymphadenopathy.    Denies any delayed onset of immune mediated side effects.  Denies headaches, double vision, rash, itching, chest pain, shortness breath, diarrhea.  No muscle weakness fatigue.        REVIEW OF SYSTEMS:  Review of Systems   Constitutional:  Negative for appetite change, fatigue, fever and unexpected weight change.   HENT:   Negative for lump/mass, trouble swallowing and voice change.    Eyes:  Negative for icterus.   Respiratory:  Negative for cough, shortness of breath and wheezing.    Cardiovascular:  Negative for leg swelling.   Gastrointestinal:  Negative for abdominal pain, constipation, diarrhea, nausea and vomiting.   Genitourinary:  Negative for difficulty urinating and hematuria.    Musculoskeletal:  Negative for arthralgias, gait problem and myalgias.   Skin:  Negative for itching and rash.        No new, changing, or concerning lesions.   Neurological:  Negative for extremity weakness, gait problem, headaches, light-headedness and numbness.   Hematological:   Negative for adenopathy.        MEDICATIONS:    Current Outpatient Medications:     atenolol (TENORMIN) 100 mg tablet, Take 1 tablet (100 mg total) by mouth daily, Disp: 90 tablet, Rfl: 3    glimepiride (AMARYL) 2 mg tablet, Take 1 tablet (2 mg total) by mouth daily with breakfast, Disp: 90 tablet, Rfl: 3    lisinopril-hydrochlorothiazide (PRINZIDE,ZESTORETIC) 20-25 MG per tablet, Take 1 tablet by mouth daily, Disp: 90 tablet, Rfl: 3    lovastatin (MEVACOR) 40 MG tablet, Take 1 tablet (40 mg total) by mouth daily, Disp: 90 tablet, Rfl: 3    metFORMIN (GLUCOPHAGE) 1000 MG tablet, Take 1 tablet (1,000 mg total) by mouth 2 (two) times a day with meals, Disp: 180 tablet, Rfl: 3     ALLERGIES:  No Known Allergies     ACTIVE PROBLEMS:  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)    Stage 3 chronic kidney disease, unspecified whether stage 3a or 3b CKD (HCC)    High risk medication use    Advanced care planning/counseling discussion    Arthritis of left knee    Left cervical lymphadenopathy    Lung nodules    MCMILLAN (dyspnea on exertion)    Fatigue          PAST MEDICAL HISTORY:   Past Medical History:   Diagnosis Date    Diabetes mellitus (HCC)     Hyperlipidemia     Hypertension         PAST SURGICAL HISTORY:  Past Surgical History:   Procedure Laterality Date    CATARACT EXTRACTION, BILATERAL      FLAP LOCAL HEAD / NECK Left 10/12/2021    Procedure: RECONSTRUCTION OF LEFT TEMPLE DEFECT AFTER RESCECTION MELANOMA;   Surgeon: Kingston Oviedo MD;  [facility]: [facility];  Service: Plastics    FULL THICKNESS SKIN GRAFT Left 10/12/2021    Procedure: SKIN GRAFT FULL THICKNESS LEFT TEMPLE;  Surgeon: Kingston Oviedo MD;  [facility];  Service: Plastics    GALLBLADDER SURGERY      HAND SURGERY Left     LYMPH NODE BIOPSY Left 10/12/2021    Procedure: BIOPSY LYMPH NODE SENTINEL, LYMPHOSCINTIGRAPHY, INTRAOPERATIVE LYMPHATIC MAPPING (INJECT AT 1200);  Surgeon: Joana Mg MD;  [facility];  Service: Surgical Oncology    NOSE SURGERY      SKIN CANCER EXCISION  10/2021    SKIN LESION EXCISION Left 10/12/2021    Procedure: FACE MELANOMA SCAR WIDE EXCISION  FACE;  Surgeon: Joana Mg MD;  [facility];  Service: Surgical Oncology    SPLIT THICKNESS SKIN GRAFT Left 10/12/2021    Procedure: SKIN GRAFT SPLIT THICKNESS LEFT TEMPLE;  Surgeon: Kingston Oviedo MD;  [facility];  Service: Plastics    US GUIDED LYMPH NODE BIOPSY LEFT  03/21/2022        SOCIAL HISTORY:  Social History     Socioeconomic History    Marital status: /Civil Union     Spouse name: Not on file    Number of children: Not on file    Years of education: Not on file    Highest education level: Not on file   Occupational History    Not on file   Tobacco Use    Smoking status: Never    Smokeless tobacco: Never   Vaping Use    Vaping status: Never Used   Substance and Sexual Activity    Alcohol use: Not Currently    Drug use: Never    Sexual activity: Not Currently   Other Topics Concern    Not on file   Social History Narrative    Not on file     Social Determinants of Health     Financial Resource Strain: Low Risk  (12/1/2023)    Overall Financial Resource Strain (CARDIA)     Difficulty of Paying Living Expenses: Not hard at all   Food Insecurity: Not on file   Transportation Needs: No Transportation Needs (12/1/2023)    PRAPARE - Transportation     Lack of Transportation (Medical): No     Lack of Transportation (Non-Medical): No  "  Physical Activity: Not on file   Stress: Not on file   Social Connections: Not on file   Intimate Partner Violence: Not on file   Housing Stability: Not on file        FAMILY HISTORY:  Family History   Problem Relation Age of Onset    No Known Problems Mother     No Known Problems Father     Colon cancer Other 60           Objective    PHYSICAL EXAMINATION:   Blood pressure 122/68, pulse 64, temperature 98.2 °F (36.8 °C), temperature source Temporal, resp. rate 18, height 5' 7\" (1.702 m), weight 71.2 kg (157 lb), SpO2 97%.     Pain Score: 0-No pain     ECOG Performance Status      Flowsheet Row Most Recent Value   ECOG Performance Status 1 - Restricted in physically strenuous activity but ambulatory and able to carry out work of a light or sedentary nature, e.g., light house work, office work               Physical Exam  Constitutional:       General: He is not in acute distress.     Appearance: Normal appearance. He is not ill-appearing or toxic-appearing.   HENT:      Head: Normocephalic and atraumatic.      Right Ear: External ear normal.      Left Ear: External ear normal.      Nose: Nose normal.      Mouth/Throat:      Mouth: Mucous membranes are moist.      Pharynx: Oropharynx is clear.   Eyes:      General: No scleral icterus.        Right eye: No discharge.         Left eye: No discharge.      Conjunctiva/sclera: Conjunctivae normal.   Cardiovascular:      Rate and Rhythm: Normal rate and regular rhythm.      Pulses: Normal pulses.      Heart sounds: No murmur heard.     No friction rub. No gallop.   Pulmonary:      Effort: Pulmonary effort is normal. No respiratory distress.      Breath sounds: Normal breath sounds. No wheezing or rales.   Abdominal:      General: Bowel sounds are normal. There is no distension.      Palpations: There is no mass.      Tenderness: There is no abdominal tenderness. There is no rebound.   Musculoskeletal:         General: No swelling or tenderness.      Cervical back: Normal " range of motion. No rigidity.      Right lower leg: No edema.      Left lower leg: No edema.   Lymphadenopathy:      Head:      Right side of head: No submandibular, preauricular or posterior auricular adenopathy.      Left side of head: No submandibular, preauricular or posterior auricular adenopathy.      Cervical: No cervical adenopathy.      Right cervical: No superficial or posterior cervical adenopathy.     Left cervical: No superficial or posterior cervical adenopathy.      Upper Body:      Right upper body: No supraclavicular or axillary adenopathy.      Left upper body: No supraclavicular or axillary adenopathy.   Skin:     General: Skin is warm.      Coloration: Skin is not jaundiced.      Findings: No lesion or rash.      Comments: Well healed surgical scar.  No evidence of recurrence at primary site.   Neurological:      General: No focal deficit present.      Mental Status: He is alert and oriented to person, place, and time.      Cranial Nerves: No cranial nerve deficit.      Motor: No weakness.      Gait: Gait normal.   Psychiatric:         Mood and Affect: Mood normal.         Behavior: Behavior normal.         Thought Content: Thought content normal.         Judgment: Judgment normal.         I reviewed lab data in the chart.    WBC   Date Value Ref Range Status   08/13/2024 10.32 (H) 4.31 - 10.16 Thousand/uL Final   06/03/2024 9.39 4.31 - 10.16 Thousand/uL Final   04/25/2024 9.71 4.31 - 10.16 Thousand/uL Final     Hemoglobin   Date Value Ref Range Status   08/13/2024 14.5 12.0 - 17.0 g/dL Final   06/03/2024 13.8 12.0 - 17.0 g/dL Final   04/25/2024 14.5 12.0 - 17.0 g/dL Final     Platelets   Date Value Ref Range Status   08/13/2024 144 (L) 149 - 390 Thousands/uL Final   06/03/2024 136 (L) 149 - 390 Thousands/uL Final   04/25/2024 138 (L) 149 - 390 Thousands/uL Final     MCV   Date Value Ref Range Status   08/13/2024 93 82 - 98 fL Final   06/03/2024 92 82 - 98 fL Final   04/25/2024 91 82 - 98 fL  Final      Potassium   Date Value Ref Range Status   08/13/2024 4.0 3.5 - 5.3 mmol/L Final   06/03/2024 3.8 3.5 - 5.3 mmol/L Final   04/25/2024 4.4 3.5 - 5.3 mmol/L Final   07/20/2020 4.6 3.5 - 5.2 mmol/L Final   05/01/2019 3.6 3.5 - 5.2 mmol/L Final   04/30/2019 3.2 (L) 3.5 - 5.2 mmol/L Final     Chloride   Date Value Ref Range Status   08/13/2024 104 96 - 108 mmol/L Final   06/03/2024 105 96 - 108 mmol/L Final   04/25/2024 101 96 - 108 mmol/L Final   07/20/2020 108 100 - 109 mmol/L Final   05/01/2019 112 (H) 100 - 109 mmol/L Final   04/30/2019 108 100 - 109 mmol/L Final     Carbon Dioxide   Date Value Ref Range Status   07/20/2020 28 23 - 31 mmol/L Final   05/01/2019 24 23 - 31 mmol/L Final   04/30/2019 25 23 - 31 mmol/L Final     CO2   Date Value Ref Range Status   08/13/2024 28 21 - 32 mmol/L Final   06/03/2024 25 21 - 32 mmol/L Final   04/25/2024 28 21 - 32 mmol/L Final     BUN   Date Value Ref Range Status   08/13/2024 27 (H) 5 - 25 mg/dL Final   06/03/2024 33 (H) 5 - 25 mg/dL Final   04/25/2024 27 (H) 5 - 25 mg/dL Final   07/20/2020 26 7 - 28 mg/dL Final   05/01/2019 35 (H) 7 - 28 mg/dL Final   04/30/2019 42 (H) 7 - 28 mg/dL Final     Creatinine   Date Value Ref Range Status   08/13/2024 1.43 (H) 0.60 - 1.30 mg/dL Final     Comment:     Standardized to IDMS reference method   06/03/2024 1.49 (H) 0.60 - 1.30 mg/dL Final     Comment:     Standardized to IDMS reference method   04/25/2024 1.53 (H) 0.60 - 1.30 mg/dL Final     Comment:     Standardized to IDMS reference method   07/20/2020 1.25 0.53 - 1.30 mg/dL Final   05/01/2019 1.18 0.53 - 1.30 mg/dL Final   04/30/2019 1.28 0.53 - 1.30 mg/dL Final     Glucose   Date Value Ref Range Status   04/25/2024 218 (H) 65 - 140 mg/dL Final     Comment:     If the patient is fasting, the ADA then defines impaired fasting glucose as > 100 mg/dL and diabetes as > or equal to 123 mg/dL.   03/14/2024 246 (H) 65 - 140 mg/dL Final     Comment:     If the patient is fasting, the  ADA then defines impaired fasting glucose as > 100 mg/dL and diabetes as > or equal to 123 mg/dL.   02/01/2024 230 (H) 65 - 140 mg/dL Final     Comment:     If the patient is fasting, the ADA then defines impaired fasting glucose as > 100 mg/dL and diabetes as > or equal to 123 mg/dL.   07/20/2020 87 65 - 99 mg/dL Final   05/01/2019 108 (H) 65 - 99 mg/dL Final   04/30/2019 140 (H) 65 - 99 mg/dL Final     Calcium   Date Value Ref Range Status   08/13/2024 9.2 8.4 - 10.2 mg/dL Final   06/03/2024 8.8 8.4 - 10.2 mg/dL Final   04/25/2024 9.1 8.4 - 10.2 mg/dL Final   07/20/2020 8.8 8.5 - 10.1 mg/dL Final   05/01/2019 7.1 (L) 8.5 - 10.1 mg/dL Final   04/30/2019 6.9 (L) 8.5 - 10.1 mg/dL Final     Albumin   Date Value Ref Range Status   08/13/2024 4.4 3.5 - 5.0 g/dL Final   06/03/2024 4.3 3.5 - 5.0 g/dL Final   04/25/2024 4.6 3.5 - 5.0 g/dL Final     ALBUMIN   Date Value Ref Range Status   07/20/2020 4.0 3.5 - 4.8 g/dL Final   05/01/2019 2.2 (L) 3.5 - 4.8 g/dL Final   04/30/2019 2.2 (L) 3.5 - 4.8 g/dL Final     Total Bilirubin   Date Value Ref Range Status   08/13/2024 1.54 (H) 0.20 - 1.00 mg/dL Final     Comment:     Use of this assay is not recommended for patients undergoing treatment with eltrombopag due to the potential for falsely elevated results.  N-acetyl-p-benzoquinone imine (metabolite of Acetaminophen) will generate erroneously low results in samples for patients that have taken an overdose of Acetaminophen.   06/03/2024 1.59 (H) 0.20 - 1.00 mg/dL Final     Comment:     Use of this assay is not recommended for patients undergoing treatment with eltrombopag due to the potential for falsely elevated results.  N-acetyl-p-benzoquinone imine (metabolite of Acetaminophen) will generate erroneously low results in samples for patients that have taken an overdose of Acetaminophen.   04/25/2024 1.41 (H) 0.20 - 1.00 mg/dL Final     Comment:     Use of this assay is not recommended for patients undergoing treatment with  eltrombopag due to the potential for falsely elevated results.  N-acetyl-p-benzoquinone imine (metabolite of Acetaminophen) will generate erroneously low results in samples for patients that have taken an overdose of Acetaminophen.   07/20/2020 1.2 (H) 0.2 - 1.0 mg/dL Final     Comment:     Use of this assay is not recommended for patients undergoing treatment with eltrombopag due to the potential for falsely elevated results.   05/01/2019 1.4 (H) 0.2 - 1.0 mg/dL Final   04/30/2019 1.9 (H) 0.2 - 1.0 mg/dL Final     Alkaline Phosphatase   Date Value Ref Range Status   08/13/2024 75 34 - 104 U/L Final   06/03/2024 76 34 - 104 U/L Final   04/25/2024 75 34 - 104 U/L Final   07/20/2020 63 35 - 120 U/L Final   05/01/2019 116 35 - 120 U/L Final   04/30/2019 129 (H) 35 - 120 U/L Final     AST   Date Value Ref Range Status   08/13/2024 16 13 - 39 U/L Final   06/03/2024 18 13 - 39 U/L Final   04/25/2024 14 13 - 39 U/L Final   07/20/2020 17 <41 U/L Final   05/01/2019 83 (H) <41 U/L Final   04/30/2019 100 (H) <41 U/L Final     ALT   Date Value Ref Range Status   08/13/2024 14 7 - 52 U/L Final     Comment:     Specimen collection should occur prior to Sulfasalazine administration due to the potential for falsely depressed results.    06/03/2024 21 7 - 52 U/L Final     Comment:     Specimen collection should occur prior to Sulfasalazine administration due to the potential for falsely depressed results.    04/25/2024 12 7 - 52 U/L Final     Comment:     Specimen collection should occur prior to Sulfasalazine administration due to the potential for falsely depressed results.    07/20/2020 22 <56 U/L Final   05/01/2019 68 (H) <56 U/L Final   04/30/2019 118 (H) <56 U/L Final      LD   Date Value Ref Range Status   08/13/2024 123 (L) 140 - 271 U/L Final   04/25/2024 165 140 - 271 U/L Final   03/14/2024 136 (L) 140 - 271 U/L Final     TSH   Date Value Ref Range Status   07/20/2020 1.47 0.36 - 3.74 uIU/mL Final   07/18/2018 2.20 0.36 -  "3.74 uIU/mL Final     No results found for: \"[id number]\"   Free T4   Date Value Ref Range Status   08/13/2024 0.82 0.61 - 1.12 ng/dL Final     Comment:     Specimens with biotin concentrations > 10 ng/mL can lead to significant (> 10%) positive bias in result.   04/25/2024 0.75 0.61 - 1.12 ng/dL Final     Comment:     Specimens with biotin concentrations > 10 ng/mL can lead to significant (> 10%) positive bias in result.   03/14/2024 0.87 0.61 - 1.12 ng/dL Final     Comment:     Specimens with biotin concentrations > 10 ng/mL can lead to significant (> 10%) positive bias in result.         RECENT IMAGING:  No results found.          Assessment/Plan  Mr. Bacon is a 84-year-old gentleman with stage IIIa melanoma with concerns for metastatic disease with lymphadenopathy in his head and neck area here for continued monitoring, follow-up and surveillance after completing treatment with pembrolizumab in March 2024.    1. Encounter for follow-up surveillance of melanoma  2. Malignant melanoma of skin of face (HCC)  3. Malignant melanoma of forehead (HCC)  4. 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  Labs reviewed and okay.  We will continue to monitor him closely.  He is due for full body imaging prior to his next visit with me.  He also has head and neck ultrasound ordered by Dr. Mg.  He knows to continue to monitor for any delayed onset of immune mediated side effects.  He knows to monitor for any changing skin lesions or development of lymphadenopathy.  He knows to call with issues or concerns prior to his next visit.  All orders placed in prescription provided for blood work to be done prior to his next visit as well as imaging.      - CBC and differential; Future  - Comprehensive metabolic panel; Future  - LD,Blood; Future  - T3, free; Future  - T4, free; Future  - TSH, 3rd generation; Future  - NM pet ct tumor " imaging whole body; Future    5. Stage 3 chronic kidney disease, unspecified whether stage 3a or 3b CKD (HCC)  Given chronic kidney disease and elevated creatinine, we do perform surveillance imaging with PET scans.  He is due for imaging prior to his next visit in 3 months.  PET scan ordered.      - CBC and differential; Future  - Comprehensive metabolic panel; Future  - LD,Blood; Future  - T3, free; Future  - T4, free; Future  - TSH, 3rd generation; Future  - NM pet ct tumor imaging whole body; Future    6. High risk medication use  Previously treated with pembrolizumab.  We continue to monitor for any delayed onset of immune mediated side effects as they can occur even after you are on treatment.  We do continue to monitor thyroid functions.      - CBC and differential; Future  - Comprehensive metabolic panel; Future  - LD,Blood; Future  - T3, free; Future  - T4, free; Future  - TSH, 3rd generation; Future  - NM pet ct tumor imaging whole body; Future      Follow  up in three months    Sara Purdy MD, PhD

## 2024-09-04 NOTE — PROGRESS NOTES
Cardiology Follow Up    Salvatore Bacon  1940  [id number]  [facility]  [address]  [place] [id number] [phone]    1. Mixed hyperlipidemia  2. MCMILLAN (dyspnea on exertion)  -     Ambulatory Referral to Cardiology  3. Fatigue, unspecified type  -     Ambulatory Referral to Cardiology  4. Left atrial dilatation  -     Ambulatory Referral to Cardiology  5. Essential hypertension  6. Type 2 diabetes mellitus without complication, without long-term current use of insulin (Formerly [facility])      Discussion: Dyspnea on exertion.  No anginal symptoms.  Echocardiography unremarkable.  Parkinson's disease with abnormal gait.  Blood pressure was initially elevated but 130 systolic on repeat by me.  Normally in the 120s at home.  Lipids are excellent.  I suspect his exertional intolerance is related to deconditioning and gait dysfunction.  A chest x-ray and BNP were ordered.  He is on pembrolizimab, an imminue checkpoint inhibitor, IV every 2 weeks for his melanoma. This has fatigue as a commonly reported side effect.  Myocarditis is a rare side effect of checkpoint inhibitors, but LV function is normal by echo.  I asked him to return in 3 months.    Cardiovascular History:   Mr. Bacon was initially seen in 9/24 for chronic dyspnea on exertion.  Duration of symptoms was uncertain.  There is no history of established cardiovascular disease.  An echocardiogram in 7/24 showed normal left ventricular systolic function without diastolic dysfunction.  There was mild to moderate left atrial lodgment, mild right atrial enlargement, and mild aortic insufficiency.  There were no significant changes since an echocardiogram obtained in 12/17.  Physical findings were unremarkable.  A BNP and chest x-ray were ordered at the time of his initial evaluation.  It was suspected that his dyspnea was related to Parkinson's-related gait dysfunction and  deconditioning..  Atherosclerotic risk factors include dyslipidemia, well-controlled on lovastatin.  A lipid panel in 6/24 disclosed total cholesterol 126, LDL 57, HDL 38, and triglycerides 155.  He has hypertension, well-controlled on lisinopril/hydrochlorothiazide and atenolol.  He has diabetes.  He does not smoke.  He has CKD. Cr 1.43 in 8/24.   There is a history of malignant melanoma involving the forehead. He is on pembrolizimab, an imminue checkpoint inhibitor, IV every 2 weeks. This has fatigue as a commonly reported side effect. Myocarditis is a rare side effect of checkpoint inhibitors, but LV function is normal by echo.    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)    Stage 3 chronic kidney disease, unspecified whether stage 3a or 3b CKD (HCC)    High risk medication use    Advanced care planning/counseling discussion    Arthritis of left knee    Left cervical lymphadenopathy    Lung nodules    MCMILLAN (dyspnea on exertion)    Fatigue     Past Medical History:   Diagnosis Date    Diabetes mellitus (HCC)     Hyperlipidemia     Hypertension      Social History     Socioeconomic History    Marital status: /Civil Union     Spouse name: Not on file    Number of children: Not on file    Years of education: Not on file    Highest education level: Not on file   Occupational History    Not on file   Tobacco Use    Smoking status: Never    Smokeless tobacco: Never   Vaping Use    Vaping status: Never Used    Substance and Sexual Activity    Alcohol use: Not Currently    Drug use: Never    Sexual activity: Not Currently   Other Topics Concern    Not on file   Social History Narrative    Not on file     Social Determinants of Health     Financial Resource Strain: Low Risk  (12/1/2023)    Overall Financial Resource Strain (CARDIA)     Difficulty of Paying Living Expenses: Not hard at all   Food Insecurity: Not on file   Transportation Needs: No Transportation Needs (12/1/2023)    PRAPARE - Transportation     Lack of Transportation (Medical): No     Lack of Transportation (Non-Medical): No   Physical Activity: Not on file   Stress: Not on file   Social Connections: Not on file   Intimate Partner Violence: Not on file   Housing Stability: Not on file      Family History   Problem Relation Age of Onset    No Known Problems Mother     No Known Problems Father     Colon cancer Other 60     Past Surgical History:   Procedure Laterality Date    CATARACT EXTRACTION, BILATERAL      FLAP LOCAL HEAD / NECK Left 10/12/2021    Procedure: RECONSTRUCTION OF LEFT TEMPLE DEFECT AFTER RESCECTION MELANOMA;  Surgeon: Kingston Oviedo MD;  [facility];  Service: Plastics    FULL THICKNESS SKIN GRAFT Left 10/12/2021    Procedure: SKIN GRAFT FULL THICKNESS LEFT TEMPLE;  Surgeon: Kingston Oviedo MD;  [facility];  Service: Plastics    GALLBLADDER SURGERY      HAND SURGERY Left     LYMPH NODE BIOPSY Left 10/12/2021    Procedure: BIOPSY LYMPH NODE SENTINEL, LYMPHOSCINTIGRAPHY, INTRAOPERATIVE LYMPHATIC MAPPING (INJECT AT 1200);  Surgeon: Joana Mg MD;  [facility];  Service: Surgical Oncology    NOSE SURGERY      SKIN CANCER EXCISION  10/2021    SKIN LESION EXCISION Left 10/12/2021    Procedure: FACE MELANOMA SCAR WIDE EXCISION  FACE;  Surgeon: Joana Mg MD;  [facility];  Service: Surgical Oncology    SPLIT THICKNESS SKIN GRAFT Left 10/12/2021    Procedure: SKIN GRAFT SPLIT THICKNESS LEFT TEMPLE;   Surgeon: Kingston Oviedo MD;  [facility];  Service: Plastics    US GUIDED LYMPH NODE BIOPSY LEFT  03/21/2022       Current Outpatient Medications:     atenolol (TENORMIN) 100 mg tablet, Take 1 tablet (100 mg total) by mouth daily, Disp: 90 tablet, Rfl: 3    glimepiride (AMARYL) 2 mg tablet, Take 1 tablet (2 mg total) by mouth daily with breakfast, Disp: 90 tablet, Rfl: 3    lisinopril-hydrochlorothiazide (PRINZIDE,ZESTORETIC) 20-25 MG per tablet, Take 1 tablet by mouth daily, Disp: 90 tablet, Rfl: 3    lovastatin (MEVACOR) 40 MG tablet, Take 1 tablet (40 mg total) by mouth daily, Disp: 90 tablet, Rfl: 3    metFORMIN (GLUCOPHAGE) 1000 MG tablet, Take 1 tablet (1,000 mg total) by mouth 2 (two) times a day with meals, Disp: 180 tablet, Rfl: 3  No Known Allergies    Labs: personally reviewed all pertinent labs  Imaging:  personally reviewed all pertinent imaging  Cath:  ECHO:  Stress:  Holter:    Review of Systems:  Review of Systems   Constitutional: Negative.   HENT: Negative.     Eyes: Negative.    Cardiovascular: Negative.    Respiratory: Negative.     Endocrine: Negative.    Hematologic/Lymphatic: Negative.    Skin: Negative.    Musculoskeletal: Negative.    Gastrointestinal: Negative.    Genitourinary: Negative.    Neurological: Negative.    Psychiatric/Behavioral: Negative.     Allergic/Immunologic: Negative.    All other systems reviewed and are negative.      Vitals:    09/04/24 0824   SpO2: 94%     Weight (last 2 days)       Date/Time Weight    09/04/24 0824 72.8 (160.4)            Physical Exam:  Physical Exam  Vitals reviewed.   Constitutional:       General: He is not in acute distress.     Appearance: He is well-developed. He is not diaphoretic.   HENT:      Head: Normocephalic and atraumatic.   Eyes:      General: No scleral icterus.     Conjunctiva/sclera: Conjunctivae normal.   Neck:      Vascular: No JVD.      Trachea: No tracheal deviation.   Cardiovascular:      Rate and Rhythm: Normal  rate and regular rhythm.      Pulses: Intact distal pulses.      Heart sounds: Normal heart sounds. No murmur heard.     No friction rub. No gallop.   Pulmonary:      Effort: Pulmonary effort is normal. No respiratory distress.      Breath sounds: Normal breath sounds. No stridor. No wheezing or rales.   Chest:      Chest wall: No tenderness.   Abdominal:      General: Bowel sounds are normal. There is no distension.      Palpations: Abdomen is soft.      Tenderness: There is no abdominal tenderness.   Musculoskeletal:         General: No tenderness. Normal range of motion.      Cervical back: Normal range of motion and neck supple.   Skin:     General: Skin is warm and dry.      Findings: No erythema.   Neurological:      Mental Status: He is alert and oriented to person, place, and time.      Cranial Nerves: No cranial nerve deficit.      Coordination: Coordination normal.   Psychiatric:         Behavior: Behavior normal.         Thought Content: Thought content normal.         Judgment: Judgment normal.         Aba Bourgeois MD

## 2024-09-06 ENCOUNTER — OFFICE VISIT (OUTPATIENT)
Dept: INTERNAL MEDICINE CLINIC | Facility: OTHER | Age: 84
End: 2024-09-06
Payer: COMMERCIAL

## 2024-09-06 VITALS
BODY MASS INDEX: 24.92 KG/M2 | OXYGEN SATURATION: 98 % | WEIGHT: 158.8 LBS | HEIGHT: 67 IN | TEMPERATURE: 98.4 F | SYSTOLIC BLOOD PRESSURE: 122 MMHG | RESPIRATION RATE: 18 BRPM | HEART RATE: 59 BPM | DIASTOLIC BLOOD PRESSURE: 64 MMHG

## 2024-09-06 DIAGNOSIS — E78.2 MIXED HYPERLIPIDEMIA: ICD-10-CM

## 2024-09-06 DIAGNOSIS — N18.30 STAGE 3 CHRONIC KIDNEY DISEASE, UNSPECIFIED WHETHER STAGE 3A OR 3B CKD (HCC): ICD-10-CM

## 2024-09-06 DIAGNOSIS — I10 ESSENTIAL HYPERTENSION: ICD-10-CM

## 2024-09-06 DIAGNOSIS — E11.9 TYPE 2 DIABETES MELLITUS WITHOUT COMPLICATION, WITHOUT LONG-TERM CURRENT USE OF INSULIN (HCC): Primary | ICD-10-CM

## 2024-09-06 DIAGNOSIS — K44.9 HIATAL HERNIA WITH GERD: ICD-10-CM

## 2024-09-06 DIAGNOSIS — K21.9 HIATAL HERNIA WITH GERD: ICD-10-CM

## 2024-09-06 PROBLEM — R53.83 FATIGUE: Status: RESOLVED | Noted: 2024-06-06 | Resolved: 2024-09-06

## 2024-09-06 PROBLEM — R06.09 DOE (DYSPNEA ON EXERTION): Status: RESOLVED | Noted: 2024-06-06 | Resolved: 2024-09-06

## 2024-09-06 PROCEDURE — 99214 OFFICE O/P EST MOD 30 MIN: CPT | Performed by: INTERNAL MEDICINE

## 2024-09-06 PROCEDURE — G2211 COMPLEX E/M VISIT ADD ON: HCPCS | Performed by: INTERNAL MEDICINE

## 2024-09-06 RX ORDER — OMEPRAZOLE 40 MG/1
40 CAPSULE, DELAYED RELEASE ORAL DAILY PRN
Qty: 90 CAPSULE | Refills: 3 | Status: SHIPPED | OUTPATIENT
Start: 2024-09-06

## 2024-09-06 NOTE — ASSESSMENT & PLAN NOTE
Continue current diabetic regimen.  Trend A1c.  Lab Results   Component Value Date    HGBA1C 6.9 (H) 06/03/2024

## 2024-09-06 NOTE — ASSESSMENT & PLAN NOTE
Lab Results   Component Value Date    EGFR 44 08/13/2024    EGFR 42 06/03/2024    EGFR 40 04/25/2024    CREATININE 1.43 (H) 08/13/2024    CREATININE 1.49 (H) 06/03/2024    CREATININE 1.53 (H) 04/25/2024   Continue to trend kidney function.  Avoid nephrotoxic agents.

## 2024-09-06 NOTE — PROGRESS NOTES
Assessment/Plan:    Essential hypertension  Controlled.  Continue current antihypertensive regimen.    Hiatal hernia with GERD  Continue omeprazole 40 mg daily as needed.    Type 2 diabetes mellitus without complication, without long-term current use of insulin ([facility])  Continue current diabetic regimen.  Trend A1c.  Lab Results   Component Value Date    HGBA1C 6.9 (H) 06/03/2024     Stage 3 chronic kidney disease, unspecified whether stage 3a or 3b CKD ([facility])  Lab Results   Component Value Date    EGFR 44 08/13/2024    EGFR 42 06/03/2024    EGFR 40 04/25/2024    CREATININE 1.43 (H) 08/13/2024    CREATININE 1.49 (H) 06/03/2024    CREATININE 1.53 (H) 04/25/2024   Continue to trend kidney function.  Avoid nephrotoxic agents.    Mixed hyperlipidemia  Controlled with lovastatin.       Diagnoses and all orders for this visit:    Type 2 diabetes mellitus without complication, without long-term current use of insulin ([facility])  -     Cancel: Hemoglobin A1C; Future  -     Hemoglobin A1C; Future    Hiatal hernia with GERD  -     omeprazole (PriLOSEC) 40 MG capsule; Take 1 capsule (40 mg total) by mouth daily as needed (indigestion, acid reflux)    Essential hypertension    Stage 3 chronic kidney disease, unspecified whether stage 3a or 3b CKD ([facility])    Mixed hyperlipidemia                  Subjective:      Patient ID: Salvatore Bacon is a 84 y.o. male.    Chief Complaint   Patient presents with   • Follow-up     3 month - no labs due - no issues   • hm     A1C due after 12/3/24   • cardiology new     Started seeing him this past week       Salvatore Bacon is seen today for follow up of chronic conditions.   Recent laboratory studies reviewed today with the patient.   he has been compliant with his medication regimen.     he has no complaints or concerns at this time.       Heartburn  He reports no abdominal pain, no chest pain, no choking, no coughing, no nausea, no sore throat or no wheezing. This is a chronic problem. The current  episode started more than 1 year ago. The problem occurs occasionally. The problem has been unchanged. The symptoms are aggravated by certain foods. Pertinent negatives include no fatigue. He has tried a PPI for the symptoms. The treatment provided moderate relief.   Diabetes  He presents for his follow-up diabetic visit. He has type 2 diabetes mellitus. His disease course has been stable. There are no hypoglycemic associated symptoms. Pertinent negatives for hypoglycemia include no dizziness or headaches. There are no diabetic associated symptoms. Pertinent negatives for diabetes include no chest pain, no fatigue and no weakness. Symptoms are stable. Current diabetic treatment includes oral agent (dual therapy). He is compliant with treatment all of the time. An ACE inhibitor/angiotensin II receptor blocker is being taken. He does not see a podiatrist.Eye exam is current.   Hypertension  This is a chronic problem. The current episode started more than 1 year ago. The problem is unchanged. The problem is controlled. Pertinent negatives include no chest pain, headaches, palpitations or shortness of breath. Past treatments include ACE inhibitors, diuretics and beta blockers. The current treatment provides moderate improvement. There are no compliance problems.    Hyperlipidemia  This is a chronic problem. The current episode started more than 1 year ago. The problem is controlled. Recent lipid tests were reviewed and are normal. Pertinent negatives include no chest pain or shortness of breath. Current antihyperlipidemic treatment includes statins. The current treatment provides moderate improvement of lipids. There are no compliance problems.        The following portions of the patient's history were reviewed and updated as appropriate: allergies, current medications, past family history, past medical history, past social history, past surgical history, and problem list.    Review of Systems   Constitutional:   Negative for activity change, appetite change, chills, diaphoresis, fatigue and fever.   HENT:  Negative for congestion, postnasal drip, rhinorrhea, sinus pressure, sinus pain, sneezing and sore throat.    Eyes:  Negative for visual disturbance.   Respiratory:  Negative for apnea, cough, choking, chest tightness, shortness of breath and wheezing.    Cardiovascular:  Negative for chest pain, palpitations and leg swelling.   Gastrointestinal:  Negative for abdominal distention, abdominal pain, anal bleeding, blood in stool, constipation, diarrhea, nausea and vomiting.   Endocrine: Negative for cold intolerance and heat intolerance.   Genitourinary:  Negative for difficulty urinating, dysuria and hematuria.   Musculoskeletal: Negative.    Skin: Negative.    Neurological:  Negative for dizziness, weakness, light-headedness, numbness and headaches.   Hematological:  Negative for adenopathy.   Psychiatric/Behavioral:  Negative for agitation, sleep disturbance and suicidal ideas.    All other systems reviewed and are negative.        Past Medical History:   Diagnosis Date   • Diabetes mellitus (HCC)    • Hyperlipidemia    • Hypertension          Current Outpatient Medications:   •  atenolol (TENORMIN) 100 mg tablet, Take 1 tablet (100 mg total) by mouth daily, Disp: 90 tablet, Rfl: 3  •  glimepiride (AMARYL) 2 mg tablet, Take 1 tablet (2 mg total) by mouth daily with breakfast, Disp: 90 tablet, Rfl: 3  •  lisinopril-hydrochlorothiazide (PRINZIDE,ZESTORETIC) 20-25 MG per tablet, Take 1 tablet by mouth daily, Disp: 90 tablet, Rfl: 3  •  lovastatin (MEVACOR) 40 MG tablet, Take 1 tablet (40 mg total) by mouth daily, Disp: 90 tablet, Rfl: 3  •  metFORMIN (GLUCOPHAGE) 1000 MG tablet, Take 1 tablet (1,000 mg total) by mouth 2 (two) times a day with meals, Disp: 180 tablet, Rfl: 3  •  omeprazole (PriLOSEC) 40 MG capsule, Take 1 capsule (40 mg total) by mouth daily as needed (indigestion, acid reflux), Disp: 90 capsule, Rfl:  "3    No Known Allergies    Social History   Past Surgical History:   Procedure Laterality Date   • CATARACT EXTRACTION, BILATERAL     • FLAP LOCAL HEAD / NECK Left 10/12/2021    Procedure: RECONSTRUCTION OF LEFT TEMPLE DEFECT AFTER RESCECTION MELANOMA;  Surgeon: Kingston Oviedo MD;  [facility];  Service: Plastics   • FULL THICKNESS SKIN GRAFT Left 10/12/2021    Procedure: SKIN GRAFT FULL THICKNESS LEFT TEMPLE;  Surgeon: Kingston Oviedo MD;  [facility];  Service: Plastics   • GALLBLADDER SURGERY     • HAND SURGERY Left    • LYMPH NODE BIOPSY Left 10/12/2021    Procedure: BIOPSY LYMPH NODE SENTINEL, LYMPHOSCINTIGRAPHY, INTRAOPERATIVE LYMPHATIC MAPPING (INJECT AT 1200);  Surgeon: Joana Mg MD;  [facility];  Service: Surgical Oncology   • NOSE SURGERY     • SKIN CANCER EXCISION  10/2021   • SKIN LESION EXCISION Left 10/12/2021    Procedure: FACE MELANOMA SCAR WIDE EXCISION  FACE;  Surgeon: Joana Mg MD;  [facility];  Service: Surgical Oncology   • SPLIT THICKNESS SKIN GRAFT Left 10/12/2021    Procedure: SKIN GRAFT SPLIT THICKNESS LEFT TEMPLE;  Surgeon: Kingston Oviedo MD;  [facility];  Service: Plastics   • US GUIDED LYMPH NODE BIOPSY LEFT  03/21/2022     Family History   Problem Relation Age of Onset   • No Known Problems Mother    • No Known Problems Father    • Colon cancer Other 60       Objective:  /64 (BP Location: Left arm, Patient Position: Sitting, Cuff Size: Standard)   Pulse 59   Temp 98.4 °F (36.9 °C) (Temporal)   Resp 18   Ht 5' 7\" (1.702 m)   Wt 72 kg (158 lb 12.8 oz)   SpO2 98%   BMI 24.87 kg/m²     Recent Results (from the past 1344 hour(s))   Microalbumin / creatinine urine ratio    Collection Time: 08/13/24  9:29 AM   Result Value Ref Range    Creatinine, Ur 98.7 Reference range not established. mg/dL    Albumin,U,Random 38.9 (H) <20.0 mg/L    Albumin Creat Ratio 39 (H) 0 - 30 mg/g creatinine   CBC and differential    " Collection Time: 08/13/24  9:29 AM   Result Value Ref Range    WBC 10.32 (H) 4.31 - 10.16 Thousand/uL    RBC 4.79 3.88 - 5.62 Million/uL    Hemoglobin 14.5 12.0 - 17.0 g/dL    Hematocrit 44.3 36.5 - 49.3 %    MCV 93 82 - 98 fL    MCH 30.3 26.8 - 34.3 pg    MCHC 32.7 31.4 - 37.4 g/dL    RDW 13.0 11.6 - 15.1 %    MPV 10.7 8.9 - 12.7 fL    Platelets 144 (L) 149 - 390 Thousands/uL    nRBC 0 /100 WBCs    Segmented % 46 43 - 75 %    Immature Grans % 0 0 - 2 %    Lymphocytes % 44 14 - 44 %    Monocytes % 6 4 - 12 %    Eosinophils Relative 4 0 - 6 %    Basophils Relative 0 0 - 1 %    Absolute Neutrophils 4.63 1.85 - 7.62 Thousands/µL    Absolute Immature Grans 0.02 0.00 - 0.20 Thousand/uL    Absolute Lymphocytes 4.58 (H) 0.60 - 4.47 Thousands/µL    Absolute Monocytes 0.65 0.17 - 1.22 Thousand/µL    Eosinophils Absolute 0.42 0.00 - 0.61 Thousand/µL    Basophils Absolute 0.02 0.00 - 0.10 Thousands/µL   Comprehensive metabolic panel    Collection Time: 08/13/24  9:29 AM   Result Value Ref Range    Sodium 142 135 - 147 mmol/L    Potassium 4.0 3.5 - 5.3 mmol/L    Chloride 104 96 - 108 mmol/L    CO2 28 21 - 32 mmol/L    ANION GAP 10 4 - 13 mmol/L    BUN 27 (H) 5 - 25 mg/dL    Creatinine 1.43 (H) 0.60 - 1.30 mg/dL    Glucose, Fasting 102 (H) 65 - 99 mg/dL    Calcium 9.2 8.4 - 10.2 mg/dL    AST 16 13 - 39 U/L    ALT 14 7 - 52 U/L    Alkaline Phosphatase 75 34 - 104 U/L    Total Protein 6.8 6.4 - 8.4 g/dL    Albumin 4.4 3.5 - 5.0 g/dL    Total Bilirubin 1.54 (H) 0.20 - 1.00 mg/dL    eGFR 44 ml/min/1.73sq m   LD,Blood    Collection Time: 08/13/24  9:29 AM   Result Value Ref Range     (L) 140 - 271 U/L   TSH, 3rd generation    Collection Time: 08/13/24  9:29 AM   Result Value Ref Range    TSH 3RD GENERATON 1.869 0.450 - 4.500 uIU/mL   T3, free    Collection Time: 08/13/24  9:29 AM   Result Value Ref Range    T3, Free 2.84 2.50 - 3.90 pg/mL   T4, free    Collection Time: 08/13/24  9:29 AM   Result Value Ref Range    Free T4 0.82  0.61 - 1.12 ng/dL            Physical Exam  Vitals and nursing note reviewed.   Constitutional:       General: He is not in acute distress.     Appearance: He is well-developed. He is not diaphoretic.   HENT:      Head: Normocephalic and atraumatic.   Eyes:      General: No scleral icterus.        Right eye: No discharge.         Left eye: No discharge.      Conjunctiva/sclera: Conjunctivae normal.      Pupils: Pupils are equal, round, and reactive to light.   Neck:      Thyroid: No thyromegaly.      Vascular: No JVD.   Cardiovascular:      Rate and Rhythm: Normal rate and regular rhythm.      Heart sounds: Normal heart sounds. No murmur heard.     No friction rub. No gallop.   Pulmonary:      Effort: Pulmonary effort is normal. No respiratory distress.      Breath sounds: Normal breath sounds. No wheezing or rales.   Chest:      Chest wall: No tenderness.   Abdominal:      General: Bowel sounds are normal. There is no distension.      Palpations: Abdomen is soft. There is no mass.      Tenderness: There is no abdominal tenderness. There is no guarding or rebound.   Musculoskeletal:         General: No tenderness or deformity. Normal range of motion.      Cervical back: Normal range of motion and neck supple.   Lymphadenopathy:      Cervical: No cervical adenopathy.   Skin:     General: Skin is warm and dry.      Coloration: Skin is not pale.      Findings: No erythema or rash.   Neurological:      Mental Status: He is alert and oriented to person, place, and time.      Cranial Nerves: No cranial nerve deficit.      Coordination: Coordination normal.      Deep Tendon Reflexes: Reflexes are normal and symmetric.   Psychiatric:         Behavior: Behavior normal.         Thought Content: Thought content normal.         Judgment: Judgment normal.

## 2024-09-09 ENCOUNTER — APPOINTMENT (OUTPATIENT)
Dept: LAB | Facility: IMAGING CENTER | Age: 84
End: 2024-09-09
Payer: COMMERCIAL

## 2024-09-09 DIAGNOSIS — E11.9 TYPE 2 DIABETES MELLITUS WITHOUT COMPLICATION, WITHOUT LONG-TERM CURRENT USE OF INSULIN (HCC): ICD-10-CM

## 2024-09-09 DIAGNOSIS — R06.09 DOE (DYSPNEA ON EXERTION): ICD-10-CM

## 2024-09-09 LAB — BNP SERPL-MCNC: 99 PG/ML (ref 0–100)

## 2024-09-09 PROCEDURE — 83880 ASSAY OF NATRIURETIC PEPTIDE: CPT

## 2024-09-09 PROCEDURE — 83036 HEMOGLOBIN GLYCOSYLATED A1C: CPT

## 2024-09-09 PROCEDURE — 36415 COLL VENOUS BLD VENIPUNCTURE: CPT

## 2024-09-10 LAB
EST. AVERAGE GLUCOSE BLD GHB EST-MCNC: 143 MG/DL
HBA1C MFR BLD: 6.6 %

## 2024-11-06 ENCOUNTER — DOCUMENTATION (OUTPATIENT)
Dept: HEMATOLOGY ONCOLOGY | Facility: CLINIC | Age: 84
End: 2024-11-06

## 2024-11-06 NOTE — PROGRESS NOTES
Patient has chronic kidney disease and decreased eFGR, under 40, and IV contrast dye cannot be used.  In order to monitor for melanoma recurrence or metastasis, IV dye is required.  In the absence of being able to use IV contrast dye - PET scans can use.  He has had his disease monitored with PET scans and is due for repeat imaging at this time.  As such PET scan was ordered due to chronic kidney disease and inability to receive IV contrast dye.    Sara Purdy MD, PhD

## 2024-11-08 ENCOUNTER — HOSPITAL ENCOUNTER (OUTPATIENT)
Dept: RADIOLOGY | Age: 84
Discharge: HOME | End: 2024-11-08
Payer: COMMERCIAL

## 2024-11-08 DIAGNOSIS — C43.30 MALIGNANT MELANOMA OF SKIN OF FACE (HCC): ICD-10-CM

## 2024-11-08 PROCEDURE — 76536 US EXAM OF HEAD AND NECK: CPT

## 2024-11-13 ENCOUNTER — TELEPHONE (OUTPATIENT)
Age: 84
End: 2024-11-13

## 2024-11-13 NOTE — TELEPHONE ENCOUNTER
Spoke with patient. Advised him to still complete his labs for the visit but that I would check with Dr. Purdy regarding how to proceed in regards to scans. He verbalized an understanding and will wait for a call back.

## 2024-11-13 NOTE — TELEPHONE ENCOUNTER
Pt stated he received a denial letter from his insurance about the PET scan that Dr. Purdy ordered for him. Pt would like to know should he still get the blood work completed at least for the appt on 12/2/24? Pt can be reached at [phone]. Thank you.

## 2024-11-20 ENCOUNTER — TELEPHONE (OUTPATIENT)
Age: 84
End: 2024-11-20

## 2024-11-20 NOTE — TELEPHONE ENCOUNTER
Patient calling to check status on the denial that was sent by his insurance co regarding pet scan.  As per notes Miriam submitted for an appeal.

## 2024-11-22 ENCOUNTER — TELEPHONE (OUTPATIENT)
Dept: INTERNAL MEDICINE CLINIC | Facility: OTHER | Age: 84
End: 2024-11-22

## 2024-11-22 NOTE — TELEPHONE ENCOUNTER
Received prior authorization request from [facility] for the following medication:    Omeprazole 40 MG DR Capsules    Form has been scanned into patients chart.

## 2024-11-25 ENCOUNTER — APPOINTMENT (OUTPATIENT)
Dept: LAB | Facility: IMAGING CENTER | Age: 84
End: 2024-11-25
Payer: COMMERCIAL

## 2024-11-25 DIAGNOSIS — C77.3 SECONDARY AND UNSPECIFIED MALIGNANT NEOPLASM OF AXILLA AND UPPER LIMB LYMPH NODES (HCC): ICD-10-CM

## 2024-11-25 DIAGNOSIS — Z79.899 HIGH RISK MEDICATION USE: ICD-10-CM

## 2024-11-25 DIAGNOSIS — C43.39 MALIGNANT MELANOMA OF FOREHEAD (HCC): ICD-10-CM

## 2024-11-25 DIAGNOSIS — N18.30 STAGE 3 CHRONIC KIDNEY DISEASE, UNSPECIFIED WHETHER STAGE 3A OR 3B CKD (HCC): ICD-10-CM

## 2024-11-25 DIAGNOSIS — C43.30 MALIGNANT MELANOMA OF SKIN OF FACE (HCC): ICD-10-CM

## 2024-11-25 LAB
ALBUMIN SERPL BCG-MCNC: 4.6 G/DL (ref 3.5–5)
ALP SERPL-CCNC: 95 U/L (ref 34–104)
ALT SERPL W P-5'-P-CCNC: 14 U/L (ref 7–52)
ANION GAP SERPL CALCULATED.3IONS-SCNC: 7 MMOL/L (ref 4–13)
AST SERPL W P-5'-P-CCNC: 16 U/L (ref 13–39)
BASOPHILS # BLD AUTO: 0.03 THOUSANDS/ΜL (ref 0–0.1)
BASOPHILS NFR BLD AUTO: 0 % (ref 0–1)
BILIRUB SERPL-MCNC: 1.15 MG/DL (ref 0.2–1)
BUN SERPL-MCNC: 33 MG/DL (ref 5–25)
CALCIUM SERPL-MCNC: 10 MG/DL (ref 8.4–10.2)
CHLORIDE SERPL-SCNC: 101 MMOL/L (ref 96–108)
CO2 SERPL-SCNC: 33 MMOL/L (ref 21–32)
CREAT SERPL-MCNC: 1.53 MG/DL (ref 0.6–1.3)
EOSINOPHIL # BLD AUTO: 0.87 THOUSAND/ΜL (ref 0–0.61)
EOSINOPHIL NFR BLD AUTO: 8 % (ref 0–6)
ERYTHROCYTE [DISTWIDTH] IN BLOOD BY AUTOMATED COUNT: 12.7 % (ref 11.6–15.1)
GFR SERPL CREATININE-BSD FRML MDRD: 40 ML/MIN/1.73SQ M
GLUCOSE P FAST SERPL-MCNC: 130 MG/DL (ref 65–99)
HCT VFR BLD AUTO: 44.5 % (ref 36.5–49.3)
HGB BLD-MCNC: 14.8 G/DL (ref 12–17)
IMM GRANULOCYTES # BLD AUTO: 0.03 THOUSAND/UL (ref 0–0.2)
IMM GRANULOCYTES NFR BLD AUTO: 0 % (ref 0–2)
LDH SERPL-CCNC: 152 U/L (ref 140–271)
LYMPHOCYTES # BLD AUTO: 4.4 THOUSANDS/ΜL (ref 0.6–4.47)
LYMPHOCYTES NFR BLD AUTO: 42 % (ref 14–44)
MCH RBC QN AUTO: 30.6 PG (ref 26.8–34.3)
MCHC RBC AUTO-ENTMCNC: 33.3 G/DL (ref 31.4–37.4)
MCV RBC AUTO: 92 FL (ref 82–98)
MONOCYTES # BLD AUTO: 0.67 THOUSAND/ΜL (ref 0.17–1.22)
MONOCYTES NFR BLD AUTO: 6 % (ref 4–12)
NEUTROPHILS # BLD AUTO: 4.4 THOUSANDS/ΜL (ref 1.85–7.62)
NEUTS SEG NFR BLD AUTO: 44 % (ref 43–75)
NRBC BLD AUTO-RTO: 0 /100 WBCS
PLATELET # BLD AUTO: 137 THOUSANDS/UL (ref 149–390)
PMV BLD AUTO: 10.4 FL (ref 8.9–12.7)
POTASSIUM SERPL-SCNC: 4.7 MMOL/L (ref 3.5–5.3)
PROT SERPL-MCNC: 7.1 G/DL (ref 6.4–8.4)
RBC # BLD AUTO: 4.84 MILLION/UL (ref 3.88–5.62)
SODIUM SERPL-SCNC: 141 MMOL/L (ref 135–147)
T3FREE SERPL-MCNC: 3.41 PG/ML (ref 2.5–3.9)
T4 FREE SERPL-MCNC: 1.02 NG/DL (ref 0.61–1.12)
TSH SERPL DL<=0.05 MIU/L-ACNC: 2.17 UIU/ML (ref 0.45–4.5)
WBC # BLD AUTO: 10.4 THOUSAND/UL (ref 4.31–10.16)

## 2024-11-25 PROCEDURE — 83615 LACTATE (LD) (LDH) ENZYME: CPT

## 2024-11-25 PROCEDURE — 85025 COMPLETE CBC W/AUTO DIFF WBC: CPT

## 2024-11-25 PROCEDURE — 84443 ASSAY THYROID STIM HORMONE: CPT

## 2024-11-25 PROCEDURE — 80053 COMPREHEN METABOLIC PANEL: CPT

## 2024-11-25 PROCEDURE — 36415 COLL VENOUS BLD VENIPUNCTURE: CPT

## 2024-11-25 PROCEDURE — 84481 FREE ASSAY (FT-3): CPT

## 2024-11-25 PROCEDURE — 84439 ASSAY OF FREE THYROXINE: CPT

## 2024-11-25 NOTE — TELEPHONE ENCOUNTER
PA for Omeprazole (PriLOSEC) 40 MG capsule SUBMITTED to [employer] due to quantity limit exceeding the allowed 90 caps in 365 days    via    []CMM-KEY:   [x]Surescripts-Case ID #: [id number]    []Availity-Auth ID #   []Faxed to plan   []Other website   []Phone call Case ID #     [x]PA sent as URGENT    All office notes, labs and other pertaining documents and studies sent. Clinical questions answered. Awaiting determination from insurance company.     Turnaround time for your insurance to make a decision on your Prior Authorization can take 7-21 business days.

## 2024-11-26 ENCOUNTER — TELEPHONE (OUTPATIENT)
Dept: HEMATOLOGY ONCOLOGY | Facility: CLINIC | Age: 84
End: 2024-11-26

## 2024-11-26 NOTE — TELEPHONE ENCOUNTER
Received notification from [employer] that PET CT appeal is still pending.  Appointment for 11/29 will need to be canceled   Per Dr. Purdy patient an keep appointment for her on 12/4   I notified patients wife and canceled scheduled scan for 11/29    Wife verbalized understanding

## 2024-11-26 NOTE — TELEPHONE ENCOUNTER
PA for Omeprazole (PriLOSEC) 40 MG capsule APPROVED     Date(s) approved November 25, 2024 to November 25, 2025     Case #: [id number]     Patient advised by          [][employer] Message  [x]Phone call- Patient verbalized understanding and will give pharmacy a call to see when it will be shipped.   []LMOM  []L/M to call office as no active Communication consent on file  []Unable to leave detailed message as VM not approved on Communication consent       Pharmacy advised by    [x]Fax  []Phone call    Approval letter scanned into [employer] Yes

## 2024-11-26 NOTE — TELEPHONE ENCOUNTER
Patient requested that a msg be left for someone to give him a call back regarding his new CT appt., and what date it was changed to. Reiterated cancelled appt for this Friday 11/29 and confirmed that appt on 12/4 still stands w/ Dr. Purdy.

## 2024-12-03 ENCOUNTER — TELEPHONE (OUTPATIENT)
Dept: HEMATOLOGY ONCOLOGY | Facility: CLINIC | Age: 84
End: 2024-12-03

## 2024-12-03 NOTE — TELEPHONE ENCOUNTER
Patients PET CT was approved  He is seeing Dr. Purdy tomorrow  PET CT will be scheduled at appointment on 12/4/24

## 2024-12-04 ENCOUNTER — OFFICE VISIT (OUTPATIENT)
Dept: HEMATOLOGY ONCOLOGY | Facility: CLINIC | Age: 84
End: 2024-12-04
Payer: COMMERCIAL

## 2024-12-04 VITALS
TEMPERATURE: 97.3 F | RESPIRATION RATE: 18 BRPM | WEIGHT: 162.5 LBS | HEART RATE: 63 BPM | HEIGHT: 67 IN | SYSTOLIC BLOOD PRESSURE: 118 MMHG | OXYGEN SATURATION: 95 % | DIASTOLIC BLOOD PRESSURE: 70 MMHG | BODY MASS INDEX: 25.51 KG/M2

## 2024-12-04 DIAGNOSIS — C43.39 MALIGNANT MELANOMA OF FOREHEAD (HCC): ICD-10-CM

## 2024-12-04 DIAGNOSIS — N18.30 STAGE 3 CHRONIC KIDNEY DISEASE, UNSPECIFIED WHETHER STAGE 3A OR 3B CKD (HCC): ICD-10-CM

## 2024-12-04 DIAGNOSIS — Z79.899 HIGH RISK MEDICATION USE: ICD-10-CM

## 2024-12-04 DIAGNOSIS — Z85.820 ENCOUNTER FOR FOLLOW-UP SURVEILLANCE OF MELANOMA: Primary | ICD-10-CM

## 2024-12-04 DIAGNOSIS — Z08 ENCOUNTER FOR FOLLOW-UP SURVEILLANCE OF MELANOMA: Primary | ICD-10-CM

## 2024-12-04 DIAGNOSIS — C43.30 MALIGNANT MELANOMA OF SKIN OF FACE (HCC): ICD-10-CM

## 2024-12-04 DIAGNOSIS — C77.3 SECONDARY AND UNSPECIFIED MALIGNANT NEOPLASM OF AXILLA AND UPPER LIMB LYMPH NODES (HCC): ICD-10-CM

## 2024-12-04 PROCEDURE — 99214 OFFICE O/P EST MOD 30 MIN: CPT | Performed by: INTERNAL MEDICINE

## 2024-12-04 PROCEDURE — G2211 COMPLEX E/M VISIT ADD ON: HCPCS | Performed by: INTERNAL MEDICINE

## 2024-12-04 NOTE — LETTER
December 6, 2024     Joana Mg MD  [address]  [facility] [address]    Patient: Salvatore Bacon   YOB: 1940   Date of Visit: 12/4/2024       Dear Dr. Mg:    Thank you for referring Salvatore Bacon to me for evaluation. Below are my notes for this consultation.    If you have questions, please do not hesitate to call me. I look forward to following your patient along with you.         Sincerely,        Sara Purdy MD        CC: MD Vance Hill Jr., MD Tony Ohanian, MD Melissa A Wilson, MD  12/6/2024  5:02 PM  Sign when Signing Visit  [facility] [place]  [address] [address]  [phone]  [phone]     Date of Visit: 12/4/2024  Name: Salvatore Bacon   YOB: 1940        Subjective    VISIT DIAGNOSIS:  Diagnoses and all orders for this visit:    Encounter for follow-up surveillance of melanoma    Malignant melanoma of skin of face (HCC)  -     CBC and differential; Future  -     Comprehensive metabolic panel; Future  -     LD,Blood; Future  -     TSH, 3rd generation; Future  -     T3, free; Future  -     T4, free; Future    Malignant melanoma of forehead (HCC)  -     CBC and differential; Future  -     Comprehensive metabolic panel; Future  -     LD,Blood; Future  -     TSH, 3rd generation; Future  -     T3, free; Future  -     T4, free; Future    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)  -     CBC and differential; Future  -     Comprehensive metabolic panel; Future  -     LD,Blood; Future  -     TSH, 3rd generation; Future  -     T3, free; Future  -     T4, free; Future    Stage 3 chronic kidney disease, unspecified whether stage 3a or 3b CKD (HCC)    High risk medication use  -     TSH, 3rd generation; Future  -     T3, free; Future  -     T4, free; Future        Oncology History   Malignant melanoma of skin of face (HCC)   9/8/2021 -   Cancer Staged    Staging form: Melanoma of the Skin, AJCC 8th Edition  - Clinical stage from 9/8/2021: Stage III (cT2a, cN1a, cM0) - Signed by Sara Purdy MD on 11/7/2021 9/8/2021 -  Cancer Staged    Staging form: Melanoma of the Skin, AJCC 8th Edition  - Pathologic stage from 9/8/2021: Stage IIIA (pT2a, pN1a, cM0) - Signed by Sara Purdy MD on 11/7/2021 9/22/2021 Initial Diagnosis    Malignant melanoma of skin of face (HCC)     4/4/2022 -  Chemotherapy    pembrolizumab (KEYTRUDA) IVPB, 400 mg, Intravenous, Once, 17 of 17 cycles  Administration: 400 mg (4/4/2022), 400 mg (5/16/2022), 400 mg (6/27/2022), 400 mg (8/8/2022), 400 mg (9/19/2022), 400 mg (10/31/2022), 400 mg (12/12/2022), 400 mg (1/23/2023), 400 mg (3/6/2023), 400 mg (5/5/2023), 400 mg (6/15/2023), 400 mg (8/3/2023), 400 mg (9/21/2023), 400 mg (11/16/2023), 400 mg (12/28/2023), 400 mg (2/8/2024), 400 mg (3/21/2024)     Malignant melanoma of forehead (HCC)   11/29/2021 Initial Diagnosis    Malignant melanoma of forehead (HCC)     4/4/2022 -  Chemotherapy    pembrolizumab (KEYTRUDA) IVPB, 400 mg, Intravenous, Once, 17 of 17 cycles  Administration: 400 mg (4/4/2022), 400 mg (5/16/2022), 400 mg (6/27/2022), 400 mg (8/8/2022), 400 mg (9/19/2022), 400 mg (10/31/2022), 400 mg (12/12/2022), 400 mg (1/23/2023), 400 mg (3/6/2023), 400 mg (5/5/2023), 400 mg (6/15/2023), 400 mg (8/3/2023), 400 mg (9/21/2023), 400 mg (11/16/2023), 400 mg (12/28/2023), 400 mg (2/8/2024), 400 mg (3/21/2024)        Cancer Staging   Malignant melanoma of skin of face (HCC)  Staging form: Melanoma of the Skin, AJCC 8th Edition  - Clinical stage from 9/8/2021: Stage III (cT2a, cN1a, cM0) - Signed by Sara Purdy MD on 11/7/2021  - Pathologic stage from 9/8/2021: Stage IIIA (pT2a, pN1a, cM0) - Signed by Sara Purdy MD on 11/7/2021     Treatment Details   Treatment goal Curative   Plan Name OP Pembrolizumab Q 42 Days   Status Active   Start Date 4/4/2022    End Date 3/21/2024   Provider Sara Purdy MD   Chemotherapy pembrolizumab (KEYTRUDA) IVPB, 400 mg, Intravenous, Once, 17 of 17 cycles  Administration: 400 mg (4/4/2022), 400 mg (5/16/2022), 400 mg (6/27/2022), 400 mg (8/8/2022), 400 mg (9/19/2022), 400 mg (10/31/2022), 400 mg (12/12/2022), 400 mg (1/23/2023), 400 mg (3/6/2023), 400 mg (5/5/2023), 400 mg (6/15/2023), 400 mg (8/3/2023), 400 mg (9/21/2023), 400 mg (11/16/2023), 400 mg (12/28/2023), 400 mg (2/8/2024), 400 mg (3/21/2024)          HISTORY OF PRESENT ILLNESS: Salvatore Bacon is a 84 y.o. male  who has stage IIIA melanoma with concerns for metastatic disease to the lymph node status post treatment with pembrolizumab completing treatment in March or 2024 here for continued monitoring, follow-up and surveillance.    He is doing well.  No issues concerns or complaints at this time.  His energy is a little decreased, but otherwise doing well.  Getting around.  Doing things around his house that need to be done.  Eating okay.    Denies any new, changing, concerning skin lesions.  Denies any lymphadenopathy.  Denies delayed onset immune mediated side effects.  Denies headaches, double vision, rash, itching, chest pain, shortness of breath and diarrhea.  No muscle weakness.  Stable fatigue.        REVIEW OF SYSTEMS:  Review of Systems   Constitutional:  Negative for appetite change, fatigue, fever and unexpected weight change.   HENT:   Negative for lump/mass, trouble swallowing and voice change.    Eyes:  Negative for icterus.   Respiratory:  Negative for cough, shortness of breath and wheezing.    Cardiovascular:  Negative for leg swelling.   Gastrointestinal:  Negative for abdominal pain, constipation, diarrhea, nausea and vomiting.   Genitourinary:  Negative for difficulty urinating and hematuria.    Musculoskeletal:  Negative for arthralgias, gait problem and myalgias.   Skin:  Negative for itching and rash.        No new, changing, or concerning  lesions.   Neurological:  Negative for extremity weakness, gait problem, headaches, light-headedness and numbness.   Hematological:  Negative for adenopathy.        MEDICATIONS:    Current Outpatient Medications:   •  atenolol (TENORMIN) 100 mg tablet, Take 1 tablet (100 mg total) by mouth daily, Disp: 90 tablet, Rfl: 3  •  glimepiride (AMARYL) 2 mg tablet, Take 1 tablet (2 mg total) by mouth daily with breakfast, Disp: 90 tablet, Rfl: 3  •  lisinopril-hydrochlorothiazide (PRINZIDE,ZESTORETIC) 20-25 MG per tablet, Take 1 tablet by mouth daily, Disp: 90 tablet, Rfl: 3  •  lovastatin (MEVACOR) 40 MG tablet, Take 1 tablet (40 mg total) by mouth daily, Disp: 90 tablet, Rfl: 3  •  metFORMIN (GLUCOPHAGE) 1000 MG tablet, Take 1 tablet (1,000 mg total) by mouth 2 (two) times a day with meals, Disp: 180 tablet, Rfl: 3  •  omeprazole (PriLOSEC) 40 MG capsule, Take 1 capsule (40 mg total) by mouth daily as needed (indigestion, acid reflux), Disp: 90 capsule, Rfl: 3     ALLERGIES:  No Known Allergies     ACTIVE PROBLEMS:  Patient Active Problem List   Diagnosis   • Essential hypertension   • Hiatal hernia with GERD   • Status post laparoscopic cholecystectomy   • Status post umbilical hernia repair, follow-up exam   • Type 2 diabetes mellitus without complication, without long-term current use of insulin (HCC)   • Mixed hyperlipidemia   • Parkinson's disease (HCC)   • Microalbuminuria   • Malignant melanoma of skin of face (HCC)   • Thrombocytopenia (HCC)   • Malignant melanoma of forehead (HCC)   • 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)   • Stage 3 chronic kidney disease, unspecified whether stage 3a or 3b CKD (HCC)   • High risk medication use   • Advanced care planning/counseling discussion   • Arthritis of left knee   • Left cervical lymphadenopathy   • Lung nodules          PAST MEDICAL HISTORY:   Past Medical History:   Diagnosis Date   • Diabetes mellitus (HCC)    •  Hyperlipidemia    • Hypertension         PAST SURGICAL HISTORY:  Past Surgical History:   Procedure Laterality Date   • CATARACT EXTRACTION, BILATERAL     • FLAP LOCAL HEAD / NECK Left 10/12/2021    Procedure: RECONSTRUCTION OF LEFT TEMPLE DEFECT AFTER RESCECTION MELANOMA;  Surgeon: Kingston Oviedo MD;  [facility];  Service: Plastics   • FULL THICKNESS SKIN GRAFT Left 10/12/2021    Procedure: SKIN GRAFT FULL THICKNESS LEFT TEMPLE;  Surgeon: Kingston Oviedo MD;  [facility];  Service: Plastics   • GALLBLADDER SURGERY     • HAND SURGERY Left    • LYMPH NODE BIOPSY Left 10/12/2021    Procedure: BIOPSY LYMPH NODE SENTINEL, LYMPHOSCINTIGRAPHY, INTRAOPERATIVE LYMPHATIC MAPPING (INJECT AT 1200);  Surgeon: Joana Mg MD;  [facility];  Service: Surgical Oncology   • NOSE SURGERY     • SKIN CANCER EXCISION  10/2021   • SKIN LESION EXCISION Left 10/12/2021    Procedure: FACE MELANOMA SCAR WIDE EXCISION  FACE;  Surgeon: Joana Mg MD;  [facility];  Service: Surgical Oncology   • SPLIT THICKNESS SKIN GRAFT Left 10/12/2021    Procedure: SKIN GRAFT SPLIT THICKNESS LEFT TEMPLE;  Surgeon: Kingston Oviedo MD;  [facility];  Service: Plastics   • US GUIDED LYMPH NODE BIOPSY LEFT  03/21/2022        SOCIAL HISTORY:  Social History     Socioeconomic History   • Marital status: /Civil Union     Spouse name: Not on file   • Number of children: Not on file   • Years of education: Not on file   • Highest education level: Not on file   Occupational History   • Not on file   Tobacco Use   • Smoking status: Never   • Smokeless tobacco: Never   Vaping Use   • Vaping status: Never Used   Substance and Sexual Activity   • Alcohol use: Not Currently   • Drug use: Never   • Sexual activity: Not Currently   Other Topics Concern   • Not on file   Social History Narrative   • Not on file     Social Drivers of Health     Financial Resource Strain: Low Risk  (12/1/2023)    Overall  "Financial Resource Strain (CARDIA)    • Difficulty of Paying Living Expenses: Not hard at all   Food Insecurity: Not on file   Transportation Needs: No Transportation Needs (12/1/2023)    PRAPARE - Transportation    • Lack of Transportation (Medical): No    • Lack of Transportation (Non-Medical): No   Physical Activity: Not on file   Stress: Not on file   Social Connections: Not on file   Intimate Partner Violence: Not on file   Housing Stability: Not on file        FAMILY HISTORY:  Family History   Problem Relation Age of Onset   • No Known Problems Mother    • No Known Problems Father    • Colon cancer Other 60           Objective    PHYSICAL EXAMINATION:   Blood pressure 118/70, pulse 63, temperature (!) 97.3 °F (36.3 °C), temperature source Temporal, resp. rate 18, height 5' 7\" (1.702 m), weight 73.7 kg (162 lb 8 oz), SpO2 95%.     Pain Score: 0-No pain     ECOG Performance Status      Flowsheet Row Most Recent Value   ECOG Performance Status 1 - Restricted in physically strenuous activity but ambulatory and able to carry out work of a light or sedentary nature, e.g., light house work, office work               Physical Exam  Constitutional:       General: He is not in acute distress.     Appearance: Normal appearance. He is not ill-appearing or toxic-appearing.   HENT:      Head: Normocephalic and atraumatic.      Right Ear: External ear normal.      Left Ear: External ear normal.      Nose: Nose normal.      Mouth/Throat:      Mouth: Mucous membranes are moist.      Pharynx: Oropharynx is clear.   Eyes:      General: No scleral icterus.        Right eye: No discharge.         Left eye: No discharge.      Conjunctiva/sclera: Conjunctivae normal.   Cardiovascular:      Rate and Rhythm: Normal rate and regular rhythm.      Pulses: Normal pulses.      Heart sounds: No murmur heard.     No friction rub. No gallop.   Pulmonary:      Effort: Pulmonary effort is normal. No respiratory distress.      Breath sounds: " Normal breath sounds. No wheezing or rales.   Abdominal:      General: Bowel sounds are normal. There is no distension.      Palpations: There is no mass.      Tenderness: There is no abdominal tenderness. There is no rebound.   Musculoskeletal:         General: No swelling or tenderness.      Cervical back: Normal range of motion. No rigidity.      Right lower leg: No edema.      Left lower leg: No edema.   Lymphadenopathy:      Head:      Right side of head: No submandibular, preauricular or posterior auricular adenopathy.      Left side of head: No submandibular, preauricular or posterior auricular adenopathy.      Cervical: No cervical adenopathy.      Right cervical: No superficial or posterior cervical adenopathy.     Left cervical: No superficial or posterior cervical adenopathy.      Upper Body:      Right upper body: No supraclavicular or axillary adenopathy.      Left upper body: No supraclavicular or axillary adenopathy.   Skin:     General: Skin is warm.      Coloration: Skin is not jaundiced.      Findings: No lesion or rash.      Comments: Well healed surgical scar.  No evidence of recurrence at primary site.   Neurological:      General: No focal deficit present.      Mental Status: He is alert and oriented to person, place, and time.      Cranial Nerves: No cranial nerve deficit.      Motor: No weakness.      Gait: Gait normal.   Psychiatric:         Mood and Affect: Mood normal.         Behavior: Behavior normal.         Thought Content: Thought content normal.         Judgment: Judgment normal.         I reviewed lab data in the chart.    WBC   Date Value Ref Range Status   11/25/2024 10.40 (H) 4.31 - 10.16 Thousand/uL Final   08/13/2024 10.32 (H) 4.31 - 10.16 Thousand/uL Final   06/03/2024 9.39 4.31 - 10.16 Thousand/uL Final     Hemoglobin   Date Value Ref Range Status   11/25/2024 14.8 12.0 - 17.0 g/dL Final   08/13/2024 14.5 12.0 - 17.0 g/dL Final   06/03/2024 13.8 12.0 - 17.0 g/dL Final      Platelets   Date Value Ref Range Status   11/25/2024 137 (L) 149 - 390 Thousands/uL Final   08/13/2024 144 (L) 149 - 390 Thousands/uL Final   06/03/2024 136 (L) 149 - 390 Thousands/uL Final     MCV   Date Value Ref Range Status   11/25/2024 92 82 - 98 fL Final   08/13/2024 93 82 - 98 fL Final   06/03/2024 92 82 - 98 fL Final      Potassium   Date Value Ref Range Status   11/25/2024 4.7 3.5 - 5.3 mmol/L Final   08/13/2024 4.0 3.5 - 5.3 mmol/L Final   06/03/2024 3.8 3.5 - 5.3 mmol/L Final   07/20/2020 4.6 3.5 - 5.2 mmol/L Final   05/01/2019 3.6 3.5 - 5.2 mmol/L Final   04/30/2019 3.2 (L) 3.5 - 5.2 mmol/L Final     Chloride   Date Value Ref Range Status   11/25/2024 101 96 - 108 mmol/L Final   08/13/2024 104 96 - 108 mmol/L Final   06/03/2024 105 96 - 108 mmol/L Final   07/20/2020 108 100 - 109 mmol/L Final   05/01/2019 112 (H) 100 - 109 mmol/L Final   04/30/2019 108 100 - 109 mmol/L Final     Carbon Dioxide   Date Value Ref Range Status   07/20/2020 28 23 - 31 mmol/L Final   05/01/2019 24 23 - 31 mmol/L Final   04/30/2019 25 23 - 31 mmol/L Final     CO2   Date Value Ref Range Status   11/25/2024 33 (H) 21 - 32 mmol/L Final   08/13/2024 28 21 - 32 mmol/L Final   06/03/2024 25 21 - 32 mmol/L Final     BUN   Date Value Ref Range Status   11/25/2024 33 (H) 5 - 25 mg/dL Final   08/13/2024 27 (H) 5 - 25 mg/dL Final   06/03/2024 33 (H) 5 - 25 mg/dL Final   07/20/2020 26 7 - 28 mg/dL Final   05/01/2019 35 (H) 7 - 28 mg/dL Final   04/30/2019 42 (H) 7 - 28 mg/dL Final     Creatinine   Date Value Ref Range Status   11/25/2024 1.53 (H) 0.60 - 1.30 mg/dL Final     Comment:     Standardized to IDMS reference method   08/13/2024 1.43 (H) 0.60 - 1.30 mg/dL Final     Comment:     Standardized to IDMS reference method   06/03/2024 1.49 (H) 0.60 - 1.30 mg/dL Final     Comment:     Standardized to IDMS reference method   07/20/2020 1.25 0.53 - 1.30 mg/dL Final   05/01/2019 1.18 0.53 - 1.30 mg/dL Final   04/30/2019 1.28 0.53 - 1.30  mg/dL Final     Glucose   Date Value Ref Range Status   04/25/2024 218 (H) 65 - 140 mg/dL Final     Comment:     If the patient is fasting, the ADA then defines impaired fasting glucose as > 100 mg/dL and diabetes as > or equal to 123 mg/dL.   03/14/2024 246 (H) 65 - 140 mg/dL Final     Comment:     If the patient is fasting, the ADA then defines impaired fasting glucose as > 100 mg/dL and diabetes as > or equal to 123 mg/dL.   02/01/2024 230 (H) 65 - 140 mg/dL Final     Comment:     If the patient is fasting, the ADA then defines impaired fasting glucose as > 100 mg/dL and diabetes as > or equal to 123 mg/dL.   07/20/2020 87 65 - 99 mg/dL Final   05/01/2019 108 (H) 65 - 99 mg/dL Final   04/30/2019 140 (H) 65 - 99 mg/dL Final     Calcium   Date Value Ref Range Status   11/25/2024 10.0 8.4 - 10.2 mg/dL Final   08/13/2024 9.2 8.4 - 10.2 mg/dL Final   06/03/2024 8.8 8.4 - 10.2 mg/dL Final   07/20/2020 8.8 8.5 - 10.1 mg/dL Final   05/01/2019 7.1 (L) 8.5 - 10.1 mg/dL Final   04/30/2019 6.9 (L) 8.5 - 10.1 mg/dL Final     Albumin   Date Value Ref Range Status   11/25/2024 4.6 3.5 - 5.0 g/dL Final   08/13/2024 4.4 3.5 - 5.0 g/dL Final   06/03/2024 4.3 3.5 - 5.0 g/dL Final     ALBUMIN   Date Value Ref Range Status   07/20/2020 4.0 3.5 - 4.8 g/dL Final   05/01/2019 2.2 (L) 3.5 - 4.8 g/dL Final   04/30/2019 2.2 (L) 3.5 - 4.8 g/dL Final     Total Bilirubin   Date Value Ref Range Status   11/25/2024 1.15 (H) 0.20 - 1.00 mg/dL Final     Comment:     Use of this assay is not recommended for patients undergoing treatment with eltrombopag due to the potential for falsely elevated results.  N-acetyl-p-benzoquinone imine (metabolite of Acetaminophen) will generate erroneously low results in samples for patients that have taken an overdose of Acetaminophen.   08/13/2024 1.54 (H) 0.20 - 1.00 mg/dL Final     Comment:     Use of this assay is not recommended for patients undergoing treatment with eltrombopag due to the potential for  falsely elevated results.  N-acetyl-p-benzoquinone imine (metabolite of Acetaminophen) will generate erroneously low results in samples for patients that have taken an overdose of Acetaminophen.   06/03/2024 1.59 (H) 0.20 - 1.00 mg/dL Final     Comment:     Use of this assay is not recommended for patients undergoing treatment with eltrombopag due to the potential for falsely elevated results.  N-acetyl-p-benzoquinone imine (metabolite of Acetaminophen) will generate erroneously low results in samples for patients that have taken an overdose of Acetaminophen.   07/20/2020 1.2 (H) 0.2 - 1.0 mg/dL Final     Comment:     Use of this assay is not recommended for patients undergoing treatment with eltrombopag due to the potential for falsely elevated results.   05/01/2019 1.4 (H) 0.2 - 1.0 mg/dL Final   04/30/2019 1.9 (H) 0.2 - 1.0 mg/dL Final     Alkaline Phosphatase   Date Value Ref Range Status   11/25/2024 95 34 - 104 U/L Final   08/13/2024 75 34 - 104 U/L Final   06/03/2024 76 34 - 104 U/L Final   07/20/2020 63 35 - 120 U/L Final   05/01/2019 116 35 - 120 U/L Final   04/30/2019 129 (H) 35 - 120 U/L Final     AST   Date Value Ref Range Status   11/25/2024 16 13 - 39 U/L Final   08/13/2024 16 13 - 39 U/L Final   06/03/2024 18 13 - 39 U/L Final   07/20/2020 17 <41 U/L Final   05/01/2019 83 (H) <41 U/L Final   04/30/2019 100 (H) <41 U/L Final     ALT   Date Value Ref Range Status   11/25/2024 14 7 - 52 U/L Final     Comment:     Specimen collection should occur prior to Sulfasalazine administration due to the potential for falsely depressed results.    08/13/2024 14 7 - 52 U/L Final     Comment:     Specimen collection should occur prior to Sulfasalazine administration due to the potential for falsely depressed results.    06/03/2024 21 7 - 52 U/L Final     Comment:     Specimen collection should occur prior to Sulfasalazine administration due to the potential for falsely depressed results.    07/20/2020 22 <56 U/L  "Final   05/01/2019 68 (H) <56 U/L Final   04/30/2019 118 (H) <56 U/L Final      LD   Date Value Ref Range Status   11/25/2024 152 140 - 271 U/L Final   08/13/2024 123 (L) 140 - 271 U/L Final   04/25/2024 165 140 - 271 U/L Final     TSH   Date Value Ref Range Status   07/20/2020 1.47 0.36 - 3.74 uIU/mL Final   07/18/2018 2.20 0.36 - 3.74 uIU/mL Final     No results found for: \"[id number]\"   Free T4   Date Value Ref Range Status   11/25/2024 1.02 0.61 - 1.12 ng/dL Final     Comment:     Specimens with biotin concentrations > 10 ng/mL can lead to significant (> 10%) positive bias in result.   08/13/2024 0.82 0.61 - 1.12 ng/dL Final     Comment:     Specimens with biotin concentrations > 10 ng/mL can lead to significant (> 10%) positive bias in result.   04/25/2024 0.75 0.61 - 1.12 ng/dL Final     Comment:     Specimens with biotin concentrations > 10 ng/mL can lead to significant (> 10%) positive bias in result.         RECENT IMAGING:  Procedure: US head neck lymph node mapping  Result Date: 11/13/2024  Narrative: NECK ULTRASOUND INDICATION: C43.30: Malignant melanoma of unspecified part of face. COMPARISON: Neck ultrasound 10/4/2023 FINDINGS: Ultrasound of the neck was performed with a high frequency linear transducer. Lymph nodes maintain normal morphologic contour, echogenicity and short axis dimensions of less than 0.7 cm. No evidence for microcalcification or focal nodularity. For example, stable, 1.9 x 0.5 x 0.7 cm right level 3 node (image 17) and 2.0 x 0.5 x 0.4  cm left level 2 node (image 57).     Impression: No suspicious adenopathy in the neck. Workstation performed: [id number]             Assessment/Plan  Mr. Bacon is a 84-year-old gentleman with stage IIIa/concern for metastatic melanoma status post treatment pembrolizumab completing treatment in March 2024 here for continued monitoring, follow-up and surveillance.    1. Encounter for follow-up surveillance of melanoma (Primary)  2. Malignant melanoma of " skin of face (HCC)  3. Malignant melanoma of forehead (HCC)  4. 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  He is due for imaging and will get a PET scan scheduled after this visit.  Labs reviewed and okay.  Blood work is within normal limits, not clinically significant, are stable and consistent with previous lab values.    He knows to continue to monitor for any new, changing, concerning skin lesions or lymphadenopathy.  He knows to monitor for delayed onset immune mediated side effects.  He knows to call with issues or concerns prior to his next visit.  Orders placed and prescription provided for blood work to be done at his follow-up in 3 months.  He will also schedule PET scan after this visit as it has been ordered.  I will call him with these results.          - CBC and differential; Future  - Comprehensive metabolic panel; Future  - LD,Blood; Future  - TSH, 3rd generation; Future  - T3, free; Future  - T4, free; Future    5. Stage 3 chronic kidney disease, unspecified whether stage 3a or 3b CKD (HCC)  Continue to be conscious about his kidney disease.  Do not want to use IV contrast dye and will get PET scan for surveillance.  It was due prior to this visit, however just obtained insurance approval.  Will get this scheduled for soon as possible after today's visit.    6. High risk medication use  He received treatment with immunotherapy completing treatment in March 2024.  We continue to monitor for delayed onset immune mediated side effects.  He knows to call with issues or concerns prior to his next visit.  We continue to monitor thyroid functions.    - TSH, 3rd generation; Future  - T3, free; Future  - T4, free; Future        Return in about 3 months (around 3/4/2025) for Office Visit, labs, scans.   Sara Purdy MD, PhD

## 2024-12-06 NOTE — PROGRESS NOTES
[facility] [place]  [address]  [place] [address]  [phone]     Date of Visit: 12/4/2024  Name: Salvatore Bacon   YOB: 1940        Subjective    VISIT DIAGNOSIS:  Diagnoses and all orders for this visit:    Encounter for follow-up surveillance of melanoma    Malignant melanoma of skin of face (HCC)  -     CBC and differential; Future  -     Comprehensive metabolic panel; Future  -     LD,Blood; Future  -     TSH, 3rd generation; Future  -     T3, free; Future  -     T4, free; Future    Malignant melanoma of forehead (HCC)  -     CBC and differential; Future  -     Comprehensive metabolic panel; Future  -     LD,Blood; Future  -     TSH, 3rd generation; Future  -     T3, free; Future  -     T4, free; Future    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)  -     CBC and differential; Future  -     Comprehensive metabolic panel; Future  -     LD,Blood; Future  -     TSH, 3rd generation; Future  -     T3, free; Future  -     T4, free; Future    Stage 3 chronic kidney disease, unspecified whether stage 3a or 3b CKD (HCC)    High risk medication use  -     TSH, 3rd generation; Future  -     T3, free; Future  -     T4, free; Future        Oncology History   Malignant melanoma of skin of face (HCC)   9/8/2021 -  Cancer Staged    Staging form: Melanoma of the Skin, AJCC 8th Edition  - Clinical stage from 9/8/2021: Stage III (cT2a, cN1a, cM0) - Signed by Sara Purdy MD on 11/7/2021 9/8/2021 -  Cancer Staged    Staging form: Melanoma of the Skin, AJCC 8th Edition  - Pathologic stage from 9/8/2021: Stage IIIA (pT2a, pN1a, cM0) - Signed by Sara Purdy MD on 11/7/2021 9/22/2021 Initial Diagnosis    Malignant melanoma of skin of face (HCC)     4/4/2022 -  Chemotherapy    pembrolizumab (KEYTRUDA) IVPB, 400 mg, Intravenous, Once, 17 of 17 cycles  Administration: 400 mg (4/4/2022), 400 mg  (5/16/2022), 400 mg (6/27/2022), 400 mg (8/8/2022), 400 mg (9/19/2022), 400 mg (10/31/2022), 400 mg (12/12/2022), 400 mg (1/23/2023), 400 mg (3/6/2023), 400 mg (5/5/2023), 400 mg (6/15/2023), 400 mg (8/3/2023), 400 mg (9/21/2023), 400 mg (11/16/2023), 400 mg (12/28/2023), 400 mg (2/8/2024), 400 mg (3/21/2024)     Malignant melanoma of forehead (HCC)   11/29/2021 Initial Diagnosis    Malignant melanoma of forehead (HCC)     4/4/2022 -  Chemotherapy    pembrolizumab (KEYTRUDA) IVPB, 400 mg, Intravenous, Once, 17 of 17 cycles  Administration: 400 mg (4/4/2022), 400 mg (5/16/2022), 400 mg (6/27/2022), 400 mg (8/8/2022), 400 mg (9/19/2022), 400 mg (10/31/2022), 400 mg (12/12/2022), 400 mg (1/23/2023), 400 mg (3/6/2023), 400 mg (5/5/2023), 400 mg (6/15/2023), 400 mg (8/3/2023), 400 mg (9/21/2023), 400 mg (11/16/2023), 400 mg (12/28/2023), 400 mg (2/8/2024), 400 mg (3/21/2024)        Cancer Staging   Malignant melanoma of skin of face (HCC)  Staging form: Melanoma of the Skin, AJCC 8th Edition  - Clinical stage from 9/8/2021: Stage III (cT2a, cN1a, cM0) - Signed by Sara Purdy MD on 11/7/2021  - Pathologic stage from 9/8/2021: Stage IIIA (pT2a, pN1a, cM0) - Signed by Sara Purdy MD on 11/7/2021     Treatment Details   Treatment goal Curative   Plan Name OP Pembrolizumab Q 42 Days   Status Active   Start Date 4/4/2022   End Date 3/21/2024   Provider Sara Purdy MD   Chemotherapy pembrolizumab (KEYTRUDA) IVPB, 400 mg, Intravenous, Once, 17 of 17 cycles  Administration: 400 mg (4/4/2022), 400 mg (5/16/2022), 400 mg (6/27/2022), 400 mg (8/8/2022), 400 mg (9/19/2022), 400 mg (10/31/2022), 400 mg (12/12/2022), 400 mg (1/23/2023), 400 mg (3/6/2023), 400 mg (5/5/2023), 400 mg (6/15/2023), 400 mg (8/3/2023), 400 mg (9/21/2023), 400 mg (11/16/2023), 400 mg (12/28/2023), 400 mg (2/8/2024), 400 mg (3/21/2024)          HISTORY OF PRESENT ILLNESS: Salvatore Bacon is a 84 y.o. male  who has stage IIIA melanoma with  concerns for metastatic disease to the lymph node status post treatment with pembrolizumab completing treatment in March or 2024 here for continued monitoring, follow-up and surveillance.    He is doing well.  No issues concerns or complaints at this time.  His energy is a little decreased, but otherwise doing well.  Getting around.  Doing things around his house that need to be done.  Eating okay.    Denies any new, changing, concerning skin lesions.  Denies any lymphadenopathy.  Denies delayed onset immune mediated side effects.  Denies headaches, double vision, rash, itching, chest pain, shortness of breath and diarrhea.  No muscle weakness.  Stable fatigue.        REVIEW OF SYSTEMS:  Review of Systems   Constitutional:  Negative for appetite change, fatigue, fever and unexpected weight change.   HENT:   Negative for lump/mass, trouble swallowing and voice change.    Eyes:  Negative for icterus.   Respiratory:  Negative for cough, shortness of breath and wheezing.    Cardiovascular:  Negative for leg swelling.   Gastrointestinal:  Negative for abdominal pain, constipation, diarrhea, nausea and vomiting.   Genitourinary:  Negative for difficulty urinating and hematuria.    Musculoskeletal:  Negative for arthralgias, gait problem and myalgias.   Skin:  Negative for itching and rash.        No new, changing, or concerning lesions.   Neurological:  Negative for extremity weakness, gait problem, headaches, light-headedness and numbness.   Hematological:  Negative for adenopathy.        MEDICATIONS:    Current Outpatient Medications:     atenolol (TENORMIN) 100 mg tablet, Take 1 tablet (100 mg total) by mouth daily, Disp: 90 tablet, Rfl: 3    glimepiride (AMARYL) 2 mg tablet, Take 1 tablet (2 mg total) by mouth daily with breakfast, Disp: 90 tablet, Rfl: 3    lisinopril-hydrochlorothiazide (PRINZIDE,ZESTORETIC) 20-25 MG per tablet, Take 1 tablet by mouth daily, Disp: 90 tablet, Rfl: 3    lovastatin (MEVACOR) 40 MG  tablet, Take 1 tablet (40 mg total) by mouth daily, Disp: 90 tablet, Rfl: 3    metFORMIN (GLUCOPHAGE) 1000 MG tablet, Take 1 tablet (1,000 mg total) by mouth 2 (two) times a day with meals, Disp: 180 tablet, Rfl: 3    omeprazole (PriLOSEC) 40 MG capsule, Take 1 capsule (40 mg total) by mouth daily as needed (indigestion, acid reflux), Disp: 90 capsule, Rfl: 3     ALLERGIES:  No Known Allergies     ACTIVE PROBLEMS:  Patient Active Problem List   Diagnosis    Essential hypertension    Hiatal hernia with GERD    Status post laparoscopic cholecystectomy    Status post umbilical hernia repair, follow-up exam    Type 2 diabetes mellitus without complication, without long-term current use of insulin (HCC)    Mixed hyperlipidemia    Parkinson's disease (HCC)    Microalbuminuria    Malignant melanoma of skin of face (HCC)    Thrombocytopenia (HCC)    Malignant melanoma of forehead (HCC)    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)    Stage 3 chronic kidney disease, unspecified whether stage 3a or 3b CKD (HCC)    High risk medication use    Advanced care planning/counseling discussion    Arthritis of left knee    Left cervical lymphadenopathy    Lung nodules          PAST MEDICAL HISTORY:   Past Medical History:   Diagnosis Date    Diabetes mellitus (HCC)     Hyperlipidemia     Hypertension         PAST SURGICAL HISTORY:  Past Surgical History:   Procedure Laterality Date    CATARACT EXTRACTION, BILATERAL      FLAP LOCAL HEAD / NECK Left 10/12/2021    Procedure: RECONSTRUCTION OF LEFT TEMPLE DEFECT AFTER RESCECTION MELANOMA;  Surgeon: Kingston Oviedo MD;  [facility]: [facility];  Service: Plastics    FULL THICKNESS SKIN GRAFT Left 10/12/2021    Procedure: SKIN GRAFT FULL THICKNESS LEFT TEMPLE;  Surgeon: Kingston Oviedo MD;  [facility];  Service: Plastics    GALLBLADDER SURGERY      HAND SURGERY Left     LYMPH NODE BIOPSY Left 10/12/2021    Procedure: BIOPSY LYMPH NODE  SENTINEL, LYMPHOSCINTIGRAPHY, INTRAOPERATIVE LYMPHATIC MAPPING (INJECT AT 1200);  Surgeon: Joana Mg MD;  Location[facility];  Service: Surgical Oncology    NOSE SURGERY      SKIN CANCER EXCISION  10/2021    SKIN LESION EXCISION Left 10/12/2021    Procedure: FACE MELANOMA SCAR WIDE EXCISION  FACE;  Surgeon: Joana Mg MD;  [facility];  Service: Surgical Oncology    SPLIT THICKNESS SKIN GRAFT Left 10/12/2021    Procedure: SKIN GRAFT SPLIT THICKNESS LEFT TEMPLE;  Surgeon: Kingston Oviedo MD;  [facility];  Service: Plastics    US GUIDED LYMPH NODE BIOPSY LEFT  03/21/2022        SOCIAL HISTORY:  Social History     Socioeconomic History    Marital status: /Civil Union     Spouse name: Not on file    Number of children: Not on file    Years of education: Not on file    Highest education level: Not on file   Occupational History    Not on file   Tobacco Use    Smoking status: Never    Smokeless tobacco: Never   Vaping Use    Vaping status: Never Used   Substance and Sexual Activity    Alcohol use: Not Currently    Drug use: Never    Sexual activity: Not Currently   Other Topics Concern    Not on file   Social History Narrative    Not on file     Social Drivers of Health     Financial Resource Strain: Low Risk  (12/1/2023)    Overall Financial Resource Strain (CARDIA)     Difficulty of Paying Living Expenses: Not hard at all   Food Insecurity: Not on file   Transportation Needs: No Transportation Needs (12/1/2023)    PRAPARE - Transportation     Lack of Transportation (Medical): No     Lack of Transportation (Non-Medical): No   Physical Activity: Not on file   Stress: Not on file   Social Connections: Not on file   Intimate Partner Violence: Not on file   Housing Stability: Not on file        FAMILY HISTORY:  Family History   Problem Relation Age of Onset    No Known Problems Mother     No Known Problems Father     Colon cancer Other 60           Objective    PHYSICAL EXAMINATION:  "  Blood pressure 118/70, pulse 63, temperature (!) 97.3 °F (36.3 °C), temperature source Temporal, resp. rate 18, height 5' 7\" (1.702 m), weight 73.7 kg (162 lb 8 oz), SpO2 95%.     Pain Score: 0-No pain     ECOG Performance Status      Flowsheet Row Most Recent Value   ECOG Performance Status 1 - Restricted in physically strenuous activity but ambulatory and able to carry out work of a light or sedentary nature, e.g., light house work, office work               Physical Exam  Constitutional:       General: He is not in acute distress.     Appearance: Normal appearance. He is not ill-appearing or toxic-appearing.   HENT:      Head: Normocephalic and atraumatic.      Right Ear: External ear normal.      Left Ear: External ear normal.      Nose: Nose normal.      Mouth/Throat:      Mouth: Mucous membranes are moist.      Pharynx: Oropharynx is clear.   Eyes:      General: No scleral icterus.        Right eye: No discharge.         Left eye: No discharge.      Conjunctiva/sclera: Conjunctivae normal.   Cardiovascular:      Rate and Rhythm: Normal rate and regular rhythm.      Pulses: Normal pulses.      Heart sounds: No murmur heard.     No friction rub. No gallop.   Pulmonary:      Effort: Pulmonary effort is normal. No respiratory distress.      Breath sounds: Normal breath sounds. No wheezing or rales.   Abdominal:      General: Bowel sounds are normal. There is no distension.      Palpations: There is no mass.      Tenderness: There is no abdominal tenderness. There is no rebound.   Musculoskeletal:         General: No swelling or tenderness.      Cervical back: Normal range of motion. No rigidity.      Right lower leg: No edema.      Left lower leg: No edema.   Lymphadenopathy:      Head:      Right side of head: No submandibular, preauricular or posterior auricular adenopathy.      Left side of head: No submandibular, preauricular or posterior auricular adenopathy.      Cervical: No cervical adenopathy.      " Right cervical: No superficial or posterior cervical adenopathy.     Left cervical: No superficial or posterior cervical adenopathy.      Upper Body:      Right upper body: No supraclavicular or axillary adenopathy.      Left upper body: No supraclavicular or axillary adenopathy.   Skin:     General: Skin is warm.      Coloration: Skin is not jaundiced.      Findings: No lesion or rash.      Comments: Well healed surgical scar.  No evidence of recurrence at primary site.   Neurological:      General: No focal deficit present.      Mental Status: He is alert and oriented to person, place, and time.      Cranial Nerves: No cranial nerve deficit.      Motor: No weakness.      Gait: Gait normal.   Psychiatric:         Mood and Affect: Mood normal.         Behavior: Behavior normal.         Thought Content: Thought content normal.         Judgment: Judgment normal.         I reviewed lab data in the chart.    WBC   Date Value Ref Range Status   11/25/2024 10.40 (H) 4.31 - 10.16 Thousand/uL Final   08/13/2024 10.32 (H) 4.31 - 10.16 Thousand/uL Final   06/03/2024 9.39 4.31 - 10.16 Thousand/uL Final     Hemoglobin   Date Value Ref Range Status   11/25/2024 14.8 12.0 - 17.0 g/dL Final   08/13/2024 14.5 12.0 - 17.0 g/dL Final   06/03/2024 13.8 12.0 - 17.0 g/dL Final     Platelets   Date Value Ref Range Status   11/25/2024 137 (L) 149 - 390 Thousands/uL Final   08/13/2024 144 (L) 149 - 390 Thousands/uL Final   06/03/2024 136 (L) 149 - 390 Thousands/uL Final     MCV   Date Value Ref Range Status   11/25/2024 92 82 - 98 fL Final   08/13/2024 93 82 - 98 fL Final   06/03/2024 92 82 - 98 fL Final      Potassium   Date Value Ref Range Status   11/25/2024 4.7 3.5 - 5.3 mmol/L Final   08/13/2024 4.0 3.5 - 5.3 mmol/L Final   06/03/2024 3.8 3.5 - 5.3 mmol/L Final   07/20/2020 4.6 3.5 - 5.2 mmol/L Final   05/01/2019 3.6 3.5 - 5.2 mmol/L Final   04/30/2019 3.2 (L) 3.5 - 5.2 mmol/L Final     Chloride   Date Value Ref Range Status    11/25/2024 101 96 - 108 mmol/L Final   08/13/2024 104 96 - 108 mmol/L Final   06/03/2024 105 96 - 108 mmol/L Final   07/20/2020 108 100 - 109 mmol/L Final   05/01/2019 112 (H) 100 - 109 mmol/L Final   04/30/2019 108 100 - 109 mmol/L Final     Carbon Dioxide   Date Value Ref Range Status   07/20/2020 28 23 - 31 mmol/L Final   05/01/2019 24 23 - 31 mmol/L Final   04/30/2019 25 23 - 31 mmol/L Final     CO2   Date Value Ref Range Status   11/25/2024 33 (H) 21 - 32 mmol/L Final   08/13/2024 28 21 - 32 mmol/L Final   06/03/2024 25 21 - 32 mmol/L Final     BUN   Date Value Ref Range Status   11/25/2024 33 (H) 5 - 25 mg/dL Final   08/13/2024 27 (H) 5 - 25 mg/dL Final   06/03/2024 33 (H) 5 - 25 mg/dL Final   07/20/2020 26 7 - 28 mg/dL Final   05/01/2019 35 (H) 7 - 28 mg/dL Final   04/30/2019 42 (H) 7 - 28 mg/dL Final     Creatinine   Date Value Ref Range Status   11/25/2024 1.53 (H) 0.60 - 1.30 mg/dL Final     Comment:     Standardized to IDMS reference method   08/13/2024 1.43 (H) 0.60 - 1.30 mg/dL Final     Comment:     Standardized to IDMS reference method   06/03/2024 1.49 (H) 0.60 - 1.30 mg/dL Final     Comment:     Standardized to IDMS reference method   07/20/2020 1.25 0.53 - 1.30 mg/dL Final   05/01/2019 1.18 0.53 - 1.30 mg/dL Final   04/30/2019 1.28 0.53 - 1.30 mg/dL Final     Glucose   Date Value Ref Range Status   04/25/2024 218 (H) 65 - 140 mg/dL Final     Comment:     If the patient is fasting, the ADA then defines impaired fasting glucose as > 100 mg/dL and diabetes as > or equal to 123 mg/dL.   03/14/2024 246 (H) 65 - 140 mg/dL Final     Comment:     If the patient is fasting, the ADA then defines impaired fasting glucose as > 100 mg/dL and diabetes as > or equal to 123 mg/dL.   02/01/2024 230 (H) 65 - 140 mg/dL Final     Comment:     If the patient is fasting, the ADA then defines impaired fasting glucose as > 100 mg/dL and diabetes as > or equal to 123 mg/dL.   07/20/2020 87 65 - 99 mg/dL Final    05/01/2019 108 (H) 65 - 99 mg/dL Final   04/30/2019 140 (H) 65 - 99 mg/dL Final     Calcium   Date Value Ref Range Status   11/25/2024 10.0 8.4 - 10.2 mg/dL Final   08/13/2024 9.2 8.4 - 10.2 mg/dL Final   06/03/2024 8.8 8.4 - 10.2 mg/dL Final   07/20/2020 8.8 8.5 - 10.1 mg/dL Final   05/01/2019 7.1 (L) 8.5 - 10.1 mg/dL Final   04/30/2019 6.9 (L) 8.5 - 10.1 mg/dL Final     Albumin   Date Value Ref Range Status   11/25/2024 4.6 3.5 - 5.0 g/dL Final   08/13/2024 4.4 3.5 - 5.0 g/dL Final   06/03/2024 4.3 3.5 - 5.0 g/dL Final     ALBUMIN   Date Value Ref Range Status   07/20/2020 4.0 3.5 - 4.8 g/dL Final   05/01/2019 2.2 (L) 3.5 - 4.8 g/dL Final   04/30/2019 2.2 (L) 3.5 - 4.8 g/dL Final     Total Bilirubin   Date Value Ref Range Status   11/25/2024 1.15 (H) 0.20 - 1.00 mg/dL Final     Comment:     Use of this assay is not recommended for patients undergoing treatment with eltrombopag due to the potential for falsely elevated results.  N-acetyl-p-benzoquinone imine (metabolite of Acetaminophen) will generate erroneously low results in samples for patients that have taken an overdose of Acetaminophen.   08/13/2024 1.54 (H) 0.20 - 1.00 mg/dL Final     Comment:     Use of this assay is not recommended for patients undergoing treatment with eltrombopag due to the potential for falsely elevated results.  N-acetyl-p-benzoquinone imine (metabolite of Acetaminophen) will generate erroneously low results in samples for patients that have taken an overdose of Acetaminophen.   06/03/2024 1.59 (H) 0.20 - 1.00 mg/dL Final     Comment:     Use of this assay is not recommended for patients undergoing treatment with eltrombopag due to the potential for falsely elevated results.  N-acetyl-p-benzoquinone imine (metabolite of Acetaminophen) will generate erroneously low results in samples for patients that have taken an overdose of Acetaminophen.   07/20/2020 1.2 (H) 0.2 - 1.0 mg/dL Final     Comment:     Use of this assay is not  "recommended for patients undergoing treatment with eltrombopag due to the potential for falsely elevated results.   05/01/2019 1.4 (H) 0.2 - 1.0 mg/dL Final   04/30/2019 1.9 (H) 0.2 - 1.0 mg/dL Final     Alkaline Phosphatase   Date Value Ref Range Status   11/25/2024 95 34 - 104 U/L Final   08/13/2024 75 34 - 104 U/L Final   06/03/2024 76 34 - 104 U/L Final   07/20/2020 63 35 - 120 U/L Final   05/01/2019 116 35 - 120 U/L Final   04/30/2019 129 (H) 35 - 120 U/L Final     AST   Date Value Ref Range Status   11/25/2024 16 13 - 39 U/L Final   08/13/2024 16 13 - 39 U/L Final   06/03/2024 18 13 - 39 U/L Final   07/20/2020 17 <41 U/L Final   05/01/2019 83 (H) <41 U/L Final   04/30/2019 100 (H) <41 U/L Final     ALT   Date Value Ref Range Status   11/25/2024 14 7 - 52 U/L Final     Comment:     Specimen collection should occur prior to Sulfasalazine administration due to the potential for falsely depressed results.    08/13/2024 14 7 - 52 U/L Final     Comment:     Specimen collection should occur prior to Sulfasalazine administration due to the potential for falsely depressed results.    06/03/2024 21 7 - 52 U/L Final     Comment:     Specimen collection should occur prior to Sulfasalazine administration due to the potential for falsely depressed results.    07/20/2020 22 <56 U/L Final   05/01/2019 68 (H) <56 U/L Final   04/30/2019 118 (H) <56 U/L Final      LD   Date Value Ref Range Status   11/25/2024 152 140 - 271 U/L Final   08/13/2024 123 (L) 140 - 271 U/L Final   04/25/2024 165 140 - 271 U/L Final     TSH   Date Value Ref Range Status   07/20/2020 1.47 0.36 - 3.74 uIU/mL Final   07/18/2018 2.20 0.36 - 3.74 uIU/mL Final     No results found for: \"[id number]\"   Free T4   Date Value Ref Range Status   11/25/2024 1.02 0.61 - 1.12 ng/dL Final     Comment:     Specimens with biotin concentrations > 10 ng/mL can lead to significant (> 10%) positive bias in result.   08/13/2024 0.82 0.61 - 1.12 ng/dL Final     Comment:     " Specimens with biotin concentrations > 10 ng/mL can lead to significant (> 10%) positive bias in result.   04/25/2024 0.75 0.61 - 1.12 ng/dL Final     Comment:     Specimens with biotin concentrations > 10 ng/mL can lead to significant (> 10%) positive bias in result.         RECENT IMAGING:  Procedure: US head neck lymph node mapping  Result Date: 11/13/2024  Narrative: NECK ULTRASOUND INDICATION: C43.30: Malignant melanoma of unspecified part of face. COMPARISON: Neck ultrasound 10/4/2023 FINDINGS: Ultrasound of the neck was performed with a high frequency linear transducer. Lymph nodes maintain normal morphologic contour, echogenicity and short axis dimensions of less than 0.7 cm. No evidence for microcalcification or focal nodularity. For example, stable, 1.9 x 0.5 x 0.7 cm right level 3 node (image 17) and 2.0 x 0.5 x 0.4  cm left level 2 node (image 57).     Impression: No suspicious adenopathy in the neck. Workstation performed: [id number]             Assessment/Plan  Mr. Bacon is a 84-year-old gentleman with stage IIIa/concern for metastatic melanoma status post treatment pembrolizumab completing treatment in March 2024 here for continued monitoring, follow-up and surveillance.    1. Encounter for follow-up surveillance of melanoma (Primary)  2. Malignant melanoma of skin of face (HCC)  3. Malignant melanoma of forehead (HCC)  4. 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  He is due for imaging and will get a PET scan scheduled after this visit.  Labs reviewed and okay.  Blood work is within normal limits, not clinically significant, are stable and consistent with previous lab values.    He knows to continue to monitor for any new, changing, concerning skin lesions or lymphadenopathy.  He knows to monitor for delayed onset immune mediated side effects.  He knows to call with issues or concerns  prior to his next visit.  Orders placed and prescription provided for blood work to be done at his follow-up in 3 months.  He will also schedule PET scan after this visit as it has been ordered.  I will call him with these results.          - CBC and differential; Future  - Comprehensive metabolic panel; Future  - LD,Blood; Future  - TSH, 3rd generation; Future  - T3, free; Future  - T4, free; Future    5. Stage 3 chronic kidney disease, unspecified whether stage 3a or 3b CKD (HCC)  Continue to be conscious about his kidney disease.  Do not want to use IV contrast dye and will get PET scan for surveillance.  It was due prior to this visit, however just obtained insurance approval.  Will get this scheduled for soon as possible after today's visit.    6. High risk medication use  He received treatment with immunotherapy completing treatment in March 2024.  We continue to monitor for delayed onset immune mediated side effects.  He knows to call with issues or concerns prior to his next visit.  We continue to monitor thyroid functions.    - TSH, 3rd generation; Future  - T3, free; Future  - T4, free; Future        Return in about 3 months (around 3/4/2025) for Office Visit, labs, scans.   Sara Purdy MD, PhD

## 2024-12-10 ENCOUNTER — OFFICE VISIT (OUTPATIENT)
Dept: INTERNAL MEDICINE CLINIC | Facility: OTHER | Age: 84
End: 2024-12-10
Payer: COMMERCIAL

## 2024-12-10 VITALS
RESPIRATION RATE: 20 BRPM | DIASTOLIC BLOOD PRESSURE: 84 MMHG | WEIGHT: 163.2 LBS | TEMPERATURE: 98.2 F | BODY MASS INDEX: 25.62 KG/M2 | SYSTOLIC BLOOD PRESSURE: 140 MMHG | HEART RATE: 52 BPM | OXYGEN SATURATION: 100 % | HEIGHT: 67 IN

## 2024-12-10 DIAGNOSIS — I10 ESSENTIAL HYPERTENSION: Primary | ICD-10-CM

## 2024-12-10 DIAGNOSIS — R80.9 MICROALBUMINURIA: ICD-10-CM

## 2024-12-10 DIAGNOSIS — Z13.6 SCREENING FOR CARDIOVASCULAR CONDITION: ICD-10-CM

## 2024-12-10 DIAGNOSIS — Z00.00 MEDICARE ANNUAL WELLNESS VISIT, SUBSEQUENT: ICD-10-CM

## 2024-12-10 DIAGNOSIS — Z13.1 SCREENING FOR DIABETES MELLITUS: ICD-10-CM

## 2024-12-10 DIAGNOSIS — E78.2 MIXED HYPERLIPIDEMIA: ICD-10-CM

## 2024-12-10 DIAGNOSIS — N18.30 STAGE 3 CHRONIC KIDNEY DISEASE, UNSPECIFIED WHETHER STAGE 3A OR 3B CKD (HCC): ICD-10-CM

## 2024-12-10 DIAGNOSIS — M70.22 OLECRANON BURSITIS OF LEFT ELBOW: ICD-10-CM

## 2024-12-10 DIAGNOSIS — D69.6 THROMBOCYTOPENIA (HCC): ICD-10-CM

## 2024-12-10 DIAGNOSIS — Z71.89 ADVANCED CARE PLANNING/COUNSELING DISCUSSION: ICD-10-CM

## 2024-12-10 DIAGNOSIS — E11.9 TYPE 2 DIABETES MELLITUS WITHOUT COMPLICATION, WITHOUT LONG-TERM CURRENT USE OF INSULIN (HCC): ICD-10-CM

## 2024-12-10 PROBLEM — M70.32 BURSITIS OF LEFT ELBOW: Status: ACTIVE | Noted: 2024-12-10

## 2024-12-10 PROCEDURE — G0439 PPPS, SUBSEQ VISIT: HCPCS | Performed by: INTERNAL MEDICINE

## 2024-12-10 PROCEDURE — 20605 DRAIN/INJ JOINT/BURSA W/O US: CPT | Performed by: INTERNAL MEDICINE

## 2024-12-10 PROCEDURE — 99214 OFFICE O/P EST MOD 30 MIN: CPT | Performed by: INTERNAL MEDICINE

## 2024-12-10 PROCEDURE — 99497 ADVNCD CARE PLAN 30 MIN: CPT | Performed by: INTERNAL MEDICINE

## 2024-12-10 PROCEDURE — G0444 DEPRESSION SCREEN ANNUAL: HCPCS | Performed by: INTERNAL MEDICINE

## 2024-12-10 NOTE — PROGRESS NOTES
Name: Salvatore Bacon      : 1940      MRN: [id number]  Encounter Provider: Joey Jarrell MD  Encounter Date: 12/10/2024   Encounter department: [facility]  Medicare annual wellness visit, subsequent         Screening for diabetes mellitus         Screening for cardiovascular condition         Essential hypertension  Controlled.  Continue current antihypertensive regimen.  Orders:  •  CBC; Future  •  Comprehensive metabolic panel; Future  •  Hemoglobin A1C; Future  •  Lipid panel; Future  •  Albumin / creatinine urine ratio; Future    Type 2 diabetes mellitus without complication, without long-term current use of insulin (HCC)  Continue current diabetic regimen.  Trend A1c.  Lab Results   Component Value Date    HGBA1C 6.6 (H) 2024     Orders:  •  CBC; Future  •  Comprehensive metabolic panel; Future  •  Hemoglobin A1C; Future  •  Lipid panel; Future  •  Albumin / creatinine urine ratio; Future    Stage 3 chronic kidney disease, unspecified whether stage 3a or 3b CKD (HCC)  Lab Results   Component Value Date    EGFR 40 2024    EGFR 44 2024    EGFR 42 2024    CREATININE 1.53 (H) 2024    CREATININE 1.43 (H) 2024    CREATININE 1.49 (H) 2024   Continue to trend kidney function.  Orders:  •  CBC; Future  •  Comprehensive metabolic panel; Future    Thrombocytopenia (HCC)  Stable, asymptomatic.  Continue to trend CBC.  Continue follow-up with oncology.  Orders:  •  CBC; Future  •  Comprehensive metabolic panel; Future    Mixed hyperlipidemia  Controlled with lovastatin.  Orders:  •  CBC; Future  •  Comprehensive metabolic panel; Future  •  Hemoglobin A1C; Future  •  Lipid panel; Future    Advanced care planning/counseling discussion  Mr. Salvatore Bacon and I had a thorough discussion regarding advance care planning.  he  has a Living Will at home to which I recommended he provide a copy to be scanned for his  medical records.  ACP documentation provided to patient today.  he  understands that today's visit is a billable service.  Refer to ACP note.         Microalbuminuria  Improving.  Continue to trend.  Orders:  •  CBC; Future  •  Comprehensive metabolic panel; Future  •  Hemoglobin A1C; Future  •  Lipid panel; Future    Olecranon bursitis of left elbow  12cc blood-tinged serosanguineous fluid drained.  Ace wrapped elbow.  Recommend he continue use of Ace wraps for 1-2 weeks and to contact me if he has recurrence/reaccumulation of fluid.    Orders:  •  Medium joint arthrocentesis: L olecranon bursa       Serious Illness Conversation    1. What is your understanding now of where you are with your illness?  Prognostic Understanding: appropriate understanding of prognosis     2. How much information about what is likely to be ahead with your illness would you like to have?  Information: patient wants to be fully informed     3. What did you (clinician) communicate to the patient?     4. If your health situation worsens, what are your most important goals?  Goals: have my medical decisions respected, be mentally aware, be at home, be physically comfortable, be spiritually and emotionally at peace, not be a burden, be emotionally at peace     5. What are the biggest fears and worries about the future and your health?  Fears/Worries: loss of control, being a financial burden, being a physical burden, burdening others     6. What abilities are so critical to your life that you cannot imagine living without them?  Unacceptable Function: being unconscious     7. What gives you strength as you think about the future with your illness?     8. If you become sicker, how much are you willing to go through for the possibility of gaining more time?  Be in the hospital: Yes Have a feeding tube: Yes   Be in the ICU: Yes Live in a nursing home: Yes   Be on a ventilator: Yes Be uncomfortable: Yes   Be on dialysis: Yes Undergo aggressive  test and/or procedures: Yes   9. How much does your proxy and family know about your priorities and wishes?  Discussion Discussion: extensive discussion with family about goals and wishes        How does this plan sound to you? I will do everything I can to help you through this.  Patient verbalized understanding of the plan     I have spent 16 minutes speaking with my patient on advanced care planning today or during this visit     Advanced directives  Five Wishes: Patient does not have Five Wishes- would like information         Preventive health issues were discussed with patient, and age appropriate screening tests were ordered as noted in patient's After Visit Summary. Personalized health advice and appropriate referrals for health education or preventive services given if needed, as noted in patient's After Visit Summary.    History of Present Illness     Salvatore Bacon is seen today for follow up of chronic conditions.   Recent laboratory studies reviewed today with the patient.   he has been compliant with his medication regimen.   He has bursitis of his left elbow since 2 weeks.  He denies any known trauma.  He denies any other than when applying pressure to the elbow.   He has no complaints or concerns at this time.       Hypertension  This is a chronic problem. The current episode started more than 1 year ago. The problem is unchanged. The problem is controlled. Pertinent negatives include no chest pain, headaches, palpitations or shortness of breath. Past treatments include ACE inhibitors, diuretics and beta blockers. The current treatment provides moderate improvement. There are no compliance problems.    Diabetes  He presents for his follow-up diabetic visit. He has type 2 diabetes mellitus. His disease course has been stable. There are no hypoglycemic associated symptoms. Pertinent negatives for hypoglycemia include no dizziness or headaches. There are no diabetic associated symptoms. Pertinent  negatives for diabetes include no chest pain, no fatigue and no weakness. Symptoms are stable. Current diabetic treatment includes oral agent (dual therapy). He is compliant with treatment all of the time. An ACE inhibitor/angiotensin II receptor blocker is being taken. He does not see a podiatrist.Eye exam is current.   Hyperlipidemia  This is a chronic problem. The current episode started more than 1 year ago. The problem is controlled. Recent lipid tests were reviewed and are normal. Pertinent negatives include no chest pain or shortness of breath. Current antihyperlipidemic treatment includes statins. The current treatment provides moderate improvement of lipids. There are no compliance problems.    Heartburn  He reports no abdominal pain, no chest pain, no choking, no coughing, no nausea, no sore throat or no wheezing. This is a chronic problem. The current episode started more than 1 year ago. The problem occurs rarely. The problem has been unchanged. The symptoms are aggravated by certain foods. Pertinent negatives include no fatigue. He has tried a PPI for the symptoms. The treatment provided moderate relief.      Patient Care Team:  Joey Jarrell MD as PCP - General (Internal Medicine)  Joey Jarrell MD as PCP - PCP-[facility] ([facility])  Vance Myers Jr., MD (Dermatology)  Joana Mg MD (Surgical Oncology)  Kingston Oviedo MD (Plastic Surgery)  Sara Purdy MD (Oncology)    Review of Systems   Constitutional:  Negative for activity change, appetite change, chills, diaphoresis, fatigue and fever.   HENT:  Negative for congestion, postnasal drip, rhinorrhea, sinus pressure, sinus pain, sneezing and sore throat.    Eyes:  Negative for visual disturbance.   Respiratory:  Negative for apnea, cough, choking, chest tightness, shortness of breath and wheezing.    Cardiovascular:  Negative for chest pain, palpitations and leg swelling.   Gastrointestinal:  Negative for abdominal distention,  abdominal pain, anal bleeding, blood in stool, constipation, diarrhea, nausea and vomiting.   Endocrine: Negative for cold intolerance and heat intolerance.   Genitourinary:  Negative for difficulty urinating, dysuria and hematuria.   Musculoskeletal: Negative.    Skin: Negative.    Neurological:  Negative for dizziness, weakness, light-headedness, numbness and headaches.   Hematological:  Negative for adenopathy.   Psychiatric/Behavioral:  Negative for agitation, sleep disturbance and suicidal ideas.    All other systems reviewed and are negative.    Medical History Reviewed by provider this encounter:  Tobacco  Allergies  Meds  Problems  Med Hx  Surg Hx  Fam Hx       Annual Wellness Visit Questionnaire   Salvatore is here for his Subsequent Wellness visit. Last Medicare Wellness visit information reviewed, patient interviewed and updates made to the record today.      Health Risk Assessment:   Patient rates overall health as good. Patient feels that their physical health rating is same. Patient is very satisfied with their life. Eyesight was rated as same. Hearing was rated as same. Patient feels that their emotional and mental health rating is same. Patients states they are never, rarely angry. Patient states they are often unusually tired/fatigued. Pain experienced in the last 7 days has been some. Patient's pain rating has been 5/10. Patient states that he has experienced no weight loss or gain in last 6 months.     Depression Screening:   PHQ-2 Score: 0      Fall Risk Screening:   In the past year, patient has experienced: no history of falling in past year      Home Safety:  Patient does not have trouble with stairs inside or outside of their home. Patient has working smoke alarms and has no working carbon monoxide detector. Home safety hazards include: none.     Nutrition:   Current diet is Regular.     Medications:   Patient is not currently taking any over-the-counter supplements. Patient is able to  manage medications.     Activities of Daily Living (ADLs)/Instrumental Activities of Daily Living (IADLs):   Walk and transfer into and out of bed and chair?: Yes  Dress and groom yourself?: Yes    Bathe or shower yourself?: Yes    Feed yourself? Yes  Do your laundry/housekeeping?: Yes  Manage your money, pay your bills and track your expenses?: Yes  Make your own meals?: Yes    Do your own shopping?: Yes    Previous Hospitalizations:   Any hospitalizations or ED visits within the last 12 months?: No      Advance Care Planning:   Living will: Yes    Durable POA for healthcare: Yes    Advanced directive: Yes    Advanced directive counseling given: Yes    End of Life Decisions reviewed with patient: Yes    Provider agrees with end of life decisions: Yes      Cognitive Screening:   Provider or family/friend/caregiver concerned regarding cognition?: No    PREVENTIVE SCREENINGS      Cardiovascular Screening:    General: Screening Not Indicated, History Lipid Disorder and Risks and Benefits Discussed    Due for: Lipid Panel      Diabetes Screening:     General: Screening Not Indicated, History Diabetes and Risks and Benefits Discussed    Due for: Blood Glucose      Colorectal Cancer Screening:     General: Screening Not Indicated and Risks and Benefits Discussed      Prostate Cancer Screening:    General: Screening Not Indicated and Risks and Benefits Discussed      Osteoporosis Screening:    General: Screening Not Indicated      Abdominal Aortic Aneurysm (AAA) Screening:        General: Screening Not Indicated      Lung Cancer Screening:     General: Screening Not Indicated      Hepatitis C Screening:    General: Risks and Benefits Discussed and Screening Current    Screening, Brief Intervention, and Referral to Treatment (SBIRT)    Screening  Typical number of drinks in a day: 0  Typical number of drinks in a week: 0  Interpretation: Low risk drinking behavior.    Single Item Drug Screening:  How often have you used an  "illegal drug (including marijuana) or a prescription medication for non-medical reasons in the past year? never    Single Item Drug Screen Score: 0  Interpretation: Negative screen for possible drug use disorder    Brief Intervention  Alcohol & drug use screenings were reviewed. No concerns regarding substance use disorder identified.     Annual Depression Screening  Time spent screening and evaluating the patient for depression during today's encounter was 5 minutes.    Other Counseling Topics:   Car/seat belt/driving safety, skin self-exam, sunscreen and calcium and vitamin D intake and regular weightbearing exercise.     Social Drivers of Health     Financial Resource Strain: Low Risk  (12/1/2023)    Overall Financial Resource Strain (CARDIA)    • Difficulty of Paying Living Expenses: Not hard at all   Food Insecurity: No Food Insecurity (12/10/2024)    Hunger Vital Sign    • Worried About Running Out of Food in the Last Year: Never true    • Ran Out of Food in the Last Year: Never true   Transportation Needs: No Transportation Needs (12/10/2024)    PRAPARE - Transportation    • Lack of Transportation (Medical): No    • Lack of Transportation (Non-Medical): No   Housing Stability: Low Risk  (12/10/2024)    Housing Stability Vital Sign    • Unable to Pay for Housing in the Last Year: No    • Number of Times Moved in the Last Year: 1    • Homeless in the Last Year: No   Utilities: Not At Risk (12/10/2024)    [facility] Utilities    • Threatened with loss of utilities: No     No results found.    Objective   /84 (BP Location: Left arm, Patient Position: Sitting, Cuff Size: Standard)   Pulse (!) 52   Temp 98.2 °F (36.8 °C) (Temporal)   Resp 20   Ht 5' 7\" (1.702 m)   Wt 74 kg (163 lb 3.2 oz)   SpO2 100%   BMI 25.56 kg/m²     Physical Exam  Vitals and nursing note reviewed.   Constitutional:       General: He is not in acute distress.     Appearance: Normal appearance. He is well-developed and normal weight. He " is not ill-appearing, toxic-appearing or diaphoretic.   HENT:      Head: Normocephalic and atraumatic.      Right Ear: Tympanic membrane, ear canal and external ear normal. There is no impacted cerumen.      Left Ear: Tympanic membrane, ear canal and external ear normal. There is no impacted cerumen.      Nose: Nose normal. No congestion or rhinorrhea.      Mouth/Throat:      Mouth: Mucous membranes are moist.      Pharynx: Oropharynx is clear. No oropharyngeal exudate or posterior oropharyngeal erythema.   Eyes:      General: No scleral icterus.        Right eye: No discharge.         Left eye: No discharge.      Extraocular Movements: Extraocular movements intact.      Conjunctiva/sclera: Conjunctivae normal.      Pupils: Pupils are equal, round, and reactive to light.   Neck:      Thyroid: No thyromegaly.      Vascular: No carotid bruit or JVD.   Cardiovascular:      Rate and Rhythm: Normal rate and regular rhythm.      Pulses: Normal pulses.      Heart sounds: Normal heart sounds. No murmur heard.     No friction rub. No gallop.   Pulmonary:      Effort: Pulmonary effort is normal. No respiratory distress.      Breath sounds: Normal breath sounds. No wheezing or rales.   Chest:      Chest wall: No tenderness.   Abdominal:      General: Abdomen is flat. Bowel sounds are normal. There is no distension.      Palpations: Abdomen is soft. There is no mass.      Tenderness: There is no abdominal tenderness. There is no guarding or rebound.      Hernia: No hernia is present.   Musculoskeletal:         General: No swelling, tenderness, deformity or signs of injury. Normal range of motion.        Arms:       Cervical back: Normal range of motion and neck supple. No rigidity. No muscular tenderness.      Right lower leg: No edema.      Left lower leg: No edema.   Lymphadenopathy:      Cervical: No cervical adenopathy.   Skin:     General: Skin is warm and dry.      Capillary Refill: Capillary refill takes less than 2  seconds.      Coloration: Skin is not jaundiced or pale.      Findings: No bruising, erythema, lesion or rash.   Neurological:      General: No focal deficit present.      Mental Status: He is alert and oriented to person, place, and time. Mental status is at baseline.      Cranial Nerves: No cranial nerve deficit.      Sensory: No sensory deficit.      Motor: No weakness.      Coordination: Coordination normal.      Gait: Gait normal.      Deep Tendon Reflexes: Reflexes are normal and symmetric. Reflexes normal.   Psychiatric:         Mood and Affect: Mood normal.         Behavior: Behavior normal.         Thought Content: Thought content normal.         Judgment: Judgment normal.     Medium joint arthrocentesis: L olecranon bursa  Universal Protocol:  procedure performed by consultantConsent: Verbal consent obtained.  Risks and benefits: risks, benefits and alternatives were discussed  Consent given by: patient  Patient understanding: patient states understanding of the procedure being performed  Patient identity confirmed: verbally with patient  Supporting Documentation  Indications: joint swelling   Procedure Details  Location: elbow - L olecranon bursa  Needle size: 20 G  Ultrasound guidance: no  Approach: posterolateral    Aspirate amount: 12 mL  Aspirate: serous and blood-tinged  Patient tolerance: patient tolerated the procedure well with no immediate complications  Dressing:  Sterile dressing applied

## 2024-12-10 NOTE — ASSESSMENT & PLAN NOTE
Lab Results   Component Value Date    EGFR 40 11/25/2024    EGFR 44 08/13/2024    EGFR 42 06/03/2024    CREATININE 1.53 (H) 11/25/2024    CREATININE 1.43 (H) 08/13/2024    CREATININE 1.49 (H) 06/03/2024   Continue to trend kidney function.  Orders:  •  CBC; Future  •  Comprehensive metabolic panel; Future

## 2024-12-10 NOTE — ASSESSMENT & PLAN NOTE
Improving.  Continue to trend.  Orders:  •  CBC; Future  •  Comprehensive metabolic panel; Future  •  Hemoglobin A1C; Future  •  Lipid panel; Future

## 2024-12-10 NOTE — ASSESSMENT & PLAN NOTE
12cc blood-tinged serosanguineous fluid drained.  Ace wrapped elbow.  Recommend he continue use of Ace wraps for 1-2 weeks and to contact me if he has recurrence/reaccumulation of fluid.    Orders:  •  Medium joint arthrocentesis: L olecranon bursa

## 2024-12-10 NOTE — ASSESSMENT & PLAN NOTE
Continue current diabetic regimen.  Trend A1c.  Lab Results   Component Value Date    HGBA1C 6.6 (H) 09/09/2024     Orders:  •  CBC; Future  •  Comprehensive metabolic panel; Future  •  Hemoglobin A1C; Future  •  Lipid panel; Future  •  Albumin / creatinine urine ratio; Future

## 2024-12-10 NOTE — ASSESSMENT & PLAN NOTE
Stable, asymptomatic.  Continue to trend CBC.  Continue follow-up with oncology.  Orders:  •  CBC; Future  •  Comprehensive metabolic panel; Future

## 2024-12-10 NOTE — ASSESSMENT & PLAN NOTE
Controlled with lovastatin.  Orders:  •  CBC; Future  •  Comprehensive metabolic panel; Future  •  Hemoglobin A1C; Future  •  Lipid panel; Future

## 2024-12-10 NOTE — PATIENT INSTRUCTIONS
Medicare Preventive Visit Patient Instructions  Thank you for completing your Welcome to Medicare Visit or Medicare Annual Wellness Visit today. Your next wellness visit will be due in one year (12/11/2025).  The screening/preventive services that you may require over the next 5-10 years are detailed below. Some tests may not apply to you based off risk factors and/or age. Screening tests ordered at today's visit but not completed yet may show as past due. Also, please note that scanned in results may not display below.  Preventive Screenings:  Service Recommendations Previous Testing/Comments   Colorectal Cancer Screening  Colonoscopy    Fecal Occult Blood Test (FOBT)/Fecal Immunochemical Test (FIT)  Fecal DNA/Cologuard Test  Flexible Sigmoidoscopy Age: 45-75 years old   Colonoscopy: every 10 years (May be performed more frequently if at higher risk)  OR  FOBT/FIT: every 1 year  OR  Cologuard: every 3 years  OR  Sigmoidoscopy: every 5 years  Screening may be recommended earlier than age 45 if at higher risk for colorectal cancer. Also, an individualized decision between you and your healthcare provider will decide whether screening between the ages of 76-85 would be appropriate. Colonoscopy: Not on file  FOBT/FIT: Not on file  Cologuard: Not on file  Sigmoidoscopy: Not on file    Screening Not Indicated     Prostate Cancer Screening Individualized decision between patient and health care provider in men between ages of 55-69   Medicare will cover every 12 months beginning on the day after your 50th birthday PSA: No results in last 5 years     Screening Not Indicated     Hepatitis C Screening Once for adults born between 1945 and 1965  More frequently in patients at high risk for Hepatitis C Hep C Antibody: Not on file        Diabetes Screening 1-2 times per year if you're at risk for diabetes or have pre-diabetes Fasting glucose: 130 mg/dL (11/25/2024)  A1C: 6.6 % (9/9/2024)  Screening Not Indicated  History  Diabetes   Cholesterol Screening Once every 5 years if you don't have a lipid disorder. May order more often based on risk factors. Lipid panel: 06/03/2024  Screening Not Indicated  History Lipid Disorder      Other Preventive Screenings Covered by Medicare:  Abdominal Aortic Aneurysm (AAA) Screening: covered once if your at risk. You're considered to be at risk if you have a family history of AAA or a male between the age of 65-75 who smoking at least 100 cigarettes in your lifetime.  Lung Cancer Screening: covers low dose CT scan once per year if you meet all of the following conditions: (1) Age 55-77; (2) No signs or symptoms of lung cancer; (3) Current smoker or have quit smoking within the last 15 years; (4) You have a tobacco smoking history of at least 20 pack years (packs per day x number of years you smoked); (5) You get a written order from a healthcare provider.  Glaucoma Screening: covered annually if you're considered high risk: (1) You have diabetes OR (2) Family history of glaucoma OR (3)  aged 50 and older OR (4)  American aged 65 and older  Osteoporosis Screening: covered every 2 years if you meet one of the following conditions: (1) Have a vertebral abnormality; (2) On glucocorticoid therapy for more than 3 months; (3) Have primary hyperparathyroidism; (4) On osteoporosis medications and need to assess response to drug therapy.  HIV Screening: covered annually if you're between the age of 15-65. Also covered annually if you are younger than 15 and older than 65 with risk factors for HIV infection. For pregnant patients, it is covered up to 3 times per pregnancy.    Immunizations:  Immunization Recommendations   Influenza Vaccine Annual influenza vaccination during flu season is recommended for all persons aged >= 6 months who do not have contraindications   Pneumococcal Vaccine   * Pneumococcal conjugate vaccine = PCV13 (Prevnar 13), PCV15 (Vaxneuvance), PCV20 (Prevnar  20)  * Pneumococcal polysaccharide vaccine = PPSV23 (Pneumovax) Adults 19-63 yo with certain risk factors or if 65+ yo  If never received any pneumonia vaccine: recommend Prevnar 20 (PCV20)  Give PCV20 if previously received 1 dose of PCV13 or PPSV23   Hepatitis B Vaccine 3 dose series if at intermediate or high risk (ex: diabetes, end stage renal disease, liver disease)   Respiratory syncytial virus (RSV) Vaccine - COVERED BY MEDICARE PART D  * RSVPreF3 (Arexvy) CDC recommends that adults 60 years of age and older may receive a single dose of RSV vaccine using shared clinical decision-making (SCDM)   Tetanus (Td) Vaccine - COST NOT COVERED BY MEDICARE PART B Following completion of primary series, a booster dose should be given every 10 years to maintain immunity against tetanus. Td may also be given as tetanus wound prophylaxis.   Tdap Vaccine - COST NOT COVERED BY MEDICARE PART B Recommended at least once for all adults. For pregnant patients, recommended with each pregnancy.   Shingles Vaccine (Shingrix) - COST NOT COVERED BY MEDICARE PART B  2 shot series recommended in those 19 years and older who have or will have weakened immune systems or those 50 years and older     Health Maintenance Due:  There are no preventive care reminders to display for this patient.  Immunizations Due:      Topic Date Due   • Pneumococcal Vaccine: 65+ Years (1 of 2 - PCV) Never done   • Influenza Vaccine (1) Never done   • COVID-19 Vaccine (5 - 2024-25 season) 09/01/2024     Advance Directives   What are advance directives?  Advance directives are legal documents that state your wishes and plans for medical care. These plans are made ahead of time in case you lose your ability to make decisions for yourself. Advance directives can apply to any medical decision, such as the treatments you want, and if you want to donate organs.   What are the types of advance directives?  There are many types of advance directives, and each state  has rules about how to use them. You may choose a combination of any of the following:  Living will:  This is a written record of the treatment you want. You can also choose which treatments you do not want, which to limit, and which to stop at a certain time. This includes surgery, medicine, IV fluid, and tube feedings.   Durable power of  for healthcare (DPAHC):  This is a written record that states who you want to make healthcare choices for you when you are unable to make them for yourself. This person, called a proxy, is usually a family member or a friend. You may choose more than 1 proxy.  Do not resuscitate (DNR) order:  A DNR order is used in case your heart stops beating or you stop breathing. It is a request not to have certain forms of treatment, such as CPR. A DNR order may be included in other types of advance directives.  Medical directive:  This covers the care that you want if you are in a coma, near death, or unable to make decisions for yourself. You can list the treatments you want for each condition. Treatment may include pain medicine, surgery, blood transfusions, dialysis, IV or tube feedings, and a ventilator (breathing machine).  Values history:  This document has questions about your views, beliefs, and how you feel and think about life. This information can help others choose the care that you would choose.  Why are advance directives important?  An advance directive helps you control your care. Although spoken wishes may be used, it is better to have your wishes written down. Spoken wishes can be misunderstood, or not followed. Treatments may be given even if you do not want them. An advance directive may make it easier for your family to make difficult choices about your care.   Weight Management   Why it is important to manage your weight:  Being overweight increases your risk of health conditions such as heart disease, high blood pressure, type 2 diabetes, and certain types of  cancer. It can also increase your risk for osteoarthritis, sleep apnea, and other respiratory problems. Aim for a slow, steady weight loss. Even a small amount of weight loss can lower your risk of health problems.  How to lose weight safely:  A safe and healthy way to lose weight is to eat fewer calories and get regular exercise. You can lose up about 1 pound a week by decreasing the number of calories you eat by 500 calories each day.   Healthy meal plan for weight management:  A healthy meal plan includes a variety of foods, contains fewer calories, and helps you stay healthy. A healthy meal plan includes the following:  Eat whole-grain foods more often.  A healthy meal plan should contain fiber. Fiber is the part of grains, fruits, and vegetables that is not broken down by your body. Whole-grain foods are healthy and provide extra fiber in your diet. Some examples of whole-grain foods are whole-wheat breads and pastas, oatmeal, brown rice, and bulgur.  Eat a variety of vegetables every day.  Include dark, leafy greens such as spinach, kale, jean greens, and mustard greens. Eat yellow and orange vegetables such as carrots, sweet potatoes, and winter squash.   Eat a variety of fruits every day.  Choose fresh or canned fruit (canned in its own juice or light syrup) instead of juice. Fruit juice has very little or no fiber.  Eat low-fat dairy foods.  Drink fat-free (skim) milk or 1% milk. Eat fat-free yogurt and low-fat cottage cheese. Try low-fat cheeses such as mozzarella and other reduced-fat cheeses.  Choose meat and other protein foods that are low in fat.  Choose beans or other legumes such as split peas or lentils. Choose fish, skinless poultry (chicken or turkey), or lean cuts of red meat (beef or pork). Before you cook meat or poultry, cut off any visible fat.   Use less fat and oil.  Try baking foods instead of frying them. Add less fat, such as margarine, sour cream, regular salad dressing and  mayonnaise to foods. Eat fewer high-fat foods. Some examples of high-fat foods include french fries, doughnuts, ice cream, and cakes.  Eat fewer sweets.  Limit foods and drinks that are high in sugar. This includes candy, cookies, regular soda, and sweetened drinks.  Exercise:  Exercise at least 30 minutes per day on most days of the week. Some examples of exercise include walking, biking, dancing, and swimming. You can also fit in more physical activity by taking the stairs instead of the elevator or parking farther away from stores. Ask your healthcare provider about the best exercise plan for you.      © Copyright [employer] 2018 Information is for End User's use only and may not be sold, redistributed or otherwise used for commercial purposes. All illustrations and images included in CareNotes® are the copyrighted property of [employer]M., Inc. or [employer]

## 2024-12-10 NOTE — ASSESSMENT & PLAN NOTE
Controlled.  Continue current antihypertensive regimen.  Orders:  •  CBC; Future  •  Comprehensive metabolic panel; Future  •  Hemoglobin A1C; Future  •  Lipid panel; Future  •  Albumin / creatinine urine ratio; Future

## 2024-12-10 NOTE — ASSESSMENT & PLAN NOTE
. Salvatore Mathewkaylie and I had a thorough discussion regarding advance care planning.  he  has a Living Will at home to which I recommended he provide a copy to be scanned for his medical records.  ACP documentation provided to patient today.  he  understands that today's visit is a billable service.  Refer to ACP note.

## 2024-12-18 ENCOUNTER — HOSPITAL ENCOUNTER (OUTPATIENT)
Dept: RADIOLOGY | Age: 84
Discharge: HOME | End: 2024-12-18
Payer: COMMERCIAL

## 2024-12-18 DIAGNOSIS — N18.30 STAGE 3 CHRONIC KIDNEY DISEASE, UNSPECIFIED WHETHER STAGE 3A OR 3B CKD (HCC): ICD-10-CM

## 2024-12-18 DIAGNOSIS — C77.3 SECONDARY AND UNSPECIFIED MALIGNANT NEOPLASM OF AXILLA AND UPPER LIMB LYMPH NODES (HCC): ICD-10-CM

## 2024-12-18 DIAGNOSIS — C43.30 MALIGNANT MELANOMA OF SKIN OF FACE (HCC): ICD-10-CM

## 2024-12-18 DIAGNOSIS — Z79.899 HIGH RISK MEDICATION USE: ICD-10-CM

## 2024-12-18 DIAGNOSIS — C43.39 MALIGNANT MELANOMA OF FOREHEAD (HCC): ICD-10-CM

## 2024-12-18 LAB — GLUCOSE SERPL-MCNC: 125 MG/DL (ref 65–140)

## 2024-12-18 PROCEDURE — 78816 PET IMAGE W/CT FULL BODY: CPT

## 2024-12-18 PROCEDURE — 82948 REAGENT STRIP/BLOOD GLUCOSE: CPT

## 2024-12-18 PROCEDURE — A9552 F18 FDG: HCPCS

## 2024-12-20 ENCOUNTER — RESULTS FOLLOW-UP (OUTPATIENT)
Dept: HEMATOLOGY ONCOLOGY | Facility: CLINIC | Age: 84
End: 2024-12-20

## 2024-12-20 NOTE — TELEPHONE ENCOUNTER
----- Message from Sara Purdy MD sent at 12/19/2024  5:37 PM EST -----  Can you let know that his imaging is negative for disease    Patient aware

## 2025-01-09 ENCOUNTER — OFFICE VISIT (OUTPATIENT)
Dept: CARDIOLOGY CLINIC | Facility: CLINIC | Age: 85
End: 2025-01-09
Payer: COMMERCIAL

## 2025-01-09 VITALS
HEIGHT: 67 IN | BODY MASS INDEX: 26.01 KG/M2 | WEIGHT: 165.7 LBS | HEART RATE: 58 BPM | DIASTOLIC BLOOD PRESSURE: 70 MMHG | SYSTOLIC BLOOD PRESSURE: 144 MMHG | OXYGEN SATURATION: 94 %

## 2025-01-09 DIAGNOSIS — I10 ESSENTIAL HYPERTENSION: ICD-10-CM

## 2025-01-09 DIAGNOSIS — E78.2 MIXED HYPERLIPIDEMIA: Primary | ICD-10-CM

## 2025-01-09 PROCEDURE — 99214 OFFICE O/P EST MOD 30 MIN: CPT | Performed by: INTERNAL MEDICINE

## 2025-01-09 NOTE — PROGRESS NOTES
Cardiology Follow Up    Salvatore Bacon  1940  [id number]  [facility] [place]  [address]  [place] [address] [phone]    1. Mixed hyperlipidemia  2. Essential hypertension      Discussion: He has been doing well.  He denied chest discomfort or dyspnea.  Blood pressure was initially elevated but 128/80 on repeat by me. I asked him to return in 1 year.    Cardiovascular History:   Mr. Bacon was initially seen in 9/24 for chronic dyspnea on exertion.  Duration of symptoms was uncertain.  There is no history of established cardiovascular disease.  An echocardiogram in 7/24 showed normal left ventricular systolic function without diastolic dysfunction.  There was mild to moderate left atrial lodgment, mild right atrial enlargement, and mild aortic insufficiency.  There were no significant changes since an echocardiogram obtained in 12/17.  Physical findings were unremarkable. BNP in 9/24 was 99. It was suspected that his dyspnea was related to Parkinson's-related gait dysfunction and deconditioning..  Atherosclerotic risk factors include dyslipidemia, well-controlled on lovastatin.  A lipid panel in 6/24 disclosed total cholesterol 126, LDL 57, HDL 38, and triglycerides 155.  He has hypertension, well-controlled on lisinopril/hydrochlorothiazide and atenolol.  He has diabetes.  He does not smoke.  He has CKD. Cr 1.43 in 8/24.   There is a history of malignant melanoma involving the forehead. He is on pembrolizimab, an imminue checkpoint inhibitor, IV every 2 weeks. This has fatigue as a commonly reported side effect. Myocarditis is a rare side effect of checkpoint inhibitors, but LV function is normal by echo.    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)    Stage 3 chronic kidney disease, unspecified whether stage 3a or 3b CKD (HCC)    High risk medication use    Advanced care planning/counseling discussion    Arthritis of left knee    Left cervical lymphadenopathy    Lung nodules    Bursitis of left elbow     Past Medical History:   Diagnosis Date    Diabetes mellitus (HCC)     Hyperlipidemia     Hypertension      Social History     Socioeconomic History    Marital status: /Civil Union     Spouse name: Not on file    Number of children: Not on file    Years of education: Not on file    Highest education level: Not on file   Occupational History    Not on file   Tobacco Use    Smoking status: Never    Smokeless tobacco: Never   Vaping Use    Vaping status: Never Used   Substance and Sexual Activity    Alcohol use: Not Currently    Drug use: Never    Sexual activity: Not Currently   Other Topics Concern    Not on file   Social History Narrative    Not on file     Social Drivers of Health     Financial Resource Strain: Low Risk  (12/1/2023)    Overall Financial Resource Strain (CARDIA)     Difficulty of Paying Living Expenses: Not hard at all   Food Insecurity: No Food Insecurity (12/10/2024)    Hunger Vital Sign     Worried About Running Out of Food in the Last Year: Never true     Ran Out of Food in the Last Year: Never true   Transportation Needs: No Transportation Needs (12/10/2024)    PRAPARE - Transportation     Lack of Transportation (Medical): No     Lack of Transportation (Non-Medical): No   Physical Activity: Not on file   Stress: Not on file   Social Connections: Not on file   Intimate Partner Violence:  Not on file   Housing Stability: Low Risk  (12/10/2024)    Housing Stability Vital Sign     Unable to Pay for Housing in the Last Year: No     Number of Times Moved in the Last Year: 1     Homeless in the Last Year: No      Family History   Problem Relation Age of Onset    No Known Problems Mother     No Known Problems Father     Colon cancer Other 60     Past Surgical History:   Procedure Laterality Date    CATARACT EXTRACTION, BILATERAL      FLAP LOCAL HEAD / NECK Left 10/12/2021    Procedure: RECONSTRUCTION OF LEFT TEMPLE DEFECT AFTER RESCECTION MELANOMA;  Surgeon: Kingston Oviedo MD;  [facility];  Service: Plastics    FULL THICKNESS SKIN GRAFT Left 10/12/2021    Procedure: SKIN GRAFT FULL THICKNESS LEFT TEMPLE;  Surgeon: Kingston Oviedo MD;  [facility];  Service: Plastics    GALLBLADDER SURGERY      HAND SURGERY Left     LYMPH NODE BIOPSY Left 10/12/2021    Procedure: BIOPSY LYMPH NODE SENTINEL, LYMPHOSCINTIGRAPHY, INTRAOPERATIVE LYMPHATIC MAPPING (INJECT AT 1200);  Surgeon: Joana Mg MD;  [facility];  Service: Surgical Oncology    NOSE SURGERY      SKIN CANCER EXCISION  10/2021    SKIN CANCER EXCISION  11/2024    SKIN LESION EXCISION Left 10/12/2021    Procedure: FACE MELANOMA SCAR WIDE EXCISION  FACE;  Surgeon: Joana Mg MD;  [facility];  Service: Surgical Oncology    SPLIT THICKNESS SKIN GRAFT Left 10/12/2021    Procedure: SKIN GRAFT SPLIT THICKNESS LEFT TEMPLE;  Surgeon: Kingston Oviedo MD;  [facility];  Service: Plastics    US GUIDED LYMPH NODE BIOPSY LEFT  03/21/2022       Current Outpatient Medications:     atenolol (TENORMIN) 100 mg tablet, Take 1 tablet (100 mg total) by mouth daily, Disp: 90 tablet, Rfl: 3    glimepiride (AMARYL) 2 mg tablet, Take 1 tablet (2 mg total) by mouth daily with breakfast, Disp: 90 tablet, Rfl: 3    lisinopril-hydrochlorothiazide (PRINZIDE,ZESTORETIC) 20-25 MG per tablet, Take 1 tablet by mouth daily, Disp: 90  tablet, Rfl: 3    lovastatin (MEVACOR) 40 MG tablet, Take 1 tablet (40 mg total) by mouth daily, Disp: 90 tablet, Rfl: 3    metFORMIN (GLUCOPHAGE) 1000 MG tablet, Take 1 tablet (1,000 mg total) by mouth 2 (two) times a day with meals, Disp: 180 tablet, Rfl: 3    omeprazole (PriLOSEC) 40 MG capsule, Take 1 capsule (40 mg total) by mouth daily as needed (indigestion, acid reflux), Disp: 90 capsule, Rfl: 3  No Known Allergies    Labs: personally reviewed all pertinent labs  Imaging:  personally reviewed all pertinent imaging  Cath:  ECHO:  Stress:  Holter:    Review of Systems:  Review of Systems   Constitutional: Negative.   HENT: Negative.     Eyes: Negative.    Cardiovascular: Negative.    Respiratory: Negative.     Endocrine: Negative.    Hematologic/Lymphatic: Negative.    Skin: Negative.    Musculoskeletal: Negative.    Gastrointestinal: Negative.    Genitourinary: Negative.    Neurological: Negative.    Psychiatric/Behavioral: Negative.     Allergic/Immunologic: Negative.    All other systems reviewed and are negative.      Vitals:    01/09/25 0815   BP: 144/70   Pulse: 58   SpO2: 94%     Weight (last 2 days)       Date/Time Weight    01/09/25 0815 75.2 (165.7)            Physical Exam:  Physical Exam  Vitals reviewed.   Constitutional:       General: He is not in acute distress.     Appearance: He is well-developed. He is not diaphoretic.   HENT:      Head: Normocephalic and atraumatic.   Eyes:      General: No scleral icterus.     Conjunctiva/sclera: Conjunctivae normal.   Neck:      Vascular: No JVD.      Trachea: No tracheal deviation.   Cardiovascular:      Rate and Rhythm: Normal rate and regular rhythm.      Pulses: Intact distal pulses.      Heart sounds: Normal heart sounds. No murmur heard.     No friction rub. No gallop.   Pulmonary:      Effort: Pulmonary effort is normal. No respiratory distress.      Breath sounds: Normal breath sounds. No stridor. No wheezing or rales.   Chest:      Chest wall: No  tenderness.   Abdominal:      General: Bowel sounds are normal. There is no distension.      Palpations: Abdomen is soft.      Tenderness: There is no abdominal tenderness.   Musculoskeletal:         General: No tenderness. Normal range of motion.      Cervical back: Normal range of motion and neck supple.   Skin:     General: Skin is warm and dry.      Findings: No erythema.   Neurological:      Mental Status: He is alert and oriented to person, place, and time.      Cranial Nerves: No cranial nerve deficit.      Coordination: Coordination normal.   Psychiatric:         Behavior: Behavior normal.         Thought Content: Thought content normal.         Judgment: Judgment normal.         Aba Bourgeois MD

## 2025-03-03 ENCOUNTER — APPOINTMENT (OUTPATIENT)
Dept: LAB | Facility: IMAGING CENTER | Age: 85
End: 2025-03-03
Payer: COMMERCIAL

## 2025-03-03 DIAGNOSIS — C77.3 SECONDARY AND UNSPECIFIED MALIGNANT NEOPLASM OF AXILLA AND UPPER LIMB LYMPH NODES (HCC): ICD-10-CM

## 2025-03-03 DIAGNOSIS — Z79.899 HIGH RISK MEDICATION USE: ICD-10-CM

## 2025-03-03 DIAGNOSIS — C43.30 MALIGNANT MELANOMA OF SKIN OF FACE (HCC): ICD-10-CM

## 2025-03-03 DIAGNOSIS — C43.39 MALIGNANT MELANOMA OF FOREHEAD (HCC): ICD-10-CM

## 2025-03-03 LAB
BASOPHILS # BLD AUTO: 0.02 THOUSANDS/ÂΜL (ref 0–0.1)
BASOPHILS NFR BLD AUTO: 0 % (ref 0–1)
EOSINOPHIL # BLD AUTO: 0.31 THOUSAND/ÂΜL (ref 0–0.61)
EOSINOPHIL NFR BLD AUTO: 4 % (ref 0–6)
ERYTHROCYTE [DISTWIDTH] IN BLOOD BY AUTOMATED COUNT: 13.2 % (ref 11.6–15.1)
HCT VFR BLD AUTO: 41.9 % (ref 36.5–49.3)
HGB BLD-MCNC: 13.8 G/DL (ref 12–17)
IMM GRANULOCYTES # BLD AUTO: 0.03 THOUSAND/UL (ref 0–0.2)
IMM GRANULOCYTES NFR BLD AUTO: 0 % (ref 0–2)
LYMPHOCYTES # BLD AUTO: 2.8 THOUSANDS/ÂΜL (ref 0.6–4.47)
LYMPHOCYTES NFR BLD AUTO: 36 % (ref 14–44)
MCH RBC QN AUTO: 30.1 PG (ref 26.8–34.3)
MCHC RBC AUTO-ENTMCNC: 32.9 G/DL (ref 31.4–37.4)
MCV RBC AUTO: 91 FL (ref 82–98)
MONOCYTES # BLD AUTO: 0.55 THOUSAND/ÂΜL (ref 0.17–1.22)
MONOCYTES NFR BLD AUTO: 7 % (ref 4–12)
NEUTROPHILS # BLD AUTO: 4 THOUSANDS/ÂΜL (ref 1.85–7.62)
NEUTS SEG NFR BLD AUTO: 53 % (ref 43–75)
NRBC BLD AUTO-RTO: 0 /100 WBCS
PLATELET # BLD AUTO: 123 THOUSANDS/UL (ref 149–390)
PMV BLD AUTO: 10.7 FL (ref 8.9–12.7)
RBC # BLD AUTO: 4.59 MILLION/UL (ref 3.88–5.62)
T3FREE SERPL-MCNC: 2.65 PG/ML (ref 2.5–3.9)
T4 FREE SERPL-MCNC: 0.87 NG/DL (ref 0.61–1.12)
TSH SERPL DL<=0.05 MIU/L-ACNC: 1.9 UIU/ML (ref 0.45–4.5)
WBC # BLD AUTO: 7.71 THOUSAND/UL (ref 4.31–10.16)

## 2025-03-03 PROCEDURE — 36415 COLL VENOUS BLD VENIPUNCTURE: CPT

## 2025-03-03 PROCEDURE — 84481 FREE ASSAY (FT-3): CPT

## 2025-03-03 PROCEDURE — 84443 ASSAY THYROID STIM HORMONE: CPT

## 2025-03-03 PROCEDURE — 83615 LACTATE (LD) (LDH) ENZYME: CPT

## 2025-03-03 PROCEDURE — 80053 COMPREHEN METABOLIC PANEL: CPT

## 2025-03-03 PROCEDURE — 85025 COMPLETE CBC W/AUTO DIFF WBC: CPT

## 2025-03-03 PROCEDURE — 84439 ASSAY OF FREE THYROXINE: CPT

## 2025-03-04 LAB
ALBUMIN SERPL BCG-MCNC: 4.3 G/DL (ref 3.5–5)
ALP SERPL-CCNC: 80 U/L (ref 34–104)
ALT SERPL W P-5'-P-CCNC: 16 U/L (ref 7–52)
ANION GAP SERPL CALCULATED.3IONS-SCNC: 8 MMOL/L (ref 4–13)
AST SERPL W P-5'-P-CCNC: 18 U/L (ref 13–39)
BILIRUB SERPL-MCNC: 1.36 MG/DL (ref 0.2–1)
BUN SERPL-MCNC: 30 MG/DL (ref 5–25)
CALCIUM SERPL-MCNC: 9.2 MG/DL (ref 8.4–10.2)
CHLORIDE SERPL-SCNC: 101 MMOL/L (ref 96–108)
CO2 SERPL-SCNC: 31 MMOL/L (ref 21–32)
CREAT SERPL-MCNC: 1.5 MG/DL (ref 0.6–1.3)
GFR SERPL CREATININE-BSD FRML MDRD: 41 ML/MIN/1.73SQ M
GLUCOSE SERPL-MCNC: 302 MG/DL (ref 65–140)
LDH SERPL-CCNC: 139 U/L (ref 140–271)
POTASSIUM SERPL-SCNC: 4.4 MMOL/L (ref 3.5–5.3)
PROT SERPL-MCNC: 6.7 G/DL (ref 6.4–8.4)
SODIUM SERPL-SCNC: 140 MMOL/L (ref 135–147)

## 2025-03-13 ENCOUNTER — OFFICE VISIT (OUTPATIENT)
Dept: HEMATOLOGY ONCOLOGY | Facility: CLINIC | Age: 85
End: 2025-03-13
Payer: COMMERCIAL

## 2025-03-13 VITALS
SYSTOLIC BLOOD PRESSURE: 130 MMHG | TEMPERATURE: 97.7 F | BODY MASS INDEX: 25.58 KG/M2 | HEIGHT: 67 IN | DIASTOLIC BLOOD PRESSURE: 70 MMHG | RESPIRATION RATE: 18 BRPM | WEIGHT: 163 LBS | HEART RATE: 57 BPM | OXYGEN SATURATION: 92 %

## 2025-03-13 DIAGNOSIS — Z08 ENCOUNTER FOR FOLLOW-UP SURVEILLANCE OF MELANOMA: Primary | ICD-10-CM

## 2025-03-13 DIAGNOSIS — Z85.820 ENCOUNTER FOR FOLLOW-UP SURVEILLANCE OF MELANOMA: Primary | ICD-10-CM

## 2025-03-13 DIAGNOSIS — C43.39 MALIGNANT MELANOMA OF FOREHEAD (HCC): ICD-10-CM

## 2025-03-13 DIAGNOSIS — C77.3 SECONDARY AND UNSPECIFIED MALIGNANT NEOPLASM OF AXILLA AND UPPER LIMB LYMPH NODES (HCC): ICD-10-CM

## 2025-03-13 DIAGNOSIS — C43.30 MALIGNANT MELANOMA OF SKIN OF FACE (HCC): ICD-10-CM

## 2025-03-13 DIAGNOSIS — Z79.899 HIGH RISK MEDICATION USE: ICD-10-CM

## 2025-03-13 DIAGNOSIS — E11.9 TYPE 2 DIABETES MELLITUS WITHOUT COMPLICATION, WITHOUT LONG-TERM CURRENT USE OF INSULIN (HCC): ICD-10-CM

## 2025-03-13 DIAGNOSIS — N18.30 STAGE 3 CHRONIC KIDNEY DISEASE, UNSPECIFIED WHETHER STAGE 3A OR 3B CKD (HCC): ICD-10-CM

## 2025-03-13 PROCEDURE — 99214 OFFICE O/P EST MOD 30 MIN: CPT | Performed by: INTERNAL MEDICINE

## 2025-03-13 NOTE — LETTER
2025     Joana Mg MD  [address]  [address]  [facility] [address]    Patient: Salvatore Bacon   YOB: 1940   Date of Visit: 3/13/2025       Dear Dr. Mg:    Thank you for referring Salvatore Bacon to me for evaluation. Below are my notes for this consultation.    If you have questions, please do not hesitate to call me. I look forward to following your patient along with you.         Sincerely,        Sara Purdy MD        CC: MD Vance Hill Jr., MD Tony Ohanian, MD Melissa A Wilson, MD  3/19/2025  9:09 AM  Sign when Signing Visit  Name: Salvatore Bacon      : 1940      MRN: [id number]  Encounter Provider: Sara Purdy MD  Encounter Date: 3/13/2025   Encounter department: [facility]  :  Assessment & Plan  Encounter for follow-up surveillance of melanoma  Mr. Bacon is an 85-year-old gentleman with stage IIIa melanoma/questionable metastatic disease treated with pembrolizumab here for continued monitoring, follow-up and surveillance.  He is doing well with no clinical evidence of disease recurrence.  Did have a PET scan on 2024 that demonstrates no evidence of metastatic disease.  Labs reviewed and okay.  He knows to continue to monitor for any new, changing, concerning skin lesions or lymphadenopathy.  He is now 3 years out from diagnosis and start of treatment with pembrolizumab.  He will switch over to every 6-month follow-up.  We will continue with close surveillance imaging and blood work.  All orders placed and prescription provided for his follow-up in 6 months.  He knows to call with issues or concerns prior to his next visit.         Malignant melanoma of skin of face (HCC)  Mr. Bacon is an 85-year-old gentleman with stage IIIa melanoma/questionable metastatic disease treated with pembrolizumab here for continued monitoring, follow-up and surveillance.  He is doing well with no  clinical evidence of disease recurrence.  Did have a PET scan on 12/18/2024 that demonstrates no evidence of metastatic disease.  Labs reviewed and okay.  He knows to continue to monitor for any new, changing, concerning skin lesions or lymphadenopathy.  He is now 3 years out from diagnosis and start of treatment with pembrolizumab.  He will switch over to every 6-month follow-up.  We will continue with close surveillance imaging and blood work.  All orders placed and prescription provided for his follow-up in 6 months.  He knows to call with issues or concerns prior to his next visit.   Orders:  •  NM pet ct tumor imaging whole body; Future  •  CBC and differential; Future  •  Comprehensive metabolic panel; Future  •  LD,Blood; Future    Malignant melanoma of forehead (HCC)  Mr. Bacon is an 85-year-old gentleman with stage IIIa melanoma/questionable metastatic disease treated with pembrolizumab here for continued monitoring, follow-up and surveillance.  He is doing well with no clinical evidence of disease recurrence.  Did have a PET scan on 12/18/2024 that demonstrates no evidence of metastatic disease.  Labs reviewed and okay.  He knows to continue to monitor for any new, changing, concerning skin lesions or lymphadenopathy.  He is now 3 years out from diagnosis and start of treatment with pembrolizumab.  He will switch over to every 6-month follow-up.  We will continue with close surveillance imaging and blood work.  All orders placed and prescription provided for his follow-up in 6 months.  He knows to call with issues or concerns prior to his next visit.   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 Bacon is an 85-year-old gentleman with stage IIIa melanoma/questionable metastatic disease treated with  pembrolizumab here for continued monitoring, follow-up and surveillance.  He is doing well with no clinical evidence of disease recurrence.  Did have a PET scan on 12/18/2024 that demonstrates no evidence of metastatic disease.  Labs reviewed and okay.  He knows to continue to monitor for any new, changing, concerning skin lesions or lymphadenopathy.  He is now 3 years out from diagnosis and start of treatment with pembrolizumab.  He will switch over to every 6-month follow-up.  We will continue with close surveillance imaging and blood work.  All orders placed and prescription provided for his follow-up in 6 months.  He knows to call with issues or concerns prior to his next visit.   Orders:  •  NM pet ct tumor imaging whole body; Future  •  CBC and differential; Future  •  Comprehensive metabolic panel; Future  •  LD,Blood; Future    Stage 3 chronic kidney disease, unspecified whether stage 3a or 3b CKD (HCC)  Lab Results   Component Value Date    EGFR 41 03/03/2025    EGFR 40 11/25/2024    EGFR 44 08/13/2024    CREATININE 1.50 (H) 03/03/2025    CREATININE 1.53 (H) 11/25/2024    CREATININE 1.43 (H) 08/13/2024     Has chronic kidney disease and EGFR borderline for scans with IV dye/contrast.  Therefore we continue to monitor with PET scans.  Orders:  •  NM pet ct tumor imaging whole body; Future    High risk medication use  Previously on treatment with pembrolizumab.  Continue to monitor for any delayed onset immune mediated side effects.  We continue to monitor thyroid functions as well.       Type 2 diabetes mellitus without complication, without long-term current use of insulin (HCC)    Lab Results   Component Value Date    HGBA1C 6.6 (H) 09/09/2024     Continue follow-up with PCP as planned.  We were mindful of his diabetes in the setting of use of pembrolizumab in case we need to give steroids to treat side effects.  Will continue to monitor as we continue melanoma surveillance.           Return in about 6  months (around 9/13/2025) for Office Visit, labs, scans.    History of Present Illness  Chief Complaint   Patient presents with   • Follow-up     Oncology History  Cancer Staging   Malignant melanoma of skin of face (HCC)  Staging form: Melanoma of the Skin, AJCC 8th Edition  - Clinical stage from 9/8/2021: Stage III (cT2a, cN1a, cM0) - Signed by Sara Purdy MD on 11/7/2021  - Pathologic stage from 9/8/2021: Stage IIIA (pT2a, pN1a, cM0) - Signed by Sara Purdy MD on 11/7/2021  Oncology History   Malignant melanoma of skin of face (HCC)   9/8/2021 -  Cancer Staged    Staging form: Melanoma of the Skin, AJCC 8th Edition  - Clinical stage from 9/8/2021: Stage III (cT2a, cN1a, cM0) - Signed by Sara Purdy MD on 11/7/2021 9/8/2021 -  Cancer Staged    Staging form: Melanoma of the Skin, AJCC 8th Edition  - Pathologic stage from 9/8/2021: Stage IIIA (pT2a, pN1a, cM0) - Signed by Sara Purdy MD on 11/7/2021 9/22/2021 Initial Diagnosis    Malignant melanoma of skin of face (HCC)     4/4/2022 -  Chemotherapy    pembrolizumab (KEYTRUDA) IVPB, 400 mg, Intravenous, Once, 17 of 17 cycles  Administration: 400 mg (4/4/2022), 400 mg (5/16/2022), 400 mg (6/27/2022), 400 mg (8/8/2022), 400 mg (9/19/2022), 400 mg (10/31/2022), 400 mg (12/12/2022), 400 mg (1/23/2023), 400 mg (3/6/2023), 400 mg (5/5/2023), 400 mg (6/15/2023), 400 mg (8/3/2023), 400 mg (9/21/2023), 400 mg (11/16/2023), 400 mg (12/28/2023), 400 mg (2/8/2024), 400 mg (3/21/2024)     Malignant melanoma of forehead (HCC)   11/29/2021 Initial Diagnosis    Malignant melanoma of forehead (HCC)     4/4/2022 -  Chemotherapy    pembrolizumab (KEYTRUDA) IVPB, 400 mg, Intravenous, Once, 17 of 17 cycles  Administration: 400 mg (4/4/2022), 400 mg (5/16/2022), 400 mg (6/27/2022), 400 mg (8/8/2022), 400 mg (9/19/2022), 400 mg (10/31/2022), 400 mg (12/12/2022), 400 mg (1/23/2023), 400 mg (3/6/2023), 400 mg (5/5/2023), 400 mg (6/15/2023), 400 mg  (8/3/2023), 400 mg (9/21/2023), 400 mg (11/16/2023), 400 mg (12/28/2023), 400 mg (2/8/2024), 400 mg (3/21/2024)        Pertinent Medical History    03/19/25:     Mr. Case Abrams is an 85-year-old gentleman with stage IIIa melanoma originally with some concerns for metastatic disease/unresectable disease for which we treated him with pembrolizumab.  He tolerated treatment and completed treatment in February 2024.  Denies any delayed onset immune-mediated side effects.  Denies headaches, double vision, rash, itching, chest pain, shortness of breath, diarrhea.  No muscle weakness or fatigue.    New, changing, concerning skin lesions.  Denies lymphadenopathy.  Overall feeling okay.  Eating okay and energy is okay 2.     Review of Systems   Constitutional:  Negative for activity change, chills, diaphoresis, fatigue, fever and unexpected weight change.   HENT:  Negative for congestion, hearing loss, trouble swallowing and voice change.    Respiratory:  Negative for cough, shortness of breath and wheezing.    Cardiovascular:  Negative for chest pain, palpitations and leg swelling.   Gastrointestinal:  Negative for abdominal distention, abdominal pain, constipation, diarrhea, nausea and vomiting.   Musculoskeletal:  Negative for arthralgias and myalgias.   Skin:  Negative for color change and rash.   Neurological:  Negative for dizziness, seizures, speech difficulty, weakness, light-headedness and headaches.   Hematological:  Negative for adenopathy.     Current Outpatient Medications on File Prior to Visit   Medication Sig Dispense Refill   • atenolol (TENORMIN) 100 mg tablet Take 1 tablet (100 mg total) by mouth daily 90 tablet 3   • glimepiride (AMARYL) 2 mg tablet Take 1 tablet (2 mg total) by mouth daily with breakfast 90 tablet 3   • lisinopril-hydrochlorothiazide (PRINZIDE,ZESTORETIC) 20-25 MG per tablet Take 1 tablet by mouth daily 90 tablet 3   • lovastatin (MEVACOR) 40 MG tablet Take 1 tablet (40 mg total) by mouth  "daily 90 tablet 3   • metFORMIN (GLUCOPHAGE) 1000 MG tablet Take 1 tablet (1,000 mg total) by mouth 2 (two) times a day with meals 180 tablet 3   • omeprazole (PriLOSEC) 40 MG capsule Take 1 capsule (40 mg total) by mouth daily as needed (indigestion, acid reflux) 90 capsule 3     No current facility-administered medications on file prior to visit.          Objective  /70 (BP Location: Left arm, Patient Position: Sitting, Cuff Size: Adult)   Pulse 57   Temp 97.7 °F (36.5 °C) (Temporal)   Resp 18   Ht 5' 7\" (1.702 m)   Wt 73.9 kg (163 lb)   SpO2 92%   BMI 25.53 kg/m²     Pain Screening:  Pain Score: 0-No pain  ECOG ECOG Performance Status: 1 - Restricted in physically strenuous activity but ambulatory and able to carry out work of a light or sedentary nature, e.g., light house work, office work       Physical Exam  Constitutional:       General: He is not in acute distress.     Appearance: Normal appearance. He is not ill-appearing.   HENT:      Head: Normocephalic and atraumatic.      Right Ear: External ear normal.      Left Ear: External ear normal.      Nose: Nose normal. No congestion.      Mouth/Throat:      Mouth: Mucous membranes are moist.      Pharynx: Oropharynx is clear. No oropharyngeal exudate.   Eyes:      General: No scleral icterus.        Right eye: No discharge.         Left eye: No discharge.      Conjunctiva/sclera: Conjunctivae normal.   Cardiovascular:      Rate and Rhythm: Normal rate and regular rhythm.      Pulses: Normal pulses.      Heart sounds: No murmur heard.     No friction rub. No gallop.   Pulmonary:      Effort: Pulmonary effort is normal. No respiratory distress.      Breath sounds: Normal breath sounds. No wheezing or rales.   Abdominal:      General: Bowel sounds are normal. There is no distension.      Palpations: There is no mass.      Tenderness: There is no abdominal tenderness. There is no rebound.   Musculoskeletal:         General: No swelling or tenderness. " Normal range of motion.      Cervical back: Normal range of motion. No rigidity.      Right lower leg: No edema.      Left lower leg: No edema.   Lymphadenopathy:      Head:      Right side of head: No submental, submandibular, preauricular, posterior auricular or occipital adenopathy.      Left side of head: No submental, submandibular, preauricular, posterior auricular or occipital adenopathy.      Cervical: No cervical adenopathy.      Right cervical: No superficial or posterior cervical adenopathy.     Left cervical: No superficial or posterior cervical adenopathy.      Upper Body:      Right upper body: No supraclavicular or axillary adenopathy.      Left upper body: No supraclavicular or axillary adenopathy.   Skin:     General: Skin is warm.      Coloration: Skin is not jaundiced.      Findings: No lesion or rash.      Comments: Scar well healed.  No evidence of recurrence at primary site.  No concerning skin lesions   Neurological:      General: No focal deficit present.      Mental Status: He is alert and oriented to person, place, and time.      Cranial Nerves: No cranial nerve deficit.      Motor: No weakness.      Gait: Gait normal.   Psychiatric:         Mood and Affect: Mood normal.         Behavior: Behavior normal.         Thought Content: Thought content normal.         Judgment: Judgment normal.         Labs: I have reviewed the following labs:  Lab Results   Component Value Date/Time    WBC 7.71 03/03/2025 09:59 AM    RBC 4.59 03/03/2025 09:59 AM    Hemoglobin 13.8 03/03/2025 09:59 AM    Hematocrit 41.9 03/03/2025 09:59 AM    MCV 91 03/03/2025 09:59 AM    MCH 30.1 03/03/2025 09:59 AM    RDW 13.2 03/03/2025 09:59 AM    Platelets 123 (L) 03/03/2025 09:59 AM    Segmented % 53 03/03/2025 09:59 AM    Lymphocytes % 36 03/03/2025 09:59 AM    Monocytes % 7 03/03/2025 09:59 AM    Eosinophils Relative 4 03/03/2025 09:59 AM    Basophils Relative 0 03/03/2025 09:59 AM    Immature Grans % 0 03/03/2025 09:59 AM     Absolute Neutrophils 4.00 03/03/2025 09:59 AM     Lab Results   Component Value Date/Time    Potassium 4.4 03/03/2025 09:59 AM    Chloride 101 03/03/2025 09:59 AM    CO2 31 03/03/2025 09:59 AM    BUN 30 (H) 03/03/2025 09:59 AM    Creatinine 1.50 (H) 03/03/2025 09:59 AM    Glucose, Fasting 130 (H) 11/25/2024 10:52 AM    Calcium 9.2 03/03/2025 09:59 AM    AST 18 03/03/2025 09:59 AM    ALT 16 03/03/2025 09:59 AM    Alkaline Phosphatase 80 03/03/2025 09:59 AM    Total Protein 6.7 03/03/2025 09:59 AM    Albumin 4.3 03/03/2025 09:59 AM    Total Bilirubin 1.36 (H) 03/03/2025 09:59 AM    eGFR 41 03/03/2025 09:59 AM     Lab Results   Component Value Date/Time    TSH 3RD GENERATON 1.897 03/03/2025 09:59 AM    Free T4 0.87 03/03/2025 09:59 AM     (L) 03/03/2025 09:59 AM        Radiology Results Review: I have reviewed radiology reports from 12/18/2024 including: PET scan.        Sara Purdy MD, PhD

## 2025-03-19 NOTE — ASSESSMENT & PLAN NOTE
Lab Results   Component Value Date    HGBA1C 6.6 (H) 09/09/2024     Continue follow-up with PCP as planned.  We were mindful of his diabetes in the setting of use of pembrolizumab in case we need to give steroids to treat side effects.  Will continue to monitor as we continue melanoma surveillance.

## 2025-03-19 NOTE — PROGRESS NOTES
Name: Salvatore Bacon      : 1940      MRN: [id number]  Encounter Provider: Sara Purdy MD  Encounter Date: 3/13/2025   Encounter department: [facility]  :  Assessment & Plan  Encounter for follow-up surveillance of melanoma  Mr. Bacon is an 85-year-old gentleman with stage IIIa melanoma/questionable metastatic disease treated with pembrolizumab here for continued monitoring, follow-up and surveillance.  He is doing well with no clinical evidence of disease recurrence.  Did have a PET scan on 2024 that demonstrates no evidence of metastatic disease.  Labs reviewed and okay.  He knows to continue to monitor for any new, changing, concerning skin lesions or lymphadenopathy.  He is now 3 years out from diagnosis and start of treatment with pembrolizumab.  He will switch over to every 6-month follow-up.  We will continue with close surveillance imaging and blood work.  All orders placed and prescription provided for his follow-up in 6 months.  He knows to call with issues or concerns prior to his next visit.         Malignant melanoma of skin of face (HCC)  Mr. Bacon is an 85-year-old gentleman with stage IIIa melanoma/questionable metastatic disease treated with pembrolizumab here for continued monitoring, follow-up and surveillance.  He is doing well with no clinical evidence of disease recurrence.  Did have a PET scan on 2024 that demonstrates no evidence of metastatic disease.  Labs reviewed and okay.  He knows to continue to monitor for any new, changing, concerning skin lesions or lymphadenopathy.  He is now 3 years out from diagnosis and start of treatment with pembrolizumab.  He will switch over to every 6-month follow-up.  We will continue with close surveillance imaging and blood work.  All orders placed and prescription provided for his follow-up in 6 months.  He knows to call with issues or concerns prior to his next visit.   Orders:    NM  pet ct tumor imaging whole body; Future    CBC and differential; Future    Comprehensive metabolic panel; Future    LD,Blood; Future    Malignant melanoma of forehead (HCC)  Mr. Bacon is an 85-year-old gentleman with stage IIIa melanoma/questionable metastatic disease treated with pembrolizumab here for continued monitoring, follow-up and surveillance.  He is doing well with no clinical evidence of disease recurrence.  Did have a PET scan on 12/18/2024 that demonstrates no evidence of metastatic disease.  Labs reviewed and okay.  He knows to continue to monitor for any new, changing, concerning skin lesions or lymphadenopathy.  He is now 3 years out from diagnosis and start of treatment with pembrolizumab.  He will switch over to every 6-month follow-up.  We will continue with close surveillance imaging and blood work.  All orders placed and prescription provided for his follow-up in 6 months.  He knows to call with issues or concerns prior to his next visit.   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 Bacon is an 85-year-old gentleman with stage IIIa melanoma/questionable metastatic disease treated with pembrolizumab here for continued monitoring, follow-up and surveillance.  He is doing well with no clinical evidence of disease recurrence.  Did have a PET scan on 12/18/2024 that demonstrates no evidence of metastatic disease.  Labs reviewed and okay.  He knows to continue to monitor for any new, changing, concerning skin lesions or lymphadenopathy.  He is now 3 years out from diagnosis and start of treatment with pembrolizumab.  He will switch over to every 6-month follow-up.  We will continue with close surveillance imaging and blood work.  All orders placed and prescription provided for his follow-up in 6  months.  He knows to call with issues or concerns prior to his next visit.   Orders:    NM pet ct tumor imaging whole body; Future    CBC and differential; Future    Comprehensive metabolic panel; Future    LD,Blood; Future    Stage 3 chronic kidney disease, unspecified whether stage 3a or 3b CKD (HCC)  Lab Results   Component Value Date    EGFR 41 03/03/2025    EGFR 40 11/25/2024    EGFR 44 08/13/2024    CREATININE 1.50 (H) 03/03/2025    CREATININE 1.53 (H) 11/25/2024    CREATININE 1.43 (H) 08/13/2024     Has chronic kidney disease and EGFR borderline for scans with IV dye/contrast.  Therefore we continue to monitor with PET scans.  Orders:    NM pet ct tumor imaging whole body; Future    High risk medication use  Previously on treatment with pembrolizumab.  Continue to monitor for any delayed onset immune mediated side effects.  We continue to monitor thyroid functions as well.       Type 2 diabetes mellitus without complication, without long-term current use of insulin (HCC)    Lab Results   Component Value Date    HGBA1C 6.6 (H) 09/09/2024     Continue follow-up with PCP as planned.  We were mindful of his diabetes in the setting of use of pembrolizumab in case we need to give steroids to treat side effects.  Will continue to monitor as we continue melanoma surveillance.           Return in about 6 months (around 9/13/2025) for Office Visit, labs, scans.    History of Present Illness   Chief Complaint   Patient presents with    Follow-up     Oncology History   Cancer Staging   Malignant melanoma of skin of face (HCC)  Staging form: Melanoma of the Skin, AJCC 8th Edition  - Clinical stage from 9/8/2021: Stage III (cT2a, cN1a, cM0) - Signed by Sara Purdy MD on 11/7/2021  - Pathologic stage from 9/8/2021: Stage IIIA (pT2a, pN1a, cM0) - Signed by Sara Purdy MD on 11/7/2021  Oncology History   Malignant melanoma of skin of face (HCC)   9/8/2021 -  Cancer Staged    Staging form: Melanoma of the Skin,  AJCC 8th Edition  - Clinical stage from 9/8/2021: Stage III (cT2a, cN1a, cM0) - Signed by Sara Purdy MD on 11/7/2021 9/8/2021 -  Cancer Staged    Staging form: Melanoma of the Skin, AJCC 8th Edition  - Pathologic stage from 9/8/2021: Stage IIIA (pT2a, pN1a, cM0) - Signed by Sara Purdy MD on 11/7/2021 9/22/2021 Initial Diagnosis    Malignant melanoma of skin of face (HCC)     4/4/2022 -  Chemotherapy    pembrolizumab (KEYTRUDA) IVPB, 400 mg, Intravenous, Once, 17 of 17 cycles  Administration: 400 mg (4/4/2022), 400 mg (5/16/2022), 400 mg (6/27/2022), 400 mg (8/8/2022), 400 mg (9/19/2022), 400 mg (10/31/2022), 400 mg (12/12/2022), 400 mg (1/23/2023), 400 mg (3/6/2023), 400 mg (5/5/2023), 400 mg (6/15/2023), 400 mg (8/3/2023), 400 mg (9/21/2023), 400 mg (11/16/2023), 400 mg (12/28/2023), 400 mg (2/8/2024), 400 mg (3/21/2024)     Malignant melanoma of forehead (HCC)   11/29/2021 Initial Diagnosis    Malignant melanoma of forehead (HCC)     4/4/2022 -  Chemotherapy    pembrolizumab (KEYTRUDA) IVPB, 400 mg, Intravenous, Once, 17 of 17 cycles  Administration: 400 mg (4/4/2022), 400 mg (5/16/2022), 400 mg (6/27/2022), 400 mg (8/8/2022), 400 mg (9/19/2022), 400 mg (10/31/2022), 400 mg (12/12/2022), 400 mg (1/23/2023), 400 mg (3/6/2023), 400 mg (5/5/2023), 400 mg (6/15/2023), 400 mg (8/3/2023), 400 mg (9/21/2023), 400 mg (11/16/2023), 400 mg (12/28/2023), 400 mg (2/8/2024), 400 mg (3/21/2024)        Pertinent Medical History     03/19/25:     Mr. Case Abrams is an 85-year-old gentleman with stage IIIa melanoma originally with some concerns for metastatic disease/unresectable disease for which we treated him with pembrolizumab.  He tolerated treatment and completed treatment in February 2024.  Denies any delayed onset immune-mediated side effects.  Denies headaches, double vision, rash, itching, chest pain, shortness of breath, diarrhea.  No muscle weakness or fatigue.    New, changing, concerning skin  "lesions.  Denies lymphadenopathy.  Overall feeling okay.  Eating okay and energy is okay 2.     Review of Systems   Constitutional:  Negative for activity change, chills, diaphoresis, fatigue, fever and unexpected weight change.   HENT:  Negative for congestion, hearing loss, trouble swallowing and voice change.    Respiratory:  Negative for cough, shortness of breath and wheezing.    Cardiovascular:  Negative for chest pain, palpitations and leg swelling.   Gastrointestinal:  Negative for abdominal distention, abdominal pain, constipation, diarrhea, nausea and vomiting.   Musculoskeletal:  Negative for arthralgias and myalgias.   Skin:  Negative for color change and rash.   Neurological:  Negative for dizziness, seizures, speech difficulty, weakness, light-headedness and headaches.   Hematological:  Negative for adenopathy.     Current Outpatient Medications on File Prior to Visit   Medication Sig Dispense Refill    atenolol (TENORMIN) 100 mg tablet Take 1 tablet (100 mg total) by mouth daily 90 tablet 3    glimepiride (AMARYL) 2 mg tablet Take 1 tablet (2 mg total) by mouth daily with breakfast 90 tablet 3    lisinopril-hydrochlorothiazide (PRINZIDE,ZESTORETIC) 20-25 MG per tablet Take 1 tablet by mouth daily 90 tablet 3    lovastatin (MEVACOR) 40 MG tablet Take 1 tablet (40 mg total) by mouth daily 90 tablet 3    metFORMIN (GLUCOPHAGE) 1000 MG tablet Take 1 tablet (1,000 mg total) by mouth 2 (two) times a day with meals 180 tablet 3    omeprazole (PriLOSEC) 40 MG capsule Take 1 capsule (40 mg total) by mouth daily as needed (indigestion, acid reflux) 90 capsule 3     No current facility-administered medications on file prior to visit.          Objective   /70 (BP Location: Left arm, Patient Position: Sitting, Cuff Size: Adult)   Pulse 57   Temp 97.7 °F (36.5 °C) (Temporal)   Resp 18   Ht 5' 7\" (1.702 m)   Wt 73.9 kg (163 lb)   SpO2 92%   BMI 25.53 kg/m²     Pain Screening:  Pain Score: 0-No " pain  ECOG ECOG Performance Status: 1 - Restricted in physically strenuous activity but ambulatory and able to carry out work of a light or sedentary nature, e.g., light house work, office work       Physical Exam  Constitutional:       General: He is not in acute distress.     Appearance: Normal appearance. He is not ill-appearing.   HENT:      Head: Normocephalic and atraumatic.      Right Ear: External ear normal.      Left Ear: External ear normal.      Nose: Nose normal. No congestion.      Mouth/Throat:      Mouth: Mucous membranes are moist.      Pharynx: Oropharynx is clear. No oropharyngeal exudate.   Eyes:      General: No scleral icterus.        Right eye: No discharge.         Left eye: No discharge.      Conjunctiva/sclera: Conjunctivae normal.   Cardiovascular:      Rate and Rhythm: Normal rate and regular rhythm.      Pulses: Normal pulses.      Heart sounds: No murmur heard.     No friction rub. No gallop.   Pulmonary:      Effort: Pulmonary effort is normal. No respiratory distress.      Breath sounds: Normal breath sounds. No wheezing or rales.   Abdominal:      General: Bowel sounds are normal. There is no distension.      Palpations: There is no mass.      Tenderness: There is no abdominal tenderness. There is no rebound.   Musculoskeletal:         General: No swelling or tenderness. Normal range of motion.      Cervical back: Normal range of motion. No rigidity.      Right lower leg: No edema.      Left lower leg: No edema.   Lymphadenopathy:      Head:      Right side of head: No submental, submandibular, preauricular, posterior auricular or occipital adenopathy.      Left side of head: No submental, submandibular, preauricular, posterior auricular or occipital adenopathy.      Cervical: No cervical adenopathy.      Right cervical: No superficial or posterior cervical adenopathy.     Left cervical: No superficial or posterior cervical adenopathy.      Upper Body:      Right upper body: No  supraclavicular or axillary adenopathy.      Left upper body: No supraclavicular or axillary adenopathy.   Skin:     General: Skin is warm.      Coloration: Skin is not jaundiced.      Findings: No lesion or rash.      Comments: Scar well healed.  No evidence of recurrence at primary site.  No concerning skin lesions   Neurological:      General: No focal deficit present.      Mental Status: He is alert and oriented to person, place, and time.      Cranial Nerves: No cranial nerve deficit.      Motor: No weakness.      Gait: Gait normal.   Psychiatric:         Mood and Affect: Mood normal.         Behavior: Behavior normal.         Thought Content: Thought content normal.         Judgment: Judgment normal.         Labs: I have reviewed the following labs:  Lab Results   Component Value Date/Time    WBC 7.71 03/03/2025 09:59 AM    RBC 4.59 03/03/2025 09:59 AM    Hemoglobin 13.8 03/03/2025 09:59 AM    Hematocrit 41.9 03/03/2025 09:59 AM    MCV 91 03/03/2025 09:59 AM    MCH 30.1 03/03/2025 09:59 AM    RDW 13.2 03/03/2025 09:59 AM    Platelets 123 (L) 03/03/2025 09:59 AM    Segmented % 53 03/03/2025 09:59 AM    Lymphocytes % 36 03/03/2025 09:59 AM    Monocytes % 7 03/03/2025 09:59 AM    Eosinophils Relative 4 03/03/2025 09:59 AM    Basophils Relative 0 03/03/2025 09:59 AM    Immature Grans % 0 03/03/2025 09:59 AM    Absolute Neutrophils 4.00 03/03/2025 09:59 AM     Lab Results   Component Value Date/Time    Potassium 4.4 03/03/2025 09:59 AM    Chloride 101 03/03/2025 09:59 AM    CO2 31 03/03/2025 09:59 AM    BUN 30 (H) 03/03/2025 09:59 AM    Creatinine 1.50 (H) 03/03/2025 09:59 AM    Glucose, Fasting 130 (H) 11/25/2024 10:52 AM    Calcium 9.2 03/03/2025 09:59 AM    AST 18 03/03/2025 09:59 AM    ALT 16 03/03/2025 09:59 AM    Alkaline Phosphatase 80 03/03/2025 09:59 AM    Total Protein 6.7 03/03/2025 09:59 AM    Albumin 4.3 03/03/2025 09:59 AM    Total Bilirubin 1.36 (H) 03/03/2025 09:59 AM    eGFR 41 03/03/2025 09:59 AM      Lab Results   Component Value Date/Time    TSH 3RD GENERATON 1.897 03/03/2025 09:59 AM    Free T4 0.87 03/03/2025 09:59 AM     (L) 03/03/2025 09:59 AM        Radiology Results Review: I have reviewed radiology reports from 12/18/2024 including: PET scan.        Sara Purdy MD, PhD

## 2025-03-19 NOTE — ASSESSMENT & PLAN NOTE
Lab Results   Component Value Date    EGFR 41 03/03/2025    EGFR 40 11/25/2024    EGFR 44 08/13/2024    CREATININE 1.50 (H) 03/03/2025    CREATININE 1.53 (H) 11/25/2024    CREATININE 1.43 (H) 08/13/2024     Has chronic kidney disease and EGFR borderline for scans with IV dye/contrast.  Therefore we continue to monitor with PET scans.  Orders:    NM pet ct tumor imaging whole body; Future

## 2025-03-19 NOTE — ASSESSMENT & PLAN NOTE
Previously on treatment with pembrolizumab.  Continue to monitor for any delayed onset immune mediated side effects.  We continue to monitor thyroid functions as well.

## 2025-03-19 NOTE — ASSESSMENT & PLAN NOTE
Mr. Bacon is an 85-year-old gentleman with stage IIIa melanoma/questionable metastatic disease treated with pembrolizumab here for continued monitoring, follow-up and surveillance.  He is doing well with no clinical evidence of disease recurrence.  Did have a PET scan on 12/18/2024 that demonstrates no evidence of metastatic disease.  Labs reviewed and okay.  He knows to continue to monitor for any new, changing, concerning skin lesions or lymphadenopathy.  He is now 3 years out from diagnosis and start of treatment with pembrolizumab.  He will switch over to every 6-month follow-up.  We will continue with close surveillance imaging and blood work.  All orders placed and prescription provided for his follow-up in 6 months.  He knows to call with issues or concerns prior to his next visit.   Orders:    NM pet ct tumor imaging whole body; Future    CBC and differential; Future    Comprehensive metabolic panel; Future    LD,Blood; Future

## 2025-04-16 PROCEDURE — 88341 IMHCHEM/IMCYTCHM EA ADD ANTB: CPT | Performed by: STUDENT IN AN ORGANIZED HEALTH CARE EDUCATION/TRAINING PROGRAM

## 2025-04-16 PROCEDURE — 88342 IMHCHEM/IMCYTCHM 1ST ANTB: CPT | Performed by: STUDENT IN AN ORGANIZED HEALTH CARE EDUCATION/TRAINING PROGRAM

## 2025-04-16 PROCEDURE — 88305 TISSUE EXAM BY PATHOLOGIST: CPT | Performed by: STUDENT IN AN ORGANIZED HEALTH CARE EDUCATION/TRAINING PROGRAM

## 2025-04-16 PROCEDURE — 88360 TUMOR IMMUNOHISTOCHEM/MANUAL: CPT | Performed by: STUDENT IN AN ORGANIZED HEALTH CARE EDUCATION/TRAINING PROGRAM

## 2025-04-17 ENCOUNTER — LAB REQUISITION (OUTPATIENT)
Dept: LAB | Facility: HOSPITAL | Age: 85
End: 2025-04-17
Payer: COMMERCIAL

## 2025-04-17 DIAGNOSIS — D48.5 NEOPLASM OF UNCERTAIN BEHAVIOR OF SKIN: ICD-10-CM

## 2025-04-22 PROCEDURE — 88342 IMHCHEM/IMCYTCHM 1ST ANTB: CPT | Performed by: STUDENT IN AN ORGANIZED HEALTH CARE EDUCATION/TRAINING PROGRAM

## 2025-04-22 PROCEDURE — 88360 TUMOR IMMUNOHISTOCHEM/MANUAL: CPT | Performed by: STUDENT IN AN ORGANIZED HEALTH CARE EDUCATION/TRAINING PROGRAM

## 2025-04-22 PROCEDURE — 88305 TISSUE EXAM BY PATHOLOGIST: CPT | Performed by: STUDENT IN AN ORGANIZED HEALTH CARE EDUCATION/TRAINING PROGRAM

## 2025-04-22 PROCEDURE — 88341 IMHCHEM/IMCYTCHM EA ADD ANTB: CPT | Performed by: STUDENT IN AN ORGANIZED HEALTH CARE EDUCATION/TRAINING PROGRAM

## 2025-06-04 ENCOUNTER — APPOINTMENT (OUTPATIENT)
Dept: LAB | Facility: IMAGING CENTER | Age: 85
End: 2025-06-04
Payer: COMMERCIAL

## 2025-06-04 DIAGNOSIS — R80.9 MICROALBUMINURIA: ICD-10-CM

## 2025-06-04 DIAGNOSIS — I10 ESSENTIAL HYPERTENSION: ICD-10-CM

## 2025-06-04 DIAGNOSIS — D69.6 THROMBOCYTOPENIA (HCC): ICD-10-CM

## 2025-06-04 DIAGNOSIS — N18.30 STAGE 3 CHRONIC KIDNEY DISEASE, UNSPECIFIED WHETHER STAGE 3A OR 3B CKD (HCC): ICD-10-CM

## 2025-06-04 DIAGNOSIS — E11.9 TYPE 2 DIABETES MELLITUS WITHOUT COMPLICATION, WITHOUT LONG-TERM CURRENT USE OF INSULIN (HCC): ICD-10-CM

## 2025-06-04 DIAGNOSIS — E78.2 MIXED HYPERLIPIDEMIA: ICD-10-CM

## 2025-06-04 LAB
ALBUMIN SERPL BCG-MCNC: 4.5 G/DL (ref 3.5–5)
ALP SERPL-CCNC: 79 U/L (ref 34–104)
ALT SERPL W P-5'-P-CCNC: 17 U/L (ref 7–52)
ANION GAP SERPL CALCULATED.3IONS-SCNC: 8 MMOL/L (ref 4–13)
AST SERPL W P-5'-P-CCNC: 20 U/L (ref 13–39)
BILIRUB SERPL-MCNC: 1.05 MG/DL (ref 0.2–1)
BUN SERPL-MCNC: 30 MG/DL (ref 5–25)
CALCIUM SERPL-MCNC: 9 MG/DL (ref 8.4–10.2)
CHLORIDE SERPL-SCNC: 103 MMOL/L (ref 96–108)
CHOLEST SERPL-MCNC: 149 MG/DL (ref ?–200)
CO2 SERPL-SCNC: 30 MMOL/L (ref 21–32)
CREAT SERPL-MCNC: 1.56 MG/DL (ref 0.6–1.3)
CREAT UR-MCNC: 125.5 MG/DL
ERYTHROCYTE [DISTWIDTH] IN BLOOD BY AUTOMATED COUNT: 13.1 % (ref 11.6–15.1)
EST. AVERAGE GLUCOSE BLD GHB EST-MCNC: 151 MG/DL
GFR SERPL CREATININE-BSD FRML MDRD: 39 ML/MIN/1.73SQ M
GLUCOSE P FAST SERPL-MCNC: 121 MG/DL (ref 65–99)
HBA1C MFR BLD: 6.9 %
HCT VFR BLD AUTO: 44.6 % (ref 36.5–49.3)
HDLC SERPL-MCNC: 47 MG/DL
HGB BLD-MCNC: 14.1 G/DL (ref 12–17)
LDLC SERPL CALC-MCNC: 72 MG/DL (ref 0–100)
MCH RBC QN AUTO: 29.4 PG (ref 26.8–34.3)
MCHC RBC AUTO-ENTMCNC: 31.6 G/DL (ref 31.4–37.4)
MCV RBC AUTO: 93 FL (ref 82–98)
MICROALBUMIN UR-MCNC: 35.8 MG/L
MICROALBUMIN/CREAT 24H UR: 29 MG/G CREATININE (ref 0–30)
NONHDLC SERPL-MCNC: 102 MG/DL
PLATELET # BLD AUTO: 140 THOUSANDS/UL (ref 149–390)
PMV BLD AUTO: 10.8 FL (ref 8.9–12.7)
POTASSIUM SERPL-SCNC: 4.4 MMOL/L (ref 3.5–5.3)
PROT SERPL-MCNC: 6.9 G/DL (ref 6.4–8.4)
RBC # BLD AUTO: 4.79 MILLION/UL (ref 3.88–5.62)
SODIUM SERPL-SCNC: 141 MMOL/L (ref 135–147)
TRIGL SERPL-MCNC: 151 MG/DL (ref ?–150)
WBC # BLD AUTO: 8.26 THOUSAND/UL (ref 4.31–10.16)

## 2025-06-04 PROCEDURE — 36415 COLL VENOUS BLD VENIPUNCTURE: CPT

## 2025-06-04 PROCEDURE — 85027 COMPLETE CBC AUTOMATED: CPT

## 2025-06-04 PROCEDURE — 82570 ASSAY OF URINE CREATININE: CPT

## 2025-06-04 PROCEDURE — 82043 UR ALBUMIN QUANTITATIVE: CPT

## 2025-06-04 PROCEDURE — 80053 COMPREHEN METABOLIC PANEL: CPT

## 2025-06-04 PROCEDURE — 80061 LIPID PANEL: CPT

## 2025-06-04 PROCEDURE — 83036 HEMOGLOBIN GLYCOSYLATED A1C: CPT

## 2025-06-10 ENCOUNTER — OFFICE VISIT (OUTPATIENT)
Dept: INTERNAL MEDICINE CLINIC | Facility: OTHER | Age: 85
End: 2025-06-10
Payer: COMMERCIAL

## 2025-06-10 VITALS
OXYGEN SATURATION: 97 % | HEIGHT: 67 IN | BODY MASS INDEX: 25.55 KG/M2 | DIASTOLIC BLOOD PRESSURE: 60 MMHG | SYSTOLIC BLOOD PRESSURE: 110 MMHG | WEIGHT: 162.8 LBS | RESPIRATION RATE: 18 BRPM | HEART RATE: 60 BPM | TEMPERATURE: 97.9 F

## 2025-06-10 DIAGNOSIS — E11.9 TYPE 2 DIABETES MELLITUS WITHOUT COMPLICATION, WITHOUT LONG-TERM CURRENT USE OF INSULIN (HCC): ICD-10-CM

## 2025-06-10 DIAGNOSIS — I10 ESSENTIAL HYPERTENSION: Primary | ICD-10-CM

## 2025-06-10 DIAGNOSIS — E78.2 MIXED HYPERLIPIDEMIA: ICD-10-CM

## 2025-06-10 DIAGNOSIS — N18.30 STAGE 3 CHRONIC KIDNEY DISEASE, UNSPECIFIED WHETHER STAGE 3A OR 3B CKD (HCC): ICD-10-CM

## 2025-06-10 DIAGNOSIS — C43.30 MALIGNANT MELANOMA OF SKIN OF FACE (HCC): ICD-10-CM

## 2025-06-10 PROBLEM — R59.0 LEFT CERVICAL LYMPHADENOPATHY: Status: RESOLVED | Noted: 2023-11-16 | Resolved: 2025-06-10

## 2025-06-10 PROBLEM — R80.9 MICROALBUMINURIA: Status: RESOLVED | Noted: 2021-06-03 | Resolved: 2025-06-10

## 2025-06-10 PROCEDURE — 99214 OFFICE O/P EST MOD 30 MIN: CPT | Performed by: INTERNAL MEDICINE

## 2025-06-10 RX ORDER — DOXYCYCLINE HYCLATE 100 MG
100 TABLET ORAL 2 TIMES DAILY
COMMUNITY
Start: 2025-06-09

## 2025-06-10 NOTE — ASSESSMENT & PLAN NOTE
Continue current diabetic regimen.  Trend A1c.  Lab Results   Component Value Date    HGBA1C 6.9 (H) 06/04/2025     Orders:  •  Hemoglobin A1C; Future

## 2025-06-10 NOTE — PROGRESS NOTES
Name: Salvatore Bacon      : 1940      MRN: [id number]  Encounter Provider: Joey Jarrell MD  Encounter Date: 6/10/2025   Encounter department: [facility]  :  Assessment & Plan  Essential hypertension  Controlled.  Continue current antihypertensive regimen.       Type 2 diabetes mellitus without complication, without long-term current use of insulin (HCC)  Continue current diabetic regimen.  Trend A1c.  Lab Results   Component Value Date    HGBA1C 6.9 (H) 2025     Orders:  •  Hemoglobin A1C; Future    Malignant melanoma of skin of face (HCC)  Continue follow-up with dermatology and oncology.       Stage 3 chronic kidney disease, unspecified whether stage 3a or 3b CKD (HCC)  Lab Results   Component Value Date    EGFR 39 2025    EGFR 41 2025    EGFR 40 2024    CREATININE 1.56 (H) 2025    CREATININE 1.50 (H) 2025    CREATININE 1.53 (H) 2024   Stable, continue to trend kidney function.       Mixed hyperlipidemia  Controlled with lovastatin.             Depression Screening and Follow-up Plan: Patient was screened for depression during today's encounter. They screened negative with a PHQ-2 score of 0.        History of Present Illness   Salvatore Bacon is seen today for follow up of chronic conditions.   Recent laboratory studies reviewed today with the patient.   he has been compliant with his medication regimen.   He underwent excision of melanoma yesterday by his dermatologist.   he has no complaints or concerns at this time.       Diabetes  He presents for his follow-up diabetic visit. He has type 2 diabetes mellitus. His disease course has been stable. There are no hypoglycemic associated symptoms. Pertinent negatives for hypoglycemia include no confusion. There are no diabetic associated symptoms. Pertinent negatives for diabetes include no chest pain and no fatigue. Symptoms are stable. Current diabetic treatment includes oral  agent (dual therapy). He is compliant with treatment all of the time. An ACE inhibitor/angiotensin II receptor blocker is being taken. He does not see a podiatrist.Eye exam is current.   Hypertension  This is a chronic problem. The current episode started more than 1 year ago. The problem is unchanged. The problem is controlled. Pertinent negatives include no chest pain, palpitations or shortness of breath. Past treatments include ACE inhibitors, diuretics and beta blockers. The current treatment provides moderate improvement. There are no compliance problems.    Heartburn  He reports no abdominal pain, no chest pain, no coughing, no nausea, no sore throat or no wheezing. This is a chronic problem. The current episode started more than 1 year ago. The problem occurs rarely. The problem has been unchanged. The symptoms are aggravated by certain foods. Pertinent negatives include no fatigue. He has tried a PPI for the symptoms. The treatment provided moderate relief.   Hyperlipidemia  This is a chronic problem. The current episode started more than 1 year ago. The problem is controlled. Recent lipid tests were reviewed and are normal. Pertinent negatives include no chest pain or shortness of breath. Current antihyperlipidemic treatment includes statins. The current treatment provides moderate improvement of lipids. There are no compliance problems.      Review of Systems   Constitutional: Negative.  Negative for chills, fatigue and fever.   HENT:  Negative for congestion, ear pain, postnasal drip, rhinorrhea and sore throat.    Eyes: Negative.    Respiratory:  Negative for cough, chest tightness, shortness of breath and wheezing.    Cardiovascular:  Negative for chest pain and palpitations.   Gastrointestinal:  Negative for abdominal distention, abdominal pain, blood in stool, constipation, diarrhea and nausea.   Endocrine: Negative.    Genitourinary:  Negative for difficulty urinating, dysuria and hematuria.  "  Musculoskeletal: Negative.    Skin: Negative.    Allergic/Immunologic: Negative for environmental allergies and food allergies.   Neurological: Negative.    Hematological:  Negative for adenopathy.   Psychiatric/Behavioral:  Negative for agitation, behavioral problems, confusion and sleep disturbance.    All other systems reviewed and are negative.      Objective   /60 (BP Location: Left arm, Patient Position: Sitting, Cuff Size: Standard)   Pulse 60   Temp 97.9 °F (36.6 °C) (Temporal)   Resp 18   Ht 5' 7\" (1.702 m)   Wt 73.8 kg (162 lb 12.8 oz)   SpO2 97%   BMI 25.50 kg/m²      Physical Exam  Vitals and nursing note reviewed.   Constitutional:       General: He is not in acute distress.     Appearance: He is well-developed. He is not diaphoretic.   HENT:      Head: Normocephalic and atraumatic.       Eyes:      General: No scleral icterus.        Right eye: No discharge.         Left eye: No discharge.      Conjunctiva/sclera: Conjunctivae normal.      Pupils: Pupils are equal, round, and reactive to light.     Neck:      Thyroid: No thyromegaly.      Vascular: No JVD.     Cardiovascular:      Rate and Rhythm: Normal rate and regular rhythm.      Heart sounds: Normal heart sounds. No murmur heard.     No friction rub. No gallop.   Pulmonary:      Effort: Pulmonary effort is normal. No respiratory distress.      Breath sounds: Normal breath sounds. No wheezing or rales.   Chest:      Chest wall: No tenderness.   Abdominal:      General: Bowel sounds are normal. There is no distension.      Palpations: Abdomen is soft. There is no mass.      Tenderness: There is no abdominal tenderness. There is no guarding or rebound.     Musculoskeletal:         General: No tenderness or deformity. Normal range of motion.      Cervical back: Normal range of motion and neck supple.   Lymphadenopathy:      Cervical: No cervical adenopathy.     Skin:     General: Skin is warm and dry.      Coloration: Skin is not pale. "      Findings: No erythema or rash.     Neurological:      Mental Status: He is alert and oriented to person, place, and time.      Cranial Nerves: No cranial nerve deficit.      Coordination: Coordination normal.      Deep Tendon Reflexes: Reflexes are normal and symmetric.     Psychiatric:         Behavior: Behavior normal.         Thought Content: Thought content normal.         Judgment: Judgment normal.

## 2025-06-10 NOTE — ASSESSMENT & PLAN NOTE
Lab Results   Component Value Date    EGFR 39 06/04/2025    EGFR 41 03/03/2025    EGFR 40 11/25/2024    CREATININE 1.56 (H) 06/04/2025    CREATININE 1.50 (H) 03/03/2025    CREATININE 1.53 (H) 11/25/2024   Stable, continue to trend kidney function.

## 2025-07-28 DIAGNOSIS — E78.2 MIXED HYPERLIPIDEMIA: ICD-10-CM

## 2025-07-28 DIAGNOSIS — I10 BENIGN ESSENTIAL HTN: ICD-10-CM

## 2025-07-28 DIAGNOSIS — E11.9 TYPE 2 DIABETES MELLITUS WITHOUT COMPLICATION, WITHOUT LONG-TERM CURRENT USE OF INSULIN (HCC): ICD-10-CM

## 2025-07-29 RX ORDER — ATENOLOL 100 MG/1
100 TABLET ORAL DAILY
Qty: 90 TABLET | Refills: 1 | Status: SHIPPED | OUTPATIENT
Start: 2025-07-29

## 2025-07-29 RX ORDER — GLIMEPIRIDE 2 MG/1
2 TABLET ORAL
Qty: 90 TABLET | Refills: 1 | Status: SHIPPED | OUTPATIENT
Start: 2025-07-29

## 2025-07-29 RX ORDER — LOVASTATIN 40 MG/1
40 TABLET ORAL DAILY
Qty: 90 TABLET | Refills: 1 | Status: SHIPPED | OUTPATIENT
Start: 2025-07-29

## 2025-07-29 RX ORDER — LISINOPRIL AND HYDROCHLOROTHIAZIDE 20; 25 MG/1; MG/1
1 TABLET ORAL DAILY
Qty: 90 TABLET | Refills: 1 | Status: SHIPPED | OUTPATIENT
Start: 2025-07-29

## (undated) DEVICE — DRESSING XEROFORM 5 X 9

## (undated) DEVICE — HARMONIC FOCUS SHEARS 9CM LENGTH + ADAPTIVE TISSUE TECHNOLOGY FOR USE WITH BLUE HAND PIECE ONLY: Brand: HARMONIC FOCUS

## (undated) DEVICE — PACK PBDS PLASTIC HEAD AND NECK RF

## (undated) DEVICE — INTENDED FOR TISSUE SEPARATION, AND OTHER PROCEDURES THAT REQUIRE A SHARP SURGICAL BLADE TO PUNCTURE OR CUT.: Brand: BARD-PARKER SAFETY BLADES SIZE 15, STERILE

## (undated) DEVICE — MEDI-VAC YANK SUCT HNDL W/TPRD BULBOUS TIP: Brand: CARDINAL HEALTH

## (undated) DEVICE — 3M™ STERI-STRIP™ REINFORCED ADHESIVE SKIN CLOSURES, R1547, 1/2 IN X 4 IN (12 MM X 100 MM), 6 STRIPS/ENVELOPE: Brand: 3M™ STERI-STRIP™

## (undated) DEVICE — COTTON BALLS: Brand: DEROYAL

## (undated) DEVICE — NEEDLE 25G X 1 1/2

## (undated) DEVICE — ELECTRODE BLADE MOD E-Z CLEAN  2.75IN 7CM -0012AM

## (undated) DEVICE — SUT MONOCRYL 5-0 P-3 18 IN Y493G

## (undated) DEVICE — SURGICEL SNOW 1 X 2 IN

## (undated) DEVICE — 3M™ STERI-STRIP™ REINFORCED ADHESIVE SKIN CLOSURES, R1541, 1/4 IN X 3 IN (6 MM X 75 MM), 3 STRIPS/ENVELOPE: Brand: 3M™ STERI-STRIP™

## (undated) DEVICE — TUBING SUCTION 5MM X 12 FT

## (undated) DEVICE — INTENDED FOR TISSUE SEPARATION, AND OTHER PROCEDURES THAT REQUIRE A SHARP SURGICAL BLADE TO PUNCTURE OR CUT.: Brand: BARD-PARKER SAFETY BLADES SIZE 10, STERILE

## (undated) DEVICE — PENCIL ELECTROSURG E-Z CLEAN -0035H

## (undated) DEVICE — SMOKE EVACUATION TUBING WITH 8 IN INTEGRAL WAND AND SPONGE GUARD: Brand: BUFFALO FILTER

## (undated) DEVICE — SPONGE LAP 18 X 18 IN

## (undated) DEVICE — SYRINGE 10ML LL

## (undated) DEVICE — ADHESIVE SKIN HIGH VISCOSITY EXOFIN 1ML

## (undated) DEVICE — BETHLEHEM UNIVERSAL OUTPATIENT: Brand: CARDINAL HEALTH

## (undated) DEVICE — SPONGE STICK WITH PVP-I: Brand: KENDALL

## (undated) DEVICE — PACK UNIVERSAL DRAPES SUB-Q ICD

## (undated) DEVICE — SHOE, POST-OPERATIVE: Brand: DEROYAL

## (undated) DEVICE — BETHLEHEM UNIVERSAL MINOR GEN: Brand: CARDINAL HEALTH

## (undated) DEVICE — SPONGE 4 X 4 XRAY 16 PLY STRL LF RFD

## (undated) DEVICE — GLOVE INDICATOR PI UNDERGLOVE SZ 8 BLUE

## (undated) DEVICE — SUT VICRYL 3-0 SH 27 IN J416H

## (undated) DEVICE — DERMATOME BLADES: Brand: DERMATOME

## (undated) DEVICE — LIGHT HANDLE COVER SLEEVE DISP BLUE STELLAR

## (undated) DEVICE — CHLORAPREP HI-LITE 26ML ORANGE

## (undated) DEVICE — BULB SYRINGE,IRRIGATION WITH PROTECTIVE CAP: Brand: DOVER

## (undated) DEVICE — GLOVE SRG BIOGEL 6.5

## (undated) DEVICE — SUT VICRYL 2-0 SH 27 IN UNDYED J417H

## (undated) DEVICE — PAD GROUNDING ADULT

## (undated) DEVICE — SPECIMEN CONTAINER STERILE PEEL PACK

## (undated) DEVICE — PLUMEPEN PRO 10FT